# Patient Record
Sex: FEMALE | Race: WHITE | NOT HISPANIC OR LATINO
[De-identification: names, ages, dates, MRNs, and addresses within clinical notes are randomized per-mention and may not be internally consistent; named-entity substitution may affect disease eponyms.]

---

## 2017-01-03 ENCOUNTER — MEDICATION RENEWAL (OUTPATIENT)
Age: 82
End: 2017-01-03

## 2017-02-01 ENCOUNTER — MEDICATION RENEWAL (OUTPATIENT)
Age: 82
End: 2017-02-01

## 2017-02-14 ENCOUNTER — MEDICATION RENEWAL (OUTPATIENT)
Age: 82
End: 2017-02-14

## 2017-03-08 ENCOUNTER — MEDICATION RENEWAL (OUTPATIENT)
Age: 82
End: 2017-03-08

## 2017-04-19 ENCOUNTER — RX RENEWAL (OUTPATIENT)
Age: 82
End: 2017-04-19

## 2017-04-25 ENCOUNTER — APPOINTMENT (OUTPATIENT)
Dept: CARDIOLOGY | Facility: CLINIC | Age: 82
End: 2017-04-25

## 2017-05-02 ENCOUNTER — NON-APPOINTMENT (OUTPATIENT)
Age: 82
End: 2017-05-02

## 2017-05-02 ENCOUNTER — APPOINTMENT (OUTPATIENT)
Dept: CARDIOLOGY | Facility: CLINIC | Age: 82
End: 2017-05-02

## 2017-05-02 VITALS
BODY MASS INDEX: 34.73 KG/M2 | WEIGHT: 196 LBS | DIASTOLIC BLOOD PRESSURE: 65 MMHG | HEIGHT: 63 IN | OXYGEN SATURATION: 88 % | SYSTOLIC BLOOD PRESSURE: 129 MMHG | HEART RATE: 79 BPM

## 2017-05-03 LAB
ALBUMIN SERPL ELPH-MCNC: 3.6 G/DL
ALP BLD-CCNC: 41 U/L
ALT SERPL-CCNC: 14 U/L
ANION GAP SERPL CALC-SCNC: 14 MMOL/L
AST SERPL-CCNC: 22 U/L
BASOPHILS # BLD AUTO: 0.01 K/UL
BASOPHILS NFR BLD AUTO: 0.2 %
BILIRUB SERPL-MCNC: 0.5 MG/DL
BUN SERPL-MCNC: 15 MG/DL
CALCIUM SERPL-MCNC: 8.6 MG/DL
CHLORIDE SERPL-SCNC: 104 MMOL/L
CHOLEST SERPL-MCNC: 92 MG/DL
CHOLEST/HDLC SERPL: 3.2 RATIO
CO2 SERPL-SCNC: 26 MMOL/L
CREAT SERPL-MCNC: 0.99 MG/DL
EOSINOPHIL # BLD AUTO: 0.26 K/UL
EOSINOPHIL NFR BLD AUTO: 4 %
GLUCOSE SERPL-MCNC: 92 MG/DL
HCT VFR BLD CALC: 39.2 %
HDLC SERPL-MCNC: 29 MG/DL
HGB BLD-MCNC: 12.2 G/DL
IMM GRANULOCYTES NFR BLD AUTO: 0.5 %
LDLC SERPL CALC-MCNC: 44 MG/DL
LYMPHOCYTES # BLD AUTO: 1.31 K/UL
LYMPHOCYTES NFR BLD AUTO: 20.4 %
MAN DIFF?: NORMAL
MCHC RBC-ENTMCNC: 29.9 PG
MCHC RBC-ENTMCNC: 31.1 GM/DL
MCV RBC AUTO: 96.1 FL
MONOCYTES # BLD AUTO: 0.78 K/UL
MONOCYTES NFR BLD AUTO: 12.1 %
NEUTROPHILS # BLD AUTO: 4.03 K/UL
NEUTROPHILS NFR BLD AUTO: 62.8 %
PLATELET # BLD AUTO: 153 K/UL
POTASSIUM SERPL-SCNC: 3.9 MMOL/L
PROT SERPL-MCNC: 6.5 G/DL
RBC # BLD: 4.08 M/UL
RBC # FLD: 15.3 %
SODIUM SERPL-SCNC: 144 MMOL/L
TRIGL SERPL-MCNC: 97 MG/DL
TSH SERPL-ACNC: 2.53 UIU/ML
WBC # FLD AUTO: 6.42 K/UL

## 2017-05-12 ENCOUNTER — APPOINTMENT (OUTPATIENT)
Dept: DERMATOLOGY | Facility: CLINIC | Age: 82
End: 2017-05-12

## 2017-05-12 VITALS
SYSTOLIC BLOOD PRESSURE: 124 MMHG | BODY MASS INDEX: 33.66 KG/M2 | WEIGHT: 190 LBS | DIASTOLIC BLOOD PRESSURE: 68 MMHG | HEIGHT: 63 IN

## 2017-05-12 DIAGNOSIS — Z56.0 UNEMPLOYMENT, UNSPECIFIED: ICD-10-CM

## 2017-05-12 DIAGNOSIS — H26.9 UNSPECIFIED CATARACT: ICD-10-CM

## 2017-05-12 DIAGNOSIS — H54.7 UNSPECIFIED VISUAL LOSS: ICD-10-CM

## 2017-05-12 DIAGNOSIS — Z87.898 PERSONAL HISTORY OF OTHER SPECIFIED CONDITIONS: ICD-10-CM

## 2017-05-12 DIAGNOSIS — L82.1 OTHER SEBORRHEIC KERATOSIS: ICD-10-CM

## 2017-05-12 DIAGNOSIS — H91.90 UNSPECIFIED HEARING LOSS, UNSPECIFIED EAR: ICD-10-CM

## 2017-05-12 DIAGNOSIS — Z87.39 PERSONAL HISTORY OF OTHER DISEASES OF THE MUSCULOSKELETAL SYSTEM AND CONNECTIVE TISSUE: ICD-10-CM

## 2017-05-12 SDOH — ECONOMIC STABILITY - INCOME SECURITY: UNEMPLOYMENT, UNSPECIFIED: Z56.0

## 2017-05-15 ENCOUNTER — MEDICATION RENEWAL (OUTPATIENT)
Age: 82
End: 2017-05-15

## 2017-05-19 ENCOUNTER — APPOINTMENT (OUTPATIENT)
Dept: CARDIOLOGY | Facility: CLINIC | Age: 82
End: 2017-05-19

## 2017-05-22 ENCOUNTER — MEDICATION RENEWAL (OUTPATIENT)
Age: 82
End: 2017-05-22

## 2017-06-23 ENCOUNTER — APPOINTMENT (OUTPATIENT)
Dept: CARDIOLOGY | Facility: CLINIC | Age: 82
End: 2017-06-23

## 2017-06-23 VITALS
OXYGEN SATURATION: 87 % | BODY MASS INDEX: 34.2 KG/M2 | DIASTOLIC BLOOD PRESSURE: 74 MMHG | SYSTOLIC BLOOD PRESSURE: 132 MMHG | HEIGHT: 63 IN | RESPIRATION RATE: 16 BRPM | WEIGHT: 193 LBS | HEART RATE: 90 BPM

## 2017-06-23 DIAGNOSIS — I87.2 VENOUS INSUFFICIENCY (CHRONIC) (PERIPHERAL): ICD-10-CM

## 2017-06-26 ENCOUNTER — NON-APPOINTMENT (OUTPATIENT)
Age: 82
End: 2017-06-26

## 2017-06-26 ENCOUNTER — APPOINTMENT (OUTPATIENT)
Dept: CARDIOLOGY | Facility: CLINIC | Age: 82
End: 2017-06-26

## 2017-06-26 VITALS
DIASTOLIC BLOOD PRESSURE: 56 MMHG | SYSTOLIC BLOOD PRESSURE: 125 MMHG | WEIGHT: 192 LBS | HEIGHT: 63 IN | HEART RATE: 76 BPM | BODY MASS INDEX: 34.02 KG/M2 | OXYGEN SATURATION: 85 %

## 2017-06-28 LAB
ANION GAP SERPL CALC-SCNC: 15 MMOL/L
BUN SERPL-MCNC: 18 MG/DL
CALCIUM SERPL-MCNC: 9 MG/DL
CHLORIDE SERPL-SCNC: 103 MMOL/L
CO2 SERPL-SCNC: 28 MMOL/L
CREAT SERPL-MCNC: 1.05 MG/DL
GLUCOSE SERPL-MCNC: 83 MG/DL
POTASSIUM SERPL-SCNC: 4 MMOL/L
SODIUM SERPL-SCNC: 146 MMOL/L

## 2017-06-29 LAB — NT-PROBNP SERPL-MCNC: 1735 PG/ML

## 2017-07-11 ENCOUNTER — APPOINTMENT (OUTPATIENT)
Dept: CARDIOLOGY | Facility: CLINIC | Age: 82
End: 2017-07-11

## 2017-07-17 ENCOUNTER — APPOINTMENT (OUTPATIENT)
Dept: ELECTROPHYSIOLOGY | Facility: CLINIC | Age: 82
End: 2017-07-17
Payer: MEDICARE

## 2017-07-17 PROCEDURE — 93298 REM INTERROG DEV EVAL SCRMS: CPT

## 2017-07-20 ENCOUNTER — RX RENEWAL (OUTPATIENT)
Age: 82
End: 2017-07-20

## 2017-07-26 ENCOUNTER — RX RENEWAL (OUTPATIENT)
Age: 82
End: 2017-07-26

## 2017-08-01 ENCOUNTER — OUTPATIENT (OUTPATIENT)
Dept: INPATIENT UNIT | Facility: HOSPITAL | Age: 82
LOS: 1 days | End: 2017-08-01
Payer: MEDICARE

## 2017-08-01 VITALS
HEIGHT: 63 IN | RESPIRATION RATE: 18 BRPM | HEART RATE: 72 BPM | OXYGEN SATURATION: 93 % | TEMPERATURE: 99 F | WEIGHT: 190.04 LBS | SYSTOLIC BLOOD PRESSURE: 112 MMHG | DIASTOLIC BLOOD PRESSURE: 58 MMHG

## 2017-08-01 DIAGNOSIS — H26.9 UNSPECIFIED CATARACT: Chronic | ICD-10-CM

## 2017-08-01 DIAGNOSIS — R55 SYNCOPE AND COLLAPSE: ICD-10-CM

## 2017-08-01 DIAGNOSIS — Z98.89 OTHER SPECIFIED POSTPROCEDURAL STATES: Chronic | ICD-10-CM

## 2017-08-01 PROCEDURE — 93005 ELECTROCARDIOGRAM TRACING: CPT

## 2017-08-01 PROCEDURE — 33284: CPT

## 2017-08-01 PROCEDURE — 93010 ELECTROCARDIOGRAM REPORT: CPT

## 2017-08-01 NOTE — H&P CARDIOLOGY - HISTORY OF PRESENT ILLNESS
84 yr old female with PMH of syncope (LOOP recorder), MR, HTN, HLD, permanent A Fib -on Pradaxa, mitral valve clipping presents for Loop recorder Explant. 84 yr old female with PMH of syncope (LOOP recorder-2015), MR, HTN, HLD, permanent A Fib -on Pradaxa, mitral valve clipping presents for Loop recorder Explant. 84 yr old female with PMH of syncope (LOOP recorder-2015), MR, HTN, HLD, permanent A Fib -on Pradaxa, Mitral valve clipping-2016 presents for Loop recorder Explant.

## 2017-08-01 NOTE — H&P CARDIOLOGY - PMH
Atrial fibrillation  On Pradaxa  Depression    Hearing loss of left ear    HLD (hyperlipidemia)    HTN (hypertension)    Leg edema    Mitral valve stenosis, unspecified etiology    Syncope

## 2017-08-16 ENCOUNTER — APPOINTMENT (OUTPATIENT)
Dept: ELECTROPHYSIOLOGY | Facility: CLINIC | Age: 82
End: 2017-08-16

## 2017-08-17 ENCOUNTER — APPOINTMENT (OUTPATIENT)
Dept: CARDIOLOGY | Facility: CLINIC | Age: 82
End: 2017-08-17
Payer: MEDICARE

## 2017-08-17 ENCOUNTER — NON-APPOINTMENT (OUTPATIENT)
Age: 82
End: 2017-08-17

## 2017-08-17 VITALS
OXYGEN SATURATION: 87 % | BODY MASS INDEX: 35.08 KG/M2 | SYSTOLIC BLOOD PRESSURE: 133 MMHG | HEART RATE: 80 BPM | HEIGHT: 63 IN | DIASTOLIC BLOOD PRESSURE: 72 MMHG | WEIGHT: 198 LBS

## 2017-08-17 PROCEDURE — 93000 ELECTROCARDIOGRAM COMPLETE: CPT

## 2017-08-17 PROCEDURE — 99214 OFFICE O/P EST MOD 30 MIN: CPT

## 2017-08-22 ENCOUNTER — APPOINTMENT (OUTPATIENT)
Dept: ELECTROPHYSIOLOGY | Facility: CLINIC | Age: 82
End: 2017-08-22

## 2017-08-25 ENCOUNTER — MEDICATION RENEWAL (OUTPATIENT)
Age: 82
End: 2017-08-25

## 2017-11-02 ENCOUNTER — APPOINTMENT (OUTPATIENT)
Dept: DERMATOLOGY | Facility: CLINIC | Age: 82
End: 2017-11-02
Payer: MEDICARE

## 2017-11-02 ENCOUNTER — LABORATORY RESULT (OUTPATIENT)
Age: 82
End: 2017-11-02

## 2017-11-02 DIAGNOSIS — L81.4 OTHER MELANIN HYPERPIGMENTATION: ICD-10-CM

## 2017-11-02 DIAGNOSIS — Z85.828 PERSONAL HISTORY OF OTHER MALIGNANT NEOPLASM OF SKIN: ICD-10-CM

## 2017-11-02 DIAGNOSIS — D48.5 NEOPLASM OF UNCERTAIN BEHAVIOR OF SKIN: ICD-10-CM

## 2017-11-02 DIAGNOSIS — L72.0 EPIDERMAL CYST: ICD-10-CM

## 2017-11-02 DIAGNOSIS — Z12.83 ENCOUNTER FOR SCREENING FOR MALIGNANT NEOPLASM OF SKIN: ICD-10-CM

## 2017-11-02 PROCEDURE — 11100 BX SKIN SUBCUTANEOUS&/MUCOUS MEMBRANE 1 LESION: CPT

## 2017-11-02 PROCEDURE — 99214 OFFICE O/P EST MOD 30 MIN: CPT | Mod: 25

## 2017-11-08 ENCOUNTER — MOBILE ON CALL (OUTPATIENT)
Age: 82
End: 2017-11-08

## 2017-11-15 ENCOUNTER — TRANSCRIPTION ENCOUNTER (OUTPATIENT)
Age: 82
End: 2017-11-15

## 2017-11-15 ENCOUNTER — OUTPATIENT (OUTPATIENT)
Dept: OUTPATIENT SERVICES | Facility: HOSPITAL | Age: 82
LOS: 1 days | End: 2017-11-15
Payer: MEDICARE

## 2017-11-15 ENCOUNTER — APPOINTMENT (OUTPATIENT)
Dept: CT IMAGING | Facility: CLINIC | Age: 82
End: 2017-11-15

## 2017-11-15 DIAGNOSIS — Z00.8 ENCOUNTER FOR OTHER GENERAL EXAMINATION: ICD-10-CM

## 2017-11-15 DIAGNOSIS — H26.9 UNSPECIFIED CATARACT: Chronic | ICD-10-CM

## 2017-11-15 DIAGNOSIS — Z98.89 OTHER SPECIFIED POSTPROCEDURAL STATES: Chronic | ICD-10-CM

## 2017-11-15 PROCEDURE — 71250 CT THORAX DX C-: CPT

## 2017-11-15 PROCEDURE — 74176 CT ABD & PELVIS W/O CONTRAST: CPT | Mod: 26

## 2017-11-15 PROCEDURE — 71250 CT THORAX DX C-: CPT | Mod: 26

## 2017-11-15 PROCEDURE — 74176 CT ABD & PELVIS W/O CONTRAST: CPT

## 2017-12-11 ENCOUNTER — NON-APPOINTMENT (OUTPATIENT)
Age: 82
End: 2017-12-11

## 2017-12-11 ENCOUNTER — APPOINTMENT (OUTPATIENT)
Dept: CARDIOLOGY | Facility: CLINIC | Age: 82
End: 2017-12-11
Payer: MEDICARE

## 2017-12-11 VITALS
HEART RATE: 80 BPM | WEIGHT: 195 LBS | HEIGHT: 63 IN | BODY MASS INDEX: 34.55 KG/M2 | SYSTOLIC BLOOD PRESSURE: 97 MMHG | DIASTOLIC BLOOD PRESSURE: 63 MMHG

## 2017-12-11 PROCEDURE — 99214 OFFICE O/P EST MOD 30 MIN: CPT

## 2017-12-11 PROCEDURE — 93000 ELECTROCARDIOGRAM COMPLETE: CPT

## 2017-12-20 ENCOUNTER — RX RENEWAL (OUTPATIENT)
Age: 82
End: 2017-12-20

## 2018-01-26 ENCOUNTER — TRANSCRIPTION ENCOUNTER (OUTPATIENT)
Age: 83
End: 2018-01-26

## 2018-01-26 ENCOUNTER — EMERGENCY (EMERGENCY)
Facility: HOSPITAL | Age: 83
LOS: 1 days | Discharge: HOME | End: 2018-01-26
Attending: EMERGENCY MEDICINE | Admitting: EMERGENCY MEDICINE
Payer: MEDICARE

## 2018-01-26 VITALS
HEART RATE: 99 BPM | WEIGHT: 184.97 LBS | OXYGEN SATURATION: 94 % | SYSTOLIC BLOOD PRESSURE: 147 MMHG | DIASTOLIC BLOOD PRESSURE: 82 MMHG | RESPIRATION RATE: 20 BRPM | TEMPERATURE: 98 F

## 2018-01-26 DIAGNOSIS — Z98.89 OTHER SPECIFIED POSTPROCEDURAL STATES: Chronic | ICD-10-CM

## 2018-01-26 DIAGNOSIS — H26.9 UNSPECIFIED CATARACT: Chronic | ICD-10-CM

## 2018-01-26 PROCEDURE — 73110 X-RAY EXAM OF WRIST: CPT

## 2018-01-26 PROCEDURE — 73030 X-RAY EXAM OF SHOULDER: CPT

## 2018-01-26 PROCEDURE — 73110 X-RAY EXAM OF WRIST: CPT | Mod: 26,LT

## 2018-01-26 PROCEDURE — 99284 EMERGENCY DEPT VISIT MOD MDM: CPT | Mod: 25

## 2018-01-26 PROCEDURE — 73070 X-RAY EXAM OF ELBOW: CPT

## 2018-01-26 PROCEDURE — 73060 X-RAY EXAM OF HUMERUS: CPT

## 2018-01-26 PROCEDURE — 73030 X-RAY EXAM OF SHOULDER: CPT | Mod: 26,LT

## 2018-01-26 PROCEDURE — 99284 EMERGENCY DEPT VISIT MOD MDM: CPT

## 2018-01-26 PROCEDURE — 73060 X-RAY EXAM OF HUMERUS: CPT | Mod: 26,LT

## 2018-01-26 PROCEDURE — 73070 X-RAY EXAM OF ELBOW: CPT | Mod: 26,LT

## 2018-01-26 RX ORDER — ACETAMINOPHEN 500 MG
975 TABLET ORAL ONCE
Qty: 0 | Refills: 0 | Status: COMPLETED | OUTPATIENT
Start: 2018-01-26 | End: 2018-01-26

## 2018-01-26 RX ORDER — ALPRAZOLAM 0.25 MG
1 TABLET ORAL
Qty: 0 | Refills: 0 | COMMUNITY

## 2018-01-26 RX ORDER — OXYCODONE HYDROCHLORIDE 5 MG/1
1 TABLET ORAL
Qty: 8 | Refills: 0 | OUTPATIENT
Start: 2018-01-26 | End: 2018-01-27

## 2018-01-26 RX ADMIN — Medication 975 MILLIGRAM(S): at 12:58

## 2018-01-26 NOTE — ED PROVIDER NOTE - PROGRESS NOTE DETAILS
Rpt exam after pain Rx -- no discrete L elbow or wrist TTP.  Limited XR of L elbow likely does not require true lateral given lack of obvious displaced fx and low clinical concern for fx at elbow joint.  --BMM Oxycodone Rx provided; pt has adverse rxn to codeine in past -- felt "hyper", no tongue swelling, airway closing, dizziness, etc.  Declined oxy in ED, pain improved after tylenol given.  --ARASH

## 2018-01-26 NOTE — ED CLERICAL - NS ED CLERK NOTE PRE-ARRIVAL INFOMATION; PCP NAME
cesario(x-ray tech) from [facility] [address] [facility]

## 2018-01-26 NOTE — ED PROVIDER NOTE - PHYSICAL EXAMINATION
GENERAL: AAOx4, GCS 15, NAD, WDWN, mild distress 2/2 pain; HEENT: MMM, no jugular venous distension, supple neck, PERRLA, EOMI, nonicteric sclera; PULM: CTA B, no crackles/rubs/rales; CV: RRR, S1S2, no MRG; ABD: Flat abdomen, NTND, no R/G/R, no CVAT.  MSK: L shoulder swelling, TTP at prox hum/mid hum, questionable TTP at L elbow and wrist without deformities.  Significant restriction in range of motion.  Soft compartments, normal skin.  ROMERO, +2 pulses x4;  NEURO: No obvious focal deficits; PSYCH: AAOx3, clear thought and normal sensorium. GENERAL: AAOx4, GCS 15, NAD, WDWN, mild distress 2/2 pain; HEENT: MMM, no jugular venous distension, supple neck, PERRLA, EOMI, nonicteric sclera; PULM: CTA B, no crackles/rubs/rales; CV: RRR, S1S2, no MRG; ABD: Flat abdomen, NTND, no R/G/R, no CVAT.  MSK: L shoulder swelling, TTP at prox hum/mid hum, questionable nonspecific TTP at L elbow and wrist without deformities.  Significant restriction in range of motion.  Soft compartments, normal skin.  ROMERO, +2 pulses x4;  NEURO: No obvious focal deficits; PSYCH: AAOx3, clear thought and normal sensorium.

## 2018-01-26 NOTE — ED PROVIDER NOTE - MEDICAL DECISION MAKING DETAILS
L shoulder pain/swelling after trauma.  Exam c/w hum fx, may be dislocated given restriction in range of motion.  NV intact, good sensation, strength, and rad pulse/CR in LUE.  XR, pain control, reassess.  --BMM

## 2018-01-26 NOTE — ED ADULT NURSE NOTE - OBJECTIVE STATEMENT
84 yo female A&OX3 presents to the ED with the c/o l elbow pain. Pt states that she fell over a chair this morning. C/o l arm pain, no numbness or tingling. Pt has sensation to touch in the l arm, + pulses. Limited ROM in the l arm. Pt c/o  8/10 pain. Pt was seen at urgent care and sent to the ED for x rays. N swelling in the hand r fingers.

## 2018-01-26 NOTE — ED PROVIDER NOTE - OBJECTIVE STATEMENT
85 y F mechanical fall onto L side approx 1 hr ago, L shoulder/elbow/wrist pain, predominantly at shoulder, no paresthesias, +LUE swelling, has not tried moving LUE.  Seen at urgent care center, no XRs performed 2/2 pt's discomfort, advised to come to ED for imaging.  No pain Rx thus far.  No CP/SOB/back pain.  no neck pain, no HA.  No head trauma or LOC.  Stumbled while walking; no dizziness, palps.  Pt unsure if pain radiates, worse c range of motion, sharp.  --BMM      Rx -- Pradaxa, cardizem, lopressor, lipitor, torsemide, ramapril; PRN -- trazadone/sertraline/xanax/ambien/docusate

## 2018-02-03 ENCOUNTER — EMERGENCY (EMERGENCY)
Facility: HOSPITAL | Age: 83
LOS: 1 days | Discharge: HOME | End: 2018-02-03
Attending: STUDENT IN AN ORGANIZED HEALTH CARE EDUCATION/TRAINING PROGRAM | Admitting: STUDENT IN AN ORGANIZED HEALTH CARE EDUCATION/TRAINING PROGRAM
Payer: MEDICARE

## 2018-02-03 VITALS
TEMPERATURE: 99 F | OXYGEN SATURATION: 87 % | RESPIRATION RATE: 24 BRPM | WEIGHT: 184.97 LBS | HEART RATE: 74 BPM | HEIGHT: 63 IN | DIASTOLIC BLOOD PRESSURE: 73 MMHG | SYSTOLIC BLOOD PRESSURE: 132 MMHG

## 2018-02-03 VITALS
RESPIRATION RATE: 25 BRPM | SYSTOLIC BLOOD PRESSURE: 140 MMHG | OXYGEN SATURATION: 94 % | DIASTOLIC BLOOD PRESSURE: 98 MMHG | TEMPERATURE: 99 F | HEART RATE: 86 BPM

## 2018-02-03 DIAGNOSIS — H26.9 UNSPECIFIED CATARACT: Chronic | ICD-10-CM

## 2018-02-03 DIAGNOSIS — Z98.89 OTHER SPECIFIED POSTPROCEDURAL STATES: Chronic | ICD-10-CM

## 2018-02-03 PROCEDURE — 71101 X-RAY EXAM UNILAT RIBS/CHEST: CPT | Mod: 26,RT

## 2018-02-03 PROCEDURE — 72070 X-RAY EXAM THORAC SPINE 2VWS: CPT

## 2018-02-03 PROCEDURE — 12001 RPR S/N/AX/GEN/TRNK 2.5CM/<: CPT

## 2018-02-03 PROCEDURE — 71100 X-RAY EXAM RIBS UNI 2 VIEWS: CPT

## 2018-02-03 PROCEDURE — 72070 X-RAY EXAM THORAC SPINE 2VWS: CPT | Mod: 26

## 2018-02-03 PROCEDURE — 71045 X-RAY EXAM CHEST 1 VIEW: CPT

## 2018-02-03 PROCEDURE — 70450 CT HEAD/BRAIN W/O DYE: CPT | Mod: 26

## 2018-02-03 PROCEDURE — 99284 EMERGENCY DEPT VISIT MOD MDM: CPT | Mod: 25

## 2018-02-03 PROCEDURE — 70450 CT HEAD/BRAIN W/O DYE: CPT

## 2018-02-03 RX ORDER — OXYCODONE AND ACETAMINOPHEN 5; 325 MG/1; MG/1
1 TABLET ORAL EVERY 4 HOURS
Qty: 0 | Refills: 0 | Status: DISCONTINUED | OUTPATIENT
Start: 2018-02-03 | End: 2018-02-03

## 2018-02-03 RX ADMIN — OXYCODONE AND ACETAMINOPHEN 1 TABLET(S): 5; 325 TABLET ORAL at 05:00

## 2018-02-03 NOTE — ED ADULT NURSE NOTE - OBJECTIVE STATEMENT
Pt was in the bathroom when she turned to flush the toilet and fell into the tub. Pt hit the back of her head and her upper back. Denies any LOC, no dizziness or CP prior to fall. Pt is now awake, alert and conversant with clear speech. LUE is in a sling from a fall 1 week ago. Pt has small laceration to back of her head. Pt was in the bathroom when she turned to flush the toilet and fell into the tub. Pt hit the back of her head and her upper back. Denies any LOC, no dizziness or CP prior to fall. Pt is now awake, alert and conversant with clear speech. LUE is in a sling from a fall 1 week ago. Pt has small laceration to back of her head. Pt has pain right upper back, rib area tender to palpation. Pt denies any difficulty breathing, is tachypneic and has decreased SpO2 in RA, pt and  describe this is normal respiratory status for pt.

## 2018-02-03 NOTE — ED PROVIDER NOTE - CARE PLAN
Principal Discharge DX:	Laceration of scalp, initial encounter  Secondary Diagnosis:	Acute right-sided thoracic back pain  Secondary Diagnosis:	Fall, initial encounter

## 2018-02-03 NOTE — ED PROVIDER NOTE - MUSCULOSKELETAL, MLM
Spine appears normal, range of motion is not limited, no muscle or joint tenderness. No T/L/S spine tenderness or deformity, hips and pelvis stable, LUE in sling

## 2018-02-03 NOTE — ED PROVIDER NOTE - OBJECTIVE STATEMENT
85 year old female with a history of HTN, high cholesterol, a-fib on Pradaxa presents s/p a mechanical fall at home.  Patient was in the bathroom, slipped and fell backwards into the bathtub. She hit the back of her head and her right upper back. No LOC. She was seen at [facility] on 1/26 also for mechanical fall and she suffered a left h 85 year old female with a history of HTN, high cholesterol, a-fib on Pradaxa presents s/p a mechanical fall at home.  Patient was in the bathroom, slipped and fell backwards into the bathtub. She hit the back of her head and her right upper back. No LOC. She was seen at [facility] on 1/26 also for mechanical fall and she suffered a left proximal humerus fracture.  Tetanus UTD, patient on 3 liters home O2 at night.  PMD Devyn Silverman, Cardiology [facility]

## 2018-02-03 NOTE — ED PROVIDER NOTE - PHYSICAL EXAMINATION
1cm vertical laceration to occipital scalp with no active bleeding.  Full ROM of c-spine, no step-off or deformity

## 2018-02-03 NOTE — ED PROVIDER NOTE - CONSTITUTIONAL, MLM
normal... Well appearing, well nourished, awake, alert, oriented to person, place, time/situation. Patient uncomfortable, obese

## 2018-02-03 NOTE — ED PROVIDER NOTE - CARDIAC, MLM
Normal rate, regular rhythm.  Heart sounds S1, S2.  No murmurs, rubs or gallops. No ecchymosis to chest or back, tenderness with right lower ribs, no deformity or splinting

## 2018-02-03 NOTE — ED ADULT NURSE REASSESSMENT NOTE - NS ED NURSE REASSESS COMMENT FT1
Pt is sleeping between care, partial pain relief following pain med. Pt and her  informed radiology results are pending. Head laceration irrigated with saline and staple inserted by the MD.

## 2018-02-03 NOTE — ED PROVIDER NOTE - PROGRESS NOTE DETAILS
Patient feels well. Informed of results of CT and x-rays,  (Discussed x-rays with radiologist Dr. Briceño, no evidence of rib fracture or lung injury.)  Advised patient to f/u with PMD and continue pradaxa.  She has an orthopedist, Dr. Riggs Patient feels well. Informed of results of CT and x-rays,  (Discussed x-rays with radiologist Dr. Briceño, no evidence of rib fracture or lung injury.)  Advised patient to f/u with PMD and continue pradaxa.  She has an orthopedist, Dr. Riggs. Has oxycodone at home, advised caution since she is a fall-risk

## 2018-02-21 ENCOUNTER — INPATIENT (INPATIENT)
Facility: HOSPITAL | Age: 83
LOS: 4 days | Discharge: HOME HEALTH | DRG: 871 | End: 2018-02-26
Attending: INTERNAL MEDICINE | Admitting: INTERNAL MEDICINE
Payer: MEDICARE

## 2018-02-21 VITALS
HEART RATE: 136 BPM | RESPIRATION RATE: 20 BRPM | OXYGEN SATURATION: 95 % | DIASTOLIC BLOOD PRESSURE: 90 MMHG | TEMPERATURE: 103 F | SYSTOLIC BLOOD PRESSURE: 137 MMHG

## 2018-02-21 DIAGNOSIS — I48.91 UNSPECIFIED ATRIAL FIBRILLATION: ICD-10-CM

## 2018-02-21 DIAGNOSIS — Z98.89 OTHER SPECIFIED POSTPROCEDURAL STATES: Chronic | ICD-10-CM

## 2018-02-21 DIAGNOSIS — R65.10 SYSTEMIC INFLAMMATORY RESPONSE SYNDROME (SIRS) OF NON-INFECTIOUS ORIGIN WITHOUT ACUTE ORGAN DYSFUNCTION: ICD-10-CM

## 2018-02-21 DIAGNOSIS — R50.9 FEVER, UNSPECIFIED: ICD-10-CM

## 2018-02-21 DIAGNOSIS — E78.5 HYPERLIPIDEMIA, UNSPECIFIED: ICD-10-CM

## 2018-02-21 DIAGNOSIS — G93.40 ENCEPHALOPATHY, UNSPECIFIED: ICD-10-CM

## 2018-02-21 DIAGNOSIS — Z29.9 ENCOUNTER FOR PROPHYLACTIC MEASURES, UNSPECIFIED: ICD-10-CM

## 2018-02-21 DIAGNOSIS — H26.9 UNSPECIFIED CATARACT: Chronic | ICD-10-CM

## 2018-02-21 DIAGNOSIS — I10 ESSENTIAL (PRIMARY) HYPERTENSION: ICD-10-CM
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
## 2018-02-21 LAB
ALBUMIN SERPL ELPH-MCNC: 3.5 G/DL — SIGNIFICANT CHANGE UP (ref 3.3–5)
ALP SERPL-CCNC: 65 U/L — SIGNIFICANT CHANGE UP (ref 40–120)
ALT FLD-CCNC: 13 U/L RC — SIGNIFICANT CHANGE UP (ref 10–45)
ANION GAP SERPL CALC-SCNC: 14 MMOL/L — SIGNIFICANT CHANGE UP (ref 5–17)
APPEARANCE UR: CLEAR — SIGNIFICANT CHANGE UP
AST SERPL-CCNC: 29 U/L — SIGNIFICANT CHANGE UP (ref 10–40)
BASOPHILS # BLD AUTO: 0 K/UL — SIGNIFICANT CHANGE UP (ref 0–0.2)
BILIRUB SERPL-MCNC: 1.4 MG/DL — HIGH (ref 0.2–1.2)
BILIRUB UR-MCNC: NEGATIVE — SIGNIFICANT CHANGE UP
BUN SERPL-MCNC: 19 MG/DL — SIGNIFICANT CHANGE UP (ref 7–23)
CALCIUM SERPL-MCNC: 8.6 MG/DL — SIGNIFICANT CHANGE UP (ref 8.4–10.5)
CHLORIDE SERPL-SCNC: 97 MMOL/L — SIGNIFICANT CHANGE UP (ref 96–108)
CO2 SERPL-SCNC: 25 MMOL/L — SIGNIFICANT CHANGE UP (ref 22–31)
COLOR SPEC: YELLOW — SIGNIFICANT CHANGE UP
CREAT SERPL-MCNC: 0.74 MG/DL — SIGNIFICANT CHANGE UP (ref 0.5–1.3)
DIFF PNL FLD: ABNORMAL
EOSINOPHIL # BLD AUTO: 0.1 K/UL — SIGNIFICANT CHANGE UP (ref 0–0.5)
EOSINOPHIL NFR BLD AUTO: 1 % — SIGNIFICANT CHANGE UP (ref 0–6)
GAS PNL BLDV: SIGNIFICANT CHANGE UP
GLUCOSE SERPL-MCNC: 158 MG/DL — HIGH (ref 70–99)
GLUCOSE UR QL: NEGATIVE — SIGNIFICANT CHANGE UP
HCT VFR BLD CALC: 35.8 % — SIGNIFICANT CHANGE UP (ref 34.5–45)
HGB BLD-MCNC: 12.3 G/DL — SIGNIFICANT CHANGE UP (ref 11.5–15.5)
KETONES UR-MCNC: NEGATIVE — SIGNIFICANT CHANGE UP
LEUKOCYTE ESTERASE UR-ACNC: NEGATIVE — SIGNIFICANT CHANGE UP
LYMPHOCYTES # BLD AUTO: 2.8 K/UL — SIGNIFICANT CHANGE UP (ref 1–3.3)
LYMPHOCYTES # BLD AUTO: 32 % — SIGNIFICANT CHANGE UP (ref 13–44)
MCHC RBC-ENTMCNC: 32.7 PG — SIGNIFICANT CHANGE UP (ref 27–34)
MCHC RBC-ENTMCNC: 34.4 GM/DL — SIGNIFICANT CHANGE UP (ref 32–36)
MCV RBC AUTO: 95 FL — SIGNIFICANT CHANGE UP (ref 80–100)
MONOCYTES # BLD AUTO: 0.7 K/UL — SIGNIFICANT CHANGE UP (ref 0–0.9)
MONOCYTES NFR BLD AUTO: 8 % — SIGNIFICANT CHANGE UP (ref 2–14)
NEUTROPHILS # BLD AUTO: 5.2 K/UL — SIGNIFICANT CHANGE UP (ref 1.8–7.4)
NEUTROPHILS NFR BLD AUTO: 53 % — SIGNIFICANT CHANGE UP (ref 43–77)
NITRITE UR-MCNC: NEGATIVE — SIGNIFICANT CHANGE UP
PH UR: 6 — SIGNIFICANT CHANGE UP (ref 5–8)
PLATELET # BLD AUTO: 105 K/UL — LOW (ref 150–400)
POTASSIUM SERPL-MCNC: 3.5 MMOL/L — SIGNIFICANT CHANGE UP (ref 3.5–5.3)
POTASSIUM SERPL-SCNC: 3.5 MMOL/L — SIGNIFICANT CHANGE UP (ref 3.5–5.3)
PROT SERPL-MCNC: 6.6 G/DL — SIGNIFICANT CHANGE UP (ref 6–8.3)
PROT UR-MCNC: 100 MG/DL
RAPID RVP RESULT: SIGNIFICANT CHANGE UP
RBC # BLD: 3.77 M/UL — LOW (ref 3.8–5.2)
RBC # FLD: 14.4 % — SIGNIFICANT CHANGE UP (ref 10.3–14.5)
SODIUM SERPL-SCNC: 136 MMOL/L — SIGNIFICANT CHANGE UP (ref 135–145)
SP GR SPEC: 1.02 — SIGNIFICANT CHANGE UP (ref 1.01–1.02)
UROBILINOGEN FLD QL: NEGATIVE — SIGNIFICANT CHANGE UP
WBC # BLD: 8.9 K/UL — SIGNIFICANT CHANGE UP (ref 3.8–10.5)
WBC # FLD AUTO: 8.9 K/UL — SIGNIFICANT CHANGE UP (ref 3.8–10.5)

## 2018-02-21 PROCEDURE — 71045 X-RAY EXAM CHEST 1 VIEW: CPT | Mod: 26

## 2018-02-21 PROCEDURE — 99285 EMERGENCY DEPT VISIT HI MDM: CPT

## 2018-02-21 PROCEDURE — 99223 1ST HOSP IP/OBS HIGH 75: CPT

## 2018-02-21 RX ORDER — SODIUM CHLORIDE 9 MG/ML
1000 INJECTION INTRAMUSCULAR; INTRAVENOUS; SUBCUTANEOUS
Qty: 0 | Refills: 0 | Status: COMPLETED | OUTPATIENT
Start: 2018-02-21 | End: 2018-02-21

## 2018-02-21 RX ORDER — ACETAMINOPHEN 500 MG
1000 TABLET ORAL ONCE
Qty: 0 | Refills: 0 | Status: COMPLETED | OUTPATIENT
Start: 2018-02-21 | End: 2018-02-21

## 2018-02-21 RX ORDER — DABIGATRAN ETEXILATE MESYLATE 150 MG/1
150 CAPSULE ORAL EVERY 12 HOURS
Qty: 0 | Refills: 0 | Status: DISCONTINUED | OUTPATIENT
Start: 2018-02-21 | End: 2018-02-26

## 2018-02-21 RX ORDER — PIPERACILLIN AND TAZOBACTAM 4; .5 G/20ML; G/20ML
3.38 INJECTION, POWDER, LYOPHILIZED, FOR SOLUTION INTRAVENOUS EVERY 8 HOURS
Qty: 0 | Refills: 0 | Status: DISCONTINUED | OUTPATIENT
Start: 2018-02-21 | End: 2018-02-23

## 2018-02-21 RX ORDER — TRAZODONE HCL 50 MG
50 TABLET ORAL AT BEDTIME
Qty: 0 | Refills: 0 | Status: DISCONTINUED | OUTPATIENT
Start: 2018-02-21 | End: 2018-02-26

## 2018-02-21 RX ORDER — PIPERACILLIN AND TAZOBACTAM 4; .5 G/20ML; G/20ML
3.38 INJECTION, POWDER, LYOPHILIZED, FOR SOLUTION INTRAVENOUS EVERY 8 HOURS
Qty: 0 | Refills: 0 | Status: DISCONTINUED | OUTPATIENT
Start: 2018-02-21 | End: 2018-02-21

## 2018-02-21 RX ORDER — VANCOMYCIN HCL 1 G
1000 VIAL (EA) INTRAVENOUS ONCE
Qty: 0 | Refills: 0 | Status: COMPLETED | OUTPATIENT
Start: 2018-02-21 | End: 2018-02-21

## 2018-02-21 RX ORDER — SODIUM CHLORIDE 9 MG/ML
1000 INJECTION INTRAMUSCULAR; INTRAVENOUS; SUBCUTANEOUS ONCE
Qty: 0 | Refills: 0 | Status: COMPLETED | OUTPATIENT
Start: 2018-02-21 | End: 2018-02-21

## 2018-02-21 RX ORDER — FUROSEMIDE 40 MG
40 TABLET ORAL ONCE
Qty: 0 | Refills: 0 | Status: COMPLETED | OUTPATIENT
Start: 2018-02-21 | End: 2018-02-21

## 2018-02-21 RX ORDER — ATORVASTATIN CALCIUM 80 MG/1
10 TABLET, FILM COATED ORAL AT BEDTIME
Qty: 0 | Refills: 0 | Status: DISCONTINUED | OUTPATIENT
Start: 2018-02-21 | End: 2018-02-26

## 2018-02-21 RX ORDER — DOCUSATE SODIUM 100 MG
100 CAPSULE ORAL DAILY
Qty: 0 | Refills: 0 | Status: DISCONTINUED | OUTPATIENT
Start: 2018-02-21 | End: 2018-02-26

## 2018-02-21 RX ORDER — SODIUM CHLORIDE 9 MG/ML
1000 INJECTION INTRAMUSCULAR; INTRAVENOUS; SUBCUTANEOUS ONCE
Qty: 0 | Refills: 0 | Status: DISCONTINUED | OUTPATIENT
Start: 2018-02-21 | End: 2018-02-21

## 2018-02-21 RX ORDER — FAMOTIDINE 10 MG/ML
20 INJECTION INTRAVENOUS DAILY
Qty: 0 | Refills: 0 | Status: DISCONTINUED | OUTPATIENT
Start: 2018-02-21 | End: 2018-02-26

## 2018-02-21 RX ORDER — RAMIPRIL 5 MG
1 CAPSULE ORAL
Qty: 0 | Refills: 0 | COMMUNITY

## 2018-02-21 RX ORDER — SERTRALINE 25 MG/1
100 TABLET, FILM COATED ORAL DAILY
Qty: 0 | Refills: 0 | Status: DISCONTINUED | OUTPATIENT
Start: 2018-02-21 | End: 2018-02-26

## 2018-02-21 RX ORDER — PIPERACILLIN AND TAZOBACTAM 4; .5 G/20ML; G/20ML
3.38 INJECTION, POWDER, LYOPHILIZED, FOR SOLUTION INTRAVENOUS ONCE
Qty: 0 | Refills: 0 | Status: DISCONTINUED | OUTPATIENT
Start: 2018-02-21 | End: 2018-02-21

## 2018-02-21 RX ORDER — METOPROLOL TARTRATE 50 MG
200 TABLET ORAL
Qty: 0 | Refills: 0 | Status: DISCONTINUED | OUTPATIENT
Start: 2018-02-21 | End: 2018-02-26

## 2018-02-21 RX ORDER — ASCORBIC ACID 60 MG
1000 TABLET,CHEWABLE ORAL DAILY
Qty: 0 | Refills: 0 | Status: DISCONTINUED | OUTPATIENT
Start: 2018-02-21 | End: 2018-02-26

## 2018-02-21 RX ORDER — CHOLECALCIFEROL (VITAMIN D3) 125 MCG
2000 CAPSULE ORAL DAILY
Qty: 0 | Refills: 0 | Status: DISCONTINUED | OUTPATIENT
Start: 2018-02-21 | End: 2018-02-26

## 2018-02-21 RX ORDER — ALPRAZOLAM 0.25 MG
0 TABLET ORAL
Qty: 0 | Refills: 0 | COMMUNITY

## 2018-02-21 RX ORDER — SODIUM CHLORIDE 9 MG/ML
3 INJECTION INTRAMUSCULAR; INTRAVENOUS; SUBCUTANEOUS ONCE
Qty: 0 | Refills: 0 | Status: COMPLETED | OUTPATIENT
Start: 2018-02-21 | End: 2018-02-21

## 2018-02-21 RX ORDER — PIPERACILLIN AND TAZOBACTAM 4; .5 G/20ML; G/20ML
3.38 INJECTION, POWDER, LYOPHILIZED, FOR SOLUTION INTRAVENOUS ONCE
Qty: 0 | Refills: 0 | Status: COMPLETED | OUTPATIENT
Start: 2018-02-21 | End: 2018-02-21

## 2018-02-21 RX ORDER — TRAZODONE HCL 50 MG
0 TABLET ORAL
Qty: 0 | Refills: 0 | COMMUNITY

## 2018-02-21 RX ORDER — DILTIAZEM HCL 120 MG
360 CAPSULE, EXT RELEASE 24 HR ORAL DAILY
Qty: 0 | Refills: 0 | Status: DISCONTINUED | OUTPATIENT
Start: 2018-02-21 | End: 2018-02-26

## 2018-02-21 RX ORDER — VANCOMYCIN HCL 1 G
1000 VIAL (EA) INTRAVENOUS EVERY 12 HOURS
Qty: 0 | Refills: 0 | Status: DISCONTINUED | OUTPATIENT
Start: 2018-02-21 | End: 2018-02-23

## 2018-02-21 RX ADMIN — Medication 250 MILLIGRAM(S): at 15:44

## 2018-02-21 RX ADMIN — DABIGATRAN ETEXILATE MESYLATE 150 MILLIGRAM(S): 150 CAPSULE ORAL at 23:42

## 2018-02-21 RX ADMIN — SODIUM CHLORIDE 3 MILLILITER(S): 9 INJECTION INTRAMUSCULAR; INTRAVENOUS; SUBCUTANEOUS at 15:20

## 2018-02-21 RX ADMIN — Medication 400 MILLIGRAM(S): at 15:40

## 2018-02-21 RX ADMIN — PIPERACILLIN AND TAZOBACTAM 200 GRAM(S): 4; .5 INJECTION, POWDER, LYOPHILIZED, FOR SOLUTION INTRAVENOUS at 15:43

## 2018-02-21 RX ADMIN — SODIUM CHLORIDE 75 MILLILITER(S): 9 INJECTION INTRAMUSCULAR; INTRAVENOUS; SUBCUTANEOUS at 22:36

## 2018-02-21 RX ADMIN — ATORVASTATIN CALCIUM 10 MILLIGRAM(S): 80 TABLET, FILM COATED ORAL at 23:42

## 2018-02-21 RX ADMIN — SODIUM CHLORIDE 1000 MILLILITER(S): 9 INJECTION INTRAMUSCULAR; INTRAVENOUS; SUBCUTANEOUS at 15:45

## 2018-02-21 RX ADMIN — Medication 40 MILLIGRAM(S): at 17:14

## 2018-02-21 RX ADMIN — PIPERACILLIN AND TAZOBACTAM 25 GRAM(S): 4; .5 INJECTION, POWDER, LYOPHILIZED, FOR SOLUTION INTRAVENOUS at 23:42

## 2018-02-21 NOTE — H&P ADULT - PROBLEM SELECTOR PLAN 2
s/p 1L NS  will continue gentle hydration as mentioned above  f/u blood culture and urine culture  c/w empiric abx with IV vancomycin and IV zosyn  ID consult appreciated

## 2018-02-21 NOTE — H&P ADULT - PROBLEM SELECTOR PLAN 6
c/w atorvastatin Hold torsemide for now given patient appears to be dehydrated  c/w gentle hydration, monitor closely for fluid overload

## 2018-02-21 NOTE — H&P ADULT - HISTORY OF PRESENT ILLNESS
84 yo female with PMH of HTN, HLD, Afib on pradaxa, MVR, syncope, multiple falls, presents here with altered mental status. As per daughter, patient was at her baseline mental status on Sunday.  At baseline, she is alert and oriented and she is able to communicate well.  On Monday, she complained of abdominal cramping pain.  On Tuesday, she appeared to be more weak and lethargic.  Daughter spoke to her on the phone today and patient was not able to speak in complete sentences and was not coherent.  Daughter states that patient had a fall few weeks ago and had a left humerus fracture.  Few days later, she again fell and had head trauma, was taken to hospital where she had CT brain that was negative, required suturing of the back of the head. She again slid off the bed last night, which was not witnessed.  Patient states she did not hit her hear and had no loss of consciousness.  Her  helped her up from the floor.  For last few days patient has not been eating or drinking well.  She is supposed to use her home oxygen, but she is not complaint.  She also is not compliant with her diuretics torsemide.  Patient otherwise had no nausea, vomiting, chest pain, SOB, cough, runny nose, sick contact or recent travel.  At baseline she does have some SOB with exertion, that has not changed.  She did report on episode of loose stool few days ago.  She reports no fever, dysuria, hematuria.  On arrival to ED, patient was found to have a fever of 102.7, /90, HR of 136.  She was given 1L NS and then also received lasix 40mg IV x 1. She also received IV vancomycin and zosyn.  On my evaluation, patient was sleeping initially, appeared lethargic, easily arousable, alert and oriented x 3.

## 2018-02-21 NOTE — H&P ADULT - PROBLEM SELECTOR PLAN 1
Patient with AMS, currently AAO x 3.  Unclear etiology  unlikely intracranial process even though patient had few recent falls.  If mental status worsens and patient has decreased mentation or develops any focal neurological deficits, will consider urgent CT brain.  For now, will hold off  given fever of 102, suspect underlying infectious process  urinalysis negative, CXR does not show opacities; patient has no leukocytosis and no symptoms  suspect viral process; will check RVP  s/p 1L fluid, patient still appears to be somewhat dry; will c/w gentle hydration with NS at 75cc/hr x 1L  f/u blood culture and urine culture  c/w gentle hydration with IV vancomycin and IV zosyn  consider neuro eval and LP if worsening mental status; at this time there is no nuchal rigidity and negative kernig's and brudzinski sign

## 2018-02-21 NOTE — H&P ADULT - NSHPPHYSICALEXAM_GEN_ALL_CORE
PHYSICAL EXAM:  Vital Signs Last 24 Hrs  T(C): 36.9 (02-21-18 @ 21:15)  T(F): 98.4 (02-21-18 @ 21:15), Max: 102.7 (02-21-18 @ 14:20)  HR: 101 (02-21-18 @ 21:15) (101 - 136)  BP: 109/70 (02-21-18 @ 21:15)  BP(mean): --  RR: 20 (02-21-18 @ 21:15) (20 - 22)  SpO2: 100% (02-21-18 @ 21:15) (95% - 100%)  Wt(kg): --    Constitutional: lethargic, arousable to voice and tactile stimuli  EYES: EOMI  ENMT:  Normal Hearing, no tonsillar exudates ; dry mucous membrane  Neck: Soft and supple, No JVD  Lungs: Breath sounds are clear bilaterally, No wheezing, rales or rhonchi; ?rales right base  Heart: S1 and S2, irregularly irregular, no Murmurs, gallops or rubs  Abdomen: Bowel Sounds present, soft, nontender, nondistended, no guarding, no rebound  Extremities: No cyanosis or clubbing; warm to touch  Vascular: 2+ peripheral pulses lower ex  Neurological: A/O x 3, no focal deficits  Musculoskeletal: 5/5 strength right upper extremity, b/l lower extremities; left arm tenderness limited movement due to pain  Skin: No rashes  Psych: no depression or anhedonia  HEME: no bruises, no nose bleeds PHYSICAL EXAM:  Vital Signs Last 24 Hrs  T(C): 36.9 (02-21-18 @ 21:15)  T(F): 98.4 (02-21-18 @ 21:15), Max: 102.7 (02-21-18 @ 14:20)  HR: 101 (02-21-18 @ 21:15) (101 - 136)  BP: 109/70 (02-21-18 @ 21:15)  BP(mean): --  RR: 20 (02-21-18 @ 21:15) (20 - 22)  SpO2: 100% (02-21-18 @ 21:15) (95% - 100%)  Wt(kg): --    Constitutional: lethargic, arousable to voice and tactile stimuli  EYES: EOMI  ENMT:  Normal Hearing, no tonsillar exudates ; dry mucous membrane  Neck: Soft and supple, No JVD  Lungs: Breath sounds are clear bilaterally, No wheezing, rales or rhonchi; ?rales right base  Heart: S1 and S2, irregularly irregular, no Murmurs, gallops or rubs  Abdomen: Bowel Sounds present, soft, nontender, nondistended, no guarding, no rebound  Extremities: No cyanosis or clubbing; warm to touch  Vascular: 2+ peripheral pulses lower ex  Neurological: A/O x 3, no focal deficits; negative nuchal rigidity and negative kernig's and brudzinski sign  Musculoskeletal: 5/5 strength right upper extremity, b/l lower extremities; left arm tenderness limited movement due to pain  Skin: No rashes  Psych: no depression or anhedonia  HEME: no bruises, no nose bleeds

## 2018-02-21 NOTE — ED PROVIDER NOTE - ATTENDING CONTRIBUTION TO CARE
Attending Note (Ade): patient coming in with confusion and sob.  patient non compliant with home O2 and lasix.  tachypnea. no confusion on my exam.  hypoxia on room air.  concern for chf exacerbation vs pna vs flu.  labs, cxr, nebs, admit.

## 2018-02-21 NOTE — ED PROVIDER NOTE - OBJECTIVE STATEMENT
Patient is an 86yo F with PMH A.fib on pradaxa, mitral valve repair?, syncope, and current left humerus fx and head injury s/p two recent falls in the past month. Patient is an 86yo F with PMH HTN, HLD, A.fib on pradaxa, mitral valve repair?, syncope, and current left humerus fx and head injury s/p two recent falls in the past month presenting with poor PO intake, abd pain, and headache x 2 days. Per family at bedside, patient not eating and drinking well for two days and pt c/o general abd pain and HA yesterday. Pt currently denies any headache. Per family, patient also SOB. Patient denies any difficulty breathing. Per daughter, patient is supposed to wear O2 at home but is noncompliant. Patient denies fever/chills but is febrile in ED. Denies CP, palpitations, cough, n/v/d, dysuria, hematuria Patient is an 84yo F with PMH HTN, HLD, A.fib on pradaxa, mitral valve repair?, syncope, and current left humerus fx and head injury s/p two recent falls in the past month presenting with poor PO intake, abd pain, and headache x 2 days. Per family at bedside, patient not eating and drinking well for two days and pt c/o general abd pain and HA yesterday. Pt currently denies any headache. Per family, patient also SOB. Patient denies any difficulty breathing. Per daughter, patient is supposed to wear O2 at home but is noncompliant. Patient denies fever/chills but is febrile in ED. Denies CP, palpitations, cough, n/v/d, dysuria, hematuria    PMD Devyn Silverman  Cardiologist [facility] Patient is an 84yo F with PMH HTN, HLD, A.fib on pradaxa, mitral valve repair?, syncope, and current left humerus fx and head injury s/p two recent falls in the past month presenting with poor PO intake, abd pain, and headache x 2 days. Per family at bedside, patient not eating and drinking well for two days and pt c/o general abd pain and HA yesterday. Pt currently denies any headache. Per family, patient also SOB. Patient denies any difficulty breathing. Per daughter, patient is supposed to wear O2 at home but is noncompliant. Patient denies fever/chills but is febrile in ED. Denies CP, palpitations, cough, n/v/d, dysuria, hematuria    PMD Devyn Silverman ([facility])  Cardiologist [facility] Patient is an 86yo F with PMH HTN, HLD, A.fib on pradaxa, mitral valve repair?, syncope, and current left humerus fx and head injury s/p two recent falls in the past month presenting with poor PO intake, cramping abd pain, and headache x 2 days. Per family at bedside, patient not eating and drinking well for two days and pt c/o generalized abd cramping and HA yesterday. Pt currently denies any headache. Per family, patient also SOB. Patient denies any difficulty breathing. Per daughter, patient is supposed to wear O2 at home but is noncompliant. Patient denies fever/chills but is febrile in ED. Denies CP, palpitations, cough, n/v/d, dysuria, hematuria    PMD Devyn Silverman ([facility])  Cardiologist [facility]

## 2018-02-21 NOTE — H&P ADULT - NSHPLABSRESULTS_GEN_ALL_CORE
Labs personally reviewed:                          12.3   8.9   )-----------( 105      ( 2018 15:42 )             35.8     -    136  |  97  |  19  ----------------------------<  158<H>  3.5   |  25  |  0.74    Ca    8.6      2018 15:42    TPro  6.6  /  Alb  3.5  /  TBili  1.4<H>  /  DBili  x   /  AST  29  /  ALT  13  /  AlkPhos  65          LIVER FUNCTIONS - ( 2018 15:42 )  Alb: 3.5 g/dL / Pro: 6.6 g/dL / ALK PHOS: 65 U/L / ALT: 13 U/L RC / AST: 29 U/L / GGT: x             Urinalysis Basic - ( 2018 15:42 )    Color: Yellow / Appearance: Clear / S.020 / pH: x  Gluc: x / Ketone: Negative  / Bili: Negative / Urobili: Negative   Blood: x / Protein: 100 mg/dL / Nitrite: Negative   Leuk Esterase: Negative / RBC: 3-5 /HPF / WBC 0-2 /HPF   Sq Epi: x / Non Sq Epi: OCC /HPF / Bacteria: Few /HPF      CAPILLARY BLOOD GLUCOSE          Imaging:  CXR personally reviewed: no focal opacity    Reviewed previous results:   : CT brain: negative; XR ribs: no displaced right sided rib fracture; old right 4th rib fracture  XR thoracic spine: multilevel degenerative changes; XR arms: redemonstration of left proximal humerus comminuted fracture    EKG personally reviewed: afib, RVR rate 121

## 2018-02-21 NOTE — H&P ADULT - NSHPREVIEWOFSYSTEMS_GEN_ALL_CORE
CONSTITUTIONAL: generalized weakness  EYES/ENT: No visual changes;  No dysphagia  NECK: No pain or stiffness  RESPIRATORY: No cough, wheezing, hemoptysis; + shortness of breath at baseline  CARDIOVASCULAR: No chest pain or palpitations; + lower extremity edema  EXTREMITIES: + le edema, cyanosis, clubbing  MUSCULOSKELETAL: + joint pain, no swelling  GASTROINTESTINAL: No abdominal or epigastric pain. No nausea, vomiting, or hematemesis; one episode of diarrhea. No melena or hematochezia.  BACK: No back pain  GENITOURINARY: No dysuria, frequency or hematuria  NEUROLOGICAL: No numbness or weakness  SKIN: No itching, burning, rashes, or lesions   PSYCH: no agitation  All other review of systems is negative unless indicated above.

## 2018-02-21 NOTE — H&P ADULT - ASSESSMENT
84 yo female with PMH of HTN, HLD, Afib on pradaxa, MVR, syncope, multiple falls, presents here with altered mental status.

## 2018-02-22 LAB
ALBUMIN SERPL ELPH-MCNC: 3 G/DL — LOW (ref 3.3–5)
ALP SERPL-CCNC: 59 U/L — SIGNIFICANT CHANGE UP (ref 40–120)
ALT FLD-CCNC: 19 U/L — SIGNIFICANT CHANGE UP (ref 10–45)
ANION GAP SERPL CALC-SCNC: 16 MMOL/L — SIGNIFICANT CHANGE UP (ref 5–17)
AST SERPL-CCNC: 36 U/L — SIGNIFICANT CHANGE UP (ref 10–40)
BASOPHILS # BLD AUTO: 0.01 K/UL — SIGNIFICANT CHANGE UP (ref 0–0.2)
BASOPHILS NFR BLD AUTO: 0.2 % — SIGNIFICANT CHANGE UP (ref 0–2)
BILIRUB SERPL-MCNC: 1.2 MG/DL — SIGNIFICANT CHANGE UP (ref 0.2–1.2)
BUN SERPL-MCNC: 18 MG/DL — SIGNIFICANT CHANGE UP (ref 7–23)
CALCIUM SERPL-MCNC: 8.1 MG/DL — LOW (ref 8.4–10.5)
CHLORIDE SERPL-SCNC: 97 MMOL/L — SIGNIFICANT CHANGE UP (ref 96–108)
CO2 SERPL-SCNC: 25 MMOL/L — SIGNIFICANT CHANGE UP (ref 22–31)
CREAT SERPL-MCNC: 0.51 MG/DL — SIGNIFICANT CHANGE UP (ref 0.5–1.3)
CULTURE RESULTS: NO GROWTH — SIGNIFICANT CHANGE UP
EOSINOPHIL # BLD AUTO: 0.24 K/UL — SIGNIFICANT CHANGE UP (ref 0–0.5)
EOSINOPHIL NFR BLD AUTO: 3.7 % — SIGNIFICANT CHANGE UP (ref 0–6)
FOLATE SERPL-MCNC: >20 NG/ML — SIGNIFICANT CHANGE UP (ref 4.8–24.2)
GLUCOSE SERPL-MCNC: 161 MG/DL — HIGH (ref 70–99)
HCT VFR BLD CALC: 36.1 % — SIGNIFICANT CHANGE UP (ref 34.5–45)
HGB BLD-MCNC: 11.9 G/DL — SIGNIFICANT CHANGE UP (ref 11.5–15.5)
IMM GRANULOCYTES NFR BLD AUTO: 0.5 % — SIGNIFICANT CHANGE UP (ref 0–1.5)
LYMPHOCYTES # BLD AUTO: 0.57 K/UL — LOW (ref 1–3.3)
LYMPHOCYTES # BLD AUTO: 8.7 % — LOW (ref 13–44)
MAGNESIUM SERPL-MCNC: 2 MG/DL — SIGNIFICANT CHANGE UP (ref 1.6–2.6)
MCHC RBC-ENTMCNC: 30.5 PG — SIGNIFICANT CHANGE UP (ref 27–34)
MCHC RBC-ENTMCNC: 33 GM/DL — SIGNIFICANT CHANGE UP (ref 32–36)
MCV RBC AUTO: 92.6 FL — SIGNIFICANT CHANGE UP (ref 80–100)
MONOCYTES # BLD AUTO: 0.69 K/UL — SIGNIFICANT CHANGE UP (ref 0–0.9)
MONOCYTES NFR BLD AUTO: 10.5 % — SIGNIFICANT CHANGE UP (ref 2–14)
NEUTROPHILS # BLD AUTO: 5.03 K/UL — SIGNIFICANT CHANGE UP (ref 1.8–7.4)
NEUTROPHILS NFR BLD AUTO: 76.4 % — SIGNIFICANT CHANGE UP (ref 43–77)
PHOSPHATE SERPL-MCNC: 2.5 MG/DL — SIGNIFICANT CHANGE UP (ref 2.5–4.5)
PLATELET # BLD AUTO: 89 K/UL — LOW (ref 150–400)
POTASSIUM SERPL-MCNC: 3.3 MMOL/L — LOW (ref 3.5–5.3)
POTASSIUM SERPL-SCNC: 3.3 MMOL/L — LOW (ref 3.5–5.3)
PROT SERPL-MCNC: 6.1 G/DL — SIGNIFICANT CHANGE UP (ref 6–8.3)
RBC # BLD: 3.9 M/UL — SIGNIFICANT CHANGE UP (ref 3.8–5.2)
RBC # FLD: 16.2 % — HIGH (ref 10.3–14.5)
SODIUM SERPL-SCNC: 138 MMOL/L — SIGNIFICANT CHANGE UP (ref 135–145)
SPECIMEN SOURCE: SIGNIFICANT CHANGE UP
T PALLIDUM AB TITR SER: NEGATIVE — SIGNIFICANT CHANGE UP
TSH SERPL-MCNC: 2.04 UIU/ML — SIGNIFICANT CHANGE UP (ref 0.27–4.2)
VIT B12 SERPL-MCNC: 350 PG/ML — SIGNIFICANT CHANGE UP (ref 232–1245)
WBC # BLD: 6.57 K/UL — SIGNIFICANT CHANGE UP (ref 3.8–10.5)
WBC # FLD AUTO: 6.57 K/UL — SIGNIFICANT CHANGE UP (ref 3.8–10.5)

## 2018-02-22 PROCEDURE — 99223 1ST HOSP IP/OBS HIGH 75: CPT

## 2018-02-22 PROCEDURE — 71045 X-RAY EXAM CHEST 1 VIEW: CPT | Mod: 26

## 2018-02-22 RX ORDER — POTASSIUM CHLORIDE 20 MEQ
40 PACKET (EA) ORAL ONCE
Qty: 0 | Refills: 0 | Status: COMPLETED | OUTPATIENT
Start: 2018-02-22 | End: 2018-02-22

## 2018-02-22 RX ORDER — ZALEPLON 10 MG
5 CAPSULE ORAL ONCE
Qty: 0 | Refills: 0 | Status: DISCONTINUED | OUTPATIENT
Start: 2018-02-22 | End: 2018-02-22

## 2018-02-22 RX ORDER — FUROSEMIDE 40 MG
40 TABLET ORAL ONCE
Qty: 0 | Refills: 0 | Status: COMPLETED | OUTPATIENT
Start: 2018-02-22 | End: 2018-02-22

## 2018-02-22 RX ADMIN — Medication 5 MILLIGRAM(S): at 00:37

## 2018-02-22 RX ADMIN — Medication 40 MILLIEQUIVALENT(S): at 13:26

## 2018-02-22 RX ADMIN — Medication 2000 UNIT(S): at 13:26

## 2018-02-22 RX ADMIN — DABIGATRAN ETEXILATE MESYLATE 150 MILLIGRAM(S): 150 CAPSULE ORAL at 05:16

## 2018-02-22 RX ADMIN — Medication 200 MILLIGRAM(S): at 18:51

## 2018-02-22 RX ADMIN — Medication 200 MILLIGRAM(S): at 05:16

## 2018-02-22 RX ADMIN — PIPERACILLIN AND TAZOBACTAM 25 GRAM(S): 4; .5 INJECTION, POWDER, LYOPHILIZED, FOR SOLUTION INTRAVENOUS at 13:35

## 2018-02-22 RX ADMIN — Medication 250 MILLIGRAM(S): at 05:16

## 2018-02-22 RX ADMIN — SERTRALINE 100 MILLIGRAM(S): 25 TABLET, FILM COATED ORAL at 13:26

## 2018-02-22 RX ADMIN — Medication 250 MILLIGRAM(S): at 18:53

## 2018-02-22 RX ADMIN — Medication 50 MILLIGRAM(S): at 21:33

## 2018-02-22 RX ADMIN — PIPERACILLIN AND TAZOBACTAM 25 GRAM(S): 4; .5 INJECTION, POWDER, LYOPHILIZED, FOR SOLUTION INTRAVENOUS at 05:15

## 2018-02-22 RX ADMIN — Medication 1000 MILLIGRAM(S): at 13:26

## 2018-02-22 RX ADMIN — Medication 360 MILLIGRAM(S): at 05:16

## 2018-02-22 RX ADMIN — ATORVASTATIN CALCIUM 10 MILLIGRAM(S): 80 TABLET, FILM COATED ORAL at 21:33

## 2018-02-22 RX ADMIN — PIPERACILLIN AND TAZOBACTAM 25 GRAM(S): 4; .5 INJECTION, POWDER, LYOPHILIZED, FOR SOLUTION INTRAVENOUS at 21:33

## 2018-02-22 RX ADMIN — FAMOTIDINE 20 MILLIGRAM(S): 10 INJECTION INTRAVENOUS at 13:26

## 2018-02-22 RX ADMIN — Medication 40 MILLIGRAM(S): at 14:09

## 2018-02-22 RX ADMIN — DABIGATRAN ETEXILATE MESYLATE 150 MILLIGRAM(S): 150 CAPSULE ORAL at 18:51

## 2018-02-22 NOTE — CONSULT NOTE ADULT - ASSESSMENT
86 yo female with HTN, HLD, Afib on pradaxa, MR s/p Bushra-clip, syncope, multiple falls, presents here with altered mental status and fevers. Her symptoms sound viral in origin, despite a negative RVP.  This was some element of volume overload, and she is s/p IV lasix 40 in the ER with some improvement. Her CXR is not with significant congestion. I would restart her diuretics. Can do Lasix 40 IV x 1 today. She was on torsemide 20 at home.  AF initially fast, now in  range.  Continue with Cardizem  and metoprolol 200 bid and Pradaxa for a/c  Empiric Abx per ID, cultures are pending  Can check echocardiogram, last in our system was earlier in 2017.  Replete potassium  watch on tele  Will follow with you
85F with diastolic heart failure, HTN,   admitted with encephalopathy and fever  thrombocytopenia

## 2018-02-22 NOTE — PROGRESS NOTE ADULT - SUBJECTIVE AND OBJECTIVE BOX
Patient is a 85y old  Female who presents with a chief complaint of altered mental status. (21 Feb 2018 21:39)  pt seen and examined at bedside   SUBJECTIVE/OVERNIGHT events noted, episode of loose bm this morning  feels better this am  dgtr and  at bedside- pt improved in terms of MS    Vital Signs Last 24 Hrs  T(C): 36.8 (22 Feb 2018 20:39), Max: 37.8 (22 Feb 2018 05:11)  T(F): 98.2 (22 Feb 2018 20:39), Max: 100 (22 Feb 2018 05:11)  HR: 71 (22 Feb 2018 20:39) (63 - 130)  BP: 106/62 (22 Feb 2018 20:39) (102/53 - 130/77)  BP(mean): --  RR: 20 (22 Feb 2018 20:39) (20 - 34)  SpO2: 94% (22 Feb 2018 20:39) (94% - 100%)  CAPILLARY BLOOD GLUCOSE        MEDICATIONS  (STANDING):  ascorbic acid 1000 milliGRAM(s) Oral daily  atorvastatin 10 milliGRAM(s) Oral at bedtime  cholecalciferol 2000 Unit(s) Oral daily  dabigatran 150 milliGRAM(s) Oral every 12 hours  diltiazem    milliGRAM(s) Oral daily  famotidine    Tablet 20 milliGRAM(s) Oral daily  metoprolol     tartrate 200 milliGRAM(s) Oral two times a day  piperacillin/tazobactam IVPB. 3.375 Gram(s) IV Intermittent every 8 hours  sertraline 100 milliGRAM(s) Oral daily  vancomycin  IVPB 1000 milliGRAM(s) IV Intermittent every 12 hours    MEDICATIONS  (PRN):  docusate sodium 100 milliGRAM(s) Oral daily PRN Constipation  traZODone 50 milliGRAM(s) Oral at bedtime PRN anxiety    PHYSICAL EXAM  General : comfortable, not in any acute distress  Neck : supple, no LAD, no JVD  Eyes : EOMI, PERRLA, conjunctiva : no icterus, no pallor  MM moist, no pharyngeal erythema/exudates  Pulmonary: clear to auscultation bilateral lung fields, no crackles/rhonchi/wheezing,   CVS : S1 S2,rate normal-,rhythm-regular, no murmurs, no abnormal sounds  Gastrointestinal: soft, non tender, non distended, no organomegaly , bowel sounds audible  Extremities: no edema  Neuro: AAOx3, no focal deficits  Musculoskeletal:  FROM of all ext  Skin: no rash  LABS                        11.9   6.57  )-----------( 89       ( 22 Feb 2018 07:29 )             36.1     02-22    138  |  97  |  18  ----------------------------<  161<H>  3.3<L>   |  25  |  0.51    Ca    8.1<L>      22 Feb 2018 07:57  Phos  2.5     02-22  Mg     2.0     02-22    TPro  6.1  /  Alb  3.0<L>  /  TBili  1.2  /  DBili  x   /  AST  36  /  ALT  19  /  AlkPhos  59  02-22      Culture - Blood (collected 21 Feb 2018 16:27)  Source: .Blood Blood-Peripheral  Preliminary Report (22 Feb 2018 17:05):    No growth to date.    Culture - Blood (collected 21 Feb 2018 16:27)  Source: .Blood Blood-Peripheral  Preliminary Report (22 Feb 2018 17:05):    No growth to date.      RADIOLOGY and IMAGING reviewed: yes  Consultant notes reviewed : yes  Care discussed with Consultants/other providers :

## 2018-02-22 NOTE — PROGRESS NOTE ADULT - SUBJECTIVE AND OBJECTIVE BOX
[facility], Division of Infectious Diseases  DAVIS White A. Lee  [phone]  Name: ARNULFO ALEX  Age: 85y  Gender: Female  MRN: [id number]    Interval History--  Notes reviewed  sitting in chair  states she feels better   at bedside she is breathing at bedside    Past Medical History--  Hearing loss of left ear  Mitral valve stenosis, unspecified etiology  Leg edema  Depression  Syncope  Atrial fibrillation  HLD (hyperlipidemia)  HTN (hypertension)  Cataracta  H/O external ear surgery  H/O knee surgery      For details regarding the patient's social history, family history, and other miscellaneous elements, please refer the initial infectious diseases consultation and/or the admitting history and physical examination for this admission.    Allergies    codeine (Other)    Intolerances        Medications--  Antibiotics:  piperacillin/tazobactam IVPB. 3.375 Gram(s) IV Intermittent every 8 hours  vancomycin  IVPB 1000 milliGRAM(s) IV Intermittent every 12 hours    Immunologic:    Other:  ascorbic acid  atorvastatin  cholecalciferol  dabigatran  diltiazem   CD  docusate sodium PRN  famotidine    Tablet  metoprolol     tartrate  potassium chloride    Tablet ER  sertraline  traZODone PRN      Review of Systems--  A 10-point review of systems was obtained.     Pertinent positives and negatives--  Constitutional: No fevers. No Chills. No Rigors.   Cardiovascular: No chest pain. No palpitations.  Respiratory: No shortness of breath. No cough.  Gastrointestinal: No nausea or vomiting. No diarrhea or constipation.   Psychiatric: no anxiety    Review of systems otherwise negative except as previously noted.    Physical Examination--  Vital Signs: T(F): 99 (02-22-18 @ 06:00), Max: 102.7 (02-21-18 @ 14:20)  HR: 130 (02-22-18 @ 06:00)  BP: 126/74 (02-22-18 @ 06:00)  RR: 20 (02-22-18 @ 06:00)  SpO2: 97% (02-22-18 @ 06:00)  Wt(kg): --  General: Nontoxic-appearing Female in no acute distress.  HEENT: AT/NC.  Anicteric. Conjunctiva pink and moist  Neck: Not rigid. No sense of mass.  Nodes: None palpable.  Lungs: Clear bilaterally without rales, wheezing or rhonchi  Heart: Regular rate and rhythm. No Murmur. No rub. No gallop. No palpable thrill.  Abdomen: Bowel sounds present and normoactive. Soft. Nondistended. Nontender.   Extremities: No cyanosis or clubbing. + edema.   Skin: Warm. Dry. Good turgor. No rash. No vasculitic stigmata.  Psychiatric: Appropriate affect and mood for situation.         Laboratory Studies--  CBC                        12.3   8.9   )-----------( 105      ( 21 Feb 2018 15:42 )             35.8     Chemistries  02-22    138  |  97  |  18  ----------------------------<  161<H>  3.3<L>   |  25  |  0.51    Ca    8.1<L>      22 Feb 2018 07:57  Phos  2.5     02-22  Mg     2.0     02-22    TPro  6.1  /  Alb  3.0<L>  /  TBili  1.2  /  DBili  x   /  AST  36  /  ALT  19  /  AlkPhos  59  02-22      Culture Data    < from: Xray Chest 1 View AP/PA (02.21.18 @ 15:34) >  XAM:  XR CHEST AP OR PA 1V                            PROCEDURE DATE:  02/21/2018            INTERPRETATION:  A single chest x-ray was obtained on February 21, 2018.    Indication: Fever.    Impression:    The heart is enlarged. The lungs are clear. Calcified aortic knob.   Degenerative changes of the thoracic spine.    < end of copied text >        Rapid Respiratory Viral Panel (02.21.18 @ 19:51)    Rapid RVP Result: NotDete: The [employer] RVP Rapid uses polymerase chain reaction (PCR) and melt  curve analysis to screen for adenovirus; coronavirus HKU1, NL63, 229E,  OC43; human metapneumovirus (hMPV); human enterovirus/rhinovirus  (Entero/RV); influenza A; influenza A/H1;influenza A/H3; influenza  A/H1-2009; influenza B; parainfluenza viruses 1, 2, 3, 4; respiratory  syncytial virus; Bordetella pertussis; Mycoplasma pneumoniae; and  Chlamydophila pneumoniae.          Urinalysis (02.21.18 @ 15:42)    Glucose Qualitative, Urine: Negative    Blood, Urine: Small    pH Urine: 6.0    Color: Yellow    Urine Appearance: Clear    Bilirubin: Negative    Ketone - Urine: Negative    Specific Gravity: 1.020    Protein, Urine: 100 mg/dL    Urobilinogen: Negative    Nitrite: Negative    Leukocyte Esterase Concentration: Negative

## 2018-02-22 NOTE — PROGRESS NOTE ADULT - PROBLEM SELECTOR PLAN 6
Hold torsemide for now given patient appears to be dehydrated  c/w gentle hydration, monitor closely for fluid overload

## 2018-02-22 NOTE — CONSULT NOTE ADULT - SUBJECTIVE AND OBJECTIVE BOX
[facility] - Mayank Matthews, Gilbert, Xiomara, Chuy Jerez  Office Number: [phone]    Initial Consult Note    CHIEF COMPLAINT: Patient is a 85y old  Female who presents with a chief complaint of altered mental status. (21 Feb 2018 21:39)      HPI:  84 yo female with PMH of HTN, HLD, Afib on pradaxa, MVR, syncope, multiple falls, presents here with altered mental status. As per daughter, patient was at her baseline mental status on Sunday.  At baseline, she is alert and oriented and she is able to communicate well.  On Monday, she complained of abdominal cramping pain.  On Tuesday, she appeared to be more weak and lethargic.  Daughter spoke to her on the phone today and patient was not able to speak in complete sentences and was not coherent.  Daughter states that patient had a fall few weeks ago and had a left humerus fracture.  Few days later, she again fell and had head trauma, was taken to hospital where she had CT brain that was negative, required suturing of the back of the head. She again slid off the bed last night, which was not witnessed.  Patient states she did not hit her hear and had no loss of consciousness.  Her  helped her up from the floor.  For last few days patient has not been eating or drinking well.  She is supposed to use her home oxygen, but she is not complaint.  She also is not compliant with her diuretics torsemide.  Patient otherwise had no nausea, vomiting, chest pain, SOB, cough, runny nose, sick contact or recent travel.  At baseline she does have some SOB with exertion, that has not changed.  She did report on episode of loose stool few days ago.  She reports no fever, dysuria, hematuria.  On arrival to ED, patient was found to have a fever of 102.7, /90, HR of 136.  She was given 1L NS and then also received lasix 40mg IV x 1. She also received IV vancomycin and zosyn.  On my evaluation, patient was sleeping initially, appeared lethargic, easily arousable, alert and oriented x 3. (21 Feb 2018 21:39)    She was last seen in our office in 12/2017 for edema, dyspnea and permanent atrial fibrillation. She underwent transesophageal echocardiography 6/16 which revealed mitral annular calcification with severely calcified and restricted posterior mitral valve leaflet with normal diastolic opening, severe eccentric, posteriorly directed mitral regurgitation, systolic flow reversal seen in the right upper pulmonary vein, aortic sclerosis, severe concentric left ventricular hypertrophy, normal left ventricular systolic function, estimated pulmonary artery systolic pressure of 70 mm, PFO. On August 30 2016, she underwent a Mitraclip procedure with reduction in mitral regurgitation to a mild amount.         PAST MEDICAL & SURGICAL HISTORY:  Hearing loss of left ear  Mitral valve stenosis, unspecified etiology  Leg edema  Depression  Syncope  Atrial fibrillation: On Pradaxa  HLD (hyperlipidemia)  HTN (hypertension)  Cataracta  H/O external ear surgery  H/O knee surgery      SOCIAL HISTORY:  No tobacco, ethanol, or drug abuse.    FAMILY HISTORY:  Family history of blood disorder (Mother)    No family history of acute MI or sudden cardiac death.    MEDICATIONS  (STANDING):  ascorbic acid 1000 milliGRAM(s) Oral daily  atorvastatin 10 milliGRAM(s) Oral at bedtime  cholecalciferol 2000 Unit(s) Oral daily  dabigatran 150 milliGRAM(s) Oral every 12 hours  diltiazem    milliGRAM(s) Oral daily  famotidine    Tablet 20 milliGRAM(s) Oral daily  metoprolol     tartrate 200 milliGRAM(s) Oral two times a day  piperacillin/tazobactam IVPB. 3.375 Gram(s) IV Intermittent every 8 hours  potassium chloride    Tablet ER 40 milliEquivalent(s) Oral once  sertraline 100 milliGRAM(s) Oral daily  vancomycin  IVPB 1000 milliGRAM(s) IV Intermittent every 12 hours    MEDICATIONS  (PRN):  docusate sodium 100 milliGRAM(s) Oral daily PRN Constipation  traZODone 50 milliGRAM(s) Oral at bedtime PRN anxiety      Allergies    codeine (Other)    Intolerances        REVIEW OF SYSTEMS:    CONSTITUTIONAL: +weakness, + fevers, no chills  EYES/ENT: No visual changes;  No vertigo or throat pain   NECK: No pain or stiffness  RESPIRATORY: +cough, + wheezing, no hemoptysis; +shortness of breath  CARDIOVASCULAR: No chest pain or palpitations  GASTROINTESTINAL: No abdominal pain. No nausea, vomiting, or hematemesis; No diarrhea or constipation. No melena or hematochezia.  GENITOURINARY: No dysuria, frequency or hematuria  NEUROLOGICAL: No numbness or weakness  SKIN: No itching or rash  All other review of systems is negative unless indicated above    VITAL SIGNS:   Vital Signs Last 24 Hrs  T(C): 37.2 (22 Feb 2018 06:00), Max: 39.3 (21 Feb 2018 14:20)  T(F): 99 (22 Feb 2018 06:00), Max: 102.7 (21 Feb 2018 14:20)  HR: 130 (22 Feb 2018 06:00) (90 - 136)  BP: 126/74 (22 Feb 2018 06:00) (109/70 - 137/90)  BP(mean): --  RR: 20 (22 Feb 2018 06:00) (20 - 22)  SpO2: 97% (22 Feb 2018 06:00) (95% - 100%)    I&O's Summary    21 Feb 2018 07:01  -  22 Feb 2018 07:00  --------------------------------------------------------  IN: 1275 mL / OUT: 0 mL / NET: 1275 mL        On Exam:    Constitutional: awake and alert.  EYES: EOMI  ENMT:  Normal Hearing, no tonsillar exudates ; dry mucous membrane  Neck: Soft and supple, No JVD  Lungs: Coarse BS bilaterally, scattered wheezing  Heart: S1 and S2, irregularly irregular, no Murmurs, gallops or rubs  Abdomen: Bowel Sounds present, soft, nontender, nondistended, no guarding, no rebound  Extremities: No cyanosis or clubbing; warm to touch  Vascular: 2+ peripheral pulses lower ex  Neurological: A/O x 3, no focal deficits; negative nuchal rigidity and negative kernig's and brudzinski sign  Musculoskeletal: 5/5 strength right upper extremity, b/l lower extremities; left arm tenderness limited movement due to pain  Skin: No rashes  Psych: no depression or anhedonia    LABS: All Labs Reviewed:                        12.3   8.9   )-----------( 105      ( 21 Feb 2018 15:42 )             35.8     22 Feb 2018 07:57    138    |  97     |  18     ----------------------------<  161    3.3     |  25     |  0.51   21 Feb 2018 15:42    136    |  97     |  19     ----------------------------<  158    3.5     |  25     |  0.74     Ca    8.1        22 Feb 2018 07:57  Ca    8.6        21 Feb 2018 15:42  Phos  2.5       22 Feb 2018 07:57  Mg     2.0       22 Feb 2018 07:57    TPro  6.1    /  Alb  3.0    /  TBili  1.2    /  DBili  x      /  AST  36     /  ALT  19     /  AlkPhos  59     22 Feb 2018 07:57  TPro  6.6    /  Alb  3.5    /  TBili  1.4    /  DBili  x      /  AST  29     /  ALT  13     /  AlkPhos  65     21 Feb 2018 15:42          Blood Culture:     02-22 @ 08:00  TSH: 2.04      RADIOLOGY:    EKG: AF with RVR, LAD, poor R wave progression

## 2018-02-22 NOTE — PROVIDER CONTACT NOTE (OTHER) - ASSESSMENT
Patient states she is breathing comfortably and denies shortness of breath, but her RR is 34 and O2 on 3LNC is 95%. On 2LNC she was 91%. Some wheezing and crackles at the bases auscultated.

## 2018-02-23 ENCOUNTER — TRANSCRIPTION ENCOUNTER (OUTPATIENT)
Age: 83
End: 2018-02-23

## 2018-02-23 LAB
ANION GAP SERPL CALC-SCNC: 12 MMOL/L — SIGNIFICANT CHANGE UP (ref 5–17)
BUN SERPL-MCNC: 20 MG/DL — SIGNIFICANT CHANGE UP (ref 7–23)
CALCIUM SERPL-MCNC: 8.3 MG/DL — LOW (ref 8.4–10.5)
CHLORIDE SERPL-SCNC: 99 MMOL/L — SIGNIFICANT CHANGE UP (ref 96–108)
CO2 SERPL-SCNC: 24 MMOL/L — SIGNIFICANT CHANGE UP (ref 22–31)
CREAT SERPL-MCNC: 0.85 MG/DL — SIGNIFICANT CHANGE UP (ref 0.5–1.3)
GLUCOSE SERPL-MCNC: 109 MG/DL — HIGH (ref 70–99)
HCT VFR BLD CALC: 34.4 % — LOW (ref 34.5–45)
HGB BLD-MCNC: 11.7 G/DL — SIGNIFICANT CHANGE UP (ref 11.5–15.5)
MCHC RBC-ENTMCNC: 32.5 PG — SIGNIFICANT CHANGE UP (ref 27–34)
MCHC RBC-ENTMCNC: 34 GM/DL — SIGNIFICANT CHANGE UP (ref 32–36)
MCV RBC AUTO: 95.8 FL — SIGNIFICANT CHANGE UP (ref 80–100)
PLATELET # BLD AUTO: 94 K/UL — LOW (ref 150–400)
POTASSIUM SERPL-MCNC: 3.4 MMOL/L — LOW (ref 3.5–5.3)
POTASSIUM SERPL-SCNC: 3.4 MMOL/L — LOW (ref 3.5–5.3)
RBC # BLD: 3.59 M/UL — LOW (ref 3.8–5.2)
RBC # FLD: 14.3 % — SIGNIFICANT CHANGE UP (ref 10.3–14.5)
SODIUM SERPL-SCNC: 135 MMOL/L — SIGNIFICANT CHANGE UP (ref 135–145)
VANCOMYCIN TROUGH SERPL-MCNC: 12.5 UG/ML — SIGNIFICANT CHANGE UP (ref 10–20)
WBC # BLD: 6.1 K/UL — SIGNIFICANT CHANGE UP (ref 3.8–10.5)
WBC # FLD AUTO: 6.1 K/UL — SIGNIFICANT CHANGE UP (ref 3.8–10.5)

## 2018-02-23 PROCEDURE — 99232 SBSQ HOSP IP/OBS MODERATE 35: CPT

## 2018-02-23 RX ORDER — POTASSIUM CHLORIDE 20 MEQ
20 PACKET (EA) ORAL
Qty: 0 | Refills: 0 | Status: COMPLETED | OUTPATIENT
Start: 2018-02-23 | End: 2018-02-23

## 2018-02-23 RX ORDER — ACETAMINOPHEN 500 MG
650 TABLET ORAL EVERY 6 HOURS
Qty: 0 | Refills: 0 | Status: DISCONTINUED | OUTPATIENT
Start: 2018-02-23 | End: 2018-02-26

## 2018-02-23 RX ADMIN — Medication 20 MILLIEQUIVALENT(S): at 12:31

## 2018-02-23 RX ADMIN — DABIGATRAN ETEXILATE MESYLATE 150 MILLIGRAM(S): 150 CAPSULE ORAL at 06:16

## 2018-02-23 RX ADMIN — Medication 1000 MILLIGRAM(S): at 12:31

## 2018-02-23 RX ADMIN — Medication 200 MILLIGRAM(S): at 18:59

## 2018-02-23 RX ADMIN — Medication 650 MILLIGRAM(S): at 15:45

## 2018-02-23 RX ADMIN — Medication 20 MILLIEQUIVALENT(S): at 14:45

## 2018-02-23 RX ADMIN — Medication 200 MILLIGRAM(S): at 06:16

## 2018-02-23 RX ADMIN — SERTRALINE 100 MILLIGRAM(S): 25 TABLET, FILM COATED ORAL at 12:31

## 2018-02-23 RX ADMIN — ATORVASTATIN CALCIUM 10 MILLIGRAM(S): 80 TABLET, FILM COATED ORAL at 21:08

## 2018-02-23 RX ADMIN — Medication 650 MILLIGRAM(S): at 14:46

## 2018-02-23 RX ADMIN — FAMOTIDINE 20 MILLIGRAM(S): 10 INJECTION INTRAVENOUS at 12:31

## 2018-02-23 RX ADMIN — PIPERACILLIN AND TAZOBACTAM 25 GRAM(S): 4; .5 INJECTION, POWDER, LYOPHILIZED, FOR SOLUTION INTRAVENOUS at 06:16

## 2018-02-23 RX ADMIN — DABIGATRAN ETEXILATE MESYLATE 150 MILLIGRAM(S): 150 CAPSULE ORAL at 18:59

## 2018-02-23 RX ADMIN — Medication 360 MILLIGRAM(S): at 06:17

## 2018-02-23 RX ADMIN — Medication 250 MILLIGRAM(S): at 07:54

## 2018-02-23 RX ADMIN — Medication 2000 UNIT(S): at 12:31

## 2018-02-23 RX ADMIN — Medication 20 MILLIEQUIVALENT(S): at 18:59

## 2018-02-23 NOTE — PROGRESS NOTE ADULT - SUBJECTIVE AND OBJECTIVE BOX
Patient is a 85y old  Female who presents with a chief complaint of altered mental status. (21 Feb 2018 21:39)  pt seen and examined at bedside   SUBJECTIVE/OVERNIGHT events noted, feels well,   afebrile, no diarrhea  some hoarse voice    Vital Signs Last 24 Hrs  T(C): 37.7 (23 Feb 2018 11:30), Max: 37.7 (23 Feb 2018 03:45)  T(F): 99.8 (23 Feb 2018 11:30), Max: 99.8 (23 Feb 2018 03:45)  HR: 71 (23 Feb 2018 11:30) (63 - 96)  BP: 112/62 (23 Feb 2018 11:30) (102/53 - 112/72)  BP(mean): --  RR: 18 (23 Feb 2018 11:30) (18 - 20)  SpO2: 92% (23 Feb 2018 11:30) (92% - 100%)  CAPILLARY BLOOD GLUCOSE        MEDICATIONS  (STANDING):  ascorbic acid 1000 milliGRAM(s) Oral daily  atorvastatin 10 milliGRAM(s) Oral at bedtime  cholecalciferol 2000 Unit(s) Oral daily  dabigatran 150 milliGRAM(s) Oral every 12 hours  diltiazem    milliGRAM(s) Oral daily  famotidine    Tablet 20 milliGRAM(s) Oral daily  metoprolol     tartrate 200 milliGRAM(s) Oral two times a day  potassium chloride    Tablet ER 20 milliEquivalent(s) Oral every 2 hours  sertraline 100 milliGRAM(s) Oral daily    MEDICATIONS  (PRN):  acetaminophen   Tablet. 650 milliGRAM(s) Oral every 6 hours PRN Moderate Pain (4 - 6)  docusate sodium 100 milliGRAM(s) Oral daily PRN Constipation  traZODone 50 milliGRAM(s) Oral at bedtime PRN anxiety    PHYSICAL EXAM  General : comfortable, not in any acute distress  Neck : supple, no LAD, no JVD  Eyes : EOMI, PERRLA, conjunctiva : no icterus, no pallor  MM moist, no pharyngeal erythema/exudates  Pulmonary: basal rales+  CVS : S1 S2,rate normal-,rhythm-regular, no murmurs, no abnormal sounds  Gastrointestinal: soft, non tender, non distended, no organomegaly , bowel sounds audible  Extremities: no edema  Neuro: AAOx3, no focal deficits  Musculoskeletal:  FROM of all ext  Skin: no rash  LABS                        11.9   6.57  )-----------( 89       ( 22 Feb 2018 07:29 )             36.1     02-23    135  |  99  |  20  ----------------------------<  109<H>  3.4<L>   |  24  |  0.85    Ca    8.3<L>      23 Feb 2018 07:42  Phos  2.5     02-22  Mg     2.0     02-22    TPro  6.1  /  Alb  3.0<L>  /  TBili  1.2  /  DBili  x   /  AST  36  /  ALT  19  /  AlkPhos  59  02-22      Culture - Urine (collected 21 Feb 2018 16:30)  Source: .Urine Clean Catch (Midstream)  Final Report (22 Feb 2018 22:15):    No growth    Culture - Blood (collected 21 Feb 2018 16:27)  Source: .Blood Blood-Peripheral  Preliminary Report (22 Feb 2018 17:05):    No growth to date.    Culture - Blood (collected 21 Feb 2018 16:27)  Source: .Blood Blood-Peripheral  Preliminary Report (22 Feb 2018 17:05):    No growth to date.      RADIOLOGY and IMAGING reviewed: yes  Consultant notes reviewed : yes  Care discussed with Consultants/other providers :

## 2018-02-23 NOTE — DISCHARGE NOTE ADULT - MEDICATION SUMMARY - MEDICATIONS TO STOP TAKING
I will STOP taking the medications listed below when I get home from the hospital:    ramipril 10 mg oral tablet  -- 1 tab(s) by mouth once a day

## 2018-02-23 NOTE — PHYSICAL THERAPY INITIAL EVALUATION ADULT - PERTINENT HX OF CURRENT PROBLEM, REHAB EVAL
84 yo female admitted to [facility]  with PMH of HTN, HLD, Afib on pradaxa, MVR, syncope, multiple falls, presents here with altered mental status. 86 yo female admitted to [facility] on 2/21/18  with PMH of HTN, HLD, Afib on pradaxa, MVR, syncope, multiple falls, presents here with altered mental status.

## 2018-02-23 NOTE — DISCHARGE NOTE ADULT - MEDICATION SUMMARY - MEDICATIONS TO TAKE
I will START or STAY ON the medications listed below when I get home from the hospital:    Tylenol 8 HR Arthritis Pain 650 mg oral tablet, extended release  -- 2 tab(s) by mouth every 8 hours, As Needed  -- Indication: For Pain    DilTIAZem Hydrochloride  mg/24 hours oral capsule, extended release  -- 1 cap(s) by mouth once a day  -- Indication: For HTN (hypertension)    Pradaxa 150 mg oral capsule  -- 1 cap(s) by mouth 2 times a day  -- Indication: For Atrial fibrillation    sertraline 100 mg oral tablet  -- 1 tab(s) by mouth once a day  -- Indication: For Depression     traZODone 50 mg oral tablet  -- 1-2 tab(s) by mouth , As Needed  -- Indication: For Pain     atorvastatin 10 mg oral tablet  -- 1 tab(s) by mouth once a day (at bedtime)  -- Indication: For HLD (hyperlipidemia)    Xanax 0.25 mg oral tablet  -- 1 tab(s) by mouth once a day, As Needed  -- Indication: For Anxiety    metoprolol tartrate 100 mg oral tablet  -- 2 tab(s) by mouth 2 times a day  -- Indication: For Atrial fibrillation    torsemide 20 mg oral tablet  -- 1 tab(s) by mouth once a day hold for sbp <100  -- Indication: For Atrial fibrillation    famotidine 20 mg oral tablet  -- 1 tab(s) by mouth once a day, As Needed  -- Indication: For Gerd    docusate sodium 100 mg oral capsule  -- 1 cap(s) by mouth , As Needed  -- Indication: For Bowel regimen     potassium chloride 10 mEq oral capsule, extended release  -- 1 cap(s) by mouth once a day  -- Indication: For Supplements     Vitamin D3 2000 intl units oral capsule  -- 1 cap(s) by mouth once a day  -- Indication: For Supplements     Vitamin C 1000 mg oral tablet  -- 1 tab(s) by mouth once a day  -- Indication: For Supplements

## 2018-02-23 NOTE — DISCHARGE NOTE ADULT - HOSPITAL COURSE
. 86 yo female with HTN, HLD, Afib on pradaxa, MR s/p Bushra-clip, syncope, multiple falls, presents here with altered mental status and fevers  RVP negative   off empiric Abx. No obvious source of infection.  Cards consulted   AF initially fast, now in 50-80 range.  Continue with Cardizem  and metoprolol 200 bid and Pradaxa for a/c  Pt to have ECHO outpatient with Dr Schwarz   follow up with Dr. Hunt in our office upon dc  DCP with med rec discussed with Dr Hu  Pt deemed clinically stable for discharge

## 2018-02-23 NOTE — DISCHARGE NOTE ADULT - HOME CARE AGENCY
[facility] at [place] . Rn will contact you in 1-2days to set up visit for teaching, assessment, evaluation for home P.t and hha

## 2018-02-23 NOTE — DISCHARGE NOTE ADULT - PLAN OF CARE
Resolved likely viral Take all antibiotics as ordered.  Call you Health care provider upon arrival home to make a one week follow up appointment.  If you develop fever, chills, malaise, or change in mental status call your Health Care Provider or go to the Emergency Department.  Nutrition is important, eat small frequent meals to help ensure you get adequate calories.  Do not stay in bed all day!  Increase your activity daily as tolerated. Atrial fibrillation is the most common heart rhythm problem & has the risk of stroke & heart attack  It helps if you control your blood pressure, not drink more than 1-2 alcohol drinks per day, cut down on caffeine, getting treatment for over active thyroid gland, & getting exercise  Call your doctor if you feel your heart racing or beating unusually, chest tightness or pain, lightheaded, faint, shortness of breath especially with exercise  It is important to take your heart medication as prescribed Follow up with your medical doctor to establish long term blood pressure treatment goals.

## 2018-02-23 NOTE — PROGRESS NOTE ADULT - SUBJECTIVE AND OBJECTIVE BOX
[facility], Division of Infectious Diseases  DAVIS [place] A. Lee  [phone]    Name: ARNULFO ALEX  Age: 85y  Gender: Female  MRN: [id number]    Interval History--  Notes reviewed  Pt wants to go home  feels breathing at baseline  left shoulder hurts after she moves too much.  She recently fell on it     Past Medical History--  Hearing loss of left ear  Mitral valve stenosis, unspecified etiology  Leg edema  Depression  Syncope  Atrial fibrillation  HLD (hyperlipidemia)  HTN (hypertension)  Cataracta  H/O external ear surgery  H/O knee surgery      For details regarding the patient's social history, family history, and other miscellaneous elements, please refer the initial infectious diseases consultation and/or the admitting history and physical examination for this admission.    Allergies    codeine (Other)    Intolerances        Medications--  Antibiotics:  piperacillin/tazobactam IVPB. 3.375 Gram(s) IV Intermittent every 8 hours  vancomycin  IVPB 1000 milliGRAM(s) IV Intermittent every 12 hours    Immunologic:    Other:  ascorbic acid  atorvastatin  cholecalciferol  dabigatran  diltiazem   CD  docusate sodium PRN  famotidine    Tablet  metoprolol     tartrate  potassium chloride    Tablet ER  sertraline  traZODone PRN      Review of Systems--  A 10-point review of systems was obtained.     Pertinent positives and negatives--  Constitutional: No fevers. No Chills. No Rigors.   Cardiovascular: No chest pain. No palpitations.  Respiratory: No shortness of breath. No cough.  Gastrointestinal: No nausea or vomiting. No diarrhea or constipation.   Psychiatric: no depression  Review of systems otherwise negative except as previously noted.    Physical Examination--  Vital Signs: T(F): 99.8 (02-23-18 @ 11:30), Max: 99.8 (02-23-18 @ 03:45)  HR: 71 (02-23-18 @ 11:30)  BP: 112/62 (02-23-18 @ 11:30)  RR: 18 (02-23-18 @ 11:30)  SpO2: 92% (02-23-18 @ 11:30)  Wt(kg): --  General: Nontoxic-appearing Female in no mild resp distress.  HEENT: AT/NC. PERRL. EOMI. Anicteric. Conjunctiva pink and moist. Oropharynx clear. Dentition fair.  Neck: Not rigid. No sense of mass.  Nodes: None palpable.  Lungs: Clear bilaterally without rales, wheezing or rhonchi  Heart: Regular rate and rhythm. No Murmur. No rub. No gallop. No palpable thrill.  Abdomen: Bowel sounds present and normoactive. Soft. Nondistended. Nontender.   Back: No spinal tenderness. No costovertebral angle tenderness.   Extremities: No cyanosis or clubbing. trace edema.   Skin: Warm. Dry. Good turgor. No rash. No vasculitic stigmata.  Psychiatric: Appropriate affect and mood for situation.         Laboratory Studies--  CBC                        11.9   6.57  )-----------( 89       ( 22 Feb 2018 07:29 )             36.1       Chemistries  02-23    135  |  99  |  20  ----------------------------<  109<H>  3.4<L>   |  24  |  0.85    Ca    8.3<L>      23 Feb 2018 07:42  Phos  2.5     02-22  Mg     2.0     02-22    TPro  6.1  /  Alb  3.0<L>  /  TBili  1.2  /  DBili  x   /  AST  36  /  ALT  19  /  AlkPhos  59  02-22      Culture Data    Culture - Urine (collected 21 Feb 2018 16:30)  Source: .Urine Clean Catch (Midstream)  Final Report (22 Feb 2018 22:15):    No growth    Culture - Blood (collected 21 Feb 2018 16:27)  Source: .Blood Blood-Peripheral  Preliminary Report (22 Feb 2018 17:05):    No growth to date.    Culture - Blood (collected 21 Feb 2018 16:27)  Source: .Blood Blood-Peripheral  Preliminary Report (22 Feb 2018 17:05):    No growth to date.      < from: Xray Chest 1 View- PORTABLE-Urgent (02.22.18 @ 14:17) >  EXAM:  XR CHEST PORTABLE URGENT 1V                            PROCEDURE DATE:  02/22/2018            INTERPRETATION:  CLINICAL INFORMATION: Tachypnea. Admitted for fever.    COMPARISON:  Chest x-ray dated 2/21/2018. Chest x-ray dated 2/3/2018.    TECHNIQUE:   AP portable chest xray.    FINDINGS:   Low lung volumes.    There is no focal consolidation.    There are no pleural effusions.    No evidence of pneumothorax.    Mitral valve clips are in place.    Comminuted left proximal humeral fracture is again noted.    The cardiac silhouette is enlarged.    IMPRESSION:  No focal consolidations.    < end of copied text > [facility], Division of Infectious Diseases  DAVIS [place] A. Lee  [phone]    Name: ARNULFO ALEX  Age: 85y  Gender: Female  MRN: [id number]    Interval History--  Notes reviewed  Pt wants to go home  feels breathing at baseline  left shoulder hurts after she moves too much.  She recently fell on it     Past Medical History--  Hearing loss of left ear  Mitral valve stenosis, unspecified etiology  Leg edema  Depression  Syncope  Atrial fibrillation  HLD (hyperlipidemia)  HTN (hypertension)  Cataracta  H/O external ear surgery  H/O knee surgery      For details regarding the patient's social history, family history, and other miscellaneous elements, please refer the initial infectious diseases consultation and/or the admitting history and physical examination for this admission.    Allergies    codeine (Other)    Intolerances        Medications--  Antibiotics:  piperacillin/tazobactam IVPB. 3.375 Gram(s) IV Intermittent every 8 hours  vancomycin  IVPB 1000 milliGRAM(s) IV Intermittent every 12 hours    Immunologic:    Other:  ascorbic acid  atorvastatin  cholecalciferol  dabigatran  diltiazem   CD  docusate sodium PRN  famotidine    Tablet  metoprolol     tartrate  potassium chloride    Tablet ER  sertraline  traZODone PRN      Review of Systems--  A 10-point review of systems was obtained.     Pertinent positives and negatives--  Constitutional: No fevers. No Chills. No Rigors.   Cardiovascular: No chest pain. No palpitations.  Respiratory: No shortness of breath. No cough.  Gastrointestinal: No nausea or vomiting. No diarrhea or constipation.   Psychiatric: no depression  Review of systems otherwise negative except as previously noted.    Physical Examination--  Vital Signs: T(F): 99.8 (02-23-18 @ 11:30), Max: 99.8 (02-23-18 @ 03:45)  HR: 71 (02-23-18 @ 11:30)  BP: 112/62 (02-23-18 @ 11:30)  RR: 18 (02-23-18 @ 11:30)  SpO2: 92% (02-23-18 @ 11:30)  Wt(kg): --  General: Nontoxic-appearing Female in no mild resp distress.  HEENT: AT/NC. PERRL. EOMI. Anicteric. Conjunctiva pink and moist. Oropharynx clear. Dentition fair.  Neck: Not rigid. No sense of mass.  Nodes: None palpable.  Lungs: exp wheeze  Heart: Regular rate and rhythm. No Murmur. No rub. No gallop. No palpable thrill.  Abdomen: Bowel sounds present and normoactive. Soft. Nondistended. Nontender.   Back: No spinal tenderness. No costovertebral angle tenderness.   Extremities: No cyanosis or clubbing. trace edema.   Skin: Warm. Dry. Good turgor. No rash. No vasculitic stigmata.  Psychiatric: Appropriate affect and mood for situation.         Laboratory Studies--  CBC                        11.9   6.57  )-----------( 89       ( 22 Feb 2018 07:29 )             36.1       Chemistries  02-23    135  |  99  |  20  ----------------------------<  109<H>  3.4<L>   |  24  |  0.85    Ca    8.3<L>      23 Feb 2018 07:42  Phos  2.5     02-22  Mg     2.0     02-22    TPro  6.1  /  Alb  3.0<L>  /  TBili  1.2  /  DBili  x   /  AST  36  /  ALT  19  /  AlkPhos  59  02-22      Culture Data    Culture - Urine (collected 21 Feb 2018 16:30)  Source: .Urine Clean Catch (Midstream)  Final Report (22 Feb 2018 22:15):    No growth    Culture - Blood (collected 21 Feb 2018 16:27)  Source: .Blood Blood-Peripheral  Preliminary Report (22 Feb 2018 17:05):    No growth to date.    Culture - Blood (collected 21 Feb 2018 16:27)  Source: .Blood Blood-Peripheral  Preliminary Report (22 Feb 2018 17:05):    No growth to date.      < from: Xray Chest 1 View- PORTABLE-Urgent (02.22.18 @ 14:17) >  EXAM:  XR CHEST PORTABLE URGENT 1V                            PROCEDURE DATE:  02/22/2018            INTERPRETATION:  CLINICAL INFORMATION: Tachypnea. Admitted for fever.    COMPARISON:  Chest x-ray dated 2/21/2018. Chest x-ray dated 2/3/2018.    TECHNIQUE:   AP portable chest xray.    FINDINGS:   Low lung volumes.    There is no focal consolidation.    There are no pleural effusions.    No evidence of pneumothorax.    Mitral valve clips are in place.    Comminuted left proximal humeral fracture is again noted.    The cardiac silhouette is enlarged.    IMPRESSION:  No focal consolidations.    < end of copied text >

## 2018-02-23 NOTE — PHYSICAL THERAPY INITIAL EVALUATION ADULT - ADDITIONAL COMMENTS
lives in private home w/ 17 steps to enter w/ handrails, uses no device for amb/ but has walker, cane, shower seat, o2 concentrator /awaiting stair glide for home stairs

## 2018-02-23 NOTE — PHYSICAL THERAPY INITIAL EVALUATION ADULT - ACTIVE RANGE OF MOTION EXAMINATION, REHAB EVAL
bilateral upper extremity Active ROM was WFL (within functional limits)/bilateral  lower extremity Active ROM was WFL (within functional limits)/L sh not assessed

## 2018-02-23 NOTE — DISCHARGE NOTE ADULT - CARE PROVIDER_API CALL
Conner Schwarz), Internal Medicine  [address], [place] [id number]  Phone: [phone]  Fax: [phone] Conner Schwarz), Internal Medicine  [address] [place] [id number]  Phone: [phone]  Fax: [phone]    Avila Hunt), Cardiovascular Disease  [address] [place] [id number]  Phone: [phone]  Fax: [phone]

## 2018-02-23 NOTE — PHYSICAL THERAPY INITIAL EVALUATION ADULT - PLANNED THERAPY INTERVENTIONS, PT EVAL
strengthening/transfer training/stair negotiation/gait training/balance training/bed mobility training

## 2018-02-23 NOTE — DISCHARGE NOTE ADULT - CARE PLAN
Principal Discharge DX:	Fever  Goal:	Resolved likely viral  Assessment and plan of treatment:	Take all antibiotics as ordered.  Call you Health care provider upon arrival home to make a one week follow up appointment.  If you develop fever, chills, malaise, or change in mental status call your Health Care Provider or go to the Emergency Department.  Nutrition is important, eat small frequent meals to help ensure you get adequate calories.  Do not stay in bed all day!  Increase your activity daily as tolerated.  Secondary Diagnosis:	Chronic atrial fibrillation  Assessment and plan of treatment:	Atrial fibrillation is the most common heart rhythm problem & has the risk of stroke & heart attack  It helps if you control your blood pressure, not drink more than 1-2 alcohol drinks per day, cut down on caffeine, getting treatment for over active thyroid gland, & getting exercise  Call your doctor if you feel your heart racing or beating unusually, chest tightness or pain, lightheaded, faint, shortness of breath especially with exercise  It is important to take your heart medication as prescribed  Secondary Diagnosis:	HTN (hypertension)  Assessment and plan of treatment:	Follow up with your medical doctor to establish long term blood pressure treatment goals.

## 2018-02-23 NOTE — DISCHARGE NOTE ADULT - CARE PROVIDERS DIRECT ADDRESSES
,DirectAddress_Unknown ,DirectAddress_Unknown,[email or web address]

## 2018-02-23 NOTE — PROGRESS NOTE ADULT - SUBJECTIVE AND OBJECTIVE BOX
[facility] -- Mayank Matthews, Gilbert, Xiomara, Chuy Jerez Savella  Office # [phone]      Follow Up:  CHF, AF    Subjective/Observations: Patient seen and examined. Events noted. Resting comfortably in bed. No complaints of chest pain,   or palpitations reported. No signs of orthopnea or PND. Dyspnea improved       REVIEW OF SYSTEMS: All other review of systems is negative unless indicated above    PAST MEDICAL & SURGICAL HISTORY:  Hearing loss of left ear  Mitral valve stenosis, unspecified etiology  Leg edema  Depression  Syncope  Atrial fibrillation: On Pradaxa  HLD (hyperlipidemia)  HTN (hypertension)  Cataracta  H/O external ear surgery  H/O knee surgery      MEDICATIONS  (STANDING):  ascorbic acid 1000 milliGRAM(s) Oral daily  atorvastatin 10 milliGRAM(s) Oral at bedtime  cholecalciferol 2000 Unit(s) Oral daily  dabigatran 150 milliGRAM(s) Oral every 12 hours  diltiazem    milliGRAM(s) Oral daily  famotidine    Tablet 20 milliGRAM(s) Oral daily  metoprolol     tartrate 200 milliGRAM(s) Oral two times a day  piperacillin/tazobactam IVPB. 3.375 Gram(s) IV Intermittent every 8 hours  sertraline 100 milliGRAM(s) Oral daily  vancomycin  IVPB 1000 milliGRAM(s) IV Intermittent every 12 hours    MEDICATIONS  (PRN):  docusate sodium 100 milliGRAM(s) Oral daily PRN Constipation  traZODone 50 milliGRAM(s) Oral at bedtime PRN anxiety      Allergies    codeine (Other)    Intolerances            Vital Signs Last 24 Hrs  T(C): 37.7 (23 Feb 2018 03:45), Max: 37.7 (23 Feb 2018 03:45)  T(F): 99.8 (23 Feb 2018 03:45), Max: 99.8 (23 Feb 2018 03:45)  HR: 84 (23 Feb 2018 08:12) (63 - 96)  BP: 112/72 (23 Feb 2018 03:45) (102/53 - 120/74)  BP(mean): --  RR: 18 (23 Feb 2018 03:45) (18 - 34)  SpO2: 93% (23 Feb 2018 03:45) (93% - 100%)    I&O's Summary    22 Feb 2018 07:01  -  23 Feb 2018 07:00  --------------------------------------------------------  IN: 800 mL / OUT: 0 mL / NET: 800 mL          PHYSICAL EXAM:  TELE: AF   Constitutional: NAD, awake and alert, well-developed  HEENT: Moist Mucous Membranes, Anicteric  Pulmonary: Decreased breath sounds b/l. mild basilar crackles.    Cardiovascular: IRRR, S1 and S2, No murmurs, rubs, gallops or clicks  Gastrointestinal: Bowel Sounds present, soft, nontender.   Lymph: trace peripheral edema. No lymphadenopathy.  Skin: No visible rashes or ulcers.  Psych:  Mood & affect appropriate    LABS: All Labs Reviewed:                        11.9   6.57  )-----------( 89       ( 22 Feb 2018 07:29 )             36.1                         12.3   8.9   )-----------( 105      ( 21 Feb 2018 15:42 )             35.8     23 Feb 2018 07:42    135    |  99     |  20     ----------------------------<  109    3.4     |  24     |  0.85   22 Feb 2018 07:57    138    |  97     |  18     ----------------------------<  161    3.3     |  25     |  0.51   21 Feb 2018 15:42    136    |  97     |  19     ----------------------------<  158    3.5     |  25     |  0.74     Ca    8.3        23 Feb 2018 07:42  Ca    8.1        22 Feb 2018 07:57  Ca    8.6        21 Feb 2018 15:42  Phos  2.5       22 Feb 2018 07:57  Mg     2.0       22 Feb 2018 07:57    TPro  6.1    /  Alb  3.0    /  TBili  1.2    /  DBili  x      /  AST  36     /  ALT  19     /  AlkPhos  59     22 Feb 2018 07:57  TPro  6.6    /  Alb  3.5    /  TBili  1.4    /  DBili  x      /  AST  29     /  ALT  13     /  AlkPhos  65     21 Feb 2018 15:42

## 2018-02-23 NOTE — DISCHARGE NOTE ADULT - PATIENT PORTAL LINK FT
You can access the [facility] Patient Portal, offered by [facility], by registering with the following website: [email or web address]

## 2018-02-24 LAB
ANION GAP SERPL CALC-SCNC: 14 MMOL/L — SIGNIFICANT CHANGE UP (ref 5–17)
BUN SERPL-MCNC: 20 MG/DL — SIGNIFICANT CHANGE UP (ref 7–23)
CALCIUM SERPL-MCNC: 8.3 MG/DL — LOW (ref 8.4–10.5)
CHLORIDE SERPL-SCNC: 101 MMOL/L — SIGNIFICANT CHANGE UP (ref 96–108)
CO2 SERPL-SCNC: 23 MMOL/L — SIGNIFICANT CHANGE UP (ref 22–31)
CREAT SERPL-MCNC: 0.69 MG/DL — SIGNIFICANT CHANGE UP (ref 0.5–1.3)
GLUCOSE SERPL-MCNC: 96 MG/DL — SIGNIFICANT CHANGE UP (ref 70–99)
HCT VFR BLD CALC: 32.8 % — LOW (ref 34.5–45)
HGB BLD-MCNC: 10.7 G/DL — LOW (ref 11.5–15.5)
MAGNESIUM SERPL-MCNC: 2.1 MG/DL — SIGNIFICANT CHANGE UP (ref 1.6–2.6)
MCHC RBC-ENTMCNC: 30.5 PG — SIGNIFICANT CHANGE UP (ref 27–34)
MCHC RBC-ENTMCNC: 32.6 GM/DL — SIGNIFICANT CHANGE UP (ref 32–36)
MCV RBC AUTO: 93.4 FL — SIGNIFICANT CHANGE UP (ref 80–100)
PLATELET # BLD AUTO: 92 K/UL — LOW (ref 150–400)
POTASSIUM SERPL-MCNC: 3.8 MMOL/L — SIGNIFICANT CHANGE UP (ref 3.5–5.3)
POTASSIUM SERPL-SCNC: 3.8 MMOL/L — SIGNIFICANT CHANGE UP (ref 3.5–5.3)
RBC # BLD: 3.51 M/UL — LOW (ref 3.8–5.2)
RBC # FLD: 16.5 % — HIGH (ref 10.3–14.5)
SODIUM SERPL-SCNC: 138 MMOL/L — SIGNIFICANT CHANGE UP (ref 135–145)
WBC # BLD: 5.57 K/UL — SIGNIFICANT CHANGE UP (ref 3.8–10.5)
WBC # FLD AUTO: 5.57 K/UL — SIGNIFICANT CHANGE UP (ref 3.8–10.5)

## 2018-02-24 PROCEDURE — 71045 X-RAY EXAM CHEST 1 VIEW: CPT | Mod: 26

## 2018-02-24 PROCEDURE — 99232 SBSQ HOSP IP/OBS MODERATE 35: CPT

## 2018-02-24 RX ORDER — POTASSIUM CHLORIDE 20 MEQ
10 PACKET (EA) ORAL DAILY
Qty: 0 | Refills: 0 | Status: DISCONTINUED | OUTPATIENT
Start: 2018-02-24 | End: 2018-02-26

## 2018-02-24 RX ORDER — LISINOPRIL 2.5 MG/1
40 TABLET ORAL DAILY
Qty: 0 | Refills: 0 | Status: DISCONTINUED | OUTPATIENT
Start: 2018-02-24 | End: 2018-02-26

## 2018-02-24 RX ADMIN — SERTRALINE 100 MILLIGRAM(S): 25 TABLET, FILM COATED ORAL at 11:39

## 2018-02-24 RX ADMIN — Medication 650 MILLIGRAM(S): at 14:10

## 2018-02-24 RX ADMIN — DABIGATRAN ETEXILATE MESYLATE 150 MILLIGRAM(S): 150 CAPSULE ORAL at 17:43

## 2018-02-24 RX ADMIN — Medication 200 MILLIGRAM(S): at 17:42

## 2018-02-24 RX ADMIN — DABIGATRAN ETEXILATE MESYLATE 150 MILLIGRAM(S): 150 CAPSULE ORAL at 06:09

## 2018-02-24 RX ADMIN — Medication 20 MILLIGRAM(S): at 11:39

## 2018-02-24 RX ADMIN — ATORVASTATIN CALCIUM 10 MILLIGRAM(S): 80 TABLET, FILM COATED ORAL at 21:39

## 2018-02-24 RX ADMIN — Medication 650 MILLIGRAM(S): at 13:40

## 2018-02-24 RX ADMIN — Medication 1000 MILLIGRAM(S): at 11:38

## 2018-02-24 RX ADMIN — Medication 200 MILLIGRAM(S): at 06:09

## 2018-02-24 RX ADMIN — Medication 10 MILLIEQUIVALENT(S): at 11:42

## 2018-02-24 RX ADMIN — Medication 360 MILLIGRAM(S): at 06:09

## 2018-02-24 RX ADMIN — Medication 50 MILLIGRAM(S): at 21:39

## 2018-02-24 RX ADMIN — FAMOTIDINE 20 MILLIGRAM(S): 10 INJECTION INTRAVENOUS at 11:39

## 2018-02-24 RX ADMIN — Medication 2000 UNIT(S): at 11:39

## 2018-02-24 NOTE — PROGRESS NOTE ADULT - SUBJECTIVE AND OBJECTIVE BOX
· Subjective and Objective:   [facility]:    Mayank Matthews Grossman, Wachsman, Pannella, Patel, Savella, Goodger      [phone]    CHIEF COMPLAINT: Patient is a 85y old  Female who presents with a chief complaint of altered mental status. (2018 16:34)    Follow Up:  CHF, AF    Subjective/Observations: Patient seen and examined. Events noted. Resting comfortably in chair. No complaints of chest pain,   or palpitations reported. No signs of orthopnea or PND. Dyspnea improved       REVIEW OF SYSTEMS: All other review of systems is negative unless indicated above    TELEMETRY: afib 60-80    REVIEW OF SYSTEMS:  CONSTITUTIONAL: No fever, weight loss, or fatigue  EYES: No eye pain, visual disturbances, or discharge  ENMT:  No difficulty hearing, tinnitus, vertigo; No sinus or throat pain  NECK: No pain or stiffness  RESPIRATORY: denies cough,No wheezing, chills or hemoptysis; No shortness of breath  CARDIOVASCULAR: No chest pain,No palpitations, dizziness, or leg swelling  GASTROINTESTINAL: No abdominal or epigastric pain. No nausea, vomiting, or hematemesis; No diarrhea , no constipation. No melena or hematochezia.  GENITOURINARY: No dysuria, frequency, hematuria, or incontinence  NEUROLOGICAL: No headaches, memory loss, loss of strength, numbness, or tremors  SKIN: No itching, burning, rashes, or lesions   LYMPH NODES: No enlarged glands  ENDOCRINE: No heat or cold intolerance; No hair loss  MUSCULOSKELETAL: No joint pain or swelling; No muscle, back, or extremity pain  PSYCHIATRIC: No depression, anxiety, mood swings, or difficulty sleeping  HEME/LYMPH: No easy bruising, or bleeding gums  ALLERGY AND IMMUNOLOGIC: No hives or eczema          PAST MEDICAL & SURGICAL HISTORY:  Hearing loss of left ear  Mitral valve stenosis, unspecified etiology  Leg edema  Depression  Syncope  Atrial fibrillation: On Pradaxa  HLD (hyperlipidemia)  HTN (hypertension)  Cataracta  H/O external ear surgery  H/O knee surgery      MEDICATIONS  (STANDING):  ascorbic acid 1000 milliGRAM(s) Oral daily  atorvastatin 10 milliGRAM(s) Oral at bedtime  cholecalciferol 2000 Unit(s) Oral daily  dabigatran 150 milliGRAM(s) Oral every 12 hours  diltiazem    milliGRAM(s) Oral daily  famotidine    Tablet 20 milliGRAM(s) Oral daily  lisinopril 40 milliGRAM(s) Oral daily  metoprolol     tartrate 200 milliGRAM(s) Oral two times a day  potassium chloride    Tablet ER 10 milliEquivalent(s) Oral daily  sertraline 100 milliGRAM(s) Oral daily  torsemide 20 milliGRAM(s) Oral daily      Allergies    codeine (Other)    Intolerances                              10.7   5.57  )-----------( 92       ( 2018 08:44 )             32.8           138  |  101  |  20  ----------------------------<  96  3.8   |  23  |  0.69    Ca    8.3<L>      2018 08:48  Mg     2.1                                       Daily     Daily Weight in k.7 (2018 16:34)    I&O's Summary    2018 07:  -  2018 07:00  --------------------------------------------------------  IN: 240 mL / OUT: 0 mL / NET: 240 mL    2018 07:01  -  2018 13:07  --------------------------------------------------------  IN: 240 mL / OUT: 0 mL / NET: 240 mL        Vital Signs Last 24 Hrs  T(C): 37 (2018 11:13), Max: 37.4 (2018 03:59)  T(F): 98.6 (2018 11:13), Max: 99.4 (2018 03:59)  HR: 87 (2018 11:13) (59 - 87)  BP: 113/68 (2018 11:13) (94/55 - 113/68)  BP(mean): --  RR: 18 (2018 11:13) (18 - 18)  SpO2: 93% (2018 11:13) (93% - 95%)    PHYSICAL EXAM:   · Constitutional	Well-developed, well nourished  · Eyes	EOMI; PERRL; no drainage or redness  · ENMT	No oral lesions; no gross abnormalities  · Neck	No bruits; no thyromegaly or nodules  · Respiratory	Normal breath sounds b/l, No RRW  · Cardiovascular	Regular rate & rhythm, normal S1, S2; no murmurs, gallops or rubs; no S3, S4  · Gastrointestinal	Soft, non-tender, no hepatosplenomegaly, normal bowel sounds  · Extremities	No cyanosis, clubbing or edema  · Vascular	Equal and normal pulses (carotid, femoral, dorsalis pedis)  · Neurological	Alert & oriented; no sensory, motor or coordination deficits, normal reflexes

## 2018-02-24 NOTE — PROGRESS NOTE ADULT - SUBJECTIVE AND OBJECTIVE BOX
Patient is a 85y old  Female who presents with a chief complaint of altered mental status. (23 Feb 2018 16:34)  pt seen and examined at bedside   SUBJECTIVE/OVERNIGHT events noted, sob on min exertion  afebrile    Vital Signs Last 24 Hrs  T(C): 37 (24 Feb 2018 11:13), Max: 37.4 (24 Feb 2018 03:59)  T(F): 98.6 (24 Feb 2018 11:13), Max: 99.4 (24 Feb 2018 03:59)  HR: 87 (24 Feb 2018 11:13) (59 - 87)  BP: 113/68 (24 Feb 2018 11:13) (94/55 - 113/68)  BP(mean): --  RR: 18 (24 Feb 2018 11:13) (18 - 18)  SpO2: 93% (24 Feb 2018 11:13) (93% - 95%)  CAPILLARY BLOOD GLUCOSE        MEDICATIONS  (STANDING):  ascorbic acid 1000 milliGRAM(s) Oral daily  atorvastatin 10 milliGRAM(s) Oral at bedtime  cholecalciferol 2000 Unit(s) Oral daily  dabigatran 150 milliGRAM(s) Oral every 12 hours  diltiazem    milliGRAM(s) Oral daily  famotidine    Tablet 20 milliGRAM(s) Oral daily  lisinopril 40 milliGRAM(s) Oral daily  metoprolol     tartrate 200 milliGRAM(s) Oral two times a day  potassium chloride    Tablet ER 10 milliEquivalent(s) Oral daily  sertraline 100 milliGRAM(s) Oral daily  torsemide 20 milliGRAM(s) Oral daily    MEDICATIONS  (PRN):  acetaminophen   Tablet. 650 milliGRAM(s) Oral every 6 hours PRN Moderate Pain (4 - 6)  docusate sodium 100 milliGRAM(s) Oral daily PRN Constipation  traZODone 50 milliGRAM(s) Oral at bedtime PRN anxiety    PHYSICAL EXAM  General : comfortable, not in any acute distress  Neck : supple, no LAD, no JVD  Eyes : EOMI, PERRLA, conjunctiva : no icterus, no pallor  MM moist, no pharyngeal erythema/exudates  Pulmonary: basal crackles  CVS : S1 S2,rate normal-,rhythm-regular, no murmurs, no abnormal sounds  Gastrointestinal: soft, non tender, non distended, no organomegaly , bowel sounds audible  Extremities: no edema  Neuro: AAOx3, no focal deficits  Musculoskeletal:  FROM of all ext  Skin: no rash  LABS                        10.7   5.57  )-----------( 92       ( 24 Feb 2018 08:44 )             32.8     02-24    138  |  101  |  20  ----------------------------<  96  3.8   |  23  |  0.69    Ca    8.3<L>      24 Feb 2018 08:48  Mg     2.1     02-24        Culture - Urine (collected 21 Feb 2018 16:30)  Source: .Urine Clean Catch (Midstream)  Final Report (22 Feb 2018 22:15):    No growth    Culture - Blood (collected 21 Feb 2018 16:27)  Source: .Blood Blood-Peripheral  Preliminary Report (22 Feb 2018 17:05):    No growth to date.    Culture - Blood (collected 21 Feb 2018 16:27)  Source: .Blood Blood-Peripheral  Preliminary Report (22 Feb 2018 17:05):    No growth to date.      RADIOLOGY and IMAGING reviewed: yes  Consultant notes reviewed : yes  Care discussed with Consultants/other providers :

## 2018-02-25 LAB
ANION GAP SERPL CALC-SCNC: 12 MMOL/L — SIGNIFICANT CHANGE UP (ref 5–17)
BUN SERPL-MCNC: 18 MG/DL — SIGNIFICANT CHANGE UP (ref 7–23)
CALCIUM SERPL-MCNC: 8.5 MG/DL — SIGNIFICANT CHANGE UP (ref 8.4–10.5)
CHLORIDE SERPL-SCNC: 102 MMOL/L — SIGNIFICANT CHANGE UP (ref 96–108)
CO2 SERPL-SCNC: 26 MMOL/L — SIGNIFICANT CHANGE UP (ref 22–31)
CREAT SERPL-MCNC: 0.74 MG/DL — SIGNIFICANT CHANGE UP (ref 0.5–1.3)
GLUCOSE SERPL-MCNC: 111 MG/DL — HIGH (ref 70–99)
HCT VFR BLD CALC: 34.8 % — SIGNIFICANT CHANGE UP (ref 34.5–45)
HGB BLD-MCNC: 11.1 G/DL — LOW (ref 11.5–15.5)
MAGNESIUM SERPL-MCNC: 1.9 MG/DL — SIGNIFICANT CHANGE UP (ref 1.6–2.6)
MCHC RBC-ENTMCNC: 30.6 PG — SIGNIFICANT CHANGE UP (ref 27–34)
MCHC RBC-ENTMCNC: 31.9 GM/DL — LOW (ref 32–36)
MCV RBC AUTO: 95.9 FL — SIGNIFICANT CHANGE UP (ref 80–100)
PLATELET # BLD AUTO: 101 K/UL — LOW (ref 150–400)
POTASSIUM SERPL-MCNC: 3.5 MMOL/L — SIGNIFICANT CHANGE UP (ref 3.5–5.3)
POTASSIUM SERPL-SCNC: 3.5 MMOL/L — SIGNIFICANT CHANGE UP (ref 3.5–5.3)
RBC # BLD: 3.63 M/UL — LOW (ref 3.8–5.2)
RBC # FLD: 16.2 % — HIGH (ref 10.3–14.5)
SODIUM SERPL-SCNC: 140 MMOL/L — SIGNIFICANT CHANGE UP (ref 135–145)
WBC # BLD: 6.02 K/UL — SIGNIFICANT CHANGE UP (ref 3.8–10.5)
WBC # FLD AUTO: 6.02 K/UL — SIGNIFICANT CHANGE UP (ref 3.8–10.5)

## 2018-02-25 PROCEDURE — 99232 SBSQ HOSP IP/OBS MODERATE 35: CPT

## 2018-02-25 RX ADMIN — DABIGATRAN ETEXILATE MESYLATE 150 MILLIGRAM(S): 150 CAPSULE ORAL at 06:16

## 2018-02-25 RX ADMIN — DABIGATRAN ETEXILATE MESYLATE 150 MILLIGRAM(S): 150 CAPSULE ORAL at 17:29

## 2018-02-25 RX ADMIN — Medication 650 MILLIGRAM(S): at 20:00

## 2018-02-25 RX ADMIN — ATORVASTATIN CALCIUM 10 MILLIGRAM(S): 80 TABLET, FILM COATED ORAL at 22:04

## 2018-02-25 RX ADMIN — Medication 2000 UNIT(S): at 11:59

## 2018-02-25 RX ADMIN — Medication 360 MILLIGRAM(S): at 06:16

## 2018-02-25 RX ADMIN — Medication 20 MILLIGRAM(S): at 06:16

## 2018-02-25 RX ADMIN — LISINOPRIL 40 MILLIGRAM(S): 2.5 TABLET ORAL at 06:16

## 2018-02-25 RX ADMIN — Medication 200 MILLIGRAM(S): at 06:16

## 2018-02-25 RX ADMIN — Medication 200 MILLIGRAM(S): at 17:29

## 2018-02-25 RX ADMIN — Medication 1000 MILLIGRAM(S): at 11:59

## 2018-02-25 RX ADMIN — FAMOTIDINE 20 MILLIGRAM(S): 10 INJECTION INTRAVENOUS at 11:59

## 2018-02-25 RX ADMIN — SERTRALINE 100 MILLIGRAM(S): 25 TABLET, FILM COATED ORAL at 11:59

## 2018-02-25 RX ADMIN — Medication 10 MILLIEQUIVALENT(S): at 11:59

## 2018-02-25 RX ADMIN — Medication 650 MILLIGRAM(S): at 19:31

## 2018-02-25 NOTE — PROGRESS NOTE ADULT - PROBLEM SELECTOR PLAN 1
follow up blood cx  cont empiric antibx for now  so far no focal infectious etiology isolated to account for fever and encephalopathy
improved   , suspect d/t underlying infectious process  urinalysis negative, CXR does not show opacities; patient has no leukocytosis and no symptoms  RVP-ve  s/p 1L fluid, patient still appears to be somewhat dry; will c/w gentle hydration with NS at 75cc/hr x 1L  f/u blood culture and urine culture ngtd  c/w gentle hydration with IV vancomycin and IV zosyn
improved   suspect d/t underlying infectious process  urinalysis negative, CXR does not show opacities; patient has no leukocytosis and no symptoms  RVP-ve      f/u blood culture and urine culture ngtd  c/w gentle hydration with IV vancomycin and IV zosyn
improved   suspect d/t underlying infectious process/viral syndrome  urinalysis negative, CXR does not show opacities; patient has no leukocytosis and no symptoms  RVP-ve  f/u blood culture and urine culture ngtd
improved   suspect d/t underlying infectious process/viral syndrome  urinalysis negative, CXR does not show opacities; patient has no leukocytosis and no symptoms  RVP-ve  f/u blood culture and urine culture ngtd
resolved  all cultures negative  rvp neg   no focal infectious etiology isolated to account for fever and encephalopathy  d/c antibiotics

## 2018-02-25 NOTE — PROGRESS NOTE ADULT - PROBLEM SELECTOR PLAN 2
baseline mental status now
baseline mental status now
f/u blood culture and urine culture ngtd  ID consult noted, monitor off abx, fever curve
f/u blood culture and urine culture ngtd  ID consult noted, monitor off abx, fever curve  afebrile>24-48hrs off abx
f/u blood culture and urine culture ngtd  ID consult noted, monitor off abx, fever curve  afebrile>24-48hrs off abx
s/p 1L NS  will continue gentle hydration as mentioned above  f/u blood culture and urine culture ngtd  c/w empiric abx with IV vancomycin and IV zosyn  ID consult appreciated

## 2018-02-25 NOTE — PROGRESS NOTE ADULT - PROBLEM SELECTOR PROBLEM 2
Encephalopathy
Encephalopathy
SIRS (systemic inflammatory response syndrome)

## 2018-02-25 NOTE — PROGRESS NOTE ADULT - NSHPATTENDINGPLANDISCUSS_GEN_ALL_CORE
dgtr and  at length
patient and   at length
patient, dgtr and   at length
patient, dgtr and   at length

## 2018-02-25 NOTE — PROGRESS NOTE ADULT - ATTENDING COMMENTS
no ID objection to discharge  thank you  Will sign off
Tami Hu MD ( [facility])  [phone]
Tami Hu MD ( [facility])  [phone]
pt eval-dc planning  home with home o2    Tami Hu MD ( [facility])  [phone]
tte  pt eval-dc planning  home with home o2    Tami Hu MD ( [facility])

## 2018-02-25 NOTE — PROGRESS NOTE ADULT - SUBJECTIVE AND OBJECTIVE BOX
· Subjective and Objective:   [facility] CARDIOLOGY CONSULTANTS:    Mayank Matthews Grossman, Wachsman, Pannella, Patel, Savella, Goodger      [phone]    CHIEF COMPLAINT: Patient is a 85y old  Female who presents with a chief complaint of altered mental status. (23 Feb 2018 16:34)    Follow Up:  CHF, AF    Subjective/Observations: Patient seen and examined. Events noted. Resting comfortably in chair. No complaints of chest pain,   or palpitations reported. No signs of orthopnea or PND. Dyspnea improved       REVIEW OF SYSTEMS: All other review of systems is negative unless indicated above    TELEMETRY: afib 60-80's    REVIEW OF SYSTEMS:  CONSTITUTIONAL: No fever, weight loss, or fatigue  EYES: No eye pain, visual disturbances, or discharge  ENMT:  No difficulty hearing, tinnitus, vertigo; No sinus or throat pain  NECK: No pain or stiffness  RESPIRATORY: denies cough,No wheezing, chills or hemoptysis; No shortness of breath  CARDIOVASCULAR: No chest pain,No palpitations, dizziness, or leg swelling  GASTROINTESTINAL: No abdominal or epigastric pain. No nausea, vomiting, or hematemesis; No diarrhea , no constipation. No melena or hematochezia.  GENITOURINARY: No dysuria, frequency, hematuria, or incontinence  NEUROLOGICAL: No headaches, memory loss, loss of strength, numbness, or tremors  SKIN: No itching, burning, rashes, or lesions   LYMPH NODES: No enlarged glands  ENDOCRINE: No heat or cold intolerance; No hair loss  MUSCULOSKELETAL: No joint pain or swelling; No muscle, back, or extremity pain  PSYCHIATRIC: No depression, anxiety, mood swings, or difficulty sleeping  HEME/LYMPH: No easy bruising, or bleeding gums  ALLERGY AND IMMUNOLOGIC: No hives or eczema          PAST MEDICAL & SURGICAL HISTORY:  Hearing loss of left ear  Mitral valve stenosis, unspecified etiology  Leg edema  Depression  Syncope  Atrial fibrillation: On Pradaxa  HLD (hyperlipidemia)  HTN (hypertension)  Cataracta  H/O external ear surgery  H/O knee surgery      MEDICATIONS  (STANDING):  ascorbic acid 1000 milliGRAM(s) Oral daily  atorvastatin 10 milliGRAM(s) Oral at bedtime  cholecalciferol 2000 Unit(s) Oral daily  dabigatran 150 milliGRAM(s) Oral every 12 hours  diltiazem    milliGRAM(s) Oral daily  famotidine    Tablet 20 milliGRAM(s) Oral daily  lisinopril 40 milliGRAM(s) Oral daily  metoprolol     tartrate 200 milliGRAM(s) Oral two times a day  potassium chloride    Tablet ER 10 milliEquivalent(s) Oral daily  sertraline 100 milliGRAM(s) Oral daily  torsemide 20 milliGRAM(s) Oral daily      Allergies    codeine (Other)    Intolerances                              10.7   5.57  )-----------( 92       ( 24 Feb 2018 08:44 )             32.8       02-24    138  |  101  |  20  ----------------------------<  96  3.8   |  23  |  0.69    Ca    8.3<L>      24 Feb 2018 08:48  Mg     2.1     02-24                                  Daily     Daily     I&O's Summary    24 Feb 2018 07:01  -  25 Feb 2018 07:00  --------------------------------------------------------  IN: 490 mL / OUT: 0 mL / NET: 490 mL        Vital Signs Last 24 Hrs  T(C): 36.7 (25 Feb 2018 04:30), Max: 37 (24 Feb 2018 11:13)  T(F): 98.1 (25 Feb 2018 04:30), Max: 98.6 (24 Feb 2018 11:13)  HR: 74 (25 Feb 2018 04:30) (74 - 87)  BP: 115/69 (25 Feb 2018 04:30) (103/63 - 115/69)  BP(mean): --  RR: 19 (25 Feb 2018 04:30) (18 - 19)  SpO2: 96% (25 Feb 2018 04:30) (93% - 96%)    PHYSICAL EXAM:   · Constitutional	Well-developed, well nourished  · Eyes	EOMI; PERRL; no drainage or redness  · ENMT	No oral lesions; no gross abnormalities  · Neck	No bruits; no thyromegaly or nodules  · Respiratory	Normal breath sounds b/l, No RRW  · Cardiovascular	Regular rate & rhythm, normal S1, S2; no murmurs, gallops or rubs; no S3, S4  · Gastrointestinal	Soft, non-tender, no hepatosplenomegaly, normal bowel sounds  · Extremities	No cyanosis, clubbing or edema  · Vascular	Equal and normal pulses (carotid, femoral, dorsalis pedis)  · Neurological	Alert & oriented; no sensory, motor or coordination deficits, normal reflexes

## 2018-02-25 NOTE — PROGRESS NOTE ADULT - SUBJECTIVE AND OBJECTIVE BOX
Patient is a 85y old  Female who presents with a chief complaint of altered mental status. (23 Feb 2018 16:34)  pt seen and examined at bedside   SUBJECTIVE/OVERNIGHT events noted, feels well    Vital Signs Last 24 Hrs  T(C): 36.8 (25 Feb 2018 21:29), Max: 36.8 (25 Feb 2018 14:29)  T(F): 98.2 (25 Feb 2018 21:29), Max: 98.3 (25 Feb 2018 14:29)  HR: 61 (25 Feb 2018 21:29) (61 - 74)  BP: 104/60 (25 Feb 2018 21:29) (97/58 - 115/69)  BP(mean): --  RR: 18 (25 Feb 2018 21:29) (18 - 19)  SpO2: 95% (25 Feb 2018 21:29) (92% - 96%)  CAPILLARY BLOOD GLUCOSE        MEDICATIONS  (STANDING):  ascorbic acid 1000 milliGRAM(s) Oral daily  atorvastatin 10 milliGRAM(s) Oral at bedtime  cholecalciferol 2000 Unit(s) Oral daily  dabigatran 150 milliGRAM(s) Oral every 12 hours  diltiazem    milliGRAM(s) Oral daily  famotidine    Tablet 20 milliGRAM(s) Oral daily  lisinopril 40 milliGRAM(s) Oral daily  metoprolol     tartrate 200 milliGRAM(s) Oral two times a day  potassium chloride    Tablet ER 10 milliEquivalent(s) Oral daily  sertraline 100 milliGRAM(s) Oral daily  torsemide 20 milliGRAM(s) Oral daily    MEDICATIONS  (PRN):  acetaminophen   Tablet. 650 milliGRAM(s) Oral every 6 hours PRN Moderate Pain (4 - 6)  docusate sodium 100 milliGRAM(s) Oral daily PRN Constipation  traZODone 50 milliGRAM(s) Oral at bedtime PRN anxiety    PHYSICAL EXAM  General : comfortable, not in any acute distress  Neck : supple, no LAD, no JVD  Eyes : EOMI, PERRLA, conjunctiva : no icterus, no pallor  MM moist, no pharyngeal erythema/exudates  Pulmonary: clear to auscultation bilateral lung fields, no crackles/rhonchi/wheezing,   CVS : S1 S2,rate normal-,rhythm-regular, no murmurs, no abnormal sounds  Gastrointestinal: soft, non tender, non distended, no organomegaly , bowel sounds audible  Extremities: no edema  Neuro: AAOx3, no focal deficits  Musculoskeletal:  FROM of all ext  Skin: no rash  LABS                        11.1   6.02  )-----------( 101      ( 25 Feb 2018 08:37 )             34.8     02-25    140  |  102  |  18  ----------------------------<  111<H>  3.5   |  26  |  0.74    Ca    8.5      25 Feb 2018 08:23  Mg     1.9     02-25        RADIOLOGY and IMAGING reviewed: yes  Consultant notes reviewed : yes  Care discussed with Consultants/other providers :

## 2018-02-26 VITALS
RESPIRATION RATE: 17 BRPM | HEART RATE: 88 BPM | TEMPERATURE: 98 F | SYSTOLIC BLOOD PRESSURE: 108 MMHG | OXYGEN SATURATION: 94 % | DIASTOLIC BLOOD PRESSURE: 65 MMHG

## 2018-02-26 LAB
ANION GAP SERPL CALC-SCNC: 15 MMOL/L — SIGNIFICANT CHANGE UP (ref 5–17)
BUN SERPL-MCNC: 17 MG/DL — SIGNIFICANT CHANGE UP (ref 7–23)
CALCIUM SERPL-MCNC: 8.5 MG/DL — SIGNIFICANT CHANGE UP (ref 8.4–10.5)
CHLORIDE SERPL-SCNC: 101 MMOL/L — SIGNIFICANT CHANGE UP (ref 96–108)
CO2 SERPL-SCNC: 28 MMOL/L — SIGNIFICANT CHANGE UP (ref 22–31)
CREAT SERPL-MCNC: 0.78 MG/DL — SIGNIFICANT CHANGE UP (ref 0.5–1.3)
CULTURE RESULTS: SIGNIFICANT CHANGE UP
CULTURE RESULTS: SIGNIFICANT CHANGE UP
GLUCOSE SERPL-MCNC: 108 MG/DL — HIGH (ref 70–99)
HCT VFR BLD CALC: 32.9 % — LOW (ref 34.5–45)
HGB BLD-MCNC: 10.6 G/DL — LOW (ref 11.5–15.5)
MAGNESIUM SERPL-MCNC: 1.9 MG/DL — SIGNIFICANT CHANGE UP (ref 1.6–2.6)
MCHC RBC-ENTMCNC: 30.5 PG — SIGNIFICANT CHANGE UP (ref 27–34)
MCHC RBC-ENTMCNC: 32.2 GM/DL — SIGNIFICANT CHANGE UP (ref 32–36)
MCV RBC AUTO: 94.5 FL — SIGNIFICANT CHANGE UP (ref 80–100)
PLATELET # BLD AUTO: 127 K/UL — LOW (ref 150–400)
POTASSIUM SERPL-MCNC: 3.5 MMOL/L — SIGNIFICANT CHANGE UP (ref 3.5–5.3)
POTASSIUM SERPL-SCNC: 3.5 MMOL/L — SIGNIFICANT CHANGE UP (ref 3.5–5.3)
RBC # BLD: 3.48 M/UL — LOW (ref 3.8–5.2)
RBC # FLD: 16.2 % — HIGH (ref 10.3–14.5)
SODIUM SERPL-SCNC: 144 MMOL/L — SIGNIFICANT CHANGE UP (ref 135–145)
SPECIMEN SOURCE: SIGNIFICANT CHANGE UP
SPECIMEN SOURCE: SIGNIFICANT CHANGE UP
WBC # BLD: 6.61 K/UL — SIGNIFICANT CHANGE UP (ref 3.8–10.5)
WBC # FLD AUTO: 6.61 K/UL — SIGNIFICANT CHANGE UP (ref 3.8–10.5)

## 2018-02-26 PROCEDURE — 87633 RESP VIRUS 12-25 TARGETS: CPT

## 2018-02-26 PROCEDURE — 85014 HEMATOCRIT: CPT

## 2018-02-26 PROCEDURE — 87086 URINE CULTURE/COLONY COUNT: CPT

## 2018-02-26 PROCEDURE — 82746 ASSAY OF FOLIC ACID SERUM: CPT

## 2018-02-26 PROCEDURE — 87486 CHLMYD PNEUM DNA AMP PROBE: CPT

## 2018-02-26 PROCEDURE — 71045 X-RAY EXAM CHEST 1 VIEW: CPT

## 2018-02-26 PROCEDURE — 97162 PT EVAL MOD COMPLEX 30 MIN: CPT

## 2018-02-26 PROCEDURE — 96374 THER/PROPH/DIAG INJ IV PUSH: CPT

## 2018-02-26 PROCEDURE — 84295 ASSAY OF SERUM SODIUM: CPT

## 2018-02-26 PROCEDURE — 87581 M.PNEUMON DNA AMP PROBE: CPT

## 2018-02-26 PROCEDURE — 86780 TREPONEMA PALLIDUM: CPT

## 2018-02-26 PROCEDURE — 83735 ASSAY OF MAGNESIUM: CPT

## 2018-02-26 PROCEDURE — 82803 BLOOD GASES ANY COMBINATION: CPT

## 2018-02-26 PROCEDURE — 87798 DETECT AGENT NOS DNA AMP: CPT

## 2018-02-26 PROCEDURE — 99285 EMERGENCY DEPT VISIT HI MDM: CPT | Mod: 25

## 2018-02-26 PROCEDURE — 82330 ASSAY OF CALCIUM: CPT

## 2018-02-26 PROCEDURE — 83605 ASSAY OF LACTIC ACID: CPT

## 2018-02-26 PROCEDURE — 82607 VITAMIN B-12: CPT

## 2018-02-26 PROCEDURE — 87040 BLOOD CULTURE FOR BACTERIA: CPT

## 2018-02-26 PROCEDURE — 84443 ASSAY THYROID STIM HORMONE: CPT

## 2018-02-26 PROCEDURE — 96375 TX/PRO/DX INJ NEW DRUG ADDON: CPT

## 2018-02-26 PROCEDURE — 93005 ELECTROCARDIOGRAM TRACING: CPT

## 2018-02-26 PROCEDURE — 82435 ASSAY OF BLOOD CHLORIDE: CPT

## 2018-02-26 PROCEDURE — 81001 URINALYSIS AUTO W/SCOPE: CPT

## 2018-02-26 PROCEDURE — 80202 ASSAY OF VANCOMYCIN: CPT

## 2018-02-26 PROCEDURE — 99232 SBSQ HOSP IP/OBS MODERATE 35: CPT

## 2018-02-26 PROCEDURE — 82947 ASSAY GLUCOSE BLOOD QUANT: CPT

## 2018-02-26 PROCEDURE — 84100 ASSAY OF PHOSPHORUS: CPT

## 2018-02-26 PROCEDURE — 80053 COMPREHEN METABOLIC PANEL: CPT

## 2018-02-26 PROCEDURE — 84132 ASSAY OF SERUM POTASSIUM: CPT

## 2018-02-26 PROCEDURE — 80048 BASIC METABOLIC PNL TOTAL CA: CPT

## 2018-02-26 PROCEDURE — 85027 COMPLETE CBC AUTOMATED: CPT

## 2018-02-26 RX ORDER — METOPROLOL TARTRATE 50 MG
2 TABLET ORAL
Qty: 0 | Refills: 0 | COMMUNITY
Start: 2018-02-26

## 2018-02-26 RX ORDER — METOPROLOL TARTRATE 50 MG
2 TABLET ORAL
Qty: 60 | Refills: 0 | COMMUNITY

## 2018-02-26 RX ORDER — ZOLPIDEM TARTRATE 10 MG/1
1 TABLET ORAL
Qty: 0 | Refills: 0 | COMMUNITY

## 2018-02-26 RX ORDER — METOPROLOL TARTRATE 50 MG
2 TABLET ORAL
Qty: 0 | Refills: 0 | DISCHARGE
Start: 2018-02-26

## 2018-02-26 RX ORDER — RAMIPRIL 5 MG
1 CAPSULE ORAL
Qty: 0 | Refills: 0 | COMMUNITY

## 2018-02-26 RX ADMIN — DABIGATRAN ETEXILATE MESYLATE 150 MILLIGRAM(S): 150 CAPSULE ORAL at 05:31

## 2018-02-26 RX ADMIN — Medication 20 MILLIGRAM(S): at 05:32

## 2018-02-26 RX ADMIN — Medication 10 MILLIEQUIVALENT(S): at 11:45

## 2018-02-26 RX ADMIN — Medication 2000 UNIT(S): at 11:45

## 2018-02-26 RX ADMIN — Medication 360 MILLIGRAM(S): at 05:32

## 2018-02-26 RX ADMIN — Medication 1000 MILLIGRAM(S): at 11:45

## 2018-02-26 RX ADMIN — FAMOTIDINE 20 MILLIGRAM(S): 10 INJECTION INTRAVENOUS at 11:45

## 2018-02-26 RX ADMIN — Medication 200 MILLIGRAM(S): at 06:28

## 2018-02-26 RX ADMIN — SERTRALINE 100 MILLIGRAM(S): 25 TABLET, FILM COATED ORAL at 11:44

## 2018-02-26 RX ADMIN — LISINOPRIL 40 MILLIGRAM(S): 2.5 TABLET ORAL at 05:32

## 2018-02-26 NOTE — PROGRESS NOTE ADULT - SUBJECTIVE AND OBJECTIVE BOX
[facility] Cardiology Consultants - Mayank Matthews, Gilbert, Xiomara, Solitario, Chong Vera  Office Number:  [phone]    Patient resting comfortably in bed in NAD.  Laying flat with no respiratory distress.  No complaints of chest pain, dyspnea, palpitations, PND, or orthopnea.  Says her breathing is much improved and wants to go home.    ROS: negative unless otherwise mentioned.    Telemetry:  AF 50-80    MEDICATIONS  (STANDING):  ascorbic acid 1000 milliGRAM(s) Oral daily  atorvastatin 10 milliGRAM(s) Oral at bedtime  cholecalciferol 2000 Unit(s) Oral daily  dabigatran 150 milliGRAM(s) Oral every 12 hours  diltiazem    milliGRAM(s) Oral daily  famotidine    Tablet 20 milliGRAM(s) Oral daily  lisinopril 40 milliGRAM(s) Oral daily  metoprolol     tartrate 200 milliGRAM(s) Oral two times a day  potassium chloride    Tablet ER 10 milliEquivalent(s) Oral daily  sertraline 100 milliGRAM(s) Oral daily  torsemide 20 milliGRAM(s) Oral daily    MEDICATIONS  (PRN):  acetaminophen   Tablet. 650 milliGRAM(s) Oral every 6 hours PRN Moderate Pain (4 - 6)  docusate sodium 100 milliGRAM(s) Oral daily PRN Constipation  traZODone 50 milliGRAM(s) Oral at bedtime PRN anxiety      Allergies    codeine (Other)    Intolerances        Vital Signs Last 24 Hrs  T(C): 36.6 (26 Feb 2018 03:59), Max: 36.8 (25 Feb 2018 14:29)  T(F): 97.8 (26 Feb 2018 03:59), Max: 98.3 (25 Feb 2018 14:29)  HR: 63 (26 Feb 2018 03:59) (61 - 72)  BP: 115/61 (26 Feb 2018 03:59) (97/58 - 115/61)  BP(mean): --  RR: 17 (26 Feb 2018 03:59) (17 - 18)  SpO2: 97% (26 Feb 2018 03:59) (92% - 97%)    I&O's Summary    25 Feb 2018 07:01  -  26 Feb 2018 07:00  --------------------------------------------------------  IN: 530 mL / OUT: 0 mL / NET: 530 mL        ON EXAM:    · Constitutional	Well-developed, well nourished, on oxygen  · Eyes	EOMI; PERRL; no drainage or redness  · ENMT	No oral lesions; no gross abnormalities  · Neck	No bruits; no thyromegaly or nodules  · Respiratory	Crackles at lung bases bilaterally.  · Cardiovascular: Irregular rate & rhythm, normal S1, S2; no murmurs, gallops or rubs; no S3, S4  · Gastrointestinal	Soft, non-tender, no hepatosplenomegaly, normal bowel sounds  · Extremities	No cyanosis, clubbing or edema  · Vascular	Equal and normal pulses (carotid, femoral, dorsalis pedis)  · Neurological	Alert & oriented; no sensory, motor or coordination deficits, normal reflexes    LABS: All Labs Reviewed:                        10.6   6.61  )-----------( 127      ( 26 Feb 2018 07:20 )             32.9                         11.1   6.02  )-----------( 101      ( 25 Feb 2018 08:37 )             34.8                         10.7   5.57  )-----------( 92       ( 24 Feb 2018 08:44 )             32.8     26 Feb 2018 07:27    144    |  101    |  17     ----------------------------<  108    3.5     |  28     |  0.78   25 Feb 2018 08:23    140    |  102    |  18     ----------------------------<  111    3.5     |  26     |  0.74   24 Feb 2018 08:48    138    |  101    |  20     ----------------------------<  96     3.8     |  23     |  0.69     Ca    8.5        26 Feb 2018 07:27  Ca    8.5        25 Feb 2018 08:23  Ca    8.3        24 Feb 2018 08:48  Mg     1.9       26 Feb 2018 07:27  Mg     1.9       25 Feb 2018 08:23  Mg     2.1       24 Feb 2018 08:48            Blood Culture: Organism --  Gram Stain Blood -- Gram Stain --  Specimen Source .Urine Clean Catch (Midstream)  Culture-Blood --    Organism --  Gram Stain Blood -- Gram Stain --  Specimen Source .Blood Blood-Peripheral  Culture-Blood --

## 2018-02-26 NOTE — PROGRESS NOTE ADULT - PROVIDER SPECIALTY LIST ADULT
Cardiology
Infectious Disease
Internal Medicine
Infectious Disease

## 2018-02-26 NOTE — PROGRESS NOTE ADULT - ASSESSMENT
85F with diastolic heart failure, HTN,   admitted with encephalopathy and fever  thrombocytopenia
84 yo female with HTN, HLD, Afib on pradaxa, MR s/p Bushra-clip, syncope, multiple falls, presents here with altered mental status and fevers. Her symptoms sound viral in origin, despite a negative RVP.  This was some element of volume overload, and she is s/p IV lasix 40 in the ER with some improvement. Her CXR is not with significant congestion. I would restart her diuretics. Can do Lasix 40 IV x 1 today. She was on torsemide 20 at home.  AF initially fast, now in  range.  Continue with Cardizem  and metoprolol 200 bid and Pradaxa for a/c  Empiric Abx per ID, cultures are pending  Can check echocardiogram, last in our system was earlier in 2017.  Monitor and replete electrolytes. Keep K>4.0 and Mg>2.0.   Appears compensated from HF POV.   Further cardiac workup will depend on clinical course.   All other workup per primary team. Will followup.
84 yo female with HTN, HLD, Afib on pradaxa, MR s/p Bushra-clip, syncope, multiple falls, presents here with altered mental status and fevers. Her symptoms sound viral in origin, despite a negative RVP.  This was some element of volume overload, she is currently on po torsemide - can consider extra IV diuretic dose today  AF initially fast, now in  range.  Continue with Cardizem  and metoprolol 200 bid and Pradaxa for a/c  Empiric Abx per ID, cultures are pending  Can check echocardiogram, last in our system was earlier in 2017.  Monitor and replete electrolytes. Keep K>4.0 and Mg>2.0.   Appears compensated from HF POV.   Further cardiac workup will depend on clinical course.   All other workup per primary team. Will followup.
84 yo female with HTN, HLD, Afib on pradaxa, MR s/p Bushra-clip, syncope, multiple falls, presents here with altered mental status and fevers. Her symptoms sound viral in origin, despite a negative RVP.  This was some element of volume overload, she is currently on po torsemide- looks much better today  AF initially fast, now in  range.  Continue with Cardizem  and metoprolol 200 bid and Pradaxa for a/c  Empiric Abx per ID, cultures are pending  Can check echocardiogram, last in our system was earlier in 2017.  Monitor and replete electrolytes. Keep K>4.0 and Mg>2.0.   Appears compensated from HF POV.   Further cardiac workup will depend on clinical course.   All other workup per primary team. Will followup.
84 yo female with PMH of HTN, HLD, Afib on pradaxa, MVR, syncope, multiple falls, presents here with altered mental status.
84 yo female with PMH of HTN, HLD, Afib on pradaxa, MVR, syncope, multiple falls, presents here with altered mental status.
85F with diastolic heart failure, HTN,   admitted with encephalopathy and fever=--- resolved  thrombocytopenia
86 yo female with HTN, HLD, Afib on pradaxa, MR s/p Bushra-clip, syncope, multiple falls, presents here with altered mental status and fevers. Her symptoms sound viral in origin, despite a negative RVP.  This was some element of volume overload, she is currently on po torsemide and volume status has improved.  AF initially fast, now in 50-80 range.  Continue with Cardizem  and metoprolol 200 bid and Pradaxa for a/c  Now off empiric Abx. No obvious source of infection.  Can check echocardiogram, last in our system was earlier in 2017. This can happen in our office as outpatient, if there is a delay.  Monitor and replete electrolytes. Keep K>4.0 and Mg>2.0.   Appears compensated from HF POV.   Further cardiac workup will depend on clinical course.   All other workup per primary team. Will followup.   She should follow up with Dr. Hunt in our office upon d/c
86 yo female with PMH of HTN, HLD, Afib on pradaxa, MVR, syncope, multiple falls, presents here with altered mental status.
86 yo female with PMH of HTN, HLD, Afib on pradaxa, MVR, syncope, multiple falls, presents here with altered mental status.

## 2018-03-01 ENCOUNTER — APPOINTMENT (OUTPATIENT)
Dept: CARDIOLOGY | Facility: CLINIC | Age: 83
End: 2018-03-01
Payer: MEDICARE

## 2018-03-01 ENCOUNTER — NON-APPOINTMENT (OUTPATIENT)
Age: 83
End: 2018-03-01

## 2018-03-01 VITALS
SYSTOLIC BLOOD PRESSURE: 118 MMHG | OXYGEN SATURATION: 98 % | HEART RATE: 58 BPM | HEIGHT: 63 IN | DIASTOLIC BLOOD PRESSURE: 78 MMHG

## 2018-03-01 PROCEDURE — 93000 ELECTROCARDIOGRAM COMPLETE: CPT

## 2018-03-01 PROCEDURE — 93306 TTE W/DOPPLER COMPLETE: CPT

## 2018-03-01 PROCEDURE — 99214 OFFICE O/P EST MOD 30 MIN: CPT

## 2018-03-04 ENCOUNTER — RESULT CHARGE (OUTPATIENT)
Age: 83
End: 2018-03-04

## 2018-03-05 ENCOUNTER — NON-APPOINTMENT (OUTPATIENT)
Age: 83
End: 2018-03-05

## 2018-03-13 ENCOUNTER — EMERGENCY (EMERGENCY)
Facility: HOSPITAL | Age: 83
LOS: 1 days | Discharge: HOME | End: 2018-03-13
Attending: EMERGENCY MEDICINE
Payer: MEDICARE

## 2018-03-13 VITALS
RESPIRATION RATE: 17 BRPM | DIASTOLIC BLOOD PRESSURE: 69 MMHG | SYSTOLIC BLOOD PRESSURE: 130 MMHG | OXYGEN SATURATION: 100 % | HEART RATE: 98 BPM

## 2018-03-13 VITALS
SYSTOLIC BLOOD PRESSURE: 122 MMHG | RESPIRATION RATE: 17 BRPM | DIASTOLIC BLOOD PRESSURE: 79 MMHG | HEART RATE: 109 BPM | OXYGEN SATURATION: 96 % | TEMPERATURE: 98 F

## 2018-03-13 DIAGNOSIS — Z98.89 OTHER SPECIFIED POSTPROCEDURAL STATES: Chronic | ICD-10-CM

## 2018-03-13 DIAGNOSIS — H26.9 UNSPECIFIED CATARACT: Chronic | ICD-10-CM

## 2018-03-13 LAB
ALBUMIN SERPL ELPH-MCNC: 3.5 G/DL — SIGNIFICANT CHANGE UP (ref 3.3–5)
ALP SERPL-CCNC: 77 U/L — SIGNIFICANT CHANGE UP (ref 40–120)
ALT FLD-CCNC: 13 U/L RC — SIGNIFICANT CHANGE UP (ref 10–45)
ANION GAP SERPL CALC-SCNC: 11 MMOL/L — SIGNIFICANT CHANGE UP (ref 5–17)
APTT BLD: 41.2 SEC — HIGH (ref 27.5–37.4)
AST SERPL-CCNC: 22 U/L — SIGNIFICANT CHANGE UP (ref 10–40)
BASE EXCESS BLDV CALC-SCNC: 8.3 MMOL/L — HIGH (ref -2–2)
BILIRUB SERPL-MCNC: 0.8 MG/DL — SIGNIFICANT CHANGE UP (ref 0.2–1.2)
BUN SERPL-MCNC: 11 MG/DL — SIGNIFICANT CHANGE UP (ref 7–23)
CA-I SERPL-SCNC: 1.09 MMOL/L — LOW (ref 1.12–1.3)
CALCIUM SERPL-MCNC: 9.1 MG/DL — SIGNIFICANT CHANGE UP (ref 8.4–10.5)
CHLORIDE BLDV-SCNC: 103 MMOL/L — SIGNIFICANT CHANGE UP (ref 96–108)
CHLORIDE SERPL-SCNC: 98 MMOL/L — SIGNIFICANT CHANGE UP (ref 96–108)
CO2 BLDV-SCNC: 35 MMOL/L — HIGH (ref 22–30)
CO2 SERPL-SCNC: 32 MMOL/L — HIGH (ref 22–31)
CREAT SERPL-MCNC: 0.68 MG/DL — SIGNIFICANT CHANGE UP (ref 0.5–1.3)
GAS PNL BLDV: 135 MMOL/L — LOW (ref 136–145)
GAS PNL BLDV: SIGNIFICANT CHANGE UP
GAS PNL BLDV: SIGNIFICANT CHANGE UP
GLUCOSE BLDV-MCNC: 127 MG/DL — HIGH (ref 70–99)
GLUCOSE SERPL-MCNC: 133 MG/DL — HIGH (ref 70–99)
HCO3 BLDV-SCNC: 34 MMOL/L — HIGH (ref 21–29)
HCT VFR BLDA CALC: 33 % — LOW (ref 39–50)
HGB BLD CALC-MCNC: 10.5 G/DL — LOW (ref 11.5–15.5)
INR BLD: 1.39 RATIO — HIGH (ref 0.88–1.16)
LACTATE BLDV-MCNC: 1.3 MMOL/L — SIGNIFICANT CHANGE UP (ref 0.7–2)
NT-PROBNP SERPL-SCNC: 3439 PG/ML — HIGH (ref 0–300)
PCO2 BLDV: 53 MMHG — HIGH (ref 35–50)
PH BLDV: 7.42 — SIGNIFICANT CHANGE UP (ref 7.35–7.45)
PO2 BLDV: 38 MMHG — SIGNIFICANT CHANGE UP (ref 25–45)
POTASSIUM BLDV-SCNC: 3.5 MMOL/L — SIGNIFICANT CHANGE UP (ref 3.5–5)
POTASSIUM SERPL-MCNC: 3.8 MMOL/L — SIGNIFICANT CHANGE UP (ref 3.5–5.3)
POTASSIUM SERPL-SCNC: 3.8 MMOL/L — SIGNIFICANT CHANGE UP (ref 3.5–5.3)
PROT SERPL-MCNC: 7.2 G/DL — SIGNIFICANT CHANGE UP (ref 6–8.3)
PROTHROM AB SERPL-ACNC: 15.2 SEC — HIGH (ref 9.8–12.7)
SAO2 % BLDV: 65 % — LOW (ref 67–88)
SODIUM SERPL-SCNC: 141 MMOL/L — SIGNIFICANT CHANGE UP (ref 135–145)

## 2018-03-13 PROCEDURE — 80053 COMPREHEN METABOLIC PANEL: CPT

## 2018-03-13 PROCEDURE — 85730 THROMBOPLASTIN TIME PARTIAL: CPT

## 2018-03-13 PROCEDURE — 71045 X-RAY EXAM CHEST 1 VIEW: CPT | Mod: 26

## 2018-03-13 PROCEDURE — 82330 ASSAY OF CALCIUM: CPT

## 2018-03-13 PROCEDURE — 70450 CT HEAD/BRAIN W/O DYE: CPT

## 2018-03-13 PROCEDURE — 99284 EMERGENCY DEPT VISIT MOD MDM: CPT | Mod: 25,GC

## 2018-03-13 PROCEDURE — 82435 ASSAY OF BLOOD CHLORIDE: CPT

## 2018-03-13 PROCEDURE — 12001 RPR S/N/AX/GEN/TRNK 2.5CM/<: CPT

## 2018-03-13 PROCEDURE — 83605 ASSAY OF LACTIC ACID: CPT

## 2018-03-13 PROCEDURE — 82947 ASSAY GLUCOSE BLOOD QUANT: CPT

## 2018-03-13 PROCEDURE — 93005 ELECTROCARDIOGRAM TRACING: CPT | Mod: XU

## 2018-03-13 PROCEDURE — 82803 BLOOD GASES ANY COMBINATION: CPT

## 2018-03-13 PROCEDURE — 84132 ASSAY OF SERUM POTASSIUM: CPT

## 2018-03-13 PROCEDURE — 82962 GLUCOSE BLOOD TEST: CPT

## 2018-03-13 PROCEDURE — 71045 X-RAY EXAM CHEST 1 VIEW: CPT

## 2018-03-13 PROCEDURE — 85610 PROTHROMBIN TIME: CPT

## 2018-03-13 PROCEDURE — 99284 EMERGENCY DEPT VISIT MOD MDM: CPT | Mod: 25

## 2018-03-13 PROCEDURE — 85014 HEMATOCRIT: CPT

## 2018-03-13 PROCEDURE — 72125 CT NECK SPINE W/O DYE: CPT | Mod: 26

## 2018-03-13 PROCEDURE — 73030 X-RAY EXAM OF SHOULDER: CPT

## 2018-03-13 PROCEDURE — 96374 THER/PROPH/DIAG INJ IV PUSH: CPT | Mod: XU

## 2018-03-13 PROCEDURE — 12001 RPR S/N/AX/GEN/TRNK 2.5CM/<: CPT | Mod: GC

## 2018-03-13 PROCEDURE — 70450 CT HEAD/BRAIN W/O DYE: CPT | Mod: 26

## 2018-03-13 PROCEDURE — 84295 ASSAY OF SERUM SODIUM: CPT

## 2018-03-13 PROCEDURE — 73030 X-RAY EXAM OF SHOULDER: CPT | Mod: 26,LT

## 2018-03-13 PROCEDURE — 83880 ASSAY OF NATRIURETIC PEPTIDE: CPT

## 2018-03-13 PROCEDURE — 72125 CT NECK SPINE W/O DYE: CPT

## 2018-03-13 RX ORDER — ACETAMINOPHEN 500 MG
1000 TABLET ORAL ONCE
Qty: 0 | Refills: 0 | Status: COMPLETED | OUTPATIENT
Start: 2018-03-13 | End: 2018-03-13

## 2018-03-13 RX ORDER — KETOROLAC TROMETHAMINE 30 MG/ML
15 SYRINGE (ML) INJECTION ONCE
Qty: 0 | Refills: 0 | Status: DISCONTINUED | OUTPATIENT
Start: 2018-03-13 | End: 2018-03-13

## 2018-03-13 RX ADMIN — Medication 400 MILLIGRAM(S): at 11:16

## 2018-03-13 NOTE — ED PROVIDER NOTE - PLAN OF CARE
1) Please follow-up with your primary care doctor within the next 10 days to have the staple removed from your scalp.  Please also speak to him/her about possibly changing your anticoagulation regimen given your recent history of several falls. If you cannot follow-up with your doctor(s), please return to the ED for any urgent issues.  2) If you have any worsening of symptoms or any other concerns please return to the ED immediately.  3) Please continue taking your home medications as directed.  4) Your non-contrast head CT and cervical spine CT were both negative for acute injury today.

## 2018-03-13 NOTE — ED PROVIDER NOTE - CARE PLAN
Principal Discharge DX:	Laceration of head, initial encounter  Assessment and plan of treatment:	1) Please follow-up with your primary care doctor within the next 10 days to have the staple removed from your scalp.  Please also speak to him/her about possibly changing your anticoagulation regimen given your recent history of several falls. If you cannot follow-up with your doctor(s), please return to the ED for any urgent issues.  2) If you have any worsening of symptoms or any other concerns please return to the ED immediately.  3) Please continue taking your home medications as directed.  4) Your non-contrast head CT and cervical spine CT were both negative for acute injury today.

## 2018-03-13 NOTE — ED ADULT NURSE NOTE - OBJECTIVE STATEMENT
84 y/o female pmhx Afib on pradaxa, HTN, HLd, multiple syncopal episodes presenting to ED s/p fall via EMS. Per EMS pt has hx of frequent falls. Pt. does not remember events, but states she did not lose consciousness.   was in another room, and heard the pt fall. At this time denies chest pain, sob, ha, n/v/d, abdominal pain, f/c, urinary symptoms, hematuria. A&Ox4 gross neuro intact, lungs cta bilaterally, pt on 3L o2 at home, no difficulty speaking in complete sentences, s1s2 heart sounds heard, pulses x 4, wynn x4, abdomen soft nontender nondistended, back of head has a small laceration. Safety and comfort measures maintained. Continuous cardiac monitoring maintained. Patient undressed and placed into gown, call bell in hand and side rails up for safety. warm blanket provided, vital signs stable, pt in no acute distress.

## 2018-03-13 NOTE — ED PROVIDER NOTE - OBJECTIVE STATEMENT
85F pmhx aFib on pradaxa, mitral valve stenosis/regurg, htn, hld, on home O2 for "heart problem" (likely chf), BIBEMS s/p fall while bending over to pick something up off the floor in her bedroom this morning. Per pt, there were no precipitating symptoms other than loss of balance while bending down. Denies palpitations, light-headedness, acute headaches prior to fall. Pt was recently discharged from [facility] (2/26/18) after being admitted approx 1 week for fever of unknown origin. Was antibiosed here and sent home. Since then, per daughter pt has been doing well and recouperating. Prior to this admission pt notably had two falls in early Feb leaving her with a left humerus fx and minor head trauma (head CT neg for bleed).     PMD: Devyn Silverman  Cardiologist: Avila Hunt

## 2018-03-13 NOTE — ED ADULT NURSE REASSESSMENT NOTE - NS ED NURSE REASSESS COMMENT FT1
Pt. currently awaiting nonemergent ambulette. VSS NAD. Ambulette to arrive approximately 14:00. Will monitor.

## 2018-04-11 ENCOUNTER — MEDICATION RENEWAL (OUTPATIENT)
Age: 83
End: 2018-04-11

## 2018-04-12 ENCOUNTER — RX RENEWAL (OUTPATIENT)
Age: 83
End: 2018-04-12

## 2018-04-12 ENCOUNTER — MEDICATION RENEWAL (OUTPATIENT)
Age: 83
End: 2018-04-12

## 2018-04-16 ENCOUNTER — RX RENEWAL (OUTPATIENT)
Age: 83
End: 2018-04-16

## 2018-04-20 ENCOUNTER — APPOINTMENT (OUTPATIENT)
Dept: ULTRASOUND IMAGING | Facility: IMAGING CENTER | Age: 83
End: 2018-04-20
Payer: MEDICARE

## 2018-04-20 ENCOUNTER — OUTPATIENT (OUTPATIENT)
Dept: OUTPATIENT SERVICES | Facility: HOSPITAL | Age: 83
LOS: 1 days | End: 2018-04-20
Payer: MEDICARE

## 2018-04-20 DIAGNOSIS — H26.9 UNSPECIFIED CATARACT: Chronic | ICD-10-CM

## 2018-04-20 DIAGNOSIS — Z00.8 ENCOUNTER FOR OTHER GENERAL EXAMINATION: ICD-10-CM

## 2018-04-20 DIAGNOSIS — Z98.89 OTHER SPECIFIED POSTPROCEDURAL STATES: Chronic | ICD-10-CM

## 2018-04-20 PROCEDURE — 76536 US EXAM OF HEAD AND NECK: CPT

## 2018-04-20 PROCEDURE — 76536 US EXAM OF HEAD AND NECK: CPT | Mod: 26

## 2018-06-18 ENCOUNTER — MEDICATION RENEWAL (OUTPATIENT)
Age: 83
End: 2018-06-18

## 2018-06-21 ENCOUNTER — RX RENEWAL (OUTPATIENT)
Age: 83
End: 2018-06-21

## 2018-06-26 ENCOUNTER — NON-APPOINTMENT (OUTPATIENT)
Age: 83
End: 2018-06-26

## 2018-06-26 ENCOUNTER — APPOINTMENT (OUTPATIENT)
Dept: CARDIOLOGY | Facility: CLINIC | Age: 83
End: 2018-06-26
Payer: MEDICARE

## 2018-06-26 ENCOUNTER — CLINICAL ADVICE (OUTPATIENT)
Age: 83
End: 2018-06-26

## 2018-06-26 VITALS
WEIGHT: 193 LBS | HEIGHT: 63 IN | DIASTOLIC BLOOD PRESSURE: 55 MMHG | SYSTOLIC BLOOD PRESSURE: 93 MMHG | HEART RATE: 58 BPM | OXYGEN SATURATION: 81 % | BODY MASS INDEX: 34.2 KG/M2

## 2018-06-26 PROCEDURE — 99214 OFFICE O/P EST MOD 30 MIN: CPT

## 2018-06-26 PROCEDURE — 93000 ELECTROCARDIOGRAM COMPLETE: CPT

## 2018-07-12 ENCOUNTER — OTHER (OUTPATIENT)
Age: 83
End: 2018-07-12

## 2018-07-16 ENCOUNTER — OUTPATIENT (OUTPATIENT)
Dept: OUTPATIENT SERVICES | Facility: HOSPITAL | Age: 83
LOS: 1 days | Discharge: HOME | End: 2018-07-16
Payer: MEDICARE

## 2018-07-16 DIAGNOSIS — Z98.89 OTHER SPECIFIED POSTPROCEDURAL STATES: Chronic | ICD-10-CM

## 2018-07-16 DIAGNOSIS — L97.801 NON-PRESSURE CHRONIC ULCER OF OTHER PART OF UNSPECIFIED LOWER LEG LIMITED TO BREAKDOWN OF SKIN: ICD-10-CM

## 2018-07-16 DIAGNOSIS — H26.9 UNSPECIFIED CATARACT: Chronic | ICD-10-CM

## 2018-07-16 PROCEDURE — G0463: CPT

## 2018-07-17 DIAGNOSIS — S81.802A UNSPECIFIED OPEN WOUND, LEFT LOWER LEG, INITIAL ENCOUNTER: ICD-10-CM

## 2018-07-17 DIAGNOSIS — Z88.5 ALLERGY STATUS TO NARCOTIC AGENT: ICD-10-CM

## 2018-07-17 DIAGNOSIS — I48.91 UNSPECIFIED ATRIAL FIBRILLATION: ICD-10-CM

## 2018-07-17 DIAGNOSIS — E66.9 OBESITY, UNSPECIFIED: ICD-10-CM

## 2018-07-17 DIAGNOSIS — E78.5 HYPERLIPIDEMIA, UNSPECIFIED: ICD-10-CM

## 2018-07-17 DIAGNOSIS — R60.9 EDEMA, UNSPECIFIED: ICD-10-CM
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
## 2018-07-17 DIAGNOSIS — Z82.49 FAMILY HISTORY OF ISCHEMIC HEART DISEASE AND OTHER DISEASES OF THE CIRCULATORY SYSTEM: ICD-10-CM

## 2018-07-17 DIAGNOSIS — I10 ESSENTIAL (PRIMARY) HYPERTENSION: ICD-10-CM

## 2018-07-17 DIAGNOSIS — Z79.899 OTHER LONG TERM (CURRENT) DRUG THERAPY: ICD-10-CM
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
## 2018-07-17 DIAGNOSIS — M12.9 ARTHROPATHY, UNSPECIFIED: ICD-10-CM

## 2018-07-24 PROBLEM — Z85.828 HISTORY OF BASAL CELL CARCINOMA: Status: ACTIVE | Noted: 2017-05-12

## 2018-07-24 PROBLEM — I87.2 VENOUS INSUFFICIENCY: Status: RESOLVED | Noted: 2017-05-12 | Resolved: 2018-07-24

## 2018-08-30 ENCOUNTER — RECORD ABSTRACTING (OUTPATIENT)
Age: 83
End: 2018-08-30

## 2018-09-17 ENCOUNTER — RX RENEWAL (OUTPATIENT)
Age: 83
End: 2018-09-17

## 2018-10-01 ENCOUNTER — MEDICATION RENEWAL (OUTPATIENT)
Age: 83
End: 2018-10-01

## 2018-10-12 ENCOUNTER — APPOINTMENT (OUTPATIENT)
Dept: CARDIOLOGY | Facility: CLINIC | Age: 83
End: 2018-10-12
Payer: MEDICARE

## 2018-10-12 ENCOUNTER — NON-APPOINTMENT (OUTPATIENT)
Age: 83
End: 2018-10-12

## 2018-10-12 VITALS
SYSTOLIC BLOOD PRESSURE: 100 MMHG | BODY MASS INDEX: 33.66 KG/M2 | DIASTOLIC BLOOD PRESSURE: 60 MMHG | OXYGEN SATURATION: 84 % | HEART RATE: 69 BPM | WEIGHT: 190 LBS | HEIGHT: 63 IN

## 2018-10-12 PROCEDURE — 99214 OFFICE O/P EST MOD 30 MIN: CPT

## 2018-10-12 PROCEDURE — 93000 ELECTROCARDIOGRAM COMPLETE: CPT

## 2018-10-14 ENCOUNTER — RESULT CHARGE (OUTPATIENT)
Age: 83
End: 2018-10-14

## 2018-10-17 ENCOUNTER — APPOINTMENT (OUTPATIENT)
Dept: PULMONOLOGY | Facility: CLINIC | Age: 83
End: 2018-10-17
Payer: MEDICARE

## 2018-10-17 VITALS — OXYGEN SATURATION: 83 %

## 2018-10-17 DIAGNOSIS — Z86.69 PERSONAL HISTORY OF OTHER DISEASES OF THE NERVOUS SYSTEM AND SENSE ORGANS: ICD-10-CM

## 2018-10-17 PROCEDURE — 99214 OFFICE O/P EST MOD 30 MIN: CPT | Mod: 25

## 2018-10-17 PROCEDURE — 71046 X-RAY EXAM CHEST 2 VIEWS: CPT

## 2018-12-14 ENCOUNTER — OUTPATIENT (OUTPATIENT)
Dept: OUTPATIENT SERVICES | Facility: HOSPITAL | Age: 83
LOS: 1 days | End: 2018-12-14
Payer: MEDICARE

## 2018-12-14 ENCOUNTER — APPOINTMENT (OUTPATIENT)
Dept: ULTRASOUND IMAGING | Facility: IMAGING CENTER | Age: 83
End: 2018-12-14
Payer: MEDICARE

## 2018-12-14 DIAGNOSIS — Z98.89 OTHER SPECIFIED POSTPROCEDURAL STATES: Chronic | ICD-10-CM

## 2018-12-14 DIAGNOSIS — H26.9 UNSPECIFIED CATARACT: Chronic | ICD-10-CM

## 2018-12-14 DIAGNOSIS — Z00.8 ENCOUNTER FOR OTHER GENERAL EXAMINATION: ICD-10-CM

## 2018-12-14 PROCEDURE — 76536 US EXAM OF HEAD AND NECK: CPT | Mod: 26

## 2018-12-14 PROCEDURE — 76536 US EXAM OF HEAD AND NECK: CPT

## 2019-01-03 ENCOUNTER — APPOINTMENT (OUTPATIENT)
Dept: DERMATOLOGY | Facility: CLINIC | Age: 84
End: 2019-01-03
Payer: MEDICARE

## 2019-01-03 DIAGNOSIS — L30.9 DERMATITIS, UNSPECIFIED: ICD-10-CM

## 2019-01-03 PROCEDURE — 99214 OFFICE O/P EST MOD 30 MIN: CPT

## 2019-01-03 NOTE — HISTORY OF PRESENT ILLNESS
[FreeTextEntry1] : Rash; Fluid leaking from RLE [de-identified] : 85 yo F with hx of BCC on the L nasal crease, s/p Mohs surgery by outside dermatologist (2016), AF and CHF with diastolic dysfunction here for above.\par \par Notes a rash that "moves" x 1.5 months. Very itchy. Lesions last about a week at a time. Noted on the arms and thighs. Used a prescription cream that she is unsure helps. Tried Benadryl without improvement. No antecedent illnesses or new medications\par \par Also reports x 1 week that she has had a sore on her RLE that has been draining fluid. No tenderness or malodorous drainage. Has not seen her cardiologist recently.

## 2019-01-03 NOTE — PHYSICAL EXAM
[Alert] : alert [Oriented x 3] : ~L oriented x 3 [Well Nourished] : well nourished [Conjunctiva Non-injected] : conjunctiva non-injected [No Visual Lymphadenopathy] : no visual  lymphadenopathy [No Clubbing] : no clubbing [No Edema] : no edema [No Bromhidrosis] : no bromhidrosis [No Chromhidrosis] : no chromhidrosis [FreeTextEntry3] : Ecchymotic patch on the R upper and L upper arms. R upper back with pink papule\par \par RLE with superficial clean-based erosion with 2+ pitting edema

## 2019-01-16 ENCOUNTER — APPOINTMENT (OUTPATIENT)
Dept: PULMONOLOGY | Facility: CLINIC | Age: 84
End: 2019-01-16
Payer: MEDICARE

## 2019-01-16 VITALS — SYSTOLIC BLOOD PRESSURE: 104 MMHG | DIASTOLIC BLOOD PRESSURE: 63 MMHG | HEART RATE: 74 BPM | OXYGEN SATURATION: 84 %

## 2019-01-16 PROCEDURE — 99213 OFFICE O/P EST LOW 20 MIN: CPT

## 2019-01-16 NOTE — PHYSICAL EXAM
[General Appearance - Well Developed] : well developed [Normal Appearance] : normal appearance [Well Groomed] : well groomed [General Appearance - Well Nourished] : well nourished [No Deformities] : no deformities [General Appearance - In No Acute Distress] : no acute distress [Normal Conjunctiva] : the conjunctiva exhibited no abnormalities [Eyelids - No Xanthelasma] : the eyelids demonstrated no xanthelasmas [Normal Oropharynx] : normal oropharynx [Neck Appearance] : the appearance of the neck was normal [Neck Cervical Mass (___cm)] : no neck mass was observed [Jugular Venous Distention Increased] : there was no jugular-venous distention [Thyroid Diffuse Enlargement] : the thyroid was not enlarged [Thyroid Nodule] : there were no palpable thyroid nodules [Heart Rate And Rhythm] : heart rate and rhythm were normal [Heart Sounds] : normal S1 and S2 [Murmurs] : no murmurs present [Respiration, Rhythm And Depth] : normal respiratory rhythm and effort [Exaggerated Use Of Accessory Muscles For Inspiration] : no accessory muscle use [Auscultation Breath Sounds / Voice Sounds] : lungs were clear to auscultation bilaterally [Abdomen Soft] : soft [Abdomen Tenderness] : non-tender [Abdomen Mass (___ Cm)] : no abdominal mass palpated [Abnormal Walk] : normal gait [Gait - Sufficient For Exercise Testing] : the gait was sufficient for exercise testing [Nail Clubbing] : no clubbing of the fingernails [Cyanosis, Localized] : no localized cyanosis [Petechial Hemorrhages (___cm)] : no petechial hemorrhages [] : no ischemic changes

## 2019-01-16 NOTE — ASSESSMENT
[FreeTextEntry1] : Overall little change. Recommend increase to 4 pulse while walking. Followup in 6 months unless symptomatic

## 2019-01-16 NOTE — HISTORY OF PRESENT ILLNESS
[FreeTextEntry1] : PRIOR: Janna Mix returns for followup.  She has a history of congestive heart failure.  She is currently oxygen dependent.  She is using a concentrator at night and a portable oxygen concentrator during the daytime.  She uses oxygen 24/7.  Her resting oxygen saturation measured in the office today was 87%.  With supplemental oxygen, oxygen saturations were maintained with exertion.\par \par Impression:  Ms. Mix has pulmonary hypertension, congestive heart failure, and sleep apnea.  She uses supplemental oxygen.  I am satisfied with her progress to date.  She should see me again in 6 months.\par \par CURRENT: No interval change. Continues to use oxygen both concentrator at home and portable concentrator while walking. Does not complain of shortness of breath or any change since last visit.\par

## 2019-03-05 ENCOUNTER — INPATIENT (INPATIENT)
Facility: HOSPITAL | Age: 84
LOS: 7 days | Discharge: HOME | DRG: 286 | End: 2019-03-13
Attending: INTERNAL MEDICINE | Admitting: INTERNAL MEDICINE
Payer: MEDICARE

## 2019-03-05 ENCOUNTER — NON-APPOINTMENT (OUTPATIENT)
Age: 84
End: 2019-03-05

## 2019-03-05 ENCOUNTER — APPOINTMENT (OUTPATIENT)
Dept: CARDIOLOGY | Facility: CLINIC | Age: 84
End: 2019-03-05
Payer: MEDICARE

## 2019-03-05 VITALS
OXYGEN SATURATION: 80 % | HEIGHT: 63 IN | DIASTOLIC BLOOD PRESSURE: 57 MMHG | SYSTOLIC BLOOD PRESSURE: 129 MMHG | WEIGHT: 195 LBS | BODY MASS INDEX: 34.55 KG/M2 | HEART RATE: 69 BPM

## 2019-03-05 VITALS
HEART RATE: 63 BPM | SYSTOLIC BLOOD PRESSURE: 132 MMHG | DIASTOLIC BLOOD PRESSURE: 69 MMHG | RESPIRATION RATE: 18 BRPM | TEMPERATURE: 98 F | HEIGHT: 63 IN | OXYGEN SATURATION: 93 %

## 2019-03-05 DIAGNOSIS — E78.5 HYPERLIPIDEMIA, UNSPECIFIED: ICD-10-CM

## 2019-03-05 DIAGNOSIS — G47.33 OBSTRUCTIVE SLEEP APNEA (ADULT) (PEDIATRIC): ICD-10-CM

## 2019-03-05 DIAGNOSIS — Z98.89 OTHER SPECIFIED POSTPROCEDURAL STATES: Chronic | ICD-10-CM

## 2019-03-05 DIAGNOSIS — I48.91 UNSPECIFIED ATRIAL FIBRILLATION: ICD-10-CM

## 2019-03-05 DIAGNOSIS — I50.33 ACUTE ON CHRONIC DIASTOLIC (CONGESTIVE) HEART FAILURE: ICD-10-CM

## 2019-03-05 DIAGNOSIS — F32.9 MAJOR DEPRESSIVE DISORDER, SINGLE EPISODE, UNSPECIFIED: ICD-10-CM

## 2019-03-05 DIAGNOSIS — I50.43 ACUTE ON CHRONIC COMBINED SYSTOLIC (CONGESTIVE) AND DIASTOLIC (CONGESTIVE) HEART FAILURE: ICD-10-CM

## 2019-03-05 DIAGNOSIS — H26.9 UNSPECIFIED CATARACT: Chronic | ICD-10-CM

## 2019-03-05 DIAGNOSIS — I05.0 RHEUMATIC MITRAL STENOSIS: ICD-10-CM

## 2019-03-05 DIAGNOSIS — I10 ESSENTIAL (PRIMARY) HYPERTENSION: ICD-10-CM

## 2019-03-05 PROCEDURE — 93010 ELECTROCARDIOGRAM REPORT: CPT

## 2019-03-05 PROCEDURE — 71045 X-RAY EXAM CHEST 1 VIEW: CPT | Mod: 26

## 2019-03-05 PROCEDURE — 99215 OFFICE O/P EST HI 40 MIN: CPT | Mod: PD

## 2019-03-05 PROCEDURE — 99223 1ST HOSP IP/OBS HIGH 75: CPT | Mod: AI

## 2019-03-05 PROCEDURE — 93000 ELECTROCARDIOGRAM COMPLETE: CPT | Mod: PD

## 2019-03-05 RX ORDER — METOPROLOL TARTRATE 50 MG
200 TABLET ORAL
Qty: 0 | Refills: 0 | Status: DISCONTINUED | OUTPATIENT
Start: 2019-03-05 | End: 2019-03-13

## 2019-03-05 RX ORDER — ZOLPIDEM TARTRATE 5 MG/1
TABLET ORAL
Refills: 0 | Status: DISCONTINUED | COMMUNITY
End: 2019-03-05

## 2019-03-05 RX ORDER — DILTIAZEM HCL 120 MG
360 CAPSULE, EXT RELEASE 24 HR ORAL DAILY
Qty: 0 | Refills: 0 | Status: DISCONTINUED | OUTPATIENT
Start: 2019-03-05 | End: 2019-03-07

## 2019-03-05 RX ORDER — FAMOTIDINE 10 MG/ML
20 INJECTION INTRAVENOUS DAILY
Qty: 0 | Refills: 0 | Status: DISCONTINUED | OUTPATIENT
Start: 2019-03-05 | End: 2019-03-13

## 2019-03-05 RX ORDER — ACETAMINOPHEN 500 MG
2 TABLET ORAL
Qty: 0 | Refills: 0 | COMMUNITY

## 2019-03-05 RX ORDER — SERTRALINE 25 MG/1
25 TABLET, FILM COATED ORAL AT BEDTIME
Qty: 0 | Refills: 0 | Status: DISCONTINUED | OUTPATIENT
Start: 2019-03-05 | End: 2019-03-13

## 2019-03-05 RX ORDER — BUMETANIDE 0.25 MG/ML
2 INJECTION INTRAMUSCULAR; INTRAVENOUS ONCE
Qty: 0 | Refills: 0 | Status: COMPLETED | OUTPATIENT
Start: 2019-03-05 | End: 2019-03-05

## 2019-03-05 RX ORDER — SERTRALINE 25 MG/1
100 TABLET, FILM COATED ORAL DAILY
Qty: 0 | Refills: 0 | Status: DISCONTINUED | OUTPATIENT
Start: 2019-03-05 | End: 2019-03-05

## 2019-03-05 RX ORDER — POTASSIUM CHLORIDE 20 MEQ
1 PACKET (EA) ORAL
Qty: 30 | Refills: 0 | COMMUNITY

## 2019-03-05 RX ORDER — DABIGATRAN ETEXILATE MESYLATE 150 MG/1
150 CAPSULE ORAL EVERY 12 HOURS
Qty: 0 | Refills: 0 | Status: DISCONTINUED | OUTPATIENT
Start: 2019-03-05 | End: 2019-03-10

## 2019-03-05 RX ORDER — RANITIDINE HYDROCHLORIDE 300 MG/1
TABLET, FILM COATED ORAL
Refills: 0 | Status: DISCONTINUED | COMMUNITY
End: 2019-03-05

## 2019-03-05 RX ORDER — ALPRAZOLAM 0.25 MG
0.25 TABLET ORAL DAILY
Qty: 0 | Refills: 0 | Status: DISCONTINUED | OUTPATIENT
Start: 2019-03-05 | End: 2019-03-11

## 2019-03-05 RX ORDER — ATORVASTATIN CALCIUM 80 MG/1
10 TABLET, FILM COATED ORAL AT BEDTIME
Qty: 0 | Refills: 0 | Status: DISCONTINUED | OUTPATIENT
Start: 2019-03-05 | End: 2019-03-13

## 2019-03-05 RX ORDER — SERTRALINE 25 MG/1
50 TABLET, FILM COATED ORAL DAILY
Qty: 0 | Refills: 0 | Status: DISCONTINUED | OUTPATIENT
Start: 2019-03-05 | End: 2019-03-13

## 2019-03-05 RX ORDER — MIRTAZAPINE 7.5 MG/1
TABLET, FILM COATED ORAL
Refills: 0 | Status: DISCONTINUED | COMMUNITY
End: 2019-03-05

## 2019-03-05 RX ORDER — BUMETANIDE 0.25 MG/ML
1 INJECTION INTRAMUSCULAR; INTRAVENOUS
Qty: 20 | Refills: 0 | Status: DISCONTINUED | OUTPATIENT
Start: 2019-03-05 | End: 2019-03-06

## 2019-03-05 RX ADMIN — SERTRALINE 25 MILLIGRAM(S): 25 TABLET, FILM COATED ORAL at 21:43

## 2019-03-05 RX ADMIN — BUMETANIDE 5 MG/HR: 0.25 INJECTION INTRAMUSCULAR; INTRAVENOUS at 21:44

## 2019-03-05 RX ADMIN — Medication 0.25 MILLIGRAM(S): at 21:43

## 2019-03-05 RX ADMIN — DABIGATRAN ETEXILATE MESYLATE 150 MILLIGRAM(S): 150 CAPSULE ORAL at 20:44

## 2019-03-05 RX ADMIN — Medication 200 MILLIGRAM(S): at 21:44

## 2019-03-05 RX ADMIN — BUMETANIDE 2 MILLIGRAM(S): 0.25 INJECTION INTRAMUSCULAR; INTRAVENOUS at 19:04

## 2019-03-05 RX ADMIN — BUMETANIDE 5 MG/HR: 0.25 INJECTION INTRAMUSCULAR; INTRAVENOUS at 19:04

## 2019-03-05 RX ADMIN — ATORVASTATIN CALCIUM 10 MILLIGRAM(S): 80 TABLET, FILM COATED ORAL at 21:43

## 2019-03-05 NOTE — H&P ADULT - PROBLEM SELECTOR PLAN 1
as per heart failure recs: bumex 2mg ivp once with bumex gtt 1mg/hr  ins and outs  daily weights  possibility of repeating 2d echo on this admission to evaluate the mv clip, follow up full heart failure recs in am  dash diet  c/w metoprolol 200mg bid  not on aceI

## 2019-03-05 NOTE — PHYSICAL EXAM
[General Appearance - Well Developed] : well developed [Bowel Sounds] : normal bowel sounds [Abdomen Soft] : soft [Abdomen Tenderness] : non-tender [Nail Clubbing] : no clubbing of the fingernails [Cyanosis, Localized] : no localized cyanosis [Oriented To Time, Place, And Person] : oriented to person, place, and time [Impaired Insight] : insight and judgment were intact [Affect] : the affect was normal [Mood] : the mood was normal [Well Groomed] : well groomed [General Appearance - Well Nourished] : well nourished [General Appearance - In No Acute Distress] : no acute distress [Eyelids - No Xanthelasma] : the eyelids demonstrated no xanthelasmas [2+] : left 2+ [No Oral Pallor] : no oral pallor [] : no rash [No Venous Stasis] : no venous stasis [Right Carotid Bruit] : no bruit heard over the right carotid [Left Carotid Bruit] : no bruit heard over the left carotid [FreeTextEntry1] : some areas of weeping, small open lesion on R lateral LE

## 2019-03-05 NOTE — H&P ADULT - NSHPLABSRESULTS_GEN_ALL_CORE
wbc 6.5 h/h 12.5/40.1 plts 134      146/4.0/101/32/15/.88<120    mg 2.2    probnp 2838    cxr pending  ekg pending

## 2019-03-05 NOTE — H&P ADULT - ASSESSMENT
87 yo HFpEF (3/2018 ef 71%) s/p mitral clip in 8/2016, hx of permanent a fib on pradaxa, htn, hld , SHERYL not on cpap but on continuous home 02 3L p/w acute on chronic diastolic heart failure

## 2019-03-05 NOTE — HISTORY OF PRESENT ILLNESS
[FreeTextEntry1] : Ms. Mix is an 86 year old woman with history of mitral regurgitation, s/p Mitraclip procedure on August 30th of 2016 and HFpEF (LVEF 71% 3/18) who presents today for initial evaluation. Her other comorbidities include a history of syncope in 2014, permanent atrial fibrillation, hypertension, hyperlipidemia and SHERYL. She is on continuous home oxygen at 3L via NC.\par \par Ms. Mix has noted shortness of breath starting around [id number]. She had a loop recorder placed in 2015 following a syncopal event to evaluate for bradyarrythmias, which showed afib with poor rate control, which was managed medically with adjustment in her diltiazem and metoprolol. She underwent Mitraclip in 8/2016, however, she noted no improvement in her symptoms following the clipping. Of note, she had a LHC/RHC prior to Mitraclip which showed normal coronaries with biventricular elevated filling pressures, pulmonary hypertension and normal output. She was hypertensive with /89 at the time.\par \par Over the past 6 months to a year she reports continued decline in her activity tolerance. At this time she is able to walk about 20 feet and is unable to climb stairs due to dyspnea. At times notes difficulty with ADLs due to dyspnea and has a home health aid to assist her. She reports orthopnea, sleeping in a recliner, occasional abdominal distention, decreased appetite, and LE edema with episodes of weeping. She does not weigh herself regularly, but states her weight has been uptrending over the past several months. Endorses dietary indiscretions, inconsistently adhering to a low sodium diet. She reports taking her medications as prescribed, including her torsemide 20mg daily, with an occasional additional dose of 10-20mg in PM, which she states she takes about once a month. She has never been hospitalized or presented to the ER for HF/SOB. She has had hospitalizations related to multiple mechanical falls, most recently in early 2018. Otherwise, she denies any chest pain, pressure, recurrent syncope since 2014, lightheadedness, dizziness, and palpitations. Her bowel and bladder habits are normal. She denies hematuria, melena, and hematochezia. She has been following with her cardiologist, Dr. Hunt and pulmonologist, Dr. Lopez.

## 2019-03-05 NOTE — H&P ADULT - HISTORY OF PRESENT ILLNESS
87 yo hfpef  ef 71% march 2018 mitral clip in 8/2016, hx of a fib on pradaxa, htn, hld , SHERYL not on cpap on continuous home 02 3L, came into heart failure clinic for initial eval, reported 6 month decrease ET could barely walk 20 steps associated with andrade and diffcuilty in performing adls. She has been sleeping in a chair. No cp. Her dry weight is usually 186lb but in clinic s 85 yo HFpEF (3/2018 ef 71%) s/p mitral clip in 8/2016, hx of permanent a fib on pradaxa, htn, hld , SHERYL not on cpap but on continuous home 02 3L, came into heart failure clinic this morning for initial eval and reported 6 month decrease ET and now could barely walk 20 steps associated with andrade and difficulty in performing adls. She is unable to go from chair to chair without feeling tired or sob. She has not required any additional oxygen but does report she has rima about low 80s on the 3L when she moves around. She is usually about 92%.  She has been recently sleeping in a chair. She has leg swelling but has noticed it getting worse in the past months. No chest pain, cough, or sputum production. No recent sick contacts. She does not recall drinking a lot of water but she did have an occasional BLT sandwich. Her dry weight is usually 186lb but in clinic she was measured to be 195lb and found to have a JVD to about 20cm.

## 2019-03-05 NOTE — REASON FOR VISIT
[Initial Evaluation] : an initial evaluation of [Heart Failure] : congestive heart failure [Spouse] : spouse [Family Member] : family member [FreeTextEntry1] : \par

## 2019-03-05 NOTE — DISCUSSION/SUMMARY
[FreeTextEntry1] : 85 yo F with acute on chronic diastolic HF, rate-controlled AFib, and chronic hypoxia on home O2, but without evident lung disease who is markedly decompensated on this initial assessment. She is ACC/AHA Stage C-D, NYHA Class IV HF. It is unclear how much of her hypoxia is driven by her HF.  I have recommended the following:\par \par 1. Acute on chronic diastolic heart failure - admission to [facility] HF unit for aggressive intravenous diuresis. Please give bumex 2mg IV x 1 and start bumex infusion at 1mg/hr. Labs sent from office and available for review. Discussed with hospitalist and inpatient HF team.\par \par 2. AFib - Rate controlled on beta blocker and CCB. On Pradaxa for AC.\par \par 3. Hypertension -  Well controlled on current therapies.\par \par 4. Hypoxia - Titrate oxygen to maintain SpO2 > 92%. Prior PFTs reportedly unrevealing. Would reassess when euvolemic.\par \par 5. Follow up with HF NP 1 week following hospital discharge and additional follow-up with me will be arranged.\par

## 2019-03-06 DIAGNOSIS — R09.02 HYPOXEMIA: ICD-10-CM

## 2019-03-06 DIAGNOSIS — I48.2 CHRONIC ATRIAL FIBRILLATION: ICD-10-CM

## 2019-03-06 LAB
ALBUMIN SERPL ELPH-MCNC: 4 G/DL
ALBUMIN SERPL ELPH-MCNC: 4 G/DL — SIGNIFICANT CHANGE UP (ref 3.3–5)
ALP BLD-CCNC: 48 U/L
ALP SERPL-CCNC: 47 U/L — SIGNIFICANT CHANGE UP (ref 40–120)
ALT FLD-CCNC: 10 U/L — SIGNIFICANT CHANGE UP (ref 10–45)
ALT SERPL-CCNC: 10 U/L
ANION GAP SERPL CALC-SCNC: 13 MMOL/L
ANION GAP SERPL CALC-SCNC: 13 MMOL/L — SIGNIFICANT CHANGE UP (ref 5–17)
ANION GAP SERPL CALC-SCNC: 13 MMOL/L — SIGNIFICANT CHANGE UP (ref 5–17)
APTT BLD: 48.1 SEC — HIGH (ref 27.5–36.3)
AST SERPL-CCNC: 17 U/L
AST SERPL-CCNC: 17 U/L — SIGNIFICANT CHANGE UP (ref 10–40)
BASOPHILS # BLD AUTO: 0.04 K/UL
BASOPHILS NFR BLD AUTO: 0.6 %
BILIRUB SERPL-MCNC: 0.6 MG/DL
BILIRUB SERPL-MCNC: 0.9 MG/DL — SIGNIFICANT CHANGE UP (ref 0.2–1.2)
BUN SERPL-MCNC: 12 MG/DL — SIGNIFICANT CHANGE UP (ref 7–23)
BUN SERPL-MCNC: 12 MG/DL — SIGNIFICANT CHANGE UP (ref 7–23)
BUN SERPL-MCNC: 15 MG/DL
CALCIUM SERPL-MCNC: 8.7 MG/DL — SIGNIFICANT CHANGE UP (ref 8.4–10.5)
CALCIUM SERPL-MCNC: 8.8 MG/DL — SIGNIFICANT CHANGE UP (ref 8.4–10.5)
CALCIUM SERPL-MCNC: 9.3 MG/DL
CHLORIDE SERPL-SCNC: 101 MMOL/L
CHLORIDE SERPL-SCNC: 98 MMOL/L — SIGNIFICANT CHANGE UP (ref 96–108)
CHLORIDE SERPL-SCNC: 98 MMOL/L — SIGNIFICANT CHANGE UP (ref 96–108)
CO2 SERPL-SCNC: 31 MMOL/L — SIGNIFICANT CHANGE UP (ref 22–31)
CO2 SERPL-SCNC: 32 MMOL/L
CO2 SERPL-SCNC: 34 MMOL/L — HIGH (ref 22–31)
CREAT SERPL-MCNC: 0.86 MG/DL — SIGNIFICANT CHANGE UP (ref 0.5–1.3)
CREAT SERPL-MCNC: 0.88 MG/DL
CREAT SERPL-MCNC: 0.98 MG/DL — SIGNIFICANT CHANGE UP (ref 0.5–1.3)
EOSINOPHIL # BLD AUTO: 0.66 K/UL
EOSINOPHIL NFR BLD AUTO: 10.1 %
GAS PNL BLDA: SIGNIFICANT CHANGE UP
GLUCOSE SERPL-MCNC: 108 MG/DL — HIGH (ref 70–99)
GLUCOSE SERPL-MCNC: 119 MG/DL — HIGH (ref 70–99)
GLUCOSE SERPL-MCNC: 120 MG/DL
HCT VFR BLD CALC: 39.9 % — SIGNIFICANT CHANGE UP (ref 34.5–45)
HCT VFR BLD CALC: 40.1 %
HGB BLD-MCNC: 12.3 G/DL — SIGNIFICANT CHANGE UP (ref 11.5–15.5)
HGB BLD-MCNC: 12.5 G/DL
IMM GRANULOCYTES NFR BLD AUTO: 0.3 %
INR BLD: 1.53 RATIO — HIGH (ref 0.88–1.16)
LYMPHOCYTES # BLD AUTO: 1.07 K/UL
LYMPHOCYTES NFR BLD AUTO: 16.4 %
MAGNESIUM SERPL-MCNC: 1.9 MG/DL — SIGNIFICANT CHANGE UP (ref 1.6–2.6)
MAGNESIUM SERPL-MCNC: 2.2 MG/DL
MAGNESIUM SERPL-MCNC: 2.2 MG/DL — SIGNIFICANT CHANGE UP (ref 1.6–2.6)
MAN DIFF?: NORMAL
MCHC RBC-ENTMCNC: 30.4 PG — SIGNIFICANT CHANGE UP (ref 27–34)
MCHC RBC-ENTMCNC: 30.8 GM/DL — LOW (ref 32–36)
MCHC RBC-ENTMCNC: 31 PG
MCHC RBC-ENTMCNC: 31.2 GM/DL
MCV RBC AUTO: 98.5 FL — SIGNIFICANT CHANGE UP (ref 80–100)
MCV RBC AUTO: 99.5 FL
MONOCYTES # BLD AUTO: 0.78 K/UL
MONOCYTES NFR BLD AUTO: 12 %
NEUTROPHILS # BLD AUTO: 3.95 K/UL
NEUTROPHILS NFR BLD AUTO: 60.6 %
NT-PROBNP SERPL-MCNC: 2848 PG/ML
PHOSPHATE SERPL-MCNC: 3.4 MG/DL — SIGNIFICANT CHANGE UP (ref 2.5–4.5)
PLATELET # BLD AUTO: 133 K/UL — LOW (ref 150–400)
PLATELET # BLD AUTO: 134 K/UL
POTASSIUM SERPL-MCNC: 3 MMOL/L — LOW (ref 3.5–5.3)
POTASSIUM SERPL-MCNC: 4.1 MMOL/L — SIGNIFICANT CHANGE UP (ref 3.5–5.3)
POTASSIUM SERPL-SCNC: 3 MMOL/L — LOW (ref 3.5–5.3)
POTASSIUM SERPL-SCNC: 4 MMOL/L
POTASSIUM SERPL-SCNC: 4.1 MMOL/L — SIGNIFICANT CHANGE UP (ref 3.5–5.3)
PROT SERPL-MCNC: 6.8 G/DL — SIGNIFICANT CHANGE UP (ref 6–8.3)
PROT SERPL-MCNC: 7 G/DL
PROTHROM AB SERPL-ACNC: 17.7 SEC — HIGH (ref 10–13.1)
RBC # BLD: 4.03 M/UL
RBC # BLD: 4.05 M/UL — SIGNIFICANT CHANGE UP (ref 3.8–5.2)
RBC # FLD: 14.9 % — HIGH (ref 10.3–14.5)
RBC # FLD: 15 %
SODIUM SERPL-SCNC: 142 MMOL/L — SIGNIFICANT CHANGE UP (ref 135–145)
SODIUM SERPL-SCNC: 145 MMOL/L — SIGNIFICANT CHANGE UP (ref 135–145)
SODIUM SERPL-SCNC: 146 MMOL/L
WBC # BLD: 7.15 K/UL — SIGNIFICANT CHANGE UP (ref 3.8–10.5)
WBC # FLD AUTO: 6.52 K/UL
WBC # FLD AUTO: 7.15 K/UL — SIGNIFICANT CHANGE UP (ref 3.8–10.5)

## 2019-03-06 PROCEDURE — 99223 1ST HOSP IP/OBS HIGH 75: CPT

## 2019-03-06 RX ORDER — NYSTATIN CREAM 100000 [USP'U]/G
1 CREAM TOPICAL THREE TIMES A DAY
Qty: 0 | Refills: 0 | Status: DISCONTINUED | OUTPATIENT
Start: 2019-03-06 | End: 2019-03-13

## 2019-03-06 RX ORDER — MAGNESIUM SULFATE 500 MG/ML
2 VIAL (ML) INJECTION ONCE
Qty: 0 | Refills: 0 | Status: COMPLETED | OUTPATIENT
Start: 2019-03-06 | End: 2019-03-06

## 2019-03-06 RX ORDER — BUMETANIDE 0.25 MG/ML
1 INJECTION INTRAMUSCULAR; INTRAVENOUS
Qty: 20 | Refills: 0 | Status: DISCONTINUED | OUTPATIENT
Start: 2019-03-06 | End: 2019-03-10

## 2019-03-06 RX ORDER — SODIUM CHLORIDE 0.65 %
1 AEROSOL, SPRAY (ML) NASAL
Qty: 0 | Refills: 0 | Status: DISCONTINUED | OUTPATIENT
Start: 2019-03-06 | End: 2019-03-13

## 2019-03-06 RX ORDER — DOCUSATE SODIUM 100 MG
100 CAPSULE ORAL THREE TIMES A DAY
Qty: 0 | Refills: 0 | Status: DISCONTINUED | OUTPATIENT
Start: 2019-03-06 | End: 2019-03-13

## 2019-03-06 RX ORDER — PETROLATUM,WHITE
1 JELLY (GRAM) TOPICAL
Qty: 0 | Refills: 0 | Status: DISCONTINUED | OUTPATIENT
Start: 2019-03-06 | End: 2019-03-13

## 2019-03-06 RX ORDER — POLYETHYLENE GLYCOL 3350 17 G/17G
17 POWDER, FOR SOLUTION ORAL DAILY
Qty: 0 | Refills: 0 | Status: DISCONTINUED | OUTPATIENT
Start: 2019-03-06 | End: 2019-03-13

## 2019-03-06 RX ORDER — POTASSIUM CHLORIDE 20 MEQ
40 PACKET (EA) ORAL
Qty: 0 | Refills: 0 | Status: COMPLETED | OUTPATIENT
Start: 2019-03-06 | End: 2019-03-06

## 2019-03-06 RX ADMIN — Medication 200 MILLIGRAM(S): at 17:24

## 2019-03-06 RX ADMIN — NYSTATIN CREAM 1 APPLICATION(S): 100000 CREAM TOPICAL at 09:22

## 2019-03-06 RX ADMIN — DABIGATRAN ETEXILATE MESYLATE 150 MILLIGRAM(S): 150 CAPSULE ORAL at 17:24

## 2019-03-06 RX ADMIN — ATORVASTATIN CALCIUM 10 MILLIGRAM(S): 80 TABLET, FILM COATED ORAL at 21:30

## 2019-03-06 RX ADMIN — BUMETANIDE 5 MG/HR: 0.25 INJECTION INTRAMUSCULAR; INTRAVENOUS at 17:27

## 2019-03-06 RX ADMIN — Medication 40 MILLIEQUIVALENT(S): at 09:23

## 2019-03-06 RX ADMIN — Medication 1 APPLICATION(S): at 17:23

## 2019-03-06 RX ADMIN — SERTRALINE 25 MILLIGRAM(S): 25 TABLET, FILM COATED ORAL at 21:31

## 2019-03-06 RX ADMIN — SERTRALINE 50 MILLIGRAM(S): 25 TABLET, FILM COATED ORAL at 10:29

## 2019-03-06 RX ADMIN — Medication 40 MILLIEQUIVALENT(S): at 15:34

## 2019-03-06 RX ADMIN — DABIGATRAN ETEXILATE MESYLATE 150 MILLIGRAM(S): 150 CAPSULE ORAL at 05:39

## 2019-03-06 RX ADMIN — Medication 0.25 MILLIGRAM(S): at 22:15

## 2019-03-06 RX ADMIN — Medication 50 GRAM(S): at 10:28

## 2019-03-06 RX ADMIN — Medication 200 MILLIGRAM(S): at 05:39

## 2019-03-06 RX ADMIN — Medication 40 MILLIEQUIVALENT(S): at 12:20

## 2019-03-06 RX ADMIN — FAMOTIDINE 20 MILLIGRAM(S): 10 INJECTION INTRAVENOUS at 10:29

## 2019-03-06 RX ADMIN — NYSTATIN CREAM 1 APPLICATION(S): 100000 CREAM TOPICAL at 21:30

## 2019-03-06 RX ADMIN — POLYETHYLENE GLYCOL 3350 17 GRAM(S): 17 POWDER, FOR SOLUTION ORAL at 15:35

## 2019-03-06 RX ADMIN — Medication 360 MILLIGRAM(S): at 05:39

## 2019-03-06 NOTE — CONSULT NOTE ADULT - PROBLEM SELECTOR RECOMMENDATION 4
- Titrate oxygen to maintain SpO2 > 92%. Prior PFTs reportedly unrevealing. Would reassess PFTs when euvolemic.

## 2019-03-06 NOTE — CONSULT NOTE ADULT - SUBJECTIVE AND OBJECTIVE BOX
CHIEF COMPLAINT: Patient is a 86y old  Female who presents with a chief complaint of     HPI:  87 yo HFpEF (3/2018 ef 71%) s/p mitral clip in 8/2016, normal cors in 2016,  permanent a fib on pradaxa, htn, hld , SHERYL not on cpap but on continuous home 02 3L, came into heart failure clinic yesterday for initial eval and reported 6 month decrease ET and now could barely walk 20 steps associated with andrade and difficulty in performing adls. She is unable to go from chair to chair without feeling tired or sob. She has not required any additional oxygen but does report she has rima about low 80s on the 3L when she moves around. She is usually about 92%.  She has been recently sleeping in a chair. She has leg swelling but has noticed it getting worse in the past months. No chest pain, cough, or sputum production. No recent sick contacts. She does not recall drinking a lot of water but she did have an occasional BLT sandwich. Her dry weight is usually 186lb but in clinic she was measured to be 195lb and found to have a JVD to about 20cm.      She was started on a Bumex gtt. She has been urinating all night. She is feeling improved but still very dyspneic on exertion. Her lower extremity edema is improving. Denies any chest pain,   palpitations, PND,  near syncope, syncope,   stroke like symptoms. + orthopnea    EKG: AF irbbb nonspecific T wave changes    REVIEW OF SYSTEMS:   All other review of systems are negative unless indicated above    PAST MEDICAL & SURGICAL HISTORY:  Hearing loss of left ear  Mitral valve stenosis, unspecified etiology  Leg edema  Depression  Syncope  Atrial fibrillation: On Pradaxa  HLD (hyperlipidemia)  HTN (hypertension)  Cataracta  H/O external ear surgery  H/O knee surgery      SOCIAL HISTORY:  No tobacco, ethanol, or drug abuse.    FAMILY HISTORY:  Family history of blood disorder (Mother)    No family history of acute MI or sudden cardiac death.    MEDICATIONS  (STANDING):  atorvastatin 10 milliGRAM(s) Oral at bedtime  buMETAnide Infusion 1 mG/Hr (5 mL/Hr) IV Continuous <Continuous>  dabigatran 150 milliGRAM(s) Oral every 12 hours  diltiazem    milliGRAM(s) Oral daily  famotidine    Tablet 20 milliGRAM(s) Oral daily  metoprolol tartrate 200 milliGRAM(s) Oral two times a day  sertraline 50 milliGRAM(s) Oral daily  sertraline 25 milliGRAM(s) Oral at bedtime    MEDICATIONS  (PRN):  ALPRAZolam 0.25 milliGRAM(s) Oral daily PRN anxiety      Allergies    codeine (Other)    Intolerances        Home meds:  Home Medications:  DilTIAZem Hydrochloride  mg/24 hours oral capsule, extended release: 1 cap(s) orally once a day (05 Mar 2019 17:43)  docusate sodium 100 mg oral capsule: 1 cap(s) orally , As Needed (05 Mar 2019 17:43)  metoprolol tartrate 100 mg oral tablet: 2 tab(s) orally 2 times a day (05 Mar 2019 17:43)  torsemide 20 mg oral tablet: 1 tab(s) orally once a day hold for sbp &lt;100 (05 Mar 2019 17:43)  traZODone 50 mg oral tablet: 1-2 tab(s) orally , As Needed (05 Mar 2019 17:43)  Vitamin C 1000 mg oral tablet: 1 tab(s) orally once a day (05 Mar 2019 17:43)  Vitamin D3 2000 intl units oral capsule: 1 cap(s) orally once a day (05 Mar 2019 17:43)  Xanax 0.25 mg oral tablet: 1 tab(s) orally once a day, As Needed (05 Mar 2019 17:43)        VITAL SIGNS:   Vital Signs Last 24 Hrs  T(C): 36.6 (06 Mar 2019 05:37), Max: 36.6 (05 Mar 2019 17:38)  T(F): 97.9 (06 Mar 2019 05:37), Max: 97.9 (05 Mar 2019 17:38)  HR: 74 (06 Mar 2019 05:37) (63 - 74)  BP: 125/67 (06 Mar 2019 05:37) (114/64 - 132/69)  BP(mean): --  RR: 18 (06 Mar 2019 05:37) (18 - 18)  SpO2: 92% (06 Mar 2019 05:37) (90% - 93%)    I&O's Summary    05 Mar 2019 07:01  -  06 Mar 2019 07:00  --------------------------------------------------------  IN: 60 mL / OUT: 900 mL / NET: -840 mL        On Exam:  Tele AF 70-80  Constitutional: NAD, awake    HEENT: Moist Mucous Membranes, Anicteric  Pulmonary: Decreased breath sounds b/l. + crackles at bases  Cardiovascular: IRRR, S1 and S2, 1-2/6 SM, distant   Gastrointestinal: Bowel Sounds present, soft, nontender.   Lymph:  2+ peripheral edema. No lymphadenopathy.  Skin: No visible rashes or ulcers.  Psych:  Mood & affect appropriate    LABS: All Labs Reviewed:    06 Mar 2019 06:00    145    |  98     |  12     ----------------------------<  108    3.0     |  34     |  0.86     Ca    8.8        06 Mar 2019 06:00  Phos  3.4       06 Mar 2019 06:00  Mg     1.9       06 Mar 2019 06:00    TPro  6.8    /  Alb  4.0    /  TBili  0.9    /  DBili  x      /  AST  17     /  ALT  10     /  AlkPhos  47     06 Mar 2019 06:00    PT/INR - ( 06 Mar 2019 07:48 )   PT: 17.7 sec;   INR: 1.53 ratio         PTT - ( 06 Mar 2019 07:48 )  PTT:48.1 sec

## 2019-03-06 NOTE — CONSULT NOTE ADULT - ASSESSMENT
87 yo HFpEF (3/2018 ef 71%) s/p mitral clip in 8/2016, normal cors in 2016,  permanent a fib on pradaxa, htn, hld , SHERYL not on cpap a/w ADHF.     - She is still decompensated.   - Sig vol ol  - Cont Bumex gtt at current dose.   - Please continue to maintain strict I/Os, monitor daily weights, Cr, and K.   - Monitor and replete electrolytes. Keep K>4.0 and Mg>2.0.   - Await HF recs  - ?if would benefit from nitrates    - AF is rate controlled.   - Cont Cardizem 360mg Qday and Lopressor 200mg q12  - Cont Pradaxa.     - 03/01/18 TTE: LVEF 71%, LVIDd 4.7cm (PW 1.0, SW 1.1), RVE with RVSD, moderate SONNY, severe LAE, mild-mod MR, mild MS, mod TR, mild-mod TN, RVSP 52  - Once better optimized I would recheck an echo to assess if the MR has significantly worsened.     - cont statin  - Further cardiac workup will depend on clinical course.   - All other workup per primary team. Will followup.

## 2019-03-06 NOTE — PROGRESS NOTE ADULT - SUBJECTIVE AND OBJECTIVE BOX
Patient is a 86y old  Female who presents with a chief complaint of acute on chronic HFpEF (06 Mar 2019 12:08)                                                               INTERVAL HPI/OVERNIGHT EVENTS:    REVIEW OF SYSTEMS:     CONSTITUTIONAL: No weakness, fevers or chills  RESPIRATORY: No cough, wheezing, improving  shortness of breath  CARDIOVASCULAR: No chest pain or palpitations  GASTROINTESTINAL: No abdominal pain  . No nausea, vomiting, or hematemesis; No diarrhea or constipation. No melena or hematochezia.  GENITOURINARY: No dysuria, frequency or hematuria  NEUROLOGICAL: No numbness or weakness                                                                                                                                                                                                                                                                                 Medications:  MEDICATIONS  (STANDING):  AQUAPHOR (petrolatum Ointment) 1 Application(s) Topical two times a day  atorvastatin 10 milliGRAM(s) Oral at bedtime  buMETAnide Infusion 1 mG/Hr (5 mL/Hr) IV Continuous <Continuous>  dabigatran 150 milliGRAM(s) Oral every 12 hours  diltiazem    milliGRAM(s) Oral daily  docusate sodium 100 milliGRAM(s) Oral three times a day  famotidine    Tablet 20 milliGRAM(s) Oral daily  metoprolol tartrate 200 milliGRAM(s) Oral two times a day  nystatin Powder 1 Application(s) Topical three times a day  polyethylene glycol 3350 17 Gram(s) Oral daily  sertraline 50 milliGRAM(s) Oral daily  sertraline 25 milliGRAM(s) Oral at bedtime    MEDICATIONS  (PRN):  ALPRAZolam 0.25 milliGRAM(s) Oral daily PRN anxiety       Allergies    codeine (Other)    Intolerances      Vital Signs Last 24 Hrs  T(C): 36.9 (06 Mar 2019 14:35), Max: 36.9 (06 Mar 2019 14:35)  T(F): 98.4 (06 Mar 2019 14:35), Max: 98.4 (06 Mar 2019 14:35)  HR: 53 (06 Mar 2019 14:35) (53 - 74)  BP: 101/63 (06 Mar 2019 14:35) (101/63 - 128/70)  BP(mean): --  RR: 18 (06 Mar 2019 14:35) (16 - 18)  SpO2: 97% (06 Mar 2019 14:35) (90% - 97%)  CAPILLARY BLOOD GLUCOSE          03-05 @ 07:01  -  03-06 @ 07:00  --------------------------------------------------------  IN: 60 mL / OUT: 900 mL / NET: -840 mL     @ 07:01  -   @ 20:36  --------------------------------------------------------  IN: 600 mL / OUT: 2051 mL / NET: -1451 mL      Physical Exam:   Daily Weight in k.4 (06 Mar 2019 09:44)  General: NAD  HEENT:  Nonicteric, PERRLA  CV:  RRR, S1S2   Lungs:  Crackles at bases   Abdomen:  Soft, non-tender, no distended, positive BS  Extremities:  edema  Skin:  Warm and dry, no rashes  :  No corbett  Neuro:  AAOx3, non-focal, grossly intact                                                                                                                                                                                                                                                                                                LABS:                               12.3   7.15  )-----------( 133      ( 06 Mar 2019 10:12 )             39.9                      03-06    145  |  98  |  12  ----------------------------<  108<H>  3.0<L>   |  34<H>  |  0.86    Ca    8.8      06 Mar 2019 06:00  Phos  3.4     03-06  Mg     1.9         TPro  6.8  /  Alb  4.0  /  TBili  0.9  /  DBili  x   /  AST  17  /  ALT  10  /  AlkPhos  47  -06

## 2019-03-06 NOTE — CONSULT NOTE ADULT - ASSESSMENT
Ms. Mix is an 85 y/o F who was directly admitted from HF clinic with acute on chronic HFpEF with RV failure, rate- controlled afib, and chronic hypoxia. She is ACC/AHAH Stage C-D, Class IV HF. It is unclear how much of her hypoxia is driven by HF. Currently diuresing on bumex gtt. Weight downtrending, although only recorded to have 900ml out in UOP. Renal function stable. Normotensive.

## 2019-03-06 NOTE — CONSULT NOTE ADULT - ATTENDING COMMENTS
86yrs, history of MR s/p mitral clip aug 2016. HFpEF, LVEF 70. RV dysfunciton. Permanent AF. Sleep apnea not on CPAP.   Home O2 for hypoxia ((? cause). No known history of chronic lung disease.   home meds: dilt 360, lopressor 200 bid, predaxa, torsemide 20, K.   can walk 20ft limited by SOB. cant climb stairs.   never hosp for HF. no improvement symptoms after mitral clip.   seen in clinic yesterday for 2nd opinion. found to have sig volume overload and referred for admission.  TTE: 3.18 LA 5.0n .  LVEDD 4.7 normal wall thickness. mild mod MR, mild MS. RV enlarg with RV dysfunction and mod TR. RVSP 52.   LHC: July 2016 normal cors. RA 19, PA 55/31/ 40, PCWP 37, PA 61. Almaz 5.4/2.9 86yrs, history of MR s/p mitral clip aug 2016. HFpEF, LVEF 70. RV dysfunciton. Permanent AF. Sleep apnea not on CPAP.   Home O2 for hypoxia ((? cause). No known history of chronic lung disease.   home meds: dilt 360, lopressor 200 bid, predaxa, torsemide 20, K.   can walk 20ft limited by SOB. cant climb stairs.   never hosp for HF. no improvement symptoms after mitral clip.   seen in clinic yesterday for 2nd opinion. found to have sig volume overload and referred for admission.  TTE: 3.18 LA 5.0n .  LVEDD 4.7 normal wall thickness. mild mod MR, mild MS. RV enlarg with RV dysfunction and mod TR. RVSP 52.   LHC: July 2016 normal cors. RA 19, PA 55/31/ 40, PCWP 37, PA 61. Almaz 5.4/2.9  initiated bumex IV drip. ? 4kg weight loss.   63-74 Afib, 114/-132/, Sat 90-93 on 3L.   NTproBNP 2848 (prior 1735).   03-06    145  |  98  |  12  ----------------------------<  108<H>  3.0<L>   |  34<H>  |  0.86  Ca    8.8      06 Mar 2019 06:00  Phos  3.4     03-06  Mg     1.9     03-06  TPro  6.8  /  Alb  4.0  /  TBili  0.9  /  DBili  x   /  AST  17  /  ALT  10  /  AlkPhos  47  03-06                        12.3   7.15  )-----------( 133      ( 06 Mar 2019 10:12 )             39.9 86yrs, history of MR s/p mitral clip aug 2016. HFpEF, LVEF 70. RV dysfunciton. Permanent AF. Sleep apnea not on CPAP.   Home O2 for hypoxia ((? cause). No known history of chronic lung disease.   home meds: dilt 360, lopressor 200 bid, predaxa, torsemide 20, K.   can walk 20ft limited by SOB. cant climb stairs.   never hosp for HF. no improvement symptoms after mitral clip.   seen in clinic yesterday for 2nd opinion. found to have sig volume overload and referred for admission.  TTE: 3.18 LA 5.0n .  LVEDD 4.7 normal wall thickness. mild mod MR, mild MS. RV enlarg with RV dysfunction and mod TR. RVSP 52.   LHC: July 2016 normal cors. RA 19, PA 55/31/ 40, PCWP 37, PA 61. Almaz 5.4/2.9  initiated bumex IV drip. ? 4kg weight loss.   63-74 Afib, 114/-132/, Sat 90-93 on 3L.   decreased AE , quiet HS.   abdo distension. ascites. +++LE edema    NTproBNP 2848 (prior 1735).   03-06    145  |  98  |  12  ----------------------------<  108<H>  3.0<L>   |  34<H>  |  0.86  Ca    8.8      06 Mar 2019 06:00  Phos  3.4     03-06  Mg     1.9     03-06  TPro  6.8  /  Alb  4.0  /  TBili  0.9  /  DBili  x   /  AST  17  /  ALT  10  /  AlkPhos  47  03-06                        12.3   7.15  )-----------( 133      ( 06 Mar 2019 10:12 )             39.9  86yrs admitted with ADHF with sig volume overload.   chronic hypoxia not well explained  Plan:  Continue IV bumex  venous dopplers  will ask her pulmonolgist to see.   when euvolemic hold AC: RHC with sat run, ABG  consider change diltiazem in view of RV dysfunction.   Demetrio Hawkins

## 2019-03-06 NOTE — PROGRESS NOTE ADULT - ASSESSMENT
87 yo HFpEF (3/2018 ef 71%) s/p mitral clip in 8/2016, hx of permanent a fib on pradaxa, htn, hld , SHERYL not on cpap but on continuous home 02 3L p/w acute on chronic diastolic heart failure      Problem/Plan - 1:  ·  Problem: Acute on chronic diastolic heart failure.  Plan: as per heart failure recs: bumex 2mg gtt   strict ins and outs  monitor HCO3   daily weights  repeat echo afteer optimization of fluid status   dash diet      Problem/Plan - 2:  ·  Problem: SHERYL (obstructive sleep apnea).  Plan: on 3L 02      Problem/Plan - 3:  ·  Problem: Atrial fibrillation.  Plan: c/w pradaxa.  Rate control   episode of tachyarrythmia / bradyarrythmia today     Problem/Plan - 4:  ·  Problem: HLD (hyperlipidemia).  Plan: c/w lipitor 1omg qhs.     Problem/Plan - 5:  ·  Problem: HTN (hypertension).  Plan: cont to monitor BP on meds   Problem/Plan - 6:  Problem: MR : f/u repeat Echo     Problem/Plan - 7:  ·  Problem: Depression.  Plan: c/w sertaline 50mg qam  serataline 25mg qpm.

## 2019-03-06 NOTE — CONSULT NOTE ADULT - PROBLEM SELECTOR RECOMMENDATION 9
- Continue with bumex 1mg/hr at this time.  - BID BMP, Mg. Supplement to maintain K 4-4.5 and Mg 2-2.5  - Daily standing weights, strict I/Os. - Continue with bumex 1mg/hr at this time.   - BID BMP, Mg. Supplement to maintain K 4-4.5 and Mg 2-2.5  - Consider starting spironolactone 25mg daily  - Daily standing weights, strict I/Os.

## 2019-03-06 NOTE — CONSULT NOTE ADULT - SUBJECTIVE AND OBJECTIVE BOX
HPI:  Ms. Mix is an 85 y/o woman with hx of MR s/p Bushra clip 2016 and HFpEF (LVEF 71% with RVSD 3/18). Her other comorbidities include a history of syncope in , permanent afib, hypertension, and SHERYL (not on CPAP). She is on continuous home oxygen at 3L NC. She was directly admitted from HF clinic 3/5 for acute on chronic HFpEF, volume overloaded with NYHA class IIIb-IV symptoms.    She initially noted SOB starting around [id number]. She had a loop recorded placed in  following a syncopal event to evaluate for bradyarrythmias, which showed afib with poor rate control, which was managed medically with adjustment in her diltiazem and metoprolol. She underwent Mitraclip in 2016, however noted no improvement in her symptoms following the clipping. Of note, she had a LHC/RHC prior to MItraclip which showed normal coronaries and biventricular elevated filling pressures, pulmonary hypertension and normal output. She was hypertensive with /89 at the time.    Over the past 6 months to a year she reports continued decline in her activity tolerance. At this time she is able to walk about 20 feet and is unable to climb stairs due to dyspnea. At times, she notes difficulty with her ADLs due to dyspnea and has a home health aid to assist her. She reports orthopnea, sleeping in a recliner, occasional abdominal distention, decreased appetite and LE edema with episodes of weeping. She does not weight herself regularly, but states her weight has been uptrending over the past several months. She endorsees dietary indiscretions inconsistently adhering to a low sodium diet. She reports taking her medications as prescribed, including her torsemide 20mg daily with an occasional additional dose of 10-20mg in PM, which she states she takes about once a month. She has never been hospitalized or presented to the ER for HF/SOB. She has had hospitalizations related to multiple mechanical falls, most recently in early . otherwise, she denies any CP, pressure, recurrent syncope since , lightheadedness, dizziness, and palpitations. Her bowel and bladder habits are normal. She denies hematuria, melena, and hematochezia. She has been following with her cardiologist, Dr. Hunt and pulmonologists, Dr. Lopez.    She received 2mg IV bumex and was started on a bumex gtt at 1mg/hr yesterday evening. Ambulating to bathroom without dyspnea, however unable to walk further due to SOB. + orthopnea, denies PND, CP, palpitations, lightheadedness, dizziness. Last BM 2 days ago. Fair appetite.     PAST MEDICAL & SURGICAL HISTORY:  Hearing loss of left ear  Mitral valve stenosis, unspecified etiology  Leg edema  Depression  Syncope  Atrial fibrillation: On Pradaxa  HLD (hyperlipidemia)  HTN (hypertension)  Cataracta  H/O external ear surgery  H/O knee surgery      REVIEW OF SYSTEMS  14 point ROS done and found to be negative or noncontributory other than noted in HPI.	    MEDICATIONS  (STANDING):  atorvastatin 10 milliGRAM(s) Oral at bedtime  buMETAnide Infusion 1 mG/Hr (5 mL/Hr) IV Continuous <Continuous>  dabigatran 150 milliGRAM(s) Oral every 12 hours  diltiazem    milliGRAM(s) Oral daily  famotidine    Tablet 20 milliGRAM(s) Oral daily  metoprolol tartrate 200 milliGRAM(s) Oral two times a day  nystatin Powder 1 Application(s) Topical three times a day  potassium chloride    Tablet ER 40 milliEquivalent(s) Oral every 2 hours  sertraline 50 milliGRAM(s) Oral daily  sertraline 25 milliGRAM(s) Oral at bedtime    MEDICATIONS  (PRN):  ALPRAZolam 0.25 milliGRAM(s) Oral daily PRN anxiety    HOME MEDICATIONS:  ALPRAZolam 0.25 MG Oral Tablet; TAKE 1 TABLET 3 TIMES DAILY AS NEEDED  Atorvastatin Calcium 10 MG Oral Tablet; TAKE 1 TABLET EVERY DAY  dilTIAZem HCl ER Beads 360 MG Oral Capsule Extended Release 24 Hour; TAKE 1  CAPSULE DAILY  Famotidine 20 MG Oral Tablet; TAKE 1 TABLET DAILY AS DIRECTED  Metoprolol Tartrate 100 MG Oral Tablet; TAKE 2 TABLET Every twelve hours  Multiple Vitamin TABS  Mupirocin 2 % External Ointment; APPLY A SMALL AMOUNT 2-3 TIMES DAILY TO  AFFECTED AREA on right lower extremity  Potassium Chloride ER 10 MEQ Oral Capsule Extended Release; TAKE 1 CAPSULE  EVERY DAY  Pradaxa 150 MG Oral Capsule; TAKE ONE CAPSULE BY MOUTH TWICE A DAY  Sertraline HCl - 100 MG Oral Tablet; TAKE 1 TABLET DAILY AS DIRECTED  Torsemide 10 MG Oral Tablet; TAKE 2 TABLET Daily take twice daily prn for weight gain  traZODone HCl TABS  Triamcinolone Acetonide 0.1 % External Ointment; APPLY SPARINGLY AND RUB IN  WELL TO AFFECTED AREA(S) ON ARMS, BODY, AND LEG TWICE DAILY. NOT FOR  FACE, BREAST OR GROIN  Tylenol 325 MG Oral Tablet; TAKE 2 TABLET Every 6 hours PRN  Zolpidem Tartrate 5 MG Oral Tablet; TAKE 1 TABLET AT BEDTIME AS NEEDED FOR  SLEEP    Allergies    codeine (Other)    SOCIAL HISTORY:  , lives with   Never smoker  Denies alcohol and illicit drug use    FAMILY HISTORY:  Father and mother history of hypertension  Mother history of leukemia    Vital Signs Last 24 Hrs  T(C): 36.6 (06 Mar 2019 05:37), Max: 36.6 (05 Mar 2019 17:38)  T(F): 97.9 (06 Mar 2019 05:37), Max: 97.9 (05 Mar 2019 17:38)  HR: 59 (06 Mar 2019 11:42) (59 - 74)  BP: 128/70 (06 Mar 2019 11:42) (111/61 - 132/69)  RR: 17 (06 Mar 2019 11:42) (16 - 18)  SpO2: 95% (06 Mar 2019 11:42) (90% - 95%)    PHYSICAL EXAM:  General: NAD, comfortable  Neuro: AOx3, ROMERO  HEET: EOM intact  Neck: JVP > 20, + HJR  Resp: Coarse crackles to mid lung fields bilaterally, unlabored, on O2 via NC  CV: Irregularly irregular. S1S2, II/VI SM at apex. +2-3 BLE edema to knees. +2 radial pulses bilaterally.  Abd: Soft, rounded, nontender, mildly distended, normoactive BS  Skin: Intact, warm peripherally  Psych: Mood and affect normal      LABS:                        12.3   7.15  )-----------( 133      ( 06 Mar 2019 10:12 )             39.9     03-06    145  |  98  |  12  ----------------------------<  108<H>  3.0<L>   |  34<H>  |  0.86    Ca    8.8      06 Mar 2019 06:00  Phos  3.4     03-06  Mg     1.9     03-06    TPro  6.8  /  Alb  4.0  /  TBili  0.9  /  DBili  x   /  AST  17  /  ALT  10  /  AlkPhos  47  03-    PT/INR - ( 06 Mar 2019 07:48 )   PT: 17.7 sec;   INR: 1.53 ratio         PTT - ( 06 Mar 2019 07:48 )  PTT:48.1 sec    pro BNP 3/6 2848    RADIOLOGY & ADDITIONAL STUDIES:    < from: Xray Chest 1 View- PORTABLE-Routine (19 @ 18:50) >    Impression:    Low lung volumes with clear lungs.    EK/05/19 EKG: AFib at 69 bpm.    Stress Test:    10/2014 Regadenason nuclear stress test: no clear evidence of stress induced ischemia with insignificant TID.    Echo:    18 TTE: LVEF 71%, LVIDd 4.7cm (PW 1.0, SW 1.1), RVE with RVSD, moderate SONNY, severe LAE, mild-mod MR, mild MS, mod TR, mild-mod RI, RVSP 52.    2016 TTE: EF 60%, LVH, enlarged LA, severe MR   Cardiac Cath:    2016 LHC: normal coronaries, RHC: Ao 154/89/114, HR 97, RA 19, RV 58/9, PA 55/31/40, PCWP 37, Ao 86.3%, PA 60.9%, CO/CI 5.45/2.89, PVR 0.55 Mendez, SVR 1393

## 2019-03-07 DIAGNOSIS — J96.01 ACUTE RESPIRATORY FAILURE WITH HYPOXIA: ICD-10-CM

## 2019-03-07 LAB
ANION GAP SERPL CALC-SCNC: 11 MMOL/L — SIGNIFICANT CHANGE UP (ref 5–17)
ANION GAP SERPL CALC-SCNC: 14 MMOL/L — SIGNIFICANT CHANGE UP (ref 5–17)
BUN SERPL-MCNC: 10 MG/DL — SIGNIFICANT CHANGE UP (ref 7–23)
BUN SERPL-MCNC: 14 MG/DL — SIGNIFICANT CHANGE UP (ref 7–23)
CALCIUM SERPL-MCNC: 8.8 MG/DL — SIGNIFICANT CHANGE UP (ref 8.4–10.5)
CALCIUM SERPL-MCNC: 9.3 MG/DL — SIGNIFICANT CHANGE UP (ref 8.4–10.5)
CHLORIDE SERPL-SCNC: 97 MMOL/L — SIGNIFICANT CHANGE UP (ref 96–108)
CHLORIDE SERPL-SCNC: 99 MMOL/L — SIGNIFICANT CHANGE UP (ref 96–108)
CO2 SERPL-SCNC: 33 MMOL/L — HIGH (ref 22–31)
CO2 SERPL-SCNC: 35 MMOL/L — HIGH (ref 22–31)
CREAT SERPL-MCNC: 0.91 MG/DL — SIGNIFICANT CHANGE UP (ref 0.5–1.3)
CREAT SERPL-MCNC: 1.01 MG/DL — SIGNIFICANT CHANGE UP (ref 0.5–1.3)
GLUCOSE SERPL-MCNC: 112 MG/DL — HIGH (ref 70–99)
GLUCOSE SERPL-MCNC: 181 MG/DL — HIGH (ref 70–99)
MAGNESIUM SERPL-MCNC: 2.2 MG/DL — SIGNIFICANT CHANGE UP (ref 1.6–2.6)
PHOSPHATE SERPL-MCNC: 3.5 MG/DL — SIGNIFICANT CHANGE UP (ref 2.5–4.5)
POTASSIUM SERPL-MCNC: 3.3 MMOL/L — LOW (ref 3.5–5.3)
POTASSIUM SERPL-MCNC: 4.1 MMOL/L — SIGNIFICANT CHANGE UP (ref 3.5–5.3)
POTASSIUM SERPL-SCNC: 3.3 MMOL/L — LOW (ref 3.5–5.3)
POTASSIUM SERPL-SCNC: 4.1 MMOL/L — SIGNIFICANT CHANGE UP (ref 3.5–5.3)
SODIUM SERPL-SCNC: 144 MMOL/L — SIGNIFICANT CHANGE UP (ref 135–145)
SODIUM SERPL-SCNC: 145 MMOL/L — SIGNIFICANT CHANGE UP (ref 135–145)

## 2019-03-07 PROCEDURE — 99233 SBSQ HOSP IP/OBS HIGH 50: CPT

## 2019-03-07 PROCEDURE — 93970 EXTREMITY STUDY: CPT | Mod: 26

## 2019-03-07 PROCEDURE — 99223 1ST HOSP IP/OBS HIGH 75: CPT

## 2019-03-07 RX ORDER — POTASSIUM CHLORIDE 20 MEQ
40 PACKET (EA) ORAL EVERY 4 HOURS
Qty: 0 | Refills: 0 | Status: COMPLETED | OUTPATIENT
Start: 2019-03-07 | End: 2019-03-07

## 2019-03-07 RX ORDER — DILTIAZEM HCL 120 MG
240 CAPSULE, EXT RELEASE 24 HR ORAL DAILY
Qty: 0 | Refills: 0 | Status: DISCONTINUED | OUTPATIENT
Start: 2019-03-08 | End: 2019-03-13

## 2019-03-07 RX ADMIN — SERTRALINE 50 MILLIGRAM(S): 25 TABLET, FILM COATED ORAL at 12:13

## 2019-03-07 RX ADMIN — POLYETHYLENE GLYCOL 3350 17 GRAM(S): 17 POWDER, FOR SOLUTION ORAL at 12:11

## 2019-03-07 RX ADMIN — NYSTATIN CREAM 1 APPLICATION(S): 100000 CREAM TOPICAL at 17:23

## 2019-03-07 RX ADMIN — Medication 200 MILLIGRAM(S): at 05:49

## 2019-03-07 RX ADMIN — Medication 200 MILLIGRAM(S): at 17:22

## 2019-03-07 RX ADMIN — NYSTATIN CREAM 1 APPLICATION(S): 100000 CREAM TOPICAL at 05:49

## 2019-03-07 RX ADMIN — BUMETANIDE 5 MG/HR: 0.25 INJECTION INTRAMUSCULAR; INTRAVENOUS at 21:19

## 2019-03-07 RX ADMIN — Medication 40 MILLIEQUIVALENT(S): at 17:22

## 2019-03-07 RX ADMIN — Medication 40 MILLIEQUIVALENT(S): at 12:10

## 2019-03-07 RX ADMIN — Medication 100 MILLIGRAM(S): at 21:20

## 2019-03-07 RX ADMIN — Medication 1 APPLICATION(S): at 17:22

## 2019-03-07 RX ADMIN — FAMOTIDINE 20 MILLIGRAM(S): 10 INJECTION INTRAVENOUS at 12:10

## 2019-03-07 RX ADMIN — Medication 1 SPRAY(S): at 05:50

## 2019-03-07 RX ADMIN — ATORVASTATIN CALCIUM 10 MILLIGRAM(S): 80 TABLET, FILM COATED ORAL at 21:20

## 2019-03-07 RX ADMIN — BUMETANIDE 5 MG/HR: 0.25 INJECTION INTRAMUSCULAR; INTRAVENOUS at 12:10

## 2019-03-07 RX ADMIN — Medication 0.25 MILLIGRAM(S): at 21:22

## 2019-03-07 RX ADMIN — DABIGATRAN ETEXILATE MESYLATE 150 MILLIGRAM(S): 150 CAPSULE ORAL at 21:20

## 2019-03-07 RX ADMIN — SERTRALINE 25 MILLIGRAM(S): 25 TABLET, FILM COATED ORAL at 21:20

## 2019-03-07 RX ADMIN — Medication 360 MILLIGRAM(S): at 05:50

## 2019-03-07 RX ADMIN — NYSTATIN CREAM 1 APPLICATION(S): 100000 CREAM TOPICAL at 21:19

## 2019-03-07 RX ADMIN — DABIGATRAN ETEXILATE MESYLATE 150 MILLIGRAM(S): 150 CAPSULE ORAL at 05:49

## 2019-03-07 RX ADMIN — Medication 100 MILLIGRAM(S): at 17:22

## 2019-03-07 RX ADMIN — Medication 1 APPLICATION(S): at 05:49

## 2019-03-07 NOTE — CHART NOTE - NSCHARTNOTEFT_GEN_A_CORE
Called by RN that pt. with HR 33. Vitals wnl   Cardizem decreased per heart failure reccs  Potassium replete  Will continue to monitor pt. on telemetry  Dr. Ross aware  [facility]

## 2019-03-07 NOTE — PROGRESS NOTE ADULT - SUBJECTIVE AND OBJECTIVE BOX
- states that her breathing has improved   - no other new/acute complaints         Medications:  ALPRAZolam 0.25 milliGRAM(s) Oral daily PRN  AQUAPHOR (petrolatum Ointment) 1 Application(s) Topical two times a day  atorvastatin 10 milliGRAM(s) Oral at bedtime  buMETAnide Infusion 1 mG/Hr IV Continuous <Continuous>  dabigatran 150 milliGRAM(s) Oral every 12 hours  docusate sodium 100 milliGRAM(s) Oral three times a day  famotidine    Tablet 20 milliGRAM(s) Oral daily  metoprolol tartrate 200 milliGRAM(s) Oral two times a day  nystatin Powder 1 Application(s) Topical three times a day  polyethylene glycol 3350 17 Gram(s) Oral daily  potassium chloride    Tablet ER 40 milliEquivalent(s) Oral every 4 hours  sertraline 50 milliGRAM(s) Oral daily  sertraline 25 milliGRAM(s) Oral at bedtime  sodium chloride 0.65% Nasal 1 Spray(s) Both Nostrils two times a day      PAST MEDICAL & SURGICAL HISTORY:  Hearing loss of left ear  Mitral valve stenosis, unspecified etiology  Leg edema  Depression  Syncope  Atrial fibrillation: On Pradaxa  HLD (hyperlipidemia)  HTN (hypertension)  Cataracta  H/O external ear surgery  H/O knee surgery        Vitals:  T(F): 98.2 (03-07), Max: 98.2 (03-07)  HR: 59 (03-07) (59 - 92)  BP: 110/69 (03-07) (110/69 - 118/67)  RR: 17 (03-07)  SpO2: 95% (03-07)  I&O's Summary    06 Mar 2019 07:01  -  07 Mar 2019 07:00  --------------------------------------------------------  IN: 1020 mL / OUT: 4201 mL / NET: -3181 mL    07 Mar 2019 07:01  -  07 Mar 2019 14:52  --------------------------------------------------------  IN: 600 mL / OUT: 0 mL / NET: 600 mL        Physical Exam:  General: NAD, comfortable  Neuro: AOx3, ROMERO  HEET: EOM intact  Neck: supple no thyromegaly, +JVP > 18cm, + HJR  Resp: Coarse crackles to mid lung fields bilaterally; improved   CV: Irregularly irregular. S1 variable S2 audible, II/VI SM along left sternum and apex. +2-3 BLE edema to knees; improved. +2 radial pulses bilaterally.  Abd: Soft, rounded, distended; improved, nontender, normoactive BS  Skin: Intact, warm peripherally  Psych: Mood and affect normal                        12.3   7.15  )-----------( 133      ( 06 Mar 2019 10:12 )             39.9     03-07    145  |  99  |  10  ----------------------------<  112<H>  3.3<L>   |  35<H>  |  0.91    Ca    8.8      07 Mar 2019 06:03  Phos  3.5     03-07  Mg     2.2     03-07    TPro  6.8  /  Alb  4.0  /  TBili  0.9  /  DBili  x   /  AST  17  /  ALT  10  /  AlkPhos  47  03-06    PT/INR - ( 06 Mar 2019 07:48 )   PT: 17.7 sec;   INR: 1.53 ratio         PTT - ( 06 Mar 2019 07:48 )  PTT:48.1 sec        Interpretation of Telemetry:  afib, HR 50s-70s; occasional drops to the 30s

## 2019-03-07 NOTE — DIETITIAN INITIAL EVALUATION ADULT. - ADHERENCE
Pt reports not following any type of diet or restriction at home; admits to consume foods high in salt. Reports taking Multivitamin, Vitamin C, and Potassium Chloride PTA. Denies obtaining daily weights at home.

## 2019-03-07 NOTE — DIETITIAN INITIAL EVALUATION ADULT. - ENERGY NEEDS
Ht: 63 inches Wt: 178.3 pounds BMI: 31.5 kg/m2 IBW: 115 (+/-10%) 154.8 %IBW  Pertinent information: Pt 85 y/o F with PMH: HFpEF S/P mitral clip (08/2016), A-fib, HTN, HLD, SHERYL, depression, mitral valve stenosis, admitted with difficulty walking and SOB, found acute on chronic diastolic HF.   Noted +3 dorie. leg edema as per flow sheets. Skin: no noted pressure injuries as per documentation.

## 2019-03-07 NOTE — DIETITIAN INITIAL EVALUATION ADULT. - OTHER INFO
Nutrition consult received for education. Pt reports good appetite and PO intake. Noted 75% PO intake as per flow sheets. Denies difficulty chewing/swallowing. Pt denies nausea, vomiting, diarrhea, or constipation, reports last BM today (03/07) - pt on bowel regimen as per chart. Denies weight changes PTA, reports UBW between 175-192 pounds due to fluid shifts, states clothes fit her the same way, reports fluid retention appears usually in her legs. Weight as per flow sheets (03/06) 186 pounds -> (03/07) 178.3 pounds -?accuracy of weight fluctuations likely due to fluid shifts, will continue to monitor.

## 2019-03-07 NOTE — PROGRESS NOTE ADULT - ASSESSMENT
85 yo HFpEF (3/2018 ef 71%) s/p mitral clip in 8/2016, hx of permanent a fib on pradaxa, htn, hld , SHERYL not on cpap but on continuous home 02 3L p/w acute on chronic diastolic heart failure      Problem/Plan - 1:  ·  Problem: Acute on chronic diastolic heart failure.  Plan: as per heart failure recs: bumex 2mg gtt   strict ins and outs  monitor HCO3   daily weights  repeat echo after optimization of fluid status   dash diet      Problem/Plan - 2:  ·  Problem: SHERYL (obstructive sleep apnea).  Plan: on 3L 02      Problem/Plan - 3:  ·  Problem: Atrial fibrillation.  Plan: c/w pradaxa.  Rate control   bradycardia today ..cardizem decreased  cont to monitor     Problem/Plan - 4:  ·  Problem: HLD (hyperlipidemia).  Plan: c/w lipitor 1omg qhs.     Problem/Plan - 5:  ·  Problem: HTN (hypertension).  Plan: cont to monitor BP on meds   Problem/Plan - 6:  Problem: MR : f/u repeat Echo     Problem/Plan - 7:  ·  Problem: Depression.  Plan: c/w sertaline 50mg qam  serataline 25mg qpm.

## 2019-03-07 NOTE — DIETITIAN INITIAL EVALUATION ADULT. - NS AS NUTRI INTERV ED CONTENT
Provided education on heart failure nutrition therapy. Recommended limited salt intake. Reviewed foods high in salt and amount of salt recommended per day. Discussed nutrition label reading. Stressed the importance of limited fried foods and saturated fat consumption. Reviewed recommended foods within therapeutic diet. Discussed the importance of daily weights at home. Discussed weight gain parameters for contacting MD. Pt and  amenable for education - noted good understanding. Dietitian remains available.

## 2019-03-07 NOTE — PROGRESS NOTE ADULT - SUBJECTIVE AND OBJECTIVE BOX
Patient is a 86y old  Female who presents with a chief complaint of acute on chronic diastolic heart failure (07 Mar 2019 14:51)                                                               INTERVAL HPI/OVERNIGHT EVENTS:    REVIEW OF SYSTEMS:     CONSTITUTIONAL: No weakness, fevers or chills  RESPIRATORY: No cough, wheezing,  No shortness of breath  CARDIOVASCULAR: No chest pain or palpitations  GASTROINTESTINAL: No abdominal pain  . No nausea, vomiting, or hematemesis; No diarrhea or constipation. No melena or hematochezia.  GENITOURINARY: No dysuria, frequency or hematuria  NEUROLOGICAL: No numbness or weakness                                                                                                                                                                                                                                                                              Medications:  MEDICATIONS  (STANDING):  AQUAPHOR (petrolatum Ointment) 1 Application(s) Topical two times a day  atorvastatin 10 milliGRAM(s) Oral at bedtime  buMETAnide Infusion 1 mG/Hr (5 mL/Hr) IV Continuous <Continuous>  dabigatran 150 milliGRAM(s) Oral every 12 hours  docusate sodium 100 milliGRAM(s) Oral three times a day  famotidine    Tablet 20 milliGRAM(s) Oral daily  metoprolol tartrate 200 milliGRAM(s) Oral two times a day  nystatin Powder 1 Application(s) Topical three times a day  polyethylene glycol 3350 17 Gram(s) Oral daily  sertraline 50 milliGRAM(s) Oral daily  sertraline 25 milliGRAM(s) Oral at bedtime  sodium chloride 0.65% Nasal 1 Spray(s) Both Nostrils two times a day    MEDICATIONS  (PRN):  ALPRAZolam 0.25 milliGRAM(s) Oral daily PRN anxiety       Allergies    codeine (Other)    Intolerances      Vital Signs Last 24 Hrs  T(C): 36.6 (07 Mar 2019 20:50), Max: 36.8 (07 Mar 2019 10:37)  T(F): 97.8 (07 Mar 2019 20:50), Max: 98.2 (07 Mar 2019 10:37)  HR: 65 (07 Mar 2019 20:50) (59 - 70)  BP: 119/70 (07 Mar 2019 20:50) (110/69 - 119/70)  BP(mean): --  RR: 18 (07 Mar 2019 20:50) (17 - 18)  SpO2: 94% (07 Mar 2019 20:50) (94% - 95%)  CAPILLARY BLOOD GLUCOSE          03-06 @ 07:01  -  03-07 @ 07:00  --------------------------------------------------------  IN: 1020 mL / OUT: 4201 mL / NET: -3181 mL     @ 07:01  -   @ 23:00  --------------------------------------------------------  IN: 960 mL / OUT: 825 mL / NET: 135 mL      Physical Exam:    Daily     Daily Weight in k.1 (07 Mar 2019 12:59)  General:  NAD  HEENT:  Nonicteric, PERRLA  CV:  RRR, S1S2   Lungs:  crackles at bases   Abdomen:  Soft, non-tender, no distended, positive BS  Extremities:  edema   Neuro:  AAOx3, non-focal, grossly intact                                                                                                                                                                                                                                                                                                LABS:                               12.3   7.15  )-----------( 133      ( 06 Mar 2019 10:12 )             39.9                      03-07    144  |  97  |  14  ----------------------------<  181<H>  4.1   |  33<H>  |  1.01    Ca    9.3      07 Mar 2019 20:16  Phos  3.5     03-07  Mg     2.2     03-07    TPro  6.8  /  Alb  4.0  /  TBili  0.9  /  DBili  x   /  AST  17  /  ALT  10  /  AlkPhos  47  03-06

## 2019-03-07 NOTE — PROGRESS NOTE ADULT - PROBLEM SELECTOR PLAN 1
- Continue bumex GTT at 1mg/hr    -  BID BMP, keep K>4.5, Mg>2   - daily standing weights, strict I/Os   - obtain LE dopplers to assess for DVTs   - RHC once she's euvolemic

## 2019-03-07 NOTE — DIETITIAN INITIAL EVALUATION ADULT. - ORAL INTAKE PTA
Pt reports good appetite and PO intake at home, states always being a "small eater". Confirms NKFA./good

## 2019-03-07 NOTE — PROGRESS NOTE ADULT - ASSESSMENT
Ms. Mix is an 87 y/o w/ hx of HFpEF (EF 60-65%, mod MR, RV enlargement w/ RV systolic dysfunction, B/L Atrial enlargement), severe MR s/p mitral clip on 8/2016, chronic Afib (on pradaxa), possible SHERYL who presented w/ acute on chronic diastolic heart failure.  Admitted directly from HF clinic.  Placed on Bumex GTT which she has tolerated well w/o issues.    Still hypervolemic on exam w/ clinical evidence of elevated filling pressures; however has responded quite well to IV diuretic therapy   84.4 Kg --> 80.9 Kg today

## 2019-03-07 NOTE — PROVIDER CONTACT NOTE (CHANGE IN STATUS NOTIFICATION) - ASSESSMENT
Patient resting in chair.  /69, Pulse 59 Afib, o2 sat 95% 3L supplementation, resp 17.  Denies distress. Serum potassium 3.3 today. On bumex drip.

## 2019-03-07 NOTE — PROGRESS NOTE ADULT - SUBJECTIVE AND OBJECTIVE BOX
PULMONARY PROGRESS NOTE    ARNULFO ALEX  MRN-[id number]    Patient is a 86y old  Female who presents with a chief complaint of hf (07 Mar 2019 08:08)      HPI:  -breathing comfortably, legs less swollen, no complaints.    ROS:   -neg    ACTIVE MEDICATION LIST:  MEDICATIONS  (STANDING):  AQUAPHOR (petrolatum Ointment) 1 Application(s) Topical two times a day  atorvastatin 10 milliGRAM(s) Oral at bedtime  buMETAnide Infusion 1 mG/Hr (5 mL/Hr) IV Continuous <Continuous>  dabigatran 150 milliGRAM(s) Oral every 12 hours  docusate sodium 100 milliGRAM(s) Oral three times a day  famotidine    Tablet 20 milliGRAM(s) Oral daily  metoprolol tartrate 200 milliGRAM(s) Oral two times a day  nystatin Powder 1 Application(s) Topical three times a day  polyethylene glycol 3350 17 Gram(s) Oral daily  potassium chloride    Tablet ER 40 milliEquivalent(s) Oral every 4 hours  sertraline 50 milliGRAM(s) Oral daily  sertraline 25 milliGRAM(s) Oral at bedtime  sodium chloride 0.65% Nasal 1 Spray(s) Both Nostrils two times a day    MEDICATIONS  (PRN):  ALPRAZolam 0.25 milliGRAM(s) Oral daily PRN anxiety      EXAM:  Vital Signs Last 24 Hrs  T(C): 36.8 (07 Mar 2019 10:37), Max: 36.9 (06 Mar 2019 14:35)  T(F): 98.2 (07 Mar 2019 10:37), Max: 98.4 (06 Mar 2019 14:35)  HR: 59 (07 Mar 2019 10:37) (53 - 92)  BP: 110/69 (07 Mar 2019 10:37) (101/63 - 118/67)  BP(mean): --  RR: 17 (07 Mar 2019 10:37) (17 - 18)  SpO2: 95% (07 Mar 2019 10:37) (95% - 97%)    GENERAL: The patient is awake and alert in no apparent distress.       LUNGS: Clear to auscultation without wheezing, rales or rhonchi; respirations unlabored        LABS/IMGAING: reviewed                        12.3   7.15  )-----------( 133      ( 06 Mar 2019 10:12 )             39.9     03-07    145  |  99  |  10  ----------------------------<  112<H>  3.3<L>   |  35<H>  |  0.91    Ca    8.8      07 Mar 2019 06:03  Phos  3.5     03-07  Mg     2.2     03-07    TPro  6.8  /  Alb  4.0  /  TBili  0.9  /  DBili  x   /  AST  17  /  ALT  10  /  AlkPhos  47  03-06    ABG - ( 06 Mar 2019 16:19 )  pH, Arterial: 7.47  pH, Blood: x     /  pCO2: 49    /  pO2: 57    / HCO3: 35    / Base Excess: 10.3  /  SaO2: 89          < from: Xray Chest 1 View- PORTABLE-Routine (03.05.19 @ 18:50) >  Impression:    Low lung volumes with clear lungs.    < end of copied text >          PROBLEM LIST:  86y Female with HEALTH ISSUES - PROBLEM Dx:  Hypoxia: Hypoxia  Permanent atrial fibrillation: Permanent atrial fibrillation  Depression: Depression  Mitral valve stenosis, unspecified etiology: Mitral valve stenosis, unspecified etiology  HTN (hypertension): HTN (hypertension)  HLD (hyperlipidemia): HLD (hyperlipidemia)  Atrial fibrillation: Atrial fibrillation  SHERYL (obstructive sleep apnea): SHERYL (obstructive sleep apnea)  Acute on chronic diastolic heart failure: Acute on chronic diastolic heart failure      RECS:  -some evidence hypoventilation on abg, pt refuses trial of cpap/bipap, states she is 86 years old and oxygen is enough.  -appreciate cards/hf evals  -cont supplemental O2, incentive tiffanie, oob, pt      Starr Hall MD  [phone]

## 2019-03-07 NOTE — PROGRESS NOTE ADULT - SUBJECTIVE AND OBJECTIVE BOX
[facility]    Mayank Matthews, Gilbert, Xiomara, Solitario, Chuy, Chong      [phone]    CHIEF COMPLAINT: Patient is a 86y old  Female who presents with a chief complaint of acute on chronic HFpEF (06 Mar 2019 12:08)      Follow Up: hf, af    Interim history: reports no chest discomfort. subj improvement in edema. no meaningful sob at rest    MEDICATIONS  (STANDING):  AQUAPHOR (petrolatum Ointment) 1 Application(s) Topical two times a day  atorvastatin 10 milliGRAM(s) Oral at bedtime  buMETAnide Infusion 1 mG/Hr (5 mL/Hr) IV Continuous <Continuous>  dabigatran 150 milliGRAM(s) Oral every 12 hours  diltiazem    milliGRAM(s) Oral daily  docusate sodium 100 milliGRAM(s) Oral three times a day  famotidine    Tablet 20 milliGRAM(s) Oral daily  metoprolol tartrate 200 milliGRAM(s) Oral two times a day  nystatin Powder 1 Application(s) Topical three times a day  polyethylene glycol 3350 17 Gram(s) Oral daily  sertraline 50 milliGRAM(s) Oral daily  sertraline 25 milliGRAM(s) Oral at bedtime  sodium chloride 0.65% Nasal 1 Spray(s) Both Nostrils two times a day    MEDICATIONS  (PRN):  ALPRAZolam 0.25 milliGRAM(s) Oral daily PRN anxiety      REVIEW OF SYSTEMS:  eye, ent, GI, , allergic, dermatologic, musculoskeletal and neurologic are negative except as described above    Vital Signs Last 24 Hrs  T(C): 36.6 (07 Mar 2019 04:46), Max: 36.9 (06 Mar 2019 14:35)  T(F): 97.8 (07 Mar 2019 04:46), Max: 98.4 (06 Mar 2019 14:35)  HR: 70 (07 Mar 2019 04:46) (53 - 92)  BP: 118/67 (07 Mar 2019 04:46) (101/63 - 128/70)  BP(mean): --  RR: 18 (07 Mar 2019 04:46) (16 - 18)  SpO2: 95% (07 Mar 2019 04:46) (95% - 97%)    I&O's Summary    06 Mar 2019 07:01  -  07 Mar 2019 07:00  --------------------------------------------------------  IN: 1020 mL / OUT: 4201 mL / NET: -3181 mL        Telemetry past 24h: af slow vr, down to 30s in am yesterday, not sustained    PHYSICAL EXAM:    Constitutional: well-nourished, well-developed, NAD   HEENT:  MMM, sclerae anicteric, conjunctivae clear, no oral cyanosis.  Pulmonary: Non-labored, breath sounds are clear bilaterally, No wheezing, rales or rhonchi  Cardiovascular: irregular, S1 and S2.  No murmur.  No rubs, gallops or clicks  Gastrointestinal: Bowel Sounds present, soft, nontender.   Lymph: mild peripheral edema.   Neurological: Alert, no focal deficits  Skin: No rashes.  Psych:  Mood & affect appropriate    LABS: All Labs Reviewed:                        12.3   7.15  )-----------( 133      ( 06 Mar 2019 10:12 )             39.9     07 Mar 2019 06:03    145    |  99     |  10     ----------------------------<  112    3.3     |  35     |  0.91   06 Mar 2019 21:19    142    |  98     |  12     ----------------------------<  119    4.1     |  31     |  0.98   06 Mar 2019 06:00    145    |  98     |  12     ----------------------------<  108    3.0     |  34     |  0.86     Ca    8.8        07 Mar 2019 06:03  Ca    8.7        06 Mar 2019 21:19  Ca    8.8        06 Mar 2019 06:00  Phos  3.5       07 Mar 2019 06:03  Phos  3.4       06 Mar 2019 06:00  Mg     2.2       07 Mar 2019 06:03  Mg     2.2       06 Mar 2019 21:19  Mg     1.9       06 Mar 2019 06:00    TPro  6.8    /  Alb  4.0    /  TBili  0.9    /  DBili  x      /  AST  17     /  ALT  10     /  AlkPhos  47     06 Mar 2019 06:00    PT/INR - ( 06 Mar 2019 07:48 )   PT: 17.7 sec;   INR: 1.53 ratio         PTT - ( 06 Mar 2019 07:48 )  PTT:48.1 sec      Blood Culture:         RADIOLOGY:    EKG:    Echo:

## 2019-03-07 NOTE — DIETITIAN INITIAL EVALUATION ADULT. - NS AS NUTRI INTERV COLLABORAT
Continue to obtain daily weights as pt with CHF and to identify changes if any. Continue to obtain daily weights as pt with HF and to identify changes if any.

## 2019-03-08 ENCOUNTER — TRANSCRIPTION ENCOUNTER (OUTPATIENT)
Age: 84
End: 2019-03-08

## 2019-03-08 LAB
ANION GAP SERPL CALC-SCNC: 14 MMOL/L — SIGNIFICANT CHANGE UP (ref 5–17)
ANION GAP SERPL CALC-SCNC: 15 MMOL/L — SIGNIFICANT CHANGE UP (ref 5–17)
BUN SERPL-MCNC: 12 MG/DL — SIGNIFICANT CHANGE UP (ref 7–23)
BUN SERPL-MCNC: 17 MG/DL — SIGNIFICANT CHANGE UP (ref 7–23)
CALCIUM SERPL-MCNC: 9.2 MG/DL — SIGNIFICANT CHANGE UP (ref 8.4–10.5)
CALCIUM SERPL-MCNC: 9.4 MG/DL — SIGNIFICANT CHANGE UP (ref 8.4–10.5)
CHLORIDE SERPL-SCNC: 93 MMOL/L — LOW (ref 96–108)
CHLORIDE SERPL-SCNC: 96 MMOL/L — SIGNIFICANT CHANGE UP (ref 96–108)
CO2 SERPL-SCNC: 32 MMOL/L — HIGH (ref 22–31)
CO2 SERPL-SCNC: 33 MMOL/L — HIGH (ref 22–31)
CREAT SERPL-MCNC: 0.85 MG/DL — SIGNIFICANT CHANGE UP (ref 0.5–1.3)
CREAT SERPL-MCNC: 1.06 MG/DL — SIGNIFICANT CHANGE UP (ref 0.5–1.3)
GLUCOSE SERPL-MCNC: 137 MG/DL — HIGH (ref 70–99)
GLUCOSE SERPL-MCNC: 199 MG/DL — HIGH (ref 70–99)
HCT VFR BLD CALC: 44.4 % — SIGNIFICANT CHANGE UP (ref 34.5–45)
HGB BLD-MCNC: 13.4 G/DL — SIGNIFICANT CHANGE UP (ref 11.5–15.5)
MCHC RBC-ENTMCNC: 30 PG — SIGNIFICANT CHANGE UP (ref 27–34)
MCHC RBC-ENTMCNC: 30.2 GM/DL — LOW (ref 32–36)
MCV RBC AUTO: 99.3 FL — SIGNIFICANT CHANGE UP (ref 80–100)
PLATELET # BLD AUTO: 142 K/UL — LOW (ref 150–400)
POTASSIUM SERPL-MCNC: 3.5 MMOL/L — SIGNIFICANT CHANGE UP (ref 3.5–5.3)
POTASSIUM SERPL-MCNC: 3.7 MMOL/L — SIGNIFICANT CHANGE UP (ref 3.5–5.3)
POTASSIUM SERPL-SCNC: 3.5 MMOL/L — SIGNIFICANT CHANGE UP (ref 3.5–5.3)
POTASSIUM SERPL-SCNC: 3.7 MMOL/L — SIGNIFICANT CHANGE UP (ref 3.5–5.3)
RBC # BLD: 4.47 M/UL — SIGNIFICANT CHANGE UP (ref 3.8–5.2)
RBC # FLD: 14.8 % — HIGH (ref 10.3–14.5)
SODIUM SERPL-SCNC: 140 MMOL/L — SIGNIFICANT CHANGE UP (ref 135–145)
SODIUM SERPL-SCNC: 143 MMOL/L — SIGNIFICANT CHANGE UP (ref 135–145)
WBC # BLD: 7.2 K/UL — SIGNIFICANT CHANGE UP (ref 3.8–10.5)
WBC # FLD AUTO: 7.2 K/UL — SIGNIFICANT CHANGE UP (ref 3.8–10.5)

## 2019-03-08 PROCEDURE — 99233 SBSQ HOSP IP/OBS HIGH 50: CPT

## 2019-03-08 PROCEDURE — 93306 TTE W/DOPPLER COMPLETE: CPT | Mod: 26

## 2019-03-08 RX ORDER — POTASSIUM CHLORIDE 20 MEQ
40 PACKET (EA) ORAL ONCE
Qty: 0 | Refills: 0 | Status: COMPLETED | OUTPATIENT
Start: 2019-03-08 | End: 2019-03-08

## 2019-03-08 RX ADMIN — DABIGATRAN ETEXILATE MESYLATE 150 MILLIGRAM(S): 150 CAPSULE ORAL at 10:51

## 2019-03-08 RX ADMIN — Medication 0.25 MILLIGRAM(S): at 21:48

## 2019-03-08 RX ADMIN — Medication 200 MILLIGRAM(S): at 05:36

## 2019-03-08 RX ADMIN — Medication 1 APPLICATION(S): at 05:37

## 2019-03-08 RX ADMIN — NYSTATIN CREAM 1 APPLICATION(S): 100000 CREAM TOPICAL at 13:13

## 2019-03-08 RX ADMIN — Medication 100 MILLIGRAM(S): at 05:36

## 2019-03-08 RX ADMIN — BUMETANIDE 5 MG/HR: 0.25 INJECTION INTRAMUSCULAR; INTRAVENOUS at 05:37

## 2019-03-08 RX ADMIN — Medication 200 MILLIGRAM(S): at 18:03

## 2019-03-08 RX ADMIN — SERTRALINE 50 MILLIGRAM(S): 25 TABLET, FILM COATED ORAL at 13:13

## 2019-03-08 RX ADMIN — BUMETANIDE 5 MG/HR: 0.25 INJECTION INTRAMUSCULAR; INTRAVENOUS at 10:51

## 2019-03-08 RX ADMIN — Medication 40 MILLIEQUIVALENT(S): at 21:50

## 2019-03-08 RX ADMIN — SERTRALINE 25 MILLIGRAM(S): 25 TABLET, FILM COATED ORAL at 21:49

## 2019-03-08 RX ADMIN — Medication 100 MILLIGRAM(S): at 13:13

## 2019-03-08 RX ADMIN — Medication 240 MILLIGRAM(S): at 05:37

## 2019-03-08 RX ADMIN — NYSTATIN CREAM 1 APPLICATION(S): 100000 CREAM TOPICAL at 21:49

## 2019-03-08 RX ADMIN — Medication 100 MILLIGRAM(S): at 21:49

## 2019-03-08 RX ADMIN — FAMOTIDINE 20 MILLIGRAM(S): 10 INJECTION INTRAVENOUS at 13:13

## 2019-03-08 RX ADMIN — DABIGATRAN ETEXILATE MESYLATE 150 MILLIGRAM(S): 150 CAPSULE ORAL at 18:03

## 2019-03-08 RX ADMIN — BUMETANIDE 5 MG/HR: 0.25 INJECTION INTRAMUSCULAR; INTRAVENOUS at 21:48

## 2019-03-08 RX ADMIN — ATORVASTATIN CALCIUM 10 MILLIGRAM(S): 80 TABLET, FILM COATED ORAL at 21:49

## 2019-03-08 RX ADMIN — Medication 1 APPLICATION(S): at 18:03

## 2019-03-08 RX ADMIN — NYSTATIN CREAM 1 APPLICATION(S): 100000 CREAM TOPICAL at 05:37

## 2019-03-08 RX ADMIN — Medication 1 SPRAY(S): at 05:37

## 2019-03-08 NOTE — PROGRESS NOTE ADULT - PROBLEM SELECTOR PLAN 1
- on bumex GTT at 1mg/hr; consider transitioning to IV bumex pushes   -  BID BMP, keep K>4.5, Mg>2   - daily standing weights, strict I/Os   - RHC and TTE once she's euvolemic -  cont bumex GTT at 1mg/hr  -  BID BMP, keep K>4.5, Mg>2   -  daily standing weights, strict I/Os   -  RHC and TTE once she's euvolemic

## 2019-03-08 NOTE — PROGRESS NOTE ADULT - SUBJECTIVE AND OBJECTIVE BOX
PULMONARY PROGRESS NOTE    ARNULFO ALEX  MRN-[id number]    Patient is a 86y old  Female who presents with a chief complaint of hf (07 Mar 2019 08:08)      HPI:  -breathing comfortably, legs less swollen, no complaints.    ROS:   -neg    MEDICATIONS  (STANDING):  AQUAPHOR (petrolatum Ointment) 1 Application(s) Topical two times a day  atorvastatin 10 milliGRAM(s) Oral at bedtime  buMETAnide Infusion 1 mG/Hr (5 mL/Hr) IV Continuous <Continuous>  dabigatran 150 milliGRAM(s) Oral every 12 hours  diltiazem    milliGRAM(s) Oral daily  docusate sodium 100 milliGRAM(s) Oral three times a day  famotidine    Tablet 20 milliGRAM(s) Oral daily  metoprolol tartrate 200 milliGRAM(s) Oral two times a day  nystatin Powder 1 Application(s) Topical three times a day  polyethylene glycol 3350 17 Gram(s) Oral daily  sertraline 50 milliGRAM(s) Oral daily  sertraline 25 milliGRAM(s) Oral at bedtime  sodium chloride 0.65% Nasal 1 Spray(s) Both Nostrils two times a day    MEDICATIONS  (PRN):  ALPRAZolam 0.25 milliGRAM(s) Oral daily PRN anxiety          EXAM:  Vital Signs Last 24 Hrs  T(C): 36.5 (08 Mar 2019 05:16), Max: 36.8 (07 Mar 2019 10:37)  T(F): 97.7 (08 Mar 2019 05:16), Max: 98.2 (07 Mar 2019 10:37)  HR: 70 (08 Mar 2019 05:38) (57 - 70)  BP: 105/57 (08 Mar 2019 05:16) (105/57 - 119/70)  BP(mean): --  RR: 18 (08 Mar 2019 05:16) (17 - 18)  SpO2: 95% (08 Mar 2019 05:16) (94% - 95%)    GENERAL: The patient is awake and alert in no apparent distress.       LUNGS: Clear to auscultation without wheezing, rales or rhonchi; respirations unlabored        LABS/IMGAING: reviewed                                   13.4   7.20  )-----------( 142      ( 08 Mar 2019 09:35 )             44.4   03-08    143  |  96  |  12  ----------------------------<  137<H>  3.5   |  33<H>  |  0.85    Ca    9.2      08 Mar 2019 06:36  Phos  3.5     03-07  Mg     2.2     03-07        ABG - ( 06 Mar 2019 16:19 )  pH, Arterial: 7.47  pH, Blood: x     /  pCO2: 49    /  pO2: 57    / HCO3: 35    / Base Excess: 10.3  /  SaO2: 89          < from: Xray Chest 1 View- PORTABLE-Routine (03.05.19 @ 18:50) >  Impression:    Low lung volumes with clear lungs.    < end of copied text >          PROBLEM LIST:  86y Female with HEALTH ISSUES - PROBLEM Dx:  Hypoxia: Hypoxia  Permanent atrial fibrillation: Permanent atrial fibrillation  Depression: Depression  Mitral valve stenosis, unspecified etiology: Mitral valve stenosis, unspecified etiology  HTN (hypertension): HTN (hypertension)  HLD (hyperlipidemia): HLD (hyperlipidemia)  Atrial fibrillation: Atrial fibrillation  SHERYL (obstructive sleep apnea): SHERYL (obstructive sleep apnea)  Acute on chronic diastolic heart failure: Acute on chronic diastolic heart failure      RECS:  -some evidence hypoventilation on abg likely related to diuresis and obesity/hypovent/sheryl?, pt refuses trial of cpap/bipap, states she is 86 years old and oxygen is enough.    -appreciate cards/hf evals  -cont supplemental O2, incentive tiffanie, oob, pt      Starr Hall MD  [phone]

## 2019-03-08 NOTE — PROGRESS NOTE ADULT - SUBJECTIVE AND OBJECTIVE BOX
Doing quite well this morning  dyspnea and rest of symptoms much improved     Medications:  ALPRAZolam 0.25 milliGRAM(s) Oral daily PRN  AQUAPHOR (petrolatum Ointment) 1 Application(s) Topical two times a day  atorvastatin 10 milliGRAM(s) Oral at bedtime  buMETAnide Infusion 1 mG/Hr IV Continuous <Continuous>  dabigatran 150 milliGRAM(s) Oral every 12 hours  diltiazem    milliGRAM(s) Oral daily  docusate sodium 100 milliGRAM(s) Oral three times a day  famotidine    Tablet 20 milliGRAM(s) Oral daily  metoprolol tartrate 200 milliGRAM(s) Oral two times a day  nystatin Powder 1 Application(s) Topical three times a day  polyethylene glycol 3350 17 Gram(s) Oral daily  sertraline 50 milliGRAM(s) Oral daily  sertraline 25 milliGRAM(s) Oral at bedtime  sodium chloride 0.65% Nasal 1 Spray(s) Both Nostrils two times a day      PAST MEDICAL & SURGICAL HISTORY:  Hearing loss of left ear  Mitral valve stenosis, unspecified etiology  Leg edema  Depression  Syncope  Atrial fibrillation: On Pradaxa  HLD (hyperlipidemia)  HTN (hypertension)  Cataracta  H/O external ear surgery  H/O knee surgery        Vitals:  T(F): 97.9 (03-08), Max: 97.9 (03-08)  HR: 61 (03-08) (57 - 70)  BP: 112/70 (03-08) (105/57 - 119/70)  RR: 17 (03-08)  SpO2: 96% (03-08)  I&O's Summary    07 Mar 2019 07:01  -  08 Mar 2019 07:00  --------------------------------------------------------  IN: 960 mL / OUT: 2425 mL / NET: -1465 mL    08 Mar 2019 07:01  -  08 Mar 2019 14:38  --------------------------------------------------------  IN: 640 mL / OUT: 450 mL / NET: 190 mL        Physical Exam:  General: NAD, comfortable  Neuro: AOx3, ROMERO  HEET: EOM intact  Neck: supple no thyromegaly, +JVP 10-12 cm w/ + HJR  Resp: Coarse crackles to mid lung fields bilaterally; improved   CV: Irregularly irregular. S1 variable S2 audible, II/VI SM along left sternum and apex. +2-3 BLE edema to knees; much improved. +2 radial pulses bilaterally.  Abd: Soft, rounded, distended; improved, nontender, normoactive BS  Skin: Intact, warm peripherally  Psych: Mood and affect normal                                     13.4   7.20  )-----------( 142      ( 08 Mar 2019 09:35 )             44.4     03-08    143  |  96  |  12  ----------------------------<  137<H>  3.5   |  33<H>  |  0.85    Ca    9.2      08 Mar 2019 06:36  Phos  3.5     03-07  Mg     2.2     03-07        Interpretation of Telemetry: afib, HR 50s-70s

## 2019-03-08 NOTE — DISCHARGE NOTE NURSING/CASE MANAGEMENT/SOCIAL WORK - NSDCDPATPORTLINK_GEN_ALL_CORE
You can access the [facility] Portal, offered by [facility], by registering with the following website: [email or web address]

## 2019-03-08 NOTE — PHYSICAL THERAPY INITIAL EVALUATION ADULT - ADDITIONAL COMMENTS
As per pt, pt lives in an apartment w/ spouse and #13 steps to enter w/ UHR, pt has chair lift. PTA pt was independent w/ all mobility w/ straight cane and ADLs.

## 2019-03-08 NOTE — PROGRESS NOTE ADULT - SUBJECTIVE AND OBJECTIVE BOX
[facility] - Mayank Matthews, Gilbert, Xiomara, Solitario, Chong Vera  Office Number:  [phone]    Patient resting comfortably in bed in NAD.  She is going to walk today to see if her respiratory status has improved.  Her LE edema has gone down.  SHe denies chest pain.  She remains volume overloaded.    F/U for:  CHF    Telemetry:  AF 50-70    MEDICATIONS  (STANDING):  AQUAPHOR (petrolatum Ointment) 1 Application(s) Topical two times a day  atorvastatin 10 milliGRAM(s) Oral at bedtime  buMETAnide Infusion 1 mG/Hr (5 mL/Hr) IV Continuous <Continuous>  dabigatran 150 milliGRAM(s) Oral every 12 hours  diltiazem    milliGRAM(s) Oral daily  docusate sodium 100 milliGRAM(s) Oral three times a day  famotidine    Tablet 20 milliGRAM(s) Oral daily  metoprolol tartrate 200 milliGRAM(s) Oral two times a day  nystatin Powder 1 Application(s) Topical three times a day  polyethylene glycol 3350 17 Gram(s) Oral daily  sertraline 50 milliGRAM(s) Oral daily  sertraline 25 milliGRAM(s) Oral at bedtime  sodium chloride 0.65% Nasal 1 Spray(s) Both Nostrils two times a day    MEDICATIONS  (PRN):  ALPRAZolam 0.25 milliGRAM(s) Oral daily PRN anxiety      Allergies    codeine (Other)    Intolerances        Vital Signs Last 24 Hrs  T(C): 36.5 (08 Mar 2019 05:16), Max: 36.8 (07 Mar 2019 10:37)  T(F): 97.7 (08 Mar 2019 05:16), Max: 98.2 (07 Mar 2019 10:37)  HR: 70 (08 Mar 2019 05:38) (57 - 70)  BP: 105/57 (08 Mar 2019 05:16) (105/57 - 119/70)  BP(mean): --  RR: 18 (08 Mar 2019 05:16) (17 - 18)  SpO2: 95% (08 Mar 2019 05:16) (94% - 95%)    I&O's Summary    07 Mar 2019 07:01  -  08 Mar 2019 07:00  --------------------------------------------------------  IN: 960 mL / OUT: 2425 mL / NET: -1465 mL        ON EXAM:    Constitutional: well-nourished, well-developed, NAD   HEENT:  MMM, sclerae anicteric, conjunctivae clear, no oral cyanosis.  Pulmonary: Non-labored, breath sounds are clear bilaterally, No wheezing, rales or rhonchi  Cardiovascular: irregular, S1 and S2.  No murmur.  No rubs, gallops or clicks  Gastrointestinal: Bowel Sounds present, soft, nontender.   Lymph: mild peripheral edema.   Neurological: Alert, no focal deficits  Skin: No rashes.  Psych:  Mood & affect appropriate      LABS: All Labs Reviewed:                        12.3   7.15  )-----------( 133      ( 06 Mar 2019 10:12 )             39.9     08 Mar 2019 06:36    143    |  96     |  12     ----------------------------<  137    3.5     |  33     |  0.85   07 Mar 2019 20:16    144    |  97     |  14     ----------------------------<  181    4.1     |  33     |  1.01   07 Mar 2019 06:03    145    |  99     |  10     ----------------------------<  112    3.3     |  35     |  0.91     Ca    9.2        08 Mar 2019 06:36  Ca    9.3        07 Mar 2019 20:16  Ca    8.8        07 Mar 2019 06:03  Phos  3.5       07 Mar 2019 06:03  Phos  3.4       06 Mar 2019 06:00  Mg     2.2       07 Mar 2019 06:03  Mg     2.2       06 Mar 2019 21:19  Mg     1.9       06 Mar 2019 06:00    TPro  6.8    /  Alb  4.0    /  TBili  0.9    /  DBili  x      /  AST  17     /  ALT  10     /  AlkPhos  47     06 Mar 2019 06:00    PT/INR - ( 06 Mar 2019 07:48 )   PT: 17.7 sec;   INR: 1.53 ratio         PTT - ( 06 Mar 2019 07:48 )  PTT:48.1 sec

## 2019-03-08 NOTE — PROGRESS NOTE ADULT - ATTENDING COMMENTS
no events.  meds: bumex 1/hr, predaxa, diltiazem 240SR, lopressor 200 Q12.   50-70Afib, 110/70,   I/O: -1.4,   84-79kg.   03-08  143  |  96  |  12  ----------------------------<  137<H>  3.5   |  33<H>  |  0.85  Ca    9.2      08 Mar 2019 06:36  Phos  3.5     03-07  Mg     2.2     03-07                        13.4   7.20  )-----------( 142      ( 08 Mar 2019 09:35 )             44.4   no DVT by dopplers. no events.  meds: bumex 1/hr, predaxa, diltiazem 240SR, lopressor 200 Q12.   50-70Afib, 110/70,   still evidence for LE edema.   I/O: -1.4,   84-79kg.   03-08  143  |  96  |  12  ----------------------------<  137<H>  3.5   |  33<H>  |  0.85  Ca    9.2      08 Mar 2019 06:36  Phos  3.5     03-07  Mg     2.2     03-07                        13.4   7.20  )-----------( 142      ( 08 Mar 2019 09:35 )             44.4   no DVT by dopplers.  Making good progress.   continue continuous bumex for further 24 hour.   likely to be ready for RHC with sat run mon/tues.   check repeat TTE  Demetrio Hawkins

## 2019-03-08 NOTE — DISCHARGE NOTE NURSING/CASE MANAGEMENT/SOCIAL WORK - NSDCPEPT PROEDHF_GEN_ALL_CORE
Low salt diet/Monitor weight daily/Activities as tolerated/Report signs and symptoms to primary care provider/Call primary care provider for follow up after discharge

## 2019-03-08 NOTE — PROGRESS NOTE ADULT - ASSESSMENT
87 yo HFpEF (3/2018 ef 71%) s/p mitral clip in 8/2016, hx of permanent a fib on pradaxa, htn, hld , SHERYL not on cpap but on continuous home 02 3L p/w acute on chronic diastolic heart failure      Problem/Plan - 1:  ·  Problem: Acute on chronic diastolic heart failure.  Plan: as per heart failure recs: bumex 2mg gtt   strict ins and outs  monitor HCO3   daily weights  repeat echo after optimization of fluid status   plan for RHC : will need to hold AC prior to cath       Problem/Plan - 2:  ·  Problem: SHERYL (obstructive sleep apnea).  Plan: on 3L 02      Problem/Plan - 3:  ·  Problem: Atrial fibrillation.  Plan: c/w pradaxa: hold when decision for RHC is made     Rate controlled : appreciate cardio input .. consider dcing CCB given CHF   cont to monitor     Problem/Plan - 4:  ·  Problem: HLD (hyperlipidemia).  Plan: c/w lipitor 1omg qhs.     Problem/Plan - 5:  ·  Problem: HTN (hypertension).  Plan: cont to monitor BP on meds   Problem/Plan - 6:  Problem: MR : f/u repeat Echo to evaluate     Problem/Plan - 7:  ·  Problem: Depression.  Plan: c/w sertaline 50mg qam  serataline 25mg qpm.

## 2019-03-08 NOTE — PROGRESS NOTE ADULT - ASSESSMENT
85 yo HFpEF (3/2018 ef 71%) s/p mitral clip in 8/2016, normal cors in 2016,  permanent a fib on pradaxa, htn, hld , SHERYL not on cpap a/w ADHF.     - She remains decompensated, with hf on exam.   - Cont Bumex gtt at current dose.   - Please continue to maintain strict I/Os, monitor daily weights, Cr, and K.  creatinine not yet bumping, arguing that there is still adequate room for diuresis  - hf eval noted, with plans to perform rhc with sat run to evaluated poorly explained chronic hypoxemia     - AF is slow at times. Rates are better with reduced dose of diltiazem.  Would consider dc'ing it entirely in light of rv dysfunction, though she is already on high dose bb, and digoxin is not desirable either, so that rate control may be difficult  - Cont Pradaxa until we get close enough to euvolemia to plan for rhc    - 03/01/18 TTE: LVEF 71%, LVIDd 4.7cm (PW 1.0, SW 1.1), RVE with RVSD, moderate SONNY, severe LAE, mild-mod MR, mild MS, mod TR, mild-mod MA, RVSP 52  - Once better optimized can re-echo to assess if the MR has significantly worsened, and reassess rv/pasp.     - cont statin  - Further cardiac workup will depend on clinical course.   - All other workup per primary team. Will followup.

## 2019-03-08 NOTE — PHYSICAL THERAPY INITIAL EVALUATION ADULT - SITTING BALANCE: DYNAMIC
Went straight to voice-mail  Left message to contact office regarding US that has not been completed 
good balance

## 2019-03-08 NOTE — PROGRESS NOTE ADULT - PROBLEM SELECTOR PLAN 4
due to above w/ possible SHERYL component   refusing CPAP/BIPAP   pulm recs appreciated due to above w/ possible SHERYL component   refusing CPAP/BIPAP   last PFTs on 8/2016: FEV1>75%, FEV1/FVC = 78%  Last CT cardiac on 6/2016 did not show evidence of lung disease     Recommend repeat ABG once euvolemic upon obtaining RHC

## 2019-03-08 NOTE — PROGRESS NOTE ADULT - ASSESSMENT
Ms. Mix is an 87 y/o w/ hx of HFpEF (EF 60-65%, mod MR, RV enlargement w/ RV systolic dysfunction, B/L Atrial enlargement), severe MR s/p mitral clip on 8/2016, chronic Afib (on pradaxa), possible SHERYL who presented w/ acute on chronic diastolic heart failure.  Admitted directly from HF clinic.  Placed on Bumex GTT which she has tolerated well w/o issues.    Still hypervolemic on exam w/ clinical evidence of elevated filling pressures; however has responded quite well to IV diuretic therapy   84.4 Kg --> 80.9 Kg --> 79.3kg today   LE dopplers neg for DVT

## 2019-03-08 NOTE — PHYSICAL THERAPY INITIAL EVALUATION ADULT - PERTINENT HX OF CURRENT PROBLEM, REHAB EVAL
Pt is a 85 yo HFpEF (3/2018 ef 71%) s/p mitral clip in 8/2016, hx of permanent a fib on pradaxa, htn, hld , SHERYL not on cpap but on continuous home 02 3L, came into heart failure clinic this morning for initial eval and reported 6 month decrease ET and now could barely walk 20 steps associated with andrade and difficulty in performing adls. She is unable to go from chair to chair without feeling tired or sob. LE dopplers: (-)

## 2019-03-08 NOTE — PROGRESS NOTE ADULT - SUBJECTIVE AND OBJECTIVE BOX
Patient is a 86y old  Female who presents with a chief complaint of acute on chronic diastolic heart failure (07 Mar 2019 14:51)                                                               INTERVAL HPI/OVERNIGHT EVENTS:    REVIEW OF SYSTEMS:     CONSTITUTIONAL: No weakness, fevers or chills  RESPIRATORY: No cough, wheezing,  No shortness of breath  CARDIOVASCULAR: No chest pain or palpitations  GASTROINTESTINAL: No abdominal pain  . No nausea, vomiting, or hematemesis; No diarrhea or constipation. No melena or hematochezia.  GENITOURINARY: No dysuria, frequency or hematuria  NEUROLOGICAL: No numbness or weakness                                                                                                                                                                                                                                                                                Medications:  MEDICATIONS  (STANDING):  AQUAPHOR (petrolatum Ointment) 1 Application(s) Topical two times a day  atorvastatin 10 milliGRAM(s) Oral at bedtime  buMETAnide Infusion 1 mG/Hr (5 mL/Hr) IV Continuous <Continuous>  dabigatran 150 milliGRAM(s) Oral every 12 hours  diltiazem    milliGRAM(s) Oral daily  docusate sodium 100 milliGRAM(s) Oral three times a day  famotidine    Tablet 20 milliGRAM(s) Oral daily  metoprolol tartrate 200 milliGRAM(s) Oral two times a day  nystatin Powder 1 Application(s) Topical three times a day  polyethylene glycol 3350 17 Gram(s) Oral daily  sertraline 50 milliGRAM(s) Oral daily  sertraline 25 milliGRAM(s) Oral at bedtime  sodium chloride 0.65% Nasal 1 Spray(s) Both Nostrils two times a day    MEDICATIONS  (PRN):  ALPRAZolam 0.25 milliGRAM(s) Oral daily PRN anxiety       Allergies    codeine (Other)    Intolerances      Vital Signs Last 24 Hrs  T(C): 36.5 (08 Mar 2019 05:16), Max: 36.8 (07 Mar 2019 10:37)  T(F): 97.7 (08 Mar 2019 05:16), Max: 98.2 (07 Mar 2019 10:37)  HR: 70 (08 Mar 2019 05:38) (57 - 70)  BP: 105/57 (08 Mar 2019 05:16) (105/57 - 119/70)  BP(mean): --  RR: 18 (08 Mar 2019 05:16) (17 - 18)  SpO2: 95% (08 Mar 2019 05:16) (94% - 95%)  CAPILLARY BLOOD GLUCOSE          03-07 @ 07:01  -  - @ 07:00  --------------------------------------------------------  IN: 960 mL / OUT: 2425 mL / NET: -1465 mL      Physical Exam:    Daily     Daily Weight in k.3 (08 Mar 2019 09:42)  General:  NAD   HEENT:  Nonicteric, PERRLA  CV:  RRR, S1S2   Lungs: mild carckles at bases   Abdomen:  Soft, non-tender, no distended, positive BS  Extremities:  improving edema    :  No corbett  Neuro:  AAOx3, non-focal, grossly intact                                                                                                                                                                                                                                                                                                LABS:                               13.4   7.20  )-----------( 142      ( 08 Mar 2019 09:35 )             44.4                      03-08    143  |  96  |  12  ----------------------------<  137<H>  3.5   |  33<H>  |  0.85    Ca    9.2      08 Mar 2019 06:36  Phos  3.5     03-07  Mg     2.2     0307

## 2019-03-09 LAB
ANION GAP SERPL CALC-SCNC: 14 MMOL/L — SIGNIFICANT CHANGE UP (ref 5–17)
ANION GAP SERPL CALC-SCNC: 15 MMOL/L — SIGNIFICANT CHANGE UP (ref 5–17)
BUN SERPL-MCNC: 16 MG/DL — SIGNIFICANT CHANGE UP (ref 7–23)
BUN SERPL-MCNC: 20 MG/DL — SIGNIFICANT CHANGE UP (ref 7–23)
CALCIUM SERPL-MCNC: 9.4 MG/DL — SIGNIFICANT CHANGE UP (ref 8.4–10.5)
CALCIUM SERPL-MCNC: 9.5 MG/DL — SIGNIFICANT CHANGE UP (ref 8.4–10.5)
CHLORIDE SERPL-SCNC: 94 MMOL/L — LOW (ref 96–108)
CHLORIDE SERPL-SCNC: 95 MMOL/L — LOW (ref 96–108)
CO2 SERPL-SCNC: 34 MMOL/L — HIGH (ref 22–31)
CO2 SERPL-SCNC: 35 MMOL/L — HIGH (ref 22–31)
CREAT SERPL-MCNC: 0.95 MG/DL — SIGNIFICANT CHANGE UP (ref 0.5–1.3)
CREAT SERPL-MCNC: 1.08 MG/DL — SIGNIFICANT CHANGE UP (ref 0.5–1.3)
GLUCOSE SERPL-MCNC: 122 MG/DL — HIGH (ref 70–99)
GLUCOSE SERPL-MCNC: 131 MG/DL — HIGH (ref 70–99)
POTASSIUM SERPL-MCNC: 3.5 MMOL/L — SIGNIFICANT CHANGE UP (ref 3.5–5.3)
POTASSIUM SERPL-MCNC: 3.6 MMOL/L — SIGNIFICANT CHANGE UP (ref 3.5–5.3)
POTASSIUM SERPL-SCNC: 3.5 MMOL/L — SIGNIFICANT CHANGE UP (ref 3.5–5.3)
POTASSIUM SERPL-SCNC: 3.6 MMOL/L — SIGNIFICANT CHANGE UP (ref 3.5–5.3)
SODIUM SERPL-SCNC: 143 MMOL/L — SIGNIFICANT CHANGE UP (ref 135–145)
SODIUM SERPL-SCNC: 144 MMOL/L — SIGNIFICANT CHANGE UP (ref 135–145)

## 2019-03-09 PROCEDURE — 99232 SBSQ HOSP IP/OBS MODERATE 35: CPT

## 2019-03-09 RX ORDER — POTASSIUM CHLORIDE 20 MEQ
40 PACKET (EA) ORAL ONCE
Qty: 0 | Refills: 0 | Status: COMPLETED | OUTPATIENT
Start: 2019-03-09 | End: 2019-03-09

## 2019-03-09 RX ADMIN — BUMETANIDE 5 MG/HR: 0.25 INJECTION INTRAMUSCULAR; INTRAVENOUS at 22:29

## 2019-03-09 RX ADMIN — Medication 100 MILLIGRAM(S): at 05:47

## 2019-03-09 RX ADMIN — DABIGATRAN ETEXILATE MESYLATE 150 MILLIGRAM(S): 150 CAPSULE ORAL at 18:26

## 2019-03-09 RX ADMIN — POLYETHYLENE GLYCOL 3350 17 GRAM(S): 17 POWDER, FOR SOLUTION ORAL at 13:57

## 2019-03-09 RX ADMIN — ATORVASTATIN CALCIUM 10 MILLIGRAM(S): 80 TABLET, FILM COATED ORAL at 22:28

## 2019-03-09 RX ADMIN — Medication 240 MILLIGRAM(S): at 05:47

## 2019-03-09 RX ADMIN — SERTRALINE 25 MILLIGRAM(S): 25 TABLET, FILM COATED ORAL at 22:29

## 2019-03-09 RX ADMIN — DABIGATRAN ETEXILATE MESYLATE 150 MILLIGRAM(S): 150 CAPSULE ORAL at 05:47

## 2019-03-09 RX ADMIN — NYSTATIN CREAM 1 APPLICATION(S): 100000 CREAM TOPICAL at 22:29

## 2019-03-09 RX ADMIN — FAMOTIDINE 20 MILLIGRAM(S): 10 INJECTION INTRAVENOUS at 13:57

## 2019-03-09 RX ADMIN — Medication 1 APPLICATION(S): at 18:27

## 2019-03-09 RX ADMIN — Medication 100 MILLIGRAM(S): at 13:57

## 2019-03-09 RX ADMIN — Medication 1 APPLICATION(S): at 05:47

## 2019-03-09 RX ADMIN — Medication 1 SPRAY(S): at 18:27

## 2019-03-09 RX ADMIN — Medication 200 MILLIGRAM(S): at 18:26

## 2019-03-09 RX ADMIN — SERTRALINE 50 MILLIGRAM(S): 25 TABLET, FILM COATED ORAL at 13:57

## 2019-03-09 RX ADMIN — Medication 100 MILLIGRAM(S): at 22:28

## 2019-03-09 RX ADMIN — NYSTATIN CREAM 1 APPLICATION(S): 100000 CREAM TOPICAL at 13:58

## 2019-03-09 RX ADMIN — Medication 0.25 MILLIGRAM(S): at 22:27

## 2019-03-09 RX ADMIN — Medication 40 MILLIEQUIVALENT(S): at 22:28

## 2019-03-09 RX ADMIN — Medication 200 MILLIGRAM(S): at 05:47

## 2019-03-09 RX ADMIN — BUMETANIDE 5 MG/HR: 0.25 INJECTION INTRAMUSCULAR; INTRAVENOUS at 05:47

## 2019-03-09 RX ADMIN — Medication 1 SPRAY(S): at 05:48

## 2019-03-09 RX ADMIN — NYSTATIN CREAM 1 APPLICATION(S): 100000 CREAM TOPICAL at 05:47

## 2019-03-09 NOTE — PROGRESS NOTE ADULT - SUBJECTIVE AND OBJECTIVE BOX
[facility]    Mayank Matthews, Gilbert, Xiomara, Solitario, Chuy, Chong      [phone]    CHIEF COMPLAINT: Patient is a 86y old  Female who presents with a chief complaint of acute on chronic diastolic heart failure (08 Mar 2019 14:37)      Follow Up: hf, s/p mitral clip    Interim history: The patient reports no new symptoms.  Denies chest discomfort and shortness of breath.  No abdominal pain.  No new neurologic symptoms.      MEDICATIONS  (STANDING):  AQUAPHOR (petrolatum Ointment) 1 Application(s) Topical two times a day  atorvastatin 10 milliGRAM(s) Oral at bedtime  buMETAnide Infusion 1 mG/Hr (5 mL/Hr) IV Continuous <Continuous>  dabigatran 150 milliGRAM(s) Oral every 12 hours  diltiazem    milliGRAM(s) Oral daily  docusate sodium 100 milliGRAM(s) Oral three times a day  famotidine    Tablet 20 milliGRAM(s) Oral daily  metoprolol tartrate 200 milliGRAM(s) Oral two times a day  nystatin Powder 1 Application(s) Topical three times a day  polyethylene glycol 3350 17 Gram(s) Oral daily  sertraline 50 milliGRAM(s) Oral daily  sertraline 25 milliGRAM(s) Oral at bedtime  sodium chloride 0.65% Nasal 1 Spray(s) Both Nostrils two times a day    MEDICATIONS  (PRN):  ALPRAZolam 0.25 milliGRAM(s) Oral daily PRN anxiety      REVIEW OF SYSTEMS:  eye, ent, GI, , allergic, dermatologic, musculoskeletal and neurologic are negative except as described above    Vital Signs Last 24 Hrs  T(C): 36.6 (09 Mar 2019 05:23), Max: 36.7 (08 Mar 2019 20:30)  T(F): 97.8 (09 Mar 2019 05:23), Max: 98.1 (08 Mar 2019 20:30)  HR: 78 (09 Mar 2019 05:23) (61 - 78)  BP: 109/65 (09 Mar 2019 05:23) (109/65 - 112/70)  BP(mean): --  RR: 18 (09 Mar 2019 05:23) (17 - 18)  SpO2: 95% (09 Mar 2019 05:23) (95% - 98%)    I&O's Summary    08 Mar 2019 07:01  -  09 Mar 2019 07:00  --------------------------------------------------------  IN: 1000 mL / OUT: 1250 mL / NET: -250 mL        Telemetry past 24h:    PHYSICAL EXAM:    Constitutional: well-nourished, well-developed, NAD   HEENT:  MMM, sclerae anicteric, conjunctivae clear, no oral cyanosis.  Pulmonary: Non-labored, crackles dorie bases  Cardiovascular: irregular, S1 and S2.  No murmur.  No rubs, gallops or clicks  Gastrointestinal: Bowel Sounds present, soft, nontender.   Lymph: mild peripheral edema.   Neurological: Alert, no focal deficits  Skin: No rashes.  Psych:  Mood & affect appropriate    LABS: All Labs Reviewed:                        13.4   7.20  )-----------( 142      ( 08 Mar 2019 09:35 )             44.4                         12.3   7.15  )-----------( 133      ( 06 Mar 2019 10:12 )             39.9     09 Mar 2019 06:30    144    |  95     |  16     ----------------------------<  122    3.5     |  34     |  0.95   08 Mar 2019 20:43    140    |  93     |  17     ----------------------------<  199    3.7     |  32     |  1.06   08 Mar 2019 06:36    143    |  96     |  12     ----------------------------<  137    3.5     |  33     |  0.85     Ca    9.5        09 Mar 2019 06:30  Ca    9.4        08 Mar 2019 20:43  Ca    9.2        08 Mar 2019 06:36  Phos  3.5       07 Mar 2019 06:03  Mg     2.2       07 Mar 2019 06:03  Mg     2.2       06 Mar 2019 21:19            Blood Culture:         RADIOLOGY:    EKG:    Echo:

## 2019-03-09 NOTE — PROGRESS NOTE ADULT - ASSESSMENT
85 yo HFpEF (3/2018 ef 71%) s/p mitral clip in 8/2016, hx of permanent a fib on pradaxa, htn, hld , SHERYL not on cpap but on continuous home 02 3L p/w acute on chronic diastolic heart failure      Problem/Plan - 1:  ·  Problem: Acute on chronic diastolic heart failure.  Plan: as per heart failure recs: bumex 2mg gtt   strict ins and outs  monitor HCO3   daily weights  repeat echo after optimization of fluid status   plan for RHC : will need to hold AC prior to cath       Problem/Plan - 2:  ·  Problem: SHERYL (obstructive sleep apnea).  Plan: on 3L 02      Problem/Plan - 3:  ·  Problem: Atrial fibrillation.  Plan: c/w pradaxa: hold when decision for RHC is made     Rate controlled : appreciate cardio input .. consider dcing CCB given CHF   cont to monitor     Problem/Plan - 4:  ·  Problem: HLD (hyperlipidemia).  Plan: c/w lipitor 1omg qhs.     Problem/Plan - 5:  ·  Problem: HTN (hypertension).  Plan: cont to monitor BP on meds   Problem/Plan - 6:  Problem: MR : f/u repeat Echo to evaluate     Problem/Plan - 7:  ·  Problem: Depression.  Plan: c/w sertaline 50mg qam  serataline 25mg qpm. 87 yo HFpEF (3/2018 ef 71%) s/p mitral clip in 8/2016, hx of permanent a fib on pradaxa, htn, hld , SHERYL not on cpap but on continuous home 02 3L p/w acute on chronic diastolic heart failure      Problem/Plan - 1:  ·  Problem: Acute on chronic diastolic heart failure.  Plan: as per heart failure recs: bumex 2mg gtt   strict ins and outs  monitor HCO3   daily weights  repeat echo after optimization of fluid status   plan for RHC :  hold AC prior to cath       Problem/Plan - 2:  ·  Problem: SHERYL (obstructive sleep apnea).  Plan: on 3L 02      Problem/Plan - 3:  ·  Problem: Atrial fibrillation.  Plan: c/w pradaxa: hold when decision for RHC is made     Rate controlled : appreciate cardio input .. consider dcing CCB given CHF   cont to monitor     Problem/Plan - 4:  ·  Problem: HLD (hyperlipidemia).  Plan: c/w lipitor 1omg qhs.     Problem/Plan - 5:  ·  Problem: HTN (hypertension).  Plan: cont to monitor BP on meds   Problem/Plan - 6:  Problem: MR : f/u repeat Echo to evaluate     Problem/Plan - 7:  ·  Problem: Depression.  Plan: c/w sertaline 50mg qam  serataline 25mg qpm.

## 2019-03-09 NOTE — PROGRESS NOTE ADULT - ASSESSMENT
87 yo HFpEF (3/2018 ef 71%) s/p mitral clip in 8/2016, normal cors in 2016,  permanent a fib on pradaxa, htn, hld , SHERYL not on cpap a/w ADHF.     - She is much better compensated, with improved hf on exam.   - Cont transition gtt to po this afternoon in my opinion.  would defer to heart failure service followup   - Please continue to maintain strict I/Os, monitor daily weights, Cr, and K.     - hf eval noted, with plans to perform rhc with sat run to evaluated poorly explained chronic hypoxemia.  As monday looks like a reasonable plan, would hold pradaxa starting today, can cover with lovenox    - AF were slow at times. Rates are better with reduced dose of diltiazem.        - 03/01/18 TTE: LVEF 71%, LVIDd 4.7cm (PW 1.0, SW 1.1), RVE with RVSD, moderate SONNY, severe LAE, mild-mod MR, mild MS, mod TR, mild-mod [place], RVSP 52     - cont statin  - Further cardiac workup will depend on clinical course.   - All other workup per primary team. Will followup.

## 2019-03-09 NOTE — PROGRESS NOTE ADULT - SUBJECTIVE AND OBJECTIVE BOX
Patient is a 86y old  Female who presents with a chief complaint of hf (09 Mar 2019 09:11)                                                               INTERVAL HPI/OVERNIGHT EVENTS:    REVIEW OF SYSTEMS:     CONSTITUTIONAL: No weakness, fevers or chills  EYES/ENT: No visual changes , no ear ache   NECK: No pain or stiffness  RESPIRATORY: No cough, wheezing,  No shortness of breath  CARDIOVASCULAR: No chest pain or palpitations  GASTROINTESTINAL: No abdominal pain  . No nausea, vomiting, or hematemesis; No diarrhea or constipation. No melena or hematochezia.  GENITOURINARY: No dysuria, frequency or hematuria  NEUROLOGICAL: No numbness or weakness  SKIN: No itching, burning, rashes, or lesions                                                                                                                                                                                                                                                                                 Medications:  MEDICATIONS  (STANDING):  AQUAPHOR (petrolatum Ointment) 1 Application(s) Topical two times a day  atorvastatin 10 milliGRAM(s) Oral at bedtime  buMETAnide Infusion 1 mG/Hr (5 mL/Hr) IV Continuous <Continuous>  dabigatran 150 milliGRAM(s) Oral every 12 hours  diltiazem    milliGRAM(s) Oral daily  docusate sodium 100 milliGRAM(s) Oral three times a day  famotidine    Tablet 20 milliGRAM(s) Oral daily  metoprolol tartrate 200 milliGRAM(s) Oral two times a day  nystatin Powder 1 Application(s) Topical three times a day  polyethylene glycol 3350 17 Gram(s) Oral daily  sertraline 50 milliGRAM(s) Oral daily  sertraline 25 milliGRAM(s) Oral at bedtime  sodium chloride 0.65% Nasal 1 Spray(s) Both Nostrils two times a day    MEDICATIONS  (PRN):  ALPRAZolam 0.25 milliGRAM(s) Oral daily PRN anxiety       Allergies    codeine (Other)    Intolerances      Vital Signs Last 24 Hrs  T(C): 36.9 (09 Mar 2019 20:28), Max: 36.9 (09 Mar 2019 20:28)  T(F): 98.4 (09 Mar 2019 20:28), Max: 98.4 (09 Mar 2019 20:28)  HR: 67 (09 Mar 2019 20:28) (61 - 78)  BP: 121/65 (09 Mar 2019 20:28) (96/60 - 123/53)  BP(mean): --  RR: 17 (09 Mar 2019 20:28) (17 - 18)  SpO2: 96% (09 Mar 2019 20:28) (95% - 96%)  CAPILLARY BLOOD GLUCOSE           @ 07:01  -   @ 07:00  --------------------------------------------------------  IN: 1000 mL / OUT: 1850 mL / NET: -850 mL     @ 06:01  -   @ 22:49  --------------------------------------------------------  IN: 264 mL / OUT: 800 mL / NET: -536 mL      Physical Exam:    Daily     Daily Weight in k.1 (09 Mar 2019 07:30)  General:  Well appearing, NAD, not cachetic  HEENT:  Nonicteric, PERRLA  CV:  RRR, S1S2   Lungs:  CTA B/L, no wheezes, rales, rhonchi  Abdomen:  Soft, non-tender, no distended, positive BS  Extremities:  2+ pulses, no c/c, no edema  Skin:  Warm and dry, no rashes  :  No corbett  Neuro:  AAOx3, non-focal, grossly intact                                                                                                                                                                                                                                                                                                LABS:                               13.4   7.20  )-----------( 142      ( 08 Mar 2019 09:35 )             44.4                          143  |  94<L>  |  20  ----------------------------<  131<H>  3.6   |  35<H>  |  1.08    Ca    9.4      09 Mar 2019 19:46                         RADIOLOGY & ADDITIONAL TESTS         I personally reviewed: [  ]EKG   [  ]CXR    [  ] CT      A/P:         Discussed with :     Emmanuelle consultants' Notes   Time spent : Patient is a 86y old  Female who presents with a chief complaint of hf (09 Mar 2019 09:11)                                                               INTERVAL HPI/OVERNIGHT EVENTS:    REVIEW OF SYSTEMS:     CONSTITUTIONAL: No weakness, fevers or chills  RESPIRATORY: No cough, wheezing,  No shortness of breath  CARDIOVASCULAR: No chest pain or palpitations  GASTROINTESTINAL: No abdominal pain  . No nausea, vomiting, or hematemesis; No diarrhea or constipation. No melena or hematochezia.  GENITOURINARY: No dysuria, frequency or hematuria  NEUROLOGICAL: No numbness or weakness                                                                                                                                                                                                                                                                                   Medications:  MEDICATIONS  (STANDING):  AQUAPHOR (petrolatum Ointment) 1 Application(s) Topical two times a day  atorvastatin 10 milliGRAM(s) Oral at bedtime  buMETAnide Infusion 1 mG/Hr (5 mL/Hr) IV Continuous <Continuous>  dabigatran 150 milliGRAM(s) Oral every 12 hours  diltiazem    milliGRAM(s) Oral daily  docusate sodium 100 milliGRAM(s) Oral three times a day  famotidine    Tablet 20 milliGRAM(s) Oral daily  metoprolol tartrate 200 milliGRAM(s) Oral two times a day  nystatin Powder 1 Application(s) Topical three times a day  polyethylene glycol 3350 17 Gram(s) Oral daily  sertraline 50 milliGRAM(s) Oral daily  sertraline 25 milliGRAM(s) Oral at bedtime  sodium chloride 0.65% Nasal 1 Spray(s) Both Nostrils two times a day    MEDICATIONS  (PRN):  ALPRAZolam 0.25 milliGRAM(s) Oral daily PRN anxiety       Allergies    codeine (Other)    Intolerances      Vital Signs Last 24 Hrs  T(C): 36.9 (09 Mar 2019 20:28), Max: 36.9 (09 Mar 2019 20:28)  T(F): 98.4 (09 Mar 2019 20:28), Max: 98.4 (09 Mar 2019 20:28)  HR: 67 (09 Mar 2019 20:28) (61 - 78)  BP: 121/65 (09 Mar 2019 20:28) (96/60 - 123/53)  BP(mean): --  RR: 17 (09 Mar 2019 20:28) (17 - 18)  SpO2: 96% (09 Mar 2019 20:28) (95% - 96%)  CAPILLARY BLOOD GLUCOSE          03-08 @ 07:01  -  03-09 @ 07:00  --------------------------------------------------------  IN: 1000 mL / OUT: 1850 mL / NET: -850 mL     @ 06:01   @ 22:49  --------------------------------------------------------  IN: 264 mL / OUT: 800 mL / NET: -536 mL      Physical Exam:    Daily Weight in k.1 (09 Mar 2019 07:30)  General:  NAD   HEENT:  Nonicteric, PERRLA  CV:  RRR, S1S2   Lungs:  mild crackles at bases   Abdomen:  Soft, non-tender, no distended, positive BS  Extremities:edema  Neuro:  AAOx3, non-focal, grossly intact                                                                                                                                                                                                                                                                                                LABS:                               13.4   7.20  )-----------( 142      ( 08 Mar 2019 09:35 )             44.4                          143  |  94<L>  |  20  ----------------------------<  131<H>  3.6   |  35<H>  |  1.08    Ca    9.4      09 Mar 2019 19:46

## 2019-03-10 LAB
ANION GAP SERPL CALC-SCNC: 14 MMOL/L — SIGNIFICANT CHANGE UP (ref 5–17)
BUN SERPL-MCNC: 18 MG/DL — SIGNIFICANT CHANGE UP (ref 7–23)
CALCIUM SERPL-MCNC: 9.3 MG/DL — SIGNIFICANT CHANGE UP (ref 8.4–10.5)
CHLORIDE SERPL-SCNC: 96 MMOL/L — SIGNIFICANT CHANGE UP (ref 96–108)
CO2 SERPL-SCNC: 33 MMOL/L — HIGH (ref 22–31)
CREAT SERPL-MCNC: 0.91 MG/DL — SIGNIFICANT CHANGE UP (ref 0.5–1.3)
GLUCOSE SERPL-MCNC: 124 MG/DL — HIGH (ref 70–99)
HCT VFR BLD CALC: 44.3 % — SIGNIFICANT CHANGE UP (ref 34.5–45)
HGB BLD-MCNC: 14 G/DL — SIGNIFICANT CHANGE UP (ref 11.5–15.5)
MCHC RBC-ENTMCNC: 30.4 PG — SIGNIFICANT CHANGE UP (ref 27–34)
MCHC RBC-ENTMCNC: 31.6 GM/DL — LOW (ref 32–36)
MCV RBC AUTO: 96.3 FL — SIGNIFICANT CHANGE UP (ref 80–100)
PLATELET # BLD AUTO: 143 K/UL — LOW (ref 150–400)
POTASSIUM SERPL-MCNC: 3.6 MMOL/L — SIGNIFICANT CHANGE UP (ref 3.5–5.3)
POTASSIUM SERPL-SCNC: 3.6 MMOL/L — SIGNIFICANT CHANGE UP (ref 3.5–5.3)
RBC # BLD: 4.6 M/UL — SIGNIFICANT CHANGE UP (ref 3.8–5.2)
RBC # FLD: 14.5 % — SIGNIFICANT CHANGE UP (ref 10.3–14.5)
SODIUM SERPL-SCNC: 143 MMOL/L — SIGNIFICANT CHANGE UP (ref 135–145)
WBC # BLD: 7.58 K/UL — SIGNIFICANT CHANGE UP (ref 3.8–10.5)
WBC # FLD AUTO: 7.58 K/UL — SIGNIFICANT CHANGE UP (ref 3.8–10.5)

## 2019-03-10 PROCEDURE — 99232 SBSQ HOSP IP/OBS MODERATE 35: CPT

## 2019-03-10 RX ORDER — POTASSIUM CHLORIDE 20 MEQ
40 PACKET (EA) ORAL ONCE
Qty: 0 | Refills: 0 | Status: COMPLETED | OUTPATIENT
Start: 2019-03-10 | End: 2019-03-10

## 2019-03-10 RX ADMIN — Medication 100 MILLIGRAM(S): at 13:49

## 2019-03-10 RX ADMIN — ATORVASTATIN CALCIUM 10 MILLIGRAM(S): 80 TABLET, FILM COATED ORAL at 21:32

## 2019-03-10 RX ADMIN — DABIGATRAN ETEXILATE MESYLATE 150 MILLIGRAM(S): 150 CAPSULE ORAL at 06:15

## 2019-03-10 RX ADMIN — Medication 100 MILLIGRAM(S): at 06:15

## 2019-03-10 RX ADMIN — Medication 1 SPRAY(S): at 06:15

## 2019-03-10 RX ADMIN — BUMETANIDE 5 MG/HR: 0.25 INJECTION INTRAMUSCULAR; INTRAVENOUS at 06:15

## 2019-03-10 RX ADMIN — Medication 40 MILLIEQUIVALENT(S): at 18:13

## 2019-03-10 RX ADMIN — POLYETHYLENE GLYCOL 3350 17 GRAM(S): 17 POWDER, FOR SOLUTION ORAL at 11:19

## 2019-03-10 RX ADMIN — Medication 240 MILLIGRAM(S): at 06:15

## 2019-03-10 RX ADMIN — NYSTATIN CREAM 1 APPLICATION(S): 100000 CREAM TOPICAL at 21:32

## 2019-03-10 RX ADMIN — Medication 200 MILLIGRAM(S): at 17:04

## 2019-03-10 RX ADMIN — SERTRALINE 25 MILLIGRAM(S): 25 TABLET, FILM COATED ORAL at 21:32

## 2019-03-10 RX ADMIN — Medication 200 MILLIGRAM(S): at 06:15

## 2019-03-10 RX ADMIN — SERTRALINE 50 MILLIGRAM(S): 25 TABLET, FILM COATED ORAL at 11:19

## 2019-03-10 RX ADMIN — Medication 1 APPLICATION(S): at 06:15

## 2019-03-10 RX ADMIN — FAMOTIDINE 20 MILLIGRAM(S): 10 INJECTION INTRAVENOUS at 11:19

## 2019-03-10 RX ADMIN — NYSTATIN CREAM 1 APPLICATION(S): 100000 CREAM TOPICAL at 06:15

## 2019-03-10 RX ADMIN — Medication 1 APPLICATION(S): at 17:03

## 2019-03-10 RX ADMIN — Medication 0.25 MILLIGRAM(S): at 22:20

## 2019-03-10 RX ADMIN — Medication 100 MILLIGRAM(S): at 21:32

## 2019-03-10 NOTE — PROGRESS NOTE ADULT - ASSESSMENT
Ms. Mix is an 87 y/o w/ hx of HFpEF (EF 60-65%, mod MR, RV enlargement w/ RV systolic dysfunction, B/L Atrial enlargement), severe MR s/p mitral clip on 8/2016, chronic Afib (on pradaxa), possible SHERYL who presented w/ acute on chronic diastolic heart failure.  Admitted directly from HF clinic.  Placed on Bumex GTT which she has tolerated well w/o issues. Currently appears close to euvolemic.

## 2019-03-10 NOTE — PROGRESS NOTE ADULT - ATTENDING COMMENTS
meds: bumex 1/hr, dilt 240 QD, lopressor 200 bid, predaxa.   Afib 60-80, 96/-121/, 6kg weight loss since admission .   03-10  143  |  96  |  18  ----------------------------<  124<H>  3.6   |  33<H>  |  0.91  Ca    9.3      10 Mar 2019 06:43                        14.0   7.58  )-----------( 143      ( 10 Mar 2019 08:27 )             44.3 meds: bumex 1/hr, dilt 240 QD, lopressor 200 bid, predaxa.   Afib 60-80, 96/-121/, 6kg weight loss since admission .   rpt TTE: 3.8 LA 5.7, LVEDD 5.1, LVEF 70 MVA 1.6, mean grad 5. severe RV dilation and dysfunction and severe TR. RVSP 82.   03-10  143  |  96  |  18  ----------------------------<  124<H>  3.6   |  33<H>  |  0.91  Ca    9.3      10 Mar 2019 06:43                        14.0   7.58  )-----------( 143      ( 10 Mar 2019 08:27 )             44.3  apppears euvolemic.   hold predaxa.  d/c bumex IV.   for RHC and NO study with sat run tomorrow.   for ABG on room air while off predaxa   Pulmonary consult  Demetrio Hawkins meds: bumex 1/hr, dilt 240 QD, lopressor 200 bid, predaxa.   Afib 60-80, 96/-121/, 6kg weight loss since admission .   rpt TTE: 3.8 LA 5.7, LVEDD 5.1, LVEF 70 MVA 1.6, mean grad 5. severe RV dilation and dysfunction and severe TR. RVSP 82.   03-10  143  |  96  |  18  ----------------------------<  124<H>  3.6   |  33<H>  |  0.91  Ca    9.3      10 Mar 2019 06:43                        14.0   7.58  )-----------( 143      ( 10 Mar 2019 08:27 )             44.3  apppears euvolemic.   hold predaxa.  d/c bumex IV.   for RHC and NO study with sat run tomorrow.   for ABG on room air while off predaxa   Please ask pulmonary to review any recent chest CT and PFTs. Does patient need V/Q scan and high res CT.   Demetrio Hawkins

## 2019-03-10 NOTE — PROGRESS NOTE ADULT - SUBJECTIVE AND OBJECTIVE BOX
[facility]    Mayank Matthews, Gilbert, Xiomara, Solitario, Chuy, Chong      [phone]    CHIEF COMPLAINT: Patient is a 86y old  Female who presents with a chief complaint of hf (09 Mar 2019 09:11)      Follow Up: hf, hypoxia, af    Interim history: The patient reports no new symptoms.  Denies chest discomfort and shortness of breath.  No abdominal pain.  No new neurologic symptoms.      MEDICATIONS  (STANDING):  AQUAPHOR (petrolatum Ointment) 1 Application(s) Topical two times a day  atorvastatin 10 milliGRAM(s) Oral at bedtime  buMETAnide Infusion 1 mG/Hr (5 mL/Hr) IV Continuous <Continuous>  dabigatran 150 milliGRAM(s) Oral every 12 hours  diltiazem    milliGRAM(s) Oral daily  docusate sodium 100 milliGRAM(s) Oral three times a day  famotidine    Tablet 20 milliGRAM(s) Oral daily  metoprolol tartrate 200 milliGRAM(s) Oral two times a day  nystatin Powder 1 Application(s) Topical three times a day  polyethylene glycol 3350 17 Gram(s) Oral daily  sertraline 50 milliGRAM(s) Oral daily  sertraline 25 milliGRAM(s) Oral at bedtime  sodium chloride 0.65% Nasal 1 Spray(s) Both Nostrils two times a day    MEDICATIONS  (PRN):  ALPRAZolam 0.25 milliGRAM(s) Oral daily PRN anxiety      REVIEW OF SYSTEMS:  eye, ent, GI, , allergic, dermatologic, musculoskeletal and neurologic are negative except as described above    Vital Signs Last 24 Hrs  T(C): 36.3 (10 Mar 2019 05:22), Max: 36.9 (09 Mar 2019 20:28)  T(F): 97.3 (10 Mar 2019 05:22), Max: 98.4 (09 Mar 2019 20:28)  HR: 78 (10 Mar 2019 05:22) (61 - 78)  BP: 111/64 (10 Mar 2019 05:22) (96/60 - 123/53)  BP(mean): --  RR: 17 (10 Mar 2019 05:22) (17 - 17)  SpO2: 94% (10 Mar 2019 05:22) (94% - 96%)    I&O's Summary    09 Mar 2019 06:01  -  10 Mar 2019 07:00  --------------------------------------------------------  IN: 264 mL / OUT: 800 mL / NET: -536 mL        Telemetry past 24h: af controlled    PHYSICAL EXAM:    Constitutional: well-nourished, well-developed, NAD   HEENT:  MMM, sclerae anicteric, conjunctivae clear, no oral cyanosis.  Pulmonary: Non-labored, breath sounds are decr at bases bilaterally, No wheezing, rales or rhonchi  Cardiovascular: irregular, S1 and S2.  No murmur.  No rubs, gallops or clicks  Gastrointestinal: Bowel Sounds present, soft, nontender.   Lymph: min peripheral edema.   Neurological: Alert, no focal deficits  Skin: No rashes.  Psych:  Mood & affect appropriate    LABS: All Labs Reviewed:                        14.0   7.58  )-----------( 143      ( 10 Mar 2019 08:27 )             44.3                         13.4   7.20  )-----------( 142      ( 08 Mar 2019 09:35 )             44.4     10 Mar 2019 06:43    143    |  96     |  18     ----------------------------<  124    3.6     |  33     |  0.91   09 Mar 2019 19:46    143    |  94     |  20     ----------------------------<  131    3.6     |  35     |  1.08   09 Mar 2019 06:30    144    |  95     |  16     ----------------------------<  122    3.5     |  34     |  0.95     Ca    9.3        10 Mar 2019 06:43  Ca    9.4        09 Mar 2019 19:46  Ca    9.5        09 Mar 2019 06:30            Blood Culture:         RADIOLOGY:    EKG:    Echo:

## 2019-03-10 NOTE — PROGRESS NOTE ADULT - ASSESSMENT
85 yo HFpEF (3/2018 ef 71%) s/p mitral clip in 8/2016, hx of permanent a fib on pradaxa, htn, hld , SHERYL not on cpap but on continuous home 02 3L p/w acute on chronic diastolic heart failure      Problem/Plan - 1:  ·  Problem: Acute on chronic diastolic heart failure.  Plan: as per heart failure recs: changed to torsemide   strict ins and outs  monitor HCO3   daily weights  repeat echo after optimization of fluid status   plan for RHC :  holding  AC prior to cath       Problem/Plan - 2:  ·  Problem: SHERYL (obstructive sleep apnea).  Plan: on 3L 02      Problem/Plan - 3:  ·  Problem: Atrial fibrillation.  Plan:  pradaxa on hold for RHC     Rate controlled : appreciate cardio input .. consider dcing CCB given CHF   cont to monitor     Problem/Plan - 4:  ·  Problem: HLD (hyperlipidemia).  Plan: c/w lipitor 1omg qhs.     Problem/Plan - 5:  ·  Problem: HTN (hypertension).  Plan: cont to monitor BP on meds   Problem/Plan - 6:  Problem: MR : f/u repeat Echo to evaluate     Problem/Plan - 7:  ·  Problem: Depression.  Plan: c/w sertaline 50mg qam  serataline 25mg qpm.

## 2019-03-10 NOTE — PROGRESS NOTE ADULT - SUBJECTIVE AND OBJECTIVE BOX
Subjective:  - Reports feeling well this morning  - Has been ambulating around the nursing unit and denies any CP, palpitations, LH/dizziness, dyspnea, orthopnea, or PND    Medications:  ALPRAZolam 0.25 milliGRAM(s) Oral daily PRN  AQUAPHOR (petrolatum Ointment) 1 Application(s) Topical two times a day  atorvastatin 10 milliGRAM(s) Oral at bedtime  buMETAnide Infusion 1 mG/Hr IV Continuous <Continuous>  dabigatran 150 milliGRAM(s) Oral every 12 hours  diltiazem    milliGRAM(s) Oral daily  docusate sodium 100 milliGRAM(s) Oral three times a day  famotidine    Tablet 20 milliGRAM(s) Oral daily  metoprolol tartrate 200 milliGRAM(s) Oral two times a day  nystatin Powder 1 Application(s) Topical three times a day  polyethylene glycol 3350 17 Gram(s) Oral daily  sertraline 50 milliGRAM(s) Oral daily  sertraline 25 milliGRAM(s) Oral at bedtime  sodium chloride 0.65% Nasal 1 Spray(s) Both Nostrils two times a day      Physical Exam:    Vitals:  Vital Signs Last 24 Hours  T(C): 36.3 (03-10-19 @ 05:22), Max: 36.9 (03-09-19 @ 20:28)  HR: 78 (03-10-19 @ 05:22) (61 - 78)  BP: 111/64 (03-10-19 @ 05:22) (96/60 - 123/53)  RR: 17 (03-10-19 @ 05:22) (17 - 17)  SpO2: 94% (03-10-19 @ 05:22) (94% - 96%)        I&O's Summary    09 Mar 2019 06:01  -  10 Mar 2019 07:00  --------------------------------------------------------  IN: 264 mL / OUT: 800 mL / NET: -536 mL    Tele: Afib 60-80    General: No distress. Comfortable.  HEENT: EOM intact.  Neck: Neck supple. JVP mildly elevated. No masses  Chest: Clear to auscultation bilaterally  CV: Irregularly irregular. Normal S1 and S2. No murmurs, rub, or gallops. Radial pulses normal. No edema.  Abdomen: Soft, non-distended, non-tender  Skin: No rashes or skin breakdown  Neurology: Alert and oriented times three. Sensation intact  Psych: Affect normal    Labs:                        14.0   7.58  )-----------( 143      ( 10 Mar 2019 08:27 )             44.3     03-10    143  |  96  |  18  ----------------------------<  124<H>  3.6   |  33<H>  |  0.91    Ca    9.3      10 Mar 2019 06:43

## 2019-03-10 NOTE — PROGRESS NOTE ADULT - PROBLEM SELECTOR PLAN 1
-  cont bumex GTT at 1mg/hr, will consider transitioning to PO diuretics  -  BID BMP, keep K>4.5, Mg>2   -  daily standing weights, strict I/Os   -  RHC and TTE once she's euvolemic

## 2019-03-10 NOTE — PROGRESS NOTE ADULT - ASSESSMENT
87 yo HFpEF (3/2018 ef 71%) s/p mitral clip in 8/2016, normal cors in 2016,  permanent a fib on pradaxa, htn, hld , SHERYL not on cpap a/w ADHF.     - She is much better compensated, with improved hf on exam.   - Cont transition gtt to po this afternoon in my opinion. d/w hf np  - Please continue to maintain strict I/Os, monitor daily weights, Cr, and K.     - hf eval noted, with plans to perform rhc with sat run to evaluated poorly explained chronic hypoxemia.  Would hold pradaxa starting today, can cover with lovenox    - AF rates were slow at times. Rates are better with reduced dose of diltiazem.        - 03/01/18 TTE: LVEF 71%, LVIDd 4.7cm (PW 1.0, SW 1.1), RVE with RVSD, moderate SONNY, severe LAE, mild-mod MR, mild MS, mod TR, mild-mod WI, RVSP 52     - cont statin  - Further cardiac workup will depend on clinical course.   - All other workup per primary team. Will followup.

## 2019-03-10 NOTE — PROGRESS NOTE ADULT - PROBLEM SELECTOR PLAN 4
due to above w/ possible SHERYL component   refusing CPAP/BIPAP   last PFTs on 8/2016: FEV1>75%, FEV1/FVC = 78%  Last CT cardiac on 6/2016 did not show evidence of lung disease     Recommend repeat ABG once euvolemic upon obtaining RHC

## 2019-03-10 NOTE — PROGRESS NOTE ADULT - SUBJECTIVE AND OBJECTIVE BOX
Patient is a 86y old  Female who presents with a chief complaint of hf (09 Mar 2019 09:11)                                                               INTERVAL HPI/OVERNIGHT EVENTS:    REVIEW OF SYSTEMS:     CONSTITUTIONAL: No weakness, fevers or chills  RESPIRATORY: No cough, wheezing,  No shortness of breath  CARDIOVASCULAR: No chest pain or palpitations  GASTROINTESTINAL: No abdominal pain  . No nausea, vomiting, or hematemesis; No diarrhea or constipation. No melena or hematochezia.  GENITOURINARY: No dysuria, frequency or hematuria  NEUROLOGICAL: No numbness or weakness                                                                                                                                                                                                                                                                                Medications:  MEDICATIONS  (STANDING):  AQUAPHOR (petrolatum Ointment) 1 Application(s) Topical two times a day  atorvastatin 10 milliGRAM(s) Oral at bedtime  diltiazem    milliGRAM(s) Oral daily  docusate sodium 100 milliGRAM(s) Oral three times a day  famotidine    Tablet 20 milliGRAM(s) Oral daily  metoprolol tartrate 200 milliGRAM(s) Oral two times a day  nystatin Powder 1 Application(s) Topical three times a day  polyethylene glycol 3350 17 Gram(s) Oral daily  sertraline 50 milliGRAM(s) Oral daily  sertraline 25 milliGRAM(s) Oral at bedtime  sodium chloride 0.65% Nasal 1 Spray(s) Both Nostrils two times a day    MEDICATIONS  (PRN):  ALPRAZolam 0.25 milliGRAM(s) Oral daily PRN anxiety       Allergies    codeine (Other)    Intolerances      Vital Signs Last 24 Hrs  T(C): 36.6 (10 Mar 2019 14:14), Max: 36.9 (09 Mar 2019 20:28)  T(F): 97.9 (10 Mar 2019 14:14), Max: 98.4 (09 Mar 2019 20:28)  HR: 65 (10 Mar 2019 14:14) (65 - 78)  BP: 112/67 (10 Mar 2019 14:14) (111/64 - 121/65)  BP(mean): --  RR: 18 (10 Mar 2019 14:14) (17 - 18)  SpO2: 94% (10 Mar 2019 14:14) (94% - 96%)  CAPILLARY BLOOD GLUCOSE          03-09 @ 06:01  -  03-10 @ 07:00  --------------------------------------------------------  IN: 264 mL / OUT: 800 mL / NET: -536 mL    03-10 @ 07:01  -  03-10 @ 19:16  --------------------------------------------------------  IN: 1240 mL / OUT: 750 mL / NET: 490 mL      Physical Exam:      Daily Weight in k.7 (10 Mar 2019 09:27)  General: NAD   HEENT:  Nonicteric, PERRLA  CV:  RRR, S1S2   Lungs:  mild crackles at bases   Abdomen:  Soft, non-tender, no distended, positive BS  Extremities: improved edema    :  No corbett  Neuro:  AAOx3, non-focal, grossly intact                                                                                                                                                                                                                                                                                                LABS:                               14.0   7.58  )-----------( 143      ( 10 Mar 2019 08:27 )             44.3                      0310    143  |  96  |  18  ----------------------------<  124<H>  3.6   |  33<H>  |  0.91    Ca    9.3      10 Mar 2019 06:43                        \

## 2019-03-11 LAB
ANION GAP SERPL CALC-SCNC: 13 MMOL/L — SIGNIFICANT CHANGE UP (ref 5–17)
BUN SERPL-MCNC: 25 MG/DL — HIGH (ref 7–23)
CALCIUM SERPL-MCNC: 9.5 MG/DL — SIGNIFICANT CHANGE UP (ref 8.4–10.5)
CHLORIDE SERPL-SCNC: 97 MMOL/L — SIGNIFICANT CHANGE UP (ref 96–108)
CO2 SERPL-SCNC: 32 MMOL/L — HIGH (ref 22–31)
CREAT SERPL-MCNC: 0.87 MG/DL — SIGNIFICANT CHANGE UP (ref 0.5–1.3)
GLUCOSE SERPL-MCNC: 128 MG/DL — HIGH (ref 70–99)
HCT VFR BLD CALC: 43.8 % — SIGNIFICANT CHANGE UP (ref 34.5–45)
HGB BLD-MCNC: 13.7 G/DL — SIGNIFICANT CHANGE UP (ref 11.5–15.5)
MCHC RBC-ENTMCNC: 30.4 PG — SIGNIFICANT CHANGE UP (ref 27–34)
MCHC RBC-ENTMCNC: 31.3 GM/DL — LOW (ref 32–36)
MCV RBC AUTO: 97.1 FL — SIGNIFICANT CHANGE UP (ref 80–100)
PLATELET # BLD AUTO: 142 K/UL — LOW (ref 150–400)
POTASSIUM SERPL-MCNC: 3.9 MMOL/L — SIGNIFICANT CHANGE UP (ref 3.5–5.3)
POTASSIUM SERPL-SCNC: 3.9 MMOL/L — SIGNIFICANT CHANGE UP (ref 3.5–5.3)
RBC # BLD: 4.51 M/UL — SIGNIFICANT CHANGE UP (ref 3.8–5.2)
RBC # FLD: 14.4 % — SIGNIFICANT CHANGE UP (ref 10.3–14.5)
SODIUM SERPL-SCNC: 142 MMOL/L — SIGNIFICANT CHANGE UP (ref 135–145)
WBC # BLD: 7.57 K/UL — SIGNIFICANT CHANGE UP (ref 3.8–10.5)
WBC # FLD AUTO: 7.57 K/UL — SIGNIFICANT CHANGE UP (ref 3.8–10.5)

## 2019-03-11 PROCEDURE — 93451 RIGHT HEART CATH: CPT | Mod: 26,GC

## 2019-03-11 PROCEDURE — 93463 DRUG ADMIN & HEMODYNMIC MEAS: CPT | Mod: 59,GC

## 2019-03-11 PROCEDURE — 99232 SBSQ HOSP IP/OBS MODERATE 35: CPT

## 2019-03-11 PROCEDURE — 99233 SBSQ HOSP IP/OBS HIGH 50: CPT | Mod: GC

## 2019-03-11 RX ORDER — DABIGATRAN ETEXILATE MESYLATE 150 MG/1
150 CAPSULE ORAL EVERY 12 HOURS
Qty: 0 | Refills: 0 | Status: DISCONTINUED | OUTPATIENT
Start: 2019-03-12 | End: 2019-03-13

## 2019-03-11 RX ORDER — POTASSIUM CHLORIDE 20 MEQ
40 PACKET (EA) ORAL ONCE
Qty: 0 | Refills: 0 | Status: COMPLETED | OUTPATIENT
Start: 2019-03-11 | End: 2019-03-11

## 2019-03-11 RX ORDER — ALPRAZOLAM 0.25 MG
0.25 TABLET ORAL DAILY
Qty: 0 | Refills: 0 | Status: DISCONTINUED | OUTPATIENT
Start: 2019-03-11 | End: 2019-03-13

## 2019-03-11 RX ADMIN — NYSTATIN CREAM 1 APPLICATION(S): 100000 CREAM TOPICAL at 18:00

## 2019-03-11 RX ADMIN — FAMOTIDINE 20 MILLIGRAM(S): 10 INJECTION INTRAVENOUS at 17:58

## 2019-03-11 RX ADMIN — Medication 40 MILLIGRAM(S): at 05:57

## 2019-03-11 RX ADMIN — SERTRALINE 50 MILLIGRAM(S): 25 TABLET, FILM COATED ORAL at 17:57

## 2019-03-11 RX ADMIN — Medication 100 MILLIGRAM(S): at 05:57

## 2019-03-11 RX ADMIN — Medication 200 MILLIGRAM(S): at 17:59

## 2019-03-11 RX ADMIN — Medication 40 MILLIGRAM(S): at 17:59

## 2019-03-11 RX ADMIN — POLYETHYLENE GLYCOL 3350 17 GRAM(S): 17 POWDER, FOR SOLUTION ORAL at 17:56

## 2019-03-11 RX ADMIN — NYSTATIN CREAM 1 APPLICATION(S): 100000 CREAM TOPICAL at 20:10

## 2019-03-11 RX ADMIN — Medication 100 MILLIGRAM(S): at 20:09

## 2019-03-11 RX ADMIN — Medication 1 APPLICATION(S): at 17:55

## 2019-03-11 RX ADMIN — ATORVASTATIN CALCIUM 10 MILLIGRAM(S): 80 TABLET, FILM COATED ORAL at 20:09

## 2019-03-11 RX ADMIN — Medication 200 MILLIGRAM(S): at 05:58

## 2019-03-11 RX ADMIN — Medication 1 SPRAY(S): at 05:58

## 2019-03-11 RX ADMIN — Medication 240 MILLIGRAM(S): at 05:58

## 2019-03-11 RX ADMIN — SERTRALINE 25 MILLIGRAM(S): 25 TABLET, FILM COATED ORAL at 20:10

## 2019-03-11 RX ADMIN — Medication 100 MILLIGRAM(S): at 17:57

## 2019-03-11 RX ADMIN — NYSTATIN CREAM 1 APPLICATION(S): 100000 CREAM TOPICAL at 05:58

## 2019-03-11 RX ADMIN — Medication 1 APPLICATION(S): at 05:58

## 2019-03-11 RX ADMIN — Medication 40 MILLIEQUIVALENT(S): at 17:55

## 2019-03-11 RX ADMIN — Medication 0.25 MILLIGRAM(S): at 20:09

## 2019-03-11 NOTE — PROGRESS NOTE ADULT - PROBLEM SELECTOR PLAN 1
-  cont Torsemide 40 mg BID   -  BID BMP, keep K>4.5, Mg>2   -  daily standing weights, strict I/Os   -  RHC results pending

## 2019-03-11 NOTE — PROGRESS NOTE ADULT - ATTENDING COMMENTS
Ms. Mix is currently well compensated. 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 Her cardiac output was on the higher side of normal, assuming that the PA saturation truly reflected the PA saturation. No shunt run was performed. 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 However, the hypoxia needs further explanation. She will need full PFTs with DLCO, sleep study, and a V/Q scan. She may need a bubble study to query an intracardiac shunt if she cannot be weaned off of oxygen. In addition, strong consideration should be given to placement of a Cardiomems device.     I discussed this plan with her and her family.     Please call me with questions at [phone].

## 2019-03-11 NOTE — PROGRESS NOTE ADULT - SUBJECTIVE AND OBJECTIVE BOX
[facility] - Mayank Matthews, Gilbert, Xiomara, Solitario, Chong Vera  Office Number:  [phone]    Patient resting comfortably in bed in NAD.  Laying flat with no respiratory distress.  No complaints of chest pain, dyspnea, palpitations, PND, or orthopnea.  Lower extremity edema has basically resolved.  She is feeling better this morning.    ROS: negative unless otherwise mentioned.    Telemetry:  AF    MEDICATIONS  (STANDING):  AQUAPHOR (petrolatum Ointment) 1 Application(s) Topical two times a day  atorvastatin 10 milliGRAM(s) Oral at bedtime  diltiazem    milliGRAM(s) Oral daily  docusate sodium 100 milliGRAM(s) Oral three times a day  famotidine    Tablet 20 milliGRAM(s) Oral daily  metoprolol tartrate 200 milliGRAM(s) Oral two times a day  nystatin Powder 1 Application(s) Topical three times a day  polyethylene glycol 3350 17 Gram(s) Oral daily  potassium chloride    Tablet ER 40 milliEquivalent(s) Oral once  sertraline 50 milliGRAM(s) Oral daily  sertraline 25 milliGRAM(s) Oral at bedtime  sodium chloride 0.65% Nasal 1 Spray(s) Both Nostrils two times a day  torsemide 40 milliGRAM(s) Oral two times a day    MEDICATIONS  (PRN):  ALPRAZolam 0.25 milliGRAM(s) Oral daily PRN anxiety      Allergies    codeine (Other)    Intolerances        Vital Signs Last 24 Hrs  T(C): 36.7 (11 Mar 2019 06:07), Max: 36.9 (10 Mar 2019 20:52)  T(F): 98 (11 Mar 2019 06:07), Max: 98.4 (10 Mar 2019 20:52)  HR: 66 (11 Mar 2019 06:07) (63 - 66)  BP: 103/61 (11 Mar 2019 06:07) (103/61 - 112/67)  BP(mean): --  RR: 18 (11 Mar 2019 06:07) (18 - 18)  SpO2: 95% (11 Mar 2019 06:07) (92% - 95%)    I&O's Summary    10 Mar 2019 07:01  -  11 Mar 2019 07:00  --------------------------------------------------------  IN: 1240 mL / OUT: 750 mL / NET: 490 mL        ON EXAM:      Constitutional: well-nourished, well-developed, NAD   HEENT:  MMM, sclerae anicteric, conjunctivae clear, no oral cyanosis.  Pulmonary: Non-labored, breath sounds are decreasing at bases bilaterally, No wheezing, rales or rhonchi  Cardiovascular: irregular, S1 and S2.  No murmur.  No rubs, gallops or clicks  Gastrointestinal: Bowel Sounds present, soft, nontender.   Lymph: trace peripheral edema.   Neurological: Alert, no focal deficits  Skin: No rashes.  Psych:  Mood & affect appropriate      LABS: All Labs Reviewed:                        13.7   7.57  )-----------( 142      ( 11 Mar 2019 07:55 )             43.8                         14.0   7.58  )-----------( 143      ( 10 Mar 2019 08:27 )             44.3     11 Mar 2019 05:56    142    |  97     |  25     ----------------------------<  128    3.9     |  32     |  0.87   10 Mar 2019 06:43    143    |  96     |  18     ----------------------------<  124    3.6     |  33     |  0.91   09 Mar 2019 19:46    143    |  94     |  20     ----------------------------<  131    3.6     |  35     |  1.08     Ca    9.5        11 Mar 2019 05:56  Ca    9.3        10 Mar 2019 06:43  Ca    9.4        09 Mar 2019 19:46            Blood Culture:

## 2019-03-11 NOTE — PROGRESS NOTE ADULT - ASSESSMENT
Ms. Mix is an 85 y/o w/ hx of HFpEF (EF 60-65%, mod MR, RV enlargement w/ RV systolic dysfunction, B/L Atrial enlargement), severe MR s/p mitral clip on 8/2016, chronic Afib (on pradaxa), possible SHERYL who presented w/ acute on chronic diastolic heart failure.  Initially placed on Bumex GTT, off Bumex since 3/10 . Currently appears close to euvolemic and is undergoing right heart catheterization.

## 2019-03-11 NOTE — PROGRESS NOTE ADULT - ASSESSMENT
85 yo HFpEF (3/2018 ef 71%) s/p mitral clip in 8/2016, normal cors in 2016,  permanent a fib on pradaxa, htn, hld , SHERYL not on cpap a/w ADHF.     - She is much better compensated, with improved hf on exam.   - agree with po torsemide  - Please continue to maintain strict I/Os, monitor daily weights, Cr, and K.     - hf eval noted and appreciated, with plans to perform rhc with sat run to evaluated poorly explained chronic hypoxemia.   - hold pradaxa for right heart cath    - AF rates were slow at times. Rates are better with reduced dose of diltiazem.        - Echo on 3/9: LVEF 70%, RVE with RVSD, severe LAE and SONNY, mild MS, severe TR and severe pulmonary HTN with an RVSP 82, and some degree of MR that is shadowed by the mitraclip.    - cont statin  - Further cardiac workup will depend on clinical course.   - All other workup per primary team. Will followup.

## 2019-03-11 NOTE — PROGRESS NOTE ADULT - PROBLEM SELECTOR PLAN 4
due to above w/ possible SHERYL component   refusing CPAP/BIPAP   last PFTs on 8/2016: FEV1>75%, FEV1/FVC = 78%  Last CT cardiac on 6/2016 did not show evidence of lung disease   -- Recommend repeat ABG once euvolemic upon obtaining RHC

## 2019-03-11 NOTE — PROGRESS NOTE ADULT - SUBJECTIVE AND OBJECTIVE BOX
Interval events:   - currently in cath lab for [facility]  - no acute events from overnight reported per RN       MEDICATIONS  (STANDING):  AQUAPHOR (petrolatum Ointment) 1 Application(s) Topical two times a day  atorvastatin 10 milliGRAM(s) Oral at bedtime  diltiazem    milliGRAM(s) Oral daily  docusate sodium 100 milliGRAM(s) Oral three times a day  famotidine    Tablet 20 milliGRAM(s) Oral daily  metoprolol tartrate 200 milliGRAM(s) Oral two times a day  nystatin Powder 1 Application(s) Topical three times a day  polyethylene glycol 3350 17 Gram(s) Oral daily  potassium chloride    Tablet ER 40 milliEquivalent(s) Oral once  sertraline 50 milliGRAM(s) Oral daily  sertraline 25 milliGRAM(s) Oral at bedtime  sodium chloride 0.65% Nasal 1 Spray(s) Both Nostrils two times a day  torsemide 40 milliGRAM(s) Oral two times a day      Physical Exam:    Vital Signs Last 24 Hrs  T(C): 36.7 (11 Mar 2019 06:07), Max: 36.9 (10 Mar 2019 20:52)  T(F): 98 (11 Mar 2019 06:07), Max: 98.4 (10 Mar 2019 20:52)  HR: 66 (11 Mar 2019 06:07) (63 - 66)  BP: 103/61 (11 Mar 2019 06:07) (103/61 - 112/67)  BP(mean): --  RR: 18 (11 Mar 2019 06:07) (18 - 18)  SpO2: 95% (11 Mar 2019 06:07) (92% - 95%)      I&O's Summary    10 Mar 2019 07:01  -  11 Mar 2019 07:00  --------------------------------------------------------  IN:    Oral Fluid: 1240 mL  Total IN: 1240 mL    OUT:    Voided: 750 mL  Total OUT: 750 mL    Total NET: 490 mL      11 Mar 2019 07:01  -  11 Mar 2019 12:55  --------------------------------------------------------  IN:    Oral Fluid: 240 mL  Total IN: 240 mL    OUT:    Voided: 400 mL  Total OUT: 400 mL    Total NET: -160 mL        Tele: Afib 50-70     General: No distress. Comfortable.  HEENT: EOM intact.  Neck: Neck supple. JVP mildly elevated. No masses  Chest: Clear to auscultation bilaterally  CV: Irregularly irregular. Normal S1 and S2. No murmurs, rub, or gallops. Radial pulses normal. No edema.  Abdomen: Soft, non-distended, non-tender  Skin: No rashes or skin breakdown  Neurology: Alert and oriented times three. Sensation intact  Psych: Affect normal    Labs:                                   13.7   7.57  )-----------( 142      ( 11 Mar 2019 07:55 )             43.8     03-11    142  |  97  |  25<H>  ----------------------------<  128<H>  3.9   |  32<H>  |  0.87    Ca    9.5      11 Mar 2019 05:56 Interval events:   - no acute events and no current complaints .      MEDICATIONS  (STANDING):  AQUAPHOR (petrolatum Ointment) 1 Application(s) Topical two times a day  atorvastatin 10 milliGRAM(s) Oral at bedtime  diltiazem    milliGRAM(s) Oral daily  docusate sodium 100 milliGRAM(s) Oral three times a day  famotidine    Tablet 20 milliGRAM(s) Oral daily  metoprolol tartrate 200 milliGRAM(s) Oral two times a day  nystatin Powder 1 Application(s) Topical three times a day  polyethylene glycol 3350 17 Gram(s) Oral daily  potassium chloride    Tablet ER 40 milliEquivalent(s) Oral once  sertraline 50 milliGRAM(s) Oral daily  sertraline 25 milliGRAM(s) Oral at bedtime  sodium chloride 0.65% Nasal 1 Spray(s) Both Nostrils two times a day  torsemide 40 milliGRAM(s) Oral two times a day      Physical Exam:    Vital Signs Last 24 Hrs  T(C): 36.7 (11 Mar 2019 06:07), Max: 36.9 (10 Mar 2019 20:52)  T(F): 98 (11 Mar 2019 06:07), Max: 98.4 (10 Mar 2019 20:52)  HR: 66 (11 Mar 2019 06:07) (63 - 66)  BP: 103/61 (11 Mar 2019 06:07) (103/61 - 112/67)  RR: 18 (11 Mar 2019 06:07) (18 - 18)  SpO2: 95% (11 Mar 2019 06:07) (92% - 95%)      I&O's Summary    10 Mar 2019 07:01  -  11 Mar 2019 07:00  --------------------------------------------------------  IN:    Oral Fluid: 1240 mL  Total IN: 1240 mL    OUT:    Voided: 750 mL  Total OUT: 750 mL    Total NET: 490 mL      11 Mar 2019 07:01  -  11 Mar 2019 12:55  --------------------------------------------------------  IN:    Oral Fluid: 240 mL  Total IN: 240 mL    OUT:    Voided: 400 mL  Total OUT: 400 mL    Total NET: -160 mL    Tele: Afib 50-70     General: No distress. Comfortable.  HEENT: EOM intact.  Neck: Neck supple. JVP not elevated. No masses  Chest: Clear to auscultation bilaterally  CV: Irregularly irregular. Normal S1 and S2. No murmurs, rub, or gallops. Radial pulses normal. No edema.  Abdomen: Soft, non-distended, non-tender  Skin: No rashes or skin breakdown  Neurology: Alert and oriented times three. Sensation intact  Psych: Affect normal    Labs:                                   13.7   7.57  )-----------( 142      ( 11 Mar 2019 07:55 )             43.8     03-11    142  |  97  |  25<H>  ----------------------------<  128<H>  3.9   |  32<H>  |  0.87    Ca    9.5      11 Mar 2019 05:56

## 2019-03-11 NOTE — PROGRESS NOTE ADULT - SUBJECTIVE AND OBJECTIVE BOX
Patient is a 86y old  Female who presents with a chief complaint of acute decompensated heart failure (11 Mar 2019 12:52)                                                               INTERVAL HPI/OVERNIGHT EVENTS:    REVIEW OF SYSTEMS:     CONSTITUTIONAL: No weakness, fevers or chills  EYES/ENT: No visual changes , no ear ache   NECK: No pain or stiffness  RESPIRATORY: No cough, wheezing,  No shortness of breath  CARDIOVASCULAR: No chest pain or palpitations  GASTROINTESTINAL: No abdominal pain  . No nausea, vomiting, or hematemesis; No diarrhea or constipation. No melena or hematochezia.  GENITOURINARY: No dysuria, frequency or hematuria  NEUROLOGICAL: No numbness or weakness  SKIN: No itching, burning, rashes, or lesions                                                                                                                                                                                                                                                                                 Medications:  MEDICATIONS  (STANDING):  AQUAPHOR (petrolatum Ointment) 1 Application(s) Topical two times a day  atorvastatin 10 milliGRAM(s) Oral at bedtime  diltiazem    milliGRAM(s) Oral daily  docusate sodium 100 milliGRAM(s) Oral three times a day  famotidine    Tablet 20 milliGRAM(s) Oral daily  metoprolol tartrate 200 milliGRAM(s) Oral two times a day  nystatin Powder 1 Application(s) Topical three times a day  polyethylene glycol 3350 17 Gram(s) Oral daily  sertraline 50 milliGRAM(s) Oral daily  sertraline 25 milliGRAM(s) Oral at bedtime  sodium chloride 0.65% Nasal 1 Spray(s) Both Nostrils two times a day  torsemide 40 milliGRAM(s) Oral two times a day    MEDICATIONS  (PRN):  ALPRAZolam 0.25 milliGRAM(s) Oral daily PRN anxiety       Allergies    codeine (Other)    Intolerances      Vital Signs Last 24 Hrs  T(C): 36.3 (11 Mar 2019 20:33), Max: 36.7 (11 Mar 2019 06:07)  T(F): 97.4 (11 Mar 2019 20:33), Max: 98 (11 Mar 2019 06:07)  HR: 68 (11 Mar 2019 20:33) (66 - 68)  BP: 121/71 (11 Mar 2019 20:33) (103/61 - 121/71)  BP(mean): --  RR: 18 (11 Mar 2019 20:33) (18 - 18)  SpO2: 96% (11 Mar 2019 20:33) (95% - 96%)  CAPILLARY BLOOD GLUCOSE          03-10 @ 07: @ 07:00  --------------------------------------------------------  IN: 1240 mL / OUT: 750 mL / NET: 490 mL     @ 07: @ 23:22  --------------------------------------------------------  IN: 480 mL / OUT: 400 mL / NET: 80 mL      Physical Exam:    Daily     Daily Weight in k.5 (11 Mar 2019 06:32)  General:  Well appearing, NAD, not cachetic  HEENT:  Nonicteric, PERRLA  CV:  RRR, S1S2   Lungs:  CTA B/L, no wheezes, rales, rhonchi  Abdomen:  Soft, non-tender, no distended, positive BS  Extremities:  2+ pulses, no c/c, no edema  Skin:  Warm and dry, no rashes  :  No corbett  Neuro:  AAOx3, non-focal, grossly intact                                                                                                                                                                                                                                                                                                LABS:                               13.7   7.57  )-----------( 142      ( 11 Mar 2019 07:55 )             43.8                          142  |  97  |  25<H>  ----------------------------<  128<H>  3.9   |  32<H>  |  0.87    Ca    9.5      11 Mar 2019 05:56                         RADIOLOGY & ADDITIONAL TESTS         I personally reviewed: [  ]EKG   [  ]CXR    [  ] CT      A/P:         Discussed with :     Emmanuelle consultants' Notes   Time spent : Patient is a 86y old  Female who presents with a chief complaint of acute decompensated heart failure (11 Mar 2019 12:52)                                                               INTERVAL HPI/OVERNIGHT EVENTS:    REVIEW OF SYSTEMS:     CONSTITUTIONAL: No weakness, fevers or chills  RESPIRATORY: No cough, wheezing,  No shortness of breath  CARDIOVASCULAR: No chest pain or palpitations  GASTROINTESTINAL: No abdominal pain  . No nausea, vomiting, or hematemesis; No diarrhea or constipation. No melena or hematochezia.  GENITOURINARY: No dysuria, frequency or hematuria  NEUROLOGICAL: No numbness or weakness                                                                                                                                                                                                                                                                               Medications:  MEDICATIONS  (STANDING):  AQUAPHOR (petrolatum Ointment) 1 Application(s) Topical two times a day  atorvastatin 10 milliGRAM(s) Oral at bedtime  diltiazem    milliGRAM(s) Oral daily  docusate sodium 100 milliGRAM(s) Oral three times a day  famotidine    Tablet 20 milliGRAM(s) Oral daily  metoprolol tartrate 200 milliGRAM(s) Oral two times a day  nystatin Powder 1 Application(s) Topical three times a day  polyethylene glycol 3350 17 Gram(s) Oral daily  sertraline 50 milliGRAM(s) Oral daily  sertraline 25 milliGRAM(s) Oral at bedtime  sodium chloride 0.65% Nasal 1 Spray(s) Both Nostrils two times a day  torsemide 40 milliGRAM(s) Oral two times a day    MEDICATIONS  (PRN):  ALPRAZolam 0.25 milliGRAM(s) Oral daily PRN anxiety       Allergies    codeine (Other)    Intolerances      Vital Signs Last 24 Hrs  T(C): 36.3 (11 Mar 2019 20:33), Max: 36.7 (11 Mar 2019 06:07)  T(F): 97.4 (11 Mar 2019 20:33), Max: 98 (11 Mar 2019 06:07)  HR: 68 (11 Mar 2019 20:33) (66 - 68)  BP: 121/71 (11 Mar 2019 20:33) (103/61 - 121/71)  BP(mean): --  RR: 18 (11 Mar 2019 20:33) (18 - 18)  SpO2: 96% (11 Mar 2019 20:33) (95% - 96%)  CAPILLARY BLOOD GLUCOSE          03-10 @ 07:01  -  03-11 @ 07:00  --------------------------------------------------------  IN: 1240 mL / OUT: 750 mL / NET: 490 mL     @ 07:01  -   @ 23:22  --------------------------------------------------------  IN: 480 mL / OUT: 400 mL / NET: 80 mL      Physical Exam:    Daily Weight in k.5 (11 Mar 2019 06:32)  General:  NAD  HEENT:  Nonicteric, PERRLA  CV:  RRR, S1S2   Lungs:  CTA B  Abdomen:  Soft, non-tender, no distended, positive BS  Extremities:  trace  edema  Skin:  Warm and dry, no rashes  :  No chelle  Neuro:  AAOx3, non-focal, grossly intact                                                                                                                                                                                                                                                                                                LABS:                               13.7   7.57  )-----------( 142      ( 11 Mar 2019 07:55 )             43.8                          142  |  97  |  25<H>  ----------------------------<  128<H>  3.9   |  32<H>  |  0.87    Ca    9.5      11 Mar 2019 05:56

## 2019-03-11 NOTE — PROGRESS NOTE ADULT - ASSESSMENT
87 yo HFpEF (3/2018 ef 71%) s/p mitral clip in 8/2016, hx of permanent a fib on pradaxa, htn, hld , SHERYL not on cpap but on continuous home 02 3L p/w acute on chronic diastolic heart failure      Problem/Plan - 1:  ·  Problem: Acute on chronic diastolic heart failure.  Plan: as per heart failure recs: changed to torsemide   strict ins and outs  monitor HCO3   daily weights  now s/p RHC   ..f/u recommendations from HF team and cardio         Problem/Plan - 2:  ·  Problem: SHERYL (obstructive sleep apnea).  Plan: on 3L 02      Problem/Plan - 3:  ·  Problem: Atrial fibrillation.  Plan:  pradaxa on hold .. restart now taht RHC  performed     Rate controlled : appreciate cardio input .. consider dcing CCB given CHF   cont to monitor     Problem/Plan - 4:  ·  Problem: HLD (hyperlipidemia).  Plan: c/w lipitor 1omg qhs.     Problem/Plan - 5:  ·  Problem: HTN (hypertension).  Plan: cont to monitor BP on meds   Problem/Plan - 6:  Problem: MR : f/u repeat Echo to evaluate     Problem/Plan - 7:  ·  Problem: Depression.  Plan: c/w sertaline 50mg qam  serataline 25mg qpm.

## 2019-03-12 ENCOUNTER — TRANSCRIPTION ENCOUNTER (OUTPATIENT)
Age: 84
End: 2019-03-12

## 2019-03-12 LAB
ANION GAP SERPL CALC-SCNC: 12 MMOL/L — SIGNIFICANT CHANGE UP (ref 5–17)
BUN SERPL-MCNC: 25 MG/DL — HIGH (ref 7–23)
CALCIUM SERPL-MCNC: 9.2 MG/DL — SIGNIFICANT CHANGE UP (ref 8.4–10.5)
CHLORIDE SERPL-SCNC: 100 MMOL/L — SIGNIFICANT CHANGE UP (ref 96–108)
CO2 SERPL-SCNC: 33 MMOL/L — HIGH (ref 22–31)
CREAT SERPL-MCNC: 0.91 MG/DL — SIGNIFICANT CHANGE UP (ref 0.5–1.3)
GLUCOSE SERPL-MCNC: 140 MG/DL — HIGH (ref 70–99)
MAGNESIUM SERPL-MCNC: 2.2 MG/DL — SIGNIFICANT CHANGE UP (ref 1.6–2.6)
PHOSPHATE SERPL-MCNC: 4.3 MG/DL — SIGNIFICANT CHANGE UP (ref 2.5–4.5)
POTASSIUM SERPL-MCNC: 3.9 MMOL/L — SIGNIFICANT CHANGE UP (ref 3.5–5.3)
POTASSIUM SERPL-SCNC: 3.9 MMOL/L — SIGNIFICANT CHANGE UP (ref 3.5–5.3)
SODIUM SERPL-SCNC: 145 MMOL/L — SIGNIFICANT CHANGE UP (ref 135–145)

## 2019-03-12 PROCEDURE — 71250 CT THORAX DX C-: CPT | Mod: 26

## 2019-03-12 PROCEDURE — 71045 X-RAY EXAM CHEST 1 VIEW: CPT | Mod: 26

## 2019-03-12 PROCEDURE — 99233 SBSQ HOSP IP/OBS HIGH 50: CPT | Mod: GC

## 2019-03-12 PROCEDURE — 99232 SBSQ HOSP IP/OBS MODERATE 35: CPT

## 2019-03-12 PROCEDURE — 78582 LUNG VENTILAT&PERFUS IMAGING: CPT | Mod: 26

## 2019-03-12 RX ORDER — ASCORBIC ACID 60 MG
1 TABLET,CHEWABLE ORAL
Qty: 0 | Refills: 0 | COMMUNITY

## 2019-03-12 RX ORDER — NYSTATIN CREAM 100000 [USP'U]/G
1 CREAM TOPICAL
Qty: 0 | Refills: 0 | DISCHARGE
Start: 2019-03-12

## 2019-03-12 RX ORDER — DILTIAZEM HCL 120 MG
1 CAPSULE, EXT RELEASE 24 HR ORAL
Qty: 0 | Refills: 0 | COMMUNITY
Start: 2019-03-12

## 2019-03-12 RX ORDER — SERTRALINE 25 MG/1
1 TABLET, FILM COATED ORAL
Qty: 0 | Refills: 0 | DISCHARGE
Start: 2019-03-12

## 2019-03-12 RX ORDER — TRAZODONE HCL 50 MG
1 TABLET ORAL
Qty: 0 | Refills: 0 | COMMUNITY

## 2019-03-12 RX ORDER — POLYETHYLENE GLYCOL 3350 17 G/17G
17 POWDER, FOR SOLUTION ORAL
Qty: 0 | Refills: 0 | DISCHARGE
Start: 2019-03-12

## 2019-03-12 RX ORDER — CHOLECALCIFEROL (VITAMIN D3) 125 MCG
1 CAPSULE ORAL
Qty: 0 | Refills: 0 | COMMUNITY

## 2019-03-12 RX ORDER — NYSTATIN CREAM 100000 [USP'U]/G
1 CREAM TOPICAL
Qty: 0 | Refills: 0 | COMMUNITY
Start: 2019-03-12

## 2019-03-12 RX ORDER — DILTIAZEM HCL 120 MG
1 CAPSULE, EXT RELEASE 24 HR ORAL
Qty: 30 | Refills: 0 | COMMUNITY

## 2019-03-12 RX ADMIN — POLYETHYLENE GLYCOL 3350 17 GRAM(S): 17 POWDER, FOR SOLUTION ORAL at 11:34

## 2019-03-12 RX ADMIN — Medication 100 MILLIGRAM(S): at 18:12

## 2019-03-12 RX ADMIN — Medication 200 MILLIGRAM(S): at 06:03

## 2019-03-12 RX ADMIN — Medication 100 MILLIGRAM(S): at 21:19

## 2019-03-12 RX ADMIN — DABIGATRAN ETEXILATE MESYLATE 150 MILLIGRAM(S): 150 CAPSULE ORAL at 06:03

## 2019-03-12 RX ADMIN — Medication 100 MILLIGRAM(S): at 06:03

## 2019-03-12 RX ADMIN — FAMOTIDINE 20 MILLIGRAM(S): 10 INJECTION INTRAVENOUS at 11:34

## 2019-03-12 RX ADMIN — NYSTATIN CREAM 1 APPLICATION(S): 100000 CREAM TOPICAL at 18:15

## 2019-03-12 RX ADMIN — Medication 40 MILLIGRAM(S): at 18:11

## 2019-03-12 RX ADMIN — NYSTATIN CREAM 1 APPLICATION(S): 100000 CREAM TOPICAL at 21:19

## 2019-03-12 RX ADMIN — Medication 240 MILLIGRAM(S): at 06:03

## 2019-03-12 RX ADMIN — SERTRALINE 50 MILLIGRAM(S): 25 TABLET, FILM COATED ORAL at 11:34

## 2019-03-12 RX ADMIN — Medication 1 APPLICATION(S): at 06:03

## 2019-03-12 RX ADMIN — Medication 200 MILLIGRAM(S): at 21:19

## 2019-03-12 RX ADMIN — ATORVASTATIN CALCIUM 10 MILLIGRAM(S): 80 TABLET, FILM COATED ORAL at 21:18

## 2019-03-12 RX ADMIN — Medication 1 APPLICATION(S): at 18:16

## 2019-03-12 RX ADMIN — Medication 40 MILLIGRAM(S): at 06:03

## 2019-03-12 RX ADMIN — Medication 0.25 MILLIGRAM(S): at 21:19

## 2019-03-12 RX ADMIN — DABIGATRAN ETEXILATE MESYLATE 150 MILLIGRAM(S): 150 CAPSULE ORAL at 18:11

## 2019-03-12 RX ADMIN — NYSTATIN CREAM 1 APPLICATION(S): 100000 CREAM TOPICAL at 06:03

## 2019-03-12 RX ADMIN — SERTRALINE 25 MILLIGRAM(S): 25 TABLET, FILM COATED ORAL at 21:19

## 2019-03-12 NOTE — DISCHARGE NOTE PROVIDER - HOSPITAL COURSE
Ms. Mix is an 85 y/o w/ hx of HFpEF (EF 60-65%, mod MR, RV enlargement w/ RV systolic dysfunction, B/L Atrial enlargement), severe MR s/p mitral clip on 8/2016, chronic Afib (on pradaxa), possible SHERYL who presented w/ acute on chronic diastolic heart failure.  Initially placed on Bumex GTT, off Bumex since 3/10 . Pt underwent a RHC which suggested component of pre- and post capillary pulm HTN likely 2/2 to left sided valvular heart disease. However other etiologies of her chornic hypoxemia need to be further investigated. She is planned for V/Q study today.  Currently appears euvolemic Ms. Mix is an 85 y/o w/ hx of HFpEF (EF 60-65%, mod MR, RV enlargement w/ RV systolic dysfunction, B/L Atrial enlargement), severe MR s/p mitral clip on 8/2016, chronic Afib (on pradaxa), possible SHERYL who presented w/ acute on chronic diastolic heart failure.  Initially placed on Bumex GTT, off Bumex since 3/10 . Pt underwent a RHC which suggested component of pre- and post capillary pulm HTN likely 2/2 to left sided valvular heart disease. However other etiologies of her chornic hypoxemia need to be further investigated. VQ scan w/ no pulmonary embolism.  Chest C w// no CHF, no lesions.  Currently appears euvolemic .  Echocardiogram w/ bubble study has very small right to left shunting - consider SHEREEN

## 2019-03-12 NOTE — DISCHARGE NOTE PROVIDER - NSDCCAREPROVSEEN_GEN_ALL_CORE_FT
[facility] Medicine, Advance PracticeTeam [facility] Medicine, Advance PracticeTeam  Pat Mayfield Sailaja

## 2019-03-12 NOTE — PROGRESS NOTE ADULT - ASSESSMENT
85 yo HFpEF (3/2018 ef 71%) s/p mitral clip in 8/2016, hx of permanent a fib on pradaxa, htn, hld , SHERYL not on cpap but on continuous home 02 3L p/w acute on chronic diastolic heart failure      Problem/Plan - 1:  ·  Problem: Acute on chronic diastolic heart failure.  Plan: as per heart failure recs: changed to torsemide   strict ins and outs  monitor HCO3   daily weights  now s/p RHC   ..pt still with hypoxia despite diuresis and RHC is not totally explaining her hypoxia ...  discussed with HF team : will check CT chest   will need further w/u as o/p.   VQ scan negative and pt on AC         Problem/Plan - 2:  ·  Problem: SHERYL (obstructive sleep apnea).  Plan: on 3L 02  as above       Problem/Plan - 3:  ·  Problem: Atrial fibrillation.  Plan:  pradaxa on hold .. restart now taht RHC  performed     Rate controlled : appreciate cardio input .. consider dcing CCB given CHF   cont to monitor     Problem/Plan - 4:  ·  Problem: HLD (hyperlipidemia).  Plan: c/w lipitor 1omg qhs.     Problem/Plan - 5:  ·  Problem: HTN (hypertension).  Plan: cont to monitor BP on meds   Problem/Plan - 6:  Problem: MR : f/u repeat Echo to evaluate     Problem/Plan - 7:  ·  Problem: Depression.  Plan: c/w sertaline 50mg qam  serataline 25mg qpm.

## 2019-03-12 NOTE — DISCHARGE NOTE PROVIDER - NSDCHHNEEDSERVICE_GEN_ALL_CORE
Teaching and training Observation and assessment/Rehabilitation services/Teaching and training/Medication teaching and assessment

## 2019-03-12 NOTE — PROGRESS NOTE ADULT - NSICDXPROBLEM_GEN_ALL_CORE_FT
PROBLEM DIAGNOSES  Problem: Acute on chronic diastolic heart failure  Assessment and Plan: - resolved  - continue Torsemide 40 mg PO BID  - cardiomems device possibly as outpt.     Problem: Acute respiratory failure with hypoxia  Assessment and Plan: - at baseline on 3L NC at home  - follow up V/Q scan today   - outpt PFT, and sleep study     Problem: Atrial fibrillation  Assessment and Plan: -rates better controlled  - cotninue current doses of Diltiazem and Lopressor   - continue pradaxa     Problem: SHERYL (obstructive sleep apnea)  Assessment and Plan: needs outpatient PFT and sleep study done PROBLEM DIAGNOSES  Problem: Acute on chronic diastolic heart failure  Assessment and Plan: - resolved  - continue Torsemide 40 mg PO BID  - cardiomems device possibly as outpt.     Problem: Hypoxia  Assessment and Plan: V/Q scan negative for PE today  - obtain CT chest   - obtain TTE with bubble study   - PFTS as outpatient     Problem: Atrial fibrillation  Assessment and Plan: -rates better controlled  - cotninue current doses of Diltiazem and Lopressor   - continue pradaxa     Problem: SHERYL (obstructive sleep apnea)  Assessment and Plan: needs outpatient PFT and sleep study done

## 2019-03-12 NOTE — PROGRESS NOTE ADULT - SUBJECTIVE AND OBJECTIVE BOX
Interval events:   - no acute overnight events , yesterday evening came back from [facility] suggestive of pre and post cap pulm HTN, no shunt run done  - pt denies any SOB, chest pain , palps otherwise.       MEDS  MEDICATIONS  (STANDING):  AQUAPHOR (petrolatum Ointment) 1 Application(s) Topical two times a day  atorvastatin 10 milliGRAM(s) Oral at bedtime  dabigatran 150 milliGRAM(s) Oral every 12 hours  diltiazem    milliGRAM(s) Oral daily  docusate sodium 100 milliGRAM(s) Oral three times a day  famotidine    Tablet 20 milliGRAM(s) Oral daily  metoprolol tartrate 200 milliGRAM(s) Oral two times a day  nystatin Powder 1 Application(s) Topical three times a day  polyethylene glycol 3350 17 Gram(s) Oral daily  sertraline 50 milliGRAM(s) Oral daily  sertraline 25 milliGRAM(s) Oral at bedtime  sodium chloride 0.65% Nasal 1 Spray(s) Both Nostrils two times a day  torsemide 40 milliGRAM(s) Oral two times a day        Physical Exam:    Vital Signs Last 24 Hrs  Vital Signs Last 24 Hrs  T(C): 36.7 (12 Mar 2019 04:41), Max: 36.7 (12 Mar 2019 04:41)  T(F): 98 (12 Mar 2019 04:41), Max: 98 (12 Mar 2019 04:41)  HR: 69 (12 Mar 2019 04:41) (68 - 69)  BP: 100/61 (12 Mar 2019 04:41) (100/61 - 121/71)  BP(mean): --  RR: 18 (12 Mar 2019 04:41) (18 - 18)  SpO2: 95% (12 Mar 2019 04:41) (95% - 96%)      I&O's Summary      11 Mar 2019 07:01  -  12 Mar 2019 07:00  --------------------------------------------------------  IN:    Oral Fluid: 480 mL  Total IN: 480 mL    OUT:    Voided: 1800 mL  Total OUT: 1800 mL    Total NET: -1320 mL      12 Mar 2019 07:01  -  12 Mar 2019 11:46  --------------------------------------------------------  IN:    Oral Fluid: 240 mL  Total IN: 240 mL    OUT:  Total OUT: 0 mL    Total NET: 240 mL      General: No distress. Comfortable.  HEENT: EOM intact.  Neck: Neck supple. JVP not elevated. No masses  Chest: Clear to auscultation bilaterally  CV: Irregularly irregular. Normal S1 and S2. No murmurs, rub, or gallops. Radial pulses normal. No edema.  Abdomen: Soft, non-distended, non-tender  Skin: No rashes or skin breakdown  Neurology: Alert and oriented times three. Sensation intact  Psych: Affect normal    Labs:                          13.7   7.57  )-----------( 142      ( 11 Mar 2019 07:55 )             43.8   03-12    145  |  100  |  25<H>  ----------------------------<  140<H>  3.9   |  33<H>  |  0.91    Ca    9.2      12 Mar 2019 05:59  Phos  4.3     03-12  Mg     2.2     03-12

## 2019-03-12 NOTE — DISCHARGE NOTE PROVIDER - CARE PROVIDERS DIRECT ADDRESSES
,[email or web address],DirectAddress_Unknown,DirectAddress_Unknown

## 2019-03-12 NOTE — DISCHARGE NOTE PROVIDER - NSDCCPCAREPLAN_GEN_ALL_CORE_FT
PRINCIPAL DISCHARGE DIAGNOSIS  Problem: Acute CHF  Assessment and Plan of Treatment: Weigh yourself daily.  If you gain 3lbs in 3 days, or 5lbs in a week call your Health Care Provider.  Do not eat or drink foods containing more than 2000mg of salt (sodium) in your diet every day.  Call your Health Care Provider if you have any swelling or increased swelling in your feet, ankles, and/or stomach.  Take all of your medication as directed.  If you become dizzy call your Health Care Provider.        SECONDARY DISCHARGE DIAGNOSES  Problem: Permanent atrial fibrillation  Assessment and Plan of Treatment: Atrial fibrillation is the most common heart rhythm problem.  The condition puts you at risk for has stroke and heart attack  It helps if you control your blood pressure, not drink more than 1-2 alcohol drinks per day, cut down on caffeine, getting treatment for over active thyroid gland, and get regular exercise  Call your doctor if you feel your heart racing or beating unusually, chest tightness or pain, lightheaded, faint, shortness of breath especially with exercise  It is important to take your heart medication as prescribed  You may be on anticoagulation which is very important to take as directed - you may need blood work to monitor drug levels      Problem: Acute respiratory failure with hypoxia  Assessment and Plan of Treatment: c/w further workup as an outpatient    Problem: HTN (hypertension)  Assessment and Plan of Treatment: Follow up with your medical doctor to establish long term blood pressure treatment goals. PRINCIPAL DISCHARGE DIAGNOSIS  Problem: Acute CHF  Assessment and Plan of Treatment: acute on chronic diastolic CHF w/ ejection fraction 71%  Weigh yourself daily.  If you gain 3lbs in 3 days, or 5lbs in a week call your Health Care Provider.  Do not eat or drink foods containing more than 2000mg of salt (sodium) in your diet every day.  Call your Health Care Provider if you have any swelling or increased swelling in your feet, ankles, and/or stomach.  Take all of your medication as directed.  If you become dizzy call your Health Care Provider.  follow up on CHF nurse practitioner appointment in CHF clinic on [facility] [address] [place] on Tuesday 3/19 # 2 PM  follow up on role for cardio MEMS for CHF management        SECONDARY DISCHARGE DIAGNOSES  Problem: Permanent atrial fibrillation  Assessment and Plan of Treatment: Atrial fibrillation is the most common heart rhythm problem.  The condition puts you at risk for has stroke and heart attack  It helps if you control your blood pressure, not drink more than 1-2 alcohol drinks per day, cut down on caffeine, getting treatment for over active thyroid gland, and get regular exercise  Call your doctor if you feel your heart racing or beating unusually, chest tightness or pain, lightheaded, faint, shortness of breath especially with exercise  It is important to take your heart medication as prescribed  You are on Xarelto for anticoagulation & stroke prevention  Xarelto/Rivaroxaban is used to thin the blood so clots will not form and to keep existing ones from getting bigger.  Take this medication daily as prescribed by your health care provider.  Take this medication with food to prevent upset stomach.  If you miss a dose call your health care provider or pharmacist right away.  Tell your doctor you use this drug before you have a spinal or epidural procedure  Tell dentists, surgeon, and other doctors that you use this drug.  You may bleed more easily.  Be careful and avoid injury.  Use a soft toothbrush and an electric razor.       Problem: Acute respiratory failure with hypoxia  Assessment and Plan of Treatment: c/w further workup as an outpatient for severe PHTN - needs sleep study and full pulmonary function studies  follow up with pulmonary Dr. Henley within 1 week after discharge  nasal cannula oxygen @ 3L/min continuously    Problem: HTN (hypertension)  Assessment and Plan of Treatment: Follow up with your medical doctor to establish long term blood pressure treatment goals.

## 2019-03-12 NOTE — PROGRESS NOTE ADULT - SUBJECTIVE AND OBJECTIVE BOX
[facility]    Mayank Matthews, Gilbert, Xiomara, Solitario, Chuy, Chong      [phone]    CHIEF COMPLAINT: Patient is a 86y old  Female who presents with a chief complaint of acute decompensated heart failure (11 Mar 2019 12:52)      Follow Up: hfpef, af, hypoxia    Interim history: The patient reports no new symptoms.  Denies chest discomfort and shortness of breath.  s/p RHC yesterday without complication      MEDICATIONS  (STANDING):  AQUAPHOR (petrolatum Ointment) 1 Application(s) Topical two times a day  atorvastatin 10 milliGRAM(s) Oral at bedtime  dabigatran 150 milliGRAM(s) Oral every 12 hours  diltiazem    milliGRAM(s) Oral daily  docusate sodium 100 milliGRAM(s) Oral three times a day  famotidine    Tablet 20 milliGRAM(s) Oral daily  metoprolol tartrate 200 milliGRAM(s) Oral two times a day  nystatin Powder 1 Application(s) Topical three times a day  polyethylene glycol 3350 17 Gram(s) Oral daily  sertraline 50 milliGRAM(s) Oral daily  sertraline 25 milliGRAM(s) Oral at bedtime  sodium chloride 0.65% Nasal 1 Spray(s) Both Nostrils two times a day  torsemide 40 milliGRAM(s) Oral two times a day    MEDICATIONS  (PRN):  ALPRAZolam 0.25 milliGRAM(s) Oral daily PRN anxiety      REVIEW OF SYSTEMS:  eye, ent, GI, , allergic, dermatologic, musculoskeletal and neurologic are negative except as described above    Vital Signs Last 24 Hrs  T(C): 36.7 (12 Mar 2019 04:41), Max: 36.7 (12 Mar 2019 04:41)  T(F): 98 (12 Mar 2019 04:41), Max: 98 (12 Mar 2019 04:41)  HR: 69 (12 Mar 2019 04:41) (68 - 69)  BP: 100/61 (12 Mar 2019 04:41) (100/61 - 121/71)  BP(mean): --  RR: 18 (12 Mar 2019 04:41) (18 - 18)  SpO2: 95% (12 Mar 2019 04:41) (95% - 96%)    I&O's Summary    11 Mar 2019 07:01  -  12 Mar 2019 07:00  --------------------------------------------------------  IN: 480 mL / OUT: 1800 mL / NET: -1320 mL    12 Mar 2019 07:01  -  12 Mar 2019 10:41  --------------------------------------------------------  IN: 240 mL / OUT: 0 mL / NET: 240 mL        Telemetry past 24h: af controlled, 2.4s pause yesterday am    PHYSICAL EXAM:    Constitutional: well-nourished, well-developed, NAD   HEENT:  MMM, sclerae anicteric, conjunctivae clear, no oral cyanosis.  Pulmonary: Non-labored, breath sounds are clear bilaterally, No wheezing, rales or rhonchi  Cardiovascular: irregular, S1 and S2.  No murmur.  No rubs, gallops or clicks  Gastrointestinal: Bowel Sounds present, soft, nontender.   Lymph: No peripheral edema.   Neurological: Alert, no focal deficits  Skin: No rashes.  Psych:  Mood & affect appropriate    LABS: All Labs Reviewed:                        13.7   7.57  )-----------( 142      ( 11 Mar 2019 07:55 )             43.8                         14.0   7.58  )-----------( 143      ( 10 Mar 2019 08:27 )             44.3     12 Mar 2019 05:59    145    |  100    |  25     ----------------------------<  140    3.9     |  33     |  0.91   11 Mar 2019 05:56    142    |  97     |  25     ----------------------------<  128    3.9     |  32     |  0.87   10 Mar 2019 06:43    143    |  96     |  18     ----------------------------<  124    3.6     |  33     |  0.91     Ca    9.2        12 Mar 2019 05:59  Ca    9.5        11 Mar 2019 05:56  Ca    9.3        10 Mar 2019 06:43  Phos  4.3       12 Mar 2019 05:59  Mg     2.2       12 Mar 2019 05:59            Blood Culture:         RADIOLOGY:    EKG:    Echo:

## 2019-03-12 NOTE — PROGRESS NOTE ADULT - SUBJECTIVE AND OBJECTIVE BOX
Patient is a 86y old  Female who presents with a chief complaint of acute decompensated heart failure (12 Mar 2019 11:43)                                                               INTERVAL HPI/OVERNIGHT EVENTS:    REVIEW OF SYSTEMS:     CONSTITUTIONAL: No weakness, fevers or chills  RESPIRATORY: No cough, wheezing,  No shortness of breath  CARDIOVASCULAR: No chest pain or palpitations  GASTROINTESTINAL: No abdominal pain  . No nausea, vomiting, or hematemesis; No diarrhea or constipation. No melena or hematochezia.  GENITOURINARY: No dysuria, frequency or hematuria  NEUROLOGICAL: No numbness or weakness                                                                                                                                                                                                                                                                            Medications:  MEDICATIONS  (STANDING):  AQUAPHOR (petrolatum Ointment) 1 Application(s) Topical two times a day  atorvastatin 10 milliGRAM(s) Oral at bedtime  dabigatran 150 milliGRAM(s) Oral every 12 hours  diltiazem    milliGRAM(s) Oral daily  docusate sodium 100 milliGRAM(s) Oral three times a day  famotidine    Tablet 20 milliGRAM(s) Oral daily  metoprolol tartrate 200 milliGRAM(s) Oral two times a day  nystatin Powder 1 Application(s) Topical three times a day  polyethylene glycol 3350 17 Gram(s) Oral daily  sertraline 50 milliGRAM(s) Oral daily  sertraline 25 milliGRAM(s) Oral at bedtime  sodium chloride 0.65% Nasal 1 Spray(s) Both Nostrils two times a day  torsemide 40 milliGRAM(s) Oral two times a day    MEDICATIONS  (PRN):  ALPRAZolam 0.25 milliGRAM(s) Oral daily PRN anxiety       Allergies    codeine (Other)    Intolerances      Vital Signs Last 24 Hrs  T(C): 36.5 (12 Mar 2019 15:05), Max: 36.7 (12 Mar 2019 04:41)  T(F): 97.7 (12 Mar 2019 15:05), Max: 98 (12 Mar 2019 04:41)  HR: 61 (12 Mar 2019 15:05) (61 - 69)  BP: 102/50 (12 Mar 2019 15:05) (100/61 - 121/71)  BP(mean): --  RR: 18 (12 Mar 2019 15:05) (18 - 18)  SpO2: 94% (12 Mar 2019 15:05) (94% - 96%)  CAPILLARY BLOOD GLUCOSE          03-11 @ 07:01  -  03-12 @ 07:00  --------------------------------------------------------  IN: 480 mL / OUT: 1800 mL / NET: -1320 mL     @ 07: @ 20:01  --------------------------------------------------------  IN: 600 mL / OUT: 0 mL / NET: 600 mL      Physical Exam:    Daily Weight in k.7 (12 Mar 2019 10:45)  General:  NAD   HEENT:  Nonicteric, PERRLA  CV:  RRR, S1S2   Lungs:  mild crackles at bases otherwise CTAB   Abdomen:  Soft, non-tender, no distended, positive BS  Extremities:  trace edema  Skin:  Warm and dry, no rashes  :  No corbett  Neuro:  AAOx3, non-focal, grossly intact                                                                                                                                                                                                                                                                                                LABS:                               13.7   7.57  )-----------( 142      ( 11 Mar 2019 07:55 )             43.8                          145  |  100  |  25<H>  ----------------------------<  140<H>  3.9   |  33<H>  |  0.91    Ca    9.2      12 Mar 2019 05:59  Phos  4.3       Mg     2.2

## 2019-03-12 NOTE — DISCHARGE NOTE PROVIDER - REASON FOR ADMISSION
acute on chronic diastolic congestive heart failure w/ ejection fraction 71%, chronic atrial fibrillation on Pradaxa, severe pulmonary hypertension requiring home oxygen which patient has prior to admission

## 2019-03-12 NOTE — PROGRESS NOTE ADULT - ATTENDING COMMENTS
Ms. Mix appears euvolemic. The cause of her hypoxia is unclear. Her echocardiogram shows severely reduced RV systolic function. The cardiac output on the right heart catheterization was likely overestimated given the presence of the left to right shunt. For now we will continue the current diuretics. The plan will be for a chest CT to evaluate for evidence of parenchymal lung disease. She will likely be discharged tomorrow.     Please call me with questions at [phone].

## 2019-03-12 NOTE — DISCHARGE NOTE PROVIDER - PROVIDER TOKENS
PROVIDER:[TOKEN:[[id number]]] PROVIDER:[TOKEN:[[id number]]],PROVIDER:[TOKEN:[[id number]]],PROVIDER:[TOKEN:[[id number]]]

## 2019-03-12 NOTE — PROGRESS NOTE ADULT - ASSESSMENT
Ms. Mix is an 85 y/o w/ hx of HFpEF (EF 60-65%, mod MR, RV enlargement w/ RV systolic dysfunction, B/L Atrial enlargement), severe MR s/p mitral clip on 8/2016, chronic Afib (on pradaxa), possible SHERYL who presented w/ acute on chronic diastolic heart failure.  Initially placed on Bumex GTT, off Bumex since 3/10 . Pt underwent a RHC which suggested component of pre- and post capillary pulm HTN likely 2/2 to left sided valvular heart disease. However other etiologies of her chornic hypoxemia need to be further investigated. She is planned for V/Q study today.  Currently appears euvolemic

## 2019-03-12 NOTE — PROGRESS NOTE ADULT - ASSESSMENT
85 yo HFpEF (3/2018 ef 71%) s/p mitral clip in 8/2016, normal cors in 2016,  permanent a fib on pradaxa, htn, hld , SHERYL not on cpap a/w ADHF.     - She is much better compensated, with improved hf on exam.   - cont po torsemide  - continue to maintain strict I/Os, monitor daily weights, Cr, and K.     -rhc yesterday revealed mildly elev right and left heart pressures, with a suggestion of pre and post cap pulm htn, none of which is surprising.  hypoxic tendencies not well explained at present.  no sat run performed.  Meaningful right to left shunt unlikely  -recommendation noted for v/q, sleep study and pfts  -patient is considering the cardiomems    - AF rates controlled overall, though still with some bradycardic tendencies  -monitor hr carefully    - Echo on 3/9: LVEF 70%, RVE with RVSD, severe LAE and SONNY, mild MS, severe TR and severe pulmonary HTN with an RVSP 82, and some degree of MR that is shadowed by the mitraclip.    - cont statin  - Further cardiac workup will depend on clinical course.   - All other workup per primary team. Will followup.

## 2019-03-12 NOTE — PROGRESS NOTE ADULT - NSHPATTENDINGPLANDISCUSS_GEN_ALL_CORE
pt  ,  and np
pt ,  , HF team and np
pt , family at bedside at length
pt and  at bedside   with np
pt and  at length   , with NP
pt ,  , son and NP
Dr. Hu

## 2019-03-12 NOTE — DISCHARGE NOTE PROVIDER - CARE PROVIDER_API CALL
Alexandra Lopes (MD; PhD)  Adv Heart Fail Trnsplnt Cardio; Cardiovascular Disease; Internal Medicine  [address] [place] [address]  Phone: [phone]  Fax: [phone]  Follow Up Time: Alexandra Lopes; PhD)  Adv Heart Fail Trnsplnt Cardio; Cardiovascular Disease; Internal Medicine  [address] [place] [address]  Phone: [phone]  Fax: [phone]  Follow Up Time:     Khari Henley)  Critical Care Medicine; Internal Medicine; Pulmonary Disease; Sleep Medicine  [address] [facility] [address]  [place] [address]  Phone: [phone]  Fax: [phone]  Follow Up Time:     Conner Schwarz)  Internal Medicine  [address], [place] [id number]  Phone: [phone]  Fax: [phone]  Follow Up Time:

## 2019-03-13 ENCOUNTER — INBOUND DOCUMENT (OUTPATIENT)
Age: 84
End: 2019-03-13

## 2019-03-13 VITALS — WEIGHT: 173.72 LBS

## 2019-03-13 LAB
ANION GAP SERPL CALC-SCNC: 13 MMOL/L — SIGNIFICANT CHANGE UP (ref 5–17)
BUN SERPL-MCNC: 23 MG/DL — SIGNIFICANT CHANGE UP (ref 7–23)
CALCIUM SERPL-MCNC: 9.5 MG/DL — SIGNIFICANT CHANGE UP (ref 8.4–10.5)
CHLORIDE SERPL-SCNC: 98 MMOL/L — SIGNIFICANT CHANGE UP (ref 96–108)
CO2 SERPL-SCNC: 34 MMOL/L — HIGH (ref 22–31)
CREAT SERPL-MCNC: 0.89 MG/DL — SIGNIFICANT CHANGE UP (ref 0.5–1.3)
GLUCOSE SERPL-MCNC: 132 MG/DL — HIGH (ref 70–99)
POTASSIUM SERPL-MCNC: 3.4 MMOL/L — LOW (ref 3.5–5.3)
POTASSIUM SERPL-SCNC: 3.4 MMOL/L — LOW (ref 3.5–5.3)
SODIUM SERPL-SCNC: 145 MMOL/L — SIGNIFICANT CHANGE UP (ref 135–145)

## 2019-03-13 PROCEDURE — C1817: CPT

## 2019-03-13 PROCEDURE — 93321 DOPPLER ECHO F-UP/LMTD STD: CPT | Mod: 26

## 2019-03-13 PROCEDURE — 78582 LUNG VENTILAT&PERFUS IMAGING: CPT

## 2019-03-13 PROCEDURE — 85610 PROTHROMBIN TIME: CPT

## 2019-03-13 PROCEDURE — 83605 ASSAY OF LACTIC ACID: CPT

## 2019-03-13 PROCEDURE — 82947 ASSAY GLUCOSE BLOOD QUANT: CPT

## 2019-03-13 PROCEDURE — 93308 TTE F-UP OR LMTD: CPT

## 2019-03-13 PROCEDURE — C1769: CPT

## 2019-03-13 PROCEDURE — A9540: CPT

## 2019-03-13 PROCEDURE — 80053 COMPREHEN METABOLIC PANEL: CPT

## 2019-03-13 PROCEDURE — 93321 DOPPLER ECHO F-UP/LMTD STD: CPT

## 2019-03-13 PROCEDURE — 84132 ASSAY OF SERUM POTASSIUM: CPT

## 2019-03-13 PROCEDURE — 76937 US GUIDE VASCULAR ACCESS: CPT

## 2019-03-13 PROCEDURE — 93970 EXTREMITY STUDY: CPT

## 2019-03-13 PROCEDURE — 85027 COMPLETE CBC AUTOMATED: CPT

## 2019-03-13 PROCEDURE — 71045 X-RAY EXAM CHEST 1 VIEW: CPT

## 2019-03-13 PROCEDURE — 84100 ASSAY OF PHOSPHORUS: CPT

## 2019-03-13 PROCEDURE — A9567: CPT

## 2019-03-13 PROCEDURE — 99233 SBSQ HOSP IP/OBS HIGH 50: CPT | Mod: GC

## 2019-03-13 PROCEDURE — 82435 ASSAY OF BLOOD CHLORIDE: CPT

## 2019-03-13 PROCEDURE — C1894: CPT

## 2019-03-13 PROCEDURE — 82330 ASSAY OF CALCIUM: CPT

## 2019-03-13 PROCEDURE — 97161 PT EVAL LOW COMPLEX 20 MIN: CPT

## 2019-03-13 PROCEDURE — 83735 ASSAY OF MAGNESIUM: CPT

## 2019-03-13 PROCEDURE — 93005 ELECTROCARDIOGRAM TRACING: CPT

## 2019-03-13 PROCEDURE — 93308 TTE F-UP OR LMTD: CPT | Mod: 26

## 2019-03-13 PROCEDURE — 82803 BLOOD GASES ANY COMBINATION: CPT

## 2019-03-13 PROCEDURE — 85014 HEMATOCRIT: CPT

## 2019-03-13 PROCEDURE — 71250 CT THORAX DX C-: CPT

## 2019-03-13 PROCEDURE — 80048 BASIC METABOLIC PNL TOTAL CA: CPT

## 2019-03-13 PROCEDURE — 99232 SBSQ HOSP IP/OBS MODERATE 35: CPT

## 2019-03-13 PROCEDURE — 84295 ASSAY OF SERUM SODIUM: CPT

## 2019-03-13 PROCEDURE — 93306 TTE W/DOPPLER COMPLETE: CPT

## 2019-03-13 PROCEDURE — 93451 RIGHT HEART CATH: CPT

## 2019-03-13 PROCEDURE — 93463 DRUG ADMIN & HEMODYNMIC MEAS: CPT | Mod: 59

## 2019-03-13 PROCEDURE — 85730 THROMBOPLASTIN TIME PARTIAL: CPT

## 2019-03-13 RX ORDER — ALPRAZOLAM 0.25 MG
1 TABLET ORAL
Qty: 0 | Refills: 0 | COMMUNITY

## 2019-03-13 RX ORDER — POTASSIUM CHLORIDE 20 MEQ
1 PACKET (EA) ORAL
Qty: 60 | Refills: 0 | OUTPATIENT
Start: 2019-03-13 | End: 2019-04-11

## 2019-03-13 RX ORDER — SODIUM CHLORIDE 0.65 %
1 AEROSOL, SPRAY (ML) NASAL
Qty: 1 | Refills: 0
Start: 2019-03-13 | End: 2019-04-11

## 2019-03-13 RX ORDER — POTASSIUM CHLORIDE 20 MEQ
40 PACKET (EA) ORAL ONCE
Qty: 0 | Refills: 0 | Status: COMPLETED | OUTPATIENT
Start: 2019-03-13 | End: 2019-03-13

## 2019-03-13 RX ORDER — POTASSIUM CHLORIDE 20 MEQ
1 PACKET (EA) ORAL
Qty: 30 | Refills: 0
Start: 2019-03-13 | End: 2019-04-11

## 2019-03-13 RX ORDER — FAMOTIDINE 10 MG/ML
1 INJECTION INTRAVENOUS
Qty: 30 | Refills: 0
Start: 2019-03-13 | End: 2019-04-11

## 2019-03-13 RX ORDER — DILTIAZEM HCL 120 MG
1 CAPSULE, EXT RELEASE 24 HR ORAL
Qty: 30 | Refills: 0
Start: 2019-03-13 | End: 2019-04-11

## 2019-03-13 RX ORDER — PETROLATUM,WHITE
1 JELLY (GRAM) TOPICAL
Qty: 1 | Refills: 0
Start: 2019-03-13 | End: 2019-04-11

## 2019-03-13 RX ORDER — DOCUSATE SODIUM 100 MG
1 CAPSULE ORAL
Qty: 0 | Refills: 0 | COMMUNITY

## 2019-03-13 RX ADMIN — Medication 40 MILLIGRAM(S): at 05:39

## 2019-03-13 RX ADMIN — Medication 1 APPLICATION(S): at 05:40

## 2019-03-13 RX ADMIN — NYSTATIN CREAM 1 APPLICATION(S): 100000 CREAM TOPICAL at 05:40

## 2019-03-13 RX ADMIN — SERTRALINE 50 MILLIGRAM(S): 25 TABLET, FILM COATED ORAL at 13:11

## 2019-03-13 RX ADMIN — DABIGATRAN ETEXILATE MESYLATE 150 MILLIGRAM(S): 150 CAPSULE ORAL at 05:39

## 2019-03-13 RX ADMIN — NYSTATIN CREAM 1 APPLICATION(S): 100000 CREAM TOPICAL at 13:13

## 2019-03-13 RX ADMIN — Medication 240 MILLIGRAM(S): at 05:39

## 2019-03-13 RX ADMIN — FAMOTIDINE 20 MILLIGRAM(S): 10 INJECTION INTRAVENOUS at 13:10

## 2019-03-13 RX ADMIN — Medication 200 MILLIGRAM(S): at 05:39

## 2019-03-13 RX ADMIN — Medication 100 MILLIGRAM(S): at 05:40

## 2019-03-13 RX ADMIN — Medication 40 MILLIEQUIVALENT(S): at 13:10

## 2019-03-13 NOTE — PROGRESS NOTE ADULT - ATTENDING COMMENTS
Stable for discharge. Chest CT was not remarkable and echo with agitated saline failed to show a significant right to left shunt. She is currently euvolemic. She will need PFTs as an outpatient. She will follow-up with Dr. Lopes as an outpatient in the heart failure clinic.     Please call me with questions at [phone].

## 2019-03-13 NOTE — PROGRESS NOTE ADULT - ASSESSMENT
Ms. Mix is an 87 y/o w/ hx of HFpEF (EF 60-65%, mod MR, RV enlargement w/ RV systolic dysfunction, B/L Atrial enlargement), severe MR s/p mitral clip on 8/2016, chronic Afib (on pradaxa), possible SHERYL who presented w/ acute on chronic diastolic heart failure.  Initially placed on Bumex GTT, off Bumex since 3/10 . Pt underwent a RHC which suggested component of pre- and post capillary pulm HTN likely 2/2 to left sided valvular heart disease. However other etiologies of her chronic hypoxemia need to be further investigated. Her V/Q scan is negative for PE. She is planned for CT chest and TTE with buble study today.   Currently appears euvolemic

## 2019-03-13 NOTE — PHARMACOTHERAPY INTERVENTION NOTE - COMMENTS
Counseled patient and spouse on discharge medication doses, indications, and possible side effects. Provided heart failure booklet to patient.    Margie Grubbs, PharmD   [phone]

## 2019-03-13 NOTE — PROGRESS NOTE ADULT - SUBJECTIVE AND OBJECTIVE BOX
[facility] - Mayank Matthews, Gilbert, Xiomara, Solitario, Chong Vera  Office Number:  [phone]    Patient resting comfortably in bed. She is still on oxygen.    No complaints of chest pain, increased dyspnea, palpitations, PND, or orthopnea.      F/U for:  CHF    Telemetry:  AF,     MEDICATIONS  (STANDING):  AQUAPHOR (petrolatum Ointment) 1 Application(s) Topical two times a day  atorvastatin 10 milliGRAM(s) Oral at bedtime  dabigatran 150 milliGRAM(s) Oral every 12 hours  diltiazem    milliGRAM(s) Oral daily  docusate sodium 100 milliGRAM(s) Oral three times a day  famotidine    Tablet 20 milliGRAM(s) Oral daily  metoprolol tartrate 200 milliGRAM(s) Oral two times a day  nystatin Powder 1 Application(s) Topical three times a day  polyethylene glycol 3350 17 Gram(s) Oral daily  sertraline 50 milliGRAM(s) Oral daily  sertraline 25 milliGRAM(s) Oral at bedtime  sodium chloride 0.65% Nasal 1 Spray(s) Both Nostrils two times a day  torsemide 40 milliGRAM(s) Oral two times a day    MEDICATIONS  (PRN):  ALPRAZolam 0.25 milliGRAM(s) Oral daily PRN anxiety      Allergies    codeine (Other)          Vital Signs Last 24 Hrs  T(C): 36.3 (13 Mar 2019 05:23), Max: 37.5 (12 Mar 2019 20:00)  T(F): 97.3 (13 Mar 2019 05:23), Max: 99.5 (12 Mar 2019 20:00)  HR: 70 (13 Mar 2019 05:23) (61 - 70)  BP: 127/71 (13 Mar 2019 05:23) (102/50 - 127/71)  BP(mean): --  RR: 19 (13 Mar 2019 05:23) (18 - 19)  SpO2: 96% (13 Mar 2019 05:23) (94% - 97%)    I&O's Summary    12 Mar 2019 07:01  -  13 Mar 2019 07:00  --------------------------------------------------------  IN: 720 mL / OUT: 800 mL / NET: -80 mL        ON EXAM:    Constitutional: well-nourished, well-developed, NAD   HEENT:  MMM, sclerae anicteric, conjunctivae clear, no oral cyanosis.  Pulmonary: Non-labored, breath sounds are clear bilaterally.  Minimal rales  Cardiovascular: irregular, S1 and S2.  No murmur.  No rubs, gallops or clicks  Gastrointestinal: Bowel Sounds present, soft, nontender.   Lymph: No peripheral edema.   Neurological: Alert, no focal deficits  Skin: No rashes.  Psych:  Mood & affect appropriate      LABS: All Labs Reviewed:                        13.7   7.57  )-----------( 142      ( 11 Mar 2019 07:55 )             43.8                         14.0   7.58  )-----------( 143      ( 10 Mar 2019 08:27 )             44.3     13 Mar 2019 06:06    145    |  98     |  23     ----------------------------<  132    3.4     |  34     |  0.89   12 Mar 2019 05:59    145    |  100    |  25     ----------------------------<  140    3.9     |  33     |  0.91   11 Mar 2019 05:56    142    |  97     |  25     ----------------------------<  128    3.9     |  32     |  0.87     Ca    9.5        13 Mar 2019 06:06  Ca    9.2        12 Mar 2019 05:59  Ca    9.5        11 Mar 2019 05:56  Phos  4.3       12 Mar 2019 05:59  Mg     2.2       12 Mar 2019 05:59

## 2019-03-13 NOTE — PROGRESS NOTE ADULT - PROVIDER SPECIALTY LIST ADULT
Cardiology
Heart Failure
Internal Medicine
Pulmonology
Pulmonology
Cardiology

## 2019-03-13 NOTE — PROGRESS NOTE ADULT - NSICDXPROBLEM_GEN_ALL_CORE_FT
PROBLEM DIAGNOSES  Problem: Acute on chronic diastolic heart failure  Assessment and Plan: - resolved  - continue Torsemide 40 mg PO BID  - cardiomems device possibly as outpt.     Problem: Hypoxia  Assessment and Plan: V/Q scan negative for PE today  - obtain CT chest   - obtain TTE with bubble study   - PFTS as outpatient     Problem: Atrial fibrillation  Assessment and Plan: -rates better controlled  - cotninue current doses of Diltiazem and Lopressor   - continue pradaxa     Problem: SHERYL (obstructive sleep apnea)  Assessment and Plan: needs outpatient PFT and sleep study done

## 2019-03-13 NOTE — PROGRESS NOTE ADULT - REASON FOR ADMISSION
sob
sob
Shortness of breath. edema
acute decompensated heart failure
hf
hf
shortness of breath
acute decompensated heart failure
acute on chronic diastolic heart failure
acute on chronic diastolic heart failure

## 2019-03-13 NOTE — PROGRESS NOTE ADULT - SUBJECTIVE AND OBJECTIVE BOX
Interval events:   - no acute overnight events , NM scan neg for PE, pt pending CT chest and TTE with bubble study.   - pt denies any SOB, chest pain , palps otherwise.       MEDS  MEDICATIONS  (STANDING):  AQUAPHOR (petrolatum Ointment) 1 Application(s) Topical two times a day  atorvastatin 10 milliGRAM(s) Oral at bedtime  dabigatran 150 milliGRAM(s) Oral every 12 hours  diltiazem    milliGRAM(s) Oral daily  docusate sodium 100 milliGRAM(s) Oral three times a day  famotidine    Tablet 20 milliGRAM(s) Oral daily  metoprolol tartrate 200 milliGRAM(s) Oral two times a day  nystatin Powder 1 Application(s) Topical three times a day  polyethylene glycol 3350 17 Gram(s) Oral daily  sertraline 50 milliGRAM(s) Oral daily  sertraline 25 milliGRAM(s) Oral at bedtime  sodium chloride 0.65% Nasal 1 Spray(s) Both Nostrils two times a day  torsemide 40 milliGRAM(s) Oral two times a day        Physical Exam:  Vital Signs Last 24 Hrs  T(C): 36.3 (13 Mar 2019 05:23), Max: 37.5 (12 Mar 2019 20:00)  T(F): 97.3 (13 Mar 2019 05:23), Max: 99.5 (12 Mar 2019 20:00)  HR: 70 (13 Mar 2019 05:23) (61 - 70)  BP: 127/71 (13 Mar 2019 05:23) (102/50 - 127/71)  BP(mean): --  RR: 19 (13 Mar 2019 05:23) (18 - 19)  SpO2: 96% (13 Mar 2019 05:23) (94% - 97%)  I&O's Summary    12 Mar 2019 07:01  -  13 Mar 2019 07:00  --------------------------------------------------------  IN:    Oral Fluid: 720 mL  Total IN: 720 mL    OUT:    Voided: 800 mL  Total OUT: 800 mL    Total NET: -80 mL    General: No distress. Comfortable.  HEENT: EOM intact.  Neck: Neck supple. JVP not elevated. No masses  Chest: Clear to auscultation bilaterally  CV: Irregularly irregular. Normal S1 and S2. No murmurs, rub, or gallops. Radial pulses normal. No edema.  Abdomen: Soft, non-distended, non-tender  Skin: No rashes or skin breakdown  Neurology: Alert and oriented times three. Sensation intact  Psych: Affect normal    Labs:                          13.7   7.57  )-----------( 142      ( 11 Mar 2019 07:55 )             43.8   03-13    145  |  98  |  23  ----------------------------<  132<H>  3.4<L>   |  34<H>  |  0.89    Ca    9.5      13 Mar 2019 06:06  Phos  4.3     03-12  Mg     2.2     03-12

## 2019-03-20 ENCOUNTER — APPOINTMENT (OUTPATIENT)
Dept: CARDIOLOGY | Facility: CLINIC | Age: 84
End: 2019-03-20
Payer: MEDICARE

## 2019-03-20 VITALS
SYSTOLIC BLOOD PRESSURE: 105 MMHG | HEART RATE: 57 BPM | WEIGHT: 173 LBS | HEIGHT: 63 IN | DIASTOLIC BLOOD PRESSURE: 56 MMHG | BODY MASS INDEX: 30.65 KG/M2 | OXYGEN SATURATION: 88 %

## 2019-03-20 PROCEDURE — 99214 OFFICE O/P EST MOD 30 MIN: CPT

## 2019-03-20 RX ORDER — TRAZODONE HYDROCHLORIDE 50 MG/1
50 TABLET ORAL
Refills: 0 | Status: DISCONTINUED | COMMUNITY
End: 2019-03-20

## 2019-03-20 RX ORDER — TRIAMCINOLONE ACETONIDE 1 MG/G
0.1 OINTMENT TOPICAL
Qty: 1 | Refills: 0 | Status: DISCONTINUED | COMMUNITY
Start: 2019-01-03 | End: 2019-03-20

## 2019-03-20 RX ORDER — NYSTATIN AND TRIAMCINOLONE ACETONIDE 100000; 1 [USP'U]/G; MG/G
100000-0.1 OINTMENT TOPICAL
Refills: 0 | Status: DISCONTINUED | COMMUNITY
Start: 2019-03-20 | End: 2019-03-20

## 2019-03-20 RX ORDER — POTASSIUM CHLORIDE 750 MG/1
10 CAPSULE, EXTENDED RELEASE ORAL DAILY
Refills: 0 | Status: DISCONTINUED | COMMUNITY
Start: 2017-06-23 | End: 2019-03-20

## 2019-03-20 RX ORDER — DILTIAZEM HYDROCHLORIDE 240 MG/1
240 CAPSULE, EXTENDED RELEASE ORAL DAILY
Refills: 0 | Status: DISCONTINUED | COMMUNITY
Start: 2018-06-21 | End: 2019-03-20

## 2019-03-20 RX ORDER — MUPIROCIN 20 MG/G
2 OINTMENT TOPICAL
Qty: 2 | Refills: 1 | Status: DISCONTINUED | COMMUNITY
Start: 2019-01-03 | End: 2019-03-20

## 2019-03-20 NOTE — HISTORY OF PRESENT ILLNESS
[FreeTextEntry1] : Ms. 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 Her other comorbidities include a history of syncope in 2014, permanent atrial fibrillation, hypertension, hyperlipidemia and SHERYL. She is on continuous home oxygen at 3L via [place].\par \par She was last seen by Dr. Lopes for initial evaluation on 3/5 at which time she was noted to be grossly volume overloaded in the setting of acute on chronic RV failure. She was hospitalized from 3/5-3/13 during which time she was diuresed 12 lbs with IV diuretics. Work up for her hypoxia and pulmonary hypertension, included a NM scan, which was negative for PE, TTE with bubble study that failed to show significant right to left shunting, and chest CT, which was unremarkable. RHC with NO challenge showed suggested component of pre/post capillary PH, which is likely secondary to left sided valvular heart disease. Her diltiazem was decreased due to bradycardia. She was transitioned to oral diuretics and discharged home at a weight of 173lbs. \par \par Since discharge she reports feeling very well. Her weight at home has ranged from 172-174lbs, although noted to be uptrending. Weight today in clinic 173lbs. She is able to walk about 1 block and has not yet tried climbing stairs. She notes improvement in her energy level and appetite. She continues to sleep in the recliner at home, which she states is for physical comfort. She states she was able to lay flat in bed prior to hospital discharge. She denies orthopnea, PND, CP, palpitations, lightheadedness, dizziness, abdominal distention and LE edema. She reports taking her medications as prescribed, following a low sodium diet and has been limiting her fluids to less than 1.5L/day. Her bowel and bladder habits are normal for her. She denies hematochezia and melena.

## 2019-03-20 NOTE — REASON FOR VISIT
[Heart Failure] : congestive heart failure [Spouse] : spouse [Family Member] : family member [Follow-Up - From Hospitalization] : follow-up of a recent hospitalization for [Discharge Date: ___] : Discharge Date: [unfilled] [Admitted for Heart Failure] : patient was admitted for heart failure [FreeTextEntry1] : INSTRUCTIONS:\par \par 1. Continue your current medications.\par \par 2. Labs to be drawn today. We will call you with the results and will also sen a copy to Dr. Silverman's office.\par \par 3. Please call us at [phone] if your weight goes up by 2-3lbs in 1-2 days or 5 lbs in a week. Limit your fluid intake to no more than 2 L per day (6-8 8 oz glasses).\par \par 4. Follow up with NP in 3 weeks.

## 2019-03-20 NOTE — PHYSICAL EXAM
[General Appearance - Well Developed] : well developed [Well Groomed] : well groomed [General Appearance - Well Nourished] : well nourished [General Appearance - In No Acute Distress] : no acute distress [Eyelids - No Xanthelasma] : the eyelids demonstrated no xanthelasmas [No Oral Pallor] : no oral pallor [2+] : left 2+ [Bowel Sounds] : normal bowel sounds [Abdomen Soft] : soft [Abdomen Tenderness] : non-tender [Nail Clubbing] : no clubbing of the fingernails [Cyanosis, Localized] : no localized cyanosis [No Venous Stasis] : no venous stasis [Oriented To Time, Place, And Person] : oriented to person, place, and time [Impaired Insight] : insight and judgment were intact [Affect] : the affect was normal [Mood] : the mood was normal [] : no respiratory distress [Respiration, Rhythm And Depth] : normal respiratory rhythm and effort [Auscultation Breath Sounds / Voice Sounds] : lungs were clear to auscultation bilaterally [Right Carotid Bruit] : no bruit heard over the right carotid [Left Carotid Bruit] : no bruit heard over the left carotid [FreeTextEntry1] : slow cautious gait using cane, using wheelchair for distance

## 2019-03-21 ENCOUNTER — OTHER (OUTPATIENT)
Age: 84
End: 2019-03-21

## 2019-03-21 RX ORDER — POTASSIUM CHLORIDE 1500 MG/1
20 TABLET, FILM COATED, EXTENDED RELEASE ORAL
Refills: 1 | Status: COMPLETED | COMMUNITY
Start: 2019-03-20 | End: 2019-03-21

## 2019-03-26 LAB
ANION GAP SERPL CALC-SCNC: 13 MMOL/L
BASOPHILS # BLD AUTO: 0.03 K/UL
BASOPHILS NFR BLD AUTO: 0.4 %
BUN SERPL-MCNC: 22 MG/DL
CALCIUM SERPL-MCNC: 9 MG/DL
CHLORIDE SERPL-SCNC: 100 MMOL/L
CO2 SERPL-SCNC: 32 MMOL/L
CREAT SERPL-MCNC: 1.05 MG/DL
EOSINOPHIL # BLD AUTO: 0.86 K/UL
EOSINOPHIL NFR BLD AUTO: 11.4 %
GLUCOSE SERPL-MCNC: 110 MG/DL
HCT VFR BLD CALC: 43.6 %
HGB BLD-MCNC: 13.7 G/DL
IMM GRANULOCYTES NFR BLD AUTO: 0.3 %
LYMPHOCYTES # BLD AUTO: 1.55 K/UL
LYMPHOCYTES NFR BLD AUTO: 20.6 %
MAGNESIUM SERPL-MCNC: 2.1 MG/DL
MAN DIFF?: NORMAL
MCHC RBC-ENTMCNC: 30.3 PG
MCHC RBC-ENTMCNC: 31.4 GM/DL
MCV RBC AUTO: 96.5 FL
MONOCYTES # BLD AUTO: 0.86 K/UL
MONOCYTES NFR BLD AUTO: 11.4 %
NEUTROPHILS # BLD AUTO: 4.22 K/UL
NEUTROPHILS NFR BLD AUTO: 55.9 %
NT-PROBNP SERPL-MCNC: 1838 PG/ML
PLATELET # BLD AUTO: 118 K/UL
POTASSIUM SERPL-SCNC: 4 MMOL/L
RBC # BLD: 4.52 M/UL
RBC # FLD: 13.8 %
SODIUM SERPL-SCNC: 145 MMOL/L
WBC # FLD AUTO: 7.54 K/UL

## 2019-03-29 ENCOUNTER — RX RENEWAL (OUTPATIENT)
Age: 84
End: 2019-03-29

## 2019-04-09 ENCOUNTER — MEDICATION RENEWAL (OUTPATIENT)
Age: 84
End: 2019-04-09

## 2019-04-10 ENCOUNTER — APPOINTMENT (OUTPATIENT)
Dept: CARDIOLOGY | Facility: CLINIC | Age: 84
End: 2019-04-10
Payer: MEDICARE

## 2019-04-10 VITALS
BODY MASS INDEX: 30.65 KG/M2 | DIASTOLIC BLOOD PRESSURE: 68 MMHG | HEIGHT: 63 IN | WEIGHT: 173 LBS | HEART RATE: 74 BPM | OXYGEN SATURATION: 94 % | SYSTOLIC BLOOD PRESSURE: 114 MMHG

## 2019-04-10 PROCEDURE — 99214 OFFICE O/P EST MOD 30 MIN: CPT

## 2019-04-10 NOTE — DISCUSSION/SUMMARY
[FreeTextEntry1] : 85 yo F with chronic diastolic HF, rate-controlled AFib, and chronic hypoxia on home O2, but without evident lung disease who presents today for follow up doing well with increasing activity tolerance. She is ACC/AHA Stage C-D, NYHA Class III HF, euvolemic and normotensive. Her afib is rate controlled. I have recommended the following:\par \par 1. Chronic diastolic heart failure - Will continue torsemide 40mg BID. Will draw labs today to reassess renal function and electrolytes on spironolactone. Asked that she continue to closely monitor her weights and limit the salt and fluid in her diet. Again, discussed CardioMEMS with her and her , however she would like to defer at this time.\par \par 2. AFib - Rate controlled on beta blocker and CCB. On Pradaxa for AC.\par \par 3. Hypertension -  Well controlled on current therapies.\par \par 4. Hypoxia - Continue with home oxygen. Follow up with Dr. Henley scheduled in July. Repeat PFTs recommended now that she is euvolemic.\par \par 5. Fall with head trauma - Sounds to be a mechanical fall. No overt s/s of bleeding. Strongly urged her to go to the ER for evaluation and head/chest CT to evaluate for injuries and bleeding particularly as she is on Pradaxa. Additionally asked that she notify her cardiologist, who manages her AC, and her PCP.  \par \par 5. Follow up with Dr. Lopes in 4 weeks.

## 2019-04-10 NOTE — PHYSICAL EXAM
[General Appearance - Well Developed] : well developed [Well Groomed] : well groomed [General Appearance - Well Nourished] : well nourished [General Appearance - In No Acute Distress] : no acute distress [Eyelids - No Xanthelasma] : the eyelids demonstrated no xanthelasmas [No Oral Pallor] : no oral pallor [Respiration, Rhythm And Depth] : normal respiratory rhythm and effort [Auscultation Breath Sounds / Voice Sounds] : lungs were clear to auscultation bilaterally [2+] : left 2+ [Bowel Sounds] : normal bowel sounds [Abdomen Soft] : soft [Abdomen Tenderness] : non-tender [] : no rash [No Venous Stasis] : no venous stasis [Oriented To Time, Place, And Person] : oriented to person, place, and time [Affect] : the affect was normal [Mood] : the mood was normal [Nail Clubbing] : no clubbing of the fingernails [Cyanosis, Localized] : no localized cyanosis [Right Carotid Bruit] : no bruit heard over the right carotid [Left Carotid Bruit] : no bruit heard over the left carotid [FreeTextEntry1] : no abdominal distention

## 2019-04-10 NOTE — HISTORY OF PRESENT ILLNESS
[FreeTextEntry1] : Ms. Mix is an 86 year old woman with history of mitral regurgitation, s/p Mitraclip procedure on August 30th of 2016, HFpEF (LVEF 71% 3/18), and mixed pre and post capillary pulmonary hypertension who presents today for follow up. Her other comorbidities include a history of syncope in 2014, permanent atrial fibrillation, hypertension, hyperlipidemia and SHERYL. She is on continuous home oxygen at 3L via [place].\par \par She was seen by Dr. Lopes for initial evaluation on 3/5 at which time she was noted to be grossly volume overloaded in the setting of acute on chronic RV failure. She was hospitalized from 3/5-3/13 during which time she was diuresed 12 lbs with IV diuretics. Work up for her hypoxia and pulmonary hypertension, included a NM scan, which was negative for PE, TTE with bubble study that failed to show significant right to left shunting, and chest CT, which was unremarkable. RHC with Kahlil challenge showed suggested component of pre/post capillary PH, which is likely secondary to left sided valvular heart disease. Her diltiazem was decreased due to bradycardia. She was transitioned to oral diuretics and discharged home at a weight of 173lbs. \par \par Since she was 3/20, she has been feeling well. She did, unfortunately, fall over the weekend as she tripped coming in her front door. She denies lightheadedness, dizziness or LOC prior to the fall. She did however, hit her head and chest when she landed on the tile floor. She sustained a small laceration over her left eye. She additionally has L periorbital edema and ecchymosis over her sternum, as she had on a large pendant necklace she landed on when she fell. She did not report the fall to her physicians, nor has she been evaluated since. She denies headaches and changes in vision.\par \par Her weight at home has ranged from 174-176lbs, and was noted to be stable from last visit at 173lbs here in clinic. Her activity tolerance has been improving and she is now able to climb a flight of stairs without stopping and without dyspnea. She is walking the distance of about 1.5-2 blocks doing laps in her house daily. She continues to sleep in the recliner at home, which she states is for physical comfort. She denies orthopnea, PND, CP, palpitations, lightheadedness, dizziness, abdominal distention and LE edema. She reports taking her medications as prescribed, following a low sodium diet and has been limiting her fluids to less than 1.5L/day. Her bowel and bladder habits are normal for her. She denies hematochezia and melena.

## 2019-04-10 NOTE — REASON FOR VISIT
[Heart Failure] : congestive heart failure [Spouse] : spouse [Family Member] : family member [Follow-Up - Clinic] : a clinic follow-up of [Formal Caregiver] : formal caregiver [FreeTextEntry1] : INSTRUCTIONS:\par \par 1. Continue your current medications.\par \par 2. Labs to be drawn today. We will call you with the results and will also send a copy to Dr. Silverman's office.\par \par 3. Please call us at [phone] if your weight goes up by 2-3lbs in 1-2 days or 5 lbs in a week. Limit your fluid intake to no more than 2 L per day (6-8 8 oz glasses).\par \par 4. We will call you to schedule an appointment with Dr. Lopes.\par \par 5. Please inform your other doctors regarding your fall. I strongly recommend you having a CT scan of your head and chest to evaluate for any injury or bleeding after your fall, particularly because you are on blood thinners.

## 2019-04-11 ENCOUNTER — OUTPATIENT (OUTPATIENT)
Dept: OUTPATIENT SERVICES | Facility: HOSPITAL | Age: 84
LOS: 1 days | End: 2019-04-11
Payer: MEDICARE

## 2019-04-11 ENCOUNTER — APPOINTMENT (OUTPATIENT)
Dept: CT IMAGING | Facility: CLINIC | Age: 84
End: 2019-04-11
Payer: MEDICARE
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
## 2019-04-11 DIAGNOSIS — Z98.89 OTHER SPECIFIED POSTPROCEDURAL STATES: Chronic | ICD-10-CM

## 2019-04-11 DIAGNOSIS — H26.9 UNSPECIFIED CATARACT: Chronic | ICD-10-CM

## 2019-04-11 PROCEDURE — 71250 CT THORAX DX C-: CPT

## 2019-04-11 PROCEDURE — 70450 CT HEAD/BRAIN W/O DYE: CPT

## 2019-04-11 PROCEDURE — 70450 CT HEAD/BRAIN W/O DYE: CPT | Mod: 26

## 2019-04-11 PROCEDURE — 71250 CT THORAX DX C-: CPT | Mod: 26

## 2019-04-15 LAB
ANION GAP SERPL CALC-SCNC: 15 MMOL/L
BUN SERPL-MCNC: 26 MG/DL
CALCIUM SERPL-MCNC: 9.6 MG/DL
CHLORIDE SERPL-SCNC: 99 MMOL/L
CO2 SERPL-SCNC: 30 MMOL/L
CREAT SERPL-MCNC: 1.1 MG/DL
GLUCOSE SERPL-MCNC: 117 MG/DL
MAGNESIUM SERPL-MCNC: 2.2 MG/DL
NT-PROBNP SERPL-MCNC: 1193 PG/ML
POTASSIUM SERPL-SCNC: 4.3 MMOL/L
SODIUM SERPL-SCNC: 144 MMOL/L

## 2019-04-22 ENCOUNTER — MEDICATION RENEWAL (OUTPATIENT)
Age: 84
End: 2019-04-22

## 2019-05-08 ENCOUNTER — APPOINTMENT (OUTPATIENT)
Dept: CARDIOLOGY | Facility: CLINIC | Age: 84
End: 2019-05-08
Payer: MEDICARE

## 2019-05-08 VITALS
HEART RATE: 76 BPM | BODY MASS INDEX: 30.48 KG/M2 | HEIGHT: 63 IN | OXYGEN SATURATION: 91 % | SYSTOLIC BLOOD PRESSURE: 110 MMHG | WEIGHT: 172 LBS | DIASTOLIC BLOOD PRESSURE: 67 MMHG

## 2019-05-08 PROCEDURE — 99215 OFFICE O/P EST HI 40 MIN: CPT

## 2019-05-08 RX ORDER — SERTRALINE HYDROCHLORIDE 50 MG/1
50 TABLET, FILM COATED ORAL DAILY
Refills: 0 | Status: DISCONTINUED | COMMUNITY
Start: 2019-03-20 | End: 2019-05-08

## 2019-05-09 LAB
ANION GAP SERPL CALC-SCNC: 11 MMOL/L
BUN SERPL-MCNC: 30 MG/DL
CALCIUM SERPL-MCNC: 9.2 MG/DL
CHLORIDE SERPL-SCNC: 98 MMOL/L
CO2 SERPL-SCNC: 31 MMOL/L
CREAT SERPL-MCNC: 0.96 MG/DL
GLUCOSE SERPL-MCNC: 108 MG/DL
MAGNESIUM SERPL-MCNC: 2.3 MG/DL
NT-PROBNP SERPL-MCNC: 1837 PG/ML
POTASSIUM SERPL-SCNC: 4.3 MMOL/L
SODIUM SERPL-SCNC: 140 MMOL/L

## 2019-05-09 NOTE — HISTORY OF PRESENT ILLNESS
[FreeTextEntry1] : Mrs. Mix is an 86 year old woman with history of HFpEF (LVEF 71% 3/18), MR s/p Mitraclip (8/30/16), and mixed pre- and post-capillary pulmonary hypertension who presents today for routine HF follow up. Her other comorbidities include permanent AFib, hypertension, hyperlipidemia and SHERYL. She is on continuous home oxygen at 3L via [place].\par \par Since last seen on 4/10/19 by the HF NP, she has done very well. Her activity tolerance includes 1 flight of stairs and she can do her own ADLs and get around the house. She is not doing basic tasks, such as laundry and housekeeping now, but she notes a dramatic difference in her functional status since discharge from the hospital. She often sleeps in a recliner for body comfort, but she denies orthopnea and PND. She also denies CP, SOB at rest, LH/dizziness, abdominal discomfort, palpitations, and syncope. Her appetite is normal and her bowel and bladder habits are unchanged. She has not had an ICD discharge. She has been limiting fluid and sodium in her diet and taking her medications as directed. She does not use NSAIDs. She has not been admitted to the hospital or seen in the ER for HF in the interim. Her weight has been stable.

## 2019-05-09 NOTE — REASON FOR VISIT
[Follow-Up - Clinic] : a clinic follow-up of [Heart Failure] : congestive heart failure [Spouse] : spouse [Formal Caregiver] : formal caregiver [Family Member] : family member [FreeTextEntry1] : INSTRUCTIONS:\par \par 1. Continue your current medications.\par \par 2. Labs to be drawn today. \par \par 3. Please keep a log of your blood pressure, heart rate, and weight taken daily. You may check your blood pressure and heart rate at a random time, preferably when you are at rest. Your weight should be checked first thing in the morning upon wakening and after using the bathroom. \par \par 4. Follow-up with HF NP in 6 weeks and with Dr. Lopes in 3 months.

## 2019-05-09 NOTE — DISCUSSION/SUMMARY
[Patient] : the patient [FreeTextEntry1] : 87 yo F with chronic diastolic HF, rate-controlled AFib, and chronic hypoxia on home O2 who is overall doing well. She is ACC/AHA Stage C, NYHA Class II-III HF, euvolemic on exam. I have recommended the following:\par \par 1. Chronic diastolic heart failure - Continue current medications. Would not pursue CardioMems unless she is difficult to manage as an outpatient or has worsening symptoms of HF. She seems to be doing very well on the current dose of torsemide and spironolactone.\par \par 2. AFib - Rate controlled on beta blocker and CCB. On Pradaxa for AC.\par \par 3. CKD stage 3 - Stable on current therapies.\par \par 4. Hypertension -  Well controlled on current therapies.\par \par 5. Follow up with HF NP in 6 weeks and with me in 3 months.

## 2019-05-09 NOTE — PHYSICAL EXAM
[General Appearance - Well Developed] : well developed [Well Groomed] : well groomed [General Appearance - In No Acute Distress] : no acute distress [General Appearance - Well Nourished] : well nourished [Eyelids - No Xanthelasma] : the eyelids demonstrated no xanthelasmas [Respiration, Rhythm And Depth] : normal respiratory rhythm and effort [Auscultation Breath Sounds / Voice Sounds] : lungs were clear to auscultation bilaterally [2+] : left 2+ [Bowel Sounds] : normal bowel sounds [Abdomen Soft] : soft [Abdomen Tenderness] : non-tender [Cyanosis, Localized] : no localized cyanosis [Nail Clubbing] : no clubbing of the fingernails [No Venous Stasis] : no venous stasis [Oriented To Time, Place, And Person] : oriented to person, place, and time [Affect] : the affect was normal [Mood] : the mood was normal [No Oral Cyanosis] : no oral cyanosis [Edema] : no peripheral edema present [Skin Turgor] : normal skin turgor [Impaired Insight] : insight and judgment were intact [FreeTextEntry1] : slow gait using cane, using wheelchair for distance

## 2019-05-15 ENCOUNTER — MEDICATION RENEWAL (OUTPATIENT)
Age: 84
End: 2019-05-15

## 2019-06-18 ENCOUNTER — OTHER (OUTPATIENT)
Age: 84
End: 2019-06-18

## 2019-06-18 ENCOUNTER — APPOINTMENT (OUTPATIENT)
Dept: CARDIOLOGY | Facility: CLINIC | Age: 84
End: 2019-06-18
Payer: MEDICARE

## 2019-06-18 VITALS
BODY MASS INDEX: 31.01 KG/M2 | DIASTOLIC BLOOD PRESSURE: 60 MMHG | HEART RATE: 63 BPM | SYSTOLIC BLOOD PRESSURE: 109 MMHG | HEIGHT: 63 IN | OXYGEN SATURATION: 91 % | WEIGHT: 175 LBS

## 2019-06-18 PROCEDURE — 99214 OFFICE O/P EST MOD 30 MIN: CPT

## 2019-06-21 NOTE — ED ADULT NURSE NOTE - RELIEVING FACTORS
Closure 2 Information: This tab is for additional flaps and grafts, including complex repair and grafts and complex repair and flaps. You can also specify a different location for the additional defect, if the location is the same you do not need to select a new one. We will insert the automated text for the repair you select below just as we do for solitary flaps and grafts. Please note that at this time if you select a location with a different insurance zone you will need to override the ICD10 and CPT if appropriate. rest

## 2019-06-21 NOTE — DISCUSSION/SUMMARY
[Patient] : the patient [FreeTextEntry1] : 87 yo F with chronic diastolic HF, rate-controlled AFib, and chronic hypoxia on home O2 who is overall doing well. She is ACC/AHA Stage C, NYHA Class II-III HF, euvolemic on exam. I have recommended the following:\par \par 1. Chronic diastolic heart failure - Continue current medications. Would not pursue CardioMems unless she is difficult to manage as an outpatient or has worsening symptoms of HF. She seems to be doing very well on the current dose of torsemide and spironolactone.\par \par 2. AFib - Rate controlled on beta blocker and CCB. On Pradaxa for AC.\par \par 3. CKD stage 3 - Stable on current therapies.\par \par 4. Hypertension -  Well controlled on current therapies.\par \par 5. Follow up with Dr. Lopes in 2 months.

## 2019-06-21 NOTE — PHYSICAL EXAM
[General Appearance - Well Developed] : well developed [General Appearance - Well Nourished] : well nourished [Well Groomed] : well groomed [No Oral Cyanosis] : no oral cyanosis [General Appearance - In No Acute Distress] : no acute distress [Respiration, Rhythm And Depth] : normal respiratory rhythm and effort [Auscultation Breath Sounds / Voice Sounds] : lungs were clear to auscultation bilaterally [Edema] : no peripheral edema present [2+] : left 2+ [Bowel Sounds] : normal bowel sounds [Abdomen Soft] : soft [Abdomen Tenderness] : non-tender [Nail Clubbing] : no clubbing of the fingernails [Cyanosis, Localized] : no localized cyanosis [Oriented To Time, Place, And Person] : oriented to person, place, and time [Affect] : the affect was normal [Impaired Insight] : insight and judgment were intact [Mood] : the mood was normal [] : no respiratory distress [FreeTextEntry1] : slow gait using cane, using wheelchair for distance

## 2019-06-21 NOTE — REASON FOR VISIT
[Heart Failure] : congestive heart failure [Follow-Up - Clinic] : a clinic follow-up of [Spouse] : spouse [Family Member] : family member [Formal Caregiver] : formal caregiver [FreeTextEntry1] : PATIENT INSTRUCTIONS:\par \par 1. Continue your current medications.\par \par 2. Please keep a log of your blood pressure, heart rate, and weight taken daily. You may check your blood pressure and heart rate at a random time, preferably when you are at rest. Your weight should be checked first thing in the morning upon wakening and after using the bathroom. \par \par 3. Follow-up with Dr. Lopes in 2 months.

## 2019-06-21 NOTE — HISTORY OF PRESENT ILLNESS
[FreeTextEntry1] : Mrs. Mix is an 86 year old woman with history of HFpEF (LVEF 71% 3/18), MR s/p Mitraclip (8/30/16), and mixed pre- and post-capillary pulmonary hypertension who presents today for routine HF follow up. Her other comorbidities include permanent AFib, hypertension, hyperlipidemia and SHERYL. She is on continuous home oxygen at 3L via NC.\par \par Since last seen in clinic on 5/8, she has been doing very well. Her activity tolerance includes 1 flight of stairs and she can do her own ADLs and get around the house. She often sleeps in a recliner for body comfort, but she denies orthopnea and PND. Her weight at home has been stable between 173-175 lbs. Her SBP has been ranging 110-130s. She denies CP, SOB at rest, LH/dizziness, abdominal discomfort, palpitations, and syncope. Her appetite has been "great" and her bowel and bladder habits are unchanged. She has not had an ICD discharge. She has been limiting fluid and sodium in her diet and taking her medications as directed. She does not use NSAIDs. She has not been admitted to the hospital or seen in the ER for HF in the interim. Her weight has been stable.

## 2019-07-03 ENCOUNTER — RX RENEWAL (OUTPATIENT)
Age: 84
End: 2019-07-03

## 2019-07-09 ENCOUNTER — RX RENEWAL (OUTPATIENT)
Age: 84
End: 2019-07-09

## 2019-07-10 ENCOUNTER — RX RENEWAL (OUTPATIENT)
Age: 84
End: 2019-07-10

## 2019-07-17 ENCOUNTER — APPOINTMENT (OUTPATIENT)
Dept: PULMONOLOGY | Facility: CLINIC | Age: 84
End: 2019-07-17
Payer: MEDICARE

## 2019-07-17 VITALS
SYSTOLIC BLOOD PRESSURE: 119 MMHG | OXYGEN SATURATION: 93 % | HEIGHT: 63 IN | DIASTOLIC BLOOD PRESSURE: 78 MMHG | WEIGHT: 176 LBS | BODY MASS INDEX: 31.18 KG/M2 | HEART RATE: 92 BPM

## 2019-07-17 PROCEDURE — 99213 OFFICE O/P EST LOW 20 MIN: CPT

## 2019-07-18 NOTE — HISTORY OF PRESENT ILLNESS
[FreeTextEntry1] : PRIOR: Janna Mix returns for followup.  She has a history of congestive heart failure.  She is currently oxygen dependent.  She is using a concentrator at night and a portable oxygen concentrator during the daytime.  She uses oxygen 24/7.  Her resting oxygen saturation measured in the office today was 87%.  With supplemental oxygen, oxygen saturations were maintained with exertion.\par \par Impression:  Ms. Mix has pulmonary hypertension, congestive heart failure, and sleep apnea.  She uses supplemental oxygen.  I am satisfied with her progress to date.  She should see me again in 6 months.\par \par Current: Patient last seen in hospital when she was admitted for congestive heart failure. She was placed on higher dose of diuretics and her weight is down about 30 pounds since that time her breathing is much better. She mostly does not use oxygen even though it has been recommended.\par \par

## 2019-07-18 NOTE — ASSESSMENT
[FreeTextEntry1] : Patient overall improved with high-dose diuretic therapy. Is not interested in further pulmonary testing.

## 2019-07-18 NOTE — PROCEDURE
[FreeTextEntry1] : Walking oximetry demonstrate oxygen desaturation with minimal exertion however resting saturation is 93% which is much higher than prior testing results

## 2019-07-18 NOTE — PHYSICAL EXAM
[FreeTextEntry1] : Elderly female in no distress [Normal Conjunctiva] : the conjunctiva exhibited no abnormalities [Eyelids - No Xanthelasma] : the eyelids demonstrated no xanthelasmas [Normal Oropharynx] : normal oropharynx [Neck Appearance] : the appearance of the neck was normal [Neck Cervical Mass (___cm)] : no neck mass was observed [Jugular Venous Distention Increased] : there was no jugular-venous distention [Thyroid Diffuse Enlargement] : the thyroid was not enlarged [Thyroid Nodule] : there were no palpable thyroid nodules [Heart Rate And Rhythm] : heart rate and rhythm were normal [Heart Sounds] : normal S1 and S2 [Murmurs] : no murmurs present [Respiration, Rhythm And Depth] : normal respiratory rhythm and effort [Exaggerated Use Of Accessory Muscles For Inspiration] : no accessory muscle use [Auscultation Breath Sounds / Voice Sounds] : lungs were clear to auscultation bilaterally [Abdomen Soft] : soft [Abdomen Tenderness] : non-tender [Abdomen Mass (___ Cm)] : no abdominal mass palpated [Abnormal Walk] : normal gait [Gait - Sufficient For Exercise Testing] : the gait was sufficient for exercise testing [Nail Clubbing] : no clubbing of the fingernails [Cyanosis, Localized] : no localized cyanosis [Petechial Hemorrhages (___cm)] : no petechial hemorrhages [Skin Color & Pigmentation] : normal skin color and pigmentation [] : no rash [No Venous Stasis] : no venous stasis [Skin Lesions] : no skin lesions [No Skin Ulcers] : no skin ulcer [No Xanthoma] : no  xanthoma was observed [Deep Tendon Reflexes (DTR)] : deep tendon reflexes were 2+ and symmetric [Sensation] : the sensory exam was normal to light touch and pinprick [No Focal Deficits] : no focal deficits [Oriented To Time, Place, And Person] : oriented to person, place, and time [Impaired Insight] : insight and judgment were intact [Affect] : the affect was normal

## 2019-08-28 ENCOUNTER — APPOINTMENT (OUTPATIENT)
Dept: CARDIOLOGY | Facility: CLINIC | Age: 84
End: 2019-08-28
Payer: MEDICARE

## 2019-08-28 ENCOUNTER — OTHER (OUTPATIENT)
Age: 84
End: 2019-08-28

## 2019-08-28 VITALS
DIASTOLIC BLOOD PRESSURE: 69 MMHG | SYSTOLIC BLOOD PRESSURE: 123 MMHG | WEIGHT: 180 LBS | OXYGEN SATURATION: 90 % | HEIGHT: 63 IN | BODY MASS INDEX: 31.89 KG/M2

## 2019-08-28 LAB
ANION GAP SERPL CALC-SCNC: 15 MMOL/L
BUN SERPL-MCNC: 32 MG/DL
CALCIUM SERPL-MCNC: 9.1 MG/DL
CHLORIDE SERPL-SCNC: 97 MMOL/L
CO2 SERPL-SCNC: 29 MMOL/L
CREAT SERPL-MCNC: 1.28 MG/DL
GLUCOSE SERPL-MCNC: 124 MG/DL
MAGNESIUM SERPL-MCNC: 2.2 MG/DL
POTASSIUM SERPL-SCNC: 4.3 MMOL/L
SODIUM SERPL-SCNC: 141 MMOL/L

## 2019-08-28 PROCEDURE — 99215 OFFICE O/P EST HI 40 MIN: CPT

## 2019-08-29 LAB — NT-PROBNP SERPL-MCNC: 1487 PG/ML

## 2019-10-11 NOTE — DISCUSSION/SUMMARY
[Patient] : the patient [FreeTextEntry1] : 88 yo F with chronic diastolic HF, rate-controlled AFib, and chronic hypoxia on home O2 who is overall doing well. She is ACC/AHA Stage C, NYHA Class II-III HF, euvolemic on exam. I have recommended the following:\par \par 1. Chronic diastolic heart failure - Continue current medications. Would not pursue CardioMems unless she is difficult to manage as an outpatient or has worsening symptoms of HF. Continued fluid and salt restriction.\par \par 2. AFib - Rate controlled on beta blocker and CCB. On Pradaxa for AC.\par \par 3. CKD stage 3 - Stable on current therapies.\par \par 4. Hypertension -  Well controlled on current therapies.\par \par 5. Follow up with me in 6 months.

## 2019-10-11 NOTE — PHYSICAL EXAM
[General Appearance - Well Developed] : well developed [General Appearance - Well Nourished] : well nourished [Well Groomed] : well groomed [General Appearance - In No Acute Distress] : no acute distress [No Oral Cyanosis] : no oral cyanosis [Respiration, Rhythm And Depth] : normal respiratory rhythm and effort [Auscultation Breath Sounds / Voice Sounds] : lungs were clear to auscultation bilaterally [Edema] : no peripheral edema present [2+] : left 2+ [Bowel Sounds] : normal bowel sounds [Abdomen Soft] : soft [Abdomen Tenderness] : non-tender [Nail Clubbing] : no clubbing of the fingernails [Cyanosis, Localized] : no localized cyanosis [Impaired Insight] : insight and judgment were intact [Oriented To Time, Place, And Person] : oriented to person, place, and time [Affect] : the affect was normal [Mood] : the mood was normal [Eyelids - No Xanthelasma] : the eyelids demonstrated no xanthelasmas [Skin Turgor] : normal skin turgor [No Venous Stasis] : no venous stasis [FreeTextEntry1] : slow gait using cane, using wheelchair for distance

## 2019-10-11 NOTE — REASON FOR VISIT
[Follow-Up - Clinic] : a clinic follow-up of [Heart Failure] : congestive heart failure [Spouse] : spouse [FreeTextEntry1] : PATIENT INSTRUCTIONS:\par \par 1. Continue your current medications.\par \par 2. Please keep a log of your blood pressure, heart rate, and weight taken daily. You may check your blood pressure and heart rate at a random time, preferably when you are at rest. Your weight should be checked first thing in the morning upon wakening and after using the bathroom. \par \par 3. Follow-up with Dr. Lopes in 6 months.

## 2019-10-11 NOTE — HISTORY OF PRESENT ILLNESS
[FreeTextEntry1] : Mrs. Mix is an 86 year old woman with history of HFpEF (LVEF 71% 3/18), MR s/p Mitraclip (8/30/16), and mixed pre- and post-capillary pulmonary hypertension who presents today for routine HF follow up. Her other comorbidities include permanent AFib, hypertension, hyperlipidemia and SHERYL. She is on continuous home oxygen at 3L via NC.\par \par Since last seen in clinic on 6/18, she has not had any clinical change in her condition. She is still able to climb 1 flight of stairs and she can manage her own ADLs. She sleeps in a recliner for "body comfort" and has not tried her bed in a long while. She is considering that now. She denies maikol orthopnea and PND, although has not slept flat. Her weight and BP are stable. She denies CP, SOB at rest, LH/dizziness, abdominal discomfort, palpitations, and syncope. Her appetite has been normal and her bowel and bladder habits are unchanged. She does not have a device. She has been limiting fluid and sodium in her diet and taking her medications as directed. She does not use NSAIDs. She has not been admitted to the hospital or seen in the ER for HF in the interim.

## 2019-10-21 ENCOUNTER — TRANSCRIPTION ENCOUNTER (OUTPATIENT)
Age: 84
End: 2019-10-21

## 2019-10-22 ENCOUNTER — TRANSCRIPTION ENCOUNTER (OUTPATIENT)
Age: 84
End: 2019-10-22

## 2019-10-26 ENCOUNTER — TRANSCRIPTION ENCOUNTER (OUTPATIENT)
Age: 84
End: 2019-10-26

## 2019-11-08 ENCOUNTER — RX RENEWAL (OUTPATIENT)
Age: 84
End: 2019-11-08

## 2019-11-13 ENCOUNTER — APPOINTMENT (OUTPATIENT)
Dept: CARDIOLOGY | Facility: CLINIC | Age: 84
End: 2019-11-13
Payer: MEDICARE

## 2019-11-13 ENCOUNTER — NON-APPOINTMENT (OUTPATIENT)
Age: 84
End: 2019-11-13

## 2019-11-13 VITALS
SYSTOLIC BLOOD PRESSURE: 123 MMHG | HEART RATE: 74 BPM | HEIGHT: 63 IN | DIASTOLIC BLOOD PRESSURE: 60 MMHG | OXYGEN SATURATION: 89 %

## 2019-11-13 PROCEDURE — 93000 ELECTROCARDIOGRAM COMPLETE: CPT

## 2019-11-13 PROCEDURE — 99214 OFFICE O/P EST MOD 30 MIN: CPT

## 2019-11-18 ENCOUNTER — RX RENEWAL (OUTPATIENT)
Age: 84
End: 2019-11-18

## 2020-01-10 NOTE — ED PROVIDER NOTE - NSTIMEPROVIDERCAREINITIATE_GEN_ER
Recd report from Raghavendra shea, [address]  Pt resting comfortably in bed  No signs of distress  Will continue to monitor  26-Jan-2018 12:04

## 2020-01-22 ENCOUNTER — RX RENEWAL (OUTPATIENT)
Age: 85
End: 2020-01-22

## 2020-02-27 ENCOUNTER — APPOINTMENT (OUTPATIENT)
Dept: DERMATOLOGY | Facility: CLINIC | Age: 85
End: 2020-02-27

## 2020-03-11 ENCOUNTER — APPOINTMENT (OUTPATIENT)
Dept: HEART FAILURE | Facility: CLINIC | Age: 85
End: 2020-03-11

## 2020-04-04 ENCOUNTER — EMERGENCY (EMERGENCY)
Facility: HOSPITAL | Age: 85
LOS: 1 days | Discharge: HOME | End: 2020-04-04
Attending: EMERGENCY MEDICINE
Payer: MEDICARE

## 2020-04-04 VITALS
HEIGHT: 63 IN | TEMPERATURE: 98 F | HEART RATE: 77 BPM | OXYGEN SATURATION: 88 % | SYSTOLIC BLOOD PRESSURE: 106 MMHG | WEIGHT: 179.9 LBS | RESPIRATION RATE: 22 BRPM | DIASTOLIC BLOOD PRESSURE: 68 MMHG

## 2020-04-04 VITALS
RESPIRATION RATE: 20 BRPM | HEART RATE: 56 BPM | OXYGEN SATURATION: 95 % | TEMPERATURE: 99 F | DIASTOLIC BLOOD PRESSURE: 93 MMHG | SYSTOLIC BLOOD PRESSURE: 130 MMHG

## 2020-04-04 DIAGNOSIS — H26.9 UNSPECIFIED CATARACT: Chronic | ICD-10-CM

## 2020-04-04 DIAGNOSIS — Z98.89 OTHER SPECIFIED POSTPROCEDURAL STATES: Chronic | ICD-10-CM

## 2020-04-04 LAB
ALBUMIN SERPL ELPH-MCNC: 3.8 G/DL — SIGNIFICANT CHANGE UP (ref 3.3–5)
ALP SERPL-CCNC: 36 U/L — LOW (ref 40–120)
ALT FLD-CCNC: 11 U/L — SIGNIFICANT CHANGE UP (ref 10–45)
ANION GAP SERPL CALC-SCNC: 14 MMOL/L — SIGNIFICANT CHANGE UP (ref 5–17)
APTT BLD: 48.1 SEC — HIGH (ref 27.5–36.3)
AST SERPL-CCNC: 21 U/L — SIGNIFICANT CHANGE UP (ref 10–40)
BASOPHILS # BLD AUTO: 0.02 K/UL — SIGNIFICANT CHANGE UP (ref 0–0.2)
BASOPHILS NFR BLD AUTO: 0.3 % — SIGNIFICANT CHANGE UP (ref 0–2)
BILIRUB SERPL-MCNC: 0.5 MG/DL — SIGNIFICANT CHANGE UP (ref 0.2–1.2)
BUN SERPL-MCNC: 31 MG/DL — HIGH (ref 7–23)
CALCIUM SERPL-MCNC: 8.7 MG/DL — SIGNIFICANT CHANGE UP (ref 8.4–10.5)
CHLORIDE SERPL-SCNC: 98 MMOL/L — SIGNIFICANT CHANGE UP (ref 96–108)
CO2 SERPL-SCNC: 26 MMOL/L — SIGNIFICANT CHANGE UP (ref 22–31)
CREAT SERPL-MCNC: 1.24 MG/DL — SIGNIFICANT CHANGE UP (ref 0.5–1.3)
EOSINOPHIL # BLD AUTO: 0.05 K/UL — SIGNIFICANT CHANGE UP (ref 0–0.5)
EOSINOPHIL NFR BLD AUTO: 0.8 % — SIGNIFICANT CHANGE UP (ref 0–6)
GLUCOSE SERPL-MCNC: 117 MG/DL — HIGH (ref 70–99)
HCT VFR BLD CALC: 44.6 % — SIGNIFICANT CHANGE UP (ref 34.5–45)
HGB BLD-MCNC: 13.8 G/DL — SIGNIFICANT CHANGE UP (ref 11.5–15.5)
IMM GRANULOCYTES NFR BLD AUTO: 1 % — SIGNIFICANT CHANGE UP (ref 0–1.5)
INR BLD: 1.47 RATIO — HIGH (ref 0.88–1.16)
LYMPHOCYTES # BLD AUTO: 1.17 K/UL — SIGNIFICANT CHANGE UP (ref 1–3.3)
LYMPHOCYTES # BLD AUTO: 19.1 % — SIGNIFICANT CHANGE UP (ref 13–44)
MCHC RBC-ENTMCNC: 27 PG — SIGNIFICANT CHANGE UP (ref 27–34)
MCHC RBC-ENTMCNC: 30.9 GM/DL — LOW (ref 32–36)
MCV RBC AUTO: 87.3 FL — SIGNIFICANT CHANGE UP (ref 80–100)
MONOCYTES # BLD AUTO: 0.76 K/UL — SIGNIFICANT CHANGE UP (ref 0–0.9)
MONOCYTES NFR BLD AUTO: 12.4 % — SIGNIFICANT CHANGE UP (ref 2–14)
NEUTROPHILS # BLD AUTO: 4.08 K/UL — SIGNIFICANT CHANGE UP (ref 1.8–7.4)
NEUTROPHILS NFR BLD AUTO: 66.4 % — SIGNIFICANT CHANGE UP (ref 43–77)
NRBC # BLD: 0 /100 WBCS — SIGNIFICANT CHANGE UP (ref 0–0)
NT-PROBNP SERPL-SCNC: 2077 PG/ML — HIGH (ref 0–300)
PLATELET # BLD AUTO: 81 K/UL — LOW (ref 150–400)
POTASSIUM SERPL-MCNC: 4.1 MMOL/L — SIGNIFICANT CHANGE UP (ref 3.5–5.3)
POTASSIUM SERPL-SCNC: 4.1 MMOL/L — SIGNIFICANT CHANGE UP (ref 3.5–5.3)
PROT SERPL-MCNC: 7.3 G/DL — SIGNIFICANT CHANGE UP (ref 6–8.3)
PROTHROM AB SERPL-ACNC: 17.1 SEC — HIGH (ref 10–12.9)
RBC # BLD: 5.11 M/UL — SIGNIFICANT CHANGE UP (ref 3.8–5.2)
RBC # FLD: 20.8 % — HIGH (ref 10.3–14.5)
SODIUM SERPL-SCNC: 138 MMOL/L — SIGNIFICANT CHANGE UP (ref 135–145)
TROPONIN T, HIGH SENSITIVITY RESULT: 31 NG/L — SIGNIFICANT CHANGE UP (ref 0–51)
TROPONIN T, HIGH SENSITIVITY RESULT: 32 NG/L — SIGNIFICANT CHANGE UP (ref 0–51)
WBC # BLD: 6.14 K/UL — SIGNIFICANT CHANGE UP (ref 3.8–10.5)
WBC # FLD AUTO: 6.14 K/UL — SIGNIFICANT CHANGE UP (ref 3.8–10.5)

## 2020-04-04 PROCEDURE — 93010 ELECTROCARDIOGRAM REPORT: CPT | Mod: GC

## 2020-04-04 PROCEDURE — 83735 ASSAY OF MAGNESIUM: CPT

## 2020-04-04 PROCEDURE — 99285 EMERGENCY DEPT VISIT HI MDM: CPT | Mod: GC

## 2020-04-04 PROCEDURE — 93005 ELECTROCARDIOGRAM TRACING: CPT

## 2020-04-04 PROCEDURE — 71045 X-RAY EXAM CHEST 1 VIEW: CPT | Mod: 26

## 2020-04-04 PROCEDURE — 85730 THROMBOPLASTIN TIME PARTIAL: CPT

## 2020-04-04 PROCEDURE — 84100 ASSAY OF PHOSPHORUS: CPT

## 2020-04-04 PROCEDURE — 83880 ASSAY OF NATRIURETIC PEPTIDE: CPT

## 2020-04-04 PROCEDURE — 71045 X-RAY EXAM CHEST 1 VIEW: CPT

## 2020-04-04 PROCEDURE — 80053 COMPREHEN METABOLIC PANEL: CPT

## 2020-04-04 PROCEDURE — 84484 ASSAY OF TROPONIN QUANT: CPT

## 2020-04-04 PROCEDURE — 99283 EMERGENCY DEPT VISIT LOW MDM: CPT | Mod: 25

## 2020-04-04 PROCEDURE — 85027 COMPLETE CBC AUTOMATED: CPT

## 2020-04-04 PROCEDURE — 85610 PROTHROMBIN TIME: CPT

## 2020-04-04 RX ORDER — AZITHROMYCIN 500 MG/1
500 TABLET, FILM COATED ORAL ONCE
Refills: 0 | Status: COMPLETED | OUTPATIENT
Start: 2020-04-04 | End: 2020-04-04

## 2020-04-04 RX ORDER — AZITHROMYCIN 500 MG/1
1 TABLET, FILM COATED ORAL
Qty: 4 | Refills: 0
Start: 2020-04-04 | End: 2020-04-07

## 2020-04-04 RX ORDER — ACETAMINOPHEN 500 MG
650 TABLET ORAL ONCE
Refills: 0 | Status: COMPLETED | OUTPATIENT
Start: 2020-04-04 | End: 2020-04-04

## 2020-04-04 RX ADMIN — AZITHROMYCIN 500 MILLIGRAM(S): 500 TABLET, FILM COATED ORAL at 23:43

## 2020-04-04 RX ADMIN — Medication 650 MILLIGRAM(S): at 21:44

## 2020-04-04 NOTE — ED PROVIDER NOTE - PROGRESS NOTE DETAILS
PGY2/MD Lopez. spoke with Kallie in law, Rina Baron, [phone]. understanding pt is not hypoxic, requires medication adjustment, metformin 200mg bid at least cut off to half, Dr. Hunt is the physician taking care of the medication. Dr. Lopes also was involved in the conversation but not has access or capability to take care of the pt care out-pt. IF pt requires admission, charis or electrolyte abnormality in this case, heart failure team can follow up the pt during hospital stay. lab result pending. PGY2/MD Lopez. Spoke with the daughter in law again over the phone. Pt received instruction of the dose of metoproplonol from Dr. Vera covering Dr. Hunt today. Pt will be seen by their service after d/c. HR low stable, 56, mentally stable. No sings of hypoxia, cxr with pna. likely covid but will cover atypical/cap with azm. all the plan was discussed with pt's daughter in law. given COVID pandemic and increasing number of admitted cases (including healthcare providers in the hospital), outpatient management is likely favored in risk/benefit analysis.  d/w patient to this effect.

## 2020-04-04 NOTE — ED PROVIDER NOTE - OBJECTIVE STATEMENT
STEWARTY2/MD Lopez. 86 yo F with hx of HFpEF (EF 60-65%, mod MR, RV enlargement w/ RV systolic dysfunction, B/L Atrial enlargement), severe MR s/p mitral clip on 8/2016, chronic Afib (on pradaxa), s/p PPI (2019) on Pradaxa, possible SHERYL, p/w bradycardia sent from Dr. Lopes (cardiologist) and his aid. Triage note read, but patient explicitly states is not "SOB". Pt is home oxygen dependent, 2L nasal, and normal SpO2 is 94%. Pt's  was admitted for COVID. Denies syncope, light headed or sob. NO chest pain.    PCP/Cardiologist: Dr. Lopes TIGIST/MD Lopez. 88 yo F with hx of HFpEF (EF 60-65%, mod MR, RV enlargement w/ RV systolic dysfunction, B/L Atrial enlargement), severe MR s/p mitral clip on 8/2016, chronic Afib (on pradaxa), s/p PPI (2019) on Pradaxa, possible SHERYL, p/w bradycardia sent from Dr. Lopes (cardiologist) and his aid. Triage note read, but patient explicitly states is not "SOB". Pt is home oxygen dependent, 2L nasal, and normal SpO2 is 94%. Pt's  was admitted for COVID. Denies syncope, light headed or sob. NO chest pain.    PCP/Cardiologist: Alexandra Enciso TIGIST/MD Lopez. 88 yo F with hx of HFpEF (EF 60-65%, mod MR, RV enlargement w/ RV systolic dysfunction, B/L Atrial enlargement), severe MR s/p mitral clip on 8/2016, chronic Afib (on pradaxa), s/p PPI (2019) on Pradaxa, possible SHERYL, p/w bradycardia sent from Dr. Lopes (cardiologist) and his aid. Triage note read, but patient explicitly states is not "SOB". Pt is home oxygen dependent, 2L nasal, and normal SpO2 is 94%. Pt's  was admitted for COVID. Denies syncope, light headed or sob. NO chest pain.    PCP/Cardiologist: Dr. Lopes, Alexandra  Cardiologist: Dr. Hunt

## 2020-04-04 NOTE — ED PROVIDER NOTE - PHYSICAL EXAMINATION
PGY2/MD Lopez.   VITALS: reviewed  GEN: No apparent distress, A & O x 4  HEAD/EYES: NC/AT, PERRL, EOMI, anicteric sclerae, no conjunctival pallor  ENT: mucus membranes moist, oropharynx WNL, trachea midline, no JVD, neck is supple  RESP: lungs CTA with equal breath sounds bilaterally, chest wall nontender and atraumatic  CV: heart with reg rhythm S1, S2, no murmur; cold extremities but not mottled skin  ABDOMEN: normoactive bowel sounds, soft, nondistended, nontender  MSK: extremities atraumatic and nontender, no edema, no asymmetry.   SKIN: warm, dry, no rash, no bruising, no cyanosis. color appropriate for ethnicity  NEURO: alert, mentating appropriately, no facial asymmetry.  PSYCH: Affect appropriate

## 2020-04-04 NOTE — ED PROVIDER NOTE - PATIENT PORTAL LINK FT
You can access the FollowMyHealth Patient Portal offered by [facility] by registering at the following website: [email or web address]. By joining [facility]’s FollowMyHealth portal, you will also be able to view your health information using other applications (apps) compatible with our system.

## 2020-04-04 NOTE — ED PROVIDER NOTE - ATTENDING CONTRIBUTION TO CARE
88 yo F with hx of HFpEF (EF 60-65%, mod MR, RV enlargement w/ RV systolic dysfunction, B/L Atrial enlargement), severe MR s/p mitral clip on 8/2016, chronic Afib (on pradaxa) with mild flu like sts, her  admitted yesterday, sats on 2l which she uses at home for chf, 96% with no sob, lungs cta b/l sent in for bradycardia, ekg afib hr 58, nl bp, mentating well.  cardiac work up, cxr, discuss with her cards might readjust her bblocker and ca channel blocker.

## 2020-04-04 NOTE — ED PROVIDER NOTE - NSFOLLOWUPINSTRUCTIONS_ED_ALL_ED_FT
Catherine, cut off the dose of metoprolol to 100 mg twice day (from 200 mg bid) until seen by Dr. Hunt. Please discuss the medication plan with Dr. Hunt.    (1) Follow up the education guide as discussed. In addition, we did not find evidence of a life threatening illness on your testing here today, but listed below are the specialists that will be necessary to see as an outpatient to continue the workup.  Please call the numbers listed below or [phone] to set up the necessary appointments  or call [phone] to make an appointment with the clinic.  (2) Patients who have questions related to Coronavirus, COVID-19: call [phone], press optiion #3  (3) Return immediately to the emergency department for new, persistent, or worsening symptoms or signs. Return immediately to the emergency department if you have sever headache, chest pain, shortness of breath, loss of consciousness, or any other new concerns.    Your symptoms may be due to the novel coronavirus (COVID19).     For any questions regarding COVID19 or quarantine, changes in your clinical status or any concerns, please call: [facility] Emergency [facility] at: [phone]    Please return to the ER if you develop worsening or concerning symptoms, shortness of breath, high fevers not improved with antifever medications, difficulty eating or drinking, chest pain, or anything else of concern to you.    You may take 500-1000 mg acetaminophen every 6 hours, as needed for fever / pain    What is a coronavirus?  Coronaviruses are a large family of viruses that cause illnesses ranging from the common cold to more severe diseases such as Middle East Respiratory Syndrome (MERS) and Severe Acute Respiratory Syndrome (SARS).    What is Novel Coronavirus (COVID-19)?  The Centers for Disease Control and Prevention (CDC) is closely monitoring the outbreak caused by COVID-19. For the latest information about COVID-19, visit the CDC website at CDC.gov/Coronavirus    How are coronaviruses spread?  Coronaviruses can be transmitted from person-toperson, usually after close contact with an infected  person (for example, in a household, workplace, or healthcare setting), via droplets that become airborne after a cough or sneeze. These droplets can then infect a nearby person. Transmission can also occur by touching recently contaminated surfaces.    Is there a treatment for a COVID-19?  There is no specific treatment for disease caused by COVID-19. However, many of the symptoms can be treated based on the patient’s clinical condition. Supportive care for infected persons can be highly effective.    What are the symptoms of coronavirus infection?  It depends on the virus, but common signs include fever and/or respiratory symptoms such as cough and shortness of breath. In more severe cases, infection can cause pneumonia, severe acute respiratory syndrome, kidney failure and even death. Fortunately, most cases of COVID-19 have an illness no different than the influenza (flu), with a majority of these patients having mild symptoms and overall mortality which appears to be not much different than the flu.    What can I do to protect myself?  The best precautionary measures:  – washing your hands  – covering your cough  – disinfecting surfaces  – it is also advisable to avoid close contact with anyone showing symptoms of respiratory illness such as coughing and sneezing  – those with symptoms should wear a surgical mask when around others    What can I do to protect those around me?  If you have been identified as someone who may be infected with COVID-19, we recommend you follow the self-isolation procedures outlined on the following page to protect those around you and to limit the spread of this virus.    We recommend the below precautionary steps from now until 14 days from when you returned from your travel or date of your last known possible contact:    — Do not go to work, school or public areas. Avoid using public transportation, ridesharing or taxis.  — As much as possible, separate yourself from other people in your home. If you can, you should stay in a room and away from other people. Also, you should use a separate bathroom if available.  — Wear the supplied mask whenever you are around other people.  — If you have a non-urgent medical appointment, please reschedule for a later date. If the appointment is urgent, please call the health care provider and tell them that you are on self-isolation for possible COVID-19. This will help the health care provider’s office take steps to keep other people from getting infected or exposed. If you can reschedule routine appointments, do so.  — Wash your hands often with soap and water for at least 15 to 20 seconds or clean your hands with an alcohol-based hand  that contains 60 to 95% alcohol, covering all surfaces of your hands and rubbing them together until they feel dry. Soap and water should be used preferentially if hands are visibly dirty.  — Cover your mouth and nose with a tissue when you cough or sneeze. Throw used tissues in a lined trash can. Immediately wash your hands.  — Avoid touching your eyes, nose, and mouth with your hands.  — Avoid sharing personal household items. You should not share dishes, drinking glasses, cups, eating utensils, towels, or bedding with other people or pets in your home. After using these items, they should be washed thoroughly with soap and water.  — Clean and disinfect all “high-touch”surfaces every day. High touch surfaces include counters, tabletops, doorknobs, light switches, remote controls, bathroom fixtures, toilets, phones, keyboards, tablets, and bedside tables. Also, clean any surfaces that may have blood, stool, or body fluids on them.

## 2020-04-04 NOTE — ED ADULT NURSE NOTE - OBJECTIVE STATEMENT
PMH of afib, heart failure. Pt uses 2L oxygen at home all the time for heart failure. Pt states daughter called EMS bc her HR was low per portable finger pulse ox at home. Pt states HR was 52. Pt states she had a fever of 100.1F which she took tylenol for (last dose at 3pm). Pt poor historian. Pt denies SOB, . Pt has trace edema BL which she states is normal for her. Pt is 94% spO2 on 2L NC. PMH of afib (on pradaxa), heart failure, mitral valve regurgitation. Pt uses 2L oxygen at home all the time for heart failure. Pt states daughter called EMS bc her HR was low per portable finger pulse ox at home. Pt states HR was 52. Pt states she had a fever of 100.1F which she took tylenol for (last dose at 3pm). Pt is 94% spO2 on 2L NC. Pt states "I have no shortness of breath". Pt does have slight increased WOB and tachypnea with no retractions. Pt has hand on chest but denies difficulty breathing and denies CP. Pt also denies palpitations, dizziness, lightheadedness, N/V, numbness/tingling, weakness, increased urination, burning on urination, abdominal pain, back pain.  Pt denies any pain at this time. Pt has trace edema BL which she states is normal for her. Pt  is +COVID19 and was admitted to the hospital. Pt poor historian. Pt AAOx4. Pt placed on cardiac and spo2 monitor. Pt noted to have heart rhythm of atrial fibrillation with bradycardia. Pt reposition for comfort. IV placed and labs drawn and sent. Bed locked in lowest position with appropriate side rails raised. Pt given call bell and explained call bell system. Pt aware of plan to await lab results and chest x-ray. Pt has no other questions or concerns at this time. PMH of afib (on pradaxa), heart failure, mitral valve regurgitation. Pt states she came to hospital bc her HR was low per portable finger pulse ox. Pt states HR was 52. Pt states she had a fever of 100.1F which she took tylenol for (last dose at 3pm). Pt uses 2L oxygen at home all the time for heart failure and normal spO2 is 94%. Pt placed on 2L O2 NC upon arrival. Pt states "I have no shortness of breath". Pt does have slight increased WOB and tachypnea with no retractions. Pt has hand on chest but denies difficulty breathing, CP or palpitations. Pt also denies dizziness, lightheadedness, headaches, N/V, C/D, numbness/tingling, weakness, increased urination, burning on urination, abdominal pain, back pain.  Pt denies any pain at this time. Pt has trace edema BL which she states is normal for her. Pt  is +COVID19 and was admitted to the hospital. Pt poor historian. Pt AAOx4. Pt placed on cardiac and spo2 monitor. Pt noted to have heart rhythm of atrial fibrillation with bradycardia. Pt reposition for comfort. IV placed and labs drawn and sent. Bed locked in lowest position with appropriate side rails raised. Pt given call bell and explained call bell system. Pt aware of plan to await lab results and chest x-ray. Pt has no other questions or concerns at this time.

## 2020-04-04 NOTE — ED PROVIDER NOTE - CLINICAL SUMMARY MEDICAL DECISION MAKING FREE TEXT BOX
PGY2/MD Lopez. 88 yo F with multiple cardiac complications including CHF, with PPI, afib on Pradaxa. due to cold finger, probably low perfusion, sat difficult to detect but with a good wave form, confirmed SpO2 95-97% on nasal 2L oxygen. does not meet with hypoxic criteria, EKG with HR 58, asymptomatic. will call cardioglosit, medication adjustment, but likely d/c.

## 2020-08-19 ENCOUNTER — APPOINTMENT (OUTPATIENT)
Dept: HEART FAILURE | Facility: CLINIC | Age: 85
End: 2020-08-19

## 2020-08-28 ENCOUNTER — NON-APPOINTMENT (OUTPATIENT)
Age: 85
End: 2020-08-28

## 2020-08-28 ENCOUNTER — APPOINTMENT (OUTPATIENT)
Dept: CARDIOLOGY | Facility: CLINIC | Age: 85
End: 2020-08-28
Payer: MEDICARE

## 2020-08-28 VITALS
HEIGHT: 63 IN | BODY MASS INDEX: 31.71 KG/M2 | HEART RATE: 76 BPM | SYSTOLIC BLOOD PRESSURE: 114 MMHG | OXYGEN SATURATION: 92 % | WEIGHT: 179 LBS | DIASTOLIC BLOOD PRESSURE: 74 MMHG

## 2020-08-28 PROCEDURE — 93000 ELECTROCARDIOGRAM COMPLETE: CPT

## 2020-08-28 PROCEDURE — 99214 OFFICE O/P EST MOD 30 MIN: CPT

## 2020-09-08 LAB
SARS-COV-2 IGG SERPL IA-ACNC: 271 AU/ML
SARS-COV-2 IGG SERPL QL IA: POSITIVE

## 2020-09-29 NOTE — H&P ADULT - NEGATIVE CARDIOVASCULAR SYMPTOMS
[FreeTextEntry1] : Onychomycosis\par Edema, B/L\par \par Patient seen and evaluated \par All findings discussed with patient \par Rx for Ciclopirox\par Recommended compression socks to be worn during the day  \par F/u in 2 months \par  no chest pain/no paroxysmal nocturnal dyspnea/no palpitations

## 2020-11-18 ENCOUNTER — APPOINTMENT (OUTPATIENT)
Dept: CV DIAGNOSITCS | Facility: HOSPITAL | Age: 85
End: 2020-11-18

## 2020-11-18 ENCOUNTER — APPOINTMENT (OUTPATIENT)
Dept: HEART FAILURE | Facility: CLINIC | Age: 85
End: 2020-11-18
Payer: MEDICARE

## 2020-11-18 ENCOUNTER — OUTPATIENT (OUTPATIENT)
Dept: OUTPATIENT SERVICES | Facility: HOSPITAL | Age: 85
LOS: 1 days | End: 2020-11-18
Payer: MEDICARE

## 2020-11-18 ENCOUNTER — NON-APPOINTMENT (OUTPATIENT)
Age: 85
End: 2020-11-18

## 2020-11-18 VITALS
HEART RATE: 61 BPM | TEMPERATURE: 97.8 F | HEIGHT: 63 IN | RESPIRATION RATE: 16 BRPM | WEIGHT: 180 LBS | BODY MASS INDEX: 31.89 KG/M2 | SYSTOLIC BLOOD PRESSURE: 102 MMHG | OXYGEN SATURATION: 93 % | DIASTOLIC BLOOD PRESSURE: 58 MMHG

## 2020-11-18 DIAGNOSIS — Z98.89 OTHER SPECIFIED POSTPROCEDURAL STATES: Chronic | ICD-10-CM

## 2020-11-18 DIAGNOSIS — H26.9 UNSPECIFIED CATARACT: Chronic | ICD-10-CM

## 2020-11-18 DIAGNOSIS — I50.32 CHRONIC DIASTOLIC (CONGESTIVE) HEART FAILURE: ICD-10-CM

## 2020-11-18 DIAGNOSIS — I34.0 NONRHEUMATIC MITRAL (VALVE) INSUFFICIENCY: ICD-10-CM

## 2020-11-18 PROCEDURE — 93000 ELECTROCARDIOGRAM COMPLETE: CPT

## 2020-11-18 PROCEDURE — 93306 TTE W/DOPPLER COMPLETE: CPT

## 2020-11-18 PROCEDURE — 93306 TTE W/DOPPLER COMPLETE: CPT | Mod: 26

## 2020-11-18 PROCEDURE — 99215 OFFICE O/P EST HI 40 MIN: CPT

## 2020-11-18 RX ORDER — METOPROLOL TARTRATE 100 MG/1
100 TABLET, FILM COATED ORAL
Qty: 120 | Refills: 4 | Status: DISCONTINUED | COMMUNITY
Start: 2019-03-20 | End: 2020-11-18

## 2020-11-18 RX ORDER — FAMOTIDINE 20 MG/1
20 TABLET, FILM COATED ORAL DAILY
Qty: 90 | Refills: 3 | Status: DISCONTINUED | COMMUNITY
Start: 2019-03-20 | End: 2020-11-18

## 2020-11-28 LAB
ALBUMIN SERPL ELPH-MCNC: 4.4 G/DL
ALP BLD-CCNC: 50 U/L
ALT SERPL-CCNC: 12 U/L
ANION GAP SERPL CALC-SCNC: 12 MMOL/L
AST SERPL-CCNC: 20 U/L
BASOPHILS # BLD AUTO: 0.04 K/UL
BASOPHILS NFR BLD AUTO: 0.4 %
BILIRUB SERPL-MCNC: 0.6 MG/DL
BUN SERPL-MCNC: 32 MG/DL
CALCIUM SERPL-MCNC: 9.1 MG/DL
CHLORIDE SERPL-SCNC: 99 MMOL/L
CO2 SERPL-SCNC: 29 MMOL/L
CREAT SERPL-MCNC: 1.35 MG/DL
EOSINOPHIL # BLD AUTO: 0.36 K/UL
EOSINOPHIL NFR BLD AUTO: 4 %
GLUCOSE SERPL-MCNC: 107 MG/DL
HCT VFR BLD CALC: 48 %
HGB BLD-MCNC: 15.7 G/DL
IMM GRANULOCYTES NFR BLD AUTO: 0.6 %
LYMPHOCYTES # BLD AUTO: 1.93 K/UL
LYMPHOCYTES NFR BLD AUTO: 21.3 %
MAGNESIUM SERPL-MCNC: 2.6 MG/DL
MAN DIFF?: NORMAL
MCHC RBC-ENTMCNC: 31.8 PG
MCHC RBC-ENTMCNC: 32.7 GM/DL
MCV RBC AUTO: 97.4 FL
MONOCYTES # BLD AUTO: 1.14 K/UL
MONOCYTES NFR BLD AUTO: 12.6 %
NEUTROPHILS # BLD AUTO: 5.52 K/UL
NEUTROPHILS NFR BLD AUTO: 61.1 %
NT-PROBNP SERPL-MCNC: 1660 PG/ML
PLATELET # BLD AUTO: 145 K/UL
POTASSIUM SERPL-SCNC: 4.5 MMOL/L
PROT SERPL-MCNC: 7 G/DL
RBC # BLD: 4.93 M/UL
RBC # FLD: 13.6 %
SODIUM SERPL-SCNC: 140 MMOL/L
WBC # FLD AUTO: 9.04 K/UL

## 2020-12-29 ENCOUNTER — NON-APPOINTMENT (OUTPATIENT)
Age: 85
End: 2020-12-29

## 2020-12-29 NOTE — REASON FOR VISIT
[Follow-Up - Clinic] : a clinic follow-up of [Heart Failure] : congestive heart failure [FreeTextEntry1] : PATIENT INSTRUCTIONS:\par \par 1. Continue your current medications.\par \par 2. TTE will be added on for today.\par \par 3. Follow-up with Dr. Lopes in 6 months.

## 2020-12-29 NOTE — DISCUSSION/SUMMARY
[Patient] : the patient [FreeTextEntry1] : 87 yo F with chronic diastolic HF, rate-controlled AFib, CKD stage 3 and chronic hypoxia on home O2 who is overall doing well. She is ACC/AHA Stage C, NYHA Class II-III HF, euvolemic on exam. I have recommended the following:\par \par 1. Chronic diastolic heart failure - Continue current medications. Would not pursue CardioMems unless she is difficult to manage as an outpatient or has worsening symptoms of HF. Continued fluid and salt restriction. TTE today essentially unchanged from 2018 with persistent severe PH.\par \par 2. Pulmonary hypertension - She has been stable for a few years despite severely elevated pulmonary pressures by TTE. She clinically feels well on current regimen and is not fluid overloaded. At the time of her last RHC on 3/11/19, PA 61/21/35, PCW 12 without a response to inhaled NO and with PA 71.3%, PVR 3.77 Mendez. Her weight then was 84 kg and she is now 81.6 kg. Would not pursue additional therapies unless she develops worsening symptoms.\par \par 3. AFib - Rate controlled on beta blocker and CCB. On Pradaxa for AC. \par \par 4. CKD stage 3 - Stable on current therapies.\par \par 5. Hypertension -  Well controlled on current therapies.\par \par 6. Follow up with me in 6 months.

## 2020-12-29 NOTE — HISTORY OF PRESENT ILLNESS
[FreeTextEntry1] : Mrs. Mix is an 88 year old woman with history of HFpEF (LVEF 71% 3/18), MR s/p Mitraclip (8/30/16), and mixed pre- and post-capillary pulmonary hypertension who presents today for routine HF follow up. Her other comorbidities include permanent AFib, hypertension, hyperlipidemia and SHERYL. She was previously on continuous home oxygen at 3L via [place], but now uses it mostly at night or as needed.\par \par Since last seen in clinic on 8/28/19, she was  due to COVID and she is now in assisted living. She also had COVID at that time, but was no hospitalized with her . She had COVID antibodies on labs done in Aug 2020. She denies any change in her condition and reports that she is overall doing well. She can get around on flat ground and manage her own ADLs. She doesn't do stairs, but thinks she could given where she was prior to moving to the [facility]. \par \par Currently, she denies CP, SOB at rest, orthopnea, PND, LH/dizziness, abdominal discomfort, palpitations, and syncope. Her appetite is normal and her bowel and bladder habits are unchanged. She has been limiting fluid and sodium in her diet and taking her medications as directed. She does not use NSAIDs. She has not been admitted to the hospital or seen in the ER for HF in the interim.

## 2020-12-29 NOTE — PHYSICAL EXAM
[General Appearance - Well Developed] : well developed [Well Groomed] : well groomed [General Appearance - Well Nourished] : well nourished [General Appearance - In No Acute Distress] : no acute distress [Eyelids - No Xanthelasma] : the eyelids demonstrated no xanthelasmas [Respiration, Rhythm And Depth] : normal respiratory rhythm and effort [Auscultation Breath Sounds / Voice Sounds] : lungs were clear to auscultation bilaterally [Edema] : no peripheral edema present [2+] : left 2+ [Bowel Sounds] : normal bowel sounds [Abdomen Soft] : soft [Abdomen Tenderness] : non-tender [Nail Clubbing] : no clubbing of the fingernails [Cyanosis, Localized] : no localized cyanosis [Skin Turgor] : normal skin turgor [No Venous Stasis] : no venous stasis [Oriented To Time, Place, And Person] : oriented to person, place, and time [Impaired Insight] : insight and judgment were intact [Affect] : the affect was normal [Mood] : the mood was normal [FreeTextEntry1] : slow gait using cane, using wheelchair for distance

## 2021-05-19 ENCOUNTER — NON-APPOINTMENT (OUTPATIENT)
Age: 86
End: 2021-05-19

## 2021-05-19 ENCOUNTER — APPOINTMENT (OUTPATIENT)
Dept: HEART FAILURE | Facility: CLINIC | Age: 86
End: 2021-05-19
Payer: MEDICARE

## 2021-05-19 VITALS
HEART RATE: 62 BPM | WEIGHT: 180 LBS | OXYGEN SATURATION: 89 % | HEIGHT: 63 IN | DIASTOLIC BLOOD PRESSURE: 66 MMHG | BODY MASS INDEX: 31.89 KG/M2 | RESPIRATION RATE: 16 BRPM | SYSTOLIC BLOOD PRESSURE: 104 MMHG | TEMPERATURE: 98.2 F

## 2021-05-19 VITALS — OXYGEN SATURATION: 96 %

## 2021-05-19 LAB
ALBUMIN SERPL ELPH-MCNC: 4.7 G/DL
ALP BLD-CCNC: 46 U/L
ALT SERPL-CCNC: 9 U/L
ANION GAP SERPL CALC-SCNC: 14 MMOL/L
AST SERPL-CCNC: 20 U/L
BILIRUB SERPL-MCNC: 0.6 MG/DL
BUN SERPL-MCNC: 36 MG/DL
CALCIUM SERPL-MCNC: 9.9 MG/DL
CHLORIDE SERPL-SCNC: 99 MMOL/L
CO2 SERPL-SCNC: 26 MMOL/L
CREAT SERPL-MCNC: 1.32 MG/DL
GLUCOSE SERPL-MCNC: 115 MG/DL
MAGNESIUM SERPL-MCNC: 2.6 MG/DL
NT-PROBNP SERPL-MCNC: 1582 PG/ML
POTASSIUM SERPL-SCNC: 4.7 MMOL/L
PROT SERPL-MCNC: 7.4 G/DL
SODIUM SERPL-SCNC: 140 MMOL/L

## 2021-05-19 PROCEDURE — 99215 OFFICE O/P EST HI 40 MIN: CPT

## 2021-05-19 PROCEDURE — 93000 ELECTROCARDIOGRAM COMPLETE: CPT

## 2021-05-28 ENCOUNTER — NON-APPOINTMENT (OUTPATIENT)
Age: 86
End: 2021-05-28

## 2021-05-28 NOTE — HISTORY OF PRESENT ILLNESS
[FreeTextEntry1] : Mrs. Mix is an 88 year old woman with history of HFpEF (LVEF 71% 3/18), MR s/p Mitraclip (8/30/16), and mixed pre- and post-capillary pulmonary hypertension who presents today for routine HF follow up. Her other comorbidities include permanent AFib (on Pradaxa), hypertension, hyperlipidemia and SHERYL. She was previously on continuous home oxygen at 3L via NC, but now uses it mostly at night or as needed.\par \par Since last visit on 11/18, she has been doing well. She continues to reside at the [facility] after the loss of her   to COVID. She stays active by participating in various activities in the facility. Reports her exertional tolerance to be unchanged and can get around on flat ground and manage own ADLs comfortably. She does not do stairs/inclines. Her daughter voices concern over reduced use of oxygen during the day as the 02 tank is difficulty to carry around. Otherwise, she denies CP, SOB at rest, orthopnea, PND, LH/dizziness, abdominal discomfort, palpitations, and syncope. Her appetite is normal and her bowel and bladder habits are unchanged. She has been limiting fluid and sodium in her diet and taking her medications as directed. She does not use NSAIDs. She has not been admitted to the hospital or seen in the ER for HF in the interim.\par

## 2021-05-28 NOTE — DISCUSSION/SUMMARY
[Patient] : the patient [FreeTextEntry1] : 89 yo F with chronic diastolic HF, rate-controlled AFib, CKD stage 3 and chronic hypoxia on home O2 who overall continues to do well. She is ACC/AHA Stage C, NYHA Class II-III HF, euvolemic on exam and normotensive. I have recommended the following:\par \par 1. Chronic diastolic heart failure - Continue current medications. Would not pursue CardioMems unless she is difficult to manage as an outpatient or has worsening symptoms of HF. Continue fluid and salt restriction. TTE from 11/2020 essentially unchanged from 2018 with persistent severe PH. Can get annual TTE.\par \par 2. Pulmonary hypertension - She has been stable for a few years despite severely elevated pulmonary pressures by TTE. She clinically feels well on current regimen and is not fluid overloaded. At the time of her last RHC on 3/11/19, PA 61/21/35, PCW 12 without a response to inhaled NO and with PA 71.3%, PVR 3.77 Mendez. Her weight then was 84 kg and she is now 81.8 kg. Would not pursue additional therapies unless she develops worsening symptoms.\par \par 3. AFib - Rate controlled on beta blocker and CCB. On Pradaxa for AC. \par \par 4. CKD stage 3 - Stable on current therapies. Repeat labs today with BUN/Cr 36/1.32 from 32/1.35 in November 2020. \par \par 5. Hypertension -  Well controlled on current therapies.\par \par 6. Follow up with Dr. Lopes in 6 months.

## 2021-05-28 NOTE — PHYSICAL EXAM
[General Appearance - Well Developed] : well developed [Well Groomed] : well groomed [General Appearance - Well Nourished] : well nourished [General Appearance - In No Acute Distress] : no acute distress [Eyelids - No Xanthelasma] : the eyelids demonstrated no xanthelasmas [Respiration, Rhythm And Depth] : normal respiratory rhythm and effort [Auscultation Breath Sounds / Voice Sounds] : lungs were clear to auscultation bilaterally [2+] : left 2+ [Bowel Sounds] : normal bowel sounds [Abdomen Soft] : soft [Abdomen Tenderness] : non-tender [Nail Clubbing] : no clubbing of the fingernails [Cyanosis, Localized] : no localized cyanosis [Skin Turgor] : normal skin turgor [No Venous Stasis] : no venous stasis [Oriented To Time, Place, And Person] : oriented to person, place, and time [Affect] : the affect was normal [Impaired Insight] : insight and judgment were intact [Mood] : the mood was normal [Exaggerated Use Of Accessory Muscles For Inspiration] : no accessory muscle use [Heart Sounds] : normal S1 and S2 [FreeTextEntry1] : slow gait using cane, using wheelchair for distance [No Xanthelasma] : no xanthelasma [Clear Lung Fields] : clear lung fields [Good Air Entry] : good air entry [No Respiratory Distress] : no respiratory distress  [Soft] : abdomen soft [Non Tender] : non-tender [Normal Bowel Sounds] : normal bowel sounds [No Cyanosis] : no cyanosis [No Clubbing] : no clubbing [No Rash] : no rash [Normal] : alert and oriented, normal memory [de-identified] : unable to assess - wearing a mask [de-identified] : JVP 6-8 cm H2O, no HJR [de-identified] : Irregularly irregular, II/VI systolic murmur at apex [de-identified] : unable to appreciate HSM or masses due to body habitus [de-identified] : n [de-identified] : slow gait using cane, using wheelchair for distance [de-identified] :  trace BLE edema [de-identified] : no venous stasis

## 2021-05-28 NOTE — REASON FOR VISIT
[Follow-Up - Clinic] : a clinic follow-up of [Heart Failure] : congestive heart failure [Other: _____] : [unfilled] [FreeTextEntry1] : PATIENT INSTRUCTIONS:\par \par 1. Routine repeat labs today, we will call you with the results  \par \par 2. Continue your current medication as prescribed, we have made no adjustments today\par \par 3. If you would like to obtain a compressed oxygen tank, inquire with Dr. Henley's office for a prescription \par \par 4. Followup with Dr. Lpoes in 6 months

## 2021-05-28 NOTE — CARDIOLOGY SUMMARY
[___] : [unfilled] [de-identified] : 05/19/21 ECG: AFib at 61 bpm, iRBBB, LAFB. No sig change from prior.\par 11/18/20 ECG: AFib at 57 bpm, iRBBB, LAFB. No sig change from prior 8/28/20. [de-identified] : 03/11/19 RHC baseline: no RA or RV reported, PA 61/21/35, PCW 12, Ao 93%, PA 71%, CO/CI (F) 6.09/3.26, PVR 3.77 Mendez.\par Nitric Oxide: RA 10, RV 57/12, PA 54/17/30, no PCW reported, Ao 93%, PA 77%, CO/CI (F) 8.19/4.38,  PVR 3.66 Mendez. [de-identified] : 11/18/20 TTE: LVIDd 4.6 cm, normal LVEF, concentric LVH (SW 1.2, PW 1.3 cm), flattening of septum c/w RV pressure overload, normal LA size, mod SONNY, RVE with decreased RV function, mild-mod MR s/p MitraClip (peak 10 mmHg, mean 4 mmHg), mod-severe TR, no effusion, severe PH (RVSP 95 mmHg). Iatrogenic transeptal flow noted (transeptal puncture for Clip).\par \par 03/01/18 TTE: LVEF 70%, LVIDd 5.1, septum/PWT 1.1, LA 5.7, severely dilated RV with severe RVSD, severe biatrial enlargement, severe TR, MR present but shadowed by Mitraclip,est RVSP 82, left to right atrial flow noted from transseptal puncture.

## 2021-05-28 NOTE — END OF VISIT
[Time Spent: ___ minutes] : I have spent [unfilled] minutes of time on the encounter. [FreeTextEntry3] : I was physically present with the HF NP for the key portions of the history and physical examination.  I agree with the above history, physical, and plan which I have reviewed and edited where appropriate.

## 2021-06-30 ENCOUNTER — APPOINTMENT (OUTPATIENT)
Dept: PULMONOLOGY | Facility: CLINIC | Age: 86
End: 2021-06-30
Payer: MEDICARE

## 2021-06-30 VITALS
OXYGEN SATURATION: 84 % | SYSTOLIC BLOOD PRESSURE: 100 MMHG | WEIGHT: 180 LBS | HEART RATE: 88 BPM | BODY MASS INDEX: 31.89 KG/M2 | HEIGHT: 63 IN | DIASTOLIC BLOOD PRESSURE: 52 MMHG

## 2021-06-30 PROCEDURE — 71046 X-RAY EXAM CHEST 2 VIEWS: CPT

## 2021-06-30 PROCEDURE — 99213 OFFICE O/P EST LOW 20 MIN: CPT | Mod: 25

## 2021-06-30 NOTE — HISTORY OF PRESENT ILLNESS
[Never] : never [TextBox_4] : Had COVID 2020\par  , patient did surprisingly well.\par She uses oxygen for heart failure.  Reports no other new complaints\par Here for interval reassessment regarding the need for portable oxygen uses a concentrator at home.  She has a portable oxygen concentrator but it is large bulky and too heavy for the patient to use\par

## 2021-06-30 NOTE — ASSESSMENT
[FreeTextEntry1] : Continues to use oxygen at home.\par Advised to purchase portable oxygen concentrator to suit patient's needs

## 2021-07-14 ENCOUNTER — APPOINTMENT (OUTPATIENT)
Dept: OTOLARYNGOLOGY | Facility: CLINIC | Age: 86
End: 2021-07-14
Payer: MEDICARE

## 2021-07-14 VITALS
HEIGHT: 63 IN | WEIGHT: 180 LBS | HEART RATE: 74 BPM | BODY MASS INDEX: 31.89 KG/M2 | SYSTOLIC BLOOD PRESSURE: 92 MMHG | DIASTOLIC BLOOD PRESSURE: 54 MMHG

## 2021-07-14 DIAGNOSIS — Z83.3 FAMILY HISTORY OF DIABETES MELLITUS: ICD-10-CM

## 2021-07-14 DIAGNOSIS — Z86.39 PERSONAL HISTORY OF OTHER ENDOCRINE, NUTRITIONAL AND METABOLIC DISEASE: ICD-10-CM

## 2021-07-14 DIAGNOSIS — L98.9 DISORDER OF THE SKIN AND SUBCUTANEOUS TISSUE, UNSPECIFIED: ICD-10-CM

## 2021-07-14 PROCEDURE — 69145 REMOVE EAR CANAL LESION(S): CPT

## 2021-07-14 PROCEDURE — 99214 OFFICE O/P EST MOD 30 MIN: CPT | Mod: 25

## 2021-07-14 NOTE — ASSESSMENT
[FreeTextEntry1] : Reviewed and reconciled medications, allergies, PMHx, PSHx, SocHx, FMHx. 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 Cerumen removed.  Ofloxacin ear drops. FU after Sonogram

## 2021-07-14 NOTE — CONSULT LETTER
[Dear  ___] : Dear  [unfilled], [Courtesy Letter:] : I had the pleasure of seeing your patient, [unfilled], in my office today. [FreeTextEntry3] : Alexei Ventura MD FACS

## 2021-07-14 NOTE — HISTORY OF PRESENT ILLNESS
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 The pt comes in today refusing to get a hearing test. The pt notes the feeling of clogging in her ears. She further states she is having intermittent right cheek swelling. The pt states she is using supplemental oxygen. Of note, she is currently on blood thinners.

## 2021-07-14 NOTE — ADDENDUM
[FreeTextEntry1] : Documented by Bhaskar Dorantes acting as scribe for Dr. Ventura on 07/14/2021.\par \par All Medical record entries made by the Scribe were at my, Dr. Ventura, direction and personally dictated by me on 07/14/2021 . I have reviewed the chart and agree that the record accurately reflects my personal performance of the history, physical exam, assessment and plan. I have also personally directed, reviewed, and agreed with the discharge instructions.

## 2021-07-23 ENCOUNTER — RESULT REVIEW (OUTPATIENT)
Age: 86
End: 2021-07-23

## 2021-07-23 ENCOUNTER — APPOINTMENT (OUTPATIENT)
Dept: ULTRASOUND IMAGING | Facility: IMAGING CENTER | Age: 86
End: 2021-07-23
Payer: MEDICARE

## 2021-07-23 ENCOUNTER — OUTPATIENT (OUTPATIENT)
Dept: OUTPATIENT SERVICES | Facility: HOSPITAL | Age: 86
LOS: 1 days | End: 2021-07-23
Payer: MEDICARE

## 2021-07-23 DIAGNOSIS — Z98.89 OTHER SPECIFIED POSTPROCEDURAL STATES: Chronic | ICD-10-CM

## 2021-07-23 DIAGNOSIS — M79.89 OTHER SPECIFIED SOFT TISSUE DISORDERS: ICD-10-CM

## 2021-07-23 DIAGNOSIS — H26.9 UNSPECIFIED CATARACT: Chronic | ICD-10-CM

## 2021-07-23 PROCEDURE — 76536 US EXAM OF HEAD AND NECK: CPT | Mod: 26

## 2021-07-23 PROCEDURE — 76536 US EXAM OF HEAD AND NECK: CPT

## 2021-08-04 ENCOUNTER — APPOINTMENT (OUTPATIENT)
Dept: OTOLARYNGOLOGY | Facility: CLINIC | Age: 86
End: 2021-08-04
Payer: MEDICARE

## 2021-08-04 VITALS
SYSTOLIC BLOOD PRESSURE: 125 MMHG | HEART RATE: 73 BPM | WEIGHT: 181 LBS | HEIGHT: 63 IN | BODY MASS INDEX: 32.07 KG/M2 | DIASTOLIC BLOOD PRESSURE: 71 MMHG

## 2021-08-04 PROCEDURE — 99024 POSTOP FOLLOW-UP VISIT: CPT

## 2021-08-04 RX ORDER — OFLOXACIN OTIC 3 MG/ML
0.3 SOLUTION AURICULAR (OTIC) TWICE DAILY
Qty: 1 | Refills: 3 | Status: COMPLETED | COMMUNITY
Start: 2021-07-14 | End: 2021-08-04

## 2021-08-04 NOTE — PHYSICAL EXAM
[Normal] : the left middle ear was normal [FreeTextEntry7] : Dried blood removed with hydrogen peroxide drops and suction- unsuccessful

## 2021-08-04 NOTE — ASSESSMENT
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 Peroxide drops twice per day, let it bubble and when it stops bubbling turn the head over and let the drops run out. FU 2 weeks to remove dried blood.

## 2021-08-04 NOTE — REASON FOR VISIT
[Subsequent Evaluation] : a subsequent evaluation for [FreeTextEntry2] : left ear recheck/ bump on cheek

## 2021-08-04 NOTE — CONSULT LETTER
[Dear  ___] : Dear  [unfilled], [Courtesy Letter:] : I had the pleasure of seeing your patient, [unfilled], in my office today. [Please see my note below.] : Please see my note below. [Consult Closing:] : Thank you very much for allowing me to participate in the care of this patient.  If you have any questions, please do not hesitate to contact me. [Sincerely,] : Sincerely, [FreeTextEntry3] : Documented by Bhaskar Dorantes acting as scribe for Dr. Ventura on 08/04/2021.\par \par All Medical record entries made by the Scribe were at my, Dr. Ventura, direction and personally dictated by me on 08/04/2021 . I have reviewed the chart and agree that the record accurately reflects my personal performance of the history, physical exam, assessment and plan. I have also personally directed, reviewed, and agreed with the discharge instructions.

## 2021-08-04 NOTE — HISTORY OF PRESENT ILLNESS
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 Pt was last seen 3 weeks ago for a a mass removal in her left ear canal. There was not much bleeding after the removal, however there is dry blood in her left ear. The pt has used Ofloxacin drops which improved her ears.

## 2021-08-04 NOTE — ADDENDUM
[FreeTextEntry1] : Documented by Bhaskar Dorantes acting as scribe for Dr. Ventura on 08/04/2021.\par \par All Medical record entries made by the Scribe were at my, Dr. Ventura, direction and personally dictated by me on 08/04/2021 . I have reviewed the chart and agree that the record accurately reflects my personal performance of the history, physical exam, assessment and plan. I have also personally directed, reviewed, and agreed with the discharge instructions.

## 2021-08-08 NOTE — ED PROVIDER NOTE - CONDUCTED A DETAILED DISCUSSION WITH PATIENT AND/OR GUARDIAN REGARDING, MDM
Patient states he has been having pain in his abdomin and rectum since Wednesday. Patient states it has been getting worse since he has been trying to have a BM, he feels constipated.   
lab results

## 2021-08-18 ENCOUNTER — APPOINTMENT (OUTPATIENT)
Dept: OTOLARYNGOLOGY | Facility: CLINIC | Age: 86
End: 2021-08-18
Payer: MEDICARE

## 2021-08-18 VITALS
HEART RATE: 51 BPM | DIASTOLIC BLOOD PRESSURE: 62 MMHG | HEIGHT: 63 IN | BODY MASS INDEX: 30.65 KG/M2 | SYSTOLIC BLOOD PRESSURE: 106 MMHG | WEIGHT: 173 LBS

## 2021-08-18 DIAGNOSIS — H71.92 UNSPECIFIED CHOLESTEATOMA, LEFT EAR: ICD-10-CM

## 2021-08-18 DIAGNOSIS — D18.09 HEMANGIOMA OF OTHER SITES: ICD-10-CM

## 2021-08-18 PROCEDURE — 99214 OFFICE O/P EST MOD 30 MIN: CPT | Mod: 25

## 2021-08-18 PROCEDURE — 99024 POSTOP FOLLOW-UP VISIT: CPT | Mod: 25

## 2021-08-18 PROCEDURE — 69145 REMOVE EAR CANAL LESION(S): CPT | Mod: LT,78

## 2021-08-18 NOTE — ASSESSMENT
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 Ciprofloxacin FU 10 -14 days- do not use drops the day of next appointment.

## 2021-08-18 NOTE — CONSULT LETTER
[Dear  ___] : Dear  [unfilled], [Courtesy Letter:] : I had the pleasure of seeing your patient, [unfilled], in my office today. [Please see my note below.] : Please see my note below. [Consult Closing:] : Thank you very much for allowing me to participate in the care of this patient.  If you have any questions, please do not hesitate to contact me. [Sincerely,] : Sincerely, [FreeTextEntry3] : Dr. Alexei Ventura MD FACS

## 2021-08-18 NOTE — PHYSICAL EXAM
[Normal] : the left middle ear was normal [FreeTextEntry7] : epithelial debris, wax, dry blood, granulation tissue removed via suction and alligator forceps

## 2021-08-18 NOTE — ADDENDUM
[FreeTextEntry1] : Documented by Bhaskar Dorantes acting as a scribe for Dr. Alexei Ventura on (08/18/2021).\par \par All medical record entries made by the Scribe were at my, Dr. Alexei Ventura's, direction and personally dictated by me on (08/18/2021). I have reviewed the chart and agree that the record accurately reflects my personal performance of the history, physical exam, assessment and plan. I have also personally directed, reviewed, and agree with the discharge instructions.\par \par

## 2021-08-18 NOTE — HISTORY OF PRESENT ILLNESS
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 Pt admits to using peroxide in her ear which resolved her ear pain. She further reports her right cheek mass is still present.

## 2021-08-27 ENCOUNTER — APPOINTMENT (OUTPATIENT)
Dept: OTOLARYNGOLOGY | Facility: CLINIC | Age: 86
End: 2021-08-27
Payer: MEDICARE

## 2021-08-27 VITALS
BODY MASS INDEX: 31.54 KG/M2 | DIASTOLIC BLOOD PRESSURE: 57 MMHG | HEIGHT: 63 IN | WEIGHT: 178 LBS | HEART RATE: 62 BPM | SYSTOLIC BLOOD PRESSURE: 93 MMHG

## 2021-08-27 DIAGNOSIS — M79.89 OTHER SPECIFIED SOFT TISSUE DISORDERS: ICD-10-CM

## 2021-08-27 PROCEDURE — 99024 POSTOP FOLLOW-UP VISIT: CPT

## 2021-08-27 NOTE — ADDENDUM
[FreeTextEntry1] : Documented by Bhaskar Dorantes acting as a scribe for Dr. Alexei Ventura on (08/27/2021).\par \par All medical record entries made by the Scribe were at my, Dr. Alexei Ventura's, direction and personally dictated by me on (08/27/2021). I have reviewed the chart and agree that the record accurately reflects my personal performance of the history, physical exam, assessment and plan. I have also personally directed, reviewed, and agree with the discharge instructions.\par \par

## 2021-08-27 NOTE — PHYSICAL EXAM
[Normal] : the left middle ear was normal [FreeTextEntry7] : Dry blood removed via suction [FreeTextEntry2] : Mass along Mandible on right side- unchaged in the last few years.

## 2021-08-27 NOTE — ASSESSMENT
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 \par \par Plan:\par Dry blood removed- s/p Cholesteatoma removal 10 days ago. FU 3 months.

## 2021-08-27 NOTE — HISTORY OF PRESENT ILLNESS
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 Pt reports not putting in drops in her ear this morning. She adds she used drops yesterday morning and place a tissue in her ear, and found blood coming out. \par \par

## 2021-11-19 ENCOUNTER — APPOINTMENT (OUTPATIENT)
Dept: OTOLARYNGOLOGY | Facility: CLINIC | Age: 86
End: 2021-11-19
Payer: MEDICARE

## 2021-11-19 VITALS
SYSTOLIC BLOOD PRESSURE: 109 MMHG | DIASTOLIC BLOOD PRESSURE: 64 MMHG | WEIGHT: 180 LBS | HEART RATE: 64 BPM | HEIGHT: 63 IN | BODY MASS INDEX: 31.89 KG/M2

## 2021-11-19 PROCEDURE — 99213 OFFICE O/P EST LOW 20 MIN: CPT

## 2021-11-19 RX ORDER — CIPROFLOXACIN 0.5 MG/.25ML
0.2 SOLUTION/ DROPS AURICULAR (OTIC) TWICE DAILY
Qty: 1 | Refills: 2 | Status: COMPLETED | COMMUNITY
Start: 2021-08-18 | End: 2021-11-19

## 2021-11-19 NOTE — PHYSICAL EXAM
[Hearing Loss Right Only] : diminished [Hearing Loss Left Only] : diminished [Orta Test Lateralizes To Right] : tone lateralization to the right [Normal] : mucosa is normal [Midline] : trachea located in midline position [FreeTextEntry8] : dry flaky wax removed by curettage [FreeTextEntry9] : dry blood [FreeTextEntry1] : dry mucous,deviated septum [de-identified] : vascular lesion right floor of mouth?joie

## 2021-11-19 NOTE — ASSESSMENT
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

## 2021-11-19 NOTE — HISTORY OF PRESENT ILLNESS
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 s/p Cholesteatoma removal o.f left ear on 8/18/21. Ears are feeling great. No discharge and no pain. Not using drops. Aids working well for her\par \par

## 2021-11-19 NOTE — REASON FOR VISIT
[Subsequent Evaluation] : a subsequent evaluation for [FreeTextEntry2] : Patient here for a subsequent evaluation on LT ear

## 2021-11-24 ENCOUNTER — APPOINTMENT (OUTPATIENT)
Dept: HEART FAILURE | Facility: CLINIC | Age: 86
End: 2021-11-24
Payer: MEDICARE

## 2021-11-24 ENCOUNTER — NON-APPOINTMENT (OUTPATIENT)
Age: 86
End: 2021-11-24

## 2021-11-24 VITALS
SYSTOLIC BLOOD PRESSURE: 99 MMHG | BODY MASS INDEX: 31.89 KG/M2 | HEART RATE: 71 BPM | TEMPERATURE: 97.7 F | WEIGHT: 180 LBS | OXYGEN SATURATION: 93 % | RESPIRATION RATE: 16 BRPM | HEIGHT: 63 IN | DIASTOLIC BLOOD PRESSURE: 59 MMHG

## 2021-11-24 DIAGNOSIS — M79.89 OTHER SPECIFIED SOFT TISSUE DISORDERS: ICD-10-CM

## 2021-11-24 PROCEDURE — 99215 OFFICE O/P EST HI 40 MIN: CPT

## 2021-11-24 PROCEDURE — 93000 ELECTROCARDIOGRAM COMPLETE: CPT

## 2021-11-30 LAB
ANION GAP SERPL CALC-SCNC: 17 MMOL/L
BUN SERPL-MCNC: 31 MG/DL
CALCIUM SERPL-MCNC: 9.4 MG/DL
CHLORIDE SERPL-SCNC: 101 MMOL/L
CO2 SERPL-SCNC: 25 MMOL/L
CREAT SERPL-MCNC: 1.24 MG/DL
GLUCOSE SERPL-MCNC: 102 MG/DL
MAGNESIUM SERPL-MCNC: 2.6 MG/DL
NT-PROBNP SERPL-MCNC: 2198 PG/ML
POTASSIUM SERPL-SCNC: 4.3 MMOL/L
SODIUM SERPL-SCNC: 143 MMOL/L

## 2021-11-30 NOTE — PHYSICAL EXAM
[No Xanthelasma] : no xanthelasma [Soft] : abdomen soft [Non Tender] : non-tender [Normal Bowel Sounds] : normal bowel sounds [No Cyanosis] : no cyanosis [No Clubbing] : no clubbing [No Rash] : no rash [No Rub] : no rub [No Gallop] : no gallop [Murmur] : murmur [Normal] : clear lung fields, good air entry, no respiratory distress [Normal Radial B/L] : normal radial B/L [Edema ___] : edema [unfilled] [de-identified] : unable to assess - wearing a mask [de-identified] : JVP 12 cm H2O with large V waves [de-identified] : II/VI systolic murmur at apex [de-identified] : Irregularly irregular S1S2 [de-identified] : unable to appreciate HSM or masses due to body habitus [de-identified] : slow gait using cane, using wheelchair for distance

## 2021-11-30 NOTE — DISCUSSION/SUMMARY
[Patient] : the patient [FreeTextEntry2] : and her daughter, Shirlene [FreeTextEntry1] : 88 yo F with chronic diastolic HF, rate-controlled AFib, severe MR s/p MitraClip, CKD stage 3 and chronic hypoxia on home O2 who overall is doing well though mildly hypervolemic on exam today. She is ACC/AHA Stage C, NYHA Class II-III HF and normotensive. I have recommended the following:\par \par 1. Chronic diastolic heart failure - Stable and generally well compensated, but with R>>L LE edema. Labs today with stable renal function, K 4.3, Mg 2.6 and pro-BNP increased to 2200 from 1600 in May. Start Farxiga 10 mg QD with 30 day trial card (daughter to contact [place] patient assistance program to submit patient application given high co-pay, provider portion faxed). Resume Spironolactone 25 mg QD. Continue Torsemide 40 mg QD and instructed to notify our office should weight increase. Would not pursue CardioMems unless she is difficult to manage as an outpatient or has worsening symptoms of HF. Continue fluid and salt restriction. TTE from 11/2020 essentially unchanged from 2018 with persistent severe PH. Annual TTE is due now. \par \par 2. Pulmonary hypertension - She has been stable for years despite severely elevated pulmonary pressures by TTE. She clinically feels well on current regimen. At the time of her last RHC on 3/11/19, PA 61/21/35, PCW 12 without a response to inhaled NO and with PA 71.3%, PVR 3.77 Mendez. Her weight then was 84 kg. Would not pursue additional therapies unless she develops worsening symptoms.\par \par 3. AFib - Rate controlled on beta blocker and CCB. On Pradaxa for AC. ECG HR ~50 bpm, but current pulse rate is 70.\par \par 4. CKD stage 3 - Stable on current therapies. Baseline Cr 1.3. Labs today with BUN/Cr 31/1.24\par \par 5. Hypertension -  Well controlled on current therapies.\par \par 6. RLE swelling - ongoing RLE edema > LLE following mechanical fall with R ankle sprain. Will obtain RLE Doppler to rule out DVT. Can be performed same day as TTE if unable to go today.\par \par 7. Follow up in 2 weeks with HF NP for assessment of fluid balance and repeat labs. RTC with Dr. Lopes in 6 months.

## 2021-11-30 NOTE — HISTORY OF PRESENT ILLNESS
[FreeTextEntry1] : Mrs. Mix is an 89 year old woman with history of HFpEF (LVEF 71% 3/18), MR s/p Mitraclip (8/30/16), and mixed pre- and post-capillary pulmonary hypertension who presents today for routine HF follow up. Her other comorbidities include permanent AFib (on Pradaxa), hypertension, hyperlipidemia, CKD (b/l Cr 1.3) and SHERYL. She was previously on continuous home oxygen at 3L via [place], but now uses it mostly at night or as needed.\par \par Since last visit in May, no significant clinical change though suffered a mechanical fall onto her bed 3 weeks ago with subsequent R ankle sprain. She underwent X-ray which reportedly did not reveal fracture though notes lingering RLE swelling. Otherwise, AT is unchanged and able to slowly walk the length of 2-3 city blocks. She uses oxygen continuously at night and intermittently throughout the day. Weight ranges 178-182 lbs on Torsemide 40 mg daily. Infrequently takes additional 20 mg when she notes increased LE swelling which occurs once every 2 week. She stopped spironolactone as she thought it was only to be used for acute weight gain. She denies CP, SOB at rest, orthopnea, PND, LH/dizziness, abdominal discomfort, palpitations, and syncope. Her appetite is normal and her bowel and bladder habits are unchanged. She has been limiting fluid and sodium in her diet and taking her medications as directed aside from Spironolactone. She does not use NSAIDs. She has not been admitted to the hospital or seen in the ER for HF in the interim.\par \par \par \par \par

## 2021-11-30 NOTE — CARDIOLOGY SUMMARY
[de-identified] : \par 11/24/21 ECG: AFib at 48 bpm, iRBBB, LAFB. No sig change from prior 5/19/21\par 05/19/21 ECG: AFib at 61 bpm, iRBBB, LAFB. No sig change from prior.\par 11/18/20 ECG: AFib at 57 bpm, iRBBB, LAFB. No sig change from prior 8/28/20\par  [de-identified] : \par 11/18/20 TTE: LVIDd 4.6 cm, normal LVEF, concentric LVH (SW 1.2, PW 1.3 cm), flattening of septum c/w RV pressure overload, normal LA size, mod SONNY, RVE with decreased RV function, mild-mod MR s/p MitraClip (peak 10 mmHg, mean 4 mmHg), mod-severe TR, no effusion, severe PH (RVSP 95 mmHg). Iatrogenic transeptal flow noted (transeptal puncture for Clip).\par \par 03/01/18 TTE: LVEF 70%, LVIDd 5.1, septum/PWT 1.1, LA 5.7, severely dilated RV with severe RVSD, severe biatrial enlargement, severe TR, MR present but shadowed by Mitraclip,est RVSP 82, left to right atrial flow noted from transseptal puncture.\par  [de-identified] : \par 03/11/19 RHC baseline: no RA or RV reported, PA 61/21/35, PCW 12, Ao 93%, PA 71%, CO/CI (F) 6.09/3.26, PVR 3.77 Mendez.\par Nitric Oxide: RA 10, RV 57/12, PA 54/17/30, no PCW reported, Ao 93%, PA 77%, CO/CI (F) 8.19/4.38,  PVR 3.66 Mendez.\par

## 2021-11-30 NOTE — REASON FOR VISIT
[Other: _____] : [unfilled] [Cardiac Failure] : cardiac failure [FreeTextEntry1] : PATIENT INSTRUCTIONS:\par \par 1. Please resume Spironolactone at 25 mg daily\par \par 2. Start Farxiga 10 mg daily. Please notify our office should you develop symptoms of urinary tract infection or swelling/discomfort/redness of your genitals or groin. \par \par 3. Continue all other medications as prescribed\par \par 4. Repeat annual echocardiogram\par \par 5. Will also have you undergo an ultrasound of your right leg given ongoing swelling\par \par 6. Labs today, will call you with the results\par \par 7. Follow your weights daily and if you see a consistent increase OR if swelling in your legs increase, please email or call Dr. Lopes so she may instruct you to on how to increase the Torsemide. \par \par 8. Follow-up with the Nurse Practitioners in 2 weeks with repeat labs and Dr. Lopes in 6 months\par

## 2021-12-01 ENCOUNTER — OUTPATIENT (OUTPATIENT)
Dept: OUTPATIENT SERVICES | Facility: HOSPITAL | Age: 86
LOS: 1 days | End: 2021-12-01
Payer: MEDICARE

## 2021-12-01 ENCOUNTER — APPOINTMENT (OUTPATIENT)
Dept: ULTRASOUND IMAGING | Facility: CLINIC | Age: 86
End: 2021-12-01
Payer: MEDICARE

## 2021-12-01 DIAGNOSIS — H26.9 UNSPECIFIED CATARACT: Chronic | ICD-10-CM

## 2021-12-01 DIAGNOSIS — M79.89 OTHER SPECIFIED SOFT TISSUE DISORDERS: ICD-10-CM

## 2021-12-01 DIAGNOSIS — Z98.89 OTHER SPECIFIED POSTPROCEDURAL STATES: Chronic | ICD-10-CM

## 2021-12-01 PROCEDURE — 93971 EXTREMITY STUDY: CPT

## 2021-12-01 PROCEDURE — 93971 EXTREMITY STUDY: CPT | Mod: 26,RT

## 2021-12-01 RX ORDER — DAPAGLIFLOZIN 10 MG/1
10 TABLET, FILM COATED ORAL DAILY
Qty: 30 | Refills: 5 | Status: DISCONTINUED | COMMUNITY
Start: 2021-11-24 | End: 2021-12-01

## 2021-12-09 ENCOUNTER — APPOINTMENT (OUTPATIENT)
Dept: VASCULAR SURGERY | Facility: CLINIC | Age: 86
End: 2021-12-09

## 2021-12-29 ENCOUNTER — APPOINTMENT (OUTPATIENT)
Dept: HEART FAILURE | Facility: CLINIC | Age: 86
End: 2021-12-29

## 2021-12-29 ENCOUNTER — APPOINTMENT (OUTPATIENT)
Dept: CV DIAGNOSITCS | Facility: HOSPITAL | Age: 86
End: 2021-12-29

## 2022-03-05 ENCOUNTER — EMERGENCY (EMERGENCY)
Facility: HOSPITAL | Age: 87
LOS: 1 days | Discharge: NURSING, REHAB OR LONG-TERM CARE | End: 2022-03-05
Attending: EMERGENCY MEDICINE | Admitting: EMERGENCY MEDICINE
Payer: MEDICARE

## 2022-03-05 VITALS
TEMPERATURE: 98 F | HEART RATE: 73 BPM | RESPIRATION RATE: 20 BRPM | DIASTOLIC BLOOD PRESSURE: 56 MMHG | OXYGEN SATURATION: 98 % | WEIGHT: 175.93 LBS | HEIGHT: 63 IN | SYSTOLIC BLOOD PRESSURE: 118 MMHG

## 2022-03-05 VITALS
SYSTOLIC BLOOD PRESSURE: 110 MMHG | OXYGEN SATURATION: 96 % | TEMPERATURE: 98 F | RESPIRATION RATE: 22 BRPM | DIASTOLIC BLOOD PRESSURE: 53 MMHG | HEART RATE: 67 BPM

## 2022-03-05 DIAGNOSIS — H26.9 UNSPECIFIED CATARACT: Chronic | ICD-10-CM

## 2022-03-05 DIAGNOSIS — Z98.89 OTHER SPECIFIED POSTPROCEDURAL STATES: Chronic | ICD-10-CM

## 2022-03-05 LAB
ALBUMIN SERPL ELPH-MCNC: 3.4 G/DL — SIGNIFICANT CHANGE UP (ref 3.3–5)
ALP SERPL-CCNC: 47 U/L — SIGNIFICANT CHANGE UP (ref 30–120)
ALT FLD-CCNC: 16 U/L DA — SIGNIFICANT CHANGE UP (ref 10–60)
ANION GAP SERPL CALC-SCNC: 5 MMOL/L — SIGNIFICANT CHANGE UP (ref 5–17)
APPEARANCE UR: CLEAR — SIGNIFICANT CHANGE UP
APTT BLD: 78.3 SEC — HIGH (ref 27.5–35.5)
AST SERPL-CCNC: 15 U/L — SIGNIFICANT CHANGE UP (ref 10–40)
BASOPHILS # BLD AUTO: 0.03 K/UL — SIGNIFICANT CHANGE UP (ref 0–0.2)
BASOPHILS NFR BLD AUTO: 0.4 % — SIGNIFICANT CHANGE UP (ref 0–2)
BILIRUB SERPL-MCNC: 0.5 MG/DL — SIGNIFICANT CHANGE UP (ref 0.2–1.2)
BILIRUB UR-MCNC: NEGATIVE — SIGNIFICANT CHANGE UP
BUN SERPL-MCNC: 33 MG/DL — HIGH (ref 7–23)
CALCIUM SERPL-MCNC: 8.2 MG/DL — LOW (ref 8.4–10.5)
CHLORIDE SERPL-SCNC: 105 MMOL/L — SIGNIFICANT CHANGE UP (ref 96–108)
CO2 SERPL-SCNC: 31 MMOL/L — SIGNIFICANT CHANGE UP (ref 22–31)
COLOR SPEC: YELLOW — SIGNIFICANT CHANGE UP
CREAT SERPL-MCNC: 1.22 MG/DL — SIGNIFICANT CHANGE UP (ref 0.5–1.3)
DIFF PNL FLD: NEGATIVE — SIGNIFICANT CHANGE UP
EGFR: 42 ML/MIN/1.73M2 — LOW
EOSINOPHIL # BLD AUTO: 0.42 K/UL — SIGNIFICANT CHANGE UP (ref 0–0.5)
EOSINOPHIL NFR BLD AUTO: 4.9 % — SIGNIFICANT CHANGE UP (ref 0–6)
GLUCOSE BLDC GLUCOMTR-MCNC: 135 MG/DL — HIGH (ref 70–99)
GLUCOSE SERPL-MCNC: 122 MG/DL — HIGH (ref 70–99)
GLUCOSE UR QL: NEGATIVE MG/DL — SIGNIFICANT CHANGE UP
HCT VFR BLD CALC: 41.3 % — SIGNIFICANT CHANGE UP (ref 34.5–45)
HGB BLD-MCNC: 13 G/DL — SIGNIFICANT CHANGE UP (ref 11.5–15.5)
IMM GRANULOCYTES NFR BLD AUTO: 0.5 % — SIGNIFICANT CHANGE UP (ref 0–1.5)
INR BLD: 1.75 RATIO — HIGH (ref 0.88–1.16)
KETONES UR-MCNC: NEGATIVE — SIGNIFICANT CHANGE UP
LEUKOCYTE ESTERASE UR-ACNC: NEGATIVE — SIGNIFICANT CHANGE UP
LYMPHOCYTES # BLD AUTO: 1.33 K/UL — SIGNIFICANT CHANGE UP (ref 1–3.3)
LYMPHOCYTES # BLD AUTO: 15.7 % — SIGNIFICANT CHANGE UP (ref 13–44)
MCHC RBC-ENTMCNC: 30.5 PG — SIGNIFICANT CHANGE UP (ref 27–34)
MCHC RBC-ENTMCNC: 31.5 GM/DL — LOW (ref 32–36)
MCV RBC AUTO: 96.9 FL — SIGNIFICANT CHANGE UP (ref 80–100)
MONOCYTES # BLD AUTO: 1.05 K/UL — HIGH (ref 0–0.9)
MONOCYTES NFR BLD AUTO: 12.4 % — SIGNIFICANT CHANGE UP (ref 2–14)
NEUTROPHILS # BLD AUTO: 5.62 K/UL — SIGNIFICANT CHANGE UP (ref 1.8–7.4)
NEUTROPHILS NFR BLD AUTO: 66.1 % — SIGNIFICANT CHANGE UP (ref 43–77)
NITRITE UR-MCNC: NEGATIVE — SIGNIFICANT CHANGE UP
NRBC # BLD: 0 /100 WBCS — SIGNIFICANT CHANGE UP (ref 0–0)
NT-PROBNP SERPL-SCNC: 1388 PG/ML — HIGH (ref 0–450)
PH UR: 6 — SIGNIFICANT CHANGE UP (ref 5–8)
PLATELET # BLD AUTO: 147 K/UL — LOW (ref 150–400)
POTASSIUM SERPL-MCNC: 4 MMOL/L — SIGNIFICANT CHANGE UP (ref 3.5–5.3)
POTASSIUM SERPL-SCNC: 4 MMOL/L — SIGNIFICANT CHANGE UP (ref 3.5–5.3)
PROT SERPL-MCNC: 7.1 G/DL — SIGNIFICANT CHANGE UP (ref 6–8.3)
PROT UR-MCNC: NEGATIVE MG/DL — SIGNIFICANT CHANGE UP
PROTHROM AB SERPL-ACNC: 20.2 SEC — HIGH (ref 10.5–13.4)
RBC # BLD: 4.26 M/UL — SIGNIFICANT CHANGE UP (ref 3.8–5.2)
RBC # FLD: 14.1 % — SIGNIFICANT CHANGE UP (ref 10.3–14.5)
SARS-COV-2 RNA SPEC QL NAA+PROBE: SIGNIFICANT CHANGE UP
SODIUM SERPL-SCNC: 141 MMOL/L — SIGNIFICANT CHANGE UP (ref 135–145)
SP GR SPEC: 1.01 — SIGNIFICANT CHANGE UP (ref 1.01–1.02)
TROPONIN I, HIGH SENSITIVITY RESULT: 40.5 NG/L — SIGNIFICANT CHANGE UP
UROBILINOGEN FLD QL: NEGATIVE MG/DL — SIGNIFICANT CHANGE UP
WBC # BLD: 8.49 K/UL — SIGNIFICANT CHANGE UP (ref 3.8–10.5)
WBC # FLD AUTO: 8.49 K/UL — SIGNIFICANT CHANGE UP (ref 3.8–10.5)

## 2022-03-05 PROCEDURE — 85025 COMPLETE CBC W/AUTO DIFF WBC: CPT

## 2022-03-05 PROCEDURE — 80053 COMPREHEN METABOLIC PANEL: CPT

## 2022-03-05 PROCEDURE — 85730 THROMBOPLASTIN TIME PARTIAL: CPT

## 2022-03-05 PROCEDURE — 93005 ELECTROCARDIOGRAM TRACING: CPT

## 2022-03-05 PROCEDURE — 71101 X-RAY EXAM UNILAT RIBS/CHEST: CPT

## 2022-03-05 PROCEDURE — 87635 SARS-COV-2 COVID-19 AMP PRB: CPT

## 2022-03-05 PROCEDURE — 70450 CT HEAD/BRAIN W/O DYE: CPT | Mod: 26,MA

## 2022-03-05 PROCEDURE — 93010 ELECTROCARDIOGRAM REPORT: CPT

## 2022-03-05 PROCEDURE — 36415 COLL VENOUS BLD VENIPUNCTURE: CPT

## 2022-03-05 PROCEDURE — 99285 EMERGENCY DEPT VISIT HI MDM: CPT | Mod: 25

## 2022-03-05 PROCEDURE — 99285 EMERGENCY DEPT VISIT HI MDM: CPT

## 2022-03-05 PROCEDURE — 73110 X-RAY EXAM OF WRIST: CPT | Mod: 26,RT

## 2022-03-05 PROCEDURE — 85610 PROTHROMBIN TIME: CPT

## 2022-03-05 PROCEDURE — 83880 ASSAY OF NATRIURETIC PEPTIDE: CPT

## 2022-03-05 PROCEDURE — 71101 X-RAY EXAM UNILAT RIBS/CHEST: CPT | Mod: 26

## 2022-03-05 PROCEDURE — 84484 ASSAY OF TROPONIN QUANT: CPT

## 2022-03-05 PROCEDURE — 70450 CT HEAD/BRAIN W/O DYE: CPT | Mod: MA

## 2022-03-05 PROCEDURE — 73110 X-RAY EXAM OF WRIST: CPT

## 2022-03-05 PROCEDURE — 81003 URINALYSIS AUTO W/O SCOPE: CPT

## 2022-03-05 PROCEDURE — 82962 GLUCOSE BLOOD TEST: CPT

## 2022-03-05 NOTE — ED PROVIDER NOTE - PATIENT PORTAL LINK FT
You can access the FollowMyHealth Patient Portal offered by [facility] by registering at the following website: [email or web address]. By joining [employer]’s FollowMyHealth portal, you will also be able to view your health information using other applications (apps) compatible with our system.

## 2022-03-05 NOTE — ED PROVIDER NOTE - PROGRESS NOTE DETAILS
Feels well. Labs and CT stable. EKG with slow afib. Family will discuss heart rate with pts cardiologist and decide if she needs medication adjustment. Do not want cardiology eval today.

## 2022-03-05 NOTE — ED PROVIDER NOTE - CARE PLAN
1 Principal Discharge DX:	Fall  Secondary Diagnosis:	Skin tear of hand without complication  Secondary Diagnosis:	Near syncope

## 2022-03-05 NOTE — ED ADULT NURSE NOTE - OBJECTIVE STATEMENT
89 YOF A&OX3 with pmh of a-fib, CHF broguht in by EMS from [facility] s/p fall. as per EMS, pt tripped and fell this morning while walking. pt stating "I tripped and fell" upon assessment skin tear noted to right forearm and right arm, bruising to left rib cage. family at bedside states pt is oxygen dependent but can be on RA for about 2 hours. pt denies sob, chest pain, n/v/d, headaches, dizziness, blurry vision. safety maintained.

## 2022-03-05 NOTE — ED PROVIDER NOTE - ENMT, MLM
Airway patent, Nasal mucosa clear. Mouth with normal mucosa. Throat has no vesicles, no oropharyngeal exudates and uvula is midline.  Scalp NT/AT

## 2022-03-05 NOTE — ED PROVIDER NOTE - OBJECTIVE STATEMENT
88 yo F with hx of CHF on home O2, afib on Pradaxa, BIBA from [facility] after a fall. Pt states she doesn't know why she fell. Was walking and found herself on floor. CO injury to skin on rt wrist. Denies headache, LOC, Visual disturbance, neck or back pain, CP, SOB, NV, or other symptom or injury.  PCP Shari

## 2022-03-05 NOTE — ED ADULT NURSE NOTE - NSIMPLEMENTINTERV_GEN_ALL_ED
87
Implemented All Fall with Harm Risk Interventions:  [place] to call system. Call bell, personal items and telephone within reach. Instruct patient to call for assistance. Room bathroom lighting operational. Non-slip footwear when patient is off stretcher. Physically safe environment: no spills, clutter or unnecessary equipment. Stretcher in lowest position, wheels locked, appropriate side rails in place. Provide visual cue, wrist band, yellow gown, etc. Monitor gait and stability. Monitor for mental status changes and reorient to person, place, and time. Review medications for side effects contributing to fall risk. Reinforce activity limits and safety measures with patient and family. Provide visual clues: red socks.

## 2022-03-05 NOTE — ED PROVIDER NOTE - NSICDXFAMILYHX_GEN_ALL_CORE_FT
FAMILY HISTORY:  Mother  Still living? Unknown  Family history of blood disorder, Age at diagnosis: Age Unknown

## 2022-03-05 NOTE — ED PROVIDER NOTE - CARE PROVIDER_API CALL
Alexandra Lopse; PhD)  Adv Heart Fail Trnsplnt Cardio; Cardiovascular Disease; Internal Medicine  [address] [place] [address]  Phone: [phone]  Fax: [phone]  Established Patient  Follow Up Time: 1-3 Days

## 2022-03-05 NOTE — ED PROVIDER NOTE - CLINICAL SUMMARY MEDICAL DECISION MAKING FREE TEXT BOX
89 F fell at [place]. ??Syncope. Skin tear to rt wrist and old bruises noted left ribs. Unclear if syncope or mechanical fall. Plan - Syncope workup. Xrays. CT.

## 2022-03-05 NOTE — ED PROVIDER NOTE - NSICDXPASTMEDICALHX_GEN_ALL_CORE_FT
PAST MEDICAL HISTORY:  Atrial fibrillation On Pradaxa    Depression     Hearing loss of left ear     HLD (hyperlipidemia)     HTN (hypertension)     Leg edema     Mitral valve stenosis, unspecified etiology     Syncope

## 2022-03-09 ENCOUNTER — FORM ENCOUNTER (OUTPATIENT)
Age: 87
End: 2022-03-09

## 2022-03-10 ENCOUNTER — APPOINTMENT (OUTPATIENT)
Dept: ELECTROPHYSIOLOGY | Facility: CLINIC | Age: 87
End: 2022-03-10
Payer: MEDICARE

## 2022-03-11 ENCOUNTER — OUTPATIENT (OUTPATIENT)
Dept: OUTPATIENT SERVICES | Facility: HOSPITAL | Age: 87
LOS: 1 days | End: 2022-03-11
Payer: MEDICARE

## 2022-03-11 ENCOUNTER — APPOINTMENT (OUTPATIENT)
Dept: ULTRASOUND IMAGING | Facility: CLINIC | Age: 87
End: 2022-03-11
Payer: MEDICARE

## 2022-03-11 DIAGNOSIS — Z98.89 OTHER SPECIFIED POSTPROCEDURAL STATES: Chronic | ICD-10-CM

## 2022-03-11 DIAGNOSIS — H26.9 UNSPECIFIED CATARACT: Chronic | ICD-10-CM

## 2022-03-11 DIAGNOSIS — I48.91 UNSPECIFIED ATRIAL FIBRILLATION: ICD-10-CM

## 2022-03-11 PROCEDURE — 93970 EXTREMITY STUDY: CPT | Mod: 26

## 2022-03-11 PROCEDURE — 93970 EXTREMITY STUDY: CPT

## 2022-03-15 ENCOUNTER — APPOINTMENT (OUTPATIENT)
Dept: CV DIAGNOSITCS | Facility: HOSPITAL | Age: 87
End: 2022-03-15

## 2022-03-15 ENCOUNTER — OUTPATIENT (OUTPATIENT)
Dept: OUTPATIENT SERVICES | Facility: HOSPITAL | Age: 87
LOS: 1 days | End: 2022-03-15
Payer: MEDICARE

## 2022-03-15 ENCOUNTER — APPOINTMENT (OUTPATIENT)
Dept: HEART FAILURE | Facility: CLINIC | Age: 87
End: 2022-03-15
Payer: MEDICARE

## 2022-03-15 VITALS
WEIGHT: 178 LBS | TEMPERATURE: 98.24 F | OXYGEN SATURATION: 95 % | HEIGHT: 63 IN | DIASTOLIC BLOOD PRESSURE: 63 MMHG | BODY MASS INDEX: 31.54 KG/M2 | HEART RATE: 70 BPM | SYSTOLIC BLOOD PRESSURE: 104 MMHG | RESPIRATION RATE: 16 BRPM

## 2022-03-15 DIAGNOSIS — Z98.89 OTHER SPECIFIED POSTPROCEDURAL STATES: Chronic | ICD-10-CM

## 2022-03-15 DIAGNOSIS — H26.9 UNSPECIFIED CATARACT: Chronic | ICD-10-CM

## 2022-03-15 DIAGNOSIS — I25.10 ATHEROSCLEROTIC HEART DISEASE OF NATIVE CORONARY ARTERY WITHOUT ANGINA PECTORIS: ICD-10-CM

## 2022-03-15 DIAGNOSIS — I34.2 NONRHEUMATIC MITRAL (VALVE) STENOSIS: ICD-10-CM

## 2022-03-15 PROCEDURE — 99215 OFFICE O/P EST HI 40 MIN: CPT

## 2022-03-15 PROCEDURE — C8929: CPT

## 2022-03-15 PROCEDURE — 93000 ELECTROCARDIOGRAM COMPLETE: CPT

## 2022-03-15 PROCEDURE — 93306 TTE W/DOPPLER COMPLETE: CPT | Mod: 26

## 2022-03-15 RX ORDER — SERTRALINE HYDROCHLORIDE 50 MG/1
50 TABLET, FILM COATED ORAL DAILY
Refills: 0 | Status: DISCONTINUED | COMMUNITY
Start: 2019-03-20 | End: 2022-03-15

## 2022-03-15 RX ORDER — ATORVASTATIN CALCIUM 10 MG/1
10 TABLET, FILM COATED ORAL
Refills: 0 | Status: DISCONTINUED | COMMUNITY
Start: 2019-03-20 | End: 2022-03-15

## 2022-03-16 ENCOUNTER — NON-APPOINTMENT (OUTPATIENT)
Age: 87
End: 2022-03-16

## 2022-03-23 ENCOUNTER — NON-APPOINTMENT (OUTPATIENT)
Age: 87
End: 2022-03-23

## 2022-03-24 PROBLEM — I34.2 NONRHEUMATIC MITRAL VALVE STENOSIS: Status: ACTIVE | Noted: 2022-03-24

## 2022-03-24 NOTE — PHYSICAL EXAM
[No Xanthelasma] : no xanthelasma [No Rub] : no rub [No Gallop] : no gallop [Murmur] : murmur [Soft] : abdomen soft [Non Tender] : non-tender [Normal Bowel Sounds] : normal bowel sounds [No Cyanosis] : no cyanosis [No Clubbing] : no clubbing [Normal Radial B/L] : normal radial B/L [No Rash] : no rash [Normal] : alert and oriented, normal memory [Edema ___] : edema [unfilled] [Normal Venous Pressure] : normal venous pressure [de-identified] : unable to assess - wearing a mask [de-identified] : JVP 6-8 cm H2O, no HJR [de-identified] : II/VI systolic murmur [de-identified] : Irregularly irregular S1S2 [de-identified] : unable to appreciate HSM or masses due to body habitus [de-identified] : slow gait using cane, using wheelchair for distance

## 2022-03-24 NOTE — DISCUSSION/SUMMARY
[Patient] : the patient [FreeTextEntry2] : and son [FreeTextEntry1] : 89 yo F with chronic diastolic HF, rate-controlled AFib, severe MR s/p MitraClip, CKD stage 3 and chronic hypoxia on home O2 who presents today after recent syncopal episode. TTE suggestive of MS s/p MitraClip several years ago. She is ACC/AHA Stage C, NYHA Class II-III, euvolemic and normotensive. I have recommended the following:\par \par 1. Syncope - Story sounds postural, but had an evaluation in the ER, was bradycardic on arrival. Negative head CT, but no D-dimer or eval for PE. We sent her for BLE Dopplers which were negative for clot. She has chronic stable O2 needs so PE is less likely. Awaiting results of Zio patch. TTE with elevated MV gradient s/p prior MitraClip so unclear if MS played a role. Will review TTE with the Structural Heart Team. Encouraged her to change positions slowly and she may drink up to 2L of fluid per day.\par \par 2. Chronic diastolic heart failure - Stable and well compensated. Continue torsemide 20 mg daily with an additional 20 mg as needed for weight gain. Continue spironolactone 25 mg daily. She did not tolerate Farxiga due to abdominal discomfort. Would not pursue CardioMEMS unless she is difficult to manage as an outpatient or has worsening symptoms of HF. Continue fluid and salt restriction. \par \par 3. Pulmonary hypertension - She has been stable for years despite severely elevated pulmonary pressures by TTE. She clinically feels well on current regimen. At the time of her last RHC on 3/11/19, PA 61/21/35, PCW 12 without a response to inhaled NO and with PA 71.3%, PVR 3.77 Mendez. Her weight then was 84 kg. Will need to determine if PH is due to worsening MS. May consider RHC study pending review of TTE with Structural team.\par \par 4. AFib - Rate controlled on beta blocker and CCB. On Pradaxa for AC.\par \par 5. CKD stage 3 - Stable on current therapies. Baseline Cr 1.2-1.3. Recent labs from 3/5/22 showed BUN/Cr of 33/1.22.\par \par 6. Hypertension -  Well controlled on current therapies.\par \par 7. RLE swelling - Ongoing RLE edema > LLE. LE dopplers on 3/11/22 negative for DVT. She states it is difficult for her to put on and take off compression socks. Recommended she be evaluated by vascular (Dr. John) but she does not wish to see another doctor at this time.\par \par 8. Follow up with Dr. Lopes as already scheduled in May.

## 2022-03-24 NOTE — REASON FOR VISIT
[Cardiac Failure] : cardiac failure [Other: _____] : [unfilled] [FreeTextEntry1] : PATIENT INSTRUCTIONS:\par \par 1. Continue your current medications.\par \par 2. We will follow-up the results of your echocardiogram and the Zio patch.\par \par 3. Keep your follow-up appointment with Dr. Lopes on 5/25 at 10am.

## 2022-03-24 NOTE — CARDIOLOGY SUMMARY
[de-identified] : \par 03/15/22 ECG: AFib at 63 bpm, iRBBB, LAFB. No sig change from 11/24/21.\par 11/24/21 ECG: AFib at 48 bpm, iRBBB, LAFB. No sig change from prior 5/19/21\par 05/19/21 ECG: AFib at 61 bpm, iRBBB, LAFB. No sig change from prior.\par 11/18/20 ECG: AFib at 57 bpm, iRBBB, LAFB. No sig change from prior 8/28/20\par  [de-identified] : \par 03/15/22 TTE: LVIDd 5.0 cm, LVEF 70%, no segmental WMA, flattening of the septum c/w RV pressure overload, mild concentric LVH (septum 1.4 cm, PWT 1.1 cm), RVE with decreased RVSF, severe JANEL, s/p Mitraclip with mild-mod MR, mod-severe MS (peak gradient 15 mmHg, mean gradient 6 mmHg), severe TR, mild MS, severe PH (RVSP 79 mmHg).\par \par 11/18/20 TTE: LVIDd 4.6 cm, normal LVEF, concentric LVH (SW 1.2, PW 1.3 cm), flattening of septum c/w RV pressure overload, normal LA size, mod SONNY, RVE with decreased RV function, mild-mod MR s/p MitraClip (peak 10 mmHg, mean 4 mmHg), mod-severe TR, no effusion, severe PH (RVSP 95 mmHg). Iatrogenic transeptal flow noted (transeptal puncture for Clip).\par \par 03/01/18 TTE: LVEF 70%, LVIDd 5.1, septum/PWT 1.1, LA 5.7, severely dilated RV with severe RVSD, severe biatrial enlargement, severe TR, MR present but shadowed by Mitraclip,est RVSP 82, left to right atrial flow noted from transseptal puncture.\par  [de-identified] : \par 03/11/19 RHC baseline: no RA or RV reported, PA 61/21/35, PCW 12, Ao 93%, PA 71%, CO/CI (F) 6.09/3.26, PVR 3.77 Mendez.\par Nitric Oxide: RA 10, RV 57/12, PA 54/17/30, no PCW reported, Ao 93%, PA 77%, CO/CI (F) 8.19/4.38,  PVR 3.66 Mendez.\par

## 2022-03-24 NOTE — HISTORY OF PRESENT ILLNESS
[FreeTextEntry1] : Mrs. Mix is an 89 year old woman with history of HFpEF (LVEF 71% 3/18), MR s/p Mitraclip (8/30/16), and mixed pre- and post-capillary pulmonary hypertension who presents today for follow-up after recent syncope episode. Her other comorbidities include permanent AFib (on Pradaxa), hypertension, hyperlipidemia, CKD (b/l Cr 1.2-1.3) and SHERYL. She was previously on continuous home oxygen at 3L via [place], but now uses it mostly at night or as needed.\par \par Since she was last seen in clinic in November 2021, she had a fall/syncopal episode at home on 3/5/22. She states that she was sitting down doing a puzzle and then stood up to go to the bathroom. After taking a few steps she found herself on the floor. She denies any feelings of lightheadedness or dizziness prior to falling. She was BIBA to [facility] and initially her HR was in the 50s, which later improved to the 70s. Her CTH was negative and xrays of her right wrist and left ribs did not show any fractures. She also had LE dopplers performed on 3/11 which was negative for DVT.\par \par Otherwise, her AT is unchanged and is able to walk the length of a long hallway at the [facility] (~2.5 yards) 3 times per day without any SOB or fatigue. She uses oxygen continuously at night and intermittently throughout the day. Her weight has been stable between 176-178# on torsemide 20 mg daily. Infrequently takes additional 20 mg when she notes increased LE swelling. She denies CP, SOB at rest, orthopnea, PND, LH/dizziness, abdominal discomfort, or palpitations. Her appetite is normal and her bowel and bladder habits are unchanged. She has been limiting fluid and sodium in her diet and taking her medications as directed. She does not use NSAIDs. She has not been admitted to the hospital or seen in the ER for HF in the interim.

## 2022-03-28 ENCOUNTER — NON-APPOINTMENT (OUTPATIENT)
Age: 87
End: 2022-03-28

## 2022-04-11 PROCEDURE — 93248 EXT ECG>7D<15D REV&INTERPJ: CPT

## 2022-04-27 ENCOUNTER — APPOINTMENT (OUTPATIENT)
Dept: HEART FAILURE | Facility: CLINIC | Age: 87
End: 2022-04-27
Payer: MEDICARE

## 2022-04-27 VITALS
OXYGEN SATURATION: 96 % | DIASTOLIC BLOOD PRESSURE: 55 MMHG | SYSTOLIC BLOOD PRESSURE: 104 MMHG | BODY MASS INDEX: 30.48 KG/M2 | HEIGHT: 63 IN | TEMPERATURE: 207.68 F | HEART RATE: 60 BPM | WEIGHT: 172 LBS

## 2022-04-27 PROCEDURE — 99214 OFFICE O/P EST MOD 30 MIN: CPT

## 2022-04-29 LAB
ANION GAP SERPL CALC-SCNC: 15 MMOL/L
BUN SERPL-MCNC: 28 MG/DL
CALCIUM SERPL-MCNC: 9.4 MG/DL
CHLORIDE SERPL-SCNC: 100 MMOL/L
CO2 SERPL-SCNC: 26 MMOL/L
CREAT SERPL-MCNC: 1.23 MG/DL
EGFR: 42 ML/MIN/1.73M2
GLUCOSE SERPL-MCNC: 126 MG/DL
MAGNESIUM SERPL-MCNC: 2.5 MG/DL
NT-PROBNP SERPL-MCNC: 1608 PG/ML
POTASSIUM SERPL-SCNC: 4.4 MMOL/L
SODIUM SERPL-SCNC: 141 MMOL/L

## 2022-04-29 NOTE — PHYSICAL EXAM
[No Xanthelasma] : no xanthelasma [No Rub] : no rub [No Gallop] : no gallop [Soft] : abdomen soft [Non Tender] : non-tender [Normal Bowel Sounds] : normal bowel sounds [No Cyanosis] : no cyanosis [No Clubbing] : no clubbing [Normal Radial B/L] : normal radial B/L [Edema ___] : edema [unfilled] [No Rash] : no rash [Normal] : alert and oriented, normal memory [de-identified] : unable to assess - wearing a mask [de-identified] : JVP 10-12 cm H2O with HJR [de-identified] : II/VI systolic murmur [de-identified] : Irregularly irregular S1 S2 [de-identified] : unable to appreciate HSM or masses due to body habitus [de-identified] : slow gait using cane, using wheelchair for distance

## 2022-04-29 NOTE — CARDIOLOGY SUMMARY
[de-identified] : \par 03/15/22 ECG: AFib at 63 bpm, iRBBB, LAFB. No sig change from 11/24/21.\par 11/24/21 ECG: AFib at 48 bpm, iRBBB, LAFB. No sig change from prior 5/19/21\par 05/19/21 ECG: AFib at 61 bpm, iRBBB, LAFB. No sig change from prior.\par 11/18/20 ECG: AFib at 57 bpm, iRBBB, LAFB. No sig change from prior 8/28/20\par  [de-identified] : \par 03/10/22-03/30/22 Zio monitor: Persistent AFib (100% burden). HR ranging  bpm (average HR 64 bpm). Mild IVCD, rare VPD's and couplets. No symptoms.\par  [de-identified] : \par 03/15/22 TTE: LVIDd 5.0 cm, LVEF 70%, no segmental WMA, flattening of the septum c/w RV pressure overload, mild concentric LVH (septum 1.4 cm, PWT 1.1 cm), RVE with decreased RVSF, severe JANEL, s/p Mitraclip with mild-mod MR, mod-severe MS (peak gradient 15 mmHg, mean gradient 6 mmHg), severe TR, mild SD, severe PH (RVSP 79 mmHg).\par \par 11/18/20 TTE: LVIDd 4.6 cm, normal LVEF, concentric LVH (SW 1.2, PW 1.3 cm), flattening of septum c/w RV pressure overload, normal LA size, mod SONNY, RVE with decreased RV function, mild-mod MR s/p MitraClip (peak 10 mmHg, mean 4 mmHg), mod-severe TR, no effusion, severe PH (RVSP 95 mmHg). Iatrogenic transeptal flow noted (transeptal puncture for Clip).\par \par 03/01/18 TTE: LVEF 70%, LVIDd 5.1, septum/PWT 1.1, LA 5.7, severely dilated RV with severe RVSD, severe biatrial enlargement, severe TR, MR present but shadowed by Mitraclip,est RVSP 82, left to right atrial flow noted from transseptal puncture.\par  [de-identified] : \par 03/11/19 RHC baseline: no RA or RV reported, PA 61/21/35, PCW 12, Ao 93%, PA 71%, CO/CI (F) 6.09/3.26, PVR 3.77 Mendez.\par Nitric Oxide: RA 10, RV 57/12, PA 54/17/30, no PCW reported, Ao 93%, PA 77%, CO/CI (F) 8.19/4.38,  PVR 3.66 Mendez.\par

## 2022-04-29 NOTE — DISCUSSION/SUMMARY
[Patient] : the patient [FreeTextEntry2] : and daughter [FreeTextEntry1] : 88 yo F with chronic diastolic HF, rate-controlled AFib, severe MR s/p Viola, CKD stage 3 and chronic hypoxia on home O2 who presents today after an episode of hypoxia and acute SOB. She is ACC/AHA Stage C, NYHA Class III, volume overloaded, and normotensive. I have recommended the following:\par \par 1. Chronic diastolic heart failure - Stable, but volume overloaded. Will increase torsemide to 40 mg BID for 3 days and then she will go back to 40 mg daily. HF education booklet given to patient so she can keep a daily log of her weight. Continue spironolactone 25 mg daily. She did not tolerate Farxiga due to abdominal discomfort. Would not pursue CardioMEMS unless she is difficult to manage as an outpatient or has worsening symptoms of HF. Continue fluid and salt restriction. Will check labs today.\par \par 3. Pulmonary hypertension - She has been stable for years despite severely elevated pulmonary pressures by TTE. At the time of her last RHC on 3/11/19, PA 61/21/35, PCW 12 without a response to inhaled NO and with PA 71.3%, PVR 3.77 Mendez. Her weight then was 84 kg. TTE from 3/15/22 reviewed with Dr. Moore (structural cardiology) who did not think there was significant new MS.\par \par 4. AFib - Rate controlled on beta blocker and CCB. On Pradaxa for AC.\par \par 5. CKD stage 3 - Stable on current therapies. Baseline Cr 1.2-1.3. Recent labs from 3/5/22 showed BUN/Cr of 33/1.22. Will repeat today.\par \par 6. Hypertension -  Well controlled on current therapies.\par \par 7. RLE swelling - She has a history of RLE edema (> LLE), but today appears more than usual. Will increase diuretics as above for a few days. LE dopplers on 3/11/22 negative for DVT. She states it is difficult for her to put on and take off compression socks. Recommended she be evaluated by vascular (Dr. John) but she does not wish to see another doctor at this time.\par \par 8. Follow up with Dr. Lopes as scheduled in May.

## 2022-04-29 NOTE — HISTORY OF PRESENT ILLNESS
[FreeTextEntry1] : Mrs. Mix is an 89 year old woman with history of HFpEF (LVEF 71% 3/18), MR s/p Mitraclip (8/30/16), and mixed pre- and post-capillary pulmonary hypertension. Her other comorbidities include permanent AFib (on Pradaxa), hypertension, hyperlipidemia, CKD (b/l Cr 1.2-1.3) and SHERYL. She was previously on continuous home oxygen at 3L via [place], but now uses it mostly at night and as needed. She presents today for follow-up of her HF.\par \par Since she was last seen in clinic a month ago, she had an episode where she was walking back to her apartment after spending the day at her daughters (off oxygen), and she became acutely SOB. When she reached her apartment she was immediately put back on oxygen, but her O2 sat at the time was 79%. With oxygen and rest, her O2 sat improved to 88-90%.\par \par She is usually able to walk the length of a long hallway at the [facility] (~2.5 yards) 3 times per day without any SOB or fatigue, but has not tried it since this episode. She states that her weight has been "stable" but has not been keeping a log. Of note, she realized about a week ago that she she ran out of spironolactone so empirically increased the torsemide to 40 mg daily. She was able to  spironolactone at the pharmacy, but has continued to take torsemide 40 mg daily. She also had increased LE edema, which has improved since increasing the torsemide.\par \par She denies CP, SOB at rest, orthopnea, PND, LH/dizziness, abdominal discomfort, or palpitations. Her appetite is normal and her bowel and bladder habits are unchanged. She has not been limiting her fluid intake, but is following a low sodium diet. She does not use NSAIDs. She has not been admitted to the hospital or seen in the ER for HF in the interim.

## 2022-05-12 NOTE — PROGRESS NOTE ADULT - PROBLEM SELECTOR PLAN 8
This is a chronic problem. The problem is well controlled. Patient monitors readings regularly. Pertinent negatives include no chest pain, focal sensory loss, focal weakness, leg pain, myalgias or shortness of breath. No headaches or chest pain. Takes medications regularly. Blood pressure has been stable, blood work was reviewed, and advised patient to continue the current instructions or medications. c/w atorvastatin

## 2022-05-18 ENCOUNTER — APPOINTMENT (OUTPATIENT)
Dept: HEART FAILURE | Facility: CLINIC | Age: 87
End: 2022-05-18

## 2022-05-20 ENCOUNTER — APPOINTMENT (OUTPATIENT)
Dept: OTOLARYNGOLOGY | Facility: CLINIC | Age: 87
End: 2022-05-20
Payer: MEDICARE

## 2022-05-20 VITALS
WEIGHT: 175 LBS | BODY MASS INDEX: 31.01 KG/M2 | SYSTOLIC BLOOD PRESSURE: 121 MMHG | HEIGHT: 63 IN | DIASTOLIC BLOOD PRESSURE: 64 MMHG | HEART RATE: 97 BPM

## 2022-05-20 DIAGNOSIS — H92.22 OTORRHAGIA, LEFT EAR: ICD-10-CM

## 2022-05-20 PROCEDURE — 99213 OFFICE O/P EST LOW 20 MIN: CPT | Mod: 25

## 2022-05-20 PROCEDURE — 69220 CLEAN OUT MASTOID CAVITY: CPT | Mod: LT

## 2022-05-20 NOTE — ADDENDUM
[FreeTextEntry1] : Documented by Anita Rodriguez acting as scribe for Dr. Ventura on 05/20/2022. \par \par All Medical record entries made by the scribe were at my. Dr. Ventura direction and personally dictated by me on 05/20/2022. I have reviewed the chart and agree that the record accurately reflects my personal performance of the history, physical exam, assessment and plan. I have also personally directed, reviewed, and agreed with the discharge instructions.

## 2022-05-20 NOTE — CONSULT LETTER
[Dear  ___] : Dear  [unfilled], [Courtesy Letter:] : I had the pleasure of seeing your patient, [unfilled], in my office today. [Please see my note below.] : Please see my note below. [Consult Closing:] : Thank you very much for allowing me to participate in the care of this patient.  If you have any questions, please do not hesitate to contact me. [Sincerely,] : Sincerely, [FreeTextEntry3] : Alexei Ventura MD FACS

## 2022-05-20 NOTE — ASSESSMENT
[FreeTextEntry1] : Reviewed and reconciled medications, allergies, PMHx, PSHx, SocHx, FMHx. 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 ofloxacin presecribed use 3x a day - don’t use the drop 24 hours before coming back. FU 2-3 weeks

## 2022-05-20 NOTE — HISTORY OF PRESENT ILLNESS
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 The patient presents today for 6 months follow up. Pt denies any odor or pain in the left ear.

## 2022-05-20 NOTE — PHYSICAL EXAM
[Normal] : mucosa is normal [Midline] : trachea located in midline position [FreeTextEntry9] : cerumen removed via curettage - granulation tissue  [de-identified] : retracted and sclerotic  [de-identified] : mastoid cleaned  via suction  [de-identified] : deviated septum [de-identified] : inflamed turbs  [de-identified] : little dry

## 2022-05-25 ENCOUNTER — APPOINTMENT (OUTPATIENT)
Dept: CV DIAGNOSITCS | Facility: HOSPITAL | Age: 87
End: 2022-05-25

## 2022-05-25 ENCOUNTER — APPOINTMENT (OUTPATIENT)
Dept: HEART FAILURE | Facility: CLINIC | Age: 87
End: 2022-05-25
Payer: MEDICARE

## 2022-05-25 VITALS
WEIGHT: 174 LBS | OXYGEN SATURATION: 95 % | DIASTOLIC BLOOD PRESSURE: 60 MMHG | BODY MASS INDEX: 30.83 KG/M2 | HEART RATE: 62 BPM | HEIGHT: 63 IN | RESPIRATION RATE: 16 BRPM | TEMPERATURE: 209.48 F | SYSTOLIC BLOOD PRESSURE: 93 MMHG

## 2022-05-25 PROCEDURE — 99215 OFFICE O/P EST HI 40 MIN: CPT

## 2022-06-08 ENCOUNTER — APPOINTMENT (OUTPATIENT)
Dept: OTOLARYNGOLOGY | Facility: CLINIC | Age: 87
End: 2022-06-08
Payer: MEDICARE

## 2022-06-08 VITALS
BODY MASS INDEX: 30.65 KG/M2 | DIASTOLIC BLOOD PRESSURE: 62 MMHG | HEIGHT: 63 IN | WEIGHT: 173 LBS | HEART RATE: 94 BPM | SYSTOLIC BLOOD PRESSURE: 106 MMHG

## 2022-06-08 DIAGNOSIS — H91.8X9 OTHER SPECIFIED HEARING LOSS, UNSPECIFIED EAR: ICD-10-CM

## 2022-06-08 PROCEDURE — 69220 CLEAN OUT MASTOID CAVITY: CPT

## 2022-06-08 PROCEDURE — 99213 OFFICE O/P EST LOW 20 MIN: CPT | Mod: 25

## 2022-06-08 RX ORDER — OFLOXACIN OTIC 3 MG/ML
0.3 SOLUTION AURICULAR (OTIC) TWICE DAILY
Qty: 1 | Refills: 3 | Status: DISCONTINUED | COMMUNITY
Start: 2022-05-20 | End: 2022-06-08

## 2022-06-08 NOTE — ASSESSMENT
[FreeTextEntry1] : Reviewed and reconciled medications, allergies, PMHx, PSHx, SocHx, FMHx. 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 FU 6 months

## 2022-06-08 NOTE — HISTORY OF PRESENT ILLNESS
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 The patient presents today for 3 weeks follow up on the ears. Pt denies having any further bleeding from left ear.

## 2022-06-08 NOTE — PHYSICAL EXAM
[Normal] : mucosa is normal [Midline] : trachea located in midline position [FreeTextEntry8] : cerumen removed via curettage - dead skin  [de-identified] : cleaned via curettage  [FreeTextEntry1] : severely deviated septum anteriorly on the right \par inflamed turbs  [de-identified] : dry mouth

## 2022-06-08 NOTE — ADDENDUM
[FreeTextEntry1] : Documented by Anita Rodriguez acting as scribe for Dr. Ventura on 06/08/2022. \par \par All Medical record entries made by the scribe were at my. Dr. Ventura direction and personally dictated by me on 06/08/2022. I have reviewed the chart and agree that the record accurately reflects my personal performance of the history, physical exam, assessment and plan. I have also personally directed, reviewed, and agreed with the discharge instructions.

## 2022-06-17 NOTE — CARDIOLOGY SUMMARY
[de-identified] : \par 03/15/22 ECG: AFib at 63 bpm, iRBBB, LAFB. No sig change from 11/24/21.\par 11/24/21 ECG: AFib at 48 bpm, iRBBB, LAFB. No sig change from prior 5/19/21\par 05/19/21 ECG: AFib at 61 bpm, iRBBB, LAFB. No sig change from prior.\par 11/18/20 ECG: AFib at 57 bpm, iRBBB, LAFB. No sig change from prior 8/28/20\par  [de-identified] : \par 03/10/22-03/30/22 Zio monitor: Persistent AFib (100% burden). HR ranging  bpm (average HR 64 bpm). Mild IVCD, rare VPD's and couplets. No symptoms.\par  [de-identified] : \par 03/15/22 TTE: LVIDd 5.0 cm, LVEF 70%, no segmental WMA, flattening of the septum c/w RV pressure overload, mild concentric LVH (septum 1.4 cm, PWT 1.1 cm), RVE with decreased RVSF, severe JANEL, s/p Mitraclip with mild-mod MR, mod-severe MS (peak gradient 15 mmHg, mean gradient 6 mmHg), severe TR, mild HI, severe PH (RVSP 79 mmHg).\par \par 11/18/20 TTE: LVIDd 4.6 cm, normal LVEF, concentric LVH (SW 1.2, PW 1.3 cm), flattening of septum c/w RV pressure overload, normal LA size, mod SONNY, RVE with decreased RV function, mild-mod MR s/p MitraClip (peak 10 mmHg, mean 4 mmHg), mod-severe TR, no effusion, severe PH (RVSP 95 mmHg). Iatrogenic transeptal flow noted (transeptal puncture for Clip).\par \par 03/01/18 TTE: LVEF 70%, LVIDd 5.1, septum/PWT 1.1, LA 5.7, severely dilated RV with severe RVSD, severe biatrial enlargement, severe TR, MR present but shadowed by Mitraclip,est RVSP 82, left to right atrial flow noted from transseptal puncture.\par  [de-identified] : \par 03/11/19 RHC baseline: no RA or RV reported, PA 61/21/35, PCW 12, Ao 93%, PA 71%, CO/CI (F) 6.09/3.26, PVR 3.77 Mendez.\par Nitric Oxide: RA 10, RV 57/12, PA 54/17/30, no PCW reported, Ao 93%, PA 77%, CO/CI (F) 8.19/4.38,  PVR 3.66 Mendez.\par

## 2022-06-17 NOTE — HISTORY OF PRESENT ILLNESS
[FreeTextEntry1] : Mrs. Mix is a very pleasant 89 year old woman with history of HFpEF (LVEF 71% 3/2022), MR s/p Mitraclip (8/30/16), mixed pre- and post-capillary pulmonary hypertension, permanent AFib (on Pradaxa), hypertension, hyperlipidemia, CKD (b/l Cr 1.2-1.3), SHERYL and hypoxia on home O2 3L. \par \par She presents today for routine follow-up of her cardiomyopathy. When last seen one month ago, was found to be mildly hypervolemic for which her diuretics were adjusted. She returned to taking Torsemide 40 mg daily for the past few days, with home weight stable at 173 lbs. Her clinic weight is down 4 lbs from last visit at 174 lbs. She notes improved LE swelling though not resolved. She can still walk the length of a long hallway at the [facility] (~2.5 yards) with the assistance of a walker but is now wearing supplemental O2 more consistently during the day as she experienced an episode of exertional hypoxia with SpO2 in 70s while on room air earlier this month. SBP generally 110-120s though is in 90s in office today. No further reported syncopal episodes and [facility] report from March without any notable events. \par \par She denies CP, SOB at rest, orthopnea, PND, LH/dizziness, abdominal discomfort, or palpitations. Her appetite is fair and her bowel and bladder habits are unchanged. She does not particularly adhere to fluid restriction, but is following a low sodium diet. She does not use NSAIDs. She has not been admitted to the hospital or seen in the ER for HF in the interim.\par

## 2022-06-17 NOTE — REASON FOR VISIT
[Cardiac Failure] : cardiac failure [Other: _____] : [unfilled] [FreeTextEntry1] : PATIENT INSTRUCTIONS:\par \par 1. Increase Torsemide to 40 mg taken twice a day ( by 6 hours)\par \par 2. Increase Spironolactone to 25 mg taken twice a day \par \par 3. Repeat labs in one week at [facility] \par \par 4. Follow-up with Dr. Lopes in 2 months, sooner if needed

## 2022-06-17 NOTE — PHYSICAL EXAM
[No Xanthelasma] : no xanthelasma [No Rub] : no rub [No Gallop] : no gallop [Soft] : abdomen soft [Non Tender] : non-tender [Normal Bowel Sounds] : normal bowel sounds [No Cyanosis] : no cyanosis [No Clubbing] : no clubbing [Normal Radial B/L] : normal radial B/L [Edema ___] : edema [unfilled] [Normal] : alert and oriented, normal memory [de-identified] : unable to assess - wearing a mask [de-identified] : JVP 14-16 cm H2O with large V waves [de-identified] : II/VI systolic murmur [de-identified] : Irregularly irregular S1 S2 [de-identified] : unable to appreciate HSM or masses due to body habitus [de-identified] : slow gait using cane, using wheelchair for distance [de-identified] : L shin with small oval laceration covered; weeping. No surrounding errythema or purulent discharge

## 2022-06-17 NOTE — END OF VISIT
[FreeTextEntry3] : I, Dr. Lopes, personally performed the evaluation and management (E/M) services for this established patient who presents today with (a) new problem(s)/exacerbation of (an) existing condition(s).  That E/M includes conducting the examination, assessing all new/exacerbated conditions, and establishing a new plan of care.  Today, my JULIUS, [employer], was here to observe my evaluation and management services for this new problem/exacerbated condition to be followed going forward.  [Time Spent: ___ minutes] : I have spent [unfilled] minutes of time on the encounter.

## 2022-06-17 NOTE — DISCUSSION/SUMMARY
[Patient] : the patient [FreeTextEntry2] : and daughter [FreeTextEntry1] : 89 yo F with chronic diastolic HF, rate-controlled AFib, severe MR s/p MitraClip, CKD stage 3 and chronic hypoxia on home O2 who is stable. She is ACC/AHA Stage C, NYHA Class III, volume overloaded, and BPs below her baseline. I have recommended the following:\par \par 1. Chronic diastolic heart failure - Fluid overloaded today. Increase Torsemide to 40 mg BID as well as Spironolactone to 25 mg BID. She did not tolerate Farxiga due to abdominal discomfort. Would not pursue CardioMEMS unless she is difficult to manage as an outpatient or has worsening symptoms of HF. Continue fluid and salt restriction. To have repeat labs in one week at [facility]. \par \par 3. Pulmonary hypertension - She has been stable for years despite severely elevated pulmonary pressures by TTE. At the time of her last RHC on 3/11/19, PA 61/21/35, PCW 12 without a response to inhaled NO and with PA 71.3%, PVR 3.77 Mendez. TTE from 3/15/22 reviewed with Dr. Moore (structural cardiology) who did not think there was significant new MS.\par \par 4. AFib - Rate controlled on beta blocker and CCB. On Pradaxa for AC.\par \par 5. CKD stage 3 - Stable on current therapies. Baseline Cr 1.2-1.3. Recent labs from 4/27 stable. Ideally would introduce SGLT2-i but did not tolerate in past. \par \par 6. Hypertension -  While SBP in office today is 93, asymptomatic and reports readings of 110-120s at [facility]. At subsequent visits, should BP continue to be marginal, will consider reducing Cardizem. \par \par 7. Follow-up with Dr. Lopes in 2 months\par

## 2022-06-22 ENCOUNTER — APPOINTMENT (OUTPATIENT)
Dept: PULMONOLOGY | Facility: CLINIC | Age: 87
End: 2022-06-22
Payer: MEDICARE

## 2022-06-22 VITALS
DIASTOLIC BLOOD PRESSURE: 59 MMHG | OXYGEN SATURATION: 93 % | HEART RATE: 65 BPM | WEIGHT: 173 LBS | BODY MASS INDEX: 30.65 KG/M2 | SYSTOLIC BLOOD PRESSURE: 103 MMHG | HEIGHT: 63 IN

## 2022-06-22 PROCEDURE — 71046 X-RAY EXAM CHEST 2 VIEWS: CPT

## 2022-06-22 PROCEDURE — 94618 PULMONARY STRESS TESTING: CPT

## 2022-06-22 PROCEDURE — 99214 OFFICE O/P EST MOD 30 MIN: CPT | Mod: 25

## 2022-06-22 NOTE — HISTORY OF PRESENT ILLNESS
[TextBox_4] : Follow-up for shortness of breath and pulmonary hypertension.  Has become clinically more dependent on oxygen.  Remains on diuretic therapy.

## 2022-06-22 NOTE — ASSESSMENT
[FreeTextEntry1] : Cardiac size appears to increased in size and patient more dependent on oxygen.  She did continue with submental oxygen will reach out to cardiology to discuss if there is any role for vericiguat

## 2022-07-18 ENCOUNTER — INPATIENT (INPATIENT)
Facility: HOSPITAL | Age: 87
LOS: 0 days | Discharge: NURSING, REHAB OR LONG-TERM CARE | DRG: 312 | End: 2022-07-19
Attending: FAMILY MEDICINE | Admitting: FAMILY MEDICINE
Payer: COMMERCIAL

## 2022-07-18 VITALS
DIASTOLIC BLOOD PRESSURE: 61 MMHG | SYSTOLIC BLOOD PRESSURE: 108 MMHG | HEIGHT: 63 IN | HEART RATE: 69 BPM | OXYGEN SATURATION: 98 % | RESPIRATION RATE: 20 BRPM | TEMPERATURE: 98 F | WEIGHT: 169.98 LBS

## 2022-07-18 DIAGNOSIS — Z98.89 OTHER SPECIFIED POSTPROCEDURAL STATES: Chronic | ICD-10-CM

## 2022-07-18 DIAGNOSIS — Z02.9 ENCOUNTER FOR ADMINISTRATIVE EXAMINATIONS, UNSPECIFIED: ICD-10-CM

## 2022-07-18 DIAGNOSIS — F41.9 ANXIETY DISORDER, UNSPECIFIED: ICD-10-CM

## 2022-07-18 DIAGNOSIS — R55 SYNCOPE AND COLLAPSE: ICD-10-CM

## 2022-07-18 DIAGNOSIS — I48.20 CHRONIC ATRIAL FIBRILLATION, UNSPECIFIED: ICD-10-CM

## 2022-07-18 DIAGNOSIS — E04.2 NONTOXIC MULTINODULAR GOITER: ICD-10-CM

## 2022-07-18 DIAGNOSIS — Z79.899 OTHER LONG TERM (CURRENT) DRUG THERAPY: ICD-10-CM

## 2022-07-18 DIAGNOSIS — I50.32 CHRONIC DIASTOLIC (CONGESTIVE) HEART FAILURE: ICD-10-CM

## 2022-07-18 DIAGNOSIS — M79.672 PAIN IN LEFT FOOT: ICD-10-CM

## 2022-07-18 DIAGNOSIS — H26.9 UNSPECIFIED CATARACT: Chronic | ICD-10-CM

## 2022-07-18 LAB
ALBUMIN SERPL ELPH-MCNC: 3.7 G/DL — SIGNIFICANT CHANGE UP (ref 3.3–5)
ALP SERPL-CCNC: 49 U/L — SIGNIFICANT CHANGE UP (ref 40–120)
ALT FLD-CCNC: 10 U/L — SIGNIFICANT CHANGE UP (ref 10–45)
ANION GAP SERPL CALC-SCNC: 11 MMOL/L — SIGNIFICANT CHANGE UP (ref 5–17)
APPEARANCE UR: CLEAR — SIGNIFICANT CHANGE UP
APTT BLD: 52.6 SEC — HIGH (ref 27.5–35.5)
AST SERPL-CCNC: 19 U/L — SIGNIFICANT CHANGE UP (ref 10–40)
BACTERIA # UR AUTO: NEGATIVE — SIGNIFICANT CHANGE UP
BASOPHILS # BLD AUTO: 0.02 K/UL — SIGNIFICANT CHANGE UP (ref 0–0.2)
BASOPHILS NFR BLD AUTO: 0.3 % — SIGNIFICANT CHANGE UP (ref 0–2)
BILIRUB SERPL-MCNC: 0.8 MG/DL — SIGNIFICANT CHANGE UP (ref 0.2–1.2)
BILIRUB UR-MCNC: NEGATIVE — SIGNIFICANT CHANGE UP
BLD GP AB SCN SERPL QL: NEGATIVE — SIGNIFICANT CHANGE UP
BUN SERPL-MCNC: 37 MG/DL — HIGH (ref 7–23)
CALCIUM SERPL-MCNC: 8.8 MG/DL — SIGNIFICANT CHANGE UP (ref 8.4–10.5)
CHLORIDE SERPL-SCNC: 100 MMOL/L — SIGNIFICANT CHANGE UP (ref 96–108)
CK SERPL-CCNC: 61 U/L — SIGNIFICANT CHANGE UP (ref 25–170)
CO2 SERPL-SCNC: 24 MMOL/L — SIGNIFICANT CHANGE UP (ref 22–31)
COLOR SPEC: SIGNIFICANT CHANGE UP
CREAT SERPL-MCNC: 1.39 MG/DL — HIGH (ref 0.5–1.3)
DIFF PNL FLD: NEGATIVE — SIGNIFICANT CHANGE UP
EGFR: 36 ML/MIN/1.73M2 — LOW
EOSINOPHIL # BLD AUTO: 0.1 K/UL — SIGNIFICANT CHANGE UP (ref 0–0.5)
EOSINOPHIL NFR BLD AUTO: 1.3 % — SIGNIFICANT CHANGE UP (ref 0–6)
EPI CELLS # UR: 0 /HPF — SIGNIFICANT CHANGE UP
FLUAV AG NPH QL: SIGNIFICANT CHANGE UP
FLUBV AG NPH QL: SIGNIFICANT CHANGE UP
GLUCOSE SERPL-MCNC: 133 MG/DL — HIGH (ref 70–99)
GLUCOSE UR QL: NEGATIVE — SIGNIFICANT CHANGE UP
HCT VFR BLD CALC: 32.1 % — LOW (ref 34.5–45)
HGB BLD-MCNC: 9.9 G/DL — LOW (ref 11.5–15.5)
HYALINE CASTS # UR AUTO: 0 /LPF — SIGNIFICANT CHANGE UP (ref 0–2)
IMM GRANULOCYTES NFR BLD AUTO: 1 % — SIGNIFICANT CHANGE UP (ref 0–1.5)
INR BLD: 1.76 RATIO — HIGH (ref 0.88–1.16)
KETONES UR-MCNC: NEGATIVE — SIGNIFICANT CHANGE UP
LEUKOCYTE ESTERASE UR-ACNC: NEGATIVE — SIGNIFICANT CHANGE UP
LYMPHOCYTES # BLD AUTO: 0.88 K/UL — LOW (ref 1–3.3)
LYMPHOCYTES # BLD AUTO: 11 % — LOW (ref 13–44)
MCHC RBC-ENTMCNC: 26.8 PG — LOW (ref 27–34)
MCHC RBC-ENTMCNC: 30.8 GM/DL — LOW (ref 32–36)
MCV RBC AUTO: 87 FL — SIGNIFICANT CHANGE UP (ref 80–100)
MONOCYTES # BLD AUTO: 1.16 K/UL — HIGH (ref 0–0.9)
MONOCYTES NFR BLD AUTO: 14.5 % — HIGH (ref 2–14)
NEUTROPHILS # BLD AUTO: 5.74 K/UL — SIGNIFICANT CHANGE UP (ref 1.8–7.4)
NEUTROPHILS NFR BLD AUTO: 71.9 % — SIGNIFICANT CHANGE UP (ref 43–77)
NITRITE UR-MCNC: NEGATIVE — SIGNIFICANT CHANGE UP
NRBC # BLD: 0 /100 WBCS — SIGNIFICANT CHANGE UP (ref 0–0)
NT-PROBNP SERPL-SCNC: 2421 PG/ML — HIGH (ref 0–300)
PH UR: 6.5 — SIGNIFICANT CHANGE UP (ref 5–8)
PLATELET # BLD AUTO: 182 K/UL — SIGNIFICANT CHANGE UP (ref 150–400)
POTASSIUM SERPL-MCNC: 4.8 MMOL/L — SIGNIFICANT CHANGE UP (ref 3.5–5.3)
POTASSIUM SERPL-SCNC: 4.8 MMOL/L — SIGNIFICANT CHANGE UP (ref 3.5–5.3)
PROT SERPL-MCNC: 6.6 G/DL — SIGNIFICANT CHANGE UP (ref 6–8.3)
PROT UR-MCNC: NEGATIVE — SIGNIFICANT CHANGE UP
PROTHROM AB SERPL-ACNC: 20.3 SEC — HIGH (ref 10.5–13.4)
RBC # BLD: 3.69 M/UL — LOW (ref 3.8–5.2)
RBC # FLD: 16.1 % — HIGH (ref 10.3–14.5)
RBC CASTS # UR COMP ASSIST: 0 /HPF — SIGNIFICANT CHANGE UP (ref 0–4)
RH IG SCN BLD-IMP: POSITIVE — SIGNIFICANT CHANGE UP
RSV RNA NPH QL NAA+NON-PROBE: SIGNIFICANT CHANGE UP
SARS-COV-2 RNA SPEC QL NAA+PROBE: SIGNIFICANT CHANGE UP
SODIUM SERPL-SCNC: 135 MMOL/L — SIGNIFICANT CHANGE UP (ref 135–145)
SP GR SPEC: 1.02 — SIGNIFICANT CHANGE UP (ref 1.01–1.02)
TROPONIN T, HIGH SENSITIVITY RESULT: 24 NG/L — SIGNIFICANT CHANGE UP (ref 0–51)
TROPONIN T, HIGH SENSITIVITY RESULT: 28 NG/L — SIGNIFICANT CHANGE UP (ref 0–51)
UROBILINOGEN FLD QL: NEGATIVE — SIGNIFICANT CHANGE UP
WBC # BLD: 7.98 K/UL — SIGNIFICANT CHANGE UP (ref 3.8–10.5)
WBC # FLD AUTO: 7.98 K/UL — SIGNIFICANT CHANGE UP (ref 3.8–10.5)
WBC UR QL: 0 /HPF — SIGNIFICANT CHANGE UP (ref 0–5)

## 2022-07-18 PROCEDURE — 99285 EMERGENCY DEPT VISIT HI MDM: CPT

## 2022-07-18 PROCEDURE — 99223 1ST HOSP IP/OBS HIGH 75: CPT

## 2022-07-18 PROCEDURE — 70450 CT HEAD/BRAIN W/O DYE: CPT | Mod: 26,MA

## 2022-07-18 PROCEDURE — 73630 X-RAY EXAM OF FOOT: CPT | Mod: 26,LT

## 2022-07-18 PROCEDURE — 70486 CT MAXILLOFACIAL W/O DYE: CPT | Mod: 26,MA

## 2022-07-18 PROCEDURE — 71260 CT THORAX DX C+: CPT | Mod: 26,MA

## 2022-07-18 PROCEDURE — 76377 3D RENDER W/INTRP POSTPROCES: CPT | Mod: 26

## 2022-07-18 PROCEDURE — 74177 CT ABD & PELVIS W/CONTRAST: CPT | Mod: 26,MA

## 2022-07-18 PROCEDURE — 72125 CT NECK SPINE W/O DYE: CPT | Mod: 26,MA

## 2022-07-18 PROCEDURE — 71045 X-RAY EXAM CHEST 1 VIEW: CPT | Mod: 26

## 2022-07-18 RX ORDER — ALPRAZOLAM 0.25 MG
0.25 TABLET ORAL DAILY
Refills: 0 | Status: DISCONTINUED | OUTPATIENT
Start: 2022-07-18 | End: 2022-07-19

## 2022-07-18 RX ORDER — ZOLPIDEM TARTRATE 10 MG/1
5 TABLET ORAL AT BEDTIME
Refills: 0 | Status: DISCONTINUED | OUTPATIENT
Start: 2022-07-18 | End: 2022-07-19

## 2022-07-18 RX ORDER — DILTIAZEM HCL 120 MG
240 CAPSULE, EXT RELEASE 24 HR ORAL DAILY
Refills: 0 | Status: DISCONTINUED | OUTPATIENT
Start: 2022-07-19 | End: 2022-07-19

## 2022-07-18 RX ORDER — SODIUM CHLORIDE 9 MG/ML
250 INJECTION INTRAMUSCULAR; INTRAVENOUS; SUBCUTANEOUS ONCE
Refills: 0 | Status: COMPLETED | OUTPATIENT
Start: 2022-07-18 | End: 2022-07-18

## 2022-07-18 RX ORDER — DOCUSATE SODIUM 100 MG
1 CAPSULE ORAL
Qty: 0 | Refills: 0 | DISCHARGE

## 2022-07-18 RX ORDER — DABIGATRAN ETEXILATE MESYLATE 150 MG/1
75 CAPSULE ORAL
Refills: 0 | Status: DISCONTINUED | OUTPATIENT
Start: 2022-07-18 | End: 2022-07-19

## 2022-07-18 RX ORDER — METOPROLOL TARTRATE 50 MG
100 TABLET ORAL
Refills: 0 | Status: DISCONTINUED | OUTPATIENT
Start: 2022-07-19 | End: 2022-07-19

## 2022-07-18 RX ORDER — ACETAMINOPHEN 500 MG
975 TABLET ORAL ONCE
Refills: 0 | Status: COMPLETED | OUTPATIENT
Start: 2022-07-18 | End: 2022-07-18

## 2022-07-18 RX ORDER — SERTRALINE 25 MG/1
75 TABLET, FILM COATED ORAL DAILY
Refills: 0 | Status: DISCONTINUED | OUTPATIENT
Start: 2022-07-18 | End: 2022-07-19

## 2022-07-18 RX ORDER — ATORVASTATIN CALCIUM 80 MG/1
10 TABLET, FILM COATED ORAL AT BEDTIME
Refills: 0 | Status: DISCONTINUED | OUTPATIENT
Start: 2022-07-18 | End: 2022-07-19

## 2022-07-18 RX ADMIN — SODIUM CHLORIDE 250 MILLILITER(S): 9 INJECTION INTRAMUSCULAR; INTRAVENOUS; SUBCUTANEOUS at 16:10

## 2022-07-18 RX ADMIN — Medication 975 MILLIGRAM(S): at 19:41

## 2022-07-18 NOTE — ED ADULT NURSE REASSESSMENT NOTE - NS ED NURSE REASSESS COMMENT FT1
Patient placed on primafit, purpose of primafit explained to patient and patient's daughter, patient tolerated well, comfort and safety maintained.

## 2022-07-18 NOTE — H&P ADULT - EXTREMITIES COMMENTS
LEFT hallux with ecchymoses at tip.  NO bony tenderness of dorsum or plantar aspect of LEFT foot or ankle.  Mild tenderness LEFT hallux on palpation.

## 2022-07-18 NOTE — ED PROVIDER NOTE - PROGRESS NOTE DETAILS
Meera Garcia, Attending Physician: Patient with drop in hgb from 3/2022. Less concerned for GIB (patient denies hematuria, hematochezia or melena) and higher concern for acute bleeding 2/2 fall on Pradaxa. CTs pending. Patient signed out to Dr. Paulson pending CTs. Attending MD Paulson.  Pt signed out to me in stable condition pending CTs, CT, CXR, EKG, labs, 89 yo fem with pmhx of CHF, on 3L baseline home O2, lives at [facility], afib on pradaxa, HTN, HLD, fell last night, lateral neck pain, preceding illness/feeling unwell. Jarrell Larson MD (PGY2): CTs neg for acute findings. Labs w/ elevated BNP. Spoke to Dr. Mccarthy, agree that patient should stay for syncope/chf w/u. Endorsed to Dr. Rodriguez.

## 2022-07-18 NOTE — ED PROVIDER NOTE - CLINICAL SUMMARY MEDICAL DECISION MAKING FREE TEXT BOX
89 y/o F p/w R facial ecchymosis s/p fall last night. Unwitnessed. Unknown time of LOC. Denies systemic sx, but did not take meds yesterday. Was hypoxic in room, increased NC 02 to 6L. Exam as above. Given AC use, c/f bleed. Will get CT head, CT abd/pelvis. Will also get CT maxillofacial and cervical spine to eval for fx. CXR to eval fluid status.  Labs including cbc, cmp, coags, t&s, CK, trop and bnp. Dispo- pending imaging and labs. 88 y/o F p/w R facial ecchymosis s/p fall last night. Unwitnessed. Unknown time of LOC. Denies systemic sx, but did not take meds yesterday. Was hypoxic in room, increased NC 02 to 6L. Exam as above. Given AC use, c/f bleed. Will get CT head, CT abd/pelvis, ct chest. Will also get CT maxillofacial and cervical spine to eval for fx. CXR to eval fluid status.  Labs including cbc, cmp, coags, t&s, CK, trop and bnp. Dispo- pending imaging and labs. 88 y/o F p/w R facial ecchymosis s/p fall last night. Unwitnessed. Unknown time of LOC. Denies systemic sx, but did not take meds yesterday. Was hypoxic in room, increased NC 02 to 6L. Exam as above. Given AC use, c/f bleed. Will get CT head, CT abd/pelvis, ct chest. Will also get CT maxillofacial and cervical spine to eval for fx. CXR to eval fluid status.  Labs including cbc, cmp, coags, t&s, CK, trop and bnp. Dispo- pending imaging and labs.    Meera Garcia, Attending Physician: 88 y/o F p/w R facial ecchymosis s/p fall last night with preceding weakness x 2 days with decreased compliance of lasix due to generalized weakness and inability to walk to the room. Patient initially hypoxic in room on home 3L which may be secondary to med non-compliance however I returned patient to 3L given sustained normal temps. No fevers however will check UA for generalized weakness. Given extent of ecchymosis - will perform multiple CT scans to eval for trauma given blood thinners. No preceding etiology of fall to suggest purely mechanical - concern for syncopal episode. Will explore etiology of generalized weakness as it was enough to deter patient from going to granddaughters baby shower.

## 2022-07-18 NOTE — ED PROVIDER NOTE - NS ED ROS FT
CONSTITUTIONAL: No fevers, no chills, no lightheadedness, no dizziness  EYES: no visual changes, no eye pain  NOSE: no nasal congestion  MOUTH/THROAT: no sore throat  CV: No chest pain, no palpitations  RESP: No SOB, no cough  GI: No n/v/d, no abd pain  : no dysuria, no hematuria, no flank pain  MSK: see hpi   SKIN: see hpi   NEURO: no headache, no focal weakness, no decreased sensation/parasthesias   PSYCHIATRIC: no known mental health issues

## 2022-07-18 NOTE — H&P ADULT - PROBLEM SELECTOR PLAN 8
Transitions of Care Status:  1.  Name of PCP:    Shari Cabezas MD (PCP) [phone]  2.  PCP Contacted on Admission: [ ] Y    [x ] N    3.  PCP contacted at Discharge: [ ] Y    [ ] N    [ ] N/A  4.  Post-Discharge Appointment Date and Location:  5.  Summary of Handoff given to PCP:

## 2022-07-18 NOTE — H&P ADULT - PROBLEM SELECTOR PLAN 4
See above.  Echo.  Orthostatics.   Would consider formal HF Team evaluation and pulmonary evaluation in the AM.

## 2022-07-18 NOTE — ED PROVIDER NOTE - OBJECTIVE STATEMENT
Patient is an 88 y/o F with PMHx sig for CHF on home O2 (3L NC), afib on Pradaxa, HTN, HLD who presents to the ED after a fall. Last night at 2:30AM patient got up from her puzzle table and fell to the ground. Denies lightheadedness, dizziness, chest pain, palpitations at that time. Unknown LOC. She remembers crawling to the bathroom/to make a call. No f/c, n/v, headache, vision changes, urinary or bowel changes. Ecchymosis on R forehead, eye. Last fall March 2022. Uses scooter for ambulation. Did not take her Torsemide or other meds x2 days. Denies ingestions.

## 2022-07-18 NOTE — H&P ADULT - PROBLEM SELECTOR PLAN 5
See above.  [place] in chart.   Patient on longstanding PRN Xanax and Ambien.  Patient/ daughter aware of risk/benefit of BEERS risk as such.

## 2022-07-18 NOTE — ED ADULT NURSE NOTE - OBJECTIVE STATEMENT
Patient is a 89 y/o female with PMH of A-fib (on Pradaxa), depression, HLD, HTN, mitral valve stenosis presenting to the ED via waiting room with c/o fall today. Patient lives at assisted living facility and had an unwitnessed fall around 2:30AM this morning. Patient states she remembers falling, denies LOC, states she did not trip on anything but just lost her balance and fell. Patient was able to crawl after falling. Bruising on right side of forehead, patient reports right sided neck pain only while moving neck. Patient took 3 Tylenol this morning. Small bruises noted on abdomen. Patient on 3L NC at baseline, on 6L in ED. Patient normally ambulates with walker. Patient states she has not taken her Lasix x2 days. Patient A&Ox4. Airway patent, breathing unlabored on 6L, no accessory muscle use noted, denies SOB. Denies chest pain, peripheral pulses strong, cap refill less than 2 secs. Denies dizziness, denies blurred vision. Denies n/v/d, denies constipation. Denies difficult/painful/frequent urination. Moving extremities w/o difficulty. Skin warm. Denies fever, cough, or chills. Patient is a 88 y/o female with PMH of A-fib (on Pradaxa), depression, HLD, HTN, mitral valve stenosis presenting to the ED via waiting room with c/o fall today. Patient lives at assisted living facility and had an unwitnessed fall around 2:30AM this morning. Patient states she remembers falling, denies LOC, states she did not trip on anything but just lost her balance and fell. Patient was able to crawl after falling. Bruising on right side of forehead, patient reports right sided neck pain only while moving neck. Small bruises noted on abdomen. Patient on 3L NC at baseline, on 4-6L in ED. Patient normally ambulates with walker. Patient states she has not taken her Lasix x2 days. Patient A&Ox3. Airway patent, breathing unlabored on 4L at rest, no accessory muscle use noted, denies SOB. Denies chest pain, peripheral pulses strong, cap refill less than 2 secs. Denies dizziness, denies blurred vision. Denies n/v/d, denies constipation. Denies difficult/painful/frequent urination. Moving extremities w/o difficulty. Skin warm. Denies fever, cough, or chills.

## 2022-07-18 NOTE — ED PROVIDER NOTE - PHYSICAL EXAMINATION
General: Alert and Orientated x 3.   Head: Ecchymosis on R Forehead, eyelid and around R upper mandible. Swelling of R buccal area.   Eyes: PERRLA with EOMI.  Neck: Supple. Trachea midline. No cervical spine ttp. Full rom of neckw. some discomfort.   Cardiac: Normal S1 and S2 w/ RRR. No murmurs appreciated.   Pulmonary: CTA bilaterally. No increased WOB. No wheezes or crackles.  Abdominal: Soft, non-tender. Ecchymosis above umbilicus.  (+) bowel sounds appreciated in all 4 quadrants. No hepatosplenomegaly.   Neurologic: No focal sensory or motor deficits.  Musculoskeletal: Strength appropriate in all 4 extremities for age with no limited ROM. Ecchymosis on b/l knees. trace pedal edema   Skin: Color appropriate for race. Intact, warm, and well-perfused.  Psychiatric: Appropriate mood and affect. No apparent risk to self or others. General: Alert and Orientated x 3.   Head: Ecchymosis on R Forehead, eyelid and around R upper mandible.  Swelling of R buccal area.   Eyes: PERRLA with EOMI.  Neck: Supple. Trachea midline. No cervical spine ttp. Full rom of neckw. some discomfort.   Cardiac: Normal S1 and S2 w/ RRR. No murmurs appreciated.   Pulmonary: CTA bilaterally. No increased WOB. No wheezes or crackles.  Abdominal: Soft, non-tender. Ecchymosis above umbilicus.  (+) bowel sounds appreciated in all 4 quadrants. No hepatosplenomegaly.   Neurologic: No focal sensory or motor deficits.  Musculoskeletal: Strength appropriate in all 4 extremities for age with no limited ROM. Ecchymosis on b/l knees. trace pedal edema   Skin: Color appropriate for race. Intact, warm, and well-perfused.  Psychiatric: Appropriate mood and affect. No apparent risk to self or others.    Meera Garcia, Attending Physician: In addition to above:  No midline spinal TTP.   Ecchymosis of L great toe - DP pulses 2/3 bilaterally. Full ROM of bilaterally hips and pelvis stable.   1 cm round ecchymosis to L lateral back in upper thoracic region around T4, non-tender  R frontal scalp TTP.   Neurologic Exam:   &O to person, place, time, and situation. GCS 15 (E4M5V6)  Cranial Nerves II-XII intact & symmetric.  Speech is normal and fluent.  Motor 5/5 and symmetric in both upper & lower extremities with normal tone and no tremor.  Sensation intact in both upper and lower extremities.

## 2022-07-18 NOTE — ED ADULT NURSE REASSESSMENT NOTE - NS ED NURSE REASSESS COMMENT FT1
Received report from ORIN Fletcher. Pt is A&Ox3, breathing spontaneously, unlabored on 4L NC. Placed back on CM. Medications administered as ordered.

## 2022-07-18 NOTE — ED ADULT NURSE REASSESSMENT NOTE - NS ED NURSE REASSESS COMMENT FT1
As per pt and family at bedside, pt needs to take home medications and would like to speak with provider. Hospitalist in charge paged.

## 2022-07-18 NOTE — H&P ADULT - PROBLEM SELECTOR PLAN 1
See above.   Echo to reassess the patient's severe mitral stenosis.   Orthostatics.  Will cautiously provide patient's Torsemide tonight and in the AM for patient's Diltiazem, Lopressor.  Would review patient's Aldactone Rx.    Would consider formal cardiology and pulmonary evaluation in the AM.

## 2022-07-18 NOTE — H&P ADULT - PROBLEM SELECTOR PLAN 7
See above.    Would revisit patient's arrangements at [facility] of medication self administration.

## 2022-07-18 NOTE — H&P ADULT - NSHPREVIEWOFSYSTEMS_GEN_ALL_CORE
COVID-19 vaccine x 4.    NO HA, no focal weakness.  NO chest pain/pressure.  No palpitations.  NO abdominal pain, no red blood per rectum or melena.  NO back pain, no tearing back pain.  NO breast symptoms.    NO thyroid symptoms.  NO SI/HI>  NO dysuria, no hematuria.  NO weight loss or anorexia.  NO vaginal bleeding.

## 2022-07-18 NOTE — H&P ADULT - HISTORY OF PRESENT ILLNESS
NIGHT HOSPITALIST:   Patient UNKNOWN to me previously, assigned to me at this point via the ER and by Dr. Rodriguez to admit this 88 y/o F--followed by her office physicians above--patient seen with patient's adult daughter in attendance--patient a resident of [facility] and apparently patient self administers her medications--patient last hospitalized at [facility] in Mar 2019--patient with a history of HF with preserved EF% with patient maintained on multiple medications of diltiazem  mg daily, Lopressor 100 mg BID, Aldactone 25 mg daily, Toresemide 20 mg BID, severe MR S/P mitral clip Aug 2016, chronic atrial fibrillation maintained on Pradaxa 150 mg BID, anxiety disorder with patient on longstanding Xanax 0.25 mg daily PRN (see [place] Stop) and Ambien 5 mg (patient apparently per daughter on both since patient was ), with patient brought in by family following patient noted by family with RIGHT periorbital hematoma and LEFT hallux pain following a presumed mechanical fall at 0230 today following getting up from a late engagement on a puzzle table.  No apparent prodrome.  NO HA, no focal weakness.  NO dizziness.  NO neck pain, back pain, tearing back pain.  Patient reports that she missed the doses of her Rx for the last day (2 days?).   Patient apparently administers her own Rx at [facility].  NO chest pain/pressure.  NO palpitations.  NO abdominal pain, no red blood per rectum or melena.  NO dysuria, no hematuria.    Patient notes LEFT foot/LEFT hallux pain. NIGHT HOSPITALIST:   Patient UNKNOWN to me previously, assigned to me at this point via the ER and by Dr. Rodriguez to admit this 88 y/o F--followed by her office physicians above--patient seen with patient's adult daughter in attendance--patient a resident of [facility] and apparently patient self administers her medications--patient last hospitalized at [facility] in Mar 2019--patient with a history of HF with preserved EF% with patient maintained on multiple medications of diltiazem  mg daily, 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  No apparent prodrome.  NO HA, no focal weakness.  NO dizziness.  NO neck pain, back pain, tearing back pain.  Patient reports that she missed the doses of her Rx for the last day (2 days?).   Patient apparently administers her own Rx at [facility].  NO chest pain/pressure.  NO palpitations.  NO abdominal pain, no red blood per rectum or melena.  NO dysuria, no hematuria.    Patient notes LEFT foot/LEFT hallux pain.

## 2022-07-18 NOTE — ED ADULT NURSE NOTE - NSIMPLEMENTINTERV_GEN_ALL_ED
Implemented All Fall with Harm Risk Interventions:  [place] to call system. Call bell, personal items and telephone within reach. Instruct patient to call for assistance. Room bathroom lighting operational. Non-slip footwear when patient is off stretcher. Physically safe environment: no spills, clutter or unnecessary equipment. Stretcher in lowest position, wheels locked, appropriate side rails in place. Provide visual cue, wrist band, yellow gown, etc. Monitor gait and stability. Monitor for mental status changes and reorient to person, place, and time. Review medications for side effects contributing to fall risk. Reinforce activity limits and safety measures with patient and family. Provide visual clues: red socks.

## 2022-07-18 NOTE — H&P ADULT - NSHPLABSRESULTS_GEN_ALL_CORE
EKG tracing reviewed with atrial fibrillation at 70 with RVH.    WBC 7.9  normal differential.    Hgb 9.9    Platelets of 182K>    INR 1.76 on Pradaxa    HS troponin 28>>24.    Random glucose of 133.    Cr 1.39 (CKD3)    K+ 4.8  Na+ 135.    UA negative.    BNP 2421.    RVP and COVID-19 PCR>>negative. EKG tracing reviewed with atrial fibrillation at 70 with RVH.    WBC 7.9  normal differential.    Hgb 9.9    Platelets of 182K>    INR 1.76 on Pradaxa    HS troponin 28>>24.    Random glucose of 133.    Cr 1.39 (CKD3)    K+ 4.8  Na+ 135.    UA negative.    BNP 2421.    RVP and COVID-19 PCR>>negative.    CTT head / neck >>no bleed.  RIGHT thyroid nodule 1.8 and 2 cm.  RIGHT periorbital soft tissue swelling.  CTT trunk with subcentimeter liver cyst.    Chest radiograph reviewed with small B/L pleural effusions.      LEFT foot radiograph with no fx.    Echo from Mar 2022 with severe mitral stenosis, severe RA enlargement, increased RV pressure, severe TR.

## 2022-07-18 NOTE — H&P ADULT - NSHPADDITIONALINFOADULT_GEN_ALL_CORE
NIGHT HOSPITALIST:    Patient/ daughter in attendance aware of course and agree with plan/care as above.   Given patient's comorbidities, patient's long term prognosis is guarded.   Emotional support provided to patient/ daughter.   The patient is not yet ready to discuss advance directives.   Care reviewed with covering NP/PA for endorsement to Dr. Rodriguez.    Jovi Reynolds MD  Available on [employer] Teams. NIGHT HOSPITALIST:    Patient/ daughter in attendance aware of course and agree with plan/care as above.   Given patient's comorbidities, patient's long term prognosis is guarded.   Emotional support provided to patient/ daughter.   The patient is not yet ready to discuss advance directives.   Care reviewed with covering NP/PA, Ritu,  for endorsement to Dr. Rodriguez.    Jovi Reynolds MD  Available on [employer] Teams.

## 2022-07-18 NOTE — H&P ADULT - NSHPSOURCEINFOTX_GEN_ALL_CORE
Adult daughter in attendance with patient's permission.   Reviewed with patient/ daughter Medex from [facility]. Adult daughter in attendance with patient's permission.   Reviewed with patient/ daughter Medex from [facility].  [facility] I STOP # [id number]

## 2022-07-18 NOTE — H&P ADULT - MENTAL STATUS
AxOx3.  Speech fluent.  Cognition intact.   Mild subtle short term memory impairment but patient interactive with examiner and daughter in attendance with no need for prompting.

## 2022-07-18 NOTE — H&P ADULT - PROBLEM SELECTOR PLAN 3
Patient/daughter aware of risk/benefits and agree to continue with patient's Pradaxa>>lowered to 75 mg BID due to CKD3.

## 2022-07-18 NOTE — ED ADULT NURSE REASSESSMENT NOTE - NS ED NURSE REASSESS COMMENT FT1
Patient urinated in diaper while not connected to primafit, fresh diaper and linens provided, will obtain urine sample when patient feels urge to urinate again, patient denies pain/discomfort, breathing unlabored, on 4L NC, side rails raised, comfort and safety maintained.

## 2022-07-18 NOTE — ED ADULT NURSE NOTE - NSFALLRSKASSESSDT_ED_ALL_ED
[facility] - 6 [facility]  Urology  Consult Note    Patient Name: Coleman Lares  MRN: [id number]  Admission Date: 2/10/2022  Hospital Length of Stay: 1   Code Status: No Order   Attending Provider: Chen Geiger MD   Consulting Provider: Kaz Nickerson Jr, MD  Primary Care Physician: Salinas Bird DO  Principal Problem:COVID-19    Consults    Subjective:     HPI:  Mr. Lares is an 89 year old WM who presents to ED with complaint of generalized weakness and shortness of breath. He states he took his daughter to therapy appointment and shortly afterwards his daughter got sick. He states she was sick approximately 4-5 days and within the week he was sick. He states he has had an intermittent cough, shortness of breath, and weakness. He also endorses variable appetite for the last 3 days. Workup revealed he is Covid positive, dehydrated, and experiencing acute kidney injury.      Pertinent medical history of chronic back pain, constipation, hypertension, osteopenia, CAD, aortic regurgitation, pulmonary stenosis, COPD, HLD, vitamin D deficiency.    COVID-19  Acute, variable  Symptomatic with shortness of breath and weakness  Covid positive on PCR  RA oxygen saturations 88%; 92-94% on 2L   ABGs unremarkable  Unable to receive Remdesivir due to kidney function  Baricitinib 1mg PO QD initiated   Decadron 4mg PO QD  Monitor in isolation    Shortness of breath  Subacute, variable  Oxygen sat room air 88%  Covid 19 positive  D-dimer 6.34; negative LE dopplers  History of cardiac stent 20+ years ago  ECHO, VQ lung scan pending   Check troponin, BNP    EMETERIO (acute kidney injury)  Patient with acute kidney injury likely d/t IVVD/Dehydration Which is currently being treated. Labs reviewed- Renal function/electrolytes with Estimated Creatinine Clearance: 13.7 mL/min (A) (based on SCr of 3.53 mg/dL (H)). according to latest data. Monitor urine output and serial BMP and adjust therapy as needed. Avoid  nephrotoxins and renally dose meds for GFR listed above.      UTI (urinary tract infection)  Acute, variable  WBCs 11-15, RBC TNTC, many bacteria; moderate leukocytes; positive nitrites  Initiated on Rocephin 1gm IV; Continue q24 hours  Monitor      I have seen the patient, reviewed the Nurse Practitioner's note.  I have personally interviewed and examined the patient at bedside and agree with the findings.        Patient at very high risk for poor outcome with COVID.     If renal function improves, can start remdesivir.     Given EF of 25%, will decrease IV fluids to 100cc/hr.       Attestation signed by Chen Geiger MD at 2/10/2022 10:24 PM  --------------------------------------------------------------------------------------------------------------------------------------------------------  The above note is from the H&P of LEXIE Jeffrey - Department of [facility] Medicine on February 10, 2022 and cosigned by Chen Geiger MD.    No improvement in renal function with IV fluids.  Urology consulted for obstructive uropathy.  Consult cardiology if new onset heart failure.    ---------------------------------------------------------------------------------------------------------------------------------------------------------  The above note is from Dr. Chen Geiger this morning, February 11, 2022.    Dr. Geiger consulted us on Mr. Interiano for hydronephrosis, obstructive uropathy with elevated Creatinine.  His Cr on admission yesterday was 3.53 (Feb 10) and this morning it was 3.59.      My RNFA Wale Felix and I looked at his CT Scan and it showed: Multiple nonobstructing calculi present in both kidneys.  There is moderate left hydronephrosis and hydroureter extending into the pelvis.  There is a calculus at the left ureterovesical junction estimated 3 mm.  Moderate amount of left perinephric stranding present.  We measured the left distal ureteral stone at 5.5 mm x 4.5 mm which is a  passable size stone. With the patient being Covid POSITIVE, we will treat conservatively with pain and nausea meds PRN.    A urine culture was taken while the patient was in the ER yesterday and the results are still pending.  Will follow urine culture and treat accordingly.    The left ureteral stone is unlikely to be contributing significantly to the patient's azotemia.  If patient improves as far as COVID is concerned and becomes symptomatic from the left ureteral stone would consider ureteroscopy but I do not think that would be helpful to the patient at this time.  Recommend only conservative treatment.  Patient does not have indwelling Torres.  We need to strain his urine to make sure he does not pass the stone.    No new subjective & objective note has been filed under this hospital service since the last note was generated.      Assessment and Plan:     No notes have been filed under this hospital service.  Service: Urology      VTE Risk Mitigation (From admission, onward)         Ordered     heparin (porcine) injection 5,000 Units  Every 8 hours         02/10/22 4164                Thank you for your consult. I will follow-up with patient. Please contact us if you have any additional questions.    Kaz Nickerson Jr, MD  Urology  [facility] - 6 [facility]   18-Jul-2022 12:41

## 2022-07-18 NOTE — H&P ADULT - NSHPOUTPATIENTPROVIDERS_GEN_ALL_CORE
Shari Cabezas MD (PCP) [phone]  Alexandra Lopes MD ([facility] Cardiology) [phone]  Khari Henley MD (pulmonary) [phone]

## 2022-07-18 NOTE — H&P ADULT - ASSESSMENT
NIGHT HOSPITALIST:  S/P presumed mechanical fall in the setting of patient with a complex medical history of HF with preserved EF%, past mitral clip for severe MR in 8/2016, chronic atrial fibrillation on Pradaxa (dose reduced to 75 mg BID with review with Pharmacy due to CKD3) with presumed mechanical fall, but with nondiagnostic CTT head/neck/ trunk with RIGHT periorbital haematoma with patient/daughter agree to resume patient's Pradaxa and aware of risks/benefits as such, reviewed with radiology recommend LEFT foot CTT no contrast to exclude occult LEFT hallux fracture.  Will obtain an echo.    Will check orthostatics.   Will provide patient's evening dose of Torsemide but will defer resumption of the Aldactone in the AM to the Daytime Provider.    Will cautiously continue patient's Diltiazem and Lopressor in the AM to avoid hypotension.    Patient/ daughter wish for patient to continue her PRN Xanax and Ambien--aware of risk/benefit as such.  See [place] I Stop.    Would consider in the AM reviewing patient's arrangements at [facility] of patient self administering her medications. NIGHT HOSPITALIST:  S/P presumed mechanical fall in the setting of patient with a complex medical history of HF with preserved EF%, past mitral clip for severe MR in 8/2016, chronic atrial fibrillation on Pradaxa (dose reduced to 75 mg BID with review with Pharmacy due to CKD3) with presumed mechanical fall, but with nondiagnostic CTT head/neck/ trunk with RIGHT periorbital haematoma with patient/daughter agree to resume patient's Pradaxa and aware of risks/benefits as such, reviewed with radiology recommend LEFT foot CTT no contrast to exclude occult LEFT hallux fracture.  Will obtain an echo.    Will check orthostatics.   Will provide patient's evening dose of Torsemide but will defer resumption of the Aldactone in the AM to the Daytime Provider.    Will cautiously continue patient's Diltiazem and Lopressor in the AM to avoid hypotension.    Patient/ daughter wish for patient to continue her PRN Xanax and Ambien--aware of risk/benefit as such.  See [place] I Stop.    Incidental thyroid nodules but will obtain a thyroid US and a thyroid panel.    Would consider in the AM reviewing patient's arrangements at [facility] of patient self administering her medications.

## 2022-07-19 ENCOUNTER — TRANSCRIPTION ENCOUNTER (OUTPATIENT)
Age: 87
End: 2022-07-19

## 2022-07-19 VITALS
HEART RATE: 65 BPM | DIASTOLIC BLOOD PRESSURE: 58 MMHG | OXYGEN SATURATION: 100 % | RESPIRATION RATE: 18 BRPM | SYSTOLIC BLOOD PRESSURE: 106 MMHG | TEMPERATURE: 98 F

## 2022-07-19 LAB
ANION GAP SERPL CALC-SCNC: 15 MMOL/L — SIGNIFICANT CHANGE UP (ref 5–17)
BASOPHILS # BLD AUTO: 0.02 K/UL — SIGNIFICANT CHANGE UP (ref 0–0.2)
BASOPHILS NFR BLD AUTO: 0.3 % — SIGNIFICANT CHANGE UP (ref 0–2)
BUN SERPL-MCNC: 26 MG/DL — HIGH (ref 7–23)
CALCIUM SERPL-MCNC: 9 MG/DL — SIGNIFICANT CHANGE UP (ref 8.4–10.5)
CHLORIDE SERPL-SCNC: 101 MMOL/L — SIGNIFICANT CHANGE UP (ref 96–108)
CO2 SERPL-SCNC: 24 MMOL/L — SIGNIFICANT CHANGE UP (ref 22–31)
CREAT SERPL-MCNC: 1 MG/DL — SIGNIFICANT CHANGE UP (ref 0.5–1.3)
CULTURE RESULTS: SIGNIFICANT CHANGE UP
EGFR: 54 ML/MIN/1.73M2 — LOW
EOSINOPHIL # BLD AUTO: 0.32 K/UL — SIGNIFICANT CHANGE UP (ref 0–0.5)
EOSINOPHIL NFR BLD AUTO: 4.4 % — SIGNIFICANT CHANGE UP (ref 0–6)
GLUCOSE SERPL-MCNC: 96 MG/DL — SIGNIFICANT CHANGE UP (ref 70–99)
HCT VFR BLD CALC: 35.9 % — SIGNIFICANT CHANGE UP (ref 34.5–45)
HGB BLD-MCNC: 10.7 G/DL — LOW (ref 11.5–15.5)
IMM GRANULOCYTES NFR BLD AUTO: 1.1 % — SIGNIFICANT CHANGE UP (ref 0–1.5)
LYMPHOCYTES # BLD AUTO: 1.06 K/UL — SIGNIFICANT CHANGE UP (ref 1–3.3)
LYMPHOCYTES # BLD AUTO: 14.6 % — SIGNIFICANT CHANGE UP (ref 13–44)
MCHC RBC-ENTMCNC: 26.7 PG — LOW (ref 27–34)
MCHC RBC-ENTMCNC: 29.8 GM/DL — LOW (ref 32–36)
MCV RBC AUTO: 89.5 FL — SIGNIFICANT CHANGE UP (ref 80–100)
MONOCYTES # BLD AUTO: 1.17 K/UL — HIGH (ref 0–0.9)
MONOCYTES NFR BLD AUTO: 16.1 % — HIGH (ref 2–14)
NEUTROPHILS # BLD AUTO: 4.62 K/UL — SIGNIFICANT CHANGE UP (ref 1.8–7.4)
NEUTROPHILS NFR BLD AUTO: 63.5 % — SIGNIFICANT CHANGE UP (ref 43–77)
NRBC # BLD: 0 /100 WBCS — SIGNIFICANT CHANGE UP (ref 0–0)
PLATELET # BLD AUTO: 192 K/UL — SIGNIFICANT CHANGE UP (ref 150–400)
POTASSIUM SERPL-MCNC: 4.1 MMOL/L — SIGNIFICANT CHANGE UP (ref 3.5–5.3)
POTASSIUM SERPL-SCNC: 4.1 MMOL/L — SIGNIFICANT CHANGE UP (ref 3.5–5.3)
RBC # BLD: 4.01 M/UL — SIGNIFICANT CHANGE UP (ref 3.8–5.2)
RBC # FLD: 16.1 % — HIGH (ref 10.3–14.5)
SODIUM SERPL-SCNC: 140 MMOL/L — SIGNIFICANT CHANGE UP (ref 135–145)
SPECIMEN SOURCE: SIGNIFICANT CHANGE UP
T3 SERPL-MCNC: 77 NG/DL — LOW (ref 80–200)
T4 AB SER-ACNC: 5.6 UG/DL — SIGNIFICANT CHANGE UP (ref 4.6–12)
TSH SERPL-MCNC: 2.49 UIU/ML — SIGNIFICANT CHANGE UP (ref 0.27–4.2)
WBC # BLD: 7.27 K/UL — SIGNIFICANT CHANGE UP (ref 3.8–10.5)
WBC # FLD AUTO: 7.27 K/UL — SIGNIFICANT CHANGE UP (ref 3.8–10.5)

## 2022-07-19 PROCEDURE — 97162 PT EVAL MOD COMPLEX 30 MIN: CPT

## 2022-07-19 PROCEDURE — 93306 TTE W/DOPPLER COMPLETE: CPT | Mod: 26

## 2022-07-19 PROCEDURE — 83880 ASSAY OF NATRIURETIC PEPTIDE: CPT

## 2022-07-19 PROCEDURE — 73630 X-RAY EXAM OF FOOT: CPT

## 2022-07-19 PROCEDURE — 70450 CT HEAD/BRAIN W/O DYE: CPT | Mod: MA

## 2022-07-19 PROCEDURE — 81001 URINALYSIS AUTO W/SCOPE: CPT

## 2022-07-19 PROCEDURE — 76377 3D RENDER W/INTRP POSTPROCES: CPT

## 2022-07-19 PROCEDURE — 86850 RBC ANTIBODY SCREEN: CPT

## 2022-07-19 PROCEDURE — 80048 BASIC METABOLIC PNL TOTAL CA: CPT

## 2022-07-19 PROCEDURE — 84484 ASSAY OF TROPONIN QUANT: CPT

## 2022-07-19 PROCEDURE — 76536 US EXAM OF HEAD AND NECK: CPT | Mod: 26

## 2022-07-19 PROCEDURE — 71260 CT THORAX DX C+: CPT | Mod: MA

## 2022-07-19 PROCEDURE — 85730 THROMBOPLASTIN TIME PARTIAL: CPT

## 2022-07-19 PROCEDURE — 85025 COMPLETE CBC W/AUTO DIFF WBC: CPT

## 2022-07-19 PROCEDURE — 84436 ASSAY OF TOTAL THYROXINE: CPT

## 2022-07-19 PROCEDURE — 82550 ASSAY OF CK (CPK): CPT

## 2022-07-19 PROCEDURE — 71045 X-RAY EXAM CHEST 1 VIEW: CPT

## 2022-07-19 PROCEDURE — 86900 BLOOD TYPING SEROLOGIC ABO: CPT

## 2022-07-19 PROCEDURE — 84480 ASSAY TRIIODOTHYRONINE (T3): CPT

## 2022-07-19 PROCEDURE — 86901 BLOOD TYPING SEROLOGIC RH(D): CPT

## 2022-07-19 PROCEDURE — 84443 ASSAY THYROID STIM HORMONE: CPT

## 2022-07-19 PROCEDURE — 87086 URINE CULTURE/COLONY COUNT: CPT

## 2022-07-19 PROCEDURE — 80053 COMPREHEN METABOLIC PANEL: CPT

## 2022-07-19 PROCEDURE — 93306 TTE W/DOPPLER COMPLETE: CPT

## 2022-07-19 PROCEDURE — 87637 SARSCOV2&INF A&B&RSV AMP PRB: CPT

## 2022-07-19 PROCEDURE — 99222 1ST HOSP IP/OBS MODERATE 55: CPT

## 2022-07-19 PROCEDURE — 76536 US EXAM OF HEAD AND NECK: CPT

## 2022-07-19 PROCEDURE — 70486 CT MAXILLOFACIAL W/O DYE: CPT | Mod: MA

## 2022-07-19 PROCEDURE — 85610 PROTHROMBIN TIME: CPT

## 2022-07-19 PROCEDURE — 74177 CT ABD & PELVIS W/CONTRAST: CPT | Mod: MA

## 2022-07-19 PROCEDURE — 72125 CT NECK SPINE W/O DYE: CPT | Mod: MA

## 2022-07-19 PROCEDURE — 99285 EMERGENCY DEPT VISIT HI MDM: CPT

## 2022-07-19 RX ORDER — SPIRONOLACTONE 25 MG/1
1 TABLET, FILM COATED ORAL
Qty: 0 | Refills: 0 | DISCHARGE

## 2022-07-19 RX ORDER — ACETAMINOPHEN 500 MG
650 TABLET ORAL EVERY 6 HOURS
Refills: 0 | Status: DISCONTINUED | OUTPATIENT
Start: 2022-07-19 | End: 2022-07-19

## 2022-07-19 RX ADMIN — DABIGATRAN ETEXILATE MESYLATE 75 MILLIGRAM(S): 150 CAPSULE ORAL at 00:12

## 2022-07-19 RX ADMIN — SERTRALINE 75 MILLIGRAM(S): 25 TABLET, FILM COATED ORAL at 12:34

## 2022-07-19 RX ADMIN — Medication 650 MILLIGRAM(S): at 05:04

## 2022-07-19 RX ADMIN — Medication 20 MILLIGRAM(S): at 00:13

## 2022-07-19 RX ADMIN — ZOLPIDEM TARTRATE 5 MILLIGRAM(S): 10 TABLET ORAL at 00:17

## 2022-07-19 RX ADMIN — Medication 20 MILLIGRAM(S): at 14:17

## 2022-07-19 RX ADMIN — Medication 20 MILLIGRAM(S): at 05:05

## 2022-07-19 RX ADMIN — Medication 240 MILLIGRAM(S): at 05:05

## 2022-07-19 RX ADMIN — ATORVASTATIN CALCIUM 10 MILLIGRAM(S): 80 TABLET, FILM COATED ORAL at 00:13

## 2022-07-19 RX ADMIN — Medication 100 MILLIGRAM(S): at 05:05

## 2022-07-19 RX ADMIN — DABIGATRAN ETEXILATE MESYLATE 75 MILLIGRAM(S): 150 CAPSULE ORAL at 12:34

## 2022-07-19 NOTE — PHYSICAL THERAPY INITIAL EVALUATION ADULT - ADDITIONAL COMMENTS
Pt lives at [facility]L facility, ambulates independently with rollator walker. States has fallen 3 times in the last 6 months. Uses O2 at baseline.

## 2022-07-19 NOTE — PHYSICAL THERAPY INITIAL EVALUATION ADULT - PRECAUTIONS/LIMITATIONS, REHAB EVAL
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

## 2022-07-19 NOTE — CONSULT NOTE ADULT - SUBJECTIVE AND OBJECTIVE BOX
HPI:       PAST MEDICAL & SURGICAL HISTORY:  HTN (hypertension)    HLD (hyperlipidemia)      Atrial fibrillation  On Pradaxa      Syncope      Depression      Leg edema      Mitral valve stenosis, unspecified etiology      Hearing loss of left ear      H/O knee surgery      H/O external ear surgery      Cataracta          Home Medications:  Ambien 5 mg oral tablet: 1 tab(s) orally once a day (at bedtime), As Needed (18 Jul 2022 22:32)  Metoprolol Tartrate 100 mg oral tablet: 1 tab(s) orally 2 times a day (18 Jul 2022 22:32)  sertraline 50 mg oral tablet: 1.5 tab(s) orally once a day (18 Jul 2022 22:35)  spironolactone 25 mg oral tablet: 1 tab(s) orally once a day (18 Jul 2022 22:32)  torsemide 20 mg oral tablet: 1 tab(s) orally 2 times a day (18 Jul 2022 22:32)  Xanax 0.25 mg oral tablet: 1 tab(s) orally once a day, As Needed - for anxiety (18 Jul 2022 22:32)      Medications:  acetaminophen     Tablet .. 650 milliGRAM(s) Oral every 6 hours PRN  ALPRAZolam 0.25 milliGRAM(s) Oral daily PRN  atorvastatin 10 milliGRAM(s) Oral at bedtime  dabigatran 75 milliGRAM(s) Oral two times a day  diltiazem    milliGRAM(s) Oral daily  metoprolol tartrate 100 milliGRAM(s) Oral two times a day  sertraline 75 milliGRAM(s) Oral daily  torsemide 20 milliGRAM(s) Oral two times a day  zolpidem 5 milliGRAM(s) Oral at bedtime PRN      Physical Exam:    Vitals:  Vital Signs Last 24 Hours  T(C): 36.4 (07-19-22 @ 13:17), Max: 36.8 (07-18-22 @ 16:28)  HR: 66 (07-19-22 @ 13:17) (66 - 102)  BP: 98/58 (07-19-22 @ 13:17) (97/57 - 124/73)  RR: 18 (07-19-22 @ 13:17) (18 - 18)  SpO2: 95% (07-19-22 @ 13:17) (95% - 100%)        I&O's Summary      Tele:    General: No distress. Comfortable.  HEENT: EOM intact.  Neck: Neck supple. JVP not elevated. No masses  Chest: Clear to auscultation bilaterally  CV: Normal S1 and S2. No murmurs, rub, or gallops. Radial pulses normal.  Abdomen: Soft, non-distended, non-tender  Skin: No rashes or skin breakdown  Neurology: Alert and oriented times three. Sensation intact  Psych: Affect normal      Labs:                        10.7   7.27  )-----------( 192      ( 19 Jul 2022 07:51 )             35.9     07-19    140  |  101  |  26<H>  ----------------------------<  96  4.1   |  24  |  1.00    Ca    9.0      19 Jul 2022 07:51    TPro  6.6  /  Alb  3.7  /  TBili  0.8  /  DBili  x   /  AST  19  /  ALT  10  /  AlkPhos  49  07-18    PT/INR - ( 18 Jul 2022 12:44 )   PT: 20.3 sec;   INR: 1.76 ratio         PTT - ( 18 Jul 2022 12:44 )  PTT:52.6 sec  CARDIAC MARKERS ( 18 Jul 2022 12:44 )  x     / x     / 61 U/L / x     / x          Serum Pro-Brain Natriuretic Peptide: 2421 pg/mL (07-18 @ 12:44)  Creatine Kinase, Serum: 61 U/L (07-18-22 @ 12:44)  Creatine Kinase, Serum: 61 U/L (07-18-22 @ 12:44)              Imaging Studies  HPI:   This is a very pleasant 89 year old woman with history of HFpEF (LVEF 71% 3/2022), MR s/p Mitraclip (8/30/16), mixed pre- and post-capillary pulmonary hypertension, permanent AFib (on Pradaxa), hypertension, hyperlipidemia, CKD (b/l Cr 1.2-1.3), SHERYL and hypoxia on home O2 3L.   She presents to [facility] ER 7/18 s/p fall. She is a resident of [facility] facility and endorses she fell at 2:30 am. No apparent prodromal symptoms. She does not remember the fall, but woke up on the floor and was able to get herself back to bed. She endorses a history of multiple falls, most recently a month ago. She reports her weight has been stable at home ~170-174 lb, in the ER she is 171 lb. She self administers her medications at assisted living and her son reports she occasionally self adjusts her medications.    She was last seen in HF clinic 5/25/22 and she was mildly hypervolemic for which she was instructed to increase her Torsemide to 40mg BID. She has been on Spironolactone 25mg BID. Of note, she has not tolerated SGLT2i in the past due to abdominal discomfort.     She specifically denies CP, palpitations, dizziness/LH, focal deficits, neck or back pain.    PAST MEDICAL & SURGICAL HISTORY:  HTN (hypertension)  HLD (hyperlipidemia)  Atrial fibrillation. On Pradaxa  Syncope  Depression  Leg edema  Mitral valve stenosis, unspecified etiology  Hearing loss of left ear    H/O knee surgery  H/O external ear surgery  Cataracta    Home Medications:  Ambien 5 mg oral tablet: 1 tab(s) orally once a day (at bedtime), As Needed (18 Jul 2022 22:32)  Metoprolol Tartrate 100 mg oral tablet: 1 tab(s) orally 2 times a day (18 Jul 2022 22:32)  sertraline 50 mg oral tablet: 1.5 tab(s) orally once a day (18 Jul 2022 22:35)  spironolactone 25 mg oral tablet: 1 tab(s) orally once a day (18 Jul 2022 22:32)  torsemide 20 mg oral tablet: 1 tab(s) orally 2 times a day (18 Jul 2022 22:32)  Xanax 0.25 mg oral tablet: 1 tab(s) orally once a day, As Needed - for anxiety (18 Jul 2022 22:32)    Medications:  acetaminophen     Tablet .. 650 milliGRAM(s) Oral every 6 hours PRN  ALPRAZolam 0.25 milliGRAM(s) Oral daily PRN  atorvastatin 10 milliGRAM(s) Oral at bedtime  dabigatran 75 milliGRAM(s) Oral two times a day  diltiazem    milliGRAM(s) Oral daily  metoprolol tartrate 100 milliGRAM(s) Oral two times a day  sertraline 75 milliGRAM(s) Oral daily  torsemide 20 milliGRAM(s) Oral two times a day  zolpidem 5 milliGRAM(s) Oral at bedtime PRN    ROS: All other review of systems is negative unless indicated in HPI.    Physical Exam:  General: No acute distress  HEENT: Right orbital ecchymosis  Pulm: Breathing unlabored, lungs clear to auscultation bilaterally  CV: irregular rhythm, +2/6 murmur. JVP ~12-14cm H20 with +V waves  GI: Abd soft, nontender  Extremities: no edema  Skin: warm peripherally  Neuro: Alert, no focal deficits    Vitals:  Vital Signs Last 24 Hours  T(C): 36.4 (07-19-22 @ 13:17), Max: 36.8 (07-18-22 @ 16:28)  HR: 66 (07-19-22 @ 13:17) (66 - 102)  BP: 98/58 (07-19-22 @ 13:17) (97/57 - 124/73)  RR: 18 (07-19-22 @ 13:17) (18 - 18)  SpO2: 95% (07-19-22 @ 13:17) (95% - 100%)    I&O's Summary    Tele: Afib, HR 60's    Labs:                        10.7   7.27  )-----------( 192      ( 19 Jul 2022 07:51 )             35.9     07-19    140  |  101  |  26<H>  ----------------------------<  96  4.1   |  24  |  1.00    Ca    9.0      19 Jul 2022 07:51    TPro  6.6  /  Alb  3.7  /  TBili  0.8  /  DBili  x   /  AST  19  /  ALT  10  /  AlkPhos  49  07-18    PT/INR - ( 18 Jul 2022 12:44 )   PT: 20.3 sec;   INR: 1.76 ratio         PTT - ( 18 Jul 2022 12:44 )  PTT:52.6 sec  CARDIAC MARKERS ( 18 Jul 2022 12:44 )  x     / x     / 61 U/L / x     / x          Serum Pro-Brain Natriuretic Peptide: 2421 pg/mL (07-18 @ 12:44)  Creatine Kinase, Serum: 61 U/L (07-18-22 @ 12:44)  Creatine Kinase, Serum: 61 U/L (07-18-22 @ 12:44)              Imaging Studies

## 2022-07-19 NOTE — DISCHARGE NOTE PROVIDER - NSDCFUADDAPPT_GEN_ALL_CORE_FT
Follow up with cardiology with 5 days of discharge.  Follow up with PCP within 48 hours of discharge for thyroid US results and continuation of care.

## 2022-07-19 NOTE — CONSULT NOTE ADULT - ASSESSMENT
89 year old woman with history of HFpEF, severe MR s/p mitral clip, chronic AF on Pradaxa, pulm HTN, home oxygen s/p fall at [facility], unclear if had syncope:    - Tele monitoring in ED neg.  Continue on telemetry while admitted  - Await echo  - Check orthostatics  - Continue Pradaxa for now  - Continue dilt and metoprolol.  HR and Bp controlled  - Getting torsemide bid and putting out good urine.  May need to reduce dose if orthostatics positive  - To follow while admitted

## 2022-07-19 NOTE — CONSULT NOTE ADULT - PROBLEM SELECTOR RECOMMENDATION 9
-Continue telemetry monitoring  -Check orthostatic BPs  -Infectious workup: urine cx pending, COVID negative, remains afebrile, WBC 7, CXR with some opacifications but no focal consolidation  -Consider holding Xanax/Ambien in this acute setting in this elderly female -Continue telemetry monitoring  -Negative orthostatic BPs  -HR ~60's, BP /60's. Can consider decreasing Lopressor or Cardizem dose, will defer to General Cardiology  -Infectious workup: urine cx pending, COVID negative, remains afebrile, WBC 7, CXR with some opacifications but no focal consolidation  -Would favor holding Xanax/Ambien in this acute setting in this elderly female - Unclear if there was LOC.   -Continue telemetry monitoring. No aucte events noted.  -Negative orthostatic BPs  -HR ~60's, BP /60's.   -Infectious workup: urine cx pending, COVID negative, remains afebrile, WBC 7, CXR with some opacifications but no focal consolidation  -Would favor holding Xanax/Ambien in this acute setting in this elderly female

## 2022-07-19 NOTE — CONSULT NOTE ADULT - NS ATTEND AMEND GEN_ALL_CORE FT
Pt known to our team with h/o chronic HFpEF ACC/AHA stage C, MR s/p mitral clip, CPC, permanent AF on pradaxa, HTN, DLP, CKD stage 2-3, SHERYL and continuous home O2 who came to the ED post fall. The pt states she wasn't able to sleep and decided to go to the bathroom at 2am. She is unclear about what happened but she ended up on the floor. She was unable to get up for a while. She eventually went back to bed. She did not notified anyone. Her son called her the next day and she stated that she had a fall. She was brought in to the hospital. SHe was hemodynamically stable and chest pain free upon arrival. Orthostatic VS were negative. She look clinically euvolemic. She was found to have a significant right periorbital hematoma. CT scans ruled out fractures or intracranial pathologies.   She stayed overnight in the ED. Repeated labs showed improvement. A TTE was unchanged from prior studies. She states she was also seen by PT.  The pt is eager to go home. It's unclear if this was a mechanical fall or syncopal event. Her sons refers she's had multiple falls in the past. Both the patient and her son are eager for her to go home.    I discussed her case with the medicine and gen cards teams. She will be discharged home on the same medical regimen as before. We will setup a 2 week follow up with out HF NPs. She will call us if there are any changes in her clinical condition.   Plan as above.

## 2022-07-19 NOTE — CONSULT NOTE ADULT - SUBJECTIVE AND OBJECTIVE BOX
[facility] - Mayank Matthews, Gilbert, Xiomara, Solitario, Chong Vera  Office Number: [phone]    Initial Consult Note    CHIEF COMPLAINT: Patient is a 89y old  Female who presents with a chief complaint of S/P presumed mechanical fall at 0230  AM of admision at [facility]       HPI:  88 y/o F--followed by her office physicians above--patient seen with patient's adult daughter in attendance--patient a resident of [facility] and apparently patient self administers her medications--patient last hospitalized at [facility] in Mar 2019--patient with a history of HF with preserved EF% with patient maintained on multiple medications of diltiazem  mg daily, 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  No apparent prodrome.  NO HA, no focal weakness.  NO dizziness.  NO neck pain, back pain, tearing back pain.  Patient reports that she missed the doses of her Rx for the last day (2 days?).   Patient apparently administers her own Rx at [facility].  NO chest pain/pressure.  NO palpitations.  NO abdominal pain, no red blood per rectum or melena.  NO dysuria, no hematuria.    Patient notes LEFT foot/LEFT hallux pain.     Spoke to daughter, Shirlene, and they feel patient had syncope, but they are not sure.  She has not been complaining of increased dyspnea.  No chest pain, PND, orthopnea, LE swelling, dizziness.  No abnormal bleeding.  Reports good medication compliance.        PAST MEDICAL & SURGICAL HISTORY:  HTN (hypertension)      HLD (hyperlipidemia)      Atrial fibrillation  On Pradaxa      Syncope      Depression      Leg edema      Mitral valve stenosis, unspecified etiology      Hearing loss of left ear      H/O knee surgery      H/O external ear surgery      Cataracta          SOCIAL HISTORY:  No tobacco, ethanol, or drug abuse.    FAMILY HISTORY:  Family history of blood disorder (Mother)      No family history of acute MI or sudden cardiac death.    MEDICATIONS  (STANDING):  atorvastatin 10 milliGRAM(s) Oral at bedtime  dabigatran 75 milliGRAM(s) Oral two times a day  diltiazem    milliGRAM(s) Oral daily  metoprolol tartrate 100 milliGRAM(s) Oral two times a day  sertraline 75 milliGRAM(s) Oral daily  torsemide 20 milliGRAM(s) Oral two times a day    MEDICATIONS  (PRN):  acetaminophen     Tablet .. 650 milliGRAM(s) Oral every 6 hours PRN Temp greater or equal to 38C (100.4F), Mild Pain (1 - 3)  ALPRAZolam 0.25 milliGRAM(s) Oral daily PRN for anxiety  zolpidem 5 milliGRAM(s) Oral at bedtime PRN Insomnia      Allergies    codeine (Other)    Intolerances        REVIEW OF SYSTEMS:     All other review of systems is negative unless indicated above    VITAL SIGNS:   Vital Signs Last 24 Hrs  T(C): 36.6 (19 Jul 2022 05:16), Max: 36.8 (18 Jul 2022 16:28)  T(F): 97.9 (19 Jul 2022 05:16), Max: 98.3 (18 Jul 2022 16:28)  HR: 102 (19 Jul 2022 05:16) (69 - 102)  BP: 124/73 (19 Jul 2022 05:16) (108/61 - 124/73)  BP(mean): --  RR: 18 (19 Jul 2022 05:16) (18 - 20)  SpO2: 98% (19 Jul 2022 05:16) (98% - 100%)    Parameters below as of 19 Jul 2022 05:16  Patient On (Oxygen Delivery Method): nasal cannula  O2 Flow (L/min): 4      I&O's Summary      On Exam:    Constitutional: NAD, alert and oriented x 3  Lungs:  Non-labored, breath sounds are clear bilaterally, No wheezing, rales or rhonchi  Cardiovascular: IRRR.  S1 and S2 positive.  3/6 systolic murmur  Gastrointestinal: Bowel Sounds present, soft, nontender.   Lymph: No peripheral edema. No cervical lymphadenopathy.  Neurological: Alert, no focal deficits  Skin: No rashes or ulcers   Psych:  Mood & affect appropriate.    LABS: All Labs Reviewed:                        9.9    7.98  )-----------( 182      ( 18 Jul 2022 12:44 )             32.1     18 Jul 2022 12:44    135    |  100    |  37     ----------------------------<  133    4.8     |  24     |  1.39     Ca    8.8        18 Jul 2022 12:44    TPro  6.6    /  Alb  3.7    /  TBili  0.8    /  DBili  x      /  AST  19     /  ALT  10     /  AlkPhos  49     18 Jul 2022 12:44    PT/INR - ( 18 Jul 2022 12:44 )   PT: 20.3 sec;   INR: 1.76 ratio         PTT - ( 18 Jul 2022 12:44 )  PTT:52.6 sec  CARDIAC MARKERS ( 18 Jul 2022 12:44 )  x     / x     / 61 U/L / x     / x          Blood Culture:   07-18 @ 12:44  Pro Bnp 2421        RADIOLOGY:    EKG:  No EKG available for review.  Old EKG with AF with IRBBB

## 2022-07-19 NOTE — CHART NOTE - NSCHARTNOTEFT_GEN_A_CORE
From a heart failure stand point patient is medically cleared to be discharged home today. Dr. Jerez was made aware

## 2022-07-19 NOTE — DISCHARGE NOTE PROVIDER - NSDCMRMEDTOKEN_GEN_ALL_CORE_FT
Ambien 5 mg oral tablet: 1 tab(s) orally once a day (at bedtime), As Needed  atorvastatin 10 mg oral tablet: 1 tab(s) orally once a day (at bedtime)  dilTIAZem 240 mg/24 hours oral capsule, extended release: 1 cap(s) orally once a day  Metoprolol Tartrate 100 mg oral tablet: 1 tab(s) orally 2 times a day  Pradaxa 150 mg oral capsule: 1 cap(s) orally 2 times a day  sertraline 50 mg oral tablet: 1.5 tab(s) orally once a day  spironolactone 25 mg oral tablet: 1 tab(s) orally once a day  Please discuss with PCP/cardiology before resuming.  torsemide 20 mg oral tablet: 1 tab(s) orally 2 times a day  Xanax 0.25 mg oral tablet: 1 tab(s) orally once a day, As Needed - for anxiety   Ambien 5 mg oral tablet: 1 tab(s) orally once a day (at bedtime), As Needed  atorvastatin 10 mg oral tablet: 1 tab(s) orally once a day (at bedtime)  dilTIAZem 240 mg/24 hours oral capsule, extended release: 1 cap(s) orally once a day  Metoprolol Tartrate 100 mg oral tablet: 1 tab(s) orally 2 times a day  Please evaluate and treat for Physical Therapy:   Pradaxa 150 mg oral capsule: 1 cap(s) orally 2 times a day  sertraline 50 mg oral tablet: 1.5 tab(s) orally once a day  spironolactone 25 mg oral tablet: 1 tab(s) orally once a day  Please discuss with PCP/cardiology before resuming.  torsemide 20 mg oral tablet: 1 tab(s) orally 2 times a day  Xanax 0.25 mg oral tablet: 1 tab(s) orally once a day, As Needed - for anxiety

## 2022-07-19 NOTE — CONSULT NOTE ADULT - PROBLEM SELECTOR RECOMMENDATION 3
-Torsemide 20mg BID  -Lopressor 100mg BID  -Aldactone 25mg daily  -TTE overall unchanged from prior (3/15/22)  -Pro BNP 2421 -Per outpatient records, patient was instructed to take Torsemide 40mg BID. Would continue this dose.  -Can continue Lopressor 100mg BID, consider decreasing if becomes bradycardic/hypotensive  -Continue spironolactone 25mg BID  -TTE today unchanged from prior (3/15/22)  -Pro BNP 2421 (outpatient trend ~0356-2722)  -Will sign off, but please call us back if further questions/concerns or clinical change. Thank you.

## 2022-07-19 NOTE — CONSULT NOTE ADULT - PROBLEM SELECTOR RECOMMENDATION 2
AC: Pradaxa 150 mg BID  -Diltiazem ER 240mg daily AC: Pradaxa 150 mg BID  -Diltiazem ER 240mg daily  -Follows with Dr. Jerez AC: Pradaxa 150 mg BID  -Diltiazem ER 240mg daily/lopressor  - can consider outpt event monitor

## 2022-07-19 NOTE — DISCHARGE NOTE NURSING/CASE MANAGEMENT/SOCIAL WORK - PATIENT PORTAL LINK FT
You can access the FollowMyHealth Patient Portal offered by [facility] by registering at the following website: [email or web address]. By joining [employer]’s FollowMyHealth portal, you will also be able to view your health information using other applications (apps) compatible with our system.

## 2022-07-19 NOTE — DISCHARGE NOTE PROVIDER - HOSPITAL COURSE
88 Y/O F complex medical history of HF with preserved EF%, past mitral clip for severe MR in 8/2016, chronic atrial fibrillation on Pradaxa (dose reduced to 75 mg BID with review with Pharmacy due to CKD3) with presumed mechanical fall, but with nondiagnostic CTT head/neck/ trunk with RIGHT periorbital hematoma with patient/daughter agree to resume patient's Pradaxa and aware of risks/benefits as such, reviewed with radiology recommend LEFT foot CTT no contrast to exclude occult LEFT hallux fracture.  Will obtain an echo.    ·  Problem: Syncope.   ·  Plan: See above.   Echo to reassess the patient's severe mitral stenosis.   Orthostatics.  Will cautiously provide patient's Torsemide tonight and in the AM for patient's Diltiazem, Lopressor.  Would review patient's Aldactone Rx.  s/p cardiology and HF evaluation     ·  Problem: Left foot pain.   ·  Plan: See above.   CTT LEFT foot no contrast.- declined ct scan    ·  Problem: Chronic atrial fibrillation.   ·  Plan: Patient/daughter aware of risk/benefits and agree to continue with patient's Pradaxa>>lowered to 75 mg BID due to CKD3.    ·  Problem: Chronic heart failure with preserved ejection fraction.   ·  Plan: See above.  Echo.  Orthostatics.   s/p cardiology and HF evaluation     ·  Problem: Anxiety disorder.   ·  Plan: See above.  [place] in [place].   Patient on longstanding PRN Xanax and Ambien.  Patient/ daughter aware of risk/benefit of BEERS risk as such.      ·  Problem: Multiple thyroid nodules.   ·  Plan: See above.   Thyroid panel, thyroid US>.  Cleared by cardiology , HF and Dr Rodriguez for discharge home.

## 2022-07-19 NOTE — CONSULT NOTE ADULT - ASSESSMENT
This is a very pleasant 89 year old woman with history of HFpEF (LVEF 71% 3/2022), MR s/p Mitraclip (8/30/16), mixed pre- and post-capillary pulmonary hypertension, permanent AFib (on Pradaxa), hypertension, hyperlipidemia, CKD (b/l Cr 1.2-1.3), SHERYL and hypoxia on home O2 3L.   She presents to [facility] ER 7/18 s/p fall. She is a resident of [facility] facility and endorses she fell at 2:30 am. No apparent prodromal symptoms. . Pt self administers her medications at assisted living and possibly missed medications.     She was last seen in HF clinic in March and she was mildly hypervolemic for which she was instructed to increase her Torsemide to 40mg BID. She has not tolerated SGLT2i in the past due to abdominal discomfort.  She specifically denies CP, palpitations, dizziness/LH, focal deficits, neck or back pain.      Pertinent Imaging:  TTE (7/19): LVEF 68%, LVIDd 5.4cm, mild concentric LVH, severe SONNY, RVE with decreased function, mod-severe TR, mild MR, minimal AR, mild OR, estimated RVSP 83 mmHg severe pHTN. No significant changes when compared to prior TTE 3/15/22    CTH: No acute intracranial abnormalities. +Right periorbital soft tissue swelling, R thyroid nodule. No fracture.  CT Chest: Trace b/l pleural effusions, bilateral atelectasis This is a very pleasant 89 year old woman with history of HFpEF (LVEF 71% 3/2022), MR s/p Mitraclip (8/30/16), mixed pre- and post-capillary pulmonary hypertension, permanent AFib (on Pradaxa), hypertension, hyperlipidemia, CKD (b/l Cr 1.2-1.3), SHERYL and hypoxia on home O2 3L.   She presents to [facility] ER 7/18 s/p fall. She is a resident of [facility] facility and endorses she fell at 2:30 am. No apparent prodromal symptoms. She does not remember the fall, but woke up on the floor and was able to get herself back to bed. She endorses multiple falls this year, most recently a month ago. She reports her weight has been stable at home ~170-174 lb, in the ER she is 171 lb. She self administers her medications at assisted living and her son reports she occasionally self adjusts her medications.    She was last seen in HF clinic 5/25/22 and she was mildly hypervolemic for which she was instructed to increase her Torsemide to 40mg BID. She has been on Spironolactone 25mg BID. Of note, she has not tolerated SGLT2i in the past due to abdominal discomfort.     She appears euvolemic on exam with warm extremities. Her BP has been /60's with negative orthostatic BPs today. HR on telemetry has been AF 60's with no events. Spoke to cardiology Dr. Jerez and patient is medically cleared to be discharged today as her scans and orthostatics are negative. Will arrange for HF hospital outpatient follow up.    Pertinent Imaging:  TTE (7/19): LVEF 68%, LVIDd 5.4cm, mild concentric LVH, severe SONNY, RVE with decreased function, mod-severe TR, mild MR, minimal AR, mild VA, estimated RVSP 83 mmHg severe pHTN. No significant changes when compared to prior TTE 3/15/22    CTH: No acute intracranial abnormalities. +Right periorbital soft tissue swelling, R thyroid nodule. No fracture.  CT Chest: Trace b/l pleural effusions, bilateral atelectasis

## 2022-07-19 NOTE — PHYSICAL THERAPY INITIAL EVALUATION ADULT - GENERAL OBSERVATIONS, REHAB EVAL
Received pt on stretcher in ED. See flowsheet for orthostatic BP taken, (-). +O2 NC 4L, tele, external urine catheter

## 2022-07-19 NOTE — DISCHARGE NOTE PROVIDER - CARE PROVIDER_API CALL
Osiel Jerez)  Cardio [facility]  [address] [place] [address]  Phone: [phone]  Fax: [phone]  Follow Up Time:     Price Rodriguez ()  Family Medicine  [address] [place] [address]  Phone: [phone]  Fax: [phone]  Follow Up Time:

## 2022-07-19 NOTE — CONSULT NOTE ADULT - REASON FOR ADMISSION
S/P presumed mechanical fall at 0230 this AM at [facility] S/P presumed mechanical fall yesterday at 0230 AM at [facility]

## 2022-07-19 NOTE — PHYSICAL THERAPY INITIAL EVALUATION ADULT - PERTINENT HX OF CURRENT PROBLEM, REHAB EVAL
89 y/oF admitted 7/18 brought in by family from A/L facility with R periorbital hematoma and L foot hallux pain following a presumed fall when getting up from a table. Unclear if pt had syncope. Pt self administers medications. L foot xray with no fx. CT head with R thyroid nodule 1.8 and 2cm. R periorbital soft tissue swelling. CTT trunk with subcentimeter liver cyst. PMH HF with preserved EF% with patient maintained on multiple medications of diltiazem  mg daily,

## 2022-07-19 NOTE — PROGRESS NOTE ADULT - SUBJECTIVE AND OBJECTIVE BOX
---___---___---___---___---___---___ ---___---___---___---___---___---___---___---___---                  M E D I C A L   A T T E N D I N G   P R O G R E S S   N O T E  ---___---___---___---___---___---___ ---___---___---___---___---___---___---___---___---        ================================================    ++CHIEF COMPLAINT:   Patient is a 89y old  Female who presents with a chief complaint of S/P presumed mechanical fall at 0230 this AM at [facility] (2022 16:46)      Syncope and collapse cardiology following  and patient has large hematoma to the right part of the face was awake at night and got up and fell . this has been the third time in 3 months that she has fallen      ---___---___---___---___---___---  PAST MEDICAL / Surgical  HISTORY:  PAST MEDICAL & SURGICAL HISTORY:  HTN (hypertension)      HLD (hyperlipidemia)      Atrial fibrillation  On Pradaxa      Syncope      Depression      Leg edema      Mitral valve stenosis, unspecified etiology      Hearing loss of left ear      H/O knee surgery      H/O external ear surgery      Cataracta          ---___---___---___---___---___---  FAMILY HISTORY:   FAMILY HISTORY:  Family history of blood disorder (Mother)          ---___---___---___---___---___---  ALLERGIES:   Allergies    codeine (Other)    Intolerances        ---___---___---___---___---___---  MEDICATIONS:  MEDICATIONS  (STANDING):  atorvastatin 10 milliGRAM(s) Oral at bedtime  dabigatran 75 milliGRAM(s) Oral two times a day  diltiazem    milliGRAM(s) Oral daily  metoprolol tartrate 100 milliGRAM(s) Oral two times a day  sertraline 75 milliGRAM(s) Oral daily  torsemide 20 milliGRAM(s) Oral two times a day    MEDICATIONS  (PRN):  acetaminophen     Tablet .. 650 milliGRAM(s) Oral every 6 hours PRN Temp greater or equal to 38C (100.4F), Mild Pain (1 - 3)  ALPRAZolam 0.25 milliGRAM(s) Oral daily PRN for anxiety  zolpidem 5 milliGRAM(s) Oral at bedtime PRN Insomnia      ---___---___---___---___---___---  REVIEW OF SYSTEM:    GEN: no fever, no chills, no pain  RESP: no SOB, no cough, no sputum  CVS: no chest pain, no palpitations, no edema  GI: no abdominal pain, no nausea, no vomiting, no constipation, no diarrhea  : no dysurea, no frequency, no hematurea  Neuro: no headache, no dizziness  PSYCH: no anxiety, no depression  Derm : no itching, no rash    ---___---___---___---___---___---  VITAL SIGNS:  89y , CAPILLARY BLOOD GLUCOSE        T(C): 36.4 (22 @ 17:14), Max: 36.8 (22 @ 22:50)  HR: 65 (22 @ 17:14) (65 - 102)  BP: 106/58 (22 @ 17:14) (97/57 - 124/73)  RR: 18 (22 @ 17:14) (18 - 18)  SpO2: 100% (22 @ 17:14) (92% - 100%)  ---___---___---___---___---___---  PHYSICAL EXAM:    GEN: A&O X 3 , NAD , comfortable  HEENT: NCAT, PERRL, MMM, hearing intact  Neck: supple , no JVD  CVS: S1S2 , regular , No M/R/G appreciated  PULM: CTA B/L,  no W/R/R appreciated  ABD.: soft. non tender, non distended,  bowel sounds present  Extrem: intact pulses , no edema   Derm: No rash ,  ecchymoses  PSYCH : normal mood,  no delusion noted to the right part of the face    not anxious     ---___---___---___---___---___---            LAB AND IMAGING:                          10.7   7.27  )-----------( 192      ( 2022 07:51 )             35.9               07-19    140  |  101  |  26<H>  ----------------------------<  96  4.1   |  24  |  1.00    Ca    9.0      2022 07:51    TPro  6.6  /  Alb  3.7  /  TBili  0.8  /  DBili  x   /  AST  19  /  ALT  10  /  AlkPhos  49  07-18    PT/INR - ( 2022 12:44 )   PT: 20.3 sec;   INR: 1.76 ratio         PTT - ( 2022 12:44 )  PTT:52.6 sec            CARDIAC MARKERS ( 2022 12:44 )  x     / x     / 61 U/L / x     / x                  Urinalysis Basic - ( 2022 16:29 )    Color: Light Yellow / Appearance: Clear / S.019 / pH: x  Gluc: x / Ketone: Negative  / Bili: Negative / Urobili: Negative   Blood: x / Protein: Negative / Nitrite: Negative   Leuk Esterase: Negative / RBC: 0 /hpf / WBC 0 /HPF   Sq Epi: x / Non Sq Epi: 0 /hpf / Bacteria: Negative        [All pertinent / recent Imaging reviewed]         ---___---___---___---___---___---___ ---___---___---___---___---                         A S S E S S M E N T   A N D   P L A N :      HEALTH ISSUES - PROBLEM Dx:  Syncope cleared by  cardiology to continue medication     Left foot pain supportive care     Chronic atrial fibrillation  continue current meds   Chronic heart failure with preserved ejection fraction  cardiology recommendation appreciated. cleared by cardiology to be discharged home.     Anxiety disorder  on xanax. consider to change timing of taking xanax   Multiple thyroid nodules  follow up out patient with endocrine   Medication management  consider discontinuing ambien and consider less sedative medication such as melatonin  Discharge planning issues                  -GI/DVT Prophylaxis.    --------------------------------------------  Case discussed with   Education given on   ___________________________  Thank you,  Price Rodriguez  [phone]

## 2022-07-19 NOTE — DISCHARGE NOTE PROVIDER - NSDCCPCAREPLAN_GEN_ALL_CORE_FT
PRINCIPAL DISCHARGE DIAGNOSIS  Diagnosis: Syncope  Assessment and Plan of Treatment: STABLE, ORTHOSTATIC NEGATIVE      SECONDARY DISCHARGE DIAGNOSES  Diagnosis: Chronic atrial fibrillation  Assessment and Plan of Treatment: CONTINUE HOME MEDS    Diagnosis: Multiple thyroid nodules  Assessment and Plan of Treatment: F/U THYROID US    Diagnosis: Anxiety disorder  Assessment and Plan of Treatment: CONT HOME MEDS    Diagnosis: Left foot pain  Assessment and Plan of Treatment: STABLE    Diagnosis: Chronic CHF  Assessment and Plan of Treatment: CONT HOME MEDS

## 2022-07-19 NOTE — DISCHARGE NOTE NURSING/CASE MANAGEMENT/SOCIAL WORK - NSDCPEFALRISK_GEN_ALL_CORE
For information on Fall & Injury Prevention, visit: [email or web address] OR  [email or web address] OR  https://www.cdc.gov/steadi/patient.html

## 2022-07-19 NOTE — DISCHARGE NOTE PROVIDER - CARE PROVIDERS DIRECT ADDRESSES
[email or web address]

## 2022-08-02 ENCOUNTER — APPOINTMENT (OUTPATIENT)
Dept: HEART FAILURE | Facility: CLINIC | Age: 87
End: 2022-08-02

## 2022-08-02 PROCEDURE — 99442: CPT | Mod: 95

## 2022-08-02 RX ORDER — SPIRONOLACTONE 25 MG/1
25 TABLET ORAL
Qty: 30 | Refills: 5 | Status: DISCONTINUED | COMMUNITY
Start: 2019-03-21 | End: 2022-08-02

## 2022-08-05 ENCOUNTER — APPOINTMENT (OUTPATIENT)
Dept: CARDIOLOGY | Facility: CLINIC | Age: 87
End: 2022-08-05

## 2022-08-05 ENCOUNTER — NON-APPOINTMENT (OUTPATIENT)
Age: 87
End: 2022-08-05

## 2022-08-05 VITALS
DIASTOLIC BLOOD PRESSURE: 70 MMHG | WEIGHT: 170 LBS | SYSTOLIC BLOOD PRESSURE: 120 MMHG | BODY MASS INDEX: 30.12 KG/M2 | HEIGHT: 63 IN | OXYGEN SATURATION: 97 % | HEART RATE: 65 BPM

## 2022-08-05 PROCEDURE — 93000 ELECTROCARDIOGRAM COMPLETE: CPT

## 2022-08-05 PROCEDURE — 99215 OFFICE O/P EST HI 40 MIN: CPT

## 2022-08-05 NOTE — DISCUSSION/SUMMARY
[FreeTextEntry1] : Janna is doing ok.  she does not present with marked volume overload on examination, though she does have mild LE edema and fine crackles on exam. ECG illustrates atrial fibrillation with controled ventricular response rate.  Her blood pressure is normal 120/70.\par Her most recent syncopal episode seems to have been related to volume depletion.  \par I advised that we keep Torsemide 20mg once a day instead of twice a day for now.  She will remain off of spironolactone until seen by HF later in the month.  I have asked the family to keep note of her daily weights and to let me know of any weight gain or worsening edema. \par In addition, to avoid any jolynn events, we will keep metoprolol at a decreased dose of 50 mg BID.\par Maintain diltiazem at 240mg daily for now.  \par Continue pradaxa BId for thromboembolic prevention. \par Atorvastatin 10mg for continue lipid management, goal LDL <100, ideally <70\par \par She is following a sodium restricted diet. She continues to be followed by the heart failure service for ACC/AHA stage C., [place] Heart Association class 2-3 chronic diastolic heart failure.  She uses oxygen at home and is followed by Dr. Henley. \par Further management will be dependent on her clinical course\par \par She will followup again with me in 3 months.  sooner if any questions or concern arise.

## 2022-08-05 NOTE — CARDIOLOGY SUMMARY
[No Ischemia] : no Ischemia [___] : [unfilled] [LVEF ___%] : LVEF [unfilled]% Detail Level: Zone [None] : normal LV function Include Location In Plan?: No [Moderate] : moderate pulmonary hypertension [Enlarged] : enlarged LA size [Mild] : mild mitral regurgitation [de-identified] : atrial fibrillation at 68 BPM\par LAD [de-identified] : 7/2022\par mitral clip\par mild concentric LVH\par bi atrial enlargement\par RV enlargement with decreased RV function\par mod -severe TR\par Rv pressure 83

## 2022-08-05 NOTE — PHYSICAL EXAM
[General Appearance - Well Developed] : well developed [Normal Appearance] : normal appearance [Well Groomed] : well groomed [General Appearance - Well Nourished] : well nourished [No Deformities] : no deformities [General Appearance - In No Acute Distress] : no acute distress [Eyelids - No Xanthelasma] : the eyelids demonstrated no xanthelasmas [Normal Oral Mucosa] : normal oral mucosa [No Oral Pallor] : no oral pallor [No Oral Cyanosis] : no oral cyanosis [Normal Jugular Venous A Waves Present] : normal jugular venous A waves present [Normal Jugular Venous V Waves Present] : normal jugular venous V waves present [No Jugular Venous Jordan A Waves] : no jugular venous jordan A waves [Respiration, Rhythm And Depth] : normal respiratory rhythm and effort [Exaggerated Use Of Accessory Muscles For Inspiration] : no accessory muscle use [Auscultation Breath Sounds / Voice Sounds] : lungs were clear to auscultation bilaterally [Abdomen Soft] : soft [Abdomen Tenderness] : non-tender [Abdomen Mass (___ Cm)] : no abdominal mass palpated [Abnormal Walk] : normal gait [Gait - Sufficient For Exercise Testing] : the gait was sufficient for exercise testing [Nail Clubbing] : no clubbing of the fingernails [Cyanosis, Localized] : no localized cyanosis [Petechial Hemorrhages (___cm)] : no petechial hemorrhages [Skin Color & Pigmentation] : normal skin color and pigmentation [] : no rash [No Venous Stasis] : no venous stasis [Skin Lesions] : no skin lesions [No Skin Ulcers] : no skin ulcer [No Xanthoma] : no  xanthoma was observed [Oriented To Time, Place, And Person] : oriented to person, place, and time [Affect] : the affect was normal [Mood] : the mood was normal [No Anxiety] : not feeling anxious [5th Left ICS - MCL] : palpated at the 5th LICS in the midclavicular line [Normal] : normal [No Precordial Heave] : no precordial heave was noted [Normal Rate] : normal [Heart Rate ___] : [unfilled] bpm [Normal S1] : normal S1 [Normal S2] : normal S2 [2+] : left 2+ [No Abnormalities] : the abdominal aorta was not enlarged and no bruit was heard [No Pitting Edema] : no pitting edema present [Rt] : varicose veins of the right leg noted [Lt] : varicose veins of the left leg noted [Well Developed] : well developed [Well Nourished] : well nourished [No Acute Distress] : no acute distress [Normal Conjunctiva] : normal conjunctiva [Normal Venous Pressure] : normal venous pressure [No Carotid Bruit] : no carotid bruit [No Murmur] : no murmur [No Rub] : no rub [No Gallop] : no gallop [Irregularly Irregular] : irregularly irregular [II] : a grade 2 [___ +] : bilateral [unfilled]U+ pretibial pitting edema [Clear Lung Fields] : clear lung fields [Good Air Entry] : good air entry [No Respiratory Distress] : no respiratory distress  [Soft] : abdomen soft [Non Tender] : non-tender [No Masses/organomegaly] : no masses/organomegaly [Normal Bowel Sounds] : normal bowel sounds [No Edema] : no edema [No Cyanosis] : no cyanosis [No Clubbing] : no clubbing [No Varicosities] : no varicosities [No Rash] : no rash [No Skin Lesions] : no skin lesions [Moves all extremities] : moves all extremities [No Focal Deficits] : no focal deficits [Normal Speech] : normal speech [Alert and Oriented] : alert and oriented [Normal memory] : normal memory [de-identified] : wheel chair, ambulates with walker , portable oxygen  [Apical Thrill] : no thrill palpable at the apex [S3] : no S3 [S4] : no S4 [Click] : no click [Pericardial Rub] : no pericardial rub [Right Carotid Bruit] : no bruit heard over the right carotid [Left Carotid Bruit] : no bruit heard over the left carotid [Right Femoral Bruit] : no bruit heard over the right femoral artery [Left Femoral Bruit] : no bruit heard over the left femoral artery [Bruit] : no bruit heard

## 2022-08-05 NOTE — REASON FOR VISIT
[Atrial Fibrillation] : atrial fibrillation [Dyspnea] : dyspnea [Syncope] : syncope [FreeTextEntry1] : edema

## 2022-08-05 NOTE — HISTORY OF PRESENT ILLNESS
[FreeTextEntry1] : Janna presents for evaluation of edema, dyspnea and atrial fibrillation.She is doing quite well with her chronic diastolic heart failure and has had no significant edema following a low sodium and volume diet. She is tolerating her medications without difficulty. \par She is saddened by her  recent death from COVID infection but is doing nicely at her assisted living facility. She reports no chest pain, dyspnea, or palpitations\par \par She underwent transesophageal echocardiography 6/16 which revealed mitral annular calcification with severely calcified and restricted posterior mitral valve leaflet with normal diastolic opening, severe eccentric, posteriorly directed mitral regurgitation, systolic flow reversal seen in the right upper pulmonary vein, aortic sclerosis, severe concentric left ventricular hypertrophy, normal left ventricular systolic function, estimated pulmonary artery systolic pressure of 70 mm, PFO\par On August 30 2016, she underwent a Mitraclip procedure with reduction in mitral regurgitation to a mild amount. \par \par \par Past medical history is otherwise remarkable for syncope,  permanent atrial fibrillation , hypertension, hyperlipidemia. \par \par She arrives today after hospital follow up.  She was Admitted to [facility] on 7/18-7/19 s/p unwitness fall.\par Her report was that she woke in the middle of the night, after standing she suddenly collapsed.  She does not recall any prodrome.  She does not remember the moment that she "went down".\par She had fernanda orbital bruising injury.\par Hospital work up included CT of the head which was negative. \par \par she has since had telehealth follow up with HF.  Torsemide was adjusted to once a day dosing.\par  Metoprolol was decreased to 50mg BID and spironolactone was removed at some point after release from the hospital.\par she is able to walk at least 2-3 wings at the [facility] with oxygen without distress.\par she states her weight has been stable ~170 lbs\par She states a good appetite.\par She still does not sleep well at night.  Denies trouble breathing at night, she states its due to anxiety.  she has since been taking xanax TID as needed.

## 2022-08-08 ENCOUNTER — EMERGENCY (EMERGENCY)
Facility: HOSPITAL | Age: 87
LOS: 1 days | Discharge: HOME | End: 2022-08-08
Attending: EMERGENCY MEDICINE | Admitting: EMERGENCY MEDICINE
Payer: MEDICARE

## 2022-08-08 VITALS
OXYGEN SATURATION: 99 % | HEART RATE: 70 BPM | DIASTOLIC BLOOD PRESSURE: 61 MMHG | SYSTOLIC BLOOD PRESSURE: 119 MMHG | RESPIRATION RATE: 19 BRPM

## 2022-08-08 VITALS
RESPIRATION RATE: 18 BRPM | WEIGHT: 186.07 LBS | HEIGHT: 63 IN | TEMPERATURE: 98 F | DIASTOLIC BLOOD PRESSURE: 71 MMHG | OXYGEN SATURATION: 100 % | HEART RATE: 74 BPM | SYSTOLIC BLOOD PRESSURE: 123 MMHG

## 2022-08-08 DIAGNOSIS — H26.9 UNSPECIFIED CATARACT: Chronic | ICD-10-CM

## 2022-08-08 DIAGNOSIS — Z98.89 OTHER SPECIFIED POSTPROCEDURAL STATES: Chronic | ICD-10-CM

## 2022-08-08 LAB
ALBUMIN SERPL ELPH-MCNC: 3.4 G/DL — SIGNIFICANT CHANGE UP (ref 3.3–5)
ALP SERPL-CCNC: 56 U/L — SIGNIFICANT CHANGE UP (ref 30–120)
ALT FLD-CCNC: 16 U/L DA — SIGNIFICANT CHANGE UP (ref 10–60)
ANION GAP SERPL CALC-SCNC: 9 MMOL/L — SIGNIFICANT CHANGE UP (ref 5–17)
APTT BLD: 71.9 SEC — HIGH (ref 27.5–35.5)
AST SERPL-CCNC: 17 U/L — SIGNIFICANT CHANGE UP (ref 10–40)
BASOPHILS # BLD AUTO: 0.02 K/UL — SIGNIFICANT CHANGE UP (ref 0–0.2)
BASOPHILS NFR BLD AUTO: 0.3 % — SIGNIFICANT CHANGE UP (ref 0–2)
BILIRUB SERPL-MCNC: 0.7 MG/DL — SIGNIFICANT CHANGE UP (ref 0.2–1.2)
BUN SERPL-MCNC: 34 MG/DL — HIGH (ref 7–23)
CALCIUM SERPL-MCNC: 9.1 MG/DL — SIGNIFICANT CHANGE UP (ref 8.4–10.5)
CHLORIDE SERPL-SCNC: 97 MMOL/L — SIGNIFICANT CHANGE UP (ref 96–108)
CO2 SERPL-SCNC: 30 MMOL/L — SIGNIFICANT CHANGE UP (ref 22–31)
CREAT SERPL-MCNC: 1.17 MG/DL — SIGNIFICANT CHANGE UP (ref 0.5–1.3)
EGFR: 45 ML/MIN/1.73M2 — LOW
EOSINOPHIL # BLD AUTO: 0.19 K/UL — SIGNIFICANT CHANGE UP (ref 0–0.5)
EOSINOPHIL NFR BLD AUTO: 3 % — SIGNIFICANT CHANGE UP (ref 0–6)
FLUAV AG NPH QL: SIGNIFICANT CHANGE UP
FLUBV AG NPH QL: SIGNIFICANT CHANGE UP
GLUCOSE SERPL-MCNC: 147 MG/DL — HIGH (ref 70–99)
HCT VFR BLD CALC: 29.9 % — LOW (ref 34.5–45)
HGB BLD-MCNC: 9.2 G/DL — LOW (ref 11.5–15.5)
IMM GRANULOCYTES NFR BLD AUTO: 0.9 % — SIGNIFICANT CHANGE UP (ref 0–1.5)
INR BLD: 1.81 RATIO — HIGH (ref 0.88–1.16)
LIDOCAIN IGE QN: 125 U/L — SIGNIFICANT CHANGE UP (ref 73–393)
LYMPHOCYTES # BLD AUTO: 0.86 K/UL — LOW (ref 1–3.3)
LYMPHOCYTES # BLD AUTO: 13.4 % — SIGNIFICANT CHANGE UP (ref 13–44)
MAGNESIUM SERPL-MCNC: 2.7 MG/DL — HIGH (ref 1.6–2.6)
MCHC RBC-ENTMCNC: 26 PG — LOW (ref 27–34)
MCHC RBC-ENTMCNC: 30.8 GM/DL — LOW (ref 32–36)
MCV RBC AUTO: 84.5 FL — SIGNIFICANT CHANGE UP (ref 80–100)
MONOCYTES # BLD AUTO: 0.87 K/UL — SIGNIFICANT CHANGE UP (ref 0–0.9)
MONOCYTES NFR BLD AUTO: 13.6 % — SIGNIFICANT CHANGE UP (ref 2–14)
NEUTROPHILS # BLD AUTO: 4.41 K/UL — SIGNIFICANT CHANGE UP (ref 1.8–7.4)
NEUTROPHILS NFR BLD AUTO: 68.8 % — SIGNIFICANT CHANGE UP (ref 43–77)
NRBC # BLD: 0 /100 WBCS — SIGNIFICANT CHANGE UP (ref 0–0)
NT-PROBNP SERPL-SCNC: 3012 PG/ML — HIGH (ref 0–450)
PHOSPHATE SERPL-MCNC: 4.3 MG/DL — SIGNIFICANT CHANGE UP (ref 2.5–4.5)
PLATELET # BLD AUTO: 169 K/UL — SIGNIFICANT CHANGE UP (ref 150–400)
POTASSIUM SERPL-MCNC: 3.4 MMOL/L — LOW (ref 3.5–5.3)
POTASSIUM SERPL-SCNC: 3.4 MMOL/L — LOW (ref 3.5–5.3)
PROT SERPL-MCNC: 7.1 G/DL — SIGNIFICANT CHANGE UP (ref 6–8.3)
PROTHROM AB SERPL-ACNC: 21.5 SEC — HIGH (ref 10.5–13.4)
RBC # BLD: 3.54 M/UL — LOW (ref 3.8–5.2)
RBC # FLD: 16 % — HIGH (ref 10.3–14.5)
RSV RNA NPH QL NAA+NON-PROBE: SIGNIFICANT CHANGE UP
SARS-COV-2 RNA SPEC QL NAA+PROBE: SIGNIFICANT CHANGE UP
SODIUM SERPL-SCNC: 136 MMOL/L — SIGNIFICANT CHANGE UP (ref 135–145)
TROPONIN I, HIGH SENSITIVITY RESULT: 21.1 NG/L — SIGNIFICANT CHANGE UP
WBC # BLD: 6.41 K/UL — SIGNIFICANT CHANGE UP (ref 3.8–10.5)
WBC # FLD AUTO: 6.41 K/UL — SIGNIFICANT CHANGE UP (ref 3.8–10.5)

## 2022-08-08 PROCEDURE — 93010 ELECTROCARDIOGRAM REPORT: CPT

## 2022-08-08 PROCEDURE — 99285 EMERGENCY DEPT VISIT HI MDM: CPT | Mod: 25

## 2022-08-08 PROCEDURE — 96374 THER/PROPH/DIAG INJ IV PUSH: CPT

## 2022-08-08 PROCEDURE — 36415 COLL VENOUS BLD VENIPUNCTURE: CPT

## 2022-08-08 PROCEDURE — 85025 COMPLETE CBC W/AUTO DIFF WBC: CPT

## 2022-08-08 PROCEDURE — 84100 ASSAY OF PHOSPHORUS: CPT

## 2022-08-08 PROCEDURE — 71045 X-RAY EXAM CHEST 1 VIEW: CPT

## 2022-08-08 PROCEDURE — 83880 ASSAY OF NATRIURETIC PEPTIDE: CPT

## 2022-08-08 PROCEDURE — 99285 EMERGENCY DEPT VISIT HI MDM: CPT

## 2022-08-08 PROCEDURE — 93005 ELECTROCARDIOGRAM TRACING: CPT

## 2022-08-08 PROCEDURE — 80053 COMPREHEN METABOLIC PANEL: CPT

## 2022-08-08 PROCEDURE — 83735 ASSAY OF MAGNESIUM: CPT

## 2022-08-08 PROCEDURE — 93970 EXTREMITY STUDY: CPT | Mod: 26

## 2022-08-08 PROCEDURE — 93970 EXTREMITY STUDY: CPT

## 2022-08-08 PROCEDURE — 84484 ASSAY OF TROPONIN QUANT: CPT

## 2022-08-08 PROCEDURE — 85730 THROMBOPLASTIN TIME PARTIAL: CPT

## 2022-08-08 PROCEDURE — 83690 ASSAY OF LIPASE: CPT

## 2022-08-08 PROCEDURE — 71045 X-RAY EXAM CHEST 1 VIEW: CPT | Mod: 26

## 2022-08-08 PROCEDURE — 87637 SARSCOV2&INF A&B&RSV AMP PRB: CPT

## 2022-08-08 PROCEDURE — 85610 PROTHROMBIN TIME: CPT

## 2022-08-08 PROCEDURE — 96375 TX/PRO/DX INJ NEW DRUG ADDON: CPT

## 2022-08-08 RX ORDER — FUROSEMIDE 40 MG
40 TABLET ORAL ONCE
Refills: 0 | Status: COMPLETED | OUTPATIENT
Start: 2022-08-08 | End: 2022-08-08

## 2022-08-08 RX ORDER — AMPICILLIN SODIUM AND SULBACTAM SODIUM 250; 125 MG/ML; MG/ML
3 INJECTION, POWDER, FOR SUSPENSION INTRAMUSCULAR; INTRAVENOUS ONCE
Refills: 0 | Status: COMPLETED | OUTPATIENT
Start: 2022-08-08 | End: 2022-08-08

## 2022-08-08 RX ORDER — POTASSIUM CHLORIDE 20 MEQ
40 PACKET (EA) ORAL ONCE
Refills: 0 | Status: COMPLETED | OUTPATIENT
Start: 2022-08-08 | End: 2022-08-08

## 2022-08-08 RX ADMIN — Medication 40 MILLIEQUIVALENT(S): at 13:57

## 2022-08-08 RX ADMIN — Medication 40 MILLIGRAM(S): at 13:58

## 2022-08-08 RX ADMIN — AMPICILLIN SODIUM AND SULBACTAM SODIUM 200 GRAM(S): 250; 125 INJECTION, POWDER, FOR SUSPENSION INTRAMUSCULAR; INTRAVENOUS at 14:07

## 2022-08-08 NOTE — ED ADULT NURSE NOTE - OBJECTIVE STATEMENT
11:05- Pt presented to ER with c/o progressive RLE x 2 days along with sob when walking briskly, denies chest pain, n/v/d, arrived with 2+ pitting edema, no other complaints. In Er eval by Provider, Ekg obtained, nsr on monitor, specimens sent to lab, awaiting US, X-ray. KIMBER [place]

## 2022-08-08 NOTE — ED PROVIDER NOTE - OBJECTIVE STATEMENT
Patient is an 89-year-old female who presents to the emergency room with a chief complaint of shortness of breath and right lower extremity swelling. Past medical history of heart failure with preserved EF past medical history of mitral clip for severe MR and 2016 history of chronic A. fib on Pradaxa, HTN, HLD, depression.  Patient was last admitted to [facility] from July 18 through July 19 with mechanical fall from the [place].  Patient underwent evaluation her Pradaxa was lowered to 75 mg twice daily due to chronic kidney disease stabilized and was discharged home presents today with shortness of breath and right lower extremity swelling. Patient is an 89-year-old female who presents to the emergency room with a chief complaint of shortness of breath and right lower extremity swelling. Past medical history of heart failure with preserved EF past medical history of mitral clip for severe MR and 2016 history of chronic A. fib on Pradaxa, HTN, HLD, depression.  Patient was last admitted to [facility] from July 18 through July 19 with mechanical fall from the [place].  Patient underwent evaluation her Pradaxa was lowered to 75 mg twice daily due to chronic kidney disease stabilized and was discharged home presents today with shortness of breath and right lower extremity swelling. Patient reports that for the last 1 to 2 months patient she has been experiencing exertional shortness of breath.  She then reports that for the last 2 days she has noticed right lower extremity swelling and today the leg began to weep.  Patient reports she is on chronic O2 at 2 to 3 L nasal cannula.  Denies fevers chills nausea vomiting chest pain abdominal pain extremity numbness.

## 2022-08-08 NOTE — ED ADULT NURSE NOTE - NSIMPLEMENTINTERV_GEN_ALL_ED
Implemented All Fall with Harm Risk Interventions:  [place] to call system. Call bell, personal items and telephone within reach. Instruct patient to call for assistance. Room bathroom lighting operational. Non-slip footwear when patient is off stretcher. Physically safe environment: no spills, clutter or unnecessary equipment. Stretcher in lowest position, wheels locked, appropriate side rails in place. Provide visual cue, wrist band, yellow gown, etc. Monitor gait and stability. Monitor for mental status changes and reorient to person, place, and time. Review medications for side effects contributing to fall risk. Reinforce activity limits and safety measures with patient and family. Provide visual clues: red socks.

## 2022-08-08 NOTE — ED PROVIDER NOTE - PATIENT PORTAL LINK FT
You can access the FollowMyHealth Patient Portal offered by [facility] by registering at the following website: [email or web address]. By joining [employer]’s FollowMyHealth portal, you will also be able to view your health information using other applications (apps) compatible with our system.

## 2022-08-08 NOTE — ED PROVIDER NOTE - PROGRESS NOTE DETAILS
Patient offered admission refusing at this time family at bedside in agreement with taking patient home.  Results of labs and images reviewed copy provided all questions answered patient seen by cardiology recommend increasing furosemide to 40 mg in the morning and 20 mg at night.  If this does not work patient may need additional increase in dose but will evaluate at a later time.  Remains stable on normal O2.  Again offered admission but refusing.  All questions answered

## 2022-08-08 NOTE — CONSULT NOTE ADULT - ASSESSMENT
The patient is an 89 year old female with a history of HTN, HL, atrial fibrillation, MitraClip, chronic diastolic heart failure who presents with right leg swelling.    Plan:  - Right leg swelling possibly due to right leg cellulitis  - There is evidence of mild acute on chronic diastolic heart failure  - CXR with mild congestion  - BNP 3012  - LE venous duplex negative for DVT  - Give furosemide 40 mg IV and increase torsemide to 40 mg qam and 20 mg qpm  - Antibiotics as indicated  - Outpatient cardiology follow-up

## 2022-08-08 NOTE — ED PROVIDER NOTE - NSFOLLOWUPINSTRUCTIONS_ED_ALL_ED_FT
Return to the ED for any new or worsening symptoms  Take your medication as prescribed  Augmentin 1 tab 2 times a day begin tonight   Increase your Torsemide to 40 mg in the morning and 20 mg at night  Elevate your leg when seated to help reduce swelling  Return to the ED immediately if the swelling or redness worsens  Advance activity as tolerated  Follow up with your PMD in 1-2 days for a recheck     Cellulitis, Adult       Cellulitis is a skin infection. The infected area is often warm, red, swollen, and sore. It occurs most often in the arms and lower legs. It is very important to get treated for this condition.      What are the causes?    This condition is caused by bacteria. The bacteria enter through a break in the skin, such as a cut, burn, insect bite, open sore, or crack.      What increases the risk?    This condition is more likely to occur in people who:  •Have a weak body defense system (immune system).       •Have open cuts, burns, bites, or scrapes on the skin.      •Are older than 60 years of age.      •Have a blood sugar problem (diabetes).       •Have a long-lasting (chronic) liver disease (cirrhosis) or kidney disease.      •Are very overweight (obese).    •Have a skin problem, such as:  •Itchy rash (eczema).      •Slow movement of blood in the veins (venous stasis).      •Fluid buildup below the skin (edema).        •Have been treated with high-energy rays (radiation).      •Use IV drugs.         What are the signs or symptoms?    Symptoms of this condition include:•Skin that is:  •Red.      •Streaking.      •Spotting.      •Swollen.      •Sore or painful when you touch it.      •Warm.        •A fever.      •Chills.       •Blisters.        How is this diagnosed?    This condition is diagnosed based on:  •Medical history.      •Physical exam.      •Blood tests.      •Imaging tests.        How is this treated?    Treatment for this condition may include:  •Medicines to treat infections or allergies.    •Home care, such as:  •Rest.      •Placing cold or warm cloths (compresses) on the skin.        •Hospital care, if the condition is very bad.        Follow these instructions at home:    Medicines     •Take over-the-counter and prescription medicines only as told by your doctor.      •If you were prescribed an antibiotic medicine, take it as told by your doctor. Do not stop taking it even if you start to feel better.        General instructions      •Drink enough fluid to keep your pee (urine) pale yellow.      • Do not touch or rub the infected area.      •Raise (elevate) the infected area above the level of your heart while you are sitting or lying down.      •Place cold or warm cloths on the area as told by your doctor.      •Keep all follow-up visits as told by your doctor. This is important.        Contact a doctor if:    •You have a fever.      •You do not start to get better after 1–2 days of treatment.      •Your bone or joint under the infected area starts to hurt after the skin has healed.      •Your infection comes back. This can happen in the same area or another area.      •You have a swollen bump in the area.      •You have new symptoms.      •You feel ill and have muscle aches and pains.        Get help right away if:    •Your symptoms get worse.      •You feel very sleepy.      •You throw up (vomit) or have watery poop (diarrhea) for a long time.      •You see red streaks coming from the area.      •Your red area gets larger.      •Your red area turns dark in color.      These symptoms may represent a serious problem that is an emergency. Do not wait to see if the symptoms will go away. Get medical help right away. Call your local emergency services (911 in the [place] Do not drive yourself to the hospital.       Summary    •Cellulitis is a skin infection. The area is often warm, red, swollen, and sore.      •This condition is treated with medicines, rest, and cold and warm cloths.      •Take all medicines only as told by your doctor.      •Tell your doctor if symptoms do not start to get better after 1–2 days of treatment.      This information is not intended to replace advice given to you by your health care provider. Make sure you discuss any questions you have with your health care provider.

## 2022-08-08 NOTE — ED PROVIDER NOTE - CLINICAL SUMMARY MEDICAL DECISION MAKING FREE TEXT BOX
Patient presenting with exertional shortness of breath and right lower extremity edema concern for possible DVT versus volume overload versus cellulitis blood clot is less likely as patient is on Pradaxa.  Will obtain screening labs EKG chest x-ray and will obtain venous duplex of the lower extremities

## 2022-08-08 NOTE — ED ADULT TRIAGE NOTE - CHIEF COMPLAINT QUOTE
" My legs are swollen and draining fluid " No shortness of breath on arrival  As per transfer sheet, leg edema and shortness of breath

## 2022-08-08 NOTE — ED PROVIDER NOTE - PHYSICAL EXAMINATION
RLE +4 pitting edema no significant edema noted to the LLE   sensation grossly intact to bilateral lower ext +pedal pulse   chronic venous stasis changes noted to RLE RLE +4 pitting edema with overlying redness and increased warmth no significant edema noted to the LLE   sensation grossly intact to bilateral lower ext +pedal pulse   chronic venous stasis changes noted to RLE

## 2022-08-08 NOTE — CONSULT NOTE ADULT - SUBJECTIVE AND OBJECTIVE BOX
History of Present Illness:    Past Medical/Surgical History:    Medications:    Family History: Non-contributory family history of premature cardiovascular atherosclerotic disease    Social History: No tobacco, alcohol or drug use    Review of Systems:  General: No fevers, chills, weight gain  Skin: No rashes, color changes  Cardiovascular: No chest pain, orthopnea  Respiratory: No shortness of breath, cough  Gastrointestinal: No nausea, abdominal pain  Genitourinary: No incontinence, pain with urination  Musculoskeletal: No pain, swelling, decreased range of motion  Neurological: No headache, weakness  Psychiatric: No depression, anxiety  Endocrine: No weight gain, increased thirst  All other systems are comprehensively negative.    Physical Exam:  Vitals:        Vital Signs Last 24 Hrs  T(C): 36.6 (08 Aug 2022 10:56), Max: 36.6 (08 Aug 2022 10:56)  T(F): 97.9 (08 Aug 2022 10:56), Max: 97.9 (08 Aug 2022 10:56)  HR: 68 (08 Aug 2022 12:19) (68 - 74)  BP: 121/59 (08 Aug 2022 12:19) (121/59 - 123/71)  BP(mean): --  RR: 17 (08 Aug 2022 12:19) (17 - 18)  SpO2: 98% (08 Aug 2022 12:19) (98% - 100%)    Parameters below as of 08 Aug 2022 10:56  Patient On (Oxygen Delivery Method): room air      General: NAD  HEENT: MMM  Neck: No JVD, no carotid bruit  Lungs: CTAB  CV: RRR, nl S1/S2, no M/R/G  Abdomen: S/NT/ND, +BS  Extremities: No LE edema, no cyanosis  Neuro: AAOx3, non-focal  Skin: No rash    Labs:                        9.2    6.41  )-----------( 169      ( 08 Aug 2022 11:25 )             29.9     08-08    136  |  97  |  34<H>  ----------------------------<  147<H>  3.4<L>   |  30  |  1.17    Ca    9.1      08 Aug 2022 11:25  Phos  4.3     08-08  Mg     2.7     08-08    TPro  7.1  /  Alb  3.4  /  TBili  0.7  /  DBili  x   /  AST  17  /  ALT  16  /  AlkPhos  56  08-08        PT/INR - ( 08 Aug 2022 11:25 )   PT: 21.5 sec;   INR: 1.81 ratio         PTT - ( 08 Aug 2022 11:25 )  PTT:71.9 sec    ECG: NSR, LAD, poor R wave progression     History of Present Illness: The patient is an 89 year old female with a history of HTN, HL, atrial fibrillation, MitraClip, chronic diastolic heart failure who presents with right leg swelling. She has noted over the last couple of days right leg swelling with associated redness and warmth. She notes chronic dyspnea on exertion that is stable with her home O2. No chest pain, dizziness, palpitations.    Past Medical/Surgical History:  HTN, HL, atrial fibrillation, MitraClip, chronic diastolic heart failure    Medications:  Home Medications:  Ambien 5 mg oral tablet: 1 tab(s) orally once a day (at bedtime), As Needed (18 Jul 2022 22:32)  Metoprolol Tartrate 100 mg oral tablet: 1 tab(s) orally 2 times a day (18 Jul 2022 22:32)  sertraline 50 mg oral tablet: 1.5 tab(s) orally once a day (18 Jul 2022 22:35)  spironolactone 25 mg oral tablet: 1 tab(s) orally once a day  Please discuss with PCP/cardiology before resuming. (19 Jul 2022 17:12)  torsemide 20 mg oral tablet: 1 tab(s) orally 2 times a day (18 Jul 2022 22:32)  Xanax 0.25 mg oral tablet: 1 tab(s) orally once a day, As Needed - for anxiety (18 Jul 2022 22:32)      Family History: Non-contributory family history of premature cardiovascular atherosclerotic disease    Social History: No tobacco, alcohol or drug use    Review of Systems:  General: No fevers, chills, weight gain  Skin: No rashes, color changes  Cardiovascular: No chest pain, orthopnea  Respiratory: No shortness of breath, cough  Gastrointestinal: No nausea, abdominal pain  Genitourinary: No incontinence, pain with urination  Musculoskeletal: No pain, swelling, decreased range of motion  Neurological: No headache, weakness  Psychiatric: No depression, anxiety  Endocrine: No weight gain, increased thirst  All other systems are comprehensively negative.    Physical Exam:  Vitals:        Vital Signs Last 24 Hrs  T(C): 36.6 (08 Aug 2022 10:56), Max: 36.6 (08 Aug 2022 10:56)  T(F): 97.9 (08 Aug 2022 10:56), Max: 97.9 (08 Aug 2022 10:56)  HR: 68 (08 Aug 2022 12:19) (68 - 74)  BP: 121/59 (08 Aug 2022 12:19) (121/59 - 123/71)  BP(mean): --  RR: 17 (08 Aug 2022 12:19) (17 - 18)  SpO2: 98% (08 Aug 2022 12:19) (98% - 100%)  Parameters below as of 08 Aug 2022 10:56  Patient On (Oxygen Delivery Method): room air  General: NAD  HEENT: MMM  Neck: No JVD, no carotid bruit  Lungs: CTAB  CV: RRR, nl S1/S2, no M/R/G  Abdomen: S/NT/ND, +BS  Extremities: RLE edema, no cyanosis  Neuro: AAOx3, non-focal  Skin: Right leg erythema    Labs:                        9.2    6.41  )-----------( 169      ( 08 Aug 2022 11:25 )             29.9     08-08    136  |  97  |  34<H>  ----------------------------<  147<H>  3.4<L>   |  30  |  1.17    Ca    9.1      08 Aug 2022 11:25  Phos  4.3     08-08  Mg     2.7     08-08    TPro  7.1  /  Alb  3.4  /  TBili  0.7  /  DBili  x   /  AST  17  /  ALT  16  /  AlkPhos  56  08-08        PT/INR - ( 08 Aug 2022 11:25 )   PT: 21.5 sec;   INR: 1.81 ratio         PTT - ( 08 Aug 2022 11:25 )  PTT:71.9 sec    ECG: NSR, LAD, poor R wave progression

## 2022-08-24 ENCOUNTER — APPOINTMENT (OUTPATIENT)
Dept: HEART FAILURE | Facility: CLINIC | Age: 87
End: 2022-08-24

## 2022-08-24 VITALS
TEMPERATURE: 97.5 F | HEART RATE: 73 BPM | BODY MASS INDEX: 30.12 KG/M2 | SYSTOLIC BLOOD PRESSURE: 113 MMHG | DIASTOLIC BLOOD PRESSURE: 64 MMHG | OXYGEN SATURATION: 82 % | WEIGHT: 170 LBS | HEIGHT: 63 IN

## 2022-08-24 VITALS — WEIGHT: 173 LBS | BODY MASS INDEX: 30.65 KG/M2

## 2022-08-24 PROCEDURE — 99215 OFFICE O/P EST HI 40 MIN: CPT

## 2022-08-24 RX ORDER — LOPERAMIDE HYDROCHLORIDE 2 MG/1
2 TABLET ORAL
Qty: 30 | Refills: 0 | Status: DISCONTINUED | COMMUNITY
Start: 2022-08-02 | End: 2022-08-24

## 2022-08-24 RX ORDER — ZOLPIDEM TARTRATE 5 MG/1
5 TABLET ORAL
Refills: 0 | Status: DISCONTINUED | COMMUNITY
Start: 2022-03-15 | End: 2022-08-24

## 2022-08-25 DIAGNOSIS — E87.6 HYPOKALEMIA: ICD-10-CM

## 2022-08-25 LAB
ANION GAP SERPL CALC-SCNC: 13 MMOL/L
BUN SERPL-MCNC: 26 MG/DL
CALCIUM SERPL-MCNC: 9 MG/DL
CHLORIDE SERPL-SCNC: 99 MMOL/L
CO2 SERPL-SCNC: 30 MMOL/L
CREAT SERPL-MCNC: 1.16 MG/DL
EGFR: 45 ML/MIN/1.73M2
GLUCOSE SERPL-MCNC: 119 MG/DL
MAGNESIUM SERPL-MCNC: 2.3 MG/DL
NT-PROBNP SERPL-MCNC: 2971 PG/ML
POTASSIUM SERPL-SCNC: 3.3 MMOL/L
SODIUM SERPL-SCNC: 141 MMOL/L

## 2022-08-26 RX ORDER — POTASSIUM CHLORIDE 1500 MG/1
20 TABLET, FILM COATED, EXTENDED RELEASE ORAL
Qty: 4 | Refills: 0 | Status: DISCONTINUED | COMMUNITY
Start: 2022-08-25 | End: 2022-08-26

## 2022-09-07 ENCOUNTER — APPOINTMENT (OUTPATIENT)
Dept: HEART FAILURE | Facility: CLINIC | Age: 87
End: 2022-09-07

## 2022-09-07 PROCEDURE — 99443: CPT | Mod: 95

## 2022-09-13 ENCOUNTER — NON-APPOINTMENT (OUTPATIENT)
Age: 87
End: 2022-09-13

## 2022-09-16 ENCOUNTER — APPOINTMENT (OUTPATIENT)
Dept: GERIATRICS | Facility: CLINIC | Age: 87
End: 2022-09-16

## 2022-09-16 VITALS
RESPIRATION RATE: 16 BRPM | HEART RATE: 76 BPM | BODY MASS INDEX: 27.46 KG/M2 | HEIGHT: 63 IN | TEMPERATURE: 97.5 F | SYSTOLIC BLOOD PRESSURE: 102 MMHG | WEIGHT: 155 LBS | DIASTOLIC BLOOD PRESSURE: 62 MMHG | OXYGEN SATURATION: 98 %

## 2022-09-16 PROCEDURE — 99205 OFFICE O/P NEW HI 60 MIN: CPT | Mod: 25

## 2022-09-16 PROCEDURE — G0444 DEPRESSION SCREEN ANNUAL: CPT | Mod: 59

## 2022-09-16 NOTE — REASON FOR VISIT
[Initial Evaluation] : an initial evaluation [FreeTextEntry1] : anxiety, insomnia, recurrent falls [FreeTextEntry3] : alisha Cardenas [phone] [FreeTextEntry2] : who assists with history due to patient with baseline cognitive impairment

## 2022-09-16 NOTE — REVIEW OF SYSTEMS
[Loss Of Hearing] : hearing loss [Constipation] : constipation [As Noted in HPI] : as noted in HPI [Easy Bleeding] : a tendency for easy bleeding [Easy Bruising] : a tendency for easy bruising [Negative] : Endocrine [FreeTextEntry3] : wears reading glasses  [FreeTextEntry4] : doesn't wear HA's

## 2022-09-16 NOTE — PHYSICAL EXAM
[Alert] : alert [Normal Outer Ear/Nose] : the ears and nose were normal in appearance [Normal Hearing] : hearing was not normal [No Respiratory Distress] : no respiratory distress [No Acc Muscle Use] : no accessory muscle use [Respiration, Rhythm And Depth] : normal respiratory rhythm and effort [Normal] : no spinal tenderness [Normal Gait] : abnormal gait [No Clubbing, Cyanosis] : no clubbing or cyanosis of the fingernails [Involuntary Movements] : no involuntary movements were seen [Motor Tone] : muscle strength and tone were normal [Normal Insight/Judgment] : insight and judgment were not intact [de-identified] : +O2 via NC [de-identified] : chronic b/l LE skin changes [de-identified] : +memory loss [de-identified] : crying at times, anxious, depressed mood

## 2022-09-16 NOTE — ASSESSMENT
[FreeTextEntry1] : - Education, counseling, support provided today\par - Referred to SW for additional counseling, education, support, resources\par \par Recent BW reviewed in EMR\par \par Would benefit from inc support/ for safety and help with ADLs\par Would also benefit from geripsych eval for mgmg of uncontrolled mood symptoms\par \par Cognitive/mood assessment at next visit\par \par Bowel regimen \par \par Fall precautions\par f/u HCP form - son to bring to next visit\par \par f/u within 1-2 wks\par \par Rest as per PMD\par \par If any new/worsening symptoms advised to call us asap or go to emergency room.\par \par \par

## 2022-09-16 NOTE — HISTORY OF PRESENT ILLNESS
[Any fall with injury in past year] : Patient reported fall with injury in the past year [Independent] : toileting [] : Assistance needed managing finances. [Cane] : cane [Walker] : walker [Wheelchair] : wheelchair [Smoke Detector] : smoke detector [Carbon Monoxide Detector] : carbon monoxide detector [2] : 1) Little interest or pleasure doing things for more than half of the days (2) [3] : 2) Feeling down, depressed, or hopeless for nearly every day (3) [PHQ-2 Positive] : PHQ-2 Positive [FreeTextEntry1] : \par \par Took a fall at the [facility] approx end of July or beginning of Aug '22 - bad fall but "didn't realize it".  Hospitalized at [facility] and "all kinds of tests were OK." Had bruises and pain but no fractures. \par \par Since then "always afraid of something."  Afraid of falling. Afraid that sometimes oxygen won't come and won't have anything to breathe with. One fear leads to another fear. Feels like she's "going crazy." Feels like something bad is going to happen to her. Frightened. Can't sleep.  Can't go to bathroom. Last BM was 3 days ago - "very hard". \par "Everything I do is hard and I don't know what to do." \par "Fear of losing my mind." \par \par INSOMNIA / ANXIETY\par - SLeep is on/off, some nights good some nights up all night long \par - Seen by psych for anxiety years ago - prescribed for Dr. Carrion\par - Previously on Ambien - stopped approx 6/22 by "heart doctor Moses - not sure why". Son states this was approx 8/22 and d/t recurrent falls was switched to Remeron instead. \par - Takes Xanax TID - dose unclear\par - Takes Zoloft\par - Takes Remeron \par \par HFpEF / CHronic SOBE / on oxygen\par - follows w/ cards Dr. Lopes\par \par h/o COVID approx 2020? - mild symptoms, no treatment \par \par Uses cane around the home. \par Outside uses WC. \par \par PMD Dr. Jayant Godinez at [facility] \par  [Driving Concerns] : not driving or driving without noted concerns [facility]  [de-identified] : Melecio Sp helps since moved to [facility]  [de-identified] : PHQ2 of 5 on 9/16/22  [[id number]] : 5 [AdvancecareDate] : 9/16/22  [FreeTextEntry4] : HCP form at home - dtr is primary HCP?

## 2022-09-19 ENCOUNTER — NON-APPOINTMENT (OUTPATIENT)
Age: 87
End: 2022-09-19

## 2022-09-21 ENCOUNTER — APPOINTMENT (OUTPATIENT)
Dept: HEART FAILURE | Facility: CLINIC | Age: 87
End: 2022-09-21

## 2022-09-21 VITALS
BODY MASS INDEX: 26.75 KG/M2 | HEART RATE: 61 BPM | HEIGHT: 63 IN | WEIGHT: 151 LBS | SYSTOLIC BLOOD PRESSURE: 91 MMHG | OXYGEN SATURATION: 96 % | DIASTOLIC BLOOD PRESSURE: 60 MMHG | TEMPERATURE: 97 F

## 2022-09-21 PROCEDURE — 99215 OFFICE O/P EST HI 40 MIN: CPT

## 2022-09-21 NOTE — CARDIOLOGY SUMMARY
[de-identified] : \par 08/05/22 ECG Afib at 68 bpm, iRBBB, low voltage, left axis, NSTW abnormalities \par 03/15/22 ECG: AFib at 63 bpm, iRBBB, LAFB. No sig change from 11/24/21.\par 11/24/21 ECG: AFib at 48 bpm, iRBBB, LAFB. No sig change from prior 5/19/21\par 05/19/21 ECG: AFib at 61 bpm, iRBBB, LAFB. No sig change from prior.\par 11/18/20 ECG: AFib at 57 bpm, iRBBB, LAFB. No sig change from prior 8/28/20\par  [de-identified] : \par 03/10/22-03/30/22 Zio monitor: Persistent AFib (100% burden). HR ranging  bpm (average HR 64 bpm). Mild IVCD, rare VPD's and couplets. No symptoms.\par  [de-identified] : 07/19/22 TTE: LVIDd 5.4 cm, LVEF 68%, mild LVH ( SW 1.0 cm, PW 1.0 cm) no segmental WMA, RV is enlarged w/ decreased RVSF, severe JANEL, s/p lala clip w/mild MR (peak/mean 10  mmHg/ 4 mmHg @ HR 66), mild AR, mod-severe TR, severe PH (RVSP 83 mmHg)\par \par 03/15/22 TTE: LVIDd 5.0 cm, LVEF 70%, no segmental WMA, flattening of the septum c/w RV pressure overload, mild concentric LVH (septum 1.4 cm, PWT 1.1 cm), RVE with decreased RVSF, severe JANEL, s/p Mitraclip with mild-mod MR, mod-severe MS (peak gradient 15 mmHg, mean gradient 6 mmHg), severe TR, mild CA, severe PH (RVSP 79 mmHg).\par \par 11/18/20 TTE: LVIDd 4.6 cm, normal LVEF, concentric LVH (SW 1.2, PW 1.3 cm), flattening of septum c/w RV pressure overload, normal LA size, mod SONNY, RVE with decreased RV function, mild-mod MR s/p MitraClip (peak 10 mmHg, mean 4 mmHg), mod-severe TR, no effusion, severe PH (RVSP 95 mmHg). Iatrogenic transeptal flow noted (transeptal puncture for Clip).\par \par 03/01/18 TTE: LVEF 70%, LVIDd 5.1, septum/PWT 1.1, LA 5.7, severely dilated RV with severe RVSD, severe biatrial enlargement, severe TR, MR present but shadowed by Mitraclip,est RVSP 82, left to right atrial flow noted from transseptal puncture.\par  [de-identified] : \par 03/11/19 RHC baseline: no RA or RV reported, PA 61/21/35, PCW 12, Ao 93%, PA 71%, CO/CI (F) 6.09/3.26, PVR 3.77 Mendez.\par Nitric Oxide: RA 10, RV 57/12, PA 54/17/30, no PCW reported, Ao 93%, PA 77%, CO/CI (F) 8.19/4.38,  PVR 3.66 Mendez.\par

## 2022-09-21 NOTE — HISTORY OF PRESENT ILLNESS
[FreeTextEntry1] : Mrs. Mix is a very pleasant 89 year old woman with history of HFpEF (LVEF 68% 7/2022), MR s/p Mitraclip (8/30/16), mixed pre- and post-capillary pulmonary hypertension, permanent AFib (on Pradaxa), hypertension, hyperlipidemia, CKD (b/l Cr 1.2-1.3), SHERYL and hypoxia on home O2 3-4L. Here today for follow up on her cardiomyopathy.\par \par Since her last OV on 5/25/2022, she had a brief hospital admission 7/18-7/19/2022 at [facility] due to an unwitnessed fall that occurred at approximately 2 AM resulting in head trauma, work up included CT of the head which was negative. She takes Ambien most nights for insomnia and cannot recall the mechanism of the fall but it is believed she had woken up to use the rest room and suffered a mechanical fall. Her medications have been further adjusted, given there was clinical concern for hypovolemia and bradycardia. Her lopressor was reduced to 50 mg BID. Torsemide further reduced to 40 mg QD and Spironolactone held for unclear reasons. \par During her most recent cardiology visit on 8/5/22 Torsemide was further reduced from 40 mg QD to 20 mg QD to avoid the potential for hypovolemia.\par \par Overall, she feels the hospital was a set back, but is working with PT/OT 2-3 times a week to regain her strength. She mostly utilizes a wheelchair or walker for mobility but is able to walk around the [facility] with her walker without stopping.  As she routinely walks to her meals twice daily. She is now maintained on continuous oxygen 3-4 L via NC. She reports she commonly sleeps on 2-3 pillows for comfort. Admits to poor sleep hygiene and frequently napping during the day and staying awake during the night. Not routinely taking BP/Weight. However, has had progressive lower extremity edema, worse in the right leg > left leg. The right leg is now weeping fluid in the last few days, and Dr. Briseno is providing wound care at [facility]. \par \par She denies CP, SOB at rest, orthopnea, PND, LH/dizziness, abdominal discomfort, or palpitations. Her appetite is fair and her bowel and bladder habits are unchanged. She is limiting her fluids and is following a low sodium diet. She does not use NSAIDs. She has not been admitted to the hospital or seen in the ER for Heart Failure in the interim.

## 2022-09-21 NOTE — END OF VISIT
[Time Spent: ___ minutes] : I have spent [unfilled] minutes of time on the encounter. [FreeTextEntry3] : I, Dr. Lopes, personally performed the evaluation and management (E/M) services for this established patient who presents today with (a) new problem(s)/exacerbation of (an) existing condition(s).  That E/M includes conducting the examination, assessing all new/exacerbated conditions, and establishing a new plan of care.  Today, my JULIUS, Arleen Zaina, was here to observe my evaluation and management services for this new problem/exacerbated condition to be followed going forward.

## 2022-09-21 NOTE — REASON FOR VISIT
[Cardiac Failure] : cardiac failure [Other: _____] : [unfilled] [FreeTextEntry1] : Patient Instructions:\par \par 1. INCREASE  Torsemide 40 mg twice daily (please take two of your 20 mg tablets at 7 am and 3 pm) \par \par 2. RESUME Spironolactone 25 mg twice daily \par \par 3. STOP Ambien, Attempt to Minimize Xanax\par \par 4. We will refer you to Geriatric Psychiatrist  \par \par 5. Consider a device called "CardioMEMS" a minor procedure that is implanted that allows the heart failure team to monitor your fluid balance from your home\par \par 6. Please keep a log of your blood pressure, heart rate, and weight taken daily. You may check your blood pressure and heart rate at a random time, preferably when you are at rest. Your weight should be checked first thing in the morning upon wakening and after using the bathroom.\par \par 7. Follow up with JULIUS Team by telehealth in 2 weeks, Follow up with Dr. Lopes in 1 month.

## 2022-09-21 NOTE — PHYSICAL EXAM
[No Xanthelasma] : no xanthelasma [No Rub] : no rub [No Gallop] : no gallop [Soft] : abdomen soft [Non Tender] : non-tender [Normal Bowel Sounds] : normal bowel sounds [No Cyanosis] : no cyanosis [No Clubbing] : no clubbing [Normal Radial B/L] : normal radial B/L [Normal] : alert and oriented, normal memory [Edema ___] : edema [unfilled] [de-identified] : unable to assess - wearing a mask [de-identified] : JVP 14 cm H2O with large V waves sitting up [de-identified] : II/VI systolic murmur [de-identified] : Irregularly irregular S1 S2 [de-identified] : on 4 L of O2 via NC [de-identified] : unable to appreciate HSM or masses due to seated in wheelchair [de-identified] : using wheelchair for distance [de-identified] : Right LE: unable to assess, in bandage, which is clean and dry

## 2022-09-21 NOTE — DISCUSSION/SUMMARY
[Patient] : the patient [FreeTextEntry2] : and son-in-law [FreeTextEntry1] : 88 yo F with chronic diastolic HF, rate-controlled AFib, severe MR s/p MitBrennaip, CKD stage 3 and chronic hypoxia on home O2. She is ACC/AHA Stage C, NYHA Class III. On exam is volume overloaded, I have recommended the following:\par \par 1. Acute on chronic diastolic heart failure - Fluid overloaded today. Increase Torsemide to 40 mg BID as well as Spironolactone to 25 mg BID. She did not tolerate Farxiga due to abdominal discomfort. Her volume status continues to be tenuous and I recommend her to consider CardioMEMS. Low thresh hold for inpatient admission if her volume status does not improve with escalation of her diuretics as she was not amenable today to pursue IV diuresis as an inpatient.  Continue fluid and salt restriction. Instructed to Evans to start routine BP check and standing weights. Check labs today. \par \par 2. Pulmonary hypertension - She has been stable for years despite severely elevated pulmonary pressures by TTE. At the time of her last RHC on 3/11/19, PA 61/21/35, PCW 12 without a response to inhaled NO and with PA 71.3%, PVR 3.77 Mendez. TTE from 3/15/22 reviewed with Dr. Moore (structural cardiology) who did not think there was significant new MS. TTE 7/19/22: (peak/mean 10 mmHg/4 mmHg).\par \par 3. AFib - Rate controlled on beta blocker and CCB. On Pradaxa for AC.\par \par 4. CKD stage 3 - Stable on current therapies. Baseline Cr 1.2-1.3. Recent labs from 8/08 SrCr (1.17). Ideally would introduce SGLT2-i but did not tolerate in 12/2021 due to abdominal cramping\par \par 5. Hypertension -  Well controlled on current regimen. Continue Diltiazem  mg QD. Instructed Evans to begin routine BP monitoring.\par \par 6. Anxiety and Depression- suboptimal control on Sertraline 75 mg QD. Amenable to referral to behavioral health, Geriatric Psychiatry. Will stop benzodiazepine.\par \par 7. Insomnia- with poor sleep maintenance currently on Ambien and Alprazolam, with recent fall on Pradaxa. Will stop hypnotic sleep aids along with benzodiazepines. Trial Mirtazapine 7.5 mg QHS. To establish care with Geriatric Psychiatric and follow up on formal recommendations.\par \par 8. Follow up with [facility] in 2 weeks and Dr. Lopes on 9/21/2022.

## 2022-09-22 ENCOUNTER — NON-APPOINTMENT (OUTPATIENT)
Age: 87
End: 2022-09-22

## 2022-09-23 ENCOUNTER — APPOINTMENT (OUTPATIENT)
Dept: GERIATRICS | Facility: CLINIC | Age: 87
End: 2022-09-23

## 2022-09-23 VITALS
BODY MASS INDEX: 26.75 KG/M2 | SYSTOLIC BLOOD PRESSURE: 110 MMHG | HEIGHT: 63 IN | WEIGHT: 151 LBS | HEART RATE: 83 BPM | DIASTOLIC BLOOD PRESSURE: 62 MMHG | TEMPERATURE: 97.5 F | OXYGEN SATURATION: 98 % | RESPIRATION RATE: 16 BRPM

## 2022-09-23 PROCEDURE — 99354: CPT

## 2022-09-23 PROCEDURE — 99483 ASSMT & CARE PLN PT COG IMP: CPT

## 2022-09-27 ENCOUNTER — NON-APPOINTMENT (OUTPATIENT)
Age: 87
End: 2022-09-27

## 2022-09-27 ENCOUNTER — APPOINTMENT (OUTPATIENT)
Dept: GERIATRICS | Facility: CLINIC | Age: 87
End: 2022-09-27

## 2022-09-27 VITALS — DIASTOLIC BLOOD PRESSURE: 40 MMHG | HEART RATE: 50 BPM | OXYGEN SATURATION: 97 % | SYSTOLIC BLOOD PRESSURE: 80 MMHG

## 2022-09-27 VITALS
BODY MASS INDEX: 26.93 KG/M2 | HEART RATE: 82 BPM | TEMPERATURE: 98.2 F | OXYGEN SATURATION: 97 % | RESPIRATION RATE: 24 BRPM | SYSTOLIC BLOOD PRESSURE: 76 MMHG | DIASTOLIC BLOOD PRESSURE: 50 MMHG | WEIGHT: 152 LBS

## 2022-09-27 DIAGNOSIS — Z87.898 PERSONAL HISTORY OF OTHER SPECIFIED CONDITIONS: ICD-10-CM

## 2022-09-27 DIAGNOSIS — R22.0 LOCALIZED SWELLING, MASS AND LUMP, HEAD: ICD-10-CM

## 2022-09-27 DIAGNOSIS — J34.89 OTHER SPECIFIED DISORDERS OF NOSE AND NASAL SINUSES: ICD-10-CM

## 2022-09-27 DIAGNOSIS — Z11.59 ENCOUNTER FOR SCREENING FOR OTHER VIRAL DISEASES: ICD-10-CM

## 2022-09-27 PROCEDURE — G2212 PROLONG OUTPT/OFFICE VIS: CPT

## 2022-09-27 PROCEDURE — 99215 OFFICE O/P EST HI 40 MIN: CPT | Mod: 25

## 2022-09-27 NOTE — HISTORY OF PRESENT ILLNESS
[Any fall with injury in past year] : Patient reported fall with injury in the past year [Independent] : transferring/mobility [Full assistance needed] : Assistance needed managing medications [FAST Score: ____] : Functional Assessment Scale (FAST) Score: [unfilled] [Cane] : cane [Walker] : walker [Wheelchair] : wheelchair [Smoke Detector] : smoke detector [Carbon Monoxide Detector] : carbon monoxide detector [Mild] : Stage: Mild [Stable] : Status: Stable [Memory Lapses Or Loss] : stable memory impairment [Patient Observed To Be Agitated] : denies agitation [Hostility Toward Caregivers] : denies aggression [Sleep Disturbances] : stable sleep disturbances [] : denies wandering [Fixed Beliefs Contradicted By Reality (Delusions)] : denies delusions [Difficulty Finding Desired Words] : denies difficulty finding desired words [Designated Healthcare Proxy] : Designated healthcare proxy [FreeTextEntry1] : No acute events since last visit including falls, hospitalizations, ED visits, urgent care visits.\par \par TODAY reports feels well except worried about having enough oxygen.  States, "As long as I have enough oxygen I'm ok." \par \par Still having trouble sleeping. \par Anxiety has overall been better. \par States hasn't needed Xanax since last visit.  Son/HHA unable to confirm. \par \par So reports pt now has  supervision with 2 HHAs - one during daytime and another at nighttime. This is going well so far. \par \par Have not been keeping sleep log, BM log, or Xanax use log. \par No med admin record available to review from [facility]. \par Med order list reviewed. \par \par Constipated since last visit - started on Senna which helped her have BM after 3 days of no BM. Last BM this morning was normal. \par \par INSOMNIA / ANXIETY / CONFUSION \par - 22: feels awful - "not myself". Ashamed - doesn't want anyone to see her this way.  \par Worried about many things including having enough oxygen, about falling, being a burden to her children. \par Used to be active independent and "happy go lizz."  States "everybody loved me!" \par More anxious after   of COVID.  Previously on xanax PRN for anxiety.  After   started taking Xanax TID pretty regularly. Significant decline after fall in .  Started on Zoloft after that.  Recently off Ambien and switched to Remeron instead. \par Wants to go back to being independent and active as she used to be prior to fall. \par Miserable living at the [facility] - "I don't want to see all the old and crippled people, I don't want to get that way." \par Doesn't want to be a burden on her children.  \par Xanax was held today - last dose was last night - "because otherwise she is completely out of it and too sleepy" per dtr.\par - 22: Took a fall at the [facility] approx end of July or beginning of Aug '22 - bad fall but "didn't realize it".  Hospitalized at [facility] and "all kinds of tests were OK." Had bruises and pain but no fractures. \par Since then "always afraid of something."  Afraid of falling. Afraid that sometimes oxygen won't come and won't have anything to breathe with. One fear leads to another fear. Feels like she's "going crazy." Feels like something bad is going to happen to her. Frightened. Can't sleep.  Can't go to bathroom. Last BM was 3 days ago - "very hard". \par "Everything I do is hard and I don't know what to do." \par "Fear of losing my mind." \par h/o COVID approx ? - mild symptoms, no treatment \par \par - Sleep is poor / variable - some nights sleeps well, other nights wakes up frequently, and approx once every 2 weeks doesn't sleep at all. Goes to bathroom sometimes when wakes up but not always. Sleps 6hrs last night - it was a good night. \par \par - Appetite: decreased, baseline wt ~180 --> 150s now\par \par - Motor: recurrent falls. \par Last fall in  w/ injury/requiring hospitalization. \par Ambulates with cane around the apartment, WC outside for longer distances d/t generalized weakness and SOB/on oxygen\par \par - Mood/behavior: anxious, depressed, withdrawn, "not interactive" as prior to fall in , disengaged, doesn't want to socialized with friends in the HEATHER, family has to encourage to go to dining area for meals\par \par - MOCA  on 22\par - CTH 22: not impressive \par \par - Seen by psych for anxiety years ago - Dr. Carrion?\par - Previously on Ambien 5mg QHS since at least  - last filled 22. Switched Ambien to Remeron QHS approx  d/t recurrent falls \par - Takes Xanax 0.25mg TID since at least  - sometimes takes it PRN?\par - Takes Zoloft\par - Takes Remeron \par \par HFpEF / CHronic SOBE / on oxygen\par - follows w/ cards Dr. Lopes - last f/u on 22 - tapered diuretics and antihypertensives. \par \par PMD Dr. Raulito Godinez at [facility] \par  [Driving Concerns] : not driving or driving without noted concerns [[facility] >12  [de-identified] : Melecio Sp helps since moved to [facility]  [de-identified] : PHQ2 of 5 on 9/16/22  [GDS] : 9 on 9/23/22  [AdvancecareDate] : 9/23/22  [FreeTextEntry4] : HCP form on file: dtr Shirlene is primary, alternate is son Ronald\par GOC: priority is "not being a burden to my children" and "dying peacefully in my sleep", does not want to go to the hospital, wants to die in comfort of home.  Does not want aggressive measures at EOL.  MOLST pending completion.

## 2022-09-27 NOTE — PHYSICAL EXAM
[Normal Outer Ear/Nose] : the ears and nose were normal in appearance [No Respiratory Distress] : no respiratory distress [No Acc Muscle Use] : no accessory muscle use [Respiration, Rhythm And Depth] : normal respiratory rhythm and effort [Auscultation Breath Sounds / Voice Sounds] : lungs were clear to auscultation bilaterally [Normal] : no spinal tenderness [No Clubbing, Cyanosis] : no clubbing or cyanosis of the fingernails [Involuntary Movements] : no involuntary movements were seen [Motor Tone] : muscle strength and tone were normal [Alert] : alert [Normal Hearing] : hearing was not normal [Normal Gait] : abnormal gait [Normal Insight/Judgment] : insight and judgment were not intact [de-identified] : +O2 via NC [de-identified] : bradycardic [de-identified] : Trace RLE edema [de-identified] : slow cautious unstable gait, better with cane [de-identified] : chronic b/l LE skin changes [de-identified] : +memory loss [de-identified] : Calm, depressed mood, DAIN-7 of 13/somewhat difficult on 9/23/22  [MocaTotal] : 18

## 2022-09-27 NOTE — ASSESSMENT
[FreeTextEntry1] : - c/w 24/7 supervision for safety and help with ADLs and for comfort/socialization and monitoring of symptoms\par \par - BP and HR relatively low today - including on repeat.  I personally spoke with NP Rosa @ [facility] Dr. Lopes' office - she kindly agreed to call alisha Cardenas to help schedule urgent f/u for re-eval and consider med adjustment\par \par BW and urine studies ordered - pt to complete today. \par \par Otherwise, we decide to \par - c/w Melatonin 6mg approx 6pm\par - c/w Zoloft 100mg daily\par - Trial increase Remeron to 15mg QHS - r/b/a discussed\par - Monitor closely for s/s of serotonin syndrome/serotonin toxicity (eg, hyperreflexia, clonus, hyperthermia, diaphoresis, tremor, autonomic instability, mental status changes) while on Remeron and Zoloft\par \par - Attempted to reach nurse Lesly at [facility]  to clarify Xanax use/administration - pending callback\par - c/w Xanax TID PRN for severe anxiety/panick attack only for now\par - f/u with psych Dr. Brito as scheduled in 11/22 \par - f/u in 1 week for re-eval \par \par HHA/family to keep sleep log, BM log, and Xanax use log and bring to every visit to review\par - c/w Senna QHS \par \par HIGH risk for falls/injury\par Maximize sensory input - f/u with optho, consider ENT/audiology f/u and trial HAs\par Fall precautions\par \par Repeat VS and orthostatics at next visit \par Would avoid aggressive BP control in this patient - may inc risk for falls/injury. \par \par Rest as per PMD. \par \par f/u within 1 week.  \par Advised to call with questions/concerns. \par If any new/worsening symptoms advised to call us asap or go to emergency room.\par \par

## 2022-09-27 NOTE — REASON FOR VISIT
[Follow-Up] : a follow-up visit [FreeTextEntry1] : anxiety, insomnia, recurrent falls, polypharmacy [FreeTextEntry3] : alisha Cardenas  [FreeTextEntry2] : who assist with history per pt request and d/t patient with cognitive impairment

## 2022-09-28 ENCOUNTER — INPATIENT (INPATIENT)
Facility: HOSPITAL | Age: 87
LOS: 1 days | Discharge: NURSING, REHAB OR LONG-TERM CARE | DRG: 811 | End: 2022-09-30
Attending: INTERNAL MEDICINE | Admitting: INTERNAL MEDICINE
Payer: MEDICARE

## 2022-09-28 ENCOUNTER — NON-APPOINTMENT (OUTPATIENT)
Age: 87
End: 2022-09-28

## 2022-09-28 VITALS
RESPIRATION RATE: 20 BRPM | OXYGEN SATURATION: 97 % | DIASTOLIC BLOOD PRESSURE: 64 MMHG | HEIGHT: 63 IN | SYSTOLIC BLOOD PRESSURE: 102 MMHG | TEMPERATURE: 98 F | WEIGHT: 151.9 LBS | HEART RATE: 91 BPM

## 2022-09-28 DIAGNOSIS — I48.20 CHRONIC ATRIAL FIBRILLATION, UNSPECIFIED: ICD-10-CM

## 2022-09-28 DIAGNOSIS — D62 ACUTE POSTHEMORRHAGIC ANEMIA: ICD-10-CM

## 2022-09-28 DIAGNOSIS — K62.5 HEMORRHAGE OF ANUS AND RECTUM: ICD-10-CM

## 2022-09-28 DIAGNOSIS — Z98.89 OTHER SPECIFIED POSTPROCEDURAL STATES: Chronic | ICD-10-CM

## 2022-09-28 DIAGNOSIS — I10 ESSENTIAL (PRIMARY) HYPERTENSION: ICD-10-CM

## 2022-09-28 DIAGNOSIS — N17.9 ACUTE KIDNEY FAILURE, UNSPECIFIED: ICD-10-CM

## 2022-09-28 DIAGNOSIS — H26.9 UNSPECIFIED CATARACT: Chronic | ICD-10-CM

## 2022-09-28 DIAGNOSIS — Z86.16 PERSONAL HISTORY OF COVID-19: ICD-10-CM

## 2022-09-28 DIAGNOSIS — K92.2 GASTROINTESTINAL HEMORRHAGE, UNSPECIFIED: ICD-10-CM

## 2022-09-28 LAB
25(OH)D3 SERPL-MCNC: 37.3 NG/ML
ABO RH CONFIRMATION: SIGNIFICANT CHANGE UP
ALBUMIN SERPL ELPH-MCNC: 3.2 G/DL — LOW (ref 3.3–5)
ALBUMIN SERPL ELPH-MCNC: 4.1 G/DL
ALP BLD-CCNC: 47 U/L
ALP SERPL-CCNC: 41 U/L — SIGNIFICANT CHANGE UP (ref 30–120)
ALT FLD-CCNC: 12 U/L DA — SIGNIFICANT CHANGE UP (ref 10–60)
ALT SERPL-CCNC: 9 U/L
ANION GAP SERPL CALC-SCNC: 18 MMOL/L
ANION GAP SERPL CALC-SCNC: 6 MMOL/L — SIGNIFICANT CHANGE UP (ref 5–17)
APPEARANCE UR: CLEAR — SIGNIFICANT CHANGE UP
APPEARANCE: CLEAR
APTT BLD: 71.4 SEC — HIGH (ref 27.5–35.5)
AST SERPL-CCNC: 19 U/L
AST SERPL-CCNC: 27 U/L — SIGNIFICANT CHANGE UP (ref 10–40)
BASOPHILS # BLD AUTO: 0.03 K/UL
BASOPHILS # BLD AUTO: 0.04 K/UL — SIGNIFICANT CHANGE UP (ref 0–0.2)
BASOPHILS NFR BLD AUTO: 0.3 %
BASOPHILS NFR BLD AUTO: 0.4 % — SIGNIFICANT CHANGE UP (ref 0–2)
BILIRUB SERPL-MCNC: 0.4 MG/DL
BILIRUB SERPL-MCNC: 0.5 MG/DL — SIGNIFICANT CHANGE UP (ref 0.2–1.2)
BILIRUB UR-MCNC: NEGATIVE — SIGNIFICANT CHANGE UP
BILIRUBIN URINE: NEGATIVE
BLD GP AB SCN SERPL QL: SIGNIFICANT CHANGE UP
BLOOD URINE: NEGATIVE
BUN SERPL-MCNC: 44 MG/DL
BUN SERPL-MCNC: 44 MG/DL — HIGH (ref 7–23)
CALCIUM SERPL-MCNC: 8.3 MG/DL — LOW (ref 8.4–10.5)
CALCIUM SERPL-MCNC: 8.8 MG/DL
CHLORIDE SERPL-SCNC: 96 MMOL/L — SIGNIFICANT CHANGE UP (ref 96–108)
CHLORIDE SERPL-SCNC: 97 MMOL/L
CHOLEST SERPL-MCNC: 94 MG/DL
CO2 SERPL-SCNC: 21 MMOL/L
CO2 SERPL-SCNC: 27 MMOL/L — SIGNIFICANT CHANGE UP (ref 22–31)
COLOR SPEC: YELLOW — SIGNIFICANT CHANGE UP
COLOR: YELLOW
COVID-19 NUCLEOCAPSID  GAM ANTIBODY INTERPRETATION: POSITIVE
CREAT SERPL-MCNC: 1.79 MG/DL — HIGH (ref 0.5–1.3)
CREAT SERPL-MCNC: 1.81 MG/DL
DIFF PNL FLD: NEGATIVE — SIGNIFICANT CHANGE UP
EGFR: 26 ML/MIN/1.73M2
EGFR: 27 ML/MIN/1.73M2 — LOW
EOSINOPHIL # BLD AUTO: 0.17 K/UL
EOSINOPHIL # BLD AUTO: 0.21 K/UL — SIGNIFICANT CHANGE UP (ref 0–0.5)
EOSINOPHIL NFR BLD AUTO: 1.7 %
EOSINOPHIL NFR BLD AUTO: 2.3 % — SIGNIFICANT CHANGE UP (ref 0–6)
ESTIMATED AVERAGE GLUCOSE: 146 MG/DL
FOLATE SERPL-MCNC: >20 NG/ML
GLUCOSE QUALITATIVE U: NEGATIVE
GLUCOSE SERPL-MCNC: 127 MG/DL
GLUCOSE SERPL-MCNC: 136 MG/DL — HIGH (ref 70–99)
GLUCOSE UR QL: NEGATIVE MG/DL — SIGNIFICANT CHANGE UP
HBA1C MFR BLD HPLC: 6.7 %
HCT VFR BLD CALC: 23 % — LOW (ref 34.5–45)
HCT VFR BLD CALC: 26.6 %
HDLC SERPL-MCNC: 34 MG/DL
HGB BLD-MCNC: 6.9 G/DL — CRITICAL LOW (ref 11.5–15.5)
HGB BLD-MCNC: 7.7 G/DL
IMM GRANULOCYTES NFR BLD AUTO: 1.3 %
IMM GRANULOCYTES NFR BLD AUTO: 1.4 % — HIGH (ref 0–0.9)
INR BLD: 2.39 RATIO — HIGH (ref 0.88–1.16)
KETONES UR-MCNC: NEGATIVE — SIGNIFICANT CHANGE UP
KETONES URINE: NEGATIVE
LDLC SERPL CALC-MCNC: 42 MG/DL
LEUKOCYTE ESTERASE UR-ACNC: NEGATIVE — SIGNIFICANT CHANGE UP
LEUKOCYTE ESTERASE URINE: NEGATIVE
LIDOCAIN IGE QN: 266 U/L — SIGNIFICANT CHANGE UP (ref 73–393)
LYMPHOCYTES # BLD AUTO: 1.48 K/UL — SIGNIFICANT CHANGE UP (ref 1–3.3)
LYMPHOCYTES # BLD AUTO: 1.55 K/UL
LYMPHOCYTES # BLD AUTO: 16 % — SIGNIFICANT CHANGE UP (ref 13–44)
LYMPHOCYTES NFR BLD AUTO: 15.4 %
MAGNESIUM SERPL-MCNC: 2.6 MG/DL
MAN DIFF?: NORMAL
MCHC RBC-ENTMCNC: 22.1 PG
MCHC RBC-ENTMCNC: 22.5 PG — LOW (ref 27–34)
MCHC RBC-ENTMCNC: 28.9 GM/DL
MCHC RBC-ENTMCNC: 30 GM/DL — LOW (ref 32–36)
MCV RBC AUTO: 74.9 FL — LOW (ref 80–100)
MCV RBC AUTO: 76.4 FL
MONOCYTES # BLD AUTO: 1.21 K/UL
MONOCYTES # BLD AUTO: 1.4 K/UL — HIGH (ref 0–0.9)
MONOCYTES NFR BLD AUTO: 12 %
MONOCYTES NFR BLD AUTO: 15.1 % — HIGH (ref 2–14)
NEUTROPHILS # BLD AUTO: 5.99 K/UL — SIGNIFICANT CHANGE UP (ref 1.8–7.4)
NEUTROPHILS # BLD AUTO: 6.96 K/UL
NEUTROPHILS NFR BLD AUTO: 64.8 % — SIGNIFICANT CHANGE UP (ref 43–77)
NEUTROPHILS NFR BLD AUTO: 69.3 %
NITRITE UR-MCNC: NEGATIVE — SIGNIFICANT CHANGE UP
NITRITE URINE: NEGATIVE
NONHDLC SERPL-MCNC: 60 MG/DL
NRBC # BLD: 0 /100 WBCS — SIGNIFICANT CHANGE UP (ref 0–0)
NT-PROBNP SERPL-MCNC: 2686 PG/ML
OB PNL STL: POSITIVE
PH UR: 6.5 — SIGNIFICANT CHANGE UP (ref 5–8)
PH URINE: 6
PLATELET # BLD AUTO: 208 K/UL — SIGNIFICANT CHANGE UP (ref 150–400)
PLATELET # BLD AUTO: 243 K/UL
POTASSIUM SERPL-MCNC: 4.8 MMOL/L — SIGNIFICANT CHANGE UP (ref 3.5–5.3)
POTASSIUM SERPL-SCNC: 4.8 MMOL/L — SIGNIFICANT CHANGE UP (ref 3.5–5.3)
POTASSIUM SERPL-SCNC: 4.9 MMOL/L
PROT SERPL-MCNC: 6.5 G/DL
PROT SERPL-MCNC: 6.7 G/DL — SIGNIFICANT CHANGE UP (ref 6–8.3)
PROT UR-MCNC: NEGATIVE MG/DL — SIGNIFICANT CHANGE UP
PROTEIN URINE: NEGATIVE
PROTHROM AB SERPL-ACNC: 28.5 SEC — HIGH (ref 10.5–13.4)
RBC # BLD: 3.07 M/UL — LOW (ref 3.8–5.2)
RBC # BLD: 3.48 M/UL
RBC # BLD: 3.55 M/UL — LOW (ref 3.8–5.2)
RBC # FLD: 17.1 % — HIGH (ref 10.3–14.5)
RBC # FLD: 17.6 %
RETICS #: 66 K/UL — SIGNIFICANT CHANGE UP (ref 25–125)
RETICS/RBC NFR: 1.9 % — SIGNIFICANT CHANGE UP (ref 0.5–2.5)
SARS-COV-2 AB SERPL QL IA: 133 INDEX
SARS-COV-2 RNA SPEC QL NAA+PROBE: SIGNIFICANT CHANGE UP
SODIUM SERPL-SCNC: 129 MMOL/L — LOW (ref 135–145)
SODIUM SERPL-SCNC: 136 MMOL/L
SP GR SPEC: 1.01 — SIGNIFICANT CHANGE UP (ref 1.01–1.02)
SPECIFIC GRAVITY URINE: 1.01
TRIGL SERPL-MCNC: 92 MG/DL
TROPONIN I, HIGH SENSITIVITY RESULT: 28.2 NG/L — SIGNIFICANT CHANGE UP
TSH SERPL-ACNC: 1.8 UIU/ML
UROBILINOGEN FLD QL: NEGATIVE MG/DL — SIGNIFICANT CHANGE UP
UROBILINOGEN URINE: NORMAL
VIT B12 SERPL-MCNC: 543 PG/ML
WBC # BLD: 9.25 K/UL — SIGNIFICANT CHANGE UP (ref 3.8–10.5)
WBC # FLD AUTO: 10.05 K/UL
WBC # FLD AUTO: 9.25 K/UL — SIGNIFICANT CHANGE UP (ref 3.8–10.5)

## 2022-09-28 PROCEDURE — 71045 X-RAY EXAM CHEST 1 VIEW: CPT | Mod: 26

## 2022-09-28 PROCEDURE — 76775 US EXAM ABDO BACK WALL LIM: CPT | Mod: 26

## 2022-09-28 PROCEDURE — 93010 ELECTROCARDIOGRAM REPORT: CPT

## 2022-09-28 PROCEDURE — 99285 EMERGENCY DEPT VISIT HI MDM: CPT

## 2022-09-28 PROCEDURE — 74176 CT ABD & PELVIS W/O CONTRAST: CPT | Mod: 26

## 2022-09-28 RX ORDER — ATORVASTATIN CALCIUM 80 MG/1
10 TABLET, FILM COATED ORAL AT BEDTIME
Refills: 0 | Status: DISCONTINUED | OUTPATIENT
Start: 2022-09-28 | End: 2022-09-30

## 2022-09-28 RX ORDER — ALPRAZOLAM 0.25 MG
1 TABLET ORAL
Qty: 0 | Refills: 0 | DISCHARGE

## 2022-09-28 RX ORDER — SODIUM CHLORIDE 9 MG/ML
1000 INJECTION INTRAMUSCULAR; INTRAVENOUS; SUBCUTANEOUS
Refills: 0 | Status: DISCONTINUED | OUTPATIENT
Start: 2022-09-28 | End: 2022-09-28

## 2022-09-28 RX ORDER — PANTOPRAZOLE SODIUM 20 MG/1
40 TABLET, DELAYED RELEASE ORAL EVERY 12 HOURS
Refills: 0 | Status: DISCONTINUED | OUTPATIENT
Start: 2022-09-28 | End: 2022-09-30

## 2022-09-28 RX ORDER — ACETAMINOPHEN 500 MG
650 TABLET ORAL EVERY 6 HOURS
Refills: 0 | Status: DISCONTINUED | OUTPATIENT
Start: 2022-09-28 | End: 2022-09-30

## 2022-09-28 RX ORDER — SERTRALINE 25 MG/1
1.5 TABLET, FILM COATED ORAL
Qty: 0 | Refills: 0 | DISCHARGE

## 2022-09-28 RX ORDER — LANOLIN ALCOHOL/MO/W.PET/CERES
3 CREAM (GRAM) TOPICAL AT BEDTIME
Refills: 0 | Status: DISCONTINUED | OUTPATIENT
Start: 2022-09-28 | End: 2022-09-30

## 2022-09-28 RX ORDER — SPIRONOLACTONE 25 MG/1
1 TABLET, FILM COATED ORAL
Qty: 0 | Refills: 0 | DISCHARGE

## 2022-09-28 RX ORDER — MIRTAZAPINE 45 MG/1
15 TABLET, ORALLY DISINTEGRATING ORAL AT BEDTIME
Refills: 0 | Status: DISCONTINUED | OUTPATIENT
Start: 2022-09-28 | End: 2022-09-30

## 2022-09-28 RX ORDER — SODIUM CHLORIDE 9 MG/ML
1000 INJECTION INTRAMUSCULAR; INTRAVENOUS; SUBCUTANEOUS
Refills: 0 | Status: DISCONTINUED | OUTPATIENT
Start: 2022-09-28 | End: 2022-09-29

## 2022-09-28 RX ORDER — METOPROLOL TARTRATE 50 MG
1 TABLET ORAL
Qty: 0 | Refills: 0 | DISCHARGE

## 2022-09-28 RX ORDER — ONDANSETRON 8 MG/1
4 TABLET, FILM COATED ORAL EVERY 6 HOURS
Refills: 0 | Status: DISCONTINUED | OUTPATIENT
Start: 2022-09-28 | End: 2022-09-30

## 2022-09-28 RX ORDER — SERTRALINE 25 MG/1
100 TABLET, FILM COATED ORAL DAILY
Refills: 0 | Status: DISCONTINUED | OUTPATIENT
Start: 2022-09-28 | End: 2022-09-30

## 2022-09-28 RX ADMIN — PANTOPRAZOLE SODIUM 40 MILLIGRAM(S): 20 TABLET, DELAYED RELEASE ORAL at 18:05

## 2022-09-28 RX ADMIN — ATORVASTATIN CALCIUM 10 MILLIGRAM(S): 80 TABLET, FILM COATED ORAL at 22:02

## 2022-09-28 RX ADMIN — SODIUM CHLORIDE 40 MILLILITER(S): 9 INJECTION INTRAMUSCULAR; INTRAVENOUS; SUBCUTANEOUS at 22:03

## 2022-09-28 RX ADMIN — Medication 3 MILLIGRAM(S): at 23:27

## 2022-09-28 RX ADMIN — SODIUM CHLORIDE 70 MILLILITER(S): 9 INJECTION INTRAMUSCULAR; INTRAVENOUS; SUBCUTANEOUS at 12:53

## 2022-09-28 RX ADMIN — MIRTAZAPINE 15 MILLIGRAM(S): 45 TABLET, ORALLY DISINTEGRATING ORAL at 22:02

## 2022-09-28 NOTE — CONSULT NOTE ADULT - ASSESSMENT
LGIB/anemia  monitor CBC, transfuse prn, CT ab/pel noted  may need bleeding scan if signs of active bleeding  hold anticoagulation  IR/CRS evaluation if decompensates   will monitor

## 2022-09-28 NOTE — CONSULT NOTE ADULT - SUBJECTIVE AND OBJECTIVE BOX
HPI:  Patient is a 89y old  Female with hx of CKD/Cr 1-1.3 in recent past, MV clip, aJairofib ( on pradaxa ), severe pulm HTN who presented to ED at the behest of outpt PMD for evaluation of acute anemia. Hgb was 6.9 in ED. Pt relates upper abd pain, melena.   Found to be in MEME/Cr 1.79. Home meds include diuretic, aldactone.   Pt c/o difficulty voiding, urgency. Bed side bladder scan showed 287cc of PVR.   Renal evaluation requested to address MEME.         PAST MEDICAL & SURGICAL HISTORY:  HTN (hypertension)  HLD (hyperlipidemia)  Atrial fibrillation  On Pradaxa  Syncope  Depression  Leg edema  Mitral valve stenosis, unspecified etiology  Hearing loss of left ear  H/O knee surgery  H/O external ear surgery  Cataract          FAMILY HISTORY:  Family history of blood disorder (Mother)        Allergies    codeine (Other)    Intolerances        MEDICATIONS  (STANDING):  sodium chloride 0.9%. 1000 milliLiter(s) (70 mL/Hr) IV Continuous <Continuous>    MEDICATIONS  (PRN):      Daily Height in cm: 160.02 (28 Sep 2022 12:20)    Daily     Drug Dosing Weight  Height (cm): 160 (28 Sep 2022 12:20)  Weight (kg): 68.9 (28 Sep 2022 12:20)  BMI (kg/m2): 26.9 (28 Sep 2022 12:20)  BSA (m2): 1.72 (28 Sep 2022 12:20)      REVIEW OF SYSTEMS:    acute anemia  melena  epigastric pain  MEME  CKD            I&O's Detail        PHYSICAL EXAM:    GENERAL: NAD  HEAD:  Atraumatic, normocephalic  EYES: EOMI, PERRLA. Conjunctiva and sclera clear  ENMT: moist mucous membranes.   NECK: Supple. No increase in JVP  NERVOUS SYSTEM:  Alert & Oriented X3. Motor Strength 5/5 B/L upper and lower extremities; DTRs 2+ intact and symmetric  CHEST/LUNG: Clear to auscultation bilaterally  HEART: S1S2  ABDOMEN: Soft, tender in epigastrium.  EXTREMITIES:  min edema      LABS:  CBC Full  -  ( 28 Sep 2022 12:49 )  WBC Count : 9.25 K/uL  RBC Count : 3.07 M/uL  Hemoglobin : 6.9 g/dL  Hematocrit : 23.0 %  Platelet Count - Automated : 208 K/uL  Mean Cell Volume : 74.9 fl  Mean Cell Hemoglobin : 22.5 pg  Mean Cell Hemoglobin Concentration : 30.0 gm/dL  Auto Neutrophil # : 5.99 K/uL  Auto Lymphocyte # : 1.48 K/uL  Auto Monocyte # : 1.40 K/uL  Auto Eosinophil # : 0.21 K/uL  Auto Basophil # : 0.04 K/uL  Auto Neutrophil % : 64.8 %  Auto Lymphocyte % : 16.0 %  Auto Monocyte % : 15.1 %  Auto Eosinophil % : 2.3 %  Auto Basophil % : 0.4 %    09-28    129<L>  |  96  |  44<H>  ----------------------------<  136<H>  4.8   |  27  |  1.79<H>    Ca    8.3<L>      28 Sep 2022 12:49    TPro  6.7  /  Alb  3.2<L>  /  TBili  0.5  /  DBili  x   /  AST  27  /  ALT  12  /  AlkPhos  41  09-28    CAPILLARY BLOOD GLUCOSE        PT/INR - ( 28 Sep 2022 12:49 )   PT: 28.5 sec;   INR: 2.39 ratio         PTT - ( 28 Sep 2022 12:49 )  PTT:71.4 sec        Impression:  * MEME -- pre-renal azotemia due to blood loss.   * HypoNa -- hypovolemic  * CKD 3. Cr 1-1.3 per historical data  * GI bleed, suspect upper  * A.fib, on Pradaxa  * Pulm HTN, hx of MV clip.     Recommendations:   * Hold Lasix, aldactone  * Transfuse blood to Hgb > 8.0  * Dec saline to 40cc/h  * Repeat bladder scan in am. If PVR is > 500cc, insert Hernández.   * GI eval

## 2022-09-28 NOTE — CONSULT NOTE ADULT - SUBJECTIVE AND OBJECTIVE BOX
Chief Complaint:  Patient is a 89y old  Female who presents with a chief complaint of abnormal labs (28 Sep 2022 15:51)    HTN (hypertension)    HLD (hyperlipidemia)    Atrial fibrillation    Syncope    Depression    Leg edema    Mitral valve stenosis, unspecified etiology    Hearing loss of left ear    H/O knee surgery    H/O external ear surgery    Cataracta       HPI:  This is an 89-year-old female with history of A. fib on Pradaxa, HTN, diastolic HF, HLD constipation, on home O2, brought by daughter for evaluation of low hemoglobin noted on outpatient blood work yesterday.  Patient admits to bloody stools and diarrhea yesterday with abdominal cramping.  Denies fever nausea vomiting chest pain shortness of breath lightheadedness or other symptoms.  Patient has not had colonoscopy in many years.   (28 Sep 2022 15:51)      codeine (Other)      acetaminophen     Tablet .. 650 milliGRAM(s) Oral every 6 hours PRN  aluminum hydroxide/magnesium hydroxide/simethicone Suspension 30 milliLiter(s) Oral every 4 hours PRN  atorvastatin 10 milliGRAM(s) Oral at bedtime  melatonin 3 milliGRAM(s) Oral at bedtime PRN  mirtazapine 15 milliGRAM(s) Oral at bedtime  ondansetron Injectable 4 milliGRAM(s) IV Push every 6 hours PRN  pantoprazole  Injectable 40 milliGRAM(s) IV Push every 12 hours  sertraline 100 milliGRAM(s) Oral daily  sodium chloride 0.9%. 1000 milliLiter(s) IV Continuous <Continuous>        FAMILY HISTORY:  Family history of blood disorder (Mother)          Review of Systems:    General:  No wt loss, fevers, chills, night sweats,fatigue,   Eyes:  Good vision, no reported pain  ENT:  No sore throat, pain, runny nose, dysphagia  CV:  No pain, palpitatioins, hypo/hypertension  Resp:  No dyspnea, cough, tachypnea, wheezing  :  No pain, bleeding, incontinence, nocturia  Muscle:  No pain, weakness  Neuro:  No weakness, tingling, memory problems  Psych:  No fatigue, insomnia, mood problems, depression  Endocrine:  No polyuria, polydypsia, cold/heat intolerance  Heme:  No petechiae, ecchymosis, easy bruisability  Skin:  No rash, tattoos, scars, edema    Relevant Family History:       Relevant Social History:       Physical Exam:    Vital Signs:  Vital Signs Last 24 Hrs  T(C): 36.9 (28 Sep 2022 21:00), Max: 37.2 (28 Sep 2022 17:30)  T(F): 98.4 (28 Sep 2022 21:00), Max: 98.9 (28 Sep 2022 17:30)  HR: 67 (28 Sep 2022 21:00) (62 - 91)  BP: 103/52 (28 Sep 2022 21:00) (94/55 - 118/41)  BP(mean): 69 (28 Sep 2022 21:00) (64 - 99)  RR: 23 (28 Sep 2022 21:00) (12 - 23)  SpO2: 100% (28 Sep 2022 21:00) (96% - 100%)    Parameters below as of 28 Sep 2022 21:00  Patient On (Oxygen Delivery Method): nasal cannula  O2 Flow (L/min): 3    Daily Height in cm: 160.02 (28 Sep 2022 12:20)    Daily     General:  Appears stated age, well-groomed, well-nourished, no distress  HEENT:  NC/AT,  conjunctivae clear and pink, no thyromegaly, nodules, adenopathy, no JVD  Chest:  Full & symmetric excursion, no increased effort, breath sounds clear  Cardiovascular:  Regular rhythm, S1, S2, no murmur/rub/S3/S4, no abdominal bruit, no edema  Abdomen:  Soft, non-tender, non-distended, normoactive bowel sounds,  no masses ,no hepatosplenomeagaly, no signs of chronic liver disease  Extremities:  no cyanosis,clubbing or edema  Skin:  No rash/erythema/ecchymoses/petechiae/wounds/abscess/warm/dry  Neuro/Psych:  Alert, oriented, no asterixis, no tremor, no encephalopathy    Laboratory:                            6.9    9.25  )-----------( 208      ( 28 Sep 2022 12:49 )             23.0     09-28    129<L>  |  96  |  44<H>  ----------------------------<  136<H>  4.8   |  27  |  1.79<H>    Ca    8.3<L>      28 Sep 2022 12:49    TPro  6.7  /  Alb  3.2<L>  /  TBili  0.5  /  DBili  x   /  AST  27  /  ALT  12  /  AlkPhos  41  09-28    LIVER FUNCTIONS - ( 28 Sep 2022 12:49 )  Alb: 3.2 g/dL / Pro: 6.7 g/dL / ALK PHOS: 41 U/L / ALT: 12 U/L DA / AST: 27 U/L / GGT: x           PT/INR - ( 28 Sep 2022 12:49 )   PT: 28.5 sec;   INR: 2.39 ratio         PTT - ( 28 Sep 2022 12:49 )  PTT:71.4 sec  Urinalysis Basic - ( 28 Sep 2022 21:05 )    Color: Yellow / Appearance: Clear / S.010 / pH: x  Gluc: x / Ketone: Negative  / Bili: Negative / Urobili: Negative mg/dL   Blood: x / Protein: Negative mg/dL / Nitrite: Negative   Leuk Esterase: Negative / RBC: x / WBC x   Sq Epi: x / Non Sq Epi: x / Bacteria: x      Amylase Serum--      Lipase [id number]       Ammonia--    Imaging:

## 2022-09-28 NOTE — H&P ADULT - PROBLEM SELECTOR PLAN 1
Admit  Clear liquids  IVF  IV PPI q12h  Serial CBCs q6h  Transfuse prn  Hold pradaxa  Anemia serologies  GI consult  Further work-up/management pending clinical course.

## 2022-09-28 NOTE — H&P ADULT - PROBLEM SELECTOR PLAN 3
MEME on CKD 3  Hold diuretics  Avoid nephrotoxic meds  Hernández  Monitor BMP  Renal consult  Further work-up/management pending clinical course.

## 2022-09-28 NOTE — PATIENT PROFILE ADULT - FALL HARM RISK - HARM RISK INTERVENTIONS

## 2022-09-28 NOTE — ED PROVIDER NOTE - CLINICAL SUMMARY MEDICAL DECISION MAKING FREE TEXT BOX
89 female on anticoagulation with rectal bleed and dropping hemoglobin.  Plan is labs to repeat H&H, type and screen, may need transfusion.  Likely GI consultation and possible admission for further care.

## 2022-09-28 NOTE — PATIENT PROFILE ADULT - FUNCTIONAL ASSESSMENT - DAILY ACTIVITY 1.
very small area of erythema on the left lateral thigh. No other lesions found. Non tender. Blanching.
4 = No assist / stand by assistance

## 2022-09-28 NOTE — ED PROVIDER NOTE - OBJECTIVE STATEMENT
89-year-old female with history of A. fib on Pradaxa brought by daughter for evaluation of low hemoglobin noted on outpatient blood work yesterday.  Patient admits to bloody stools and diarrhea yesterday.  Denies fever nausea vomiting chest pain shortness of breath abdominal pain or other symptom.  Started with a new geriatric specialist this week.  Patient has not had colonoscopy in many years.

## 2022-09-28 NOTE — H&P ADULT - HISTORY OF PRESENT ILLNESS
This is an 89-year-old female with history of A. fib on Pradaxa, HTN, diastolic HF, HLD constipation brought by daughter for evaluation of low hemoglobin noted on outpatient blood work yesterday.  Patient admits to bloody stools and diarrhea yesterday with abdominal cramping.  Denies fever nausea vomiting chest pain shortness of breath lightheadedness or other symptoms.  Patient has not had colonoscopy in many years.   This is an 89-year-old female with history of A. fib on Pradaxa, HTN, diastolic HF, HLD constipation, on home O2, brought by daughter for evaluation of low hemoglobin noted on outpatient blood work yesterday.  Patient admits to bloody stools and diarrhea yesterday with abdominal cramping.  Denies fever nausea vomiting chest pain shortness of breath lightheadedness or other symptoms.  Patient has not had colonoscopy in many years.

## 2022-09-28 NOTE — H&P ADULT - PROBLEM SELECTOR PLAN 4
Rate controlled  Monitor on tele  Hold pradaxa  Cardio consult  Further work-up/management pending clinical course.

## 2022-09-29 LAB
ANION GAP SERPL CALC-SCNC: 5 MMOL/L — SIGNIFICANT CHANGE UP (ref 5–17)
BUN SERPL-MCNC: 29 MG/DL — HIGH (ref 7–23)
CALCIUM SERPL-MCNC: 8.1 MG/DL — LOW (ref 8.4–10.5)
CHLORIDE SERPL-SCNC: 104 MMOL/L — SIGNIFICANT CHANGE UP (ref 96–108)
CO2 SERPL-SCNC: 28 MMOL/L — SIGNIFICANT CHANGE UP (ref 22–31)
CREAT SERPL-MCNC: 1.36 MG/DL — HIGH (ref 0.5–1.3)
EGFR: 37 ML/MIN/1.73M2 — LOW
FERRITIN SERPL-MCNC: 75 NG/ML — SIGNIFICANT CHANGE UP (ref 15–150)
FOLATE SERPL-MCNC: >20 NG/ML — SIGNIFICANT CHANGE UP
GLUCOSE SERPL-MCNC: 113 MG/DL — HIGH (ref 70–99)
HAPTOGLOB SERPL-MCNC: 96 MG/DL — SIGNIFICANT CHANGE UP (ref 34–200)
HCT VFR BLD CALC: 25.6 % — LOW (ref 34.5–45)
HCT VFR BLD CALC: 26.7 % — LOW (ref 34.5–45)
HCT VFR BLD CALC: 27.1 % — LOW (ref 34.5–45)
HCT VFR BLD CALC: 27.4 % — LOW (ref 34.5–45)
HGB BLD-MCNC: 7.9 G/DL — LOW (ref 11.5–15.5)
HGB BLD-MCNC: 8.3 G/DL — LOW (ref 11.5–15.5)
HGB BLD-MCNC: 8.4 G/DL — LOW (ref 11.5–15.5)
HGB BLD-MCNC: 8.4 G/DL — LOW (ref 11.5–15.5)
IRON SATN MFR SERPL: 104 UG/DL — SIGNIFICANT CHANGE UP (ref 30–160)
IRON SATN MFR SERPL: 30 % — SIGNIFICANT CHANGE UP (ref 14–50)
LDH SERPL L TO P-CCNC: 205 U/L — SIGNIFICANT CHANGE UP (ref 50–242)
MAGNESIUM SERPL-MCNC: 2.3 MG/DL — SIGNIFICANT CHANGE UP (ref 1.6–2.6)
MCHC RBC-ENTMCNC: 23.7 PG — LOW (ref 27–34)
MCHC RBC-ENTMCNC: 23.8 PG — LOW (ref 27–34)
MCHC RBC-ENTMCNC: 23.8 PG — LOW (ref 27–34)
MCHC RBC-ENTMCNC: 24 PG — LOW (ref 27–34)
MCHC RBC-ENTMCNC: 30.7 GM/DL — LOW (ref 32–36)
MCHC RBC-ENTMCNC: 30.9 GM/DL — LOW (ref 32–36)
MCHC RBC-ENTMCNC: 31 GM/DL — LOW (ref 32–36)
MCHC RBC-ENTMCNC: 31.1 GM/DL — LOW (ref 32–36)
MCV RBC AUTO: 76.3 FL — LOW (ref 80–100)
MCV RBC AUTO: 77.1 FL — LOW (ref 80–100)
MCV RBC AUTO: 77.2 FL — LOW (ref 80–100)
MCV RBC AUTO: 77.6 FL — LOW (ref 80–100)
NRBC # BLD: 0 /100 WBCS — SIGNIFICANT CHANGE UP (ref 0–0)
PLATELET # BLD AUTO: 156 K/UL — SIGNIFICANT CHANGE UP (ref 150–400)
PLATELET # BLD AUTO: 160 K/UL — SIGNIFICANT CHANGE UP (ref 150–400)
PLATELET # BLD AUTO: 173 K/UL — SIGNIFICANT CHANGE UP (ref 150–400)
PLATELET # BLD AUTO: 180 K/UL — SIGNIFICANT CHANGE UP (ref 150–400)
POTASSIUM SERPL-MCNC: 3.8 MMOL/L — SIGNIFICANT CHANGE UP (ref 3.5–5.3)
POTASSIUM SERPL-SCNC: 3.8 MMOL/L — SIGNIFICANT CHANGE UP (ref 3.5–5.3)
RBC # BLD: 3.32 M/UL — LOW (ref 3.8–5.2)
RBC # BLD: 3.46 M/UL — LOW (ref 3.8–5.2)
RBC # BLD: 3.53 M/UL — LOW (ref 3.8–5.2)
RBC # BLD: 3.55 M/UL — LOW (ref 3.8–5.2)
RBC # FLD: 16.6 % — HIGH (ref 10.3–14.5)
RBC # FLD: 16.7 % — HIGH (ref 10.3–14.5)
RBC # FLD: 16.9 % — HIGH (ref 10.3–14.5)
RBC # FLD: 17.2 % — HIGH (ref 10.3–14.5)
SODIUM SERPL-SCNC: 137 MMOL/L — SIGNIFICANT CHANGE UP (ref 135–145)
TIBC SERPL-MCNC: 348 UG/DL — SIGNIFICANT CHANGE UP (ref 220–430)
UIBC SERPL-MCNC: 243 UG/DL — SIGNIFICANT CHANGE UP (ref 110–370)
VIT B12 SERPL-MCNC: 454 PG/ML — SIGNIFICANT CHANGE UP (ref 232–1245)
WBC # BLD: 7.95 K/UL — SIGNIFICANT CHANGE UP (ref 3.8–10.5)
WBC # BLD: 8.93 K/UL — SIGNIFICANT CHANGE UP (ref 3.8–10.5)
WBC # BLD: 9.28 K/UL — SIGNIFICANT CHANGE UP (ref 3.8–10.5)
WBC # BLD: 9.34 K/UL — SIGNIFICANT CHANGE UP (ref 3.8–10.5)
WBC # FLD AUTO: 7.95 K/UL — SIGNIFICANT CHANGE UP (ref 3.8–10.5)
WBC # FLD AUTO: 8.93 K/UL — SIGNIFICANT CHANGE UP (ref 3.8–10.5)
WBC # FLD AUTO: 9.28 K/UL — SIGNIFICANT CHANGE UP (ref 3.8–10.5)
WBC # FLD AUTO: 9.34 K/UL — SIGNIFICANT CHANGE UP (ref 3.8–10.5)

## 2022-09-29 RX ADMIN — PANTOPRAZOLE SODIUM 40 MILLIGRAM(S): 20 TABLET, DELAYED RELEASE ORAL at 05:22

## 2022-09-29 RX ADMIN — PANTOPRAZOLE SODIUM 40 MILLIGRAM(S): 20 TABLET, DELAYED RELEASE ORAL at 18:00

## 2022-09-29 RX ADMIN — SERTRALINE 100 MILLIGRAM(S): 25 TABLET, FILM COATED ORAL at 12:17

## 2022-09-29 RX ADMIN — ATORVASTATIN CALCIUM 10 MILLIGRAM(S): 80 TABLET, FILM COATED ORAL at 21:16

## 2022-09-29 RX ADMIN — MIRTAZAPINE 15 MILLIGRAM(S): 45 TABLET, ORALLY DISINTEGRATING ORAL at 21:16

## 2022-09-29 RX ADMIN — Medication 3 MILLIGRAM(S): at 21:16

## 2022-09-29 NOTE — DIETITIAN INITIAL EVALUATION ADULT - PERTINENT MEDS FT
MEDICATIONS  (STANDING):  atorvastatin 10 milliGRAM(s) Oral at bedtime  mirtazapine 15 milliGRAM(s) Oral at bedtime  pantoprazole  Injectable 40 milliGRAM(s) IV Push every 12 hours  sertraline 100 milliGRAM(s) Oral daily  sodium chloride 0.9%. 1000 milliLiter(s) (40 mL/Hr) IV Continuous <Continuous>    MEDICATIONS  (PRN):  acetaminophen     Tablet .. 650 milliGRAM(s) Oral every 6 hours PRN Temp greater or equal to 38C (100.4F), Mild Pain (1 - 3)  aluminum hydroxide/magnesium hydroxide/simethicone Suspension 30 milliLiter(s) Oral every 4 hours PRN Dyspepsia  melatonin 3 milliGRAM(s) Oral at bedtime PRN Insomnia  ondansetron Injectable 4 milliGRAM(s) IV Push every 6 hours PRN Nausea and/or Vomiting

## 2022-09-29 NOTE — CONSULT NOTE ADULT - SUBJECTIVE AND OBJECTIVE BOX
History of Present Illness:    Past Medical/Surgical History:    Medications:    Family History: Non-contributory family history of premature cardiovascular atherosclerotic disease    Social History: No tobacco, alcohol or drug use    Review of Systems:  General: No fevers, chills, weight gain  Skin: No rashes, color changes  Cardiovascular: No chest pain, orthopnea  Respiratory: No shortness of breath, cough  Gastrointestinal: No nausea, abdominal pain  Genitourinary: No incontinence, pain with urination  Musculoskeletal: No pain, swelling, decreased range of motion  Neurological: No headache, weakness  Psychiatric: No depression, anxiety  Endocrine: No weight gain, increased thirst  All other systems are comprehensively negative.    Physical Exam:  Vitals:        Vital Signs Last 24 Hrs  T(C): 36.7 (29 Sep 2022 08:23), Max: 37.2 (28 Sep 2022 17:30)  T(F): 98 (29 Sep 2022 08:23), Max: 98.9 (28 Sep 2022 17:30)  HR: 74 (29 Sep 2022 08:00) (62 - 91)  BP: 109/56 (29 Sep 2022 08:00) (94/55 - 118/41)  BP(mean): 71 (29 Sep 2022 08:00) (56 - 99)  RR: 22 (29 Sep 2022 08:00) (12 - 32)  SpO2: 100% (29 Sep 2022 08:00) (96% - 100%)    Parameters below as of 29 Sep 2022 08:00  Patient On (Oxygen Delivery Method): nasal cannula  O2 Flow (L/min): 2    General: NAD  HEENT: MMM  Neck: No JVD, no carotid bruit  Lungs: CTAB  CV: RRR, nl S1/S2, no M/R/G  Abdomen: S/NT/ND, +BS  Extremities: No LE edema, no cyanosis  Neuro: AAOx3, non-focal  Skin: No rash    Labs:                        7.9    7.95  )-----------( 156      ( 29 Sep 2022 06:45 )             25.6     09-29    137  |  104  |  29<H>  ----------------------------<  113<H>  3.8   |  28  |  1.36<H>    Ca    8.1<L>      29 Sep 2022 06:45  Mg     2.3     09-29    TPro  6.7  /  Alb  3.2<L>  /  TBili  0.5  /  DBili  x   /  AST  27  /  ALT  12  /  AlkPhos  41  09-28        PT/INR - ( 28 Sep 2022 12:49 )   PT: 28.5 sec;   INR: 2.39 ratio         PTT - ( 28 Sep 2022 12:49 )  PTT:71.4 sec    ECG: Atrial fibrillation, nonspecific ST abnormality     History of Present Illness: The patient is an 89 year old female with a history of HTN, HL, atrial fibrillation, MitraClip, chronic diastolic heart failure who presents with GI bleed. She has had rectal bleeding for the past couple of days. She was noted to be anemic on labs. No chest pain, dizziness, palpitations, shortness of breath. She is on dabigatran for atrial fibrillation.    Past Medical/Surgical History:  HTN, HL, atrial fibrillation, MitraClip, chronic diastolic heart failure    Medications:  Home Medications:  Ambien 5 mg oral tablet: 1 tab(s) orally once a day (at bedtime), As Needed (28 Sep 2022 16:01)  DilTIAZem (Eqv-Cardizem CD) 120 mg/24 hours oral capsule, extended release: 1 cap(s) orally once a day (28 Sep 2022 16:01)  melatonin 3 mg oral tablet: 2 tab(s) orally once a day (at bedtime) (28 Sep 2022 16:01)  metoprolol tartrate 50 mg oral tablet: 1 tab(s) orally every 12 hours (28 Sep 2022 16:01)  mirtazapine 15 mg oral tablet: 1 tab(s) orally once a day (at bedtime) (28 Sep 2022 16:01)  sertraline 100 mg oral tablet: 1 tab(s) orally once a day (28 Sep 2022 16:01)  spironolactone 50 mg oral tablet: 1 tab(s) orally once a day (28 Sep 2022 16:01)  torsemide 20 mg oral tablet: 2 tab(s) orally once a day (28 Sep 2022 16:01)  Xanax 0.25 mg oral tablet: 1 tab(s) orally every 12 hours, As Needed - for anxiety (28 Sep 2022 16:01)      Family History: Non-contributory family history of premature cardiovascular atherosclerotic disease    Social History: No tobacco, alcohol or drug use    Review of Systems:  General: No fevers, chills, weight gain  Skin: No rashes, color changes  Cardiovascular: No chest pain, orthopnea  Respiratory: No shortness of breath, cough  Gastrointestinal: No nausea, abdominal pain  Genitourinary: No incontinence, pain with urination  Musculoskeletal: No pain, swelling, decreased range of motion  Neurological: No headache, weakness  Psychiatric: No depression, anxiety  Endocrine: No weight gain, increased thirst  All other systems are comprehensively negative.    Physical Exam:  Vitals:        Vital Signs Last 24 Hrs  T(C): 36.7 (29 Sep 2022 08:23), Max: 37.2 (28 Sep 2022 17:30)  T(F): 98 (29 Sep 2022 08:23), Max: 98.9 (28 Sep 2022 17:30)  HR: 74 (29 Sep 2022 08:00) (62 - 91)  BP: 109/56 (29 Sep 2022 08:00) (94/55 - 118/41)  BP(mean): 71 (29 Sep 2022 08:00) (56 - 99)  RR: 22 (29 Sep 2022 08:00) (12 - 32)  SpO2: 100% (29 Sep 2022 08:00) (96% - 100%)  Parameters below as of 29 Sep 2022 08:00  Patient On (Oxygen Delivery Method): nasal cannula  O2 Flow (L/min): 2  General: NAD  HEENT: MMM  Neck: No JVD, no carotid bruit  Lungs: CTAB  CV: Irregular, nl S1/S2, no M/R/G  Abdomen: S/NT/ND, +BS  Extremities: No LE edema, no cyanosis  Neuro: AAOx3, non-focal  Skin: No rash    Labs:                        7.9    7.95  )-----------( 156      ( 29 Sep 2022 06:45 )             25.6     09-29    137  |  104  |  29<H>  ----------------------------<  113<H>  3.8   |  28  |  1.36<H>    Ca    8.1<L>      29 Sep 2022 06:45  Mg     2.3     09-29    TPro  6.7  /  Alb  3.2<L>  /  TBili  0.5  /  DBili  x   /  AST  27  /  ALT  12  /  AlkPhos  41  09-28        PT/INR - ( 28 Sep 2022 12:49 )   PT: 28.5 sec;   INR: 2.39 ratio         PTT - ( 28 Sep 2022 12:49 )  PTT:71.4 sec    ECG: Atrial fibrillation, nonspecific ST abnormality

## 2022-09-29 NOTE — DIETITIAN INITIAL EVALUATION ADULT - ORAL INTAKE PTA/DIET HISTORY
Reported not following any therapeutic diet at assisted living, appetite/po intake was good/baseline. States has some implanted teeth, was eating soft foods. No vitamin/mineral or other nutrition supplements reported.

## 2022-09-29 NOTE — CONSULT NOTE ADULT - ASSESSMENT
The patient is an 89 year old female with a history of HTN, HL, atrial fibrillation, MitraClip, chronic diastolic heart failure who presents with GI bleed.    Plan:  - ECG with known AF  - Hold metoprolol and diltiazem for now  - Discontinue dabigatran  - GI eval  - If/when able, will resume anticoagulation with apixaban in place of dabigatran  - Discussed alternatives such as no anticoagulation and possibility of Watchman placement  - If plan includes colonoscopy, the patient is optimized from a cardiac standpoint to proceed for this

## 2022-09-29 NOTE — PROGRESS NOTE ADULT - PROBLEM SELECTOR PLAN 3
MEME on CKD 3  Hold diuretics  Avoid nephrotoxic meds  Monitor BMP  Renal f/u  Further work-up/management pending clinical course.

## 2022-09-29 NOTE — DIETITIAN INITIAL EVALUATION ADULT - OTHER INFO
Per H&P "This is an 89-year-old female with history of A. fib on Pradaxa, HTN, diastolic HF, HLD constipation, on home O2, brought by daughter for evaluation of low hemoglobin noted on outpatient blood work yesterday.  Patient admits to bloody stools and diarrhea yesterday with abdominal cramping." Per GI note today "LGIB likely diverticular 2 coagulopathy appears to have resolved"    Visited patient in room, presents with fair-good appetite/po intake, consuming >50-75% of clear liquid. Noted diet advanced to full liquid today. Denies n/v/d/c, no BM noted in house. No reported difficulty chewing or swallowing, pt prefers soft foods. NKFA. Reported # x3 months, reported been getting "water pills lately and lost 20# since", current adm weight 153#, 17#/10% weight loss in 3 months is clinically significant, will continue to monitor weight trends as able.    Pertinent medications/nutrition labs reviewed; noted elevated BUN, Cr, Nephology following; receiving IVF in house.      Educated on adequate protein/energy intake to prevent weight loss, prioritize protein at each meal. Recommend Ensure Enlive 8oz (220 kcal, 9 g protein) bid, Magic Cup 4oz (290 kcal, 9 g protein) qd to optimize intake. Food preferences obtained, will honor as able to encourage intake. Pt receptive, no nutrition related questions at this time. Explained typical diet progression: full liquid -> low Na/fiber, soft and bite sized. RD to continue to monitor nutrition status per protocol.

## 2022-09-29 NOTE — DIETITIAN INITIAL EVALUATION ADULT - ETIOLOGY
related to inability to consume sufficient protein/energy secondary to suboptimal po intake vs illness

## 2022-09-29 NOTE — DIETITIAN INITIAL EVALUATION ADULT - PERTINENT LABORATORY DATA
09-29    137  |  104  |  29<H>  ----------------------------<  113<H>  3.8   |  28  |  1.36<H>    Ca    8.1<L>      29 Sep 2022 06:45  Mg     2.3     09-29    TPro  6.7  /  Alb  3.2<L>  /  TBili  0.5  /  DBili  x   /  AST  27  /  ALT  12  /  AlkPhos  41  09-28

## 2022-09-30 ENCOUNTER — APPOINTMENT (OUTPATIENT)
Dept: GERIATRICS | Facility: CLINIC | Age: 87
End: 2022-09-30

## 2022-09-30 ENCOUNTER — TRANSCRIPTION ENCOUNTER (OUTPATIENT)
Age: 87
End: 2022-09-30

## 2022-09-30 VITALS
OXYGEN SATURATION: 95 % | RESPIRATION RATE: 25 BRPM | SYSTOLIC BLOOD PRESSURE: 116 MMHG | DIASTOLIC BLOOD PRESSURE: 43 MMHG | HEART RATE: 100 BPM

## 2022-09-30 LAB
ANION GAP SERPL CALC-SCNC: 5 MMOL/L — SIGNIFICANT CHANGE UP (ref 5–17)
BUN SERPL-MCNC: 15 MG/DL — SIGNIFICANT CHANGE UP (ref 7–23)
CALCIUM SERPL-MCNC: 8.1 MG/DL — LOW (ref 8.4–10.5)
CHLORIDE SERPL-SCNC: 107 MMOL/L — SIGNIFICANT CHANGE UP (ref 96–108)
CO2 SERPL-SCNC: 28 MMOL/L — SIGNIFICANT CHANGE UP (ref 22–31)
CREAT SERPL-MCNC: 1.05 MG/DL — SIGNIFICANT CHANGE UP (ref 0.5–1.3)
EGFR: 51 ML/MIN/1.73M2 — LOW
GLUCOSE SERPL-MCNC: 102 MG/DL — HIGH (ref 70–99)
HCT VFR BLD CALC: 28 % — LOW (ref 34.5–45)
HGB BLD-MCNC: 8.5 G/DL — LOW (ref 11.5–15.5)
MAGNESIUM SERPL-MCNC: 2.5 MG/DL — SIGNIFICANT CHANGE UP (ref 1.6–2.6)
MCHC RBC-ENTMCNC: 23.6 PG — LOW (ref 27–34)
MCHC RBC-ENTMCNC: 30.4 GM/DL — LOW (ref 32–36)
MCV RBC AUTO: 77.8 FL — LOW (ref 80–100)
NRBC # BLD: 0 /100 WBCS — SIGNIFICANT CHANGE UP (ref 0–0)
PLATELET # BLD AUTO: 160 K/UL — SIGNIFICANT CHANGE UP (ref 150–400)
POTASSIUM SERPL-MCNC: 4.3 MMOL/L — SIGNIFICANT CHANGE UP (ref 3.5–5.3)
POTASSIUM SERPL-SCNC: 4.3 MMOL/L — SIGNIFICANT CHANGE UP (ref 3.5–5.3)
RBC # BLD: 3.6 M/UL — LOW (ref 3.8–5.2)
RBC # FLD: 17.4 % — HIGH (ref 10.3–14.5)
SODIUM SERPL-SCNC: 140 MMOL/L — SIGNIFICANT CHANGE UP (ref 135–145)
WBC # BLD: 10.16 K/UL — SIGNIFICANT CHANGE UP (ref 3.8–10.5)
WBC # FLD AUTO: 10.16 K/UL — SIGNIFICANT CHANGE UP (ref 3.8–10.5)

## 2022-09-30 PROCEDURE — 85610 PROTHROMBIN TIME: CPT

## 2022-09-30 PROCEDURE — 84484 ASSAY OF TROPONIN QUANT: CPT

## 2022-09-30 PROCEDURE — 97161 PT EVAL LOW COMPLEX 20 MIN: CPT

## 2022-09-30 PROCEDURE — 36430 TRANSFUSION BLD/BLD COMPNT: CPT

## 2022-09-30 PROCEDURE — 86900 BLOOD TYPING SEROLOGIC ABO: CPT

## 2022-09-30 PROCEDURE — 99285 EMERGENCY DEPT VISIT HI MDM: CPT | Mod: 25

## 2022-09-30 PROCEDURE — 87635 SARS-COV-2 COVID-19 AMP PRB: CPT

## 2022-09-30 PROCEDURE — 83550 IRON BINDING TEST: CPT

## 2022-09-30 PROCEDURE — 85027 COMPLETE CBC AUTOMATED: CPT

## 2022-09-30 PROCEDURE — 85045 AUTOMATED RETICULOCYTE COUNT: CPT

## 2022-09-30 PROCEDURE — 85025 COMPLETE CBC W/AUTO DIFF WBC: CPT

## 2022-09-30 PROCEDURE — 74176 CT ABD & PELVIS W/O CONTRAST: CPT | Mod: MA

## 2022-09-30 PROCEDURE — 83690 ASSAY OF LIPASE: CPT

## 2022-09-30 PROCEDURE — 36415 COLL VENOUS BLD VENIPUNCTURE: CPT

## 2022-09-30 PROCEDURE — 86923 COMPATIBILITY TEST ELECTRIC: CPT

## 2022-09-30 PROCEDURE — 83010 ASSAY OF HAPTOGLOBIN QUANT: CPT

## 2022-09-30 PROCEDURE — 86850 RBC ANTIBODY SCREEN: CPT

## 2022-09-30 PROCEDURE — 71045 X-RAY EXAM CHEST 1 VIEW: CPT

## 2022-09-30 PROCEDURE — 80053 COMPREHEN METABOLIC PANEL: CPT

## 2022-09-30 PROCEDURE — 76775 US EXAM ABDO BACK WALL LIM: CPT

## 2022-09-30 PROCEDURE — 82607 VITAMIN B-12: CPT

## 2022-09-30 PROCEDURE — P9016: CPT

## 2022-09-30 PROCEDURE — 93005 ELECTROCARDIOGRAM TRACING: CPT

## 2022-09-30 PROCEDURE — 83735 ASSAY OF MAGNESIUM: CPT

## 2022-09-30 PROCEDURE — 83540 ASSAY OF IRON: CPT

## 2022-09-30 PROCEDURE — 86901 BLOOD TYPING SEROLOGIC RH(D): CPT

## 2022-09-30 PROCEDURE — 82728 ASSAY OF FERRITIN: CPT

## 2022-09-30 PROCEDURE — 85730 THROMBOPLASTIN TIME PARTIAL: CPT

## 2022-09-30 PROCEDURE — 82746 ASSAY OF FOLIC ACID SERUM: CPT

## 2022-09-30 PROCEDURE — 81003 URINALYSIS AUTO W/O SCOPE: CPT

## 2022-09-30 PROCEDURE — 80048 BASIC METABOLIC PNL TOTAL CA: CPT

## 2022-09-30 PROCEDURE — 82272 OCCULT BLD FECES 1-3 TESTS: CPT

## 2022-09-30 PROCEDURE — 83615 LACTATE (LD) (LDH) ENZYME: CPT

## 2022-09-30 RX ORDER — PANTOPRAZOLE SODIUM 20 MG/1
1 TABLET, DELAYED RELEASE ORAL
Qty: 30 | Refills: 0
Start: 2022-09-30 | End: 2022-10-29

## 2022-09-30 RX ORDER — SPIRONOLACTONE 25 MG/1
1 TABLET, FILM COATED ORAL
Qty: 0 | Refills: 0 | DISCHARGE

## 2022-09-30 RX ORDER — SPIRONOLACTONE 25 MG/1
25 TABLET, FILM COATED ORAL DAILY
Refills: 0 | Status: DISCONTINUED | OUTPATIENT
Start: 2022-10-01 | End: 2022-09-30

## 2022-09-30 RX ORDER — METOPROLOL TARTRATE 50 MG
25 TABLET ORAL
Refills: 0 | Status: DISCONTINUED | OUTPATIENT
Start: 2022-09-30 | End: 2022-09-30

## 2022-09-30 RX ORDER — METOPROLOL TARTRATE 50 MG
1 TABLET ORAL
Qty: 0 | Refills: 0 | DISCHARGE

## 2022-09-30 RX ORDER — METOPROLOL TARTRATE 50 MG
1 TABLET ORAL
Qty: 60 | Refills: 0
Start: 2022-09-30 | End: 2022-10-29

## 2022-09-30 RX ORDER — SPIRONOLACTONE 25 MG/1
1 TABLET, FILM COATED ORAL
Qty: 30 | Refills: 0
Start: 2022-09-30 | End: 2022-10-29

## 2022-09-30 RX ORDER — DILTIAZEM HCL 120 MG
1 CAPSULE, EXT RELEASE 24 HR ORAL
Qty: 0 | Refills: 0 | DISCHARGE

## 2022-09-30 RX ORDER — PANTOPRAZOLE SODIUM 20 MG/1
40 TABLET, DELAYED RELEASE ORAL
Refills: 0 | Status: DISCONTINUED | OUTPATIENT
Start: 2022-10-01 | End: 2022-09-30

## 2022-09-30 RX ADMIN — Medication 650 MILLIGRAM(S): at 12:21

## 2022-09-30 RX ADMIN — PANTOPRAZOLE SODIUM 40 MILLIGRAM(S): 20 TABLET, DELAYED RELEASE ORAL at 05:22

## 2022-09-30 RX ADMIN — SERTRALINE 100 MILLIGRAM(S): 25 TABLET, FILM COATED ORAL at 11:24

## 2022-09-30 RX ADMIN — Medication 20 MILLIGRAM(S): at 16:13

## 2022-09-30 NOTE — PROGRESS NOTE ADULT - PROBLEM SELECTOR PLAN 1
Likely 2/2 diverticular bleed  No further bleeding   Advance to regular diet  IVF  PPI qd  Hold AC for 7 more days and start eliquis 2.5mg bid  GI consult noted  Further work-up/management pending clinical course.
Likely 2/2 diverticular bleed  No further bleeding   Advance to full liquids  IVF  IV PPI q12h  Serial CBCs q6h  Transfuse prn  Hold pradaxa  F/u anemia serologies  GI consult noted  Further work-up/management pending clinical course.

## 2022-09-30 NOTE — DISCHARGE NOTE PROVIDER - CARE PROVIDER_API CALL
Marjorie Jones)  Geriatric Medicine; Internal Medicine  [address], [address]  [place] [address]  Phone: [phone]  Fax: [phone]  Established Patient  Follow Up Time: 1 week    Alexandra Lopes; PhD)  Adv Heart Fail Trnsplnt Cardio; Cardiovascular Disease; Internal Medicine  [address] [place] [address]  Phone: [phone]  Fax: [phone]  Established Patient  Follow Up Time: 1 week

## 2022-09-30 NOTE — DISCHARGE NOTE NURSING/CASE MANAGEMENT/SOCIAL WORK - NSDCDMETYPESERV_GEN_ALL_CORE_FT
You said you have a rolling walker, Cane, Wheelchair and portable and stationary oxygen at the Assisted Living.

## 2022-09-30 NOTE — DISCHARGE NOTE NURSING/CASE MANAGEMENT/SOCIAL WORK - NSSCNAMETXT_GEN_ALL_CORE
[facility])  Phone: [phone]  to visit within 72 hours at [facility] on [address] [phone].

## 2022-09-30 NOTE — PROGRESS NOTE ADULT - NUTRITIONAL ASSESSMENT
This patient has been assessed with a concern for Malnutrition and has been determined to have a diagnosis/diagnoses of Moderate protein-calorie malnutrition.    This patient is being managed with:   Diet Full Liquid-  Supplement Feeding Modality:  Oral  Ensure Enlive Cans or Servings Per Day:  1       Frequency:  Two Times a day  Entered: Sep 29 2022 12:59PM    Diet Full Liquid-  Entered: Sep 29 2022 10:51AM    The following pending diet order is being considered for treatment of Moderate protein-calorie malnutrition:null
This patient has been assessed with a concern for Malnutrition and has been determined to have a diagnosis/diagnoses of Moderate protein-calorie malnutrition.    This patient is being managed with:   Diet DASH/TLC-  Sodium & Cholesterol Restricted  Supplement Feeding Modality:  Oral  Ensure Enlive Cans or Servings Per Day:  1       Frequency:  Two Times a day  Entered: Sep 30 2022  9:53AM

## 2022-09-30 NOTE — DISCHARGE NOTE PROVIDER - NSDCFUSCHEDAPPT_GEN_ALL_CORE_FT
[facility]  HEARTFAIL [address]  Scheduled Appointment: 10/17/2022    Michael Oliveira  [facility]  VASCULAR [address]  Scheduled Appointment: 10/18/2022    Osiel Jerez  [facility]  CARDIOLOGY [address]  Scheduled Appointment: 11/09/2022    [facility]  HEARTFAIL [address]  Scheduled Appointment: 11/14/2022    Alexei Ventura  [facility]  [address]ARYNG [address]  Scheduled Appointment: 12/06/2022    Khari Henley  [facility]  PULMMED [address]  Scheduled Appointment: 12/28/2022     Marjorie Jones  [facility] Physician [facility]  GERIATRICS [address]   Scheduled Appointment: 10/11/2022    [facility]  HEARTFAIL [address]  Scheduled Appointment: 10/17/2022    Michael Oliveira  [facility] Physician [facility]  VASCULAR [address]  Scheduled Appointment: 10/18/2022    Osiel Jerez  [facility] Physician [facility]  CARDIOLOGY [address]  Scheduled Appointment: 11/09/2022    [facility]ician [facility]  HEARTFAIL [address]  Scheduled Appointment: 11/14/2022    Alexei Ventura  [facility] Physician [facility]  [address]NG [address]  Scheduled Appointment: 12/06/2022    Khari Henley  [facility]ician [facility]  PULMMED [address]  Scheduled Appointment: 12/28/2022

## 2022-09-30 NOTE — DISCHARGE NOTE PROVIDER - CARE PROVIDERS DIRECT ADDRESSES
,DirectAddress_Unknown,[email or web address]

## 2022-09-30 NOTE — DISCHARGE NOTE PROVIDER - PROVIDER TOKENS
PROVIDER:[TOKEN:[[id number]],FOLLOWUP:[1 week],ESTABLISHEDPATIENT:[T]],PROVIDER:[TOKEN:[[id number]],FOLLOWUP:[1 week],ESTABLISHEDPATIENT:[T]]

## 2022-09-30 NOTE — PROGRESS NOTE ADULT - PROVIDER SPECIALTY LIST ADULT
Cardiology
Critical Care
Nephrology
Nephrology
Gastroenterology
Gastroenterology
Internal Medicine
Internal Medicine

## 2022-09-30 NOTE — PROGRESS NOTE ADULT - PROBLEM SELECTOR PLAN 4
Rate controlled  Hold pradaxa  Cardio f/u  Further work-up/management pending clinical course.
Rate controlled  Monitor on tele  Hold pradaxa  Cardio f/u  Further work-up/management pending clinical course.

## 2022-09-30 NOTE — PHYSICAL THERAPY INITIAL EVALUATION ADULT - GAIT DEVIATIONS NOTED, PT EVAL
decreased natali/increased time in double stance/decreased velocity of limb motion/decreased step length/decreased stride length

## 2022-09-30 NOTE — PHYSICAL THERAPY INITIAL EVALUATION ADULT - REHAB POTENTIAL, PT EVAL
Patient's mindy-in-law dropped off [place] paperwork to be filled out. All info was given to Cora. Please call patient's daughter Laura when paperwork is ready for .    good, to achieve stated therapy goals

## 2022-09-30 NOTE — DISCHARGE NOTE PROVIDER - NSDCMRMEDTOKEN_GEN_ALL_CORE_FT
Ambien 5 mg oral tablet: 1 tab(s) orally once a day (at bedtime), As Needed  atorvastatin 10 mg oral tablet: 1 tab(s) orally once a day (at bedtime)  DilTIAZem (Eqv-Cardizem CD) 120 mg/24 hours oral capsule, extended release: 1 cap(s) orally once a day  melatonin 3 mg oral tablet: 2 tab(s) orally once a day (at bedtime)  metoprolol tartrate 50 mg oral tablet: 1 tab(s) orally every 12 hours  mirtazapine 15 mg oral tablet: 1 tab(s) orally once a day (at bedtime)  Pradaxa 150 mg oral capsule: 1 cap(s) orally 2 times a day  sertraline 100 mg oral tablet: 1 tab(s) orally once a day  spironolactone 50 mg oral tablet: 1 tab(s) orally once a day  torsemide 20 mg oral tablet: 2 tab(s) orally once a day  Xanax 0.25 mg oral tablet: 1 tab(s) orally every 12 hours, As Needed - for anxiety   Ambien 5 mg oral tablet: 1 tab(s) orally once a day (at bedtime), As Needed  atorvastatin 10 mg oral tablet: 1 tab(s) orally once a day (at bedtime)  melatonin 3 mg oral tablet: 2 tab(s) orally once a day (at bedtime)  metoprolol tartrate 25 mg oral tablet: 1 tab(s) orally 2 times a day  mirtazapine 15 mg oral tablet: 1 tab(s) orally once a day (at bedtime)  pantoprazole 40 mg oral delayed release tablet: 1 tab(s) orally once a day (before a meal)  sertraline 100 mg oral tablet: 1 tab(s) orally once a day  spironolactone 25 mg oral tablet: 1 tab(s) orally once a day  torsemide 20 mg oral tablet: 1 tab(s) orally once a day  Xanax 0.25 mg oral tablet: 1 tab(s) orally every 12 hours, As Needed - for anxiety

## 2022-09-30 NOTE — PHYSICAL THERAPY INITIAL EVALUATION ADULT - GENERAL OBSERVATIONS, REHAB EVAL
Pt rec'd semifowler position in bed. + telemetry + supplemental 02 ([id number]). Pt agreeable to PT treatment which was tolerated well.

## 2022-09-30 NOTE — DISCHARGE NOTE NURSING/CASE MANAGEMENT/SOCIAL WORK - NSDCPEFALRISK_GEN_ALL_CORE
For information on Fall & Injury Prevention, visit: [email or web address] OR  [email or web address] OR  [email or web address]://www.cdc.gov/steadi/patient.html

## 2022-09-30 NOTE — PROGRESS NOTE ADULT - SUBJECTIVE AND OBJECTIVE BOX
Patient is a 89y old  Female who presents with a chief complaint of abnormal labs (29 Sep 2022 09:27)      INTERVAL HPI/OVERNIGHT EVENTS: Patient seen and examined. NAD. No complaints.    Vital Signs Last 24 Hrs  T(C): 36.7 (29 Sep 2022 08:23), Max: 37.2 (28 Sep 2022 17:30)  T(F): 98 (29 Sep 2022 08:23), Max: 98.9 (28 Sep 2022 17:30)  HR: 74 (29 Sep 2022 08:00) (62 - 91)  BP: 109/56 (29 Sep 2022 08:00) (94/55 - 118/41)  BP(mean): 71 (29 Sep 2022 08:00) (56 - 99)  RR: 22 (29 Sep 2022 08:00) (12 - 32)  SpO2: 100% (29 Sep 2022 08:00) (96% - 100%)    Parameters below as of 29 Sep 2022 08:00  Patient On (Oxygen Delivery Method): nasal cannula  O2 Flow (L/min): 2          137  |  104  |  29<H>  ----------------------------<  113<H>  3.8   |  28  |  1.36<H>    Ca    8.1<L>      29 Sep 2022 06:45  Mg     2.3         TPro  6.7  /  Alb  3.2<L>  /  TBili  0.5  /  DBili  x   /  AST  27  /  ALT  12  /  AlkPhos  41                            7.9    7.95  )-----------( 156      ( 29 Sep 2022 06:45 )             25.6     PT/INR - ( 28 Sep 2022 12:49 )   PT: 28.5 sec;   INR: 2.39 ratio         PTT - ( 28 Sep 2022 12:49 )  PTT:71.4 sec  CAPILLARY BLOOD GLUCOSE        Urinalysis Basic - ( 28 Sep 2022 21:05 )    Color: Yellow / Appearance: Clear / S.010 / pH: x  Gluc: x / Ketone: Negative  / Bili: Negative / Urobili: Negative mg/dL   Blood: x / Protein: Negative mg/dL / Nitrite: Negative   Leuk Esterase: Negative / RBC: x / WBC x   Sq Epi: x / Non Sq Epi: x / Bacteria: x              acetaminophen     Tablet .. 650 milliGRAM(s) Oral every 6 hours PRN  aluminum hydroxide/magnesium hydroxide/simethicone Suspension 30 milliLiter(s) Oral every 4 hours PRN  atorvastatin 10 milliGRAM(s) Oral at bedtime  melatonin 3 milliGRAM(s) Oral at bedtime PRN  mirtazapine 15 milliGRAM(s) Oral at bedtime  ondansetron Injectable 4 milliGRAM(s) IV Push every 6 hours PRN  pantoprazole  Injectable 40 milliGRAM(s) IV Push every 12 hours  sertraline 100 milliGRAM(s) Oral daily  sodium chloride 0.9%. 1000 milliLiter(s) IV Continuous <Continuous>              REVIEW OF SYSTEMS:  CONSTITUTIONAL: No fever, no weight loss, or no fatigue  NECK: No pain, no stiffness  RESPIRATORY: No cough, no wheezing, no chills, no hemoptysis, No shortness of breath  CARDIOVASCULAR: No chest pain, no palpitations, no dizziness, no leg swelling  GASTROINTESTINAL: No abdominal pain. No nausea, no vomiting, no hematemesis; No diarrhea, no constipation. No melena, no hematochezia.  GENITOURINARY: No dysuria, no frequency, no hematuria, no incontinence  NEUROLOGICAL: No headaches, no loss of strength, no numbness, no tremors  SKIN: No itching, no burning  MUSCULOSKELETAL: No joint pain, no swelling; No muscle, no back, no extremity pain  PSYCHIATRIC: No depression, no mood swings,   HEME/LYMPH: No easy bruising, no bleeding gums  ALLERY AND IMMUNOLOGIC: No hives       Consultant(s) Notes Reviewed:  [X] YES  [ ] NO    PHYSICAL EXAM:  GENERAL: NAD  HEAD:  Atraumatic, Normocephalic  EYES: EOMI, PERRLA, conjunctiva and sclera clear  ENMT: No tonsillar erythema, exudates, or enlargement; Moist mucous membranes  NECK: Supple, No JVD  NERVOUS SYSTEM:  Awake & alert  CHEST/LUNG: Clear to auscultation bilaterally; No rales, rhonchi, wheezing,  HEART: Regular rate and rhythm  ABDOMEN: Soft, Nontender, Nondistended; Bowel sounds present  EXTREMITIES:  No clubbing, cyanosis, or edema  LYMPH: No lymphadenopathy noted  SKIN: No rashes      Advanced care planning discussed with patient/family [X] YES   [ ] NO    Advanced care planning discussed with patient/family. Patient's health status was discussed. All appropriate changes have been made regarding patient's end-of-life care. Advanced care planning forms reviewed/discussed/completed.  20 minutes spent.   
Subjective: no new complaints.       MEDICATIONS  (STANDING):  atorvastatin 10 milliGRAM(s) Oral at bedtime  metoprolol tartrate 25 milliGRAM(s) Oral two times a day  mirtazapine 15 milliGRAM(s) Oral at bedtime  predniSONE   Tablet 20 milliGRAM(s) Oral daily  sertraline 100 milliGRAM(s) Oral daily    MEDICATIONS  (PRN):  acetaminophen     Tablet .. 650 milliGRAM(s) Oral every 6 hours PRN Temp greater or equal to 38C (100.4F), Mild Pain (1 - 3)  aluminum hydroxide/magnesium hydroxide/simethicone Suspension 30 milliLiter(s) Oral every 4 hours PRN Dyspepsia  melatonin 3 milliGRAM(s) Oral at bedtime PRN Insomnia  ondansetron Injectable 4 milliGRAM(s) IV Push every 6 hours PRN Nausea and/or Vomiting          T(C): 36.8 (09-30-22 @ 13:47), Max: 36.9 (09-29-22 @ 15:59)  HR: 87 (09-30-22 @ 12:00) (77 - 106)  BP: 106/60 (09-30-22 @ 12:00) (93/44 - 139/76)  RR: 23 (09-30-22 @ 12:00) (19 - 30)  SpO2: 97% (09-30-22 @ 12:00) (96% - 100%)  Wt(kg): --        I&O's Detail    29 Sep 2022 07:01  -  30 Sep 2022 07:00  --------------------------------------------------------  IN:    sodium chloride 0.9%: 240 mL  Total IN: 240 mL    OUT:    Voided (mL): 1000 mL  Total OUT: 1000 mL    Total NET: -760 mL               PHYSICAL EXAM:    GENERAL: NAD  NECK: Supple, no inc in JVP  CHEST/LUNG: Clear  HEART: S1S2  ABDOMEN: Soft  EXTREMITIES:  trace edema      LABS:  CBC Full  -  ( 30 Sep 2022 06:30 )  WBC Count : 10.16 K/uL  RBC Count : 3.60 M/uL  Hemoglobin : 8.5 g/dL  Hematocrit : 28.0 %  Platelet Count - Automated : 160 K/uL  Mean Cell Volume : 77.8 fl  Mean Cell Hemoglobin : 23.6 pg  Mean Cell Hemoglobin Concentration : 30.4 gm/dL  Auto Neutrophil # : x  Auto Lymphocyte # : x  Auto Monocyte # : x  Auto Eosinophil # : x  Auto Basophil # : x  Auto Neutrophil % : x  Auto Lymphocyte % : x  Auto Monocyte % : x  Auto Eosinophil % : x  Auto Basophil % : x    09-30    140  |  107  |  15  ----------------------------<  102<H>  4.3   |  28  |  1.05    Ca    8.1<L>      30 Sep 2022 06:30  Mg     2.5     09-30          Impression:  * MEME -- pre-renal azotemia due to blood loss. Improved.   * HypoNa -- hypovolemic. Better.   * CKD 3. Cr 1-1.3 per historical data  * GI bleed  * A.fib, on Pradaxa  * Pulm HTN, hx of MV clip.     Recommendations:   * Resume Lasix, aldactone  * May dc from renal POV.             
Chief Complaint: GI bleed    Interval Events: No events overnight.    Review of Systems:  General: No fevers, chills, weight gain  Skin: No rashes, color changes  Cardiovascular: No chest pain, orthopnea  Respiratory: No shortness of breath, cough  Gastrointestinal: No nausea, abdominal pain  Genitourinary: No incontinence, pain with urination  Musculoskeletal: No pain, swelling, decreased range of motion  Neurological: No headache, weakness  Psychiatric: No depression, anxiety  Endocrine: No weight gain, increased thirst  All other systems are comprehensively negative.    Physical Exam:  Vitals:        Vital Signs Last 24 Hrs  T(C): 36.7 (30 Sep 2022 08:42), Max: 36.9 (29 Sep 2022 12:30)  T(F): 98 (30 Sep 2022 08:42), Max: 98.4 (29 Sep 2022 12:30)  HR: 90 (30 Sep 2022 08:00) (68 - 106)  BP: 116/55 (30 Sep 2022 08:00) (93/43 - 139/76)  BP(mean): 66 (30 Sep 2022 08:00) (58 - 97)  RR: 19 (30 Sep 2022 08:00) (15 - 30)  SpO2: 98% (30 Sep 2022 08:00) (95% - 100%)  Parameters below as of 30 Sep 2022 08:00  Patient On (Oxygen Delivery Method): nasal cannula  O2 Flow (L/min): 2  General: NAD  HEENT: MMM  Neck: No JVD, no carotid bruit  Lungs: CTAB  CV: RRR, nl S1/S2, no M/R/G  Abdomen: S/NT/ND, +BS  Extremities: No LE edema, no cyanosis  Neuro: AAOx3, non-focal  Skin: No rash    Labs:                        8.5    10.16 )-----------( 160      ( 30 Sep 2022 06:30 )             28.0     09-30    140  |  107  |  15  ----------------------------<  102<H>  4.3   |  28  |  1.05    Ca    8.1<L>      30 Sep 2022 06:30  Mg     2.5     09-30    TPro  6.7  /  Alb  3.2<L>  /  TBili  0.5  /  DBili  x   /  AST  27  /  ALT  12  /  AlkPhos  41  09-28        PT/INR - ( 28 Sep 2022 12:49 )   PT: 28.5 sec;   INR: 2.39 ratio         PTT - ( 28 Sep 2022 12:49 )  PTT:71.4 sec    Telemetry: AF
INTERVAL HPI/OVERNIGHT EVENTS:  no bleeding today    MEDICATIONS  (STANDING):  atorvastatin 10 milliGRAM(s) Oral at bedtime  mirtazapine 15 milliGRAM(s) Oral at bedtime  pantoprazole  Injectable 40 milliGRAM(s) IV Push every 12 hours  sertraline 100 milliGRAM(s) Oral daily  sodium chloride 0.9%. 1000 milliLiter(s) (40 mL/Hr) IV Continuous <Continuous>    MEDICATIONS  (PRN):  acetaminophen     Tablet .. 650 milliGRAM(s) Oral every 6 hours PRN Temp greater or equal to 38C (100.4F), Mild Pain (1 - 3)  aluminum hydroxide/magnesium hydroxide/simethicone Suspension 30 milliLiter(s) Oral every 4 hours PRN Dyspepsia  melatonin 3 milliGRAM(s) Oral at bedtime PRN Insomnia  ondansetron Injectable 4 milliGRAM(s) IV Push every 6 hours PRN Nausea and/or Vomiting      Allergies    codeine (Other)    Intolerances        Review of Systems:    General:  No wt loss, fevers, chills, night sweats,fatigue,   Eyes:  Good vision, no reported pain  ENT:  No sore throat, pain, runny nose, dysphagia  CV:  No pain, palpitatioins, hypo/hypertension  Resp:  No dyspnea, cough, tachypnea, wheezing  GI:  No pain, No nausea, No vomiting, No diarrhea, No constipatiion, No weight loss, No fever, No pruritis, No rectal bleeding, No tarry stools, No dysphagia,  :  No pain, bleeding, incontinence, nocturia  Muscle:  No pain, weakness  Neuro:  No weakness, tingling, memory problems  Psych:  No fatigue, insomnia, mood problems, depression  Endocrine:  No polyuria, polydypsia, cold/heat intolerance  Heme:  No petechiae, ecchymosis, easy bruisability  Skin:  No rash, tattoos, scars, edema      Vital Signs Last 24 Hrs  T(C): 36.7 (29 Sep 2022 08:23), Max: 37.2 (28 Sep 2022 17:30)  T(F): 98 (29 Sep 2022 08:23), Max: 98.9 (28 Sep 2022 17:30)  HR: 74 (29 Sep 2022 08:00) (62 - 91)  BP: 109/56 (29 Sep 2022 08:00) (94/55 - 118/41)  BP(mean): 71 (29 Sep 2022 08:00) (56 - 99)  RR: 22 (29 Sep 2022 08:00) (12 - 32)  SpO2: 100% (29 Sep 2022 08:00) (96% - 100%)    Parameters below as of 29 Sep 2022 08:00  Patient On (Oxygen Delivery Method): nasal cannula  O2 Flow (L/min): 2      PHYSICAL EXAM:    Constitutional: NAD, well-developed  HEENT: EOMI, throat clear  Neck: No LAD, supple  Respiratory: CTA and P  Cardiovascular: S1 and S2, RRR, no M  Gastrointestinal: BS+, soft, NT/ND, neg HSM,  Extremities: No peripheral edema, neg clubing, cyanosis  Vascular: 2+ peripheral pulses  Neurological: A/O x 3, no focal deficits  Psychiatric: Normal mood, normal affect  Skin: No rashes      LABS:                        7.9    7.95  )-----------( 156      ( 29 Sep 2022 06:45 )             25.6         137  |  104  |  29<H>  ----------------------------<  113<H>  3.8   |  28  |  1.36<H>    Ca    8.1<L>      29 Sep 2022 06:45  Mg     2.3         TPro  6.7  /  Alb  3.2<L>  /  TBili  0.5  /  DBili  x   /  AST  27  /  ALT  12  /  AlkPhos  41  -    PT/INR - ( 28 Sep 2022 12:49 )   PT: 28.5 sec;   INR: 2.39 ratio         PTT - ( 28 Sep 2022 12:49 )  PTT:71.4 sec  Urinalysis Basic - ( 28 Sep 2022 21:05 )    Color: Yellow / Appearance: Clear / S.010 / pH: x  Gluc: x / Ketone: Negative  / Bili: Negative / Urobili: Negative mg/dL   Blood: x / Protein: Negative mg/dL / Nitrite: Negative   Leuk Esterase: Negative / RBC: x / WBC x   Sq Epi: x / Non Sq Epi: x / Bacteria: x        RADIOLOGY & ADDITIONAL TESTS:  
Subjective: no abdominal pain. Hernández is out. She is voiding without difficulty.   Denies abd pain, hematochezia.       MEDICATIONS  (STANDING):  atorvastatin 10 milliGRAM(s) Oral at bedtime  mirtazapine 15 milliGRAM(s) Oral at bedtime  pantoprazole  Injectable 40 milliGRAM(s) IV Push every 12 hours  sertraline 100 milliGRAM(s) Oral daily    MEDICATIONS  (PRN):  acetaminophen     Tablet .. 650 milliGRAM(s) Oral every 6 hours PRN Temp greater or equal to 38C (100.4F), Mild Pain (1 - 3)  aluminum hydroxide/magnesium hydroxide/simethicone Suspension 30 milliLiter(s) Oral every 4 hours PRN Dyspepsia  melatonin 3 milliGRAM(s) Oral at bedtime PRN Insomnia  ondansetron Injectable 4 milliGRAM(s) IV Push every 6 hours PRN Nausea and/or Vomiting          T(C): 36.7 (22 @ 08:23), Max: 37.2 (22 @ 17:30)  HR: 69 (22 @ 10:00) (62 - 74)  BP: 98/41 (22 @ 10:00) (94/55 - 118/41)  RR: 22 (22 @ 10:00) (12 - 32)  SpO2: 98% (22 @ 10:00) (96% - 100%)  Wt(kg): --        I&O's Detail    28 Sep 2022 07:  -  29 Sep 2022 07:00  --------------------------------------------------------  IN:    Oral Fluid: 300 mL    PRBCs (Packed Red Blood Cells): 283 mL    sodium chloride 0.9%: 400 mL  Total IN: 983 mL    OUT:    Voided (mL): 1200 mL  Total OUT: 1200 mL    Total NET: -217 mL      29 Sep 2022 07:01  -  29 Sep 2022 13:17  --------------------------------------------------------  IN:    sodium chloride 0.9%: 200 mL  Total IN: 200 mL    OUT:  Total OUT: 0 mL    Total NET: 200 mL               PHYSICAL EXAM:    GENERAL: NAD  NECK: Supple, no inc in JVP  CHEST/LUNG: Clear  HEART: S1S2  ABDOMEN: Soft, Nontender, Nondistended; Bowel sounds present  EXTREMITIES:  no edema      LABS:  CBC Full  -  ( 29 Sep 2022 11:11 )  WBC Count : 8.93 K/uL  RBC Count : 3.46 M/uL  Hemoglobin : 8.3 g/dL  Hematocrit : 26.7 %  Platelet Count - Automated : 160 K/uL  Mean Cell Volume : 77.2 fl  Mean Cell Hemoglobin : 24.0 pg  Mean Cell Hemoglobin Concentration : 31.1 gm/dL  Auto Neutrophil # : x  Auto Lymphocyte # : x  Auto Monocyte # : x  Auto Eosinophil # : x  Auto Basophil # : x  Auto Neutrophil % : x  Auto Lymphocyte % : x  Auto Monocyte % : x  Auto Eosinophil % : x  Auto Basophil % : x        137  |  104  |  29<H>  ----------------------------<  113<H>  3.8   |  28  |  1.36<H>    Ca    8.1<L>      29 Sep 2022 06:45  Mg     2.3         TPro  6.7  /  Alb  3.2<L>  /  TBili  0.5  /  DBili  x   /  AST  27  /  ALT  12  /  AlkPhos  41      PT/INR - ( 28 Sep 2022 12:49 )   PT: 28.5 sec;   INR: 2.39 ratio         PTT - ( 28 Sep 2022 12:49 )  PTT:71.4 sec  Urinalysis Basic - ( 28 Sep 2022 21:05 )    Color: Yellow / Appearance: Clear / S.010 / pH: x  Gluc: x / Ketone: Negative  / Bili: Negative / Urobili: Negative mg/dL   Blood: x / Protein: Negative mg/dL / Nitrite: Negative   Leuk Esterase: Negative / RBC: x / WBC x   Sq Epi: x / Non Sq Epi: x / Bacteria: x      Impression:  * MEME -- pre-renal azotemia due to blood loss. Improved.   * HypoNa -- hypovolemic. Better.   * CKD 3. Cr 1-1.3 per historical data  * GI bleed, suspect upper  * A.fib, on Pradaxa  * Pulm HTN, hx of MV clip.     Recommendations:   * Stop IVFs  * Cont to hold Lasix, aldactone  * Keep Hgb > 8.0  * Repeat bladder scan. If PVR is > 500cc, insert Hernández.   * GI follow up                
INTERVAL HPI/OVERNIGHT EVENTS:  No new overnight event.  No N/V/D.  Tolerating diet.   no further bleeding    MEDICATIONS  (STANDING):  atorvastatin 10 milliGRAM(s) Oral at bedtime  metoprolol tartrate 25 milliGRAM(s) Oral two times a day  mirtazapine 15 milliGRAM(s) Oral at bedtime  predniSONE   Tablet 20 milliGRAM(s) Oral daily  sertraline 100 milliGRAM(s) Oral daily    MEDICATIONS  (PRN):  acetaminophen     Tablet .. 650 milliGRAM(s) Oral every 6 hours PRN Temp greater or equal to 38C (100.4F), Mild Pain (1 - 3)  aluminum hydroxide/magnesium hydroxide/simethicone Suspension 30 milliLiter(s) Oral every 4 hours PRN Dyspepsia  melatonin 3 milliGRAM(s) Oral at bedtime PRN Insomnia  ondansetron Injectable 4 milliGRAM(s) IV Push every 6 hours PRN Nausea and/or Vomiting      Allergies    codeine (Other)    Intolerances        Review of Systems:    General:  No wt loss, fevers, chills, night sweats,fatigue,   Eyes:  Good vision, no reported pain  ENT:  No sore throat, pain, runny nose, dysphagia  CV:  No pain, palpitatioins, hypo/hypertension  Resp:  No dyspnea, cough, tachypnea, wheezing  GI:  No pain, No nausea, No vomiting, No diarrhea, No constipatiion, No weight loss, No fever, No pruritis, No rectal bleeding, No tarry stools, No dysphagia,  :  No pain, bleeding, incontinence, nocturia  Muscle:  No pain, weakness  Neuro:  No weakness, tingling, memory problems  Psych:  No fatigue, insomnia, mood problems, depression  Endocrine:  No polyuria, polydypsia, cold/heat intolerance  Heme:  No petechiae, ecchymosis, easy bruisability  Skin:  No rash, tattoos, scars, edema      Vital Signs Last 24 Hrs  T(C): 36.8 (30 Sep 2022 13:47), Max: 36.9 (29 Sep 2022 15:59)  T(F): 98.3 (30 Sep 2022 13:47), Max: 98.4 (29 Sep 2022 15:59)  HR: 92 (30 Sep 2022 14:00) (77 - 106)  BP: 101/62 (30 Sep 2022 14:00) (93/44 - 139/76)  BP(mean): 73 (30 Sep 2022 14:00) (58 - 97)  RR: 25 (30 Sep 2022 14:00) (19 - 30)  SpO2: 94% (30 Sep 2022 14:00) (94% - 100%)    Parameters below as of 30 Sep 2022 14:00  Patient On (Oxygen Delivery Method): nasal cannula    O2 Concentration (%): 2    PHYSICAL EXAM:    Constitutional: NAD, well-developed  HEENT: EOMI, throat clear  Neck: No LAD, supple  Respiratory: CTA and P  Cardiovascular: S1 and S2, RRR, no M  Gastrointestinal: BS+, soft, NT/ND, neg HSM,  Extremities: No peripheral edema, neg clubing, cyanosis  Vascular: 2+ peripheral pulses  Neurological: A/O x 3, no focal deficits  Psychiatric: Normal mood, normal affect  Skin: No rashes      LABS:                        8.5    10.16 )-----------( 160      ( 30 Sep 2022 06:30 )             28.0     09-30    140  |  107  |  15  ----------------------------<  102<H>  4.3   |  28  |  1.05    Ca    8.1<L>      30 Sep 2022 06:30  Mg     2.5     09-30        Urinalysis Basic - ( 28 Sep 2022 21:05 )    Color: Yellow / Appearance: Clear / S.010 / pH: x  Gluc: x / Ketone: Negative  / Bili: Negative / Urobili: Negative mg/dL   Blood: x / Protein: Negative mg/dL / Nitrite: Negative   Leuk Esterase: Negative / RBC: x / WBC x   Sq Epi: x / Non Sq Epi: x / Bacteria: x        RADIOLOGY & ADDITIONAL TESTS:  
Patient is a 89y old  Female who presents with a chief complaint of abnormal labs (29 Sep 2022 22:57)    BRIEF HOSPITAL COURSE: This is an 89-year-old female with history of A. fib on Pradaxa, HTN, diastolic HF, HLD constipation, on home O2, brought by daughter for evaluation of low hemoglobin noted on outpatient blood work yesterday.  Patient admits to bloody stools and diarrhea yesterday with abdominal cramping.  Denies fever nausea vomiting chest pain shortness of breath lightheadedness or other symptoms.  Patient has not had colonoscopy in many years.    Events last 24 hours: Hgb remains stable at 8, no signs of further bleeding, no acute events, remains hemodynamically stable.    PAST MEDICAL & SURGICAL HISTORY:  HTN (hypertension)      HLD (hyperlipidemia)      Atrial fibrillation  On Pradaxa      Syncope      Depression      Leg edema      Mitral valve stenosis, unspecified etiology      Hearing loss of left ear      H/O knee surgery      H/O external ear surgery      Cataracta          Review of Systems:  CONSTITUTIONAL: No fever, chills, or fatigue.  EYES: No eye pain, visual disturbances, or discharge.  ENMT:  No difficulty hearing, tinnitus, or vertigo. No sinus or throat pain.  NECK: No pain or stiffness.  RESPIRATORY: No shortness of breath, cough, or wheezing.  CARDIOVASCULAR: No chest pain, palpitations, dizziness, or leg swelling.  GASTROINTESTINAL: No abdominal or epigastric pain. No nausea, vomiting, diarrhea, or constipation. No hematemesis, melena, or hematochezia.  GENITOURINARY: No dysuria, increased frequency, hematuria, or incontinence.  NEUROLOGICAL: No headaches, memory loss, loss of strength, numbness, or tremors.  SKIN: No itching, burning, rashes, or lesions.  MUSCULOSKELETAL: No joint pain or swelling. No muscle, back, or extremity pain.  PSYCHIATRIC: No depression, anxiety, mood swings, or difficulty sleeping.    Medications:        acetaminophen     Tablet .. 650 milliGRAM(s) Oral every 6 hours PRN  melatonin 3 milliGRAM(s) Oral at bedtime PRN  mirtazapine 15 milliGRAM(s) Oral at bedtime  ondansetron Injectable 4 milliGRAM(s) IV Push every 6 hours PRN  sertraline 100 milliGRAM(s) Oral daily        aluminum hydroxide/magnesium hydroxide/simethicone Suspension 30 milliLiter(s) Oral every 4 hours PRN  pantoprazole  Injectable 40 milliGRAM(s) IV Push every 12 hours      atorvastatin 10 milliGRAM(s) Oral at bedtime                  ICU Vital Signs Last 24 Hrs  T(C): 36.7 (29 Sep 2022 22:00), Max: 36.9 (29 Sep 2022 12:30)  T(F): 98 (29 Sep 2022 22:00), Max: 98.4 (29 Sep 2022 12:30)  HR: 78 (29 Sep 2022 22:00) (64 - 83)  BP: 109/48 (29 Sep 2022 22:00) (93/43 - 120/62)  BP(mean): 66 (29 Sep 2022 22:00) (54 - 78)  ABP: --  ABP(mean): --  RR: 27 (29 Sep 2022 22:00) (15 - 32)  SpO2: 98% (29 Sep 2022 22:00) (95% - 100%)    O2 Parameters below as of 29 Sep 2022 22:00  Patient On (Oxygen Delivery Method): nasal cannula  O2 Flow (L/min): 2              I&O's Detail    28 Sep 2022 07:01  -  29 Sep 2022 07:00  --------------------------------------------------------  IN:    Oral Fluid: 300 mL    PRBCs (Packed Red Blood Cells): 283 mL    sodium chloride 0.9%: 400 mL  Total IN: 983 mL    OUT:    Voided (mL): 1200 mL  Total OUT: 1200 mL    Total NET: -217 mL      29 Sep 2022 07:01  -  29 Sep 2022 23:41  --------------------------------------------------------  IN:    sodium chloride 0.9%: 240 mL  Total IN: 240 mL    OUT:    Voided (mL): 300 mL  Total OUT: 300 mL    Total NET: -60 mL          LABS:                        8.4    9.28  )-----------( 173      ( 29 Sep 2022 17:39 )             27.4         137  |  104  |  29<H>  ----------------------------<  113<H>  3.8   |  28  |  1.36<H>    Ca    8.1<L>      29 Sep 2022 06:45  Mg     2.3         TPro  6.7  /  Alb  3.2<L>  /  TBili  0.5  /  DBili  x   /  AST  27  /  ALT  12  /  AlkPhos  41            CAPILLARY BLOOD GLUCOSE        PT/INR - ( 28 Sep 2022 12:49 )   PT: 28.5 sec;   INR: 2.39 ratio         PTT - ( 28 Sep 2022 12:49 )  PTT:71.4 sec  Urinalysis Basic - ( 28 Sep 2022 21:05 )    Color: Yellow / Appearance: Clear / S.010 / pH: x  Gluc: x / Ketone: Negative  / Bili: Negative / Urobili: Negative mg/dL   Blood: x / Protein: Negative mg/dL / Nitrite: Negative   Leuk Esterase: Negative / RBC: x / WBC x   Sq Epi: x / Non Sq Epi: x / Bacteria: x      CULTURES:      Physical Examination:    General: Well appearing, lying in bed in NAD.      HEENT: Pupils equal, reactive to light. Symmetric. No scleral icterus or injection.    PULM: Clear to auscultation B/L. No wheezes, rales, or rhonchi apprecaited. No significant sputum production or increased respiratory effort.    NECK: Supple, no lymphadenopathy, trachea midline.    CVS: Regular rate and rhythm, no murmurs appreciated, +s1/s2.    ABD: Soft, nondistended, nontender, normoactive bowel sounds.    EXT: No edema, nontender.    SKIN: Warm and well perfused, no rashes noted.    NEURO: Alert, oriented, interactive, nonfocal.      RADIOLOGY: < from: CT Abdomen and Pelvis No Cont (22 @ 14:27) >    ACC: [id number] EXAM:  CT ABDOMEN AND PELVIS                          PROCEDURE DATE:  2022          INTERPRETATION:  CLINICAL INFORMATION: Bloody stools. Anemia.    COMPARISON: CT scan abdomen pelvis 2022.    CONTRAST/COMPLICATIONS:  IV Contrast: NONE  Oral Contrast: NONE  Complications: None reported at time of study completion    PROCEDURE:  CT of the Abdomen and Pelvis was performed.  Sagittal and coronal reformats were performed.    FINDINGS:    LOWER CHEST:  Cardiomegaly. Coronary artery calcifications.  Mild dependent bibasilar atelectatic changes.    Streak artifact degrades image quality.  The evaluation of the solid organ parenchyma is limited without   intravenous contrast.    LIVER: Stable cysts left hepatic lobe.  BILEDUCTS: Normal caliber.  GALLBLADDER: Biliary sludge/cholelithiasis.  SPLEEN: Within normal limits.  PANCREAS: Within normal limits.  ADRENALS: Within normal limits.  KIDNEYS/URETERS:  Bilateral renal cysts measuring up to 10 cm left kidney.  Faint, nonobstructing intrarenal calcification posterior upper pole right   kidney.    BLADDER: Within normal limits.  REPRODUCTIVE ORGANS: The uterus and adnexa appear within normal limits.    BOWEL:  Evaluation of the stomach is limited without distention.  Duodenal diverticulum.  Sigmoid diverticulosis, without CT evidence of diverticulitis.  No bowel obstruction.   Appendix normal.  PERITONEUM: No ascites.    VESSELS: Atherosclerotic changes.  RETROPERITONEUM/LYMPH NODES: No lymphadenopathy.    ABDOMINAL WALL: Fat-containing umbilical hernia.    BONES:  Degenerative changes spine.  Chronic compression deformity T10 vertebral body.  Small sclerotic focus right iliac bone, stable.    IMPRESSION:    Sigmoid diverticulosis, without CT evidence of diverticulitis.  The evaluation for active gastrointestinal bleeding is limited without   intravenous contrast.    Other findings as discussed above.        --- End of Report ---    < end of copied text >    
Patient is a 89y old  Female who presents with a chief complaint of abnormal labs (29 Sep 2022 22:57)      INTERVAL HPI/OVERNIGHT EVENTS: Patient seen and examined. NAD. No complaints.    Vital Signs Last 24 Hrs  T(C): 36.7 (30 Sep 2022 08:42), Max: 36.9 (29 Sep 2022 12:30)  T(F): 98 (30 Sep 2022 08:42), Max: 98.4 (29 Sep 2022 12:30)  HR: 90 (30 Sep 2022 08:00) (68 - 106)  BP: 116/55 (30 Sep 2022 08:00) (93/43 - 139/76)  BP(mean): 66 (30 Sep 2022 08:00) (58 - 97)  RR: 19 (30 Sep 2022 08:00) (15 - 30)  SpO2: 98% (30 Sep 2022 08:00) (95% - 100%)    Parameters below as of 30 Sep 2022 08:00  Patient On (Oxygen Delivery Method): nasal cannula  O2 Flow (L/min): 2      09-30    140  |  107  |  15  ----------------------------<  102<H>  4.3   |  28  |  1.05    Ca    8.1<L>      30 Sep 2022 06:30  Mg     2.5     09-30    TPro  6.7  /  Alb  3.2<L>  /  TBili  0.5  /  DBili  x   /  AST  27  /  ALT  12  /  AlkPhos  41  09-28                          8.5    10.16 )-----------( 160      ( 30 Sep 2022 06:30 )             28.0     PT/INR - ( 28 Sep 2022 12:49 )   PT: 28.5 sec;   INR: 2.39 ratio         PTT - ( 28 Sep 2022 12:49 )  PTT:71.4 sec  CAPILLARY BLOOD GLUCOSE        Urinalysis Basic - ( 28 Sep 2022 21:05 )    Color: Yellow / Appearance: Clear / S.010 / pH: x  Gluc: x / Ketone: Negative  / Bili: Negative / Urobili: Negative mg/dL   Blood: x / Protein: Negative mg/dL / Nitrite: Negative   Leuk Esterase: Negative / RBC: x / WBC x   Sq Epi: x / Non Sq Epi: x / Bacteria: x              acetaminophen     Tablet .. 650 milliGRAM(s) Oral every 6 hours PRN  aluminum hydroxide/magnesium hydroxide/simethicone Suspension 30 milliLiter(s) Oral every 4 hours PRN  atorvastatin 10 milliGRAM(s) Oral at bedtime  melatonin 3 milliGRAM(s) Oral at bedtime PRN  metoprolol tartrate 25 milliGRAM(s) Oral two times a day  mirtazapine 15 milliGRAM(s) Oral at bedtime  ondansetron Injectable 4 milliGRAM(s) IV Push every 6 hours PRN  pantoprazole  Injectable 40 milliGRAM(s) IV Push every 12 hours  sertraline 100 milliGRAM(s) Oral daily              REVIEW OF SYSTEMS:  CONSTITUTIONAL: No fever, no weight loss, or no fatigue  NECK: No pain, no stiffness  RESPIRATORY: No cough, no wheezing, no chills, no hemoptysis, No shortness of breath  CARDIOVASCULAR: No chest pain, no palpitations, no dizziness, no leg swelling  GASTROINTESTINAL: No abdominal pain. No nausea, no vomiting, no hematemesis; No diarrhea, no constipation. No melena, no hematochezia.  GENITOURINARY: No dysuria, no frequency, no hematuria, no incontinence  NEUROLOGICAL: No headaches, no loss of strength, no numbness, no tremors  SKIN: No itching, no burning  MUSCULOSKELETAL: No joint pain, no swelling; No muscle, no back, no extremity pain  PSYCHIATRIC: No depression, no mood swings,   HEME/LYMPH: No easy bruising, no bleeding gums  ALLERY AND IMMUNOLOGIC: No hives       Consultant(s) Notes Reviewed:  [X] YES  [ ] NO    PHYSICAL EXAM:  GENERAL: NAD  HEAD:  Atraumatic, Normocephalic  EYES: EOMI, PERRLA, conjunctiva and sclera clear  ENMT: No tonsillar erythema, exudates, or enlargement; Moist mucous membranes  NECK: Supple, No JVD  NERVOUS SYSTEM:  Awake & alert  CHEST/LUNG: Clear to auscultation bilaterally; No rales, rhonchi, wheezing,  HEART: IRRegular rate and rhythm  ABDOMEN: Soft, Nontender, Nondistended; Bowel sounds present  EXTREMITIES:  No clubbing, cyanosis, or edema  LYMPH: No lymphadenopathy noted  SKIN: No rashes      Advanced care planning discussed with patient/family [X] YES   [ ] NO    Advanced care planning discussed with patient/family. Patient's health status was discussed. All appropriate changes have been made regarding patient's end-of-life care. Advanced care planning forms reviewed/discussed/completed.  20 minutes spent.

## 2022-09-30 NOTE — PROGRESS NOTE ADULT - ASSESSMENT
LGIB  likely diverticular 2 coagulopathy  hold AC  appears to have resolved  liquids today   conservative management   will fu
LGIB  resolved  advance diet  hold ac for 1 week  can dc
The patient is an 89 year old female with a history of HTN, HL, atrial fibrillation, MitraClip, chronic diastolic heart failure who presents with GI bleed.    Plan:  - ECG with known AF  - Resume metoprolol tartrate at 25 mg bid  - Hold diltiazem  - Discontinue dabigatran  - GI follow-up  - If/when able, will resume anticoagulation with apixaban 2.5 mg bid in place of dabigatran  - Discussed alternatives such as no anticoagulation and possibility of Watchman placement  - If plan includes colonoscopy, the patient is optimized from a cardiac standpoint to proceed for this
This is an 89-year-old female with history of A. fib on Pradaxa, HTN, diastolic HF, HLD constipation, on home O2 admitted for    1. LGIB 2/2 divertic bleed 2/2 Coagulopathy  2. ABLA  3. MEME on CKD 3      Neuro:  - No Active issues  - Avoid Neuro Deliriogenic / sedative medications  - Aspiration Precautions, HOB > 30 degrees  - Zoloft, remeron    CV:  - Hold antihypertensives pt BP remains soft  - Statin    Pulm:  - Supplemental oxygen as needed to maintain SpO2 > 92%,  - Incentive spirometry                  GI:  - Continue Protonix 40mg BID  - Advance diet as tolerated to avoid Stress ulceration and optimize nutritional state    Renal:  - Even to net negative fluid balance as tolerated by hemodynamics and renal fx.    - Renal function improving Continue to monitor Bun/Cr and UOP  - Replacing electrolytes as needed with Goal K> 4, PO> 3, Mg> 2               - Strict I&O's  - Avoid Nephro toxic medication, hold diuretics  - Renally dose meds    Heme:  - SCDs for DVT/PE ppx     ID:  - Microbiology and Radiology reviewed   - trend CBC with diff, CMP  and fever curve  - Avoiding antibiotics unless there is a concern for a bacterial/fungal infection    Endo:  - ISS for aggressive glycemic control to limit FS glucose to < 180mg/dl.

## 2022-09-30 NOTE — DISCHARGE NOTE PROVIDER - DETAILS OF MALNUTRITION DIAGNOSIS/DIAGNOSES
This patient has been assessed with a concern for Malnutrition and was treated during this hospitalization for the following Nutrition diagnosis/diagnoses:     -  09/29/2022: Moderate protein-calorie malnutrition

## 2022-09-30 NOTE — PHYSICAL THERAPY INITIAL EVALUATION ADULT - PERTINENT HX OF CURRENT PROBLEM, REHAB EVAL
This is an 89-year-old female with history of A. fib on Pradaxa, HTN, diastolic HF, HLD constipation, on home O2, brought by daughter for evaluation of low hemoglobin noted on outpatient blood work yesterday.  Patient admits to bloody stools and diarrhea yesterday with abdominal cramping.  Denies fever nausea vomiting chest pain shortness of breath lightheadedness or other symptoms.  Patient has not had colonoscopy in many years.

## 2022-09-30 NOTE — PHYSICAL THERAPY INITIAL EVALUATION ADULT - ADDITIONAL COMMENTS
Pt resides at [facility] and was independent in all functional mobility with a SAC. Pt owns a RW but did not use.

## 2022-09-30 NOTE — DISCHARGE NOTE PROVIDER - HOSPITAL COURSE
This is an 89-year-old female with history of A. fib on Pradaxa, HTN, diastolic HF, HLD constipation, on home O2, brought by daughter for evaluation of low hemoglobin noted on outpatient blood work yesterday.  Patient admits to bloody stools and diarrhea yesterday with abdominal cramping.  Denies fever nausea vomiting chest pain shortness of breath lightheadedness or other symptoms.  Patient has not had colonoscopy in many years.   This is an 89-year-old female with history of A. fib on Pradaxa, HTN, diastolic HF, HLD constipation, on home O2, brought by daughter for evaluation of low hemoglobin noted on outpatient blood work yesterday.  Patient admits to bloody stools and diarrhea yesterday with abdominal cramping.  Denies fever nausea vomiting chest pain shortness of breath lightheadedness or other symptoms.  Patient has not had colonoscopy in many years.    Patient came with Hg 6.9  Transfused 2 units PRBC  Improved to 8.5 and stable  No further bleeding  Patient likely had diverticular bleed  CT: diverticulosis  Also MEME -- diuretics held while hospitalized  Restart AC in one week  +plantar fascitis -- seen by podiatry, rxed with steroids    >35 minutes spent on discharge    This is an 89-year-old female with history of A. fib on Pradaxa, HTN, diastolic HF, HLD constipation, on home O2, brought by daughter for evaluation of low hemoglobin noted on outpatient blood work yesterday.  Patient admits to bloody stools and diarrhea yesterday with abdominal cramping.  Denies fever nausea vomiting chest pain shortness of breath lightheadedness or other symptoms.  Patient has not had colonoscopy in many years.    Patient came with Hg 6.9  Transfused 2 units PRBC  Improved to 8.5 and stable  No further bleeding  Patient likely had diverticular bleed  CT: diverticulosis  Also MEME -- diuretics held while hospitalized  Restart AC in one week  +plantar fascitis -- will f/u with podiatry at [facility]    >35 minutes spent on discharge

## 2022-09-30 NOTE — DISCHARGE NOTE NURSING/CASE MANAGEMENT/SOCIAL WORK - NSDCCRTYPESERV_GEN_ALL_CORE_FT
Your family said you will have 24 hour aides to assist with your activities of daily living.  You also have assist from [facility] on [address] [phone] Staff.

## 2022-09-30 NOTE — PROGRESS NOTE ADULT - PROBLEM SELECTOR PLAN 3
MEME on CKD 3  Hold diuretics  Avoid nephrotoxic meds  Monitor BMP  Renal f/u  Further work-up/management pending clinical course. MEME on CKD 3  Restart torsemide 20mg qd and spironolactone 25mg qd (lower doses)  Avoid nephrotoxic meds  Monitor BMP as outpatient  Renal f/u  Further work-up/management pending clinical course.

## 2022-09-30 NOTE — DISCHARGE NOTE NURSING/CASE MANAGEMENT/SOCIAL WORK - PATIENT PORTAL LINK FT
You can access the FollowMyHealth Patient Portal offered by [facility] by registering at the following website: [email or web address]. By joining [facility]’s FollowMyHealth portal, you will also be able to view your health information using other applications (apps) compatible with our system.

## 2022-09-30 NOTE — CHART NOTE - NSCHARTNOTEFT_GEN_A_CORE
Called to see patient for right foot pain. Patient admitted for GI bleed and tried to ambulate with PT but noticed pain on plantar fascia of right foot. Pain is worse with ambulation. Denies any falls or trauma. Exam remarkable for pain with palpation of plantar fascia. D/w podiatry who will see patient. Patient unable to be discharged to [facility] as limited with ambulation at present

## 2022-09-30 NOTE — DISCHARGE NOTE PROVIDER - NSDCCPCAREPLAN_GEN_ALL_CORE_FT
PRINCIPAL DISCHARGE DIAGNOSIS  Diagnosis: Gastrointestinal bleed  Assessment and Plan of Treatment: Restart anticoagulation in 7 days  Repeat blood work in one week      SECONDARY DISCHARGE DIAGNOSES  Diagnosis: Chronic atrial fibrillation  Assessment and Plan of Treatment: Restart anticoagulation in 7 days. Recommend starting Eliquis 2.5mg twice daily. Please consult with your cardiologist first.  Continue new medication regimen as prescribed.

## 2022-10-03 LAB — VIT B1 SERPL-MCNC: 156.8 NMOL/L

## 2022-10-03 NOTE — REASON FOR VISIT
[Cardiac Failure] : cardiac failure [Other: _____] : [unfilled] [FreeTextEntry1] : Patient Instructions:\par \par 1. Reduce Diltiazem to 120 mg once daily\par \par 2. Reduce Torsemide to 40 mg once daily\par \par 3. Reduce Spironolactone 50 mg once daily\par \par 4. Check labs in one week \par \par 5. Follow up with [facility] in October and  Dr. Lopes in November.

## 2022-10-03 NOTE — HISTORY OF PRESENT ILLNESS
[FreeTextEntry1] : Mrs. Mix is a very pleasant 89 year old woman with history of HFpEF (LVEF 68% 7/2022), MR s/p Mitraclip (8/30/16), mixed pre- and post-capillary pulmonary hypertension, permanent AFib (on Pradaxa), hypertension, hyperlipidemia, CKD (b/l Cr 1.2-1.3), SHERYL and hypoxia on home O2 3-4L. Here today for follow up on her cardiomyopathy.\par \par She had a brief hospital admission 7/18-7/19/2022 at [facility] due to an unwitnessed fall that occurred at approximately 2 AM resulting in head trauma, work up included CT of the head which was negative. Ambien at that time was discontinued due to concern of further fall risk.\par \par Overall since her last OV on 8/24/2022, she has established with a geriatrician with further plans to see psychiatrist Dr. Brito in November. She feels adequate sleep initiation with the mirtazipine but continues to heavily rely on alprazolam during the day for suboptimal control of her anxiety. From a heart failure perspective, since the Torsemide was increased to 40 mg BID, she is down nearly 15 pounds and her lower extremities are no longer weeping fluid. She feels more mobile and her breathing is modestly improved. Feelings of lethargy and isolation  keep her from her usual walk to the dining [place]. As she typically can manage slowly with a walker but lately has been feeling less motivated.\par \par She denies CP, SOB at rest, orthopnea, PND, LH/dizziness, abdominal discomfort, or palpitations. Her appetite is fair and her bowel and bladder habits are unchanged. She is limiting her fluids and is following a low sodium diet. Using sugar free lozenges as needed for thirst. She does not use NSAIDs. She has not been admitted to the hospital or seen in the ER for Heart Failure in the interim.

## 2022-10-03 NOTE — PHYSICAL EXAM
[No Xanthelasma] : no xanthelasma [No Rub] : no rub [No Gallop] : no gallop [Soft] : abdomen soft [Non Tender] : non-tender [Normal Bowel Sounds] : normal bowel sounds [No Cyanosis] : no cyanosis [No Clubbing] : no clubbing [Normal Radial B/L] : normal radial B/L [Normal] : moves all extremities, no focal deficits, normal speech [Edema ___] : edema [unfilled] [No Rash] : no rash [Alert and Oriented] : alert and oriented [Appears Anxious] : appears anxious [de-identified] : unable to assess - wearing a mask [de-identified] : JVP 6-8 cm H2O with v-waves sitting in wheelchair [de-identified] : II/VI systolic murmur [de-identified] : Irregularly irregular S1 S2 [de-identified] : on 4 L of O2 via NC [de-identified] : unable to appreciate HSM or masses due to body habitus/seated in wheelchair [de-identified] : using wheelchair for distance [de-identified] : warm peripherally [de-identified] : depressed

## 2022-10-03 NOTE — DISCUSSION/SUMMARY
[Patient] : the patient [FreeTextEntry2] : and daughter [FreeTextEntry1] : 89 yo F with chronic diastolic HF, rate-controlled AFib, severe MR s/p Viola, CKD stage 3 and chronic hypoxia on home O2. She is ACC/AHA Stage C, NYHA Class III, and euvolemic. I have recommended the following:\par \par 1. Chronic diastolic heart failure - Stable and well compensated. Decrease Torsemide to 40 mg QD with an additional afternoon dose as needed for weight gain. Decrease frequency of Spironolactone to 50 mg QD. She did not tolerate Farxiga due to abdominal discomfort. If her volume status continues to be tenuous, I would ask her family to reconsider [facility]. In the past they had declined. [facility] [place] is agreeable to continue long-term weight and BP log 3x per week for one month. Check labs in 1 week. \par \par 2. Pulmonary hypertension - She has been stable for years despite severely elevated pulmonary pressures by TTE. At the time of her last RHC on 3/11/19, PA 61/21/35, PCW 12 without a response to inhaled NO and with PA 71.3%, PVR 3.77 Mendez. TTE from 3/15/22 reviewed with Dr. Moore (structural cardiology) who did not think there was significant or new MS.\par \par 3. AFib - Rate controlled on beta blocker and CCB. On Pradaxa for AC.\par \par 4. CKD stage 3 - Stable on current therapies. Baseline Cr 1.2-1.3. Recent labs from 8/08 SrCr (1.1). Intolerant of SGLT2-i (abdominal cramping).\par \par 5. Hypertension -  Well controlled on current regimen. Continue Diltiazem  mg QD. \par \par 6. Anxiety and Depression- suboptimal control on Sertraline 75 mg QD. Recently established with geriatrician Dr. Jones and future plans to establish with psychiatry Dr. Brito.\par \par 7. Insomnia- Continue Mirtazipine 7.5 mg QHS. Recommended against hypnotic sleep aids along with benzodiazepines. To establish care with Geriatric Psychiatry and follow up on formal recommendations.\par \par 8. Follow up with [facility] in 3-4 weeks and Dr. Lopes in November.

## 2022-10-03 NOTE — CARDIOLOGY SUMMARY
[de-identified] : \par 08/05/22 ECG Afib at 68 bpm, iRBBB, low voltage, left axis, NSTW abnormalities \par 03/15/22 ECG: AFib at 63 bpm, iRBBB, LAFB. No sig change from 11/24/21.\par 11/24/21 ECG: AFib at 48 bpm, iRBBB, LAFB. No sig change from prior 5/19/21\par 05/19/21 ECG: AFib at 61 bpm, iRBBB, LAFB. No sig change from prior.\par 11/18/20 ECG: AFib at 57 bpm, iRBBB, LAFB. No sig change from prior 8/28/20\par  [de-identified] : \par 03/10/22-03/30/22 Zio monitor: Persistent AFib (100% burden). HR ranging  bpm (average HR 64 bpm). Mild IVCD, rare VPD's and couplets. No symptoms.\par  [de-identified] : 07/19/22 TTE: LVIDd 5.4 cm, LVEF 68%, mild LVH ( SW 1.0 cm, PW 1.0 cm) no segmental WMA, RV is enlarged w/ decreased RVSF, severe JANEL, s/p lala clip w/mild MR (peak/mean 10  mmHg/ 4 mmHg @ HR 66), mild AR, mod-severe TR, severe PH (RVSP 83 mmHg)\par \par 03/15/22 TTE: LVIDd 5.0 cm, LVEF 70%, no segmental WMA, flattening of the septum c/w RV pressure overload, mild concentric LVH (septum 1.4 cm, PWT 1.1 cm), RVE with decreased RVSF, severe JANEL, s/p Mitraclip with mild-mod MR, mod-severe MS (peak gradient 15 mmHg, mean gradient 6 mmHg), severe TR, mild KY, severe PH (RVSP 79 mmHg).\par \par 11/18/20 TTE: LVIDd 4.6 cm, normal LVEF, concentric LVH (SW 1.2, PW 1.3 cm), flattening of septum c/w RV pressure overload, normal LA size, mod SONNY, RVE with decreased RV function, mild-mod MR s/p MitraClip (peak 10 mmHg, mean 4 mmHg), mod-severe TR, no effusion, severe PH (RVSP 95 mmHg). Iatrogenic transeptal flow noted (transeptal puncture for Clip).\par \par 03/01/18 TTE: LVEF 70%, LVIDd 5.1, septum/PWT 1.1, LA 5.7, severely dilated RV with severe RVSD, severe biatrial enlargement, severe TR, MR present but shadowed by Mitraclip,est RVSP 82, left to right atrial flow noted from transseptal puncture.\par  [de-identified] : \par 03/11/19 RHC baseline: no RA or RV reported, PA 61/21/35, PCW 12, Ao 93%, PA 71%, CO/CI (F) 6.09/3.26, PVR 3.77 Mendez.\par Nitric Oxide: RA 10, RV 57/12, PA 54/17/30, no PCW reported, Ao 93%, PA 77%, CO/CI (F) 8.19/4.38,  PVR 3.66 Mendez.\par

## 2022-10-04 ENCOUNTER — NON-APPOINTMENT (OUTPATIENT)
Age: 87
End: 2022-10-04

## 2022-10-06 ENCOUNTER — APPOINTMENT (OUTPATIENT)
Dept: HEART FAILURE | Facility: CLINIC | Age: 87
End: 2022-10-06

## 2022-10-06 VITALS
SYSTOLIC BLOOD PRESSURE: 109 MMHG | DIASTOLIC BLOOD PRESSURE: 54 MMHG | HEART RATE: 100 BPM | HEIGHT: 63 IN | OXYGEN SATURATION: 89 % | WEIGHT: 154 LBS | BODY MASS INDEX: 27.29 KG/M2

## 2022-10-06 LAB
ANION GAP SERPL CALC-SCNC: 14 MMOL/L
BUN SERPL-MCNC: 21 MG/DL
CALCIUM SERPL-MCNC: 8.8 MG/DL
CHLORIDE SERPL-SCNC: 100 MMOL/L
CK MB BLD-MCNC: 1.8 NG/ML
CO2 SERPL-SCNC: 22 MMOL/L
CREAT SERPL-MCNC: 1.24 MG/DL
EGFR: 42 ML/MIN/1.73M2
GLUCOSE SERPL-MCNC: 135 MG/DL
MAGNESIUM SERPL-MCNC: 2.1 MG/DL
NT-PROBNP SERPL-MCNC: 3791 PG/ML
POTASSIUM SERPL-SCNC: 3.9 MMOL/L
SODIUM SERPL-SCNC: 136 MMOL/L

## 2022-10-06 PROCEDURE — 99214 OFFICE O/P EST MOD 30 MIN: CPT

## 2022-10-06 RX ORDER — DILTIAZEM HYDROCHLORIDE 120 MG/1
120 CAPSULE, EXTENDED RELEASE ORAL DAILY
Qty: 30 | Refills: 5 | Status: DISCONTINUED | COMMUNITY
Start: 2022-03-15 | End: 2022-10-06

## 2022-10-06 RX ORDER — DABIGATRAN ETEXILATE MESYLATE 150 MG/1
150 CAPSULE ORAL TWICE DAILY
Qty: 180 | Refills: 3 | Status: DISCONTINUED | COMMUNITY
Start: 2019-03-20 | End: 2022-10-06

## 2022-10-06 RX ORDER — ATORVASTATIN CALCIUM 10 MG/1
10 TABLET, FILM COATED ORAL
Refills: 0 | Status: DISCONTINUED | COMMUNITY
Start: 2022-08-01 | End: 2022-10-06

## 2022-10-06 NOTE — CARDIOLOGY SUMMARY
[de-identified] : \par 08/05/22 ECG Afib at 68 bpm, iRBBB, low voltage, left axis, NSTW abnormalities \par 03/15/22 ECG: AFib at 63 bpm, iRBBB, LAFB. No sig change from 11/24/21.\par 11/24/21 ECG: AFib at 48 bpm, iRBBB, LAFB. No sig change from prior 5/19/21\par 05/19/21 ECG: AFib at 61 bpm, iRBBB, LAFB. No sig change from prior.\par 11/18/20 ECG: AFib at 57 bpm, iRBBB, LAFB. No sig change from prior 8/28/20\par  [de-identified] : \par 03/10/22-03/30/22 Zio monitor: Persistent AFib (100% burden). HR ranging  bpm (average HR 64 bpm). Mild IVCD, rare VPD's and couplets. No symptoms.\par  [de-identified] : \par 07/19/22 TTE: LVIDd 5.4 cm, LVEF 68%, mild LVH ( SW 1.0 cm, PW 1.0 cm) no segmental WMA, RV is enlarged w/ decreased RVSF, severe JANEL, s/p lala clip w/mild MR (peak/mean 10  mmHg/ 4 mmHg @ HR 66), mild AR, mod-severe TR, severe PH (RVSP 83 mmHg)\par \par 03/15/22 TTE: LVIDd 5.0 cm, LVEF 70%, no segmental WMA, flattening of the septum c/w RV pressure overload, mild concentric LVH (septum 1.4 cm, PWT 1.1 cm), RVE with decreased RVSF, severe JANEL, s/p Mitraclip with mild-mod MR, mod-severe MS (peak gradient 15 mmHg, mean gradient 6 mmHg), severe TR, mild ID, severe PH (RVSP 79 mmHg).\par \par 11/18/20 TTE: LVIDd 4.6 cm, normal LVEF, concentric LVH (SW 1.2, PW 1.3 cm), flattening of septum c/w RV pressure overload, normal LA size, mod SONNY, RVE with decreased RV function, mild-mod MR s/p MitraClip (peak 10 mmHg, mean 4 mmHg), mod-severe TR, no effusion, severe PH (RVSP 95 mmHg). Iatrogenic transeptal flow noted (transeptal puncture for Clip).\par \par 03/01/18 TTE: LVEF 70%, LVIDd 5.1, septum/PWT 1.1, LA 5.7, severely dilated RV with severe RVSD, severe biatrial enlargement, severe TR, MR present but shadowed by Mitraclip,est RVSP 82, left to right atrial flow noted from transseptal puncture.\par  [de-identified] : \par 03/11/19 RHC baseline: no RA or RV reported, PA 61/21/35, PCW 12, Ao 93%, PA 71%, CO/CI (F) 6.09/3.26, PVR 3.77 Mendez.\par Nitric Oxide: RA 10, RV 57/12, PA 54/17/30, no PCW reported, Ao 93%, PA 77%, CO/CI (F) 8.19/4.38,  PVR 3.66 Mnedez.\par

## 2022-10-06 NOTE — DISCUSSION/SUMMARY
[Patient] : the patient [FreeTextEntry2] : and daughter [FreeTextEntry1] : 89 yo F with chronic diastolic HF, rate-controlled AFib, severe MR s/p MitraClip, CKD stage 3 and chronic hypoxia on home O2 who presents for follow-up after recent hospitalization for hematochezia requiring blood products. She is ACC/AHA Stage C, stable NYHA Class III, and euvolemic despite recent reduction of diuretic regimen during her hospitalization. I have recommended the following:\par \par 1. Chronic diastolic heart failure - Stable and well compensated. Continue Torsemide 20 mg QD. She will keep a daily weight log on her personal scale at [facility] and inform our office of an acute change. Continue Spironolactone 25 mg QD. Previously did not tolerate Farxiga due to abdominal discomfort. Should we again encounter tenuous volume status, would ask her family to reconsider CardioMEMS. Labs from 9/30 with K 4.3, Cr 1.05 and pro-BNP from 9/27 2700. Labs today. \par \par 2. Pulmonary hypertension - Stable for years despite severely elevated pulmonary pressures by TTE. At the time of her last RHC on 3/11/19, PA 61/21/35, PCW 12 without a response to inhaled NO and with PA 71.3%, PVR 3.77 Mendez. TTE from 3/15/22 reviewed with Dr. Moore (structural cardiology) who did not think there was significant or new MS.\par \par 3. AFib - Rate controlled on beta blocker. Lopressor was recently reduced from 50 mg BID to 25 mg BID and Cardizem 120 mg stopped during her hospitalization for GIB given SBP in 80s. Had been recommended by general cardiology as an inpatient to resume AC with Eliquis 2.5 mg BID but is concerned of risk of recurrent bleed. Discussed with Dr. Lopes, risk of stoke and bleeding are equivalent at this time though stroke would be more difficult to recover from. Informed son Ronald who will have a family discussion to decide on management. Offered GI referral to help us determine risk of rebleeding. He will let us know if he would like to pursue this. \par \par 4. Lower GIB - Recieved 2U PRBC 9/28/22 for Hgb of 6.9 from 9.2 in August. Hgb on discharge 8.5 and without recurrence of bleed while off AC. Conservatively managed and thought to be a diverticular bleed by GI. Now on Protonix 40 mg QD. Currently remains off AC, pending family decision. \par \par 5. CKD stage 3 - Stable on current therapies. Baseline Cr 1.2-1.3. Recent labs from 9/30 with Cr 1.05\par \par 6. HLD - On Atorvastatin 10 mg qHS. Family is concerned muscular calf discomfort is related to statin. Given its role as long term prevention, reasonable to stop but also offered trialing Crestor 2.5 mg q48hrs which son has declined. \par \par 7. Hypertension -  Currently well controlled despite recent discontinuation of Diltiazem. On Lopressor as above\par \par 8. Anxiety and Depression- suboptimal control on Sertraline 75 mg QD. Recently established with geriatrician Dr. Jones and future plans to establish with psychiatry Dr. Brito.\par \par 9. Insomnia- Continue Mirtazipine 7.5 mg qHS. Recommended against hypnotic sleep aids along with benzodiazepines. To establish care with Geriatric Psychiatry and follow up on formal recommendations.\par \par 10. Follow up with the NP in one month and Dr. Lopes in 2 months\par

## 2022-10-06 NOTE — PHYSICAL EXAM
[No Xanthelasma] : no xanthelasma [No Rub] : no rub [No Gallop] : no gallop [Soft] : abdomen soft [Non Tender] : non-tender [Normal Bowel Sounds] : normal bowel sounds [Normal Radial B/L] : normal radial B/L [Edema ___] : edema [unfilled] [No Rash] : no rash [Alert and Oriented] : alert and oriented [Normal] : no edema, no cyanosis, no clubbing, no varicosities [de-identified] : unable to assess - wearing a mask [de-identified] : JVP 8 cm H2O, no HJR [de-identified] : II/VI systolic murmur [de-identified] : Irregularly irregular S1 S2 [de-identified] : on 4 L of O2 via NC [de-identified] : using wheelchair for distance [de-identified] : warm peripherally

## 2022-10-06 NOTE — HISTORY OF PRESENT ILLNESS
[FreeTextEntry1] : Mrs. Mix is a very pleasant 89 year old woman with history of HFpEF (LVEF 68% 7/2022), MR s/p Mitraclip (8/30/16), mixed pre- and post-capillary pulmonary hypertension, permanent AFib (on Pradaxa), hypertension, hyperlipidemia, CKD (b/l Cr 1.2-1.3), SHERYL and hypoxia on home O2 3-4L who presents today for follow-up after recent hospitalization for GIB. \par \par When last seen on 9/21 with Dr. Lopes, had successfully diuresed to euvolemia and Torsemide reduced to 40 mg QD for maintenance along with reduction of Cardizem for SBP in 90s. The following week, noted marginal SBP readings in 80s with outpatient labs notable for drop in Hgb to 7.7 and Cr of 1.8 for which she presented to [facility], found to have a lower GI bleed thought to be diverticular. She was transfused with 2U PRBC for Hgb of 6.9 (from 9.2 in August) and treated conservatively. Seen by general cardiology recommended to hold Pradaxa for one week and then start Eliquis 2.5 mg BID thereafter. Discharged on 9/30 with Hgb of 8.5 and on reduced diuretic/antihypertensive regimen (Torsemide 20 mg QD, Spironolactone 25 mg QD, Lopressor 25 mg BID and Cardizem 120 mg QD stopped). \par \par Since her return home, from a HF perspective has been stable. No increased GUERRA and able to manage walking 50 yards slowly with assistive device. She continues to use home O2, particularly at night when she sleeps. Weight has been stable at 154 lbs on Torsemide 20 mg QD. She has been keeping a personal weight log apart from igobubble. SBP generally 92-100s off Cardizem and HR 60-70s. Her appetite is good. She has not noted recurrent blood in her stools. Of note, has yet to resume AC either with Pradaxa or Eliquis given concern of bleeding. Her son is also concerned of continuation of statin given recent BLE muscle pain including calves. She denies CP, SOB at rest, maikol orthopnea (3 pillows for body comfort), PND, LH/dizziness, abdominal discomfort, palpitations, and syncope. Her bowel and bladder habits are unchanged and normal for her. She does not have a device. She has been limiting fluid and sodium in her diet and taking her medications as directed. She does not use NSAIDs.\par

## 2022-10-11 ENCOUNTER — APPOINTMENT (OUTPATIENT)
Dept: GERIATRICS | Facility: CLINIC | Age: 87
End: 2022-10-11

## 2022-10-11 VITALS
BODY MASS INDEX: 27.81 KG/M2 | DIASTOLIC BLOOD PRESSURE: 52 MMHG | SYSTOLIC BLOOD PRESSURE: 104 MMHG | TEMPERATURE: 96.8 F | OXYGEN SATURATION: 96 % | RESPIRATION RATE: 20 BRPM | HEART RATE: 102 BPM | WEIGHT: 157 LBS

## 2022-10-11 PROCEDURE — 99495 TRANSJ CARE MGMT MOD F2F 14D: CPT

## 2022-10-12 NOTE — PHYSICAL EXAM
[Alert] : alert [Normal Outer Ear/Nose] : the ears and nose were normal in appearance [No Respiratory Distress] : no respiratory distress [No Acc Muscle Use] : no accessory muscle use [Respiration, Rhythm And Depth] : normal respiratory rhythm and effort [Auscultation Breath Sounds / Voice Sounds] : lungs were clear to auscultation bilaterally [Normal] : no spinal tenderness [No Clubbing, Cyanosis] : no clubbing or cyanosis of the fingernails [Involuntary Movements] : no involuntary movements were seen [Motor Tone] : muscle strength and tone were normal [Normal Hearing] : hearing was not normal [Normal Gait] : abnormal gait [Normal Insight/Judgment] : insight and judgment were not intact [de-identified] : +O2 via NC [de-identified] : bradycardic [de-identified] : Trace RLE edema [de-identified] : slow cautious unstable gait, better with cane [de-identified] : chronic b/l LE skin changes [de-identified] : +memory loss [de-identified] : Calm, depressed mood, DANI-7 of 13/somewhat difficult on 9/23/22  [MocaTotal] : 18

## 2022-10-12 NOTE — HISTORY OF PRESENT ILLNESS
[Any fall with injury in past year] : Patient reported fall with injury in the past year [Independent] : transferring/mobility [Full assistance needed] : Assistance needed managing medications [FAST Score: ____] : Functional Assessment Scale (FAST) Score: [unfilled] [Cane] : cane [Walker] : walker [Wheelchair] : wheelchair [Smoke Detector] : smoke detector [Carbon Monoxide Detector] : carbon monoxide detector [Mild] : Stage: Mild [Stable] : Status: Stable [Memory Lapses Or Loss] : stable memory impairment [Patient Observed To Be Agitated] : denies agitation [Hostility Toward Caregivers] : denies aggression [Sleep Disturbances] : stable sleep disturbances [] : denies wandering [Fixed Beliefs Contradicted By Reality (Delusions)] : denies delusions [Difficulty Finding Desired Words] : denies difficulty finding desired words [Designated Healthcare Proxy] : Designated healthcare proxy [FreeTextEntry1] : Hospitalized since last visit for anemia/GIB. \par \par Per hospital discharge summary reviewed in [facility]: \par Discharge Date 30-Sep-2022\par Admission Date 28-Sep-2022 \par Patient came with Hg 6.9\par Transfused 2 units PRBC\par Improved to 8.5 and stable\par No further bleeding\par Patient likely had diverticular bleed\par CT: diverticulosis\par Also MEME -- diuretics held while hospitalized\par Restart AC in one week\par +plantar fascitis -- will f/u with podiatry at [facility]\par \par Seen by cards Dr. Terry on 10/6/22 - visit note appreciated in EMR. Son declined statin.  Family to consider resuming AC. \par \par TODAY reports main concern is constipation. +Flatulance.  Afraid she will have FI so tries to hold the gas in.  Dtr states this limits her ability to go out for activities in the [facility]. \par \par Takes 3 different medications for constipation at bedtime? Dulcolax?  Unclear as pt has these in the apartment and managing them herself but doesn't remember what she takes or when she last took it. Unreliable historian. \par \par Sleeping better. Only one night in past 2 weeks had a bad night of sleep, otherwise slept well per dtr Shirlene who reviewed the sleep log kept by the HHAs.  \par \par Pt states she walks down to eat regularly. Dtr states pt does not walk down but is taken down in the WC by the HHA. \par More alert per dtr but still confused. \par \par Dtr frustrated with HHAs.  Using [facility] home care agency. \par \par Pt now has / supervision with 2 HHAs - one during daytime and another at nighttime. This is going well so far. \par \par Shirlene forgot to bring slleep log and BM log to visit today. \par Dtr states last Xanax was last administered on 22. \par \par Forgot to bring med list from [facility] today - states patient told her she would bring it. \par \par INSOMNIA / ANXIETY / CONFUSION \par - 22: feels awful - "not myself". Ashamed - doesn't want anyone to see her this way.  \par Worried about many things including having enough oxygen, about falling, being a burden to her children. \par Used to be active independent and "happy go lizz."  States "everybody loved me!" \par More anxious after   of COVID.  Previously on xanax PRN for anxiety.  After   started taking Xanax TID pretty regularly. Significant decline after fall in .  Started on Zoloft after that.  Recently off Ambien and switched to Remeron instead. \par Wants to go back to being independent and active as she used to be prior to fall. \par Miserable living at the [facility] - "I don't want to see all the old and crippled people, I don't want to get that way." \par Doesn't want to be a burden on her children.  \par Xanax was held today - last dose was last night - "because otherwise she is completely out of it and too sleepy" per dtr.\par - 22: Took a fall at the [facility] [place] approx end of July or beginning of Aug '22 - bad fall but "didn't realize it".  Hospitalized at [facility] and "all kinds of tests were OK." Had bruises and pain but no fractures. \par Since then "always afraid of something."  Afraid of falling. Afraid that sometimes oxygen won't come and won't have anything to breathe with. One fear leads to another fear. Feels like she's "going crazy." Feels like something bad is going to happen to her. Frightened. Can't sleep.  Can't go to bathroom. Last BM was 3 days ago - "very hard". \par "Everything I do is hard and I don't know what to do." \par "Fear of losing my mind." \par h/o COVID approx ? - mild symptoms, no treatment \par \par - Sleep - better now\par \par - Appetite: decreased, baseline wt ~180 --> 150s now\par \par - Motor: h/o recurrent falls. \par Last fall in  w/ injury/requiring hospitalization. \par Ambulates with cane around the apartment, WC outside for longer distances d/t generalized weakness and SOB/on oxygen\par \par - Mood/behavior: rare episodes of anxiety in past few weeks \par \par - MOCA  on 22\par - CTH 22: not impressive \par \par - Seen by psych for anxiety years ago - Dr. Carrion?\par - Previously on Ambien 5mg QHS since at least  - last filled 22. Switched Ambien to Remeron QHS approx  d/t recurrent falls \par - Previously on Xanax 0.25mg TID - has not needed to use it since last visit \par - Takes Zoloft\par - Takes Remeron \par \par HFpEF / CHronic SOBE / on oxygen\par - follows w/ cards Dr. Lopes\par \par PMD Dr. Raulito Godinez at [facility] \par  [Driving Concerns] : not driving or driving without noted concerns [[facility]] : >12  [de-identified] : [facility]le[facility] Sp helps since moved to [facility]  [de-identified] : PHQ2 of 5 on 9/16/22  [GDS] : 9 on 9/23/22  [AdvancecareDate] : 9/23/22  [FreeTextEntry4] : HCP form on file: dtr Shirlene is primary, alternate is son Ronald\par GOC: priority is "not being a burden to my children" and "dying peacefully in my sleep", does not want to go to the hospital, wants to die in comfort of home.  Does not want aggressive measures at EOL.  MOLST pending completion.

## 2022-10-12 NOTE — ASSESSMENT
[FreeTextEntry1] : Hospital d/c summary reviewed in EMR\par \par BW from 10/6/22 reviewed in EMR\par \par Called [facility] and obtained current meds list.\par Pt still on Lipitor - pt no longer c/o LE pain/cramps per daughter - c/w same \par Xanax was changed on med list to 0.25mg BID PRN - unclear when was last adminsitered - 9/27/22?\par Pt/dtr agree to use Xanax 0.25mg daily PRN severe anxiety/panic attack only\par - c/w Zoloft, Remeron, Melatonin for anxiety/sleep for now\par - Will consider trial Zoloft taper as may inc risk for bleeding\par - Monitor closely for s/s of serotonin syndrome/serotonin toxicity (eg, hyperreflexia, clonus, hyperthermia, diaphoresis, tremor, autonomic instability, mental status changes) while on Remeron and Zoloft\par - f/u with psych Dr. Brito as scheduled in 11/22 \par \par On Senna 2 tabs QHS PRN - unclear when was last administered? \par Dtr will clarify meds (including laxatives, etc.) which pt has in the apartment\par Adv to try Senna 1-2 tabs QHS for constipation, can add Miralax daily PRN if no BM x 2 days\par Dtr to bring BM and sleep log to next visit for review\par Adv to keep log of significant anxiety/confusion/agitation events to review at next visit\par \par - c/w 24/7 supervision for safety and help with ADLs and for comfort/socialization and monitoring of symptoms\par HIGH risk for falls/injury\par Maximize sensory input - f/u with optho, consider ENT/audiology f/u and trial HAs\par Fall precautions\par \par Discussed r/b/a of resuming AC.  Family in agreement to resume. \par Will touch base with cardiology team to clarify agent/dosing - start Eliquis 2.5mg BID? \par \par Repeat VS and orthostatics at next visit \par \par Rest as per PMD\par \par - f/u in 1 week for re-eval, unless earlier PRN \par \par

## 2022-10-12 NOTE — REASON FOR VISIT
[Post Hospitalization] : a post hospitalization visit [FreeTextEntry1] : anxiety, insomnia, recurrent falls, polypharmacy, recently hospitalized for anemia and suspected GIB [FreeTextEntry3] : tyree Garber  [FreeTextEntry2] : who assist with history per pt request and d/t patient with cognitive impairment

## 2022-10-14 DIAGNOSIS — R09.81 NASAL CONGESTION: ICD-10-CM

## 2022-10-15 ENCOUNTER — NON-APPOINTMENT (OUTPATIENT)
Age: 87
End: 2022-10-15

## 2022-10-17 ENCOUNTER — APPOINTMENT (OUTPATIENT)
Dept: HEART FAILURE | Facility: CLINIC | Age: 87
End: 2022-10-17

## 2022-10-17 LAB
BASOPHILS # BLD AUTO: 0.03 K/UL
BASOPHILS NFR BLD AUTO: 0.4 %
EOSINOPHIL # BLD AUTO: 0.19 K/UL
EOSINOPHIL NFR BLD AUTO: 2.3 %
HCT VFR BLD CALC: 30.1 %
HGB BLD-MCNC: 8.8 G/DL
IMM GRANULOCYTES NFR BLD AUTO: 0.6 %
LYMPHOCYTES # BLD AUTO: 0.92 K/UL
LYMPHOCYTES NFR BLD AUTO: 11.1 %
MAN DIFF?: NORMAL
MCHC RBC-ENTMCNC: 24 PG
MCHC RBC-ENTMCNC: 29.2 GM/DL
MCV RBC AUTO: 82 FL
MONOCYTES # BLD AUTO: 0.96 K/UL
MONOCYTES NFR BLD AUTO: 11.6 %
NEUTROPHILS # BLD AUTO: 6.13 K/UL
NEUTROPHILS NFR BLD AUTO: 74 %
PLATELET # BLD AUTO: 209 K/UL
RBC # BLD: 3.67 M/UL
RBC # FLD: 19 %
WBC # FLD AUTO: 8.28 K/UL

## 2022-10-18 ENCOUNTER — APPOINTMENT (OUTPATIENT)
Dept: VASCULAR SURGERY | Facility: CLINIC | Age: 87
End: 2022-10-18

## 2022-10-21 ENCOUNTER — APPOINTMENT (OUTPATIENT)
Dept: GERIATRICS | Facility: CLINIC | Age: 87
End: 2022-10-21

## 2022-10-21 ENCOUNTER — NON-APPOINTMENT (OUTPATIENT)
Age: 87
End: 2022-10-21

## 2022-10-21 DIAGNOSIS — K08.9 DISORDER OF TEETH AND SUPPORTING STRUCTURES, UNSPECIFIED: ICD-10-CM

## 2022-10-21 DIAGNOSIS — K59.00 CONSTIPATION, UNSPECIFIED: ICD-10-CM

## 2022-10-21 PROCEDURE — 99215 OFFICE O/P EST HI 40 MIN: CPT | Mod: 95

## 2022-10-21 RX ORDER — POLYETHYLENE GLYCOL 3350 17 G/17G
17 POWDER, FOR SOLUTION ORAL
Qty: 1 | Refills: 3 | Status: ACTIVE | COMMUNITY
Start: 2022-10-21 | End: 1900-01-01

## 2022-10-21 NOTE — PHYSICAL EXAM
[Normal Outer Ear/Nose] : the ears and nose were normal in appearance [No Respiratory Distress] : no respiratory distress [No Acc Muscle Use] : no accessory muscle use [Respiration, Rhythm And Depth] : normal respiratory rhythm and effort [No Clubbing, Cyanosis] : no clubbing or cyanosis of the fingernails [MocaTotal] : 18 [Normal] : alert, in no acute distress [Sclera] : the sclera and conjunctiva were normal [Normal Appearance] : the appearance of the neck was normal [Normal Hearing] : hearing was not normal [Normal Gait] : abnormal gait [Normal Insight/Judgment] : insight and judgment were not intact [de-identified] : +O2 via NC [de-identified] : Calm, DAIN-7 of 13/somewhat difficult on 9/23/22

## 2022-10-21 NOTE — REASON FOR VISIT
[Follow-Up] : a follow-up visit [FreeTextEntry1] : anxiety, insomnia, recurrent falls, polypharmacy [FreeTextEntry3] : tyree Garber  [FreeTextEntry2] : who assist with history per pt request and d/t patient with cognitive impairment

## 2022-10-27 ENCOUNTER — NON-APPOINTMENT (OUTPATIENT)
Age: 87
End: 2022-10-27

## 2022-10-27 LAB
BASOPHILS # BLD AUTO: 0.05 K/UL
BASOPHILS NFR BLD AUTO: 0.8 %
EOSINOPHIL # BLD AUTO: 0.31 K/UL
EOSINOPHIL NFR BLD AUTO: 5 %
HCT VFR BLD CALC: 26.1 %
HGB BLD-MCNC: 7.6 G/DL
IMM GRANULOCYTES NFR BLD AUTO: 0.8 %
LYMPHOCYTES # BLD AUTO: 0.95 K/UL
LYMPHOCYTES NFR BLD AUTO: 15.2 %
MAN DIFF?: NORMAL
MCHC RBC-ENTMCNC: 22.2 PG
MCHC RBC-ENTMCNC: 29.1 GM/DL
MCV RBC AUTO: 76.1 FL
MONOCYTES # BLD AUTO: 1.11 K/UL
MONOCYTES NFR BLD AUTO: 17.8 %
NEUTROPHILS # BLD AUTO: 3.77 K/UL
NEUTROPHILS NFR BLD AUTO: 60.4 %
PLATELET # BLD AUTO: 222 K/UL
RBC # BLD: 3.43 M/UL
RBC # FLD: 20.7 %
WBC # FLD AUTO: 6.24 K/UL

## 2022-10-31 LAB
BASOPHILS # BLD AUTO: 0.02 K/UL
BASOPHILS NFR BLD AUTO: 0.3 %
EOSINOPHIL # BLD AUTO: 0.25 K/UL
EOSINOPHIL NFR BLD AUTO: 3.8 %
HCT VFR BLD CALC: 27.6 %
HGB BLD-MCNC: 7.8 G/DL
IMM GRANULOCYTES NFR BLD AUTO: 0.9 %
LYMPHOCYTES # BLD AUTO: 0.77 K/UL
LYMPHOCYTES NFR BLD AUTO: 11.7 %
MAN DIFF?: NORMAL
MCHC RBC-ENTMCNC: 22.2 PG
MCHC RBC-ENTMCNC: 28.3 GM/DL
MCV RBC AUTO: 78.4 FL
MONOCYTES # BLD AUTO: 0.93 K/UL
MONOCYTES NFR BLD AUTO: 14.1 %
NEUTROPHILS # BLD AUTO: 4.56 K/UL
NEUTROPHILS NFR BLD AUTO: 69.2 %
PLATELET # BLD AUTO: 230 K/UL
RBC # BLD: 3.52 M/UL
RBC # FLD: 20.6 %
WBC # FLD AUTO: 6.59 K/UL

## 2022-11-04 ENCOUNTER — INPATIENT (INPATIENT)
Facility: HOSPITAL | Age: 87
LOS: 3 days | Discharge: NURSING, REHAB OR LONG-TERM CARE | DRG: 871 | End: 2022-11-08
Attending: INTERNAL MEDICINE | Admitting: INTERNAL MEDICINE
Payer: MEDICARE

## 2022-11-04 VITALS
SYSTOLIC BLOOD PRESSURE: 135 MMHG | HEIGHT: 63 IN | TEMPERATURE: 99 F | HEART RATE: 111 BPM | OXYGEN SATURATION: 100 % | WEIGHT: 160.94 LBS | DIASTOLIC BLOOD PRESSURE: 66 MMHG | RESPIRATION RATE: 32 BRPM

## 2022-11-04 DIAGNOSIS — H26.9 UNSPECIFIED CATARACT: Chronic | ICD-10-CM

## 2022-11-04 DIAGNOSIS — Z98.89 OTHER SPECIFIED POSTPROCEDURAL STATES: Chronic | ICD-10-CM

## 2022-11-04 LAB
ANION GAP SERPL CALC-SCNC: 12 MMOL/L — SIGNIFICANT CHANGE UP (ref 5–17)
APTT BLD: 30.2 SEC — SIGNIFICANT CHANGE UP (ref 27.5–35.5)
BUN SERPL-MCNC: 31 MG/DL — HIGH (ref 7–23)
CALCIUM SERPL-MCNC: 8.1 MG/DL — LOW (ref 8.4–10.5)
CHLORIDE SERPL-SCNC: 102 MMOL/L — SIGNIFICANT CHANGE UP (ref 96–108)
CO2 SERPL-SCNC: 25 MMOL/L — SIGNIFICANT CHANGE UP (ref 22–31)
CREAT SERPL-MCNC: 1.67 MG/DL — HIGH (ref 0.5–1.3)
EGFR: 29 ML/MIN/1.73M2 — LOW
GLUCOSE SERPL-MCNC: 153 MG/DL — HIGH (ref 70–99)
HCT VFR BLD CALC: 28.1 % — LOW (ref 34.5–45)
HGB BLD-MCNC: 8.2 G/DL — LOW (ref 11.5–15.5)
INR BLD: 1.51 RATIO — HIGH (ref 0.88–1.16)
MCHC RBC-ENTMCNC: 21.9 PG — LOW (ref 27–34)
MCHC RBC-ENTMCNC: 29.2 GM/DL — LOW (ref 32–36)
MCV RBC AUTO: 75.1 FL — LOW (ref 80–100)
PLATELET # BLD AUTO: 176 K/UL — SIGNIFICANT CHANGE UP (ref 150–400)
POTASSIUM SERPL-MCNC: 4.5 MMOL/L — SIGNIFICANT CHANGE UP (ref 3.5–5.3)
POTASSIUM SERPL-SCNC: 4.5 MMOL/L — SIGNIFICANT CHANGE UP (ref 3.5–5.3)
PROTHROM AB SERPL-ACNC: 17.9 SEC — HIGH (ref 10.5–13.4)
RBC # BLD: 3.74 M/UL — LOW (ref 3.8–5.2)
RBC # FLD: 20.3 % — HIGH (ref 10.3–14.5)
SODIUM SERPL-SCNC: 139 MMOL/L — SIGNIFICANT CHANGE UP (ref 135–145)
WBC # BLD: 13.45 K/UL — HIGH (ref 3.8–10.5)
WBC # FLD AUTO: 13.45 K/UL — HIGH (ref 3.8–10.5)

## 2022-11-04 PROCEDURE — 71045 X-RAY EXAM CHEST 1 VIEW: CPT | Mod: 26

## 2022-11-04 PROCEDURE — 99285 EMERGENCY DEPT VISIT HI MDM: CPT | Mod: CS

## 2022-11-04 RX ORDER — VANCOMYCIN HCL 1 G
1000 VIAL (EA) INTRAVENOUS ONCE
Refills: 0 | Status: COMPLETED | OUTPATIENT
Start: 2022-11-04 | End: 2022-11-04

## 2022-11-04 RX ORDER — PIPERACILLIN AND TAZOBACTAM 4; .5 G/20ML; G/20ML
3.38 INJECTION, POWDER, LYOPHILIZED, FOR SOLUTION INTRAVENOUS ONCE
Refills: 0 | Status: COMPLETED | OUTPATIENT
Start: 2022-11-04 | End: 2022-11-04

## 2022-11-04 RX ORDER — ACETAMINOPHEN 500 MG
1000 TABLET ORAL ONCE
Refills: 0 | Status: COMPLETED | OUTPATIENT
Start: 2022-11-04 | End: 2022-11-04

## 2022-11-04 RX ADMIN — Medication 1000 MILLIGRAM(S): at 23:41

## 2022-11-04 RX ADMIN — Medication 400 MILLIGRAM(S): at 23:35

## 2022-11-04 RX ADMIN — Medication 1000 MILLIGRAM(S): at 23:50

## 2022-11-04 NOTE — ED ADULT NURSE NOTE - NSIMPLEMENTINTERV_GEN_ALL_ED
Implemented All Fall Risk Interventions:  [place] to call system. Call bell, personal items and telephone within reach. Instruct patient to call for assistance. Room bathroom lighting operational. Non-slip footwear when patient is off stretcher. Physically safe environment: no spills, clutter or unnecessary equipment. Stretcher in lowest position, wheels locked, appropriate side rails in place. Provide visual cue, wrist band, yellow gown, etc. Monitor gait and stability. Monitor for mental status changes and reorient to person, place, and time. Review medications for side effects contributing to fall risk. Reinforce activity limits and safety measures with patient and family.

## 2022-11-05 DIAGNOSIS — N17.9 ACUTE KIDNEY FAILURE, UNSPECIFIED: ICD-10-CM

## 2022-11-05 DIAGNOSIS — J96.21 ACUTE AND CHRONIC RESPIRATORY FAILURE WITH HYPOXIA: ICD-10-CM

## 2022-11-05 DIAGNOSIS — D50.9 IRON DEFICIENCY ANEMIA, UNSPECIFIED: ICD-10-CM

## 2022-11-05 DIAGNOSIS — A41.9 SEPSIS, UNSPECIFIED ORGANISM: ICD-10-CM

## 2022-11-05 DIAGNOSIS — I50.33 ACUTE ON CHRONIC DIASTOLIC (CONGESTIVE) HEART FAILURE: ICD-10-CM

## 2022-11-05 DIAGNOSIS — R09.89 OTHER SPECIFIED SYMPTOMS AND SIGNS INVOLVING THE CIRCULATORY AND RESPIRATORY SYSTEMS: ICD-10-CM

## 2022-11-05 DIAGNOSIS — J18.9 PNEUMONIA, UNSPECIFIED ORGANISM: ICD-10-CM

## 2022-11-05 DIAGNOSIS — L03.116 CELLULITIS OF LEFT LOWER LIMB: ICD-10-CM

## 2022-11-05 DIAGNOSIS — Z29.9 ENCOUNTER FOR PROPHYLACTIC MEASURES, UNSPECIFIED: ICD-10-CM

## 2022-11-05 LAB
ALBUMIN SERPL ELPH-MCNC: 3.1 G/DL — LOW (ref 3.3–5)
ALP SERPL-CCNC: 66 U/L — SIGNIFICANT CHANGE UP (ref 30–120)
ALT FLD-CCNC: 13 U/L DA — SIGNIFICANT CHANGE UP (ref 10–60)
ANION GAP SERPL CALC-SCNC: 8 MMOL/L — SIGNIFICANT CHANGE UP (ref 5–17)
ANISOCYTOSIS BLD QL: SLIGHT — SIGNIFICANT CHANGE UP
APPEARANCE UR: CLEAR — SIGNIFICANT CHANGE UP
AST SERPL-CCNC: 19 U/L — SIGNIFICANT CHANGE UP (ref 10–40)
BACTERIA # UR AUTO: NEGATIVE — SIGNIFICANT CHANGE UP
BASE EXCESS BLDA CALC-SCNC: -1.4 MMOL/L — SIGNIFICANT CHANGE UP (ref -2–3)
BASOPHILS # BLD AUTO: 0.02 K/UL — SIGNIFICANT CHANGE UP (ref 0–0.2)
BASOPHILS # BLD AUTO: 0.04 K/UL — SIGNIFICANT CHANGE UP (ref 0–0.2)
BASOPHILS NFR BLD AUTO: 0.2 % — SIGNIFICANT CHANGE UP (ref 0–2)
BASOPHILS NFR BLD AUTO: 0.3 % — SIGNIFICANT CHANGE UP (ref 0–2)
BILIRUB SERPL-MCNC: 0.5 MG/DL — SIGNIFICANT CHANGE UP (ref 0.2–1.2)
BILIRUB UR-MCNC: NEGATIVE — SIGNIFICANT CHANGE UP
BLD GP AB SCN SERPL QL: SIGNIFICANT CHANGE UP
BUN SERPL-MCNC: 30 MG/DL — HIGH (ref 7–23)
CALCIUM SERPL-MCNC: 7.8 MG/DL — LOW (ref 8.4–10.5)
CHLORIDE SERPL-SCNC: 105 MMOL/L — SIGNIFICANT CHANGE UP (ref 96–108)
CK MB BLD-MCNC: 3.2 % — SIGNIFICANT CHANGE UP (ref 0–3.5)
CK MB CFR SERPL CALC: 0.9 NG/ML — SIGNIFICANT CHANGE UP (ref 0–3.6)
CK SERPL-CCNC: 28 U/L — SIGNIFICANT CHANGE UP (ref 26–192)
CO2 SERPL-SCNC: 26 MMOL/L — SIGNIFICANT CHANGE UP (ref 22–31)
COLOR SPEC: YELLOW — SIGNIFICANT CHANGE UP
CREAT SERPL-MCNC: 1.56 MG/DL — HIGH (ref 0.5–1.3)
CRP SERPL-MCNC: 22 MG/L — HIGH
DIFF PNL FLD: NEGATIVE — SIGNIFICANT CHANGE UP
EGFR: 31 ML/MIN/1.73M2 — LOW
EOSINOPHIL # BLD AUTO: 0.03 K/UL — SIGNIFICANT CHANGE UP (ref 0–0.5)
EOSINOPHIL # BLD AUTO: 0.19 K/UL — SIGNIFICANT CHANGE UP (ref 0–0.5)
EOSINOPHIL NFR BLD AUTO: 0.2 % — SIGNIFICANT CHANGE UP (ref 0–6)
EOSINOPHIL NFR BLD AUTO: 1.4 % — SIGNIFICANT CHANGE UP (ref 0–6)
EPI CELLS # UR: SIGNIFICANT CHANGE UP
GLUCOSE SERPL-MCNC: 132 MG/DL — HIGH (ref 70–99)
GLUCOSE UR QL: NEGATIVE MG/DL — SIGNIFICANT CHANGE UP
HCO3 BLDA-SCNC: 24 MMOL/L — SIGNIFICANT CHANGE UP (ref 21–28)
HCT VFR BLD CALC: 23.3 % — LOW (ref 34.5–45)
HGB BLD-MCNC: 6.9 G/DL — CRITICAL LOW (ref 11.5–15.5)
HOROWITZ INDEX BLDA+IHG-RTO: 100 — SIGNIFICANT CHANGE UP
HYPOCHROMIA BLD QL: SLIGHT — SIGNIFICANT CHANGE UP
IMM GRANULOCYTES NFR BLD AUTO: 0.8 % — SIGNIFICANT CHANGE UP (ref 0–0.9)
IMM GRANULOCYTES NFR BLD AUTO: 1.2 % — HIGH (ref 0–0.9)
KETONES UR-MCNC: NEGATIVE — SIGNIFICANT CHANGE UP
LACTATE SERPL-SCNC: 1.2 MMOL/L — SIGNIFICANT CHANGE UP (ref 0.7–2)
LACTATE SERPL-SCNC: 2.9 MMOL/L — HIGH (ref 0.7–2)
LEUKOCYTE ESTERASE UR-ACNC: NEGATIVE — SIGNIFICANT CHANGE UP
LYMPHOCYTES # BLD AUTO: 1.09 K/UL — SIGNIFICANT CHANGE UP (ref 1–3.3)
LYMPHOCYTES # BLD AUTO: 1.48 K/UL — SIGNIFICANT CHANGE UP (ref 1–3.3)
LYMPHOCYTES # BLD AUTO: 11 % — LOW (ref 13–44)
LYMPHOCYTES # BLD AUTO: 8.6 % — LOW (ref 13–44)
MANUAL SMEAR VERIFICATION: SIGNIFICANT CHANGE UP
MCHC RBC-ENTMCNC: 21.9 PG — LOW (ref 27–34)
MCHC RBC-ENTMCNC: 29.6 GM/DL — LOW (ref 32–36)
MCV RBC AUTO: 74 FL — LOW (ref 80–100)
MONOCYTES # BLD AUTO: 1.33 K/UL — HIGH (ref 0–0.9)
MONOCYTES # BLD AUTO: 1.38 K/UL — HIGH (ref 0–0.9)
MONOCYTES NFR BLD AUTO: 10.3 % — SIGNIFICANT CHANGE UP (ref 2–14)
MONOCYTES NFR BLD AUTO: 10.5 % — SIGNIFICANT CHANGE UP (ref 2–14)
NEUTROPHILS # BLD AUTO: 10.08 K/UL — HIGH (ref 1.8–7.4)
NEUTROPHILS # BLD AUTO: 10.2 K/UL — HIGH (ref 1.8–7.4)
NEUTROPHILS NFR BLD AUTO: 75.8 % — SIGNIFICANT CHANGE UP (ref 43–77)
NEUTROPHILS NFR BLD AUTO: 79.7 % — HIGH (ref 43–77)
NITRITE UR-MCNC: NEGATIVE — SIGNIFICANT CHANGE UP
NRBC # BLD: 0 /100 WBCS — SIGNIFICANT CHANGE UP (ref 0–0)
NRBC # BLD: 0 /100 WBCS — SIGNIFICANT CHANGE UP (ref 0–0)
NT-PROBNP SERPL-SCNC: 6253 PG/ML — HIGH (ref 0–450)
PCO2 BLDA: 39 MMHG — HIGH (ref 32–35)
PH BLDA: 7.39 — SIGNIFICANT CHANGE UP (ref 7.35–7.45)
PH UR: 5 — SIGNIFICANT CHANGE UP (ref 5–8)
PLAT MORPH BLD: NORMAL — SIGNIFICANT CHANGE UP
PLATELET # BLD AUTO: 144 K/UL — LOW (ref 150–400)
PO2 BLDA: 231 MMHG — HIGH (ref 83–108)
POLYCHROMASIA BLD QL SMEAR: SLIGHT — SIGNIFICANT CHANGE UP
POTASSIUM SERPL-MCNC: 4 MMOL/L — SIGNIFICANT CHANGE UP (ref 3.5–5.3)
POTASSIUM SERPL-SCNC: 4 MMOL/L — SIGNIFICANT CHANGE UP (ref 3.5–5.3)
PROCALCITONIN SERPL-MCNC: 0.19 NG/ML — HIGH (ref 0.02–0.1)
PROT SERPL-MCNC: 7 G/DL — SIGNIFICANT CHANGE UP (ref 6–8.3)
PROT UR-MCNC: 15 MG/DL
RBC # BLD: 3.15 M/UL — LOW (ref 3.8–5.2)
RBC # FLD: 19.9 % — HIGH (ref 10.3–14.5)
RBC BLD AUTO: ABNORMAL
RBC CASTS # UR COMP ASSIST: NEGATIVE /HPF — SIGNIFICANT CHANGE UP (ref 0–4)
SAO2 % BLDA: 98.5 % — HIGH (ref 94–98)
SARS-COV-2 RNA SPEC QL NAA+PROBE: SIGNIFICANT CHANGE UP
SODIUM SERPL-SCNC: 139 MMOL/L — SIGNIFICANT CHANGE UP (ref 135–145)
SP GR SPEC: 1.01 — SIGNIFICANT CHANGE UP (ref 1.01–1.02)
TARGETS BLD QL SMEAR: SLIGHT — SIGNIFICANT CHANGE UP
TROPONIN I, HIGH SENSITIVITY RESULT: 44.4 NG/L — SIGNIFICANT CHANGE UP
UROBILINOGEN FLD QL: NEGATIVE MG/DL — SIGNIFICANT CHANGE UP
WBC # BLD: 12.65 K/UL — HIGH (ref 3.8–10.5)
WBC # FLD AUTO: 12.65 K/UL — HIGH (ref 3.8–10.5)
WBC UR QL: NEGATIVE — SIGNIFICANT CHANGE UP

## 2022-11-05 PROCEDURE — 71045 X-RAY EXAM CHEST 1 VIEW: CPT | Mod: 26

## 2022-11-05 PROCEDURE — 93010 ELECTROCARDIOGRAM REPORT: CPT

## 2022-11-05 PROCEDURE — 93306 TTE W/DOPPLER COMPLETE: CPT | Mod: 26

## 2022-11-05 PROCEDURE — 99223 1ST HOSP IP/OBS HIGH 75: CPT | Mod: AI

## 2022-11-05 PROCEDURE — 93970 EXTREMITY STUDY: CPT | Mod: 26

## 2022-11-05 PROCEDURE — 71250 CT THORAX DX C-: CPT | Mod: 26

## 2022-11-05 RX ORDER — PIPERACILLIN AND TAZOBACTAM 4; .5 G/20ML; G/20ML
3.38 INJECTION, POWDER, LYOPHILIZED, FOR SOLUTION INTRAVENOUS EVERY 8 HOURS
Refills: 0 | Status: DISCONTINUED | OUTPATIENT
Start: 2022-11-05 | End: 2022-11-06

## 2022-11-05 RX ORDER — VANCOMYCIN HCL 1 G
1000 VIAL (EA) INTRAVENOUS EVERY 24 HOURS
Refills: 0 | Status: DISCONTINUED | OUTPATIENT
Start: 2022-11-05 | End: 2022-11-06

## 2022-11-05 RX ORDER — METOPROLOL TARTRATE 50 MG
25 TABLET ORAL
Refills: 0 | Status: DISCONTINUED | OUTPATIENT
Start: 2022-11-05 | End: 2022-11-08

## 2022-11-05 RX ORDER — SENNA PLUS 8.6 MG/1
2 TABLET ORAL AT BEDTIME
Refills: 0 | Status: DISCONTINUED | OUTPATIENT
Start: 2022-11-05 | End: 2022-11-08

## 2022-11-05 RX ORDER — MOMETASONE FUROATE 220 UG/1
1 INHALANT RESPIRATORY (INHALATION) DAILY
Refills: 0 | Status: DISCONTINUED | OUTPATIENT
Start: 2022-11-05 | End: 2022-11-08

## 2022-11-05 RX ORDER — SODIUM CHLORIDE 9 MG/ML
1000 INJECTION INTRAMUSCULAR; INTRAVENOUS; SUBCUTANEOUS ONCE
Refills: 0 | Status: COMPLETED | OUTPATIENT
Start: 2022-11-05 | End: 2022-11-05

## 2022-11-05 RX ORDER — LANOLIN ALCOHOL/MO/W.PET/CERES
3 CREAM (GRAM) TOPICAL AT BEDTIME
Refills: 0 | Status: DISCONTINUED | OUTPATIENT
Start: 2022-11-05 | End: 2022-11-08

## 2022-11-05 RX ORDER — PANTOPRAZOLE SODIUM 20 MG/1
40 TABLET, DELAYED RELEASE ORAL
Refills: 0 | Status: DISCONTINUED | OUTPATIENT
Start: 2022-11-05 | End: 2022-11-08

## 2022-11-05 RX ORDER — PANTOPRAZOLE SODIUM 20 MG/1
40 TABLET, DELAYED RELEASE ORAL
Refills: 0 | Status: DISCONTINUED | OUTPATIENT
Start: 2022-11-05 | End: 2022-11-05

## 2022-11-05 RX ORDER — FERROUS SULFATE 325(65) MG
325 TABLET ORAL DAILY
Refills: 0 | Status: DISCONTINUED | OUTPATIENT
Start: 2022-11-05 | End: 2022-11-08

## 2022-11-05 RX ORDER — ALPRAZOLAM 0.25 MG
0.5 TABLET ORAL ONCE
Refills: 0 | Status: DISCONTINUED | OUTPATIENT
Start: 2022-11-05 | End: 2022-11-05

## 2022-11-05 RX ORDER — FUROSEMIDE 40 MG
40 TABLET ORAL EVERY 12 HOURS
Refills: 0 | Status: DISCONTINUED | OUTPATIENT
Start: 2022-11-05 | End: 2022-11-07

## 2022-11-05 RX ORDER — ZOLPIDEM TARTRATE 10 MG/1
1 TABLET ORAL
Qty: 0 | Refills: 0 | DISCHARGE

## 2022-11-05 RX ORDER — SPIRONOLACTONE 25 MG/1
25 TABLET, FILM COATED ORAL DAILY
Refills: 0 | Status: DISCONTINUED | OUTPATIENT
Start: 2022-11-05 | End: 2022-11-08

## 2022-11-05 RX ORDER — APIXABAN 2.5 MG/1
2.5 TABLET, FILM COATED ORAL EVERY 12 HOURS
Refills: 0 | Status: DISCONTINUED | OUTPATIENT
Start: 2022-11-05 | End: 2022-11-05

## 2022-11-05 RX ORDER — ALPRAZOLAM 0.25 MG
0.25 TABLET ORAL
Refills: 0 | Status: DISCONTINUED | OUTPATIENT
Start: 2022-11-05 | End: 2022-11-08

## 2022-11-05 RX ORDER — POLYETHYLENE GLYCOL 3350 17 G/17G
17 POWDER, FOR SOLUTION ORAL AT BEDTIME
Refills: 0 | Status: DISCONTINUED | OUTPATIENT
Start: 2022-11-05 | End: 2022-11-08

## 2022-11-05 RX ORDER — MIRTAZAPINE 45 MG/1
15 TABLET, ORALLY DISINTEGRATING ORAL AT BEDTIME
Refills: 0 | Status: DISCONTINUED | OUTPATIENT
Start: 2022-11-05 | End: 2022-11-08

## 2022-11-05 RX ORDER — SERTRALINE 25 MG/1
100 TABLET, FILM COATED ORAL DAILY
Refills: 0 | Status: DISCONTINUED | OUTPATIENT
Start: 2022-11-05 | End: 2022-11-08

## 2022-11-05 RX ORDER — ATORVASTATIN CALCIUM 80 MG/1
10 TABLET, FILM COATED ORAL AT BEDTIME
Refills: 0 | Status: DISCONTINUED | OUTPATIENT
Start: 2022-11-05 | End: 2022-11-08

## 2022-11-05 RX ADMIN — PIPERACILLIN AND TAZOBACTAM 200 GRAM(S): 4; .5 INJECTION, POWDER, LYOPHILIZED, FOR SOLUTION INTRAVENOUS at 00:02

## 2022-11-05 RX ADMIN — PIPERACILLIN AND TAZOBACTAM 25 GRAM(S): 4; .5 INJECTION, POWDER, LYOPHILIZED, FOR SOLUTION INTRAVENOUS at 21:59

## 2022-11-05 RX ADMIN — Medication 250 MILLIGRAM(S): at 00:34

## 2022-11-05 RX ADMIN — MIRTAZAPINE 15 MILLIGRAM(S): 45 TABLET, ORALLY DISINTEGRATING ORAL at 21:58

## 2022-11-05 RX ADMIN — SPIRONOLACTONE 25 MILLIGRAM(S): 25 TABLET, FILM COATED ORAL at 06:21

## 2022-11-05 RX ADMIN — Medication 25 MILLIGRAM(S): at 06:22

## 2022-11-05 RX ADMIN — SENNA PLUS 2 TABLET(S): 8.6 TABLET ORAL at 21:58

## 2022-11-05 RX ADMIN — Medication 0.25 MILLIGRAM(S): at 15:39

## 2022-11-05 RX ADMIN — SERTRALINE 100 MILLIGRAM(S): 25 TABLET, FILM COATED ORAL at 11:56

## 2022-11-05 RX ADMIN — Medication 250 MILLIGRAM(S): at 21:57

## 2022-11-05 RX ADMIN — SODIUM CHLORIDE 1000 MILLILITER(S): 9 INJECTION INTRAMUSCULAR; INTRAVENOUS; SUBCUTANEOUS at 01:00

## 2022-11-05 RX ADMIN — Medication 325 MILLIGRAM(S): at 11:56

## 2022-11-05 RX ADMIN — PANTOPRAZOLE SODIUM 40 MILLIGRAM(S): 20 TABLET, DELAYED RELEASE ORAL at 06:22

## 2022-11-05 RX ADMIN — SODIUM CHLORIDE 1000 MILLILITER(S): 9 INJECTION INTRAMUSCULAR; INTRAVENOUS; SUBCUTANEOUS at 00:00

## 2022-11-05 RX ADMIN — ATORVASTATIN CALCIUM 10 MILLIGRAM(S): 80 TABLET, FILM COATED ORAL at 21:58

## 2022-11-05 RX ADMIN — Medication 20 MILLIGRAM(S): at 06:22

## 2022-11-05 RX ADMIN — Medication 40 MILLIGRAM(S): at 08:07

## 2022-11-05 RX ADMIN — PIPERACILLIN AND TAZOBACTAM 3.38 GRAM(S): 4; .5 INJECTION, POWDER, LYOPHILIZED, FOR SOLUTION INTRAVENOUS at 00:30

## 2022-11-05 RX ADMIN — POLYETHYLENE GLYCOL 3350 17 GRAM(S): 17 POWDER, FOR SOLUTION ORAL at 21:58

## 2022-11-05 RX ADMIN — Medication 0.5 MILLIGRAM(S): at 14:27

## 2022-11-05 RX ADMIN — Medication 1000 MILLIGRAM(S): at 01:30

## 2022-11-05 RX ADMIN — MOMETASONE FUROATE 1 PUFF(S): 220 INHALANT RESPIRATORY (INHALATION) at 09:25

## 2022-11-05 NOTE — H&P ADULT - NSHPPHYSICALEXAM_GEN_ALL_CORE
-    Vital Signs Last 24 Hrs  T(C): 37.3 (04 Nov 2022 23:07), Max: 37.3 (04 Nov 2022 23:07)  T(F): 99.1 (04 Nov 2022 23:07), Max: 99.1 (04 Nov 2022 23:07)  HR: 83 (05 Nov 2022 02:17) (83 - 111)  BP: 92/55 (05 Nov 2022 02:17) (92/55 - 135/66)  BP(mean): --  RR: 20 (05 Nov 2022 02:17) (20 - 32)  SpO2: 98% (05 Nov 2022 02:17) (98% - 100%)    Parameters below as of 04 Nov 2022 23:07  Patient On (Oxygen Delivery Method): nasal cannula  O2 Flow (L/min): 4        PHYSICAL EXAM:  		  GENERAL: NAD, well-groomed, well-developed.  HEAD:  Atraumatic, Norm cephalic, large mass over the right cheeck.  EYES: PERRLA, conjunctiva clear.  ENMT: no nasal discharge, MMM.   NECK: Supple, No JVD.  NERVOUS SYSTEM:  Alert & oriented X3, neurologically intact grossly.  CHEST/LUNG: Good air entry B/L, (+)medium sized insp B/L basal rales, no rhonchi, or wheezing.  HEART: Normal S1 & S2, grade III/VI HSM max over the apex propagated towards axilla, no extra sounds.  ABDOMEN: Soft, non-tender, non-distended; bowel sounds present, no palpable masses or organomegaly.  EXTREMITIES:  No clubbing or cyanosis, (+) B/L leg edema, (+) palm sized erythema & underlying firm lumpy tender area medial aspect of left thigh, with coin sized erythema at medial aspect of left knee.   VASCULAR: 2+ radial, brachial pulses B/L.  SKIN: B/L stasis dermatitis.  PSYCH: In good mood, smiling al the time, normal behavior.

## 2022-11-05 NOTE — CONSULT NOTE ADULT - ASSESSMENT
Assessment   89 y/o DNR/DNI F with PMH of HTN, Dyslipidemia, MR s/p TMVR, A. Fib off Eliquis 5 weeks ago for GI bleeding, Chronic Diastolic CHF, Chronic Respiratory Failure, COVID-19, CKD stage 3, GIB, Anemia, Anxiety, is admitted to hospital for SOB and acute on chronic  hypoxic RF likely 2/2 to PNA vs HFpEF Exacerbation. Hb 6.9 in setting of recent GI bleed, gave 1 unit of PRBC. CT chest shows no pneumonia, procal negative, L thigh warm possible cellulites will continue abx  Send RVP. Suspected HFpEF exacerbation as cause, treated with 40 mg BID of lasix, will monitor I/O's. ECho reviewed showed preserved EF function with RVH and decereased function along with mild-mod MR and TR. Possible acute RV failure component, will trend I/O's if failing to make urine possibly will add inotropic support for assist although in active Afib. Doppler B/l LE negative for DVT. Pt is accepted to SPCU for treatment of  1. Acute on chronic Hypoxic RF  2.  HFpEF exacerbation with possible RV failure component   3. PNA  4. Afib  5. Recent GI bleed.   6. MEME on CKD  7. L thigh cellulitis     Plan   Neuro: Xanax prn for anxiety  Cardio: HD stable at this time. Maintain MAP > 65. Echo reviewed, normal EF, RV hypertrophy and decreased function with Mild to Mod MR and TR. Lasix 40 mg BID. Metoprolol for afib, currently rate controlled. Continue aldactone. Cardiology consult appreciated   Pulm: Maintain Sat > 88 %. Currently on 3 L NC. If further desat will trial BIPAP for presumed HFpEF exac component.   GI: NPO. PPI changed to BID given hx of GI bleed and acute drop from 8.2 to 6.9.  Bowel regimen. GI consult  Renal: Acute on chronic kidney disease likely 2/2 to congestive nephropathy. Scr downtrending. Lasix BID. Hernández, Strict I/os. Trend Scr and lytes and replete as needed. Avoid nephrotoxic agents  Heme: NO AC in setting of recent bleed. S/p 1 unit of PRBC. PPI changed to BID. Will trend Hb.  ID: Bcx and Ucx pending. Procal negative. Ct chest engative for pneumonia Suspected L thigh cellulitis, will continue Zosyn and Vanco. MRSA swab, if negative will d/c vanco. RVP panel send off. ID consult apprecaited.  Endo: No active issues  Dispo: Accepted to SPCU for active treatment for HfpEF with possible BIPAP for acute on chronic hypxoic RF. DNR/DNI       Assessment   89 y/o DNR/DNI F with PMH of HTN, Dyslipidemia, MR s/p TMVR, A. Fib off Eliquis 5 weeks ago for GI bleeding, Chronic Diastolic CHF, Chronic Respiratory Failure, COVID-19, CKD stage 3, GIB, Anemia, Anxiety, is admitted to hospital for SOB and acute on chronic  hypoxic RF likely 2/2 to PNA vs HFpEF Exacerbation. Hb 6.9 in setting of recent GI bleed, gave 1 unit of PRBC. CT chest shows no pneumonia, procal negative, L thigh warm possible cellulites will continue abx  Send RVP. Suspected HFpEF exacerbation as cause, treated with 40 mg BID of lasix, will monitor I/O's. ECho reviewed showed preserved EF function with RVH and decereased function along with mild-mod MR and TR. . Doppler B/l LE negative for DVT. Pt is accepted to SPCU for treatment of  1. Acute on chronic Hypoxic RF  2.  HFpEF exacerbation with possible RV failure component   3. PNA  4. Afib  5. Recent GI bleed.   6. MEME on CKD  7. L thigh cellulitis     Plan   Neuro: Xanax prn for anxiety  Cardio: HD stable at this time. Maintain MAP > 65. Echo reviewed, normal EF, RV hypertrophy and decreased function with Mild to Mod MR and TR. Lasix 40 mg BID. Metoprolol for afib, currently rate controlled. Continue aldactone. Cardiology consult appreciated   Pulm: Maintain Sat > 88 %. Currently on 3 L NC. If further desat will trial BIPAP for presumed HFpEF exac component.   GI: NPO. PPI changed to BID given hx of GI bleed and acute drop from 8.2 to 6.9.  Bowel regimen. GI consult  Renal: Acute on chronic kidney disease likely 2/2 to congestive nephropathy. Scr downtrending. Lasix BID. Hernández, Strict I/os. Trend Scr and lytes and replete as needed. Avoid nephrotoxic agents  Heme: NO AC in setting of recent bleed. S/p 1 unit of PRBC. PPI changed to BID. Will trend Hb.  ID: Bcx and Ucx pending. Procal negative. Ct chest engative for pneumonia Suspected L thigh cellulitis, will continue Zosyn and Vanco. MRSA swab, if negative will d/c vanco. RVP panel send off. ID consult apprecaited.  Endo: No active issues  Dispo: Accepted to SPCU for active treatment for HfpEF with possible BIPAP for acute on chronic hypxoic RF. DNR/DNI    Case Discussed with eICU attending    Assessment   89 y/o DNR/DNI F with PMH of HTN, Dyslipidemia, MR s/p TMVR, A. Fib off Eliquis 5 weeks ago for GI bleeding, Chronic Diastolic CHF, Chronic Respiratory Failure, COVID-19, CKD stage 3, GIB, Anemia, Anxiety, is admitted to hospital for SOB and acute on chronic  hypoxic RF likely 2/2 to PNA vs HFpEF Exacerbation. Hb 6.9 in setting of recent GI bleed, gave 1 unit of PRBC. CT chest shows no pneumonia, procal negative, L thigh warm possible cellulites will continue abx  Send RVP. Suspected HFpEF exacerbation as cause, treated with 40 mg BID of lasix, will monitor I/O's. ECho reviewed showed preserved EF function with RVH and decereased function along with mild-mod MR and TR. . Doppler B/l LE negative for DVT. Pt is accepted to SPCU for treatment of  1. Acute on chronic Hypoxic RF  2.  HFpEF exacerbation with possible RV failure component   3. PNA  4. Afib  5. Recent GI bleed.   6. MEME on CKD  7. L thigh cellulitis     Plan   Neuro: Xanax prn for anxiety  Cardio: HD stable at this time. Maintain MAP > 65. Echo reviewed, normal EF, RV hypertrophy and decreased function with Mild to Mod MR and TR. Lasix 40 mg BID. Metoprolol for afib, currently rate controlled. Continue aldactone. Cardiology consult appreciated   Pulm: Maintain Sat > 88 %. Currently on 3 L NC. If further desat will trial BIPAP for presumed HFpEF exac component.   GI: Changed to NPO. PPI changed to BID given hx of GI bleed and acute drop from 8.2 to 6.9.  Bowel regimen. GI consult  Renal: Acute on chronic kidney disease likely 2/2 to congestive nephropathy. Scr downtrending. Lasix BID. Hernández, Strict I/os. Trend Scr and lytes and replete as needed. Avoid nephrotoxic agents  Heme: NO AC in setting of recent bleed. S/p 1 unit of PRBC. PPI changed to BID. Will trend Hb.  ID: Bcx and Ucx pending. Procal negative. Ct chest engative for pneumonia Suspected L thigh cellulitis, will continue Zosyn and Vanco. MRSA swab, if negative will d/c vanco. RVP panel send off. ID consult apprecaited.  Endo: No active issues  Dispo: Accepted to SPCU for active treatment for HfpEF with possible BIPAP for acute on chronic hypxoic RF. DNR/DNI    Case Discussed with eICU attending

## 2022-11-05 NOTE — CHART NOTE - NSCHARTNOTEFT_GEN_A_CORE
Patient seen and examined. Acutely SOB but not on any monitors.  Conversing with me and able to sign consent for blood transfusion.  Concerened for worsening hypoxic respiratory failure.  Instructed to place on tele and get a pulse OX.  May need BIPAP support and transfer to SPCU.  Patient does not want any aggressive measure and wants to be DNR/DNI Patient seen and examined. Acutely SOB but not on any monitors.  Conversing with me and able to sign consent for blood transfusion.  Concerened for worsening hypoxic respiratory failure/flash pulmonary edema from Arrythmia.  Instructed to place on tele and get a pulse OX, 12 Lead EKG and CXR.  May need BIPAP support and transfer to SPCU.  Patient does not want any aggressive measure and wants to be DNR/DNI.  Will follow.

## 2022-11-05 NOTE — H&P ADULT - PROBLEM SELECTOR PLAN 4
Ml 2ry to cellulitis Vs PNA, serum lactic acid level of 2.9 mmol/L on arrival, down to 1.2 mmol/L, antibiotic therapy as above, ID Dr. Real was called.

## 2022-11-05 NOTE — H&P ADULT - PROBLEM SELECTOR PLAN 2
ML 2ry to the above, r/o PNA, r/o PE, patient on home oxygen at 3.5 L/min 24h a day, negative COVID-19 PCR, negative B/L venous duplex for DVT, couldn't get a CTA to r/o PE as an underlying cause given her calculated Cr. Clearance of 26, can consider a VQ scan, couldn't r/u PNA with portable CXR, was started empirically on Vancomycin & Zosyn, get chest CT without contrast in am, supplemental oxygen, pulmonary consult with Dr. Nowak was called.

## 2022-11-05 NOTE — ED PROVIDER NOTE - OBJECTIVE STATEMENT
89 year old female with a history of a-fib, HTN, CHF, HLD presents with SOB.  Patient lives in the [place], was driven to ED by her daughter who is at the bedside.  Patient is on home oxygen 24 hours a day, usually 3-3.5 liters.  She has an aide at the [place] and patient c/o to aide that she was feeling short of breath today.  A visiting nurse O2 sat in the low 80s and increased oxygen to 4 liters.  However, daughter reports O2 sat kept fluctuating and patient started to become agitated.  Patient also reports productive cough x 2 months.  No fever.  She received COVID vaccines 11/2.  PMD Dr. Jones, Cardiology Alexandra Lopes 89 year old female with a history of a-fib, HTN, CHF, HLD presents with SOB.  Patient lives in the [place], was driven to ED by her daughter who is at the bedside.  Patient is on home oxygen 24 hours a day, usually 3-3.5 liters.  She has an aide at the [place] and patient c/o to aide that she was feeling short of breath today.  A visiting nurse O2 sat in the low 80s and increased oxygen to 4 liters.  However, daughter reports O2 sat kept fluctuating and patient started to become agitated.  Patient also reports productive cough x 2 months.  No fever.  She received COVID vaccine 11/2.  PMD Dr. Jones, Cardiology Alexandra Lopes

## 2022-11-05 NOTE — H&P ADULT - PROBLEM SELECTOR PLAN 1
no ACEIs or ARBs given the kidney dysfunction, hold Torsemide, started on Lasix 40 mg IVP Q 12h, continue Spironolactone, supplemental oxygen, daily body weight, TTE & TSH in am, cardiology consult with Dr. Meeks was called.

## 2022-11-05 NOTE — CONSULT NOTE ADULT - SUBJECTIVE AND OBJECTIVE BOX
REASON FOR CONSULT: HFpEF exac    CONSULT REQUESTED BY: Hospitalist     Patient is a 89y old  Female who presents with a chief complaint of Respiratory distress. (2022 16:33)      BRIEF HOSPITAL COURSE:   89 y/o F wit PMH of HTN, Dyslipidemia, MR s/p TMVR, A. Fib off Eliquis 5 weeks ago for GI bleeding, Chronic Diastolic CHF, Chronic Respiratory Failure, COVID-19, CKD stage 3, GIB, Anemia, Anxiety, Depression who presented from [facility] with respiratory distress. Patient daughter at the bedside states that they called her from the NH as her mom got SOB, with chills, no reported fever, also she reports that her mom was coughing over the past 2 months with "lumps" of thick greenish yellow non bloody sputum. Treated for possible PNA with vanc/zosyn.     Events last 24 hours:   Upgraded to SPCU for acute hypoxic RF and respiratory distress 2/2 to HFpEF for possible BIPAP     PAST MEDICAL & SURGICAL HISTORY:  HTN (hypertension)      HLD (hyperlipidemia)      Atrial fibrillation  On Pradaxa      Syncope      Depression      Leg edema      Mitral valve stenosis, unspecified etiology      Hearing loss of left ear      H/O knee surgery      H/O external ear surgery      Cataracta        Allergies    codeine (Other)    Intolerances      FAMILY HISTORY:  Family history of blood disorder (Mother)        Review of Systems:  Negative 2/2 to mental status     Medications:  piperacillin/tazobactam IVPB.. 3.375 Gram(s) IV Intermittent every 8 hours  vancomycin  IVPB 1000 milliGRAM(s) IV Intermittent every 24 hours    furosemide   Injectable 40 milliGRAM(s) IV Push every 12 hours  metoprolol tartrate 25 milliGRAM(s) Oral two times a day  spironolactone 25 milliGRAM(s) Oral daily    mometasone 220 MICROgram(s) Inhaler 1 Puff(s) Inhalation daily    ALPRAZolam 0.25 milliGRAM(s) Oral two times a day PRN  melatonin 3 milliGRAM(s) Oral at bedtime PRN  mirtazapine 15 milliGRAM(s) Oral at bedtime  sertraline 100 milliGRAM(s) Oral daily        pantoprazole    Tablet 40 milliGRAM(s) Oral before breakfast  polyethylene glycol 3350 17 Gram(s) Oral at bedtime  senna 2 Tablet(s) Oral at bedtime      atorvastatin 10 milliGRAM(s) Oral at bedtime    ferrous    sulfate 325 milliGRAM(s) Oral daily                ICU Vital Signs Last 24 Hrs  T(C): 36.4 (2022 20:00), Max: 37.3 (2022 23:07)  T(F): 97.6 (2022 20:00), Max: 99.1 (2022 23:07)  HR: 92 (2022 20:06) (83 - 116)  BP: 104/58 (2022 20:02) (92/55 - 160/82)  BP(mean): 73 (2022 20:02) (73 - 73)  ABP: --  ABP(mean): --  RR: 19 (2022 20:06) (17 - 32)  SpO2: 92% (2022 20:06) (68% - 100%)    O2 Parameters below as of 2022 20:02  Patient On (Oxygen Delivery Method): nasal cannula  O2 Flow (L/min): 3        Vital Signs Last 24 Hrs  T(C): 36.4 (2022 20:00), Max: 37.3 (2022 23:07)  T(F): 97.6 (2022 20:00), Max: 99.1 (2022 23:07)  HR: 92 (2022 20:06) (83 - 116)  BP: 104/58 (2022 20:02) (92/55 - 160/82)  BP(mean): 73 (2022 20:02) (73 - 73)  RR: 19 (2022 20:06) (17 - 32)  SpO2: 92% (2022 20:06) (68% - 100%)    Parameters below as of 2022 20:02  Patient On (Oxygen Delivery Method): nasal cannula  O2 Flow (L/min): 3      ABG - ( 2022 14:18 )  pH, Arterial: 7.39  pH, Blood: x     /  pCO2: 39    /  pO2: 231   / HCO3: 24    / Base Excess: -1.4  /  SaO2: 98.5                I&O's Detail        LABS:                        6.9    12.65 )-----------( 144      ( 2022 05:56 )             23.3     11-05    139  |  105  |  30<H>  ----------------------------<  132<H>  4.0   |  26  |  1.56<H>    Ca    7.8<L>      2022 05:56    TPro  7.0  /  Alb  3.1<L>  /  TBili  0.5  /  DBili  x   /  AST  19  /  ALT  13  /  AlkPhos  66  11-      CARDIAC MARKERS ( 2022 23:35 )  x     / x     / 28 U/L / x     / 0.9 ng/mL      CAPILLARY BLOOD GLUCOSE        PT/INR - ( 2022 23:35 )   PT: 17.9 sec;   INR: 1.51 ratio         PTT - ( 2022 23:35 )  PTT:30.2 sec  Urinalysis Basic - ( 2022 01:15 )    Color: Yellow / Appearance: Clear / S.015 / pH: x  Gluc: x / Ketone: Negative  / Bili: Negative / Urobili: Negative mg/dL   Blood: x / Protein: 15 mg/dL / Nitrite: Negative   Leuk Esterase: Negative / RBC: Negative /HPF / WBC Negative   Sq Epi: x / Non Sq Epi: Occasional / Bacteria: Negative      CULTURES:      Physical Examination:    General: No acute distress.      Neuro: Demented at baseline. Arousalable Cn2-12 intact.     HEENT: Pupils equal, reactive to light.  Symmetric.    PULM: Diminished B/l no significant sputum production    CVS: Afib rate controlled , no murmurs, rubs, or gallops    ABD: Soft, nondistended, nontender, normoactive bowel sounds, no masses    EXT: + 1 pedal edema., nontender    SKIN: Warm and well perfused, L thigh red, warm possible cellulitis     RADIOLOGY:   < from: CT Chest No Cont (22 @ 09:11) >  IMPRESSION: There is no pneumonia.    Small bilateral pleural effusions.    --- End of Report ---      < end of copied text >    < from: US Transthoracic Echocardiogram w/Doppler Complete (22 @ 12:27) >  IMPRESSION:  1. Suboptimal technical quality due to reported patient uncooperativeness   during scanning.  2. Normal left ventricular size and systolic function with estimated   ejection fraction 60-65%. Moderate left ventricular hypertrophy.  3. Right ventricular enlargement with decreased function.  4. Biatrial enlargement.  5. Bushra-clip present. Mild to moderate mitral regurgitation.  6. Mild to moderate tricuspid regurgitation.  7. Pulmonary artery systolic pressure estimated at 47 mmHg, assuming a   right atrial pressure of 8 mmHg, consistent with mild pulmonary     < end of copied text >

## 2022-11-05 NOTE — ED PROVIDER NOTE - RESPIRATORY, MLM
Breath sounds clear and equal bilaterally.  No wheezing, no rales. Speaking in short sentences, no accessory muscle use

## 2022-11-05 NOTE — H&P ADULT - PROBLEM SELECTOR PLAN 5
Stage 3 CKD, BUN/Cr 31/1.67 versus 15/1.05 on previous admission 5 weeks ago on the day of discharge, no IVF given the active CHF on diuretic therapy, avoid nephrotoxins as possible & renally dose meds, trend renal indices, consider nephrology consult if not improving.

## 2022-11-05 NOTE — CONSULT NOTE ADULT - ASSESSMENT
89 y/o F wit PMH of HTN, Dyslipidemia, MR s/p TMVR, A. Fib off Eliquis 5 weeks ago for GI bleeding, Chronic Diastolic CHF, Chronic Respiratory Failure, COVID-19, CKD stage 3, GIB, Anemia, Anxiety, Depression who presented from [facility] with respiratory distress. 89 y/o F wit PMH of HTN, Dyslipidemia, MR s/p TMVR, A. Fib off Eliquis 5 weeks ago for GI bleeding, Chronic Diastolic CHF, Chronic Respiratory Failure, COVID-19, CKD stage 3, GIB, Anemia, Anxiety, Depression who presented from [facility] with respiratory distress.    HF  hypoxemia  pleural eff  anemia  GI Bleed  valv heart disease  AF  HTN  OP  OA  PVD    doubt PNA - biomarkers - CT chest - eval clinically  suspect CHF HFPEF and Pulm HTN - exacerbation  diuresis in progress  cardio eval in progress  TTE repeat  I and O  serial labs  Anemia - Gi bleed - AF on AC - serial Hgb > 7, transfuse as needed  outpatient record reviewed - follows with Dr Lory Duong in [place]  O2 support as needed - Keep Sat > 88 pct  oral and skin care  LE doppler NEG for DVT  CXR reviewed  old record reviewed  GOC discussion  assist with needs  spoke with pt and daughter at the bedside, dtr works for [employer]

## 2022-11-05 NOTE — ED PROVIDER NOTE - CONSTITUTIONAL, MLM
Well appearing, awake, alert, oriented to person, place, time/situation and patient anxious normal...

## 2022-11-05 NOTE — CONSULT NOTE ADULT - ASSESSMENT
89 y/o F wit PMH of HTN, Dyslipidemia, MR s/p TMVR, A. Fib off Eliquis 5 weeks ago for GI bleeding, Chronic Diastolic CHF, Chronic Respiratory Failure, COVID-19, CKD stage 3, GIB, Anemia, Anxiety, Depression presented with respiratory distress.     Sepsis 2/2 PNA vs. L thigh Cellulitis  Acute on Chronic CHF exacerbation  Acute on chronic RF on NRB/NC  MEME on CKD  - pt p/w resp distress  - cx pending  Plan:   Send MRSA swab  Send uPNA ag  F/u pending cx  CT chest  C/w vancomycin, can d/c if MRSA negative (goal trough 15-20)  C/w zosyn  Trend temps/WBC  Trend renal fxn/renally dose medications/avoid nephrotoxic agents  Supportive care   Supplemental O2 as needed    D/w Dr. Jacob    Infectious Diseases will continue to follow. Please call with any questions.   Suzy Real M.D.  [facility] [phone]

## 2022-11-05 NOTE — H&P ADULT - PROBLEM SELECTOR PLAN 7
restarted patient on Eliquis given her high risk for DVT, and her A. Fib with high [id number] score, monitor H & H while inpatient, no need for further DVT prophylaxis.

## 2022-11-05 NOTE — CONSULT NOTE ADULT - SUBJECTIVE AND OBJECTIVE BOX
[facility], Division of Infectious Diseases  VANE Shine S. Shah, Y. Patel, [facility]  [phone]    ARNULFO ALEX  89y, Female  [id number]    HPI--  HPI:  This is a 89 y/o F wit PMH of HTN, Dyslipidemia, MR s/p TMVR, A. Fib off Eliquis 5 weeks ago for GI bleeding, Chronic Diastolic CHF, Chronic Respiratory Failure, COVID-19, CKD stage 3, GIB, Anemia, Anxiety, Depression who presented from [facility] with respiratory distress. Patient daughter at the bedside states that they called her from the NH as her mom got SOB, with chills, no reported fever, also she reports that her mom was coughing over the past 2 months with "lumps" of thick greenish yellow non bloody sputum. Patient is demented, unable to give further history. (2022 01:45)        Active Medications--  ALPRAZolam 0.25 milliGRAM(s) Oral two times a day PRN  atorvastatin 10 milliGRAM(s) Oral at bedtime  ferrous    sulfate 325 milliGRAM(s) Oral daily  furosemide   Injectable 40 milliGRAM(s) IV Push every 12 hours  melatonin 3 milliGRAM(s) Oral at bedtime PRN  metoprolol tartrate 25 milliGRAM(s) Oral two times a day  mirtazapine 15 milliGRAM(s) Oral at bedtime  mometasone 220 MICROgram(s) Inhaler 1 Puff(s) Inhalation daily  pantoprazole    Tablet 40 milliGRAM(s) Oral before breakfast  piperacillin/tazobactam IVPB.. 3.375 Gram(s) IV Intermittent every 8 hours  polyethylene glycol 3350 17 Gram(s) Oral at bedtime  senna 2 Tablet(s) Oral at bedtime  sertraline 100 milliGRAM(s) Oral daily  spironolactone 25 milliGRAM(s) Oral daily  vancomycin  IVPB 1000 milliGRAM(s) IV Intermittent every 24 hours    Antimicrobials:   piperacillin/tazobactam IVPB.. 3.375 Gram(s) IV Intermittent every 8 hours  vancomycin  IVPB 1000 milliGRAM(s) IV Intermittent every 24 hours    Immunologic:     ROS:  CONSTITUTIONAL: No fevers or chills. No weakness or headache. No weight changes.  EYES/ENT: No visual or hearing changes. No sore throat or throat pain .  NECK: No pain or stiffness  RESPIRATORY: No cough, wheezing, or hemoptysis. No shortness of breath  CARDIOVASCULAR: No chest pain or palpitations  GASTROINTESTINAL: No abdominal pain. No nausea or vomiting. No diarrhea or constipation.  GENITOURINARY: No dysuria, frequency or hematuria  NEUROLOGICAL: No numbness or weakness  SKIN: No itching or rashes  PSYCHIATRIC: Pleasant. Appropriate affect    Allergies: codeine (Other)    PMH -- HTN (hypertension)    HLD (hyperlipidemia)    Atrial fibrillation    Syncope    Depression    Leg edema    Mitral valve stenosis, unspecified etiology    Hearing loss of left ear      PSH -- H/O knee surgery    H/O external ear surgery    Cataracta      FH -- No pertinent family history in first degree relatives    Family history of blood disorder (Mother)      Social History --  EtOH: denies   Tobacco: denies   Drug Use: denies     Travel/Environmental/Occupational History:    Physical Exam--  Vital Signs Last 24 Hrs  T(F): 97.8 (2022 07:50), Max: 99.1 (2022 23:07)  HR: 109 (2022 13:53) (83 - 116)  BP: 132/75 (2022 13:53) (92/55 - 160/82)  RR: 22 (2022 13:53) (19 - 32)  SpO2: 100% (2022 13:53) (68% - 100%)  General: nontoxic-appearing, no acute distress  HEENT: NC/AT, EOMI, anicteric, conjunctiva pink and moist, oropharynx clear, dentition fair  Neck: Not rigid. No sense of mass. No LAD  Lungs: Clear bilaterally without rales, wheezing or rhonchi  Heart: Regular rate and rhythm. No murmur, rub or gallop.  Abdomen: Soft. Nondistended. Nontender. Bowel sounds present. No organomegaly.  Back: No spinal tenderness. No costovertebral angle tenderness.  Extremities: No cyanosis or clubbing. No edema.   Skin: Warm. Dry. Good turgor. No rash. No vasculitic stigmata.  Psychiatric: Appropriate affect and mood for situation.   Lines:    Laboratory & Imaging Data:  CBC:                       6.9    12.65 )-----------( 144      ( 2022 05:56 )             23.3     CMP: 11-05    139  |  105  |  30<H>  ----------------------------<  132<H>  4.0   |  26  |  1.56<H>    Ca    7.8<L>      2022 05:56    TPro  7.0  /  Alb  3.1<L>  /  TBili  0.5  /  DBili  x   /  AST  19  /  ALT  13  /  AlkPhos  66  11-04    LIVER FUNCTIONS - ( 2022 23:35 )  Alb: 3.1 g/dL / Pro: 7.0 g/dL / ALK PHOS: 66 U/L / ALT: 13 U/L DA / AST: 19 U/L / GGT: x           Urinalysis Basic - ( 2022 01:15 )    Color: Yellow / Appearance: Clear / S.015 / pH: x  Gluc: x / Ketone: Negative  / Bili: Negative / Urobili: Negative mg/dL   Blood: x / Protein: 15 mg/dL / Nitrite: Negative   Leuk Esterase: Negative / RBC: Negative /HPF / WBC Negative   Sq Epi: x / Non Sq Epi: Occasional / Bacteria: Negative        Microbiology: reviewed        Radiology: reviewed     [facility], Division of Infectious Diseases  VANE Shine S. Shah, Y. Patel, G. [facility]  [phone]    ARNULFO ALEX  89y, Female  [id number]    HPI--  HPI:  This is a 89 y/o F wit PMH of HTN, Dyslipidemia, MR s/p TMVR, A. Fib off Eliquis 5 weeks ago for GI bleeding, Chronic Diastolic CHF, Chronic Respiratory Failure, COVID-19, CKD stage 3, GIB, Anemia, Anxiety, Depression who presented from [facility] with respiratory distress. Patient daughter at the bedside states that they called her from the NH as her mom got SOB, with chills, no reported fever, also she reports that her mom was coughing over the past 2 months with "lumps" of thick greenish yellow non bloody sputum. Patient is demented, unable to give further history. (2022 01:45)    Pt seen and examined at bedside  Reporting that she is only having some trouble breathing, getting winded while speaking w/ me  Denies f/c/n/v/d  Aide from [place] at bedside    Additional hx as above      Active Medications--  ALPRAZolam 0.25 milliGRAM(s) Oral two times a day PRN  atorvastatin 10 milliGRAM(s) Oral at bedtime  ferrous    sulfate 325 milliGRAM(s) Oral daily  furosemide   Injectable 40 milliGRAM(s) IV Push every 12 hours  melatonin 3 milliGRAM(s) Oral at bedtime PRN  metoprolol tartrate 25 milliGRAM(s) Oral two times a day  mirtazapine 15 milliGRAM(s) Oral at bedtime  mometasone 220 MICROgram(s) Inhaler 1 Puff(s) Inhalation daily  pantoprazole    Tablet 40 milliGRAM(s) Oral before breakfast  piperacillin/tazobactam IVPB.. 3.375 Gram(s) IV Intermittent every 8 hours  polyethylene glycol 3350 17 Gram(s) Oral at bedtime  senna 2 Tablet(s) Oral at bedtime  sertraline 100 milliGRAM(s) Oral daily  spironolactone 25 milliGRAM(s) Oral daily  vancomycin  IVPB 1000 milliGRAM(s) IV Intermittent every 24 hours    Antimicrobials:   piperacillin/tazobactam IVPB.. 3.375 Gram(s) IV Intermittent every 8 hours  vancomycin  IVPB 1000 milliGRAM(s) IV Intermittent every 24 hours    Immunologic:     ROS:  CONSTITUTIONAL: No fevers or chills. No weakness or headache. No weight changes.  EYES/ENT: No visual or hearing changes. No sore throat or throat pain .  NECK: No pain or stiffness  RESPIRATORY: No cough, wheezing, or hemoptysis. No shortness of breath  CARDIOVASCULAR: No chest pain or palpitations  GASTROINTESTINAL: No abdominal pain. No nausea or vomiting. No diarrhea or constipation.  GENITOURINARY: No dysuria, frequency or hematuria  NEUROLOGICAL: No numbness or weakness  SKIN: No itching or rashes  PSYCHIATRIC: Pleasant. Appropriate affect    Allergies: codeine (Other)    PMH -- HTN (hypertension)    HLD (hyperlipidemia)    Atrial fibrillation    Syncope    Depression    Leg edema    Mitral valve stenosis, unspecified etiology    Hearing loss of left ear      PSH -- H/O knee surgery    H/O external ear surgery    Cataracta      FH -- No pertinent family history in first degree relatives    Family history of blood disorder (Mother)      Social History --  EtOH: denies   Tobacco: denies   Drug Use: denies     Travel/Environmental/Occupational History:    Physical Exam--  Vital Signs Last 24 Hrs  T(F): 97.8 (2022 07:50), Max: 99.1 (2022 23:07)  HR: 109 (2022 13:53) (83 - 116)  BP: 132/75 (2022 13:53) (92/55 - 160/82)  RR: 22 (2022 13:53) (19 - 32)  SpO2: 100% (2022 13:53) (68% - 100%)  General: NAD  HEENT: NC/AT, EOMI  Lungs: decreased b/l breath sounds   Heart: RRR  Abdomen: Soft. Nondistended. Nontender.   Extremities: L thigh cellulitis/erythema/edema  Skin: Warm. Dry. Good turgor.     Laboratory & Imaging Data:  CBC:                       6.9    12.65 )-----------( 144      ( 2022 05:56 )             23.3     CMP:     139  |  105  |  30<H>  ----------------------------<  132<H>  4.0   |  26  |  1.56<H>    Ca    7.8<L>      2022 05:56    TPro  7.0  /  Alb  3.1<L>  /  TBili  0.5  /  DBili  x   /  AST  19  /  ALT  13  /  AlkPhos  66  11-04    LIVER FUNCTIONS - ( 2022 23:35 )  Alb: 3.1 g/dL / Pro: 7.0 g/dL / ALK PHOS: 66 U/L / ALT: 13 U/L DA / AST: 19 U/L / GGT: x           Urinalysis Basic - ( 2022 01:15 )    Color: Yellow / Appearance: Clear / S.015 / pH: x  Gluc: x / Ketone: Negative  / Bili: Negative / Urobili: Negative mg/dL   Blood: x / Protein: 15 mg/dL / Nitrite: Negative   Leuk Esterase: Negative / RBC: Negative /HPF / WBC Negative   Sq Epi: x / Non Sq Epi: Occasional / Bacteria: Negative        Microbiology: reviewed        Radiology: reviewed  < from: Xray Chest 1 View-PORTABLE IMMEDIATE (Xray Chest 1 View-PORTABLE IMMEDIATE .) (22 @ 14:07) >    ACC: [id number] EXAM:  XR CHEST PORTABLE IMMED 1V                          PROCEDURE DATE:  2022          INTERPRETATION:  INDICATION: Short of breath    Portable chest 1:59 PM    COMPARISON: 2022    FINDINGS:  Heart/Vascular: The mediastinum, hilum and aorta are stable. Cardiomegaly.  2. Mitral clips.  Pulmonary: Midline trachea. There are small bilateral pleural effusions.    Bones: There is no fracture.  Lines and catheter: None    Impression:    Small bilateral pleural effusions.    --- End of Report ---             JOSE DELEON DO; Attending Radiologist  This document has been electronically signed. 2022  3:14PM    < end of copied text >

## 2022-11-05 NOTE — H&P ADULT - PROBLEM SELECTOR PLAN 3
of unclear cause, already was started on Vancomycin & Zosyn for PNA as stated above, monitor Vancomycin trough level, ID consult with Dr. Real was called.

## 2022-11-05 NOTE — H&P ADULT - HISTORY OF PRESENT ILLNESS
This is a 89 y/o F wit PMH of HTN, Dyslipidemia, MR s/p TMVR, A. Fib off Eliquis 5 weeks ago for GI bleeding, Chronic Diastolic CHF, Chronic Respiratory Failure, COVID-19, CKD stage 3, GIB, Anemia, Anxiety, Depression who presented from [facility] with respiratory distress. Patient daughter at the bedside states that they called her from the NH as her mom got SOB, with chills, no reported fever, also she reports that her mom was coughing over the past 2 months with "lumps" of thick greenish yellow non bloody sputum. Patient is demented, unable to give further history.

## 2022-11-05 NOTE — PROVIDER CONTACT NOTE (OTHER) - BACKGROUND
Pt is admitted with pneumonia. Pt is A+Ox3. VSS. Pt has a history of Afib, HTN, HLD and acute respiratory failure.

## 2022-11-05 NOTE — CHART NOTE - NSCHARTNOTEFT_GEN_A_CORE
All Imaging reviewed.  Imaging does not show any evidence of fluid overload. She is in Atrial Fibrillation but rate was between 110-120.  Patient continued to look anxious and therefore decision to give her Xanax as I felt most of this was stemming from a Panic Attack.  Will hold off on blood transfusion and transfuse once stable. Transfer to SPCU for 24 hour monitoring. Updated family and they are in agreement.

## 2022-11-05 NOTE — CONSULT NOTE ADULT - ASSESSMENT
The patient is an 89 year old female with a history of HTN, HL, atrial fibrillation, MitraClip, chronic diastolic heart failure, GI bleed who presents with shortness of breath in the setting of acute on chronic diastolic heart failure.    Plan:  - ECG with known AF  - CT chest with small bilateral pleural effusions  - BNP 6253  - Cardiac enzymes negative  - Echo pending  - Continue furosemide 40 mg IV q12h  - Continue metoprolol tartrate 25 mg bid  - Continue spironolactone 25 mg daily  - Hemoglobin again low at 6.9 - transfuse. Remain off of anticoagulation for now. This can be re-evaluated as an outpatient.

## 2022-11-05 NOTE — CONSULT NOTE ADULT - CONSULT REASON
Acute on chronic diastolic heart failure
PNA
Acute on chornic hypoxic RF
sob  andrade  resp distress  hypoxemia

## 2022-11-05 NOTE — H&P ADULT - NSHPLABSRESULTS_GEN_ALL_CORE
-    Lactate Trend   @ 01:03 Lactate:1.2    @ 23:35 Lactate:2.9                           8.2    13.45 )-----------( 176      ( 2022 23:35 )             28.1                139  |  102  |  31<H>  ----------------------------<  153<H>  4.5   |  25  |  1.67<H>    Ca    8.1<L>      2022 23:35    TPro  7.0  /  Alb  3.1<L>  /  TBili  0.5  /  DBili  x   /  AST  19  /  ALT  13  /  AlkPhos  66            Urinalysis Basic - ( 2022 01:15 )    Color: Yellow / Appearance: Clear / S.015 / pH: x  Gluc: x / Ketone: Negative  / Bili: Negative / Urobili: Negative mg/dL   Blood: x / Protein: 15 mg/dL / Nitrite: Negative   Leuk Esterase: Negative / RBC: Negative /HPF / WBC Negative   Sq Epi: x / Non Sq Epi: Occasional / Bacteria: Negative      PT/INR - ( 2022 23:35 )   PT: 17.9 sec;   INR: 1.51 ratio    PTT - ( 2022 23:35 )  PTT:30.2 sec      CARDIAC MARKERS ( 2022 23:35 )  x     / x     / 28 U/L / x     / 0.9 ng/mL      COVID-19 PCR: NotDetec (2022 23:35)  COVID-19 PCR: NotDetec (28 Sep 2022 12:49)      Troponin I, High Sensitivity (22 @ 23:35)   Troponin I, High Sensitivity Result: 44.4  Serum Pro-Brain Natriuretic Peptide (22 @ 23:35)   Serum Pro-Brain Natriuretic Peptide: 6253 pg/mL         CXR:    As per my review shows left humeral surgical neck malunion, enlarged cardiac shadow size, aortic arch calcifications, Mitral valve clips, B/L increased interstitial markings with PVC, can't r/o pulmonary infiltrates, no pleural effusion, or pneumothorax. Pending official report.         EKG:    As per my review shows A. Fib with CVR at 95/min, RAD, incomplete RBBB, normal QRS voltage & duration, with delayed transition, no ST-T abnormality.    -

## 2022-11-05 NOTE — PATIENT PROFILE ADULT - FALL HARM RISK - HARM RISK INTERVENTIONS

## 2022-11-05 NOTE — H&P ADULT - ASSESSMENT
89 y/o F wit PMH of HTN, Dyslipidemia, MR s/p TMVR, A. Fib off Eliquis 5 weeks ago for GI bleeding, Chronic Diastolic CHF, Chronic Respiratory Failure, COVID-19, CKD stage 3, GIB, Anemia, Anxiety, Depression presented with respiratory distress.

## 2022-11-05 NOTE — H&P ADULT - PROBLEM SELECTOR PLAN 6
ML anemia of blood loss, had GIB of unknown cause while was on Eliquis about 5 weeks ago and Eliquis was then discontinued, H & H stable compared with the discharge H & H, will resume Eliquis & monitor H & H, started her on PO Iron therapy.

## 2022-11-05 NOTE — CONSULT NOTE ADULT - SUBJECTIVE AND OBJECTIVE BOX
History of Present Illness: The patient is an 89 year old female with a history of HTN, HL, atrial fibrillation, MitraClip, chronic diastolic heart failure, GI bleed who presents with shortness of breath. She has had shortness of breath, cough productive of yellow/green sputum for the last few weeks. Yesterday her breathing worsened significantly. No leg swelling, chest pain, palpitations. She was recently admitted for GI bleed and was taken off of anticoagulation.    Past Medical/Surgical History:  HTN, HL, atrial fibrillation, MitraClip, chronic diastolic heart failure, GI bleed     Medications:  Home Medications:  fluticasone propionate 55 mcg/inh inhalation powder: 1 puff(s) inhaled every 12 hours (05 Nov 2022 00:44)  loperamide 2 mg oral capsule: 1 cap(s) orally every 4 hours, As Needed (05 Nov 2022 00:44)  melatonin 3 mg oral tablet: 2 tab(s) orally once a day (at bedtime) (05 Nov 2022 00:44)  MiraLax oral powder for reconstitution: orally once a day (at bedtime) (05 Nov 2022 00:44)  mirtazapine 15 mg oral tablet: 1 tab(s) orally once a day (at bedtime) (05 Nov 2022 00:44)  Senna 8.6 mg oral tablet: 1 tab(s) orally once a day (at bedtime) (05 Nov 2022 00:44)  sertraline 100 mg oral tablet: 1 tab(s) orally once a day (05 Nov 2022 00:44)  Xanax 0.25 mg oral tablet: 1 tab(s) orally every 12 hours, As Needed - for anxiety (05 Nov 2022 00:44)      Family History: Non-contributory family history of premature cardiovascular atherosclerotic disease    Social History: No tobacco, alcohol or drug use    Review of Systems:  General: No fevers, chills, weight gain  Skin: No rashes, color changes  Cardiovascular: No chest pain, orthopnea  Respiratory: +shortness of breath, cough  Gastrointestinal: No nausea, abdominal pain  Genitourinary: No incontinence, pain with urination  Musculoskeletal: No pain, swelling, decreased range of motion  Neurological: No headache, weakness  Psychiatric: No depression, anxiety  Endocrine: No weight gain, increased thirst  All other systems are comprehensively negative.    Physical Exam:  Vitals:        Vital Signs Last 24 Hrs  T(C): 36.6 (05 Nov 2022 07:50), Max: 37.3 (04 Nov 2022 23:07)  T(F): 97.8 (05 Nov 2022 07:50), Max: 99.1 (04 Nov 2022 23:07)  HR: 83 (05 Nov 2022 07:50) (83 - 111)  BP: 121/60 (05 Nov 2022 07:50) (92/55 - 135/66)  BP(mean): --  RR: 19 (05 Nov 2022 07:50) (19 - 32)  SpO2: 97% (05 Nov 2022 07:50) (96% - 100%)  Parameters below as of 05 Nov 2022 07:50  Patient On (Oxygen Delivery Method): nasal cannula  General: NAD  HEENT: MMM  Neck: No JVD, no carotid bruit  Lungs: Decreased at bases  CV: Irregular, nl S1/S2, no M/R/G  Abdomen: S/NT/ND, +BS  Extremities: Trace LE edema, no cyanosis  Neuro: AAOx3, non-focal  Skin: No rash    Labs:                        6.9    12.65 )-----------( 144      ( 05 Nov 2022 05:56 )             23.3     11-05    139  |  105  |  30<H>  ----------------------------<  132<H>  4.0   |  26  |  1.56<H>    Ca    7.8<L>      05 Nov 2022 05:56    TPro  7.0  /  Alb  3.1<L>  /  TBili  0.5  /  DBili  x   /  AST  19  /  ALT  13  /  AlkPhos  66  11-04    CARDIAC MARKERS ( 04 Nov 2022 23:35 )  x     / x     / 28 U/L / x     / 0.9 ng/mL      PT/INR - ( 04 Nov 2022 23:35 )   PT: 17.9 sec;   INR: 1.51 ratio         PTT - ( 04 Nov 2022 23:35 )  PTT:30.2 sec    ECG/Telemetry: AF, nonspecific ST abnormality

## 2022-11-05 NOTE — ED PROVIDER NOTE - CLINICAL SUMMARY MEDICAL DECISION MAKING FREE TEXT BOX
89 year old female p/w SOB and hypoxia today.  Persistent productive cough x 2 months.  Resides at [place].  Mildy tachycardic with low-grade fever.  Check labs, lactate, cultures, EKG, CXR, abx needed.  Likely admission.  Evaluate for CHF exacerbation, PNA, pleural effusion, COVID

## 2022-11-05 NOTE — CONSULT NOTE ADULT - SUBJECTIVE AND OBJECTIVE BOX
Date/Time Patient Seen:  		  Referring MD:   Data Reviewed	       Patient is a 89y old  Female who presents with a chief complaint of Respiratory distress. (05 Nov 2022 01:45)      Subjective/HPI   This is a 89 y/o F wit PMH of HTN, Dyslipidemia, MR s/p TMVR, A. Fib off Eliquis 5 weeks ago for GI bleeding, Chronic Diastolic CHF, Chronic Respiratory Failure, COVID-19, CKD stage 3, GIB, Anemia, Anxiety, Depression who presented from [facility] with respiratory distress. Patient daughter at the bedside states that they called her from the NH as her mom got SOB, with chills, no reported fever, also she reports that her mom was coughing over the past 2 months with "lumps" of thick greenish yellow non bloody sputum. Patient is demented, unable to give further history.  PAST MEDICAL & SURGICAL HISTORY:  HTN (hypertension)    HLD (hyperlipidemia)    Atrial fibrillation  On Pradaxa    Syncope    Depression    Leg edema    Mitral valve stenosis, unspecified etiology    Hearing loss of left ear    H/O knee surgery    H/O external ear surgery    Cataracta          Medication list         MEDICATIONS  (STANDING):  apixaban 2.5 milliGRAM(s) Oral every 12 hours  atorvastatin 10 milliGRAM(s) Oral at bedtime  furosemide   Injectable 40 milliGRAM(s) IV Push every 12 hours  metoprolol tartrate 25 milliGRAM(s) Oral two times a day  mirtazapine 15 milliGRAM(s) Oral at bedtime  mometasone 220 MICROgram(s) Inhaler 1 Puff(s) Inhalation daily  pantoprazole    Tablet 40 milliGRAM(s) Oral before breakfast  piperacillin/tazobactam IVPB.. 3.375 Gram(s) IV Intermittent every 8 hours  polyethylene glycol 3350 17 Gram(s) Oral at bedtime  senna 2 Tablet(s) Oral at bedtime  sertraline 100 milliGRAM(s) Oral daily  spironolactone 25 milliGRAM(s) Oral daily  vancomycin  IVPB 1000 milliGRAM(s) IV Intermittent every 24 hours    MEDICATIONS  (PRN):  ALPRAZolam 0.25 milliGRAM(s) Oral two times a day PRN Anxiety.  melatonin 3 milliGRAM(s) Oral at bedtime PRN Insomnia         Vitals log        ICU Vital Signs Last 24 Hrs  T(C): 36.4 (05 Nov 2022 05:35), Max: 37.3 (04 Nov 2022 23:07)  T(F): 97.5 (05 Nov 2022 05:35), Max: 99.1 (04 Nov 2022 23:07)  HR: 89 (05 Nov 2022 05:35) (83 - 111)  BP: 111/59 (05 Nov 2022 05:35) (92/55 - 135/66)  BP(mean): --  ABP: --  ABP(mean): --  RR: 23 (05 Nov 2022 05:35) (20 - 32)  SpO2: 96% (05 Nov 2022 05:35) (96% - 100%)    O2 Parameters below as of 05 Nov 2022 05:35  Patient On (Oxygen Delivery Method): nasal cannula  O2 Flow (L/min): 3    FAMILY HISTORY:  Mother  Still living? Unknown  Family history of blood disorder, Age at diagnosis: Age Unknown.     Social History:  · Substance use	No  · Social History (marital status, living situation, occupation, and sexual history)	-    , lives in [facility], non smoker, no ETOH or drug abuse.     Tobacco Screening:  · Core Measure Site	No  · Has the patient used tobacco in the past 30 days?	No    Risk Assessment:    Present on Admission:  Deep Venous Thrombosis	suspected  Pulmonary Embolus	suspected  Urinary Catheter	no  Central Venous Catheter/PICC Line	no  Surgical Site Incision	no  Pressure Ulcer(s)	no     HIV Screening:  · In accordance with [place] State law, we offer every patient who comes to our ED an HIV test. Would you like to be tested today?	Opt out             Input and Output:  I&O's Detail      Lab Data                        6.9    12.65 )-----------( 144      ( 05 Nov 2022 05:56 )             23.3     11-05    139  |  105  |  30<H>  ----------------------------<  132<H>  4.0   |  26  |  1.56<H>    Ca    7.8<L>      05 Nov 2022 05:56    TPro  7.0  /  Alb  3.1<L>  /  TBili  0.5  /  DBili  x   /  AST  19  /  ALT  13  /  AlkPhos  66  11-04      CARDIAC MARKERS ( 04 Nov 2022 23:35 )  x     / x     / 28 U/L / x     / 0.9 ng/mL        Review of Systems	  weakness      Objective     Physical Examination        Pertinent Lab findings & Imaging      Hernández:  NO   Adequate UO     I&O's Detail           Discussed with:     Cultures:	        Radiology        ACC: [id number] EXAM:  US DPLX LWR EXT VEINS COMPL BI                          PROCEDURE DATE:  11/05/2022          INTERPRETATION:  CLINICAL INFORMATION: Leg swelling    COMPARISON: None available.    TECHNIQUE: Duplex sonography of the BILATERAL LOWER extremity veins with   color and spectral Doppler, with and without compression.    FINDINGS:    RIGHT:  Normal compressibility of the RIGHT common femoral, femoral and popliteal   veins.  Doppler examination shows normal spontaneous and phasic flow.  No RIGHT calf vein thrombosis is detected.    LEFT:  Normal compressibility of the LEFT common femoral, femoral and popliteal   veins.  Doppler examination shows normal spontaneous and phasic flow.  No LEFT calf vein thrombosis is detected.    IMPRESSION:  No evidence of deep venous thrombosis in either lower extremity.          --- End of Report ---            KYAW ACOSTA MD; Attending Radiologist  This document has been electronically signed. Nov 5 2022  5:54AM                       Date/Time Patient Seen:  		  Referring MD:   Data Reviewed	       Patient is a 89y old  Female who presents with a chief complaint of Respiratory distress. (05 Nov 2022 01:45)      Subjective/HPI  in bed  seen and examined  dtr at bedside  vs noted  labs reviewed  imaging reviewed  H and P reviewed  er provider note reviewed  cxr noted     This is a 89 y/o F wit PMH of HTN, Dyslipidemia, MR s/p TMVR, A. Fib off Eliquis 5 weeks ago for GI bleeding, Chronic Diastolic CHF, Chronic Respiratory Failure, COVID-19, CKD stage 3, GIB, Anemia, Anxiety, Depression who presented from [facility] with respiratory distress. Patient daughter at the bedside states that they called her from the NH as her mom got SOB, with chills, no reported fever, also she reports that her mom was coughing over the past 2 months with "lumps" of thick greenish yellow non bloody sputum. Patient is demented, unable to give further history.  PAST MEDICAL & SURGICAL HISTORY:  HTN (hypertension)    HLD (hyperlipidemia)    Atrial fibrillation  On Pradaxa    Syncope    Depression    Leg edema    Mitral valve stenosis, unspecified etiology    Hearing loss of left ear    H/O knee surgery    H/O external ear surgery    Cataracta          Medication list         MEDICATIONS  (STANDING):  apixaban 2.5 milliGRAM(s) Oral every 12 hours  atorvastatin 10 milliGRAM(s) Oral at bedtime  furosemide   Injectable 40 milliGRAM(s) IV Push every 12 hours  metoprolol tartrate 25 milliGRAM(s) Oral two times a day  mirtazapine 15 milliGRAM(s) Oral at bedtime  mometasone 220 MICROgram(s) Inhaler 1 Puff(s) Inhalation daily  pantoprazole    Tablet 40 milliGRAM(s) Oral before breakfast  piperacillin/tazobactam IVPB.. 3.375 Gram(s) IV Intermittent every 8 hours  polyethylene glycol 3350 17 Gram(s) Oral at bedtime  senna 2 Tablet(s) Oral at bedtime  sertraline 100 milliGRAM(s) Oral daily  spironolactone 25 milliGRAM(s) Oral daily  vancomycin  IVPB 1000 milliGRAM(s) IV Intermittent every 24 hours    MEDICATIONS  (PRN):  ALPRAZolam 0.25 milliGRAM(s) Oral two times a day PRN Anxiety.  melatonin 3 milliGRAM(s) Oral at bedtime PRN Insomnia         Vitals log        ICU Vital Signs Last 24 Hrs  T(C): 36.4 (05 Nov 2022 05:35), Max: 37.3 (04 Nov 2022 23:07)  T(F): 97.5 (05 Nov 2022 05:35), Max: 99.1 (04 Nov 2022 23:07)  HR: 89 (05 Nov 2022 05:35) (83 - 111)  BP: 111/59 (05 Nov 2022 05:35) (92/55 - 135/66)  BP(mean): --  ABP: --  ABP(mean): --  RR: 23 (05 Nov 2022 05:35) (20 - 32)  SpO2: 96% (05 Nov 2022 05:35) (96% - 100%)    O2 Parameters below as of 05 Nov 2022 05:35  Patient On (Oxygen Delivery Method): nasal cannula  O2 Flow (L/min): 3    FAMILY HISTORY:  Mother  Still living? Unknown  Family history of blood disorder, Age at diagnosis: Age Unknown.     Social History:  · Substance use	No  · Social History (marital status, living situation, occupation, and sexual history)	-    , lives in [facility], non smoker, no ETOH or drug abuse.     Tobacco Screening:  · Core Measure Site	No  · Has the patient used tobacco in the past 30 days?	No    Risk Assessment:    Present on Admission:  Deep Venous Thrombosis	suspected  Pulmonary Embolus	suspected  Urinary Catheter	no  Central Venous Catheter/PICC Line	no  Surgical Site Incision	no  Pressure Ulcer(s)	no     HIV Screening:  · In accordance with [place] State law, we offer every patient who comes to our ED an HIV test. Would you like to be tested today?	Opt out         non smoker  non drinker  has children  follows with Dr Henley  ros - sob  family hx reviewed  allergies reviewed  [place]  single      Input and Output:  I&O's Detail      Lab Data                        6.9    12.65 )-----------( 144      ( 05 Nov 2022 05:56 )             23.3     11-05    139  |  105  |  30<H>  ----------------------------<  132<H>  4.0   |  26  |  1.56<H>    Ca    7.8<L>      05 Nov 2022 05:56    TPro  7.0  /  Alb  3.1<L>  /  TBili  0.5  /  DBili  x   /  AST  19  /  ALT  13  /  AlkPhos  66  11-04      CARDIAC MARKERS ( 04 Nov 2022 23:35 )  x     / x     / 28 U/L / x     / 0.9 ng/mL        Review of Systems	  weakness      Objective     Physical Examination    heart s1s2  lung dec BS  head nc  verbal  alert  cn grossly int  moves extr  pvd  on o2 support      Pertinent Lab findings & Imaging      Hernández:  NO   Adequate UO     I&O's Detail           Discussed with:     Cultures:	        Radiology        ACC: [id number] EXAM:  US DPLX LWR EXT VEINS COMPL BI                          PROCEDURE DATE:  11/05/2022          INTERPRETATION:  CLINICAL INFORMATION: Leg swelling    COMPARISON: None available.    TECHNIQUE: Duplex sonography of the BILATERAL LOWER extremity veins with   color and spectral Doppler, with and without compression.    FINDINGS:    RIGHT:  Normal compressibility of the RIGHT common femoral, femoral and popliteal   veins.  Doppler examination shows normal spontaneous and phasic flow.  No RIGHT calf vein thrombosis is detected.    LEFT:  Normal compressibility of the LEFT common femoral, femoral and popliteal   veins.  Doppler examination shows normal spontaneous and phasic flow.  No LEFT calf vein thrombosis is detected.    IMPRESSION:  No evidence of deep venous thrombosis in either lower extremity.          --- End of Report ---            KYAW ACOSTA MD; Attending Radiologist  This document has been electronically signed. Nov 5 2022  5:54AM

## 2022-11-05 NOTE — ED PROVIDER NOTE - PRINCIPAL DIAGNOSIS
[de-identified] : pt with fluid in the ears but hearing OK \par growing well, meeting milestones eating well\par speech has improved recently \par chronic rhinitis and gets abx for it - 10 in past year \par has a tongue tie, did not cut due to consider about bleeding with circ 
Pneumonia due to infectious organism

## 2022-11-06 DIAGNOSIS — F41.9 ANXIETY DISORDER, UNSPECIFIED: ICD-10-CM

## 2022-11-06 DIAGNOSIS — R22.0 LOCALIZED SWELLING, MASS AND LUMP, HEAD: ICD-10-CM

## 2022-11-06 DIAGNOSIS — I48.91 UNSPECIFIED ATRIAL FIBRILLATION: ICD-10-CM

## 2022-11-06 LAB
ALBUMIN SERPL ELPH-MCNC: 2.9 G/DL — LOW (ref 3.3–5)
ALP SERPL-CCNC: 50 U/L — SIGNIFICANT CHANGE UP (ref 30–120)
ALT FLD-CCNC: 17 U/L DA — SIGNIFICANT CHANGE UP (ref 10–60)
ANION GAP SERPL CALC-SCNC: 10 MMOL/L — SIGNIFICANT CHANGE UP (ref 5–17)
AST SERPL-CCNC: 16 U/L — SIGNIFICANT CHANGE UP (ref 10–40)
BILIRUB SERPL-MCNC: 0.7 MG/DL — SIGNIFICANT CHANGE UP (ref 0.2–1.2)
BUN SERPL-MCNC: 24 MG/DL — HIGH (ref 7–23)
CALCIUM SERPL-MCNC: 7.9 MG/DL — LOW (ref 8.4–10.5)
CHLORIDE SERPL-SCNC: 104 MMOL/L — SIGNIFICANT CHANGE UP (ref 96–108)
CO2 SERPL-SCNC: 28 MMOL/L — SIGNIFICANT CHANGE UP (ref 22–31)
CREAT SERPL-MCNC: 1.33 MG/DL — HIGH (ref 0.5–1.3)
CULTURE RESULTS: SIGNIFICANT CHANGE UP
EGFR: 38 ML/MIN/1.73M2 — LOW
GLUCOSE SERPL-MCNC: 121 MG/DL — HIGH (ref 70–99)
HCT VFR BLD CALC: 27.9 % — LOW (ref 34.5–45)
HGB BLD-MCNC: 8.4 G/DL — LOW (ref 11.5–15.5)
MAGNESIUM SERPL-MCNC: 2.1 MG/DL — SIGNIFICANT CHANGE UP (ref 1.6–2.6)
MCHC RBC-ENTMCNC: 23.1 PG — LOW (ref 27–34)
MCHC RBC-ENTMCNC: 30.1 GM/DL — LOW (ref 32–36)
MCV RBC AUTO: 76.9 FL — LOW (ref 80–100)
MRSA PCR RESULT.: DETECTED
NRBC # BLD: 0 /100 WBCS — SIGNIFICANT CHANGE UP (ref 0–0)
OB PNL STL: NEGATIVE — SIGNIFICANT CHANGE UP
PHOSPHATE SERPL-MCNC: 3.4 MG/DL — SIGNIFICANT CHANGE UP (ref 2.5–4.5)
PLATELET # BLD AUTO: 134 K/UL — LOW (ref 150–400)
POTASSIUM SERPL-MCNC: 3.6 MMOL/L — SIGNIFICANT CHANGE UP (ref 3.5–5.3)
POTASSIUM SERPL-SCNC: 3.6 MMOL/L — SIGNIFICANT CHANGE UP (ref 3.5–5.3)
PROT SERPL-MCNC: 6.2 G/DL — SIGNIFICANT CHANGE UP (ref 6–8.3)
RAPID RVP RESULT: SIGNIFICANT CHANGE UP
RBC # BLD: 3.63 M/UL — LOW (ref 3.8–5.2)
RBC # FLD: 21.2 % — HIGH (ref 10.3–14.5)
S AUREUS DNA NOSE QL NAA+PROBE: DETECTED
S PNEUM AG UR QL: NEGATIVE — SIGNIFICANT CHANGE UP
SARS-COV-2 RNA SPEC QL NAA+PROBE: SIGNIFICANT CHANGE UP
SODIUM SERPL-SCNC: 142 MMOL/L — SIGNIFICANT CHANGE UP (ref 135–145)
SPECIMEN SOURCE: SIGNIFICANT CHANGE UP
TROPONIN I, HIGH SENSITIVITY RESULT: 81.1 NG/L — HIGH
VANCOMYCIN TROUGH SERPL-MCNC: 9.8 UG/ML — LOW (ref 10–20)
WBC # BLD: 8.5 K/UL — SIGNIFICANT CHANGE UP (ref 3.8–10.5)
WBC # FLD AUTO: 8.5 K/UL — SIGNIFICANT CHANGE UP (ref 3.8–10.5)

## 2022-11-06 PROCEDURE — 99233 SBSQ HOSP IP/OBS HIGH 50: CPT

## 2022-11-06 RX ORDER — POTASSIUM CHLORIDE 20 MEQ
40 PACKET (EA) ORAL ONCE
Refills: 0 | Status: COMPLETED | OUTPATIENT
Start: 2022-11-06 | End: 2022-11-06

## 2022-11-06 RX ORDER — VANCOMYCIN HCL 1 G
1000 VIAL (EA) INTRAVENOUS EVERY 24 HOURS
Refills: 0 | Status: DISCONTINUED | OUTPATIENT
Start: 2022-11-06 | End: 2022-11-07

## 2022-11-06 RX ADMIN — Medication 40 MILLIGRAM(S): at 17:47

## 2022-11-06 RX ADMIN — Medication 250 MILLIGRAM(S): at 22:45

## 2022-11-06 RX ADMIN — Medication 40 MILLIEQUIVALENT(S): at 11:55

## 2022-11-06 RX ADMIN — Medication 0.25 MILLIGRAM(S): at 14:09

## 2022-11-06 RX ADMIN — PANTOPRAZOLE SODIUM 40 MILLIGRAM(S): 20 TABLET, DELAYED RELEASE ORAL at 17:47

## 2022-11-06 RX ADMIN — PIPERACILLIN AND TAZOBACTAM 25 GRAM(S): 4; .5 INJECTION, POWDER, LYOPHILIZED, FOR SOLUTION INTRAVENOUS at 06:03

## 2022-11-06 RX ADMIN — MOMETASONE FUROATE 1 PUFF(S): 220 INHALANT RESPIRATORY (INHALATION) at 07:00

## 2022-11-06 RX ADMIN — SENNA PLUS 2 TABLET(S): 8.6 TABLET ORAL at 21:32

## 2022-11-06 RX ADMIN — SERTRALINE 100 MILLIGRAM(S): 25 TABLET, FILM COATED ORAL at 11:55

## 2022-11-06 RX ADMIN — PANTOPRAZOLE SODIUM 40 MILLIGRAM(S): 20 TABLET, DELAYED RELEASE ORAL at 06:03

## 2022-11-06 RX ADMIN — Medication 25 MILLIGRAM(S): at 21:32

## 2022-11-06 RX ADMIN — Medication 0.25 MILLIGRAM(S): at 22:45

## 2022-11-06 RX ADMIN — Medication 40 MILLIGRAM(S): at 06:02

## 2022-11-06 RX ADMIN — MIRTAZAPINE 15 MILLIGRAM(S): 45 TABLET, ORALLY DISINTEGRATING ORAL at 21:32

## 2022-11-06 RX ADMIN — Medication 25 MILLIGRAM(S): at 06:03

## 2022-11-06 RX ADMIN — POLYETHYLENE GLYCOL 3350 17 GRAM(S): 17 POWDER, FOR SOLUTION ORAL at 21:30

## 2022-11-06 RX ADMIN — ATORVASTATIN CALCIUM 10 MILLIGRAM(S): 80 TABLET, FILM COATED ORAL at 21:32

## 2022-11-06 RX ADMIN — SPIRONOLACTONE 25 MILLIGRAM(S): 25 TABLET, FILM COATED ORAL at 06:04

## 2022-11-06 RX ADMIN — Medication 325 MILLIGRAM(S): at 11:55

## 2022-11-06 NOTE — PROGRESS NOTE ADULT - ASSESSMENT
89 y/o F wit PMH of HTN, Dyslipidemia, MR s/p TMVR, A. Fib off Eliquis 5 weeks ago for GI bleeding, Chronic Diastolic CHF, Chronic Respiratory Failure, COVID-19, CKD stage 3, GIB, Anemia, Anxiety, Depression presented with respiratory distress.     ?PNA  Acute on Chronic CHF exacerbation  Acute on chronic RF on NRB/NC  MEME on CKD  - pt p/w resp distress  - CXR w/ no consolidation  - cx NGTD  Plan:   Can d/c Abx and monitor off Abx given no obvious infection  Trend temps/WBC  Trend renal fxn/renally dose medications/avoid nephrotoxic agents  Supportive care   Supplemental O2 as needed    D/w Dr. Bella    Infectious Diseases will continue to follow. Please call with any questions.   Suzy Real M.D.  [facility] [phone]   89 y/o F wit PMH of HTN, Dyslipidemia, MR s/p TMVR, A. Fib off Eliquis 5 weeks ago for GI bleeding, Chronic Diastolic CHF, Chronic Respiratory Failure, COVID-19, CKD stage 3, GIB, Anemia, Anxiety, Depression presented with respiratory distress.     ?PNA  Acute on Chronic CHF exacerbation  Acute on chronic RF on NRB/NC  MEME on CKD  - pt p/w resp distress  - CXR w/ no consolidation  - cx NGTD  Plan:   D/c zosyn  Trend temps/WBC  Trend renal fxn/renally dose medications/avoid nephrotoxic agents  Supportive care   Supplemental O2 as needed    L thigh cellulitis  - DVT study negative  Plan:  serial exams  c/w vancomycin    D/w Dr. Bella    Infectious Diseases will continue to follow. Please call with any questions.   Suzy Real M.D.  [facility] [phone]

## 2022-11-06 NOTE — PROGRESS NOTE ADULT - ASSESSMENT
The patient is an 89 year old female with a history of HTN, HL, atrial fibrillation, MitraClip, chronic diastolic heart failure, GI bleed who presents with shortness of breath in the setting of acute on chronic diastolic heart failure.    Plan:  - ECG with known AF  - Troponin borderline elevated at 81 in the setting of demand ischemia from heart failure and anemia. No need to trend further.  - CT chest with small bilateral pleural effusions  - BNP 6253  - Cardiac enzymes negative  - Echo with normal LV systolic function, normal MitraClip function, mild pulm HTN  - Continue furosemide 40 mg IV q12h  - Continue metoprolol tartrate 25 mg bid  - Continue spironolactone 25 mg daily  - Hemoglobin improved after transfusion  - Remain off of anticoagulation for now. This can be re-evaluated as an outpatient.

## 2022-11-06 NOTE — PROGRESS NOTE ADULT - SUBJECTIVE AND OBJECTIVE BOX
Patient is a 89y old  Female who presents with a chief complaint of SOB.       INTERVAL HPI/OVERNIGHT EVENTS: Pt states she is feeling better with significant improvement in SOB. Denies cough, fever, chills, CP, abd pain, n/v, melena, hematochezia.     MEDICATIONS  (STANDING):  atorvastatin 10 milliGRAM(s) Oral at bedtime  ferrous    sulfate 325 milliGRAM(s) Oral daily  furosemide   Injectable 40 milliGRAM(s) IV Push every 12 hours  metoprolol tartrate 25 milliGRAM(s) Oral two times a day  mirtazapine 15 milliGRAM(s) Oral at bedtime  mometasone 220 MICROgram(s) Inhaler 1 Puff(s) Inhalation daily  pantoprazole  Injectable 40 milliGRAM(s) IV Push two times a day  polyethylene glycol 3350 17 Gram(s) Oral at bedtime  senna 2 Tablet(s) Oral at bedtime  sertraline 100 milliGRAM(s) Oral daily  spironolactone 25 milliGRAM(s) Oral daily    MEDICATIONS  (PRN):  ALPRAZolam 0.25 milliGRAM(s) Oral two times a day PRN Anxiety.  melatonin 3 milliGRAM(s) Oral at bedtime PRN Insomnia      Allergies    codeine (Other)    Intolerances        REVIEW OF SYSTEMS:  CONSTITUTIONAL: No fever or chills  HEENT:  No headache, no sore throat  RESPIRATORY: +SOB (improving); No cough, wheezing.  CARDIOVASCULAR: No chest pain, palpitations  GASTROINTESTINAL: No abd pain, nausea, vomiting, or diarrhea  GENITOURINARY: No dysuria, frequency, or hematuria  NEUROLOGICAL: no focal weakness or dizziness  MUSCULOSKELETAL: no myalgias     Vital Signs Last 24 Hrs  T(C): 36.2 (2022 12:30), Max: 36.5 (2022 04:00)  T(F): 97.2 (2022 12:30), Max: 97.7 (2022 04:00)  HR: 98 (2022 18:00) (81 - 104)  BP: 109/66 (2022 18:00) (86/55 - 123/71)  BP(mean): 79 (2022 18:00) (59 - 100)  RR: 20 (2022 18:00) (17 - 34)  SpO2: 94% (2022 18:00) (84% - 98%)    Parameters below as of 2022 18:00  Patient On (Oxygen Delivery Method): nasal cannula  O2 Flow (L/min): 3      PHYSICAL EXAM:  GENERAL: NAD at rest lying down at 30 degree incline   HEENT:  anicteric, moist mucous membranes; swelling in cheeks b/l (chronic intermittent finding per pt)  CHEST/LUNG: decreased breath sounds, crackles in bases  HEART:  irregular at 88bpm, S1, S2  ABDOMEN:  BS+, soft, nontender, nondistended  EXTREMITIES: trace LE edema, no cyanosis, or calf tenderness  NERVOUS SYSTEM: answers questions and follows commands appropriately    LABS:                        8.4    8.50  )-----------( 134      ( 2022 06:48 )             27.9     CBC Full  -  ( 2022 06:48 )  WBC Count : 8.50 K/uL  Hemoglobin : 8.4 g/dL  Hematocrit : 27.9 %  Platelet Count - Automated : 134 K/uL  Mean Cell Volume : 76.9 fl  Mean Cell Hemoglobin : 23.1 pg  Mean Cell Hemoglobin Concentration : 30.1 gm/dL  Auto Neutrophil # : x  Auto Lymphocyte # : x  Auto Monocyte # : x  Auto Eosinophil # : x  Auto Basophil # : x  Auto Neutrophil % : x  Auto Lymphocyte % : x  Auto Monocyte % : x  Auto Eosinophil % : x  Auto Basophil % : x    2022 09:20    142    |  104    |  24     ----------------------------<  121    3.6     |  28     |  1.33     Ca    7.9        2022 09:20  Phos  3.4       2022 09:20  Mg     2.1       2022 09:20    TPro  6.2    /  Alb  2.9    /  TBili  0.7    /  DBili  x      /  AST  16     /  ALT  17     /  AlkPhos  50     2022 09:20    PT/INR - ( 2022 23:35 )   PT: 17.9 sec;   INR: 1.51 ratio         PTT - ( 2022 23:35 )  PTT:30.2 sec  Urinalysis Basic - ( 2022 01:15 )    Color: Yellow / Appearance: Clear / S.015 / pH: x  Gluc: x / Ketone: Negative  / Bili: Negative / Urobili: Negative mg/dL   Blood: x / Protein: 15 mg/dL / Nitrite: Negative   Leuk Esterase: Negative / RBC: Negative /HPF / WBC Negative   Sq Epi: x / Non Sq Epi: Occasional / Bacteria: Negative      CAPILLARY BLOOD GLUCOSE            Culture - Urine (collected 22 @ 01:15)  Source: Clean Catch Clean Catch (Midstream)  Final Report (22 @ 12:09):    <10,000 CFU/mL Normal Urogenital Tika    Culture - Blood (collected 22 @ 23:35)  Source: .Blood Blood-Peripheral  Preliminary Report (22 @ 13:01):    No growth to date.    Culture - Blood (collected 22 @ 23:35)  Source: .Blood Blood-Peripheral  Preliminary Report (22 @ 13:01):    No growth to date.        RADIOLOGY & ADDITIONAL TESTS:    Personally reviewed.     Consultant(s) Notes Reviewed:  [x] YES  [ ] NO     Patient is a 89y old  Female who presents with a chief complaint of SOB.        INTERVAL HPI/OVERNIGHT EVENTS: Pt states she is feeling better with significant improvement in SOB. Denies cough, fever, chills, CP, abd pain, n/v, melena, hematochezia.     MEDICATIONS  (STANDING):  atorvastatin 10 milliGRAM(s) Oral at bedtime  ferrous    sulfate 325 milliGRAM(s) Oral daily  furosemide   Injectable 40 milliGRAM(s) IV Push every 12 hours  metoprolol tartrate 25 milliGRAM(s) Oral two times a day  mirtazapine 15 milliGRAM(s) Oral at bedtime  mometasone 220 MICROgram(s) Inhaler 1 Puff(s) Inhalation daily  pantoprazole  Injectable 40 milliGRAM(s) IV Push two times a day  polyethylene glycol 3350 17 Gram(s) Oral at bedtime  senna 2 Tablet(s) Oral at bedtime  sertraline 100 milliGRAM(s) Oral daily  spironolactone 25 milliGRAM(s) Oral daily    MEDICATIONS  (PRN):  ALPRAZolam 0.25 milliGRAM(s) Oral two times a day PRN Anxiety.  melatonin 3 milliGRAM(s) Oral at bedtime PRN Insomnia      Allergies    codeine (Other)    Intolerances        REVIEW OF SYSTEMS:  CONSTITUTIONAL: No fever or chills  HEENT:  No headache, no sore throat  RESPIRATORY: +SOB (improving); No cough, wheezing.  CARDIOVASCULAR: No chest pain, palpitations  GASTROINTESTINAL: No abd pain, nausea, vomiting, or diarrhea  GENITOURINARY: No dysuria, frequency, or hematuria  NEUROLOGICAL: no focal weakness or dizziness  MUSCULOSKELETAL: no myalgias     Vital Signs Last 24 Hrs  T(C): 36.2 (2022 12:30), Max: 36.5 (2022 04:00)  T(F): 97.2 (2022 12:30), Max: 97.7 (2022 04:00)  HR: 98 (2022 18:00) (81 - 104)  BP: 109/66 (2022 18:00) (86/55 - 123/71)  BP(mean): 79 (2022 18:00) (59 - 100)  RR: 20 (2022 18:00) (17 - 34)  SpO2: 94% (2022 18:00) (84% - 98%)    Parameters below as of 2022 18:00  Patient On (Oxygen Delivery Method): nasal cannula  O2 Flow (L/min): 3      PHYSICAL EXAM:  GENERAL: NAD at rest lying down at 30 degree incline   HEENT:  anicteric, moist mucous membranes; swelling in cheeks b/l (chronic intermittent finding per pt)  CHEST/LUNG: decreased breath sounds, crackles in bases  HEART:  irregular at 88bpm, S1, S2  ABDOMEN:  BS+, soft, nontender, nondistended  EXTREMITIES: trace LE edema, no cyanosis, or calf tenderness  NERVOUS SYSTEM: answers questions and follows commands appropriately    LABS:                        8.4    8.50  )-----------( 134      ( 2022 06:48 )             27.9     CBC Full  -  ( 2022 06:48 )  WBC Count : 8.50 K/uL  Hemoglobin : 8.4 g/dL  Hematocrit : 27.9 %  Platelet Count - Automated : 134 K/uL  Mean Cell Volume : 76.9 fl  Mean Cell Hemoglobin : 23.1 pg  Mean Cell Hemoglobin Concentration : 30.1 gm/dL  Auto Neutrophil # : x  Auto Lymphocyte # : x  Auto Monocyte # : x  Auto Eosinophil # : x  Auto Basophil # : x  Auto Neutrophil % : x  Auto Lymphocyte % : x  Auto Monocyte % : x  Auto Eosinophil % : x  Auto Basophil % : x    2022 09:20    142    |  104    |  24     ----------------------------<  121    3.6     |  28     |  1.33     Ca    7.9        2022 09:20  Phos  3.4       2022 09:20  Mg     2.1       2022 09:20    TPro  6.2    /  Alb  2.9    /  TBili  0.7    /  DBili  x      /  AST  16     /  ALT  17     /  AlkPhos  50     2022 09:20    PT/INR - ( 2022 23:35 )   PT: 17.9 sec;   INR: 1.51 ratio         PTT - ( 2022 23:35 )  PTT:30.2 sec  Urinalysis Basic - ( 2022 01:15 )    Color: Yellow / Appearance: Clear / S.015 / pH: x  Gluc: x / Ketone: Negative  / Bili: Negative / Urobili: Negative mg/dL   Blood: x / Protein: 15 mg/dL / Nitrite: Negative   Leuk Esterase: Negative / RBC: Negative /HPF / WBC Negative   Sq Epi: x / Non Sq Epi: Occasional / Bacteria: Negative      CAPILLARY BLOOD GLUCOSE            Culture - Urine (collected 22 @ 01:15)  Source: Clean Catch Clean Catch (Midstream)  Final Report (22 @ 12:09):    <10,000 CFU/mL Normal Urogenital Tika    Culture - Blood (collected 22 @ 23:35)  Source: .Blood Blood-Peripheral  Preliminary Report (22 @ 13:01):    No growth to date.    Culture - Blood (collected 22 @ 23:35)  Source: .Blood Blood-Peripheral  Preliminary Report (22 @ 13:01):    No growth to date.        RADIOLOGY & ADDITIONAL TESTS:    Personally reviewed.     Consultant(s) Notes Reviewed:  [x] YES  [ ] NO

## 2022-11-06 NOTE — DIETITIAN INITIAL EVALUATION ADULT - OTHER INFO
Visited patient in room, family at bedside, presents with good appetite/po intake, consuming ~50-75% of meals. Denies n/v/d/, c/o chronic constipation, last BM 3 days ago, bowel regimen in place, also offered prune juice to aid in constipation. Missing/broken teeth, still following up with her dentist, prefers soft and bite sized foods. NKFA.     Pertinent medications/nutrition labs reviewed; noted elevated BUn, receiving diuretic, iron supplement in house. No education provided at this time as pt was previously educated, recommended yogurt every day and encouraged po intake. Food preferences obtained, will honor as able to encourage intake. RD to continue to monitor nutrition status per protocol.

## 2022-11-06 NOTE — DIETITIAN INITIAL EVALUATION ADULT - PERTINENT LABORATORY DATA
11-06    142  |  104  |  24<H>  ----------------------------<  121<H>  3.6   |  28  |  1.33<H>    Ca    7.9<L>      06 Nov 2022 09:20  Phos  3.4     11-06  Mg     2.1     11-06    TPro  6.2  /  Alb  2.9<L>  /  TBili  0.7  /  DBili  x   /  AST  16  /  ALT  17  /  AlkPhos  50  11-06

## 2022-11-06 NOTE — DIETITIAN INITIAL EVALUATION ADULT - ORAL INTAKE PTA/DIET HISTORY
Pt known to writer from previous admission >1 month ago. Was on a soft foods, NKFA. Previous reported # x3 months Pt known to writer from previous admission >1 month ago. Was on a soft foods, NKFA. Previous reported #, previous admt weight 153# (9/29); current admt weight 160.9#, noted pt on diuretic, wt changes partial likely r/t fluid shift and increased in po intake.

## 2022-11-06 NOTE — PROGRESS NOTE ADULT - SUBJECTIVE AND OBJECTIVE BOX
Chief Complaint: Shortness of breath    Interval Events: No events overnight.    Review of Systems:  General: No fevers, chills, weight gain  Skin: No rashes, color changes  Cardiovascular: No chest pain, orthopnea  Respiratory: No shortness of breath, cough  Gastrointestinal: No nausea, abdominal pain  Genitourinary: No incontinence, pain with urination  Musculoskeletal: No pain, swelling, decreased range of motion  Neurological: No headache, weakness  Psychiatric: No depression, anxiety  Endocrine: No weight gain, increased thirst  All other systems are comprehensively negative.    Physical Exam:  Vitals:        Vital Signs Last 24 Hrs  T(C): 36.2 (06 Nov 2022 08:15), Max: 36.7 (05 Nov 2022 18:23)  T(F): 97.1 (06 Nov 2022 08:15), Max: 98.1 (05 Nov 2022 18:23)  HR: 88 (06 Nov 2022 11:00) (82 - 116)  BP: 108/53 (06 Nov 2022 11:00) (86/55 - 160/82)  BP(mean): 70 (06 Nov 2022 11:00) (59 - 100)  RR: 18 (06 Nov 2022 11:00) (17 - 34)  SpO2: 91% (06 Nov 2022 11:00) (68% - 100%)  Parameters below as of 06 Nov 2022 11:00  Patient On (Oxygen Delivery Method): nasal cannula  O2 Flow (L/min): 3  General: NAD  HEENT: MMM  Neck: No JVD, no carotid bruit  Lungs: CTAB  CV: RRR, nl S1/S2, no M/R/G  Abdomen: S/NT/ND, +BS  Extremities: No LE edema, no cyanosis  Neuro: AAOx3, non-focal  Skin: No rash    Labs:                        8.4    8.50  )-----------( 134      ( 06 Nov 2022 06:48 )             27.9     11-06    142  |  104  |  24<H>  ----------------------------<  121<H>  3.6   |  28  |  1.33<H>    Ca    7.9<L>      06 Nov 2022 09:20  Phos  3.4     11-06  Mg     2.1     11-06    TPro  6.2  /  Alb  2.9<L>  /  TBili  0.7  /  DBili  x   /  AST  16  /  ALT  17  /  AlkPhos  50  11-06    CARDIAC MARKERS ( 04 Nov 2022 23:35 )  x     / x     / 28 U/L / x     / 0.9 ng/mL      PT/INR - ( 04 Nov 2022 23:35 )   PT: 17.9 sec;   INR: 1.51 ratio         PTT - ( 04 Nov 2022 23:35 )  PTT:30.2 sec    ECG/Telemetry: AF

## 2022-11-06 NOTE — DIETITIAN INITIAL EVALUATION ADULT - REASON FOR ADMISSION
Per H&P "89 y/o F wit PMH of HTN, Dyslipidemia, MR s/p TMVR, A. Fib off Eliquis 5 weeks ago for GI bleeding, Chronic Diastolic CHF, Chronic Respiratory Failure, COVID-19, CKD stage 3, GIB, Anemia, Anxiety, Depression presented with respiratory distress."

## 2022-11-06 NOTE — PROGRESS NOTE ADULT - ASSESSMENT
89 y/o F wit PMH of HTN, Dyslipidemia, MR s/p TMVR, A. Fib off Eliquis 5 weeks ago for GI bleeding, Chronic Diastolic CHF, Chronic Respiratory Failure, COVID-19, CKD stage 3, GIB, Anemia, Anxiety, Depression who presented from [facility] with respiratory distress.    HF  hypoxemia  pleural eff  anemia  GI Bleed  valv heart disease  AF  HTN  OP  OA  PVD    TTE reviewed  on ABX  VS noted  s/p PRBC 1 unit  CT chest does not show PNA    doubt PNA - biomarkers - CT chest - eval clinically  suspect CHF HFPEF and Pulm HTN - exacerbation  cardio eval in progress  TTE repeat noted  I and O  serial labs  Anemia - Gi bleed - AF on AC - serial Hgb > 7, transfuse as needed  outpatient record reviewed - follows with Dr Lory Duong in [place]  O2 support as needed - Keep Sat > 88 pct  oral and skin care  LE doppler NEG for DVT  CXR reviewed  old record reviewed  GOC discussion  assist with needs  spoke with pt and daughter at the bedside, dtr works for [employer]

## 2022-11-06 NOTE — DIETITIAN INITIAL EVALUATION ADULT - NS FNS DIET ORDER
Diet, Regular:   DASH/TLC {Sodium & Cholesterol Restricted}  Soft and Bite Sized (SOFTBTSZ) (11-06-22 @ 09:50)

## 2022-11-06 NOTE — DIETITIAN INITIAL EVALUATION ADULT - PERTINENT MEDS FT
MEDICATIONS  (STANDING):  atorvastatin 10 milliGRAM(s) Oral at bedtime  ferrous    sulfate 325 milliGRAM(s) Oral daily  furosemide   Injectable 40 milliGRAM(s) IV Push every 12 hours  metoprolol tartrate 25 milliGRAM(s) Oral two times a day  mirtazapine 15 milliGRAM(s) Oral at bedtime  mometasone 220 MICROgram(s) Inhaler 1 Puff(s) Inhalation daily  pantoprazole  Injectable 40 milliGRAM(s) IV Push two times a day  piperacillin/tazobactam IVPB.. 3.375 Gram(s) IV Intermittent every 8 hours  polyethylene glycol 3350 17 Gram(s) Oral at bedtime  potassium chloride    Tablet ER 40 milliEquivalent(s) Oral once  senna 2 Tablet(s) Oral at bedtime  sertraline 100 milliGRAM(s) Oral daily  spironolactone 25 milliGRAM(s) Oral daily  vancomycin  IVPB 1000 milliGRAM(s) IV Intermittent every 24 hours    MEDICATIONS  (PRN):  ALPRAZolam 0.25 milliGRAM(s) Oral two times a day PRN Anxiety.  melatonin 3 milliGRAM(s) Oral at bedtime PRN Insomnia

## 2022-11-06 NOTE — PROGRESS NOTE ADULT - SUBJECTIVE AND OBJECTIVE BOX
Date/Time Patient Seen:  		  Referring MD:   Data Reviewed	       Patient is a 89y old  Female who presents with a chief complaint of Respiratory distress. (05 Nov 2022 22:37)      Subjective/HPI     PAST MEDICAL & SURGICAL HISTORY:  HTN (hypertension)    HLD (hyperlipidemia)    Atrial fibrillation  On Pradaxa    Syncope    Depression    Leg edema    Mitral valve stenosis, unspecified etiology    Hearing loss of left ear    H/O knee surgery    H/O external ear surgery    Cataracta          Medication list         MEDICATIONS  (STANDING):  atorvastatin 10 milliGRAM(s) Oral at bedtime  ferrous    sulfate 325 milliGRAM(s) Oral daily  furosemide   Injectable 40 milliGRAM(s) IV Push every 12 hours  metoprolol tartrate 25 milliGRAM(s) Oral two times a day  mirtazapine 15 milliGRAM(s) Oral at bedtime  mometasone 220 MICROgram(s) Inhaler 1 Puff(s) Inhalation daily  pantoprazole  Injectable 40 milliGRAM(s) IV Push two times a day  piperacillin/tazobactam IVPB.. 3.375 Gram(s) IV Intermittent every 8 hours  polyethylene glycol 3350 17 Gram(s) Oral at bedtime  senna 2 Tablet(s) Oral at bedtime  sertraline 100 milliGRAM(s) Oral daily  spironolactone 25 milliGRAM(s) Oral daily  vancomycin  IVPB 1000 milliGRAM(s) IV Intermittent every 24 hours    MEDICATIONS  (PRN):  ALPRAZolam 0.25 milliGRAM(s) Oral two times a day PRN Anxiety.  melatonin 3 milliGRAM(s) Oral at bedtime PRN Insomnia         Vitals log        ICU Vital Signs Last 24 Hrs  T(C): 36.5 (06 Nov 2022 04:00), Max: 36.7 (05 Nov 2022 18:23)  T(F): 97.7 (06 Nov 2022 04:00), Max: 98.1 (05 Nov 2022 18:23)  HR: 100 (06 Nov 2022 05:00) (82 - 116)  BP: 112/58 (06 Nov 2022 05:00) (90/47 - 160/82)  BP(mean): 75 (06 Nov 2022 05:00) (59 - 100)  ABP: --  ABP(mean): --  RR: 21 (06 Nov 2022 05:00) (17 - 33)  SpO2: 95% (06 Nov 2022 05:00) (68% - 100%)    O2 Parameters below as of 05 Nov 2022 20:02  Patient On (Oxygen Delivery Method): nasal cannula  O2 Flow (L/min): 3               Input and Output:  I&O's Detail    05 Nov 2022 08:01  -  06 Nov 2022 06:32  --------------------------------------------------------  IN:    IV PiggyBack: 450 mL  Total IN: 450 mL    OUT:  Total OUT: 0 mL    Total NET: 450 mL          Lab Data                        6.9    12.65 )-----------( 144      ( 05 Nov 2022 05:56 )             23.3     11-05    139  |  105  |  30<H>  ----------------------------<  132<H>  4.0   |  26  |  1.56<H>    Ca    7.8<L>      05 Nov 2022 05:56    TPro  7.0  /  Alb  3.1<L>  /  TBili  0.5  /  DBili  x   /  AST  19  /  ALT  13  /  AlkPhos  66  11-04    ABG - ( 05 Nov 2022 14:18 )  pH, Arterial: 7.39  pH, Blood: x     /  pCO2: 39    /  pO2: 231   / HCO3: 24    / Base Excess: -1.4  /  SaO2: 98.5              CARDIAC MARKERS ( 04 Nov 2022 23:35 )  x     / x     / 28 U/L / x     / 0.9 ng/mL        Review of Systems	      Objective     Physical Examination    heart s1s2  lung dec BS  head nc      Pertinent Lab findings & Imaging      Edgar:  NO   Adequate UO     I&O's Detail    05 Nov 2022 08:01  -  06 Nov 2022 06:32  --------------------------------------------------------  IN:    IV PiggyBack: 450 mL  Total IN: 450 mL    OUT:  Total OUT: 0 mL    Total NET: 450 mL               Discussed with:     Cultures:	        Radiology

## 2022-11-06 NOTE — PROGRESS NOTE ADULT - SUBJECTIVE AND OBJECTIVE BOX
[facility], Division of Infectious Diseases  VANE Shine Y. Patel, S. Shah, G. [facility]  [phone]    Name: ARNULFO ALEX  Age: 89y  Gender: Female  MRN: [id number]    Interval History:  Patient seen and examined at bedside   No acute overnight events. Afebrile  Sleeping  Aide at bedside  Notes reviewed    Antibiotics:      Medications:  ALPRAZolam 0.25 milliGRAM(s) Oral two times a day PRN  atorvastatin 10 milliGRAM(s) Oral at bedtime  ferrous    sulfate 325 milliGRAM(s) Oral daily  furosemide   Injectable 40 milliGRAM(s) IV Push every 12 hours  melatonin 3 milliGRAM(s) Oral at bedtime PRN  metoprolol tartrate 25 milliGRAM(s) Oral two times a day  mirtazapine 15 milliGRAM(s) Oral at bedtime  mometasone 220 MICROgram(s) Inhaler 1 Puff(s) Inhalation daily  pantoprazole  Injectable 40 milliGRAM(s) IV Push two times a day  polyethylene glycol 3350 17 Gram(s) Oral at bedtime  senna 2 Tablet(s) Oral at bedtime  sertraline 100 milliGRAM(s) Oral daily  spironolactone 25 milliGRAM(s) Oral daily      Review of Systems:  unable to obtain    Allergies: codeine (Other)    For details regarding the patient's past medical history, social history, family history, and other miscellaneous elements, please refer the initial infectious diseases consultation and/or the admitting history and physical examination for this admission.    Objective:  Vitals:   T(C): 36.2 (22 @ 12:30), Max: 36.7 (22 @ 18:23)  HR: 86 (22 @ 16:00) (82 - 104)  BP: 99/58 (22 @ 16:00) (86/55 - 123/71)  RR: 25 (22 @ 16:00) (17 - 34)  SpO2: 95% (22 @ 16:00) (84% - 98%)    Physical Examination:  General: no acute distress  HEENT: NC/AT  Cardio: S1, S2 heard, RRR, no murmurs  Resp: no use resp acc muscles, on NC  Abd: soft  Ext: no edema or cyanosis  Skin: warm, dry, no visible rash      Laboratory Studies:  CBC:                       8.4    8.50  )-----------( 134      ( 2022 06:48 )             27.9     CMP:     142  |  104  |  24<H>  ----------------------------<  121<H>  3.6   |  28  |  1.33<H>    Ca    7.9<L>      2022 09:20  Phos  3.4       Mg     2.1         TPro  6.2  /  Alb  2.9<L>  /  TBili  0.7  /  DBili  x   /  AST  16  /  ALT  17  /  AlkPhos  50      LIVER FUNCTIONS - ( 2022 09:20 )  Alb: 2.9 g/dL / Pro: 6.2 g/dL / ALK PHOS: 50 U/L / ALT: 17 U/L DA / AST: 16 U/L / GGT: x           Urinalysis Basic - ( 2022 01:15 )    Color: Yellow / Appearance: Clear / S.015 / pH: x  Gluc: x / Ketone: Negative  / Bili: Negative / Urobili: Negative mg/dL   Blood: x / Protein: 15 mg/dL / Nitrite: Negative   Leuk Esterase: Negative / RBC: Negative /HPF / WBC Negative   Sq Epi: x / Non Sq Epi: Occasional / Bacteria: Negative        Microbiology: reviewed    Culture - Urine (collected 22 @ 01:15)  Source: Clean Catch Clean Catch (Midstream)  Final Report (22 @ 12:09):    <10,000 CFU/mL Normal Urogenital Tika    Culture - Blood (collected 22 @ 23:35)  Source: .Blood Blood-Peripheral  Preliminary Report (22 @ 13:01):    No growth to date.    Culture - Blood (collected 22 @ 23:35)  Source: .Blood Blood-Peripheral  Preliminary Report (22 @ 13:01):    No growth to date.          Radiology: reviewed    < from: Xray Chest 1 View-PORTABLE IMMEDIATE (Xray Chest 1 View-PORTABLE IMMEDIATE .) (22 @ 14:07) >    ACC: [id number] EXAM:  XR CHEST PORTABLE IMMED 1V                          PROCEDURE DATE:  2022          INTERPRETATION:  INDICATION: Short of breath    Portable chest 1:59 PM    COMPARISON: 2022    FINDINGS:  Heart/Vascular: The mediastinum, hilum and aorta are stable. Cardiomegaly.  2. Mitral clips.  Pulmonary: Midline trachea. There are small bilateral pleural effusions.    Bones: There is no fracture.  Lines and catheter: None    Impression:    Small bilateral pleural effusions.    --- End of Report ---             JOSE DELEON DO; Attending Radiologist  This document has been electronically signed. 2022  3:14PM    < end of copied text >

## 2022-11-06 NOTE — DIETITIAN INITIAL EVALUATION ADULT - REASON INDICATOR FOR ASSESSMENT
Policy: length of stay in scu; hx of CHF, vito Policy: length of stay in scu; hx of CHF, malnutrition

## 2022-11-06 NOTE — PROGRESS NOTE ADULT - ASSESSMENT
89yo F with PMH of HTN, Dyslipidemia, MR s/p MitraClip, A. Fib (off Eliquis 5 weeks ago for LGIB), Diastolic CHF, Chronic Hypoxic Respiratory Failure (on 3.5L NC), hx of COVID-19, CKD stage 3, Anemia, Anxiety, Depression presented with dyspnea a/w acute on chronic hypoxic respiratory failure due to acute on chronic diastolic CHF.

## 2022-11-07 LAB
ALBUMIN SERPL ELPH-MCNC: 2.9 G/DL — LOW (ref 3.3–5)
ALP SERPL-CCNC: 57 U/L — SIGNIFICANT CHANGE UP (ref 30–120)
ALT FLD-CCNC: 17 U/L DA — SIGNIFICANT CHANGE UP (ref 10–60)
ANION GAP SERPL CALC-SCNC: 8 MMOL/L — SIGNIFICANT CHANGE UP (ref 5–17)
AST SERPL-CCNC: 22 U/L — SIGNIFICANT CHANGE UP (ref 10–40)
BILIRUB SERPL-MCNC: 0.7 MG/DL — SIGNIFICANT CHANGE UP (ref 0.2–1.2)
BUN SERPL-MCNC: 19 MG/DL — SIGNIFICANT CHANGE UP (ref 7–23)
CALCIUM SERPL-MCNC: 8.1 MG/DL — LOW (ref 8.4–10.5)
CHLORIDE SERPL-SCNC: 104 MMOL/L — SIGNIFICANT CHANGE UP (ref 96–108)
CO2 SERPL-SCNC: 30 MMOL/L — SIGNIFICANT CHANGE UP (ref 22–31)
CREAT SERPL-MCNC: 1.15 MG/DL — SIGNIFICANT CHANGE UP (ref 0.5–1.3)
EGFR: 45 ML/MIN/1.73M2 — LOW
FERRITIN SERPL-MCNC: 118 NG/ML — SIGNIFICANT CHANGE UP (ref 15–150)
GLUCOSE SERPL-MCNC: 144 MG/DL — HIGH (ref 70–99)
HCT VFR BLD CALC: 29.3 % — LOW (ref 34.5–45)
HGB BLD-MCNC: 8.6 G/DL — LOW (ref 11.5–15.5)
IRON SATN MFR SERPL: 20 UG/DL — LOW (ref 30–160)
IRON SATN MFR SERPL: 6 % — LOW (ref 14–50)
LEGIONELLA AG UR QL: NEGATIVE — SIGNIFICANT CHANGE UP
MCHC RBC-ENTMCNC: 22.5 PG — LOW (ref 27–34)
MCHC RBC-ENTMCNC: 29.4 GM/DL — LOW (ref 32–36)
MCV RBC AUTO: 76.7 FL — LOW (ref 80–100)
NRBC # BLD: 0 /100 WBCS — SIGNIFICANT CHANGE UP (ref 0–0)
PLATELET # BLD AUTO: 153 K/UL — SIGNIFICANT CHANGE UP (ref 150–400)
POTASSIUM SERPL-MCNC: 3.9 MMOL/L — SIGNIFICANT CHANGE UP (ref 3.5–5.3)
POTASSIUM SERPL-SCNC: 3.9 MMOL/L — SIGNIFICANT CHANGE UP (ref 3.5–5.3)
PROT SERPL-MCNC: 6.5 G/DL — SIGNIFICANT CHANGE UP (ref 6–8.3)
RBC # BLD: 3.82 M/UL — SIGNIFICANT CHANGE UP (ref 3.8–5.2)
RBC # FLD: 21.6 % — HIGH (ref 10.3–14.5)
SODIUM SERPL-SCNC: 142 MMOL/L — SIGNIFICANT CHANGE UP (ref 135–145)
TIBC SERPL-MCNC: 339 UG/DL — SIGNIFICANT CHANGE UP (ref 220–430)
UIBC SERPL-MCNC: 320 UG/DL — SIGNIFICANT CHANGE UP (ref 110–370)
VIT B12 SERPL-MCNC: 921 PG/ML — SIGNIFICANT CHANGE UP (ref 232–1245)
WBC # BLD: 8.49 K/UL — SIGNIFICANT CHANGE UP (ref 3.8–10.5)
WBC # FLD AUTO: 8.49 K/UL — SIGNIFICANT CHANGE UP (ref 3.8–10.5)

## 2022-11-07 PROCEDURE — 99233 SBSQ HOSP IP/OBS HIGH 50: CPT

## 2022-11-07 PROCEDURE — 70488 CT MAXILLOFACIAL W/O & W/DYE: CPT | Mod: 26

## 2022-11-07 RX ORDER — IRON SUCROSE 20 MG/ML
200 INJECTION, SOLUTION INTRAVENOUS ONCE
Refills: 0 | Status: COMPLETED | OUTPATIENT
Start: 2022-11-07 | End: 2022-11-07

## 2022-11-07 RX ORDER — ENOXAPARIN SODIUM 100 MG/ML
70 INJECTION SUBCUTANEOUS EVERY 12 HOURS
Refills: 0 | Status: DISCONTINUED | OUTPATIENT
Start: 2022-11-07 | End: 2022-11-08

## 2022-11-07 RX ORDER — MAGNESIUM HYDROXIDE 400 MG/1
30 TABLET, CHEWABLE ORAL DAILY
Refills: 0 | Status: DISCONTINUED | OUTPATIENT
Start: 2022-11-07 | End: 2022-11-08

## 2022-11-07 RX ORDER — ENOXAPARIN SODIUM 100 MG/ML
40 INJECTION SUBCUTANEOUS EVERY 24 HOURS
Refills: 0 | Status: DISCONTINUED | OUTPATIENT
Start: 2022-11-07 | End: 2022-11-07

## 2022-11-07 RX ORDER — FUROSEMIDE 40 MG
40 TABLET ORAL DAILY
Refills: 0 | Status: DISCONTINUED | OUTPATIENT
Start: 2022-11-08 | End: 2022-11-08

## 2022-11-07 RX ORDER — ACETAMINOPHEN 500 MG
650 TABLET ORAL ONCE
Refills: 0 | Status: COMPLETED | OUTPATIENT
Start: 2022-11-07 | End: 2022-11-07

## 2022-11-07 RX ADMIN — Medication 650 MILLIGRAM(S): at 22:33

## 2022-11-07 RX ADMIN — Medication 325 MILLIGRAM(S): at 14:02

## 2022-11-07 RX ADMIN — PANTOPRAZOLE SODIUM 40 MILLIGRAM(S): 20 TABLET, DELAYED RELEASE ORAL at 17:09

## 2022-11-07 RX ADMIN — PANTOPRAZOLE SODIUM 40 MILLIGRAM(S): 20 TABLET, DELAYED RELEASE ORAL at 05:22

## 2022-11-07 RX ADMIN — Medication 40 MILLIGRAM(S): at 05:22

## 2022-11-07 RX ADMIN — SERTRALINE 100 MILLIGRAM(S): 25 TABLET, FILM COATED ORAL at 14:02

## 2022-11-07 RX ADMIN — Medication 3 MILLIGRAM(S): at 20:49

## 2022-11-07 RX ADMIN — MOMETASONE FUROATE 1 PUFF(S): 220 INHALANT RESPIRATORY (INHALATION) at 06:53

## 2022-11-07 RX ADMIN — Medication 0.25 MILLIGRAM(S): at 05:22

## 2022-11-07 RX ADMIN — Medication 25 MILLIGRAM(S): at 17:09

## 2022-11-07 RX ADMIN — Medication 0.25 MILLIGRAM(S): at 20:50

## 2022-11-07 RX ADMIN — ATORVASTATIN CALCIUM 10 MILLIGRAM(S): 80 TABLET, FILM COATED ORAL at 21:06

## 2022-11-07 RX ADMIN — Medication 650 MILLIGRAM(S): at 23:33

## 2022-11-07 RX ADMIN — MIRTAZAPINE 15 MILLIGRAM(S): 45 TABLET, ORALLY DISINTEGRATING ORAL at 21:06

## 2022-11-07 RX ADMIN — IRON SUCROSE 110 MILLIGRAM(S): 20 INJECTION, SOLUTION INTRAVENOUS at 14:01

## 2022-11-07 RX ADMIN — POLYETHYLENE GLYCOL 3350 17 GRAM(S): 17 POWDER, FOR SOLUTION ORAL at 21:06

## 2022-11-07 RX ADMIN — SPIRONOLACTONE 25 MILLIGRAM(S): 25 TABLET, FILM COATED ORAL at 05:33

## 2022-11-07 RX ADMIN — SENNA PLUS 2 TABLET(S): 8.6 TABLET ORAL at 21:06

## 2022-11-07 RX ADMIN — ENOXAPARIN SODIUM 70 MILLIGRAM(S): 100 INJECTION SUBCUTANEOUS at 17:09

## 2022-11-07 NOTE — PROGRESS NOTE ADULT - PROBLEM SELECTOR PLAN 4
-afib w/ RVR on admission  -rates now better controlled and in the 90s  -c/w metoprolol with hold parameters  -had 6 beats of NSVT, which can also benefit from metoprolol  -repleted potassium -- goal K>4 and Mg>2  -monitor on tele  -not on A/C due to LGIB
-afib w/ RVR on admission  -rates now better controlled and in the 90s  -c/w metoprolol with hold parameters  -had 6 beats of NSVT, which can also benefit from metoprolol  -repleted potassium -- goal K>4 and Mg>2  -monitor on tele  -not on A/C due to LGIB

## 2022-11-07 NOTE — PROGRESS NOTE ADULT - PROBLEM SELECTOR PLAN 2
-likely due to acute on chronic diastolic CHF   -c/w diuretics and management as described above  -PNA ruled out with CT  -had been on a NRB  -patient on home oxygen at 3.5 L/min 24h a day and is now back down to home O2 requirements   -negative COVID-19 PCR ; B/L venous duplex negative for DVT  -checked RVP to r/out other viral resp infection and was negative as well  -pulmonary (She), cardio (Neal), and ID (Addie), recs appreciated
-likely due to acute on chronic diastolic CHF   -c/w diuretics and management as described above  -PNA ruled out with CT  -had been on a NRB  -patient on home oxygen at 3.5 L/min 24h a day and is now back down to home O2 requirements   -negative COVID-19 PCR ; B/L venous duplex negative for DVT  -checked RVP to r/out other viral resp infection and was negative as well  -pulmonary (She), cardio (Neal), and ID (Addie), recs appreciated

## 2022-11-07 NOTE — PROGRESS NOTE ADULT - ASSESSMENT
The patient is an 89 year old female with a history of HTN, HL, atrial fibrillation, MitraClip, chronic diastolic heart failure, GI bleed who presents with shortness of breath in the setting of acute on chronic diastolic heart failure.    Plan:  - ECG with known AF  - Troponin borderline elevated at 81 in the setting of demand ischemia from heart failure and anemia. No need to trend further.  - CT chest with small bilateral pleural effusions  - BNP 6253  - Cardiac enzymes negative  - Echo with normal LV systolic function, normal MitraClip function, mild pulm HTN  - Lower furosemide to 40 mg PO daily  - Continue metoprolol tartrate 25 mg bid  - Continue spironolactone 25 mg daily  - Hemoglobin improved after transfusion  - Remain off of anticoagulation for now. This can be re-evaluated as an outpatient.

## 2022-11-07 NOTE — PROGRESS NOTE ADULT - SUBJECTIVE AND OBJECTIVE BOX
Chief Complaint: Shortness of breath    Interval Events: No events overnight.    Review of Systems:  General: No fevers, chills, weight gain  Skin: No rashes, color changes  Cardiovascular: No chest pain, orthopnea  Respiratory: No shortness of breath, cough  Gastrointestinal: No nausea, abdominal pain  Genitourinary: No incontinence, pain with urination  Musculoskeletal: No pain, swelling, decreased range of motion  Neurological: No headache, weakness  Psychiatric: No depression, anxiety  Endocrine: No weight gain, increased thirst  All other systems are comprehensively negative.    Physical Exam:  Vital Signs Last 24 Hrs  T(C): 36.5 (07 Nov 2022 07:57), Max: 36.6 (07 Nov 2022 00:00)  T(F): 97.7 (07 Nov 2022 07:57), Max: 97.9 (07 Nov 2022 00:00)  HR: 100 (07 Nov 2022 08:00) (81 - 107)  BP: 114/89 (07 Nov 2022 08:00) (97/61 - 118/61)  BP(mean): 98 (07 Nov 2022 08:00) (66 - 99)  RR: 17 (07 Nov 2022 08:00) (17 - 35)  SpO2: 95% (07 Nov 2022 08:00) (88% - 97%)  Parameters below as of 07 Nov 2022 08:00  Patient On (Oxygen Delivery Method): nasal cannula  O2 Flow (L/min): 3  General: NAD  HEENT: MMM  Neck: No JVD, no carotid bruit  Lungs: CTAB  CV: RRR, nl S1/S2, no M/R/G  Abdomen: S/NT/ND, +BS  Extremities: No LE edema, no cyanosis  Neuro: AAOx3, non-focal  Skin: No rash    Labs:    11-07    142  |  104  |  19  ----------------------------<  144<H>  3.9   |  30  |  1.15    Ca    8.1<L>      07 Nov 2022 06:00  Phos  3.4     11-06  Mg     2.1     11-06    TPro  6.5  /  Alb  2.9<L>  /  TBili  0.7  /  DBili  x   /  AST  22  /  ALT  17  /  AlkPhos  57  11-07                        8.6    8.49  )-----------( 153      ( 07 Nov 2022 06:00 )             29.3       ECG/Telemetry: AF

## 2022-11-07 NOTE — PROGRESS NOTE ADULT - SUBJECTIVE AND OBJECTIVE BOX
Patient is a 89y old  Female who presents with a chief complaint of SOB.        INTERVAL HPI/OVERNIGHT EVENTS:   Patient seen and examined bedside.   no overnight events  doing well on 6L NC   states she feels pain in the right cheek next to the cyst     REVIEW OF SYSTEMS: remaining ROS negative     MEDICATIONS  (STANDING):  atorvastatin 10 milliGRAM(s) Oral at bedtime  ferrous    sulfate 325 milliGRAM(s) Oral daily  furosemide   Injectable 40 milliGRAM(s) IV Push every 12 hours  metoprolol tartrate 25 milliGRAM(s) Oral two times a day  mirtazapine 15 milliGRAM(s) Oral at bedtime  mometasone 220 MICROgram(s) Inhaler 1 Puff(s) Inhalation daily  pantoprazole  Injectable 40 milliGRAM(s) IV Push two times a day  polyethylene glycol 3350 17 Gram(s) Oral at bedtime  senna 2 Tablet(s) Oral at bedtime  sertraline 100 milliGRAM(s) Oral daily  spironolactone 25 milliGRAM(s) Oral daily    MEDICATIONS  (PRN):  ALPRAZolam 0.25 milliGRAM(s) Oral two times a day PRN Anxiety.  melatonin 3 milliGRAM(s) Oral at bedtime PRN Insomnia      Allergies    codeine (Other)    Intolerances      Vital Signs Last 24 Hrs  T(C): 36.4 (2022 11:34), Max: 36.6 (2022 00:00)  T(F): 97.6 (2022 11:34), Max: 97.9 (2022 00:00)  HR: 100 (2022 08:00) (81 - 107)  BP: 114/89 (2022 08:00) (97/61 - 118/61)  BP(mean): 98 (2022 08:00) (66 - 99)  RR: 17 (2022 08:00) (17 - 35)  SpO2: 95% (2022 08:00) (88% - 97%)    Parameters below as of 2022 08:00  Patient On (Oxygen Delivery Method): nasal cannula  O2 Flow (L/min): 3        PHYSICAL EXAM:    GENERAL: NAD well-developed, no increased WOB, not using accessory muscles, speaking in full sentences, appears comfortable   HEAD:  Atraumatic, Normocephalic  EYES: EOMI, PERRLA, conjunctiva and sclera clear  ENMT: No tonsillar erythema, exudates, or enlargement; Moist MM, poor dentition, b/l cheek cysts, right small area of tenderness and fluctuance behind right cyst of cheek   NECK: Supple, No JVD,   NERVOUS SYSTEM:  Alert & Oriented X3, Good concentration; nonfocal   CHEST/LUNG: CTAB;  No rales, rhonchi, wheezing, or rubs  HEART: Regular rate and rhythm; No murmurs, rubs, or gallops  ABDOMEN: Soft, Nontender, Nondistended; Bowel sounds present  EXTREMITIES:  2+ Peripheral Pulses b/l, No clubbing, cyanosis, calf tenderness or edema b/l         LABS:                                   8.6    8.49  )-----------( 153      ( 2022 06:00 )             29.3       142  |  104  |  19  ----------------------------<  144<H>  3.9   |  30  |  1.15    Ca    8.1<L>      2022 06:00  Phos  3.4       Mg     2.1         TPro  6.5  /  Alb  2.9<L>  /  TBili  0.7  /  DBili  x   /  AST  22  /  ALT  17  /  AlkPhos  57      Urinalysis Basic - ( 2022 01:15 )    Color: Yellow / Appearance: Clear / S.015 / pH: x  Gluc: x / Ketone: Negative  / Bili: Negative / Urobili: Negative mg/dL   Blood: x / Protein: 15 mg/dL / Nitrite: Negative   Leuk Esterase: Negative / RBC: Negative /HPF / WBC Negative   Sq Epi: x / Non Sq Epi: Occasional / Bacteria: Negative      CAPILLARY BLOOD GLUCOSE            Culture - Urine (collected 22 @ 01:15)  Source: Clean Catch Clean Catch (Midstream)  Final Report (22 @ 12:09):    <10,000 CFU/mL Normal Urogenital Tika    Culture - Blood (collected 22 @ 23:35)  Source: .Blood Blood-Peripheral  Preliminary Report (22 @ 13:01):    No growth to date.    Culture - Blood (collected 22 @ 23:35)  Source: .Blood Blood-Peripheral  Preliminary Report (22 @ 13:01):    No growth to date.        RADIOLOGY & ADDITIONAL TESTS:    Consultant(s) Notes Reveiwed [x ] Yes     Care Discussed with [x ] Consultants  [x ] Patient  [x ] Family--daughter Gabriel [phone]  [ x] /   [x ] Other; RN   Patient is a 89y old  Female who presents with a chief complaint of SOB.        INTERVAL HPI/OVERNIGHT EVENTS:   Patient seen and examined bedside.   no overnight events  doing well on 3L NC (her home O2 is also 3L NC)   states she feels pain in the right cheek next to the cyst     REVIEW OF SYSTEMS: remaining ROS negative     MEDICATIONS  (STANDING):  atorvastatin 10 milliGRAM(s) Oral at bedtime  ferrous    sulfate 325 milliGRAM(s) Oral daily  furosemide   Injectable 40 milliGRAM(s) IV Push every 12 hours  metoprolol tartrate 25 milliGRAM(s) Oral two times a day  mirtazapine 15 milliGRAM(s) Oral at bedtime  mometasone 220 MICROgram(s) Inhaler 1 Puff(s) Inhalation daily  pantoprazole  Injectable 40 milliGRAM(s) IV Push two times a day  polyethylene glycol 3350 17 Gram(s) Oral at bedtime  senna 2 Tablet(s) Oral at bedtime  sertraline 100 milliGRAM(s) Oral daily  spironolactone 25 milliGRAM(s) Oral daily    MEDICATIONS  (PRN):  ALPRAZolam 0.25 milliGRAM(s) Oral two times a day PRN Anxiety.  melatonin 3 milliGRAM(s) Oral at bedtime PRN Insomnia      Allergies    codeine (Other)    Intolerances      Vital Signs Last 24 Hrs  T(C): 36.4 (2022 11:34), Max: 36.6 (2022 00:00)  T(F): 97.6 (2022 11:34), Max: 97.9 (2022 00:00)  HR: 100 (2022 08:00) (81 - 107)  BP: 114/89 (2022 08:00) (97/61 - 118/61)  BP(mean): 98 (2022 08:00) (66 - 99)  RR: 17 (2022 08:00) (17 - 35)  SpO2: 95% (2022 08:00) (88% - 97%)    Parameters below as of 2022 08:00  Patient On (Oxygen Delivery Method): nasal cannula  O2 Flow (L/min): 3        PHYSICAL EXAM:    GENERAL: NAD well-developed, no increased WOB, not using accessory muscles, speaking in full sentences, appears comfortable   HEAD:  Atraumatic, Normocephalic  EYES: EOMI, PERRLA, conjunctiva and sclera clear  ENMT: No tonsillar erythema, exudates, or enlargement; Moist MM, poor dentition, b/l cheek cysts, right small area of tenderness and fluctuance behind right cyst of cheek   NECK: Supple, No JVD,   NERVOUS SYSTEM:  Alert & Oriented X3, Good concentration; nonfocal   CHEST/LUNG: CTAB;  No rales, rhonchi, wheezing, or rubs  HEART: Regular rate and rhythm; No murmurs, rubs, or gallops  ABDOMEN: Soft, Nontender, Nondistended; Bowel sounds present  EXTREMITIES:  2+ Peripheral Pulses b/l, No clubbing, cyanosis, calf tenderness or edema b/l         LABS:                                   8.6    8.49  )-----------( 153      ( 2022 06:00 )             29.3       142  |  104  |  19  ----------------------------<  144<H>  3.9   |  30  |  1.15    Ca    8.1<L>      2022 06:00  Phos  3.4       Mg     2.1         TPro  6.5  /  Alb  2.9<L>  /  TBili  0.7  /  DBili  x   /  AST  22  /  ALT  17  /  AlkPhos  57      Urinalysis Basic - ( 2022 01:15 )    Color: Yellow / Appearance: Clear / S.015 / pH: x  Gluc: x / Ketone: Negative  / Bili: Negative / Urobili: Negative mg/dL   Blood: x / Protein: 15 mg/dL / Nitrite: Negative   Leuk Esterase: Negative / RBC: Negative /HPF / WBC Negative   Sq Epi: x / Non Sq Epi: Occasional / Bacteria: Negative      CAPILLARY BLOOD GLUCOSE            Culture - Urine (collected 22 @ 01:15)  Source: Clean Catch Clean Catch (Midstream)  Final Report (22 @ 12:09):    <10,000 CFU/mL Normal Urogenital Tika    Culture - Blood (collected 22 @ 23:35)  Source: .Blood Blood-Peripheral  Preliminary Report (22 @ 13:01):    No growth to date.    Culture - Blood (collected 22 @ 23:35)  Source: .Blood Blood-Peripheral  Preliminary Report (22 @ 13:01):    No growth to date.        RADIOLOGY & ADDITIONAL TESTS:    Consultant(s) Notes Reveiwed [x ] Yes     Care Discussed with [x ] Consultants  [x ] Patient  [x ] Family--daughter Gabriel [phone]  [ x] /   [x ] Other; RN    Care plan and all findings were discussed in detail with patient and daughter Gabriel.  All questions and concerns addressed

## 2022-11-07 NOTE — PROGRESS NOTE ADULT - PROBLEM SELECTOR PLAN 3
-found to have erythema on admission ; unclear if has cellulitis; will c/w vancomycin for now and reassess tomorrow   -ID (Addie), recs appreciated  -MRSA nares positive  -LE Doppler negative for DVT
-found to have erythema on admission ; unclear if has cellulitis; will c/w vancomycin for now and reassess tomorrow   -ID (Addie), recs appreciated  -MRSA nares positive  -LE Doppler negative for DVT

## 2022-11-07 NOTE — SWALLOW BEDSIDE ASSESSMENT ADULT - SLP GENERAL OBSERVATIONS
Pt received upright in bed A&A Ox4, +O2NC SpO2 90-95%, private aide at bedside, pain scale 0/10 pre & post eval

## 2022-11-07 NOTE — PROGRESS NOTE ADULT - ASSESSMENT
89 y/o F wit PMH of HTN, Dyslipidemia, MR s/p TMVR, A. Fib off Eliquis 5 weeks ago for GI bleeding, Chronic Diastolic CHF, Chronic Respiratory Failure, COVID-19, CKD stage 3, GIB, Anemia, Anxiety, Depression presented with respiratory distress.     ?PNA  Acute on Chronic CHF exacerbation  Acute on chronic RF on NRB/NC  MEME on CKD  - pt p/w resp distress  - CXR w/ no consolidation  - cx NGTD  Plan:   D/c zosyn  Trend temps/WBC  Trend renal fxn/renally dose medications/avoid nephrotoxic agents  Supportive care   Supplemental O2 as needed    ?L thigh cellulitis  - DVT study negative  - exam inconsistent w/ cellulitis, slight erythema, normal temp  Plan:  serial exams  d/c vancomycin  monitor off Abx    Infectious Diseases will continue to follow. Please call with any questions.   Suzy Real M.D.  [facility] of Infectious Diseases [phone]

## 2022-11-07 NOTE — PROGRESS NOTE ADULT - PROBLEM SELECTOR PLAN 5
-MEME on Stage 3 CKD  -renal function improved despite diuresis so suspect was cardiorenal   -c/w diuretics for now and monitor renal function; pt approaching euvolemia and will likely transition to oral diuretics tomorrow  -monitor BMP
-MEME on Stage 3 CKD  -renal function improved despite diuresis so suspect was cardiorenal   -c/w diuretics for now and monitor renal function; pt approaching euvolemia and will likely transition to oral diuretics tomorrow  -monitor BMP

## 2022-11-07 NOTE — PROGRESS NOTE ADULT - SUBJECTIVE AND OBJECTIVE BOX
[facility], Division of Infectious Diseases  [facility]el, S. [facility], G. [facility]  [phone]    Name: ARNULFO ALEX  Age: 89y  Gender: Female  MRN: [id number]    Interval History:  Patient seen and examined at bedside  No acute overnight events. Afebrile  No complaints  Notes reviewed    Antibiotics:      Medications:  ALPRAZolam 0.25 milliGRAM(s) Oral two times a day PRN  atorvastatin 10 milliGRAM(s) Oral at bedtime  ferrous    sulfate 325 milliGRAM(s) Oral daily  melatonin 3 milliGRAM(s) Oral at bedtime PRN  metoprolol tartrate 25 milliGRAM(s) Oral two times a day  mirtazapine 15 milliGRAM(s) Oral at bedtime  mometasone 220 MICROgram(s) Inhaler 1 Puff(s) Inhalation daily  pantoprazole  Injectable 40 milliGRAM(s) IV Push two times a day  polyethylene glycol 3350 17 Gram(s) Oral at bedtime  senna 2 Tablet(s) Oral at bedtime  sertraline 100 milliGRAM(s) Oral daily  spironolactone 25 milliGRAM(s) Oral daily      Review of Systems:  A 10-point review of systems was obtained.     Pertinent positives and negatives--  Constitutional: No fevers. No Chills. No Rigors.   Cardiovascular: No chest pain. No palpitations.  Respiratory: No shortness of breath. No cough.  Gastrointestinal: No nausea or vomiting. No diarrhea or constipation.   Psychiatric: Pleasant. Appropriate affect.    Review of systems otherwise negative except as previously noted.    Allergies: codeine (Other)    For details regarding the patient's past medical history, social history, family history, and other miscellaneous elements, please refer the initial infectious diseases consultation and/or the admitting history and physical examination for this admission.    Objective:  Vitals:   T(C): 36.4 (11-07-22 @ 11:34), Max: 36.6 (11-07-22 @ 00:00)  HR: 100 (11-07-22 @ 08:00) (81 - 107)  BP: 114/89 (11-07-22 @ 08:00) (97/61 - 118/61)  RR: 17 (11-07-22 @ 08:00) (17 - 35)  SpO2: 95% (11-07-22 @ 08:00) (88% - 97%)    Physical Examination:  General: no acute distress  HEENT: NC/AT, EOMI  Cardio: S1, S2 heard, RRR, no murmurs  Resp: decreased breath sounds, whezzing  Abd: soft, NT, ND  Ext: no edema or cyanosis  Skin: warm, dry, no visible rash      Laboratory Studies:  CBC:                       8.6    8.49  )-----------( 153      ( 07 Nov 2022 06:00 )             29.3     CMP: 11-07    142  |  104  |  19  ----------------------------<  144<H>  3.9   |  30  |  1.15    Ca    8.1<L>      07 Nov 2022 06:00  Phos  3.4     11-06  Mg     2.1     11-06    TPro  6.5  /  Alb  2.9<L>  /  TBili  0.7  /  DBili  x   /  AST  22  /  ALT  17  /  AlkPhos  57  11-07    LIVER FUNCTIONS - ( 07 Nov 2022 06:00 )  Alb: 2.9 g/dL / Pro: 6.5 g/dL / ALK PHOS: 57 U/L / ALT: 17 U/L DA / AST: 22 U/L / GGT: x               Microbiology: reviewed    Culture - Urine (collected 11-05-22 @ 01:15)  Source: Clean Catch Clean Catch (Midstream)  Final Report (11-06-22 @ 12:09):    <10,000 CFU/mL Normal Urogenital Tika    Culture - Blood (collected 11-04-22 @ 23:35)  Source: .Blood Blood-Peripheral  Preliminary Report (11-06-22 @ 13:01):    No growth to date.    Culture - Blood (collected 11-04-22 @ 23:35)  Source: .Blood Blood-Peripheral  Preliminary Report (11-06-22 @ 13:01):    No growth to date.          Radiology: reviewed

## 2022-11-07 NOTE — PROGRESS NOTE ADULT - TIME BILLING
Note written by attending, see above.  Time spent: 45min. More than 50% of the visit was spent counseling the patient and her family on medical condition - acute on chronic hypoxic respiratory failure due to acute on chronic diastolic CHF, MEME on CKD 3, anemia, cheek swelling.
min spent reviewing chart, examining patient, discussing plan with patient and family and staff, writing progress note and placing orders.

## 2022-11-07 NOTE — PHYSICAL THERAPY INITIAL EVALUATION ADULT - TRANSFER SKILLS, REHAB EVAL
Problem: Mobility Impaired (Adult and Pediatric)  Goal: *Acute Goals and Plan of Care (Insert Text)  STG:  (1.)Ms. Bar Woods will move from supine to sit and sit to supine  with CONTACT GUARD ASSIST within 3 treatment day(s). (2.)Ms. Bar Woods will transfer from bed to chair and chair to bed with MINIMAL ASSIST using the least restrictive device within 3 treatment day(s). (3.)Ms. Bar Woods will ambulate with MINIMAL ASSIST for 30 feet with the least restrictive device within 3 treatment day(s). LTG:  (1.)Ms. Bar Woods will move from supine to sit and sit to supine  in bed with STAND BY ASSIST within 7 treatment day(s). (2.)Ms. Bar Woods will transfer from bed to chair and chair to bed with STAND BY ASSIST using the least restrictive device within 7 treatment day(s). (3.)Ms. Bar Woods will ambulate with CONTACT GUARD ASSIST for 75 feet with the least restrictive device within 7 treatment day(s). ________________________________________________________________________________________________      PHYSICAL THERAPY: Daily Note and AM 3/11/2019  INPATIENT: PT Visit Days : 1  [id number] [address] / Plan: [address] [place] [address] / Product Type: Medicaid /       NAME/AGE/GENDER: Mara Bradford is a 61 y.o. female   PRIMARY DIAGNOSIS: Seizure ([address] [R56.9]  Metastatic cancer to brain ([address] [C79.31]  Hyperglycemia [R73.9] Seizure ([address] Seizure ([address]  Procedure(s) (LRB):  RIGHT CRANIOTOMY  FOR RESECTION OF TUMOR WITH STEALTH/ MRI @  0900 / ROOM 3101 (N/A)     ICD-10: Treatment Diagnosis:    · Generalized Muscle Weakness (M62.81)  · Difficulty in walking, Not elsewhere classified (R26.2)  · History of falling (Z91.81)   Precaution/Allergies: Iodides tincture [iodine]      ASSESSMENT:     Ms. Bar Woods is supine on arrival, on 2 L/min O2 via n.c, small catheter, IV. Pt A & O x 3 but confused in conversation. Pt required redirection with tasks at times.  Pt required Min/Mod A x 2 for supine to sit, able to maintain static sitting balance at EOB through prop sitting x 5-10 min. Pt attempted sit to stand x 3 standing attempts with MAX A x 2, bed elevated. Pt unable to weight shifting well in standing to advance B LEs for ambulation. Pt attempted to take side steps at bedside, unable to perform. Pt unsafe to transfer to chair at this time due to safety concerns, fall risk. Pt with BM in standing, returned to supine with MAX A x 2 and called for assist with cleaning. Pt making slow progress toward goals, craniotomy scheduled for Wednesday. PT to cont to follow for acute care needs. per initial:  Pt lives with her son and daughter in 1 story home with 5 steps to enter with railing available. Pt reports gait with use of rollator or wheelchair dependent on how she is feeling that day. Pt reports independence with ADLs and requires use of 1.5 L supplemental O2 at baseline. Pt reports 2 falls. This section established at most recent assessment   PROBLEM LIST (Impairments causing functional limitations):  1. Decreased Strength  2. Decreased ADL/Functional Activities  3. Decreased Transfer Abilities  4. Decreased Ambulation Ability/Technique  5. Decreased Balance  6. Increased Pain  7. Decreased Activity Tolerance  8. Decreased Pacing Skills  9. Increased Fatigue  10. Increased Shortness of Breath  11. Decreased [place] with Home Exercise Program   INTERVENTIONS PLANNED: (Benefits and precautions of physical therapy have been discussed with the patient.)  1. Balance Exercise  2. Bed Mobility  3. Family Education  4. Gait Training  5. Home Exercise Program (HEP)  6. Neuromuscular Re-education/Strengthening  7. Range of Motion (ROM)  8. Therapeutic Activites  9. Therapeutic Exercise/Strengthening  10.  Transfer Training     TREATMENT PLAN: Frequency/Duration: 3 times a week for duration of hospital stay  Rehabilitation Potential For Stated Goals: Good     RECOMMENDED REHABILITATION/EQUIPMENT: (at time of discharge pending progress): Due to the probability of continued deficits (see above) this patient will likely need continued skilled physical therapy after discharge. Equipment:    None at this time              HISTORY:   History of Present Injury/Illness (Reason for Referral):  See H&P below  Patient is a 60 y/o female with squamous cell carcinoma of the lung, COPD, extreme obesity, diastolic CHF, HTN, HLD, FLETCHER, pulmonary HTN, HLD who presents from home with new altered mental status. EMS reports hyperglycemia. On arrival to ED she was unresponsive with seizure activity. Glucose is 558. She was loaded with IV keppra and also given IV ativan. A head CT shows brain mass in the right parieto-occipital lobe with surrounding vasogenic edema. She then received IV decadron. She was tachycardic with WBC ct of 14.2 so sepsis protocol initiated with fluids and antibiotics. Initially family had said she was DNR but later stated to do everything Except intubation. Will admit to ICU.     Past Medical History/Comorbidities:   Ms. Wilfred Naik  has a past medical history of Abnormality of gait and mobility, Acute on chronic respiratory failure ([address] (7/28/2018), Ambulatory dysfunction, Arthritis of knee, Asthma, CHF (congestive heart failure), NYHA class I ([address] Chronic hypoxemic respiratory failure ([address] COPD (chronic obstructive pulmonary disease) with emphysema ([address] Cor pulmonale ([address] Diabetes mellitus without complication ([address] Dyslipidemia, Extreme obesity with respiratory disorder ([address] Fall (7/28/2018), Hyperlipidemia, Hypertension, essential, benign, Hypomagnesemia, Insomnia (12/27/2016), Nocturnal hypoxia, Obesity with alveolar hypoventilation (HCC), Orthopnea, FLETCHER (obstructive sleep apnea), Primary pulmonary hypertension ([address] Pulmonary edema, noncardiac, and Sleeps in sitting position due to orthopnea.   Ms. Wilfred Naik  has a past surgical history that includes hx cholecystectomy (1991); hx tonsillectomy; and hx tubal ligation (). Social History/Living Environment:   Home Environment: Private residence  # Steps to Enter: 5  Rails to Enter: Yes  One/Two Story Residence: One story  Living Alone: No  Support Systems: Child(kira)  Patient Expects to be Discharged to[de-identified] Unknown  Current DME Used/Available at Home: Wheelchair  Tub or Shower Type: Tub/Shower combination  Prior Level of Function/Work/Activity:  Use of rollator or wheelchair for mobility, 2 falls   Number of Personal Factors/Comorbidities that affect the Plan of Care: 3+: HIGH COMPLEXITY   EXAMINATION:   Most Recent Physical Functioning:   Gross Assessment:                  Posture:  Posture (WDL): Exceptions to WDL  Posture Assessment: Forward head, Rounded shoulders  Balance:  Sitting: Impaired  Sitting - Static: Prop sitting  Sitting - Dynamic: Occassional  Standing: Impaired  Standing - Static: Constant support;Poor Bed Mobility:  Rolling: Maximum assistance  Supine to Sit: Maximum assistance;Assist x2  Sit to Supine: Maximum assistance;Assist x2  Wheelchair Mobility:     Transfers:  Sit to Stand: Maximum assistance;Assist x2  Stand to Sit: Maximum assistance;Assist x2  Gait:            Body Structures Involved:  1. Nerves  2. Lungs  3. Bones  4. Joints  5. Muscles  6. Ligaments Body Functions Affected:  1. Mental  2. Sensory/Pain  3. Cardio  4. Respiratory  5. Neuromusculoskeletal  6. Movement Related Activities and Participation Affected:  1. General Tasks and Demands  2. Mobility  3. Self Care  4. Domestic Life  5. Interpersonal Interactions and Relationships  6. Community, Social and [place]   Number of elements that affect the Plan of Care: 4+: HIGH COMPLEXITY   CLINICAL PRESENTATION:   Presentation: Evolving clinical presentation with changing clinical characteristics: MODERATE COMPLEXITY   CLINICAL DECISION MAKIN [facility] Inpatient Short Form  How much difficulty does the patient currently have. ..  Unable A Lot A Little None   1. Turning over in bed (including adjusting bedclothes, sheets and blankets)? [] 1   [] 2   [x] 3   [] 4   2. Sitting down on and standing up from a chair with arms ( e.g., wheelchair, bedside commode, etc.)   [x] 1   [] 2   [] 3   [] 4   3. Moving from lying on back to sitting on the side of the bed? [] 1   [] 2   [x] 3   [] 4   How much help from another person does the patient currently need. .. Total A Lot A Little None   4. Moving to and from a bed to a chair (including a wheelchair)? [x] 1   [] 2   [] 3   [] 4   5. Need to walk in hospital room? [x] 1   [] 2   [] 3   [] 4   6. Climbing 3-5 steps with a railing? [x] 1   [] 2   [] 3   [] 4   © 2007, Trustees of [address] [place] [address], under license to [employer]. All rights reserved      Score:  Initial: 10 Most Recent: X (Date: -- )    Interpretation of Tool:  Represents activities that are increasingly more difficult (i.e. Bed mobility, Transfers, Gait). Medical Necessity:     · Patient is expected to demonstrate progress in strength, balance, coordination and functional technique to decrease assistance required with gait, transfers, and functional mobility. .  Reason for Services/Other Comments:  · Patient continues to require skilled intervention due to  decreased strength, decreased balance, decreased functional tolerance, decreased cardiopulmonary endurance affecting participation in basic ADLs and functional tasks. Use of outcome tool(s) and clinical judgement create a POC that gives a: Questionable prediction of patient's progress: MODERATE COMPLEXITY          TREATMENT:   (In addition to Assessment/Re-Assessment sessions the following treatments were rendered)   Pre-treatment Symptoms/Complaints:  \"I need something hot on my backside\"  Pain: Initial:   Pain Intensity 1: 0  Post Session:  0/10 in supine post mobility     Therapeutic Activity: (    24 min):   Therapeutic activities including Bed transfers, static sitting balance, weight shifting, sit to stand attempts, LE advancement attempts to improve mobility, strength, balance and coordination. Required moderate to maximal verbal and tactile cues   to promote static and dynamic balance in standing and promote coordination of bilateral, lower extremity(s). Braces/Orthotics/Lines/Etc:   · IV  · small catheter  · O2 Device: Heated, Hi flow nasal cannula  Treatment/Session Assessment:    · Response to Treatment: Mod to max A x 2 for mobility  · Interdisciplinary Collaboration:   o Physical Therapist  o Registered Nurse  o Rehabilitation Attendant  · After treatment position/precautions:   o Supine in bed  o Bed/Chair-wheels locked  o Bed in low position  o Call light within reach  o PCT at bedside for cleaning   · Compliance with Program/Exercises: Will assess as treatment progresses  · Recommendations/Intent for next treatment session: \"Next visit will focus on advancements to more challenging activities and reduction in assistance provided\".   Total Treatment Duration:  PT Patient Time In/Time Out  Time In: 1131  Time Out: [facility],  needed assist

## 2022-11-07 NOTE — PROGRESS NOTE ADULT - PROBLEM SELECTOR PLAN 2
Left a message for the patient to see how she is doing and if I can assist her in scheduling an appointment for her anxiety.  The patient has not been seen by NP since March and is requesting a refill on her Alprazolam.  Waiting for the patient to call back to assist her in scheduling an appointment.     - cont diltiazem  mg daily   - cont metoprolol tartrate 200 mg BID   - cont pradaxa

## 2022-11-07 NOTE — PROGRESS NOTE ADULT - SUBJECTIVE AND OBJECTIVE BOX
Date/Time Patient Seen:  		  Referring MD:   Data Reviewed	       Patient is a 89y old  Female who presents with a chief complaint of acute on chronic hypoxic respiratory failure due to PNA vs acute on chronic diastolic CHF (06 Nov 2022 18:39)      Subjective/HPI     PAST MEDICAL & SURGICAL HISTORY:  HTN (hypertension)    HLD (hyperlipidemia)    Atrial fibrillation  On Pradaxa    Syncope    Depression    Leg edema    Mitral valve stenosis, unspecified etiology    Hearing loss of left ear    H/O knee surgery    H/O external ear surgery    Cataracta          Medication list         MEDICATIONS  (STANDING):  atorvastatin 10 milliGRAM(s) Oral at bedtime  ferrous    sulfate 325 milliGRAM(s) Oral daily  furosemide   Injectable 40 milliGRAM(s) IV Push every 12 hours  metoprolol tartrate 25 milliGRAM(s) Oral two times a day  mirtazapine 15 milliGRAM(s) Oral at bedtime  mometasone 220 MICROgram(s) Inhaler 1 Puff(s) Inhalation daily  pantoprazole  Injectable 40 milliGRAM(s) IV Push two times a day  polyethylene glycol 3350 17 Gram(s) Oral at bedtime  senna 2 Tablet(s) Oral at bedtime  sertraline 100 milliGRAM(s) Oral daily  spironolactone 25 milliGRAM(s) Oral daily  vancomycin  IVPB 1000 milliGRAM(s) IV Intermittent every 24 hours    MEDICATIONS  (PRN):  ALPRAZolam 0.25 milliGRAM(s) Oral two times a day PRN Anxiety.  melatonin 3 milliGRAM(s) Oral at bedtime PRN Insomnia         Vitals log        ICU Vital Signs Last 24 Hrs  T(C): 36.5 (07 Nov 2022 04:09), Max: 36.6 (07 Nov 2022 00:00)  T(F): 97.7 (07 Nov 2022 04:09), Max: 97.9 (07 Nov 2022 00:00)  HR: 100 (07 Nov 2022 05:00) (81 - 107)  BP: 118/61 (07 Nov 2022 05:00) (86/55 - 118/61)  BP(mean): 78 (07 Nov 2022 05:00) (66 - 99)  ABP: --  ABP(mean): --  RR: 29 (07 Nov 2022 05:00) (17 - 35)  SpO2: 93% (07 Nov 2022 05:00) (84% - 97%)    O2 Parameters below as of 07 Nov 2022 05:00  Patient On (Oxygen Delivery Method): ventilator    O2 Concentration (%): 50             Input and Output:  I&O's Detail    05 Nov 2022 08:01  -  06 Nov 2022 07:00  --------------------------------------------------------  IN:    IV PiggyBack: 450 mL  Total IN: 450 mL    OUT:  Total OUT: 0 mL    Total NET: 450 mL      06 Nov 2022 07:01  -  07 Nov 2022 06:23  --------------------------------------------------------  IN:    Oral Fluid: 480 mL  Total IN: 480 mL    OUT:    Voided (mL): 1350 mL  Total OUT: 1350 mL    Total NET: -870 mL          Lab Data                        8.4    8.50  )-----------( 134      ( 06 Nov 2022 06:48 )             27.9     11-06    142  |  104  |  24<H>  ----------------------------<  121<H>  3.6   |  28  |  1.33<H>    Ca    7.9<L>      06 Nov 2022 09:20  Phos  3.4     11-06  Mg     2.1     11-06    TPro  6.2  /  Alb  2.9<L>  /  TBili  0.7  /  DBili  x   /  AST  16  /  ALT  17  /  AlkPhos  50  11-06    ABG - ( 05 Nov 2022 14:18 )  pH, Arterial: 7.39  pH, Blood: x     /  pCO2: 39    /  pO2: 231   / HCO3: 24    / Base Excess: -1.4  /  SaO2: 98.5                    Review of Systems	      Objective     Physical Examination  heart s1s2  lung dec BS  head nc        Pertinent Lab findings & Imaging      Edgar:  NO   Adequate UO     I&O's Detail    05 Nov 2022 08:01  -  06 Nov 2022 07:00  --------------------------------------------------------  IN:    IV PiggyBack: 450 mL  Total IN: 450 mL    OUT:  Total OUT: 0 mL    Total NET: 450 mL      06 Nov 2022 07:01  -  07 Nov 2022 06:23  --------------------------------------------------------  IN:    Oral Fluid: 480 mL  Total IN: 480 mL    OUT:    Voided (mL): 1350 mL  Total OUT: 1350 mL    Total NET: -870 mL               Discussed with:     Cultures:	        Radiology

## 2022-11-07 NOTE — PROGRESS NOTE ADULT - PROBLEM SELECTOR PLAN 8
-reportedly has chronic b/l cheek swelling, which is quite pronounced -- does not appear infected   -unclear etiology   -pt has appt with oral surgeon - rec to discuss if may be related to salivary gland issue  -possible outpt ENT eval with more imaging
-reportedly has chronic b/l cheek swelling, which is quite pronounced -- does not appear infected   -unclear etiology   -pt has appt with oral surgeon - rec to discuss if may be related to salivary gland issue  -possible outpt ENT eval with more imaging

## 2022-11-07 NOTE — PROGRESS NOTE ADULT - ASSESSMENT
89 y/o F wit PMH of HTN, Dyslipidemia, MR s/p TMVR, A. Fib off Eliquis 5 weeks ago for GI bleeding, Chronic Diastolic CHF, Chronic Respiratory Failure, COVID-19, CKD stage 3, GIB, Anemia, Anxiety, Depression who presented from [facility] with respiratory distress.    HF  hypoxemia  pleural eff  anemia  GI Bleed  valv heart disease  AF  HTN  OP  OA  PVD    TTE reviewed  on ABX, VANCO  on LASIX IV  VS noted  s/p PRBC 1 unit  CT chest does not show PNA    doubt PNA - biomarkers - CT chest - eval clinically  suspect CHF HFPEF and Pulm HTN - exacerbation  cardio eval in progress  TTE repeat noted  I and O  serial labs  Anemia - Gi bleed - AF on AC - serial Hgb > 7, transfuse as needed  outpatient record reviewed - follows with Dr Lory Duong in [place]  O2 support as needed - Keep Sat > 88 pct  oral and skin care  LE doppler NEG for DVT  CXR reviewed  old record reviewed  GOC discussion  assist with needs  spoke with pt and daughter at the bedside, dtr works for [employer]

## 2022-11-07 NOTE — PHYSICAL THERAPY INITIAL EVALUATION ADULT - PERTINENT HX OF CURRENT PROBLEM, REHAB EVAL
This is a 89 y/o F wit PMH of HTN, Dyslipidemia, MR s/p TMVR, A. Fib off Eliquis 5 weeks ago for GI bleeding, Chronic Diastolic CHF, Chronic Respiratory Failure, COVID-19, CKD stage 3, GIB, Anemia, Anxiety, Depression who presented from [facility] with respiratory distress. Patient daughter at the bedside states that they called her from the NH as her mom got SOB, with chills, no reported fever, also she reports that her mom was coughing over the past 2 months with "lumps" of thick greenish yellow non bloody sputum. CXR->small bilateral pleural effusions.  Neg. for PNA

## 2022-11-07 NOTE — PROGRESS NOTE ADULT - PROBLEM SELECTOR PLAN 9
-Eliquis had been held by ICU PA for concern of recent GIB and given pt's anemia  -c/w SCDs for VTE ppx  -if H&H stable tomorrow, consider restarting A/C
-Eliquis had been held by ICU PA for concern of recent GIB and given pt's anemia  -c/w SCDs for VTE ppx  -if H&H stable tomorrow, consider restarting A/C

## 2022-11-07 NOTE — PROGRESS NOTE ADULT - PROBLEM SELECTOR PLAN 6
-suspect pt has iron deficiency anemia  -check iron studies  -pt had LGIB of unknown cause while pt was on Eliquis about 5 weeks ago and Eliquis was then discontinued  -pt's Hgb was down 6.9 on this admission and had good response to 1un PRBC with Hgb up to 8.4 this morning  -c/w ferrous sulfate  -FOBT negative today
-suspect pt has iron deficiency anemia  -check iron studies  -pt had LGIB of unknown cause while pt was on Eliquis about 5 weeks ago and Eliquis was then discontinued  -pt's Hgb was down 6.9 on this admission and had good response to 1un PRBC with Hgb up to 8.4 this morning  -c/w ferrous sulfate  -FOBT negative today

## 2022-11-07 NOTE — PROGRESS NOTE ADULT - ASSESSMENT
89yo F with PMH of HTN, Dyslipidemia, MR s/p MitraClip, A. Fib (off Eliquis 5 weeks ago for LGIB), Diastolic CHF, Chronic Hypoxic Respiratory Failure (on 3.5L NC), hx of COVID-19, CKD stage 3, Anemia, Anxiety, Depression presented with dyspnea a/w acute on chronic hypoxic respiratory failure due to acute on chronic diastolic CHF.  89yo F with PMH of HTN, Dyslipidemia, MR s/p VANE Rod (off Eliquis 5 weeks ago for LGIB), Diastolic CHF, Chronic Hypoxic Respiratory Failure (on 3.5L NC), hx of COVID-19, CKD stage 3, Anemia, Anxiety, Depression presented with dyspnea a/w acute on chronic hypoxic respiratory failure due to acute on chronic diastolic CHF.     Assessment and Plan:    Acute on chronic diastolic congestive heart failure.   PNA ruled out, abx stopped   -pt's family reports pt's diuretics had been decreased due to variation in BP  -CT Chest showed no evidence of PNA, but did note b/l small pleural effusions and pt's pro-BNP was high at 6253  -b/l LE duplex --neg for DVT   -TTE performed - no significant change in EF or valvular pathology  -c/w lasix 40mg PO daily, aldactone,   -symptomatically and clinically improving and now back down to home level of supplemental oxygen  -monitor daily weight, I&Os  -cardiology following      Acute on chronic respiratory failure with hypoxia.   ·  Plan: -likely due to acute on chronic diastolic CHF   -c/w diuretics and management as described above  follows with Dr Lory Duong in [place]  -PNA ruled out   pulmonary following     Atrial fib RVR POA, now rate controlled   discussed with daughter Gabriel [phone], patient was previously on Pradaxa and developed a diverticular bleed, was seen by GI in the past, she was seen by her PMD Dr. Jones who rstarted her AC with eliquis 2.5mg bid, daughter stated that she had a long discussion with patient and patient's PMD and risks vs benefits were discussed and she prefers to remain on the eliquis , patient has CBC checked every 2 weeks with her PMD. Daughter stated she prefers that patient remains on AC, given that H/H stable x 2 days and no e/o bleeding currently, will restart ther lovenox for now, monitor CBC, on discharge can resume eliquis 2.5mg bid   -c/w BB       Cellulitis of left thigh ruled out   -LE Doppler negative for DVT.  ID following , d/c abx      Cheek swelling. b/l cheek cysts   -reportedly has chronic b/l cheek swelling, which is quite pronounced    -pt has appt with oral surgeon - rec to discuss if may be related to salivary gland issue  -11/7 pain behind the right cheek cyst with small tender fluctuance --will obtain CT maxillofacial w/wo IV contrast  may need ENT eval depending on findings     Acute kidney injury superimposed on CKD.   ·  Plan: -MEME on Stage 3 CKD  -renal function improved despite diuresis so suspect was cardiorenal   avoid nephrotoxic meds, dose all meds per eGFR   monitor Cr     Microcytic anemia. VALENTINO.   -check iron studies  Hgb  6.9 on this admission and had good response to 1un PRBC with Hgb up to 8.4, H/H has been stable x 2   -c/w ferrous sulfate  11/7 given venofer x 1   -FOBT negative   monitor      Anxiety and depression.   denies SI/HI   ·  Plan: -c/w sertraline, remeron and xanax PRN.    Prevenative measures   -ther lovenox --dvt ppx  -fall, aspiration precautions   swallow eval   DNR/DNI

## 2022-11-08 ENCOUNTER — TRANSCRIPTION ENCOUNTER (OUTPATIENT)
Age: 87
End: 2022-11-08

## 2022-11-08 VITALS
TEMPERATURE: 98 F | OXYGEN SATURATION: 94 % | RESPIRATION RATE: 23 BRPM | HEART RATE: 101 BPM | DIASTOLIC BLOOD PRESSURE: 78 MMHG | SYSTOLIC BLOOD PRESSURE: 107 MMHG

## 2022-11-08 LAB
ALBUMIN SERPL ELPH-MCNC: 2.7 G/DL — LOW (ref 3.3–5)
ALP SERPL-CCNC: 55 U/L — SIGNIFICANT CHANGE UP (ref 30–120)
ALT FLD-CCNC: 10 U/L DA — SIGNIFICANT CHANGE UP (ref 10–60)
ANION GAP SERPL CALC-SCNC: 5 MMOL/L — SIGNIFICANT CHANGE UP (ref 5–17)
AST SERPL-CCNC: 15 U/L — SIGNIFICANT CHANGE UP (ref 10–40)
BILIRUB SERPL-MCNC: 0.7 MG/DL — SIGNIFICANT CHANGE UP (ref 0.2–1.2)
BUN SERPL-MCNC: 13 MG/DL — SIGNIFICANT CHANGE UP (ref 7–23)
CALCIUM SERPL-MCNC: 8.1 MG/DL — LOW (ref 8.4–10.5)
CHLORIDE SERPL-SCNC: 104 MMOL/L — SIGNIFICANT CHANGE UP (ref 96–108)
CO2 SERPL-SCNC: 32 MMOL/L — HIGH (ref 22–31)
CREAT SERPL-MCNC: 1.09 MG/DL — SIGNIFICANT CHANGE UP (ref 0.5–1.3)
EGFR: 48 ML/MIN/1.73M2 — LOW
FOLATE SERPL-MCNC: 18.7 NG/ML — SIGNIFICANT CHANGE UP
GLUCOSE SERPL-MCNC: 97 MG/DL — SIGNIFICANT CHANGE UP (ref 70–99)
HCT VFR BLD CALC: 30.5 % — LOW (ref 34.5–45)
HGB BLD-MCNC: 8.9 G/DL — LOW (ref 11.5–15.5)
INR BLD: 1.44 RATIO — HIGH (ref 0.88–1.16)
MAGNESIUM SERPL-MCNC: 2 MG/DL — SIGNIFICANT CHANGE UP (ref 1.6–2.6)
MCHC RBC-ENTMCNC: 22.8 PG — LOW (ref 27–34)
MCHC RBC-ENTMCNC: 29.2 GM/DL — LOW (ref 32–36)
MCV RBC AUTO: 78 FL — LOW (ref 80–100)
NRBC # BLD: 0 /100 WBCS — SIGNIFICANT CHANGE UP (ref 0–0)
PHOSPHATE SERPL-MCNC: 3.2 MG/DL — SIGNIFICANT CHANGE UP (ref 2.5–4.5)
PLATELET # BLD AUTO: 156 K/UL — SIGNIFICANT CHANGE UP (ref 150–400)
POTASSIUM SERPL-MCNC: 3.6 MMOL/L — SIGNIFICANT CHANGE UP (ref 3.5–5.3)
POTASSIUM SERPL-SCNC: 3.6 MMOL/L — SIGNIFICANT CHANGE UP (ref 3.5–5.3)
PROT SERPL-MCNC: 6.3 G/DL — SIGNIFICANT CHANGE UP (ref 6–8.3)
PROTHROM AB SERPL-ACNC: 17.1 SEC — HIGH (ref 10.5–13.4)
RBC # BLD: 3.91 M/UL — SIGNIFICANT CHANGE UP (ref 3.8–5.2)
RBC # FLD: 22.5 % — HIGH (ref 10.3–14.5)
SODIUM SERPL-SCNC: 141 MMOL/L — SIGNIFICANT CHANGE UP (ref 135–145)
VIT B12 SERPL-MCNC: 892 PG/ML — SIGNIFICANT CHANGE UP (ref 232–1245)
WBC # BLD: 7.31 K/UL — SIGNIFICANT CHANGE UP (ref 3.8–10.5)
WBC # FLD AUTO: 7.31 K/UL — SIGNIFICANT CHANGE UP (ref 3.8–10.5)

## 2022-11-08 PROCEDURE — 93970 EXTREMITY STUDY: CPT

## 2022-11-08 PROCEDURE — 86901 BLOOD TYPING SEROLOGIC RH(D): CPT

## 2022-11-08 PROCEDURE — 87040 BLOOD CULTURE FOR BACTERIA: CPT

## 2022-11-08 PROCEDURE — 86923 COMPATIBILITY TEST ELECTRIC: CPT

## 2022-11-08 PROCEDURE — 80202 ASSAY OF VANCOMYCIN: CPT

## 2022-11-08 PROCEDURE — 82272 OCCULT BLD FECES 1-3 TESTS: CPT

## 2022-11-08 PROCEDURE — 82550 ASSAY OF CK (CPK): CPT

## 2022-11-08 PROCEDURE — 96375 TX/PRO/DX INJ NEW DRUG ADDON: CPT

## 2022-11-08 PROCEDURE — 94640 AIRWAY INHALATION TREATMENT: CPT

## 2022-11-08 PROCEDURE — 85610 PROTHROMBIN TIME: CPT

## 2022-11-08 PROCEDURE — 83605 ASSAY OF LACTIC ACID: CPT

## 2022-11-08 PROCEDURE — 83540 ASSAY OF IRON: CPT

## 2022-11-08 PROCEDURE — 87449 NOS EACH ORGANISM AG IA: CPT

## 2022-11-08 PROCEDURE — 97116 GAIT TRAINING THERAPY: CPT

## 2022-11-08 PROCEDURE — 83036 HEMOGLOBIN GLYCOSYLATED A1C: CPT

## 2022-11-08 PROCEDURE — 86140 C-REACTIVE PROTEIN: CPT

## 2022-11-08 PROCEDURE — 97530 THERAPEUTIC ACTIVITIES: CPT

## 2022-11-08 PROCEDURE — P9016: CPT

## 2022-11-08 PROCEDURE — 87635 SARS-COV-2 COVID-19 AMP PRB: CPT

## 2022-11-08 PROCEDURE — 85027 COMPLETE CBC AUTOMATED: CPT

## 2022-11-08 PROCEDURE — 36600 WITHDRAWAL OF ARTERIAL BLOOD: CPT

## 2022-11-08 PROCEDURE — 71250 CT THORAX DX C-: CPT

## 2022-11-08 PROCEDURE — 82607 VITAMIN B-12: CPT

## 2022-11-08 PROCEDURE — 71045 X-RAY EXAM CHEST 1 VIEW: CPT

## 2022-11-08 PROCEDURE — 93005 ELECTROCARDIOGRAM TRACING: CPT

## 2022-11-08 PROCEDURE — 83550 IRON BINDING TEST: CPT

## 2022-11-08 PROCEDURE — 80048 BASIC METABOLIC PNL TOTAL CA: CPT

## 2022-11-08 PROCEDURE — 96365 THER/PROPH/DIAG IV INF INIT: CPT

## 2022-11-08 PROCEDURE — 84145 PROCALCITONIN (PCT): CPT

## 2022-11-08 PROCEDURE — 85730 THROMBOPLASTIN TIME PARTIAL: CPT

## 2022-11-08 PROCEDURE — 36430 TRANSFUSION BLD/BLD COMPNT: CPT

## 2022-11-08 PROCEDURE — 70488 CT MAXILLOFACIAL W/O & W/DYE: CPT

## 2022-11-08 PROCEDURE — 92526 ORAL FUNCTION THERAPY: CPT

## 2022-11-08 PROCEDURE — 86900 BLOOD TYPING SEROLOGIC ABO: CPT

## 2022-11-08 PROCEDURE — 82553 CREATINE MB FRACTION: CPT

## 2022-11-08 PROCEDURE — 83735 ASSAY OF MAGNESIUM: CPT

## 2022-11-08 PROCEDURE — 82746 ASSAY OF FOLIC ACID SERUM: CPT

## 2022-11-08 PROCEDURE — 87899 AGENT NOS ASSAY W/OPTIC: CPT

## 2022-11-08 PROCEDURE — 87086 URINE CULTURE/COLONY COUNT: CPT

## 2022-11-08 PROCEDURE — 99239 HOSP IP/OBS DSCHRG MGMT >30: CPT

## 2022-11-08 PROCEDURE — 84100 ASSAY OF PHOSPHORUS: CPT

## 2022-11-08 PROCEDURE — 87640 STAPH A DNA AMP PROBE: CPT

## 2022-11-08 PROCEDURE — 87641 MR-STAPH DNA AMP PROBE: CPT

## 2022-11-08 PROCEDURE — 84484 ASSAY OF TROPONIN QUANT: CPT

## 2022-11-08 PROCEDURE — 36415 COLL VENOUS BLD VENIPUNCTURE: CPT

## 2022-11-08 PROCEDURE — 86850 RBC ANTIBODY SCREEN: CPT

## 2022-11-08 PROCEDURE — 99285 EMERGENCY DEPT VISIT HI MDM: CPT

## 2022-11-08 PROCEDURE — 80053 COMPREHEN METABOLIC PANEL: CPT

## 2022-11-08 PROCEDURE — 83880 ASSAY OF NATRIURETIC PEPTIDE: CPT

## 2022-11-08 PROCEDURE — 82728 ASSAY OF FERRITIN: CPT

## 2022-11-08 PROCEDURE — 85025 COMPLETE CBC W/AUTO DIFF WBC: CPT

## 2022-11-08 PROCEDURE — 93306 TTE W/DOPPLER COMPLETE: CPT

## 2022-11-08 PROCEDURE — 81001 URINALYSIS AUTO W/SCOPE: CPT

## 2022-11-08 PROCEDURE — 92610 EVALUATE SWALLOWING FUNCTION: CPT

## 2022-11-08 PROCEDURE — 0225U NFCT DS DNA&RNA 21 SARSCOV2: CPT

## 2022-11-08 PROCEDURE — 82803 BLOOD GASES ANY COMBINATION: CPT

## 2022-11-08 RX ORDER — FERROUS SULFATE 325(65) MG
1 TABLET ORAL
Qty: 0 | Refills: 0 | DISCHARGE
Start: 2022-11-08

## 2022-11-08 RX ORDER — SPIRONOLACTONE 25 MG/1
1 TABLET, FILM COATED ORAL
Qty: 0 | Refills: 0 | DISCHARGE
Start: 2022-11-08

## 2022-11-08 RX ORDER — FUROSEMIDE 40 MG
1 TABLET ORAL
Qty: 30 | Refills: 0
Start: 2022-11-08 | End: 2022-12-07

## 2022-11-08 RX ADMIN — PANTOPRAZOLE SODIUM 40 MILLIGRAM(S): 20 TABLET, DELAYED RELEASE ORAL at 06:40

## 2022-11-08 RX ADMIN — Medication 40 MILLIGRAM(S): at 06:40

## 2022-11-08 RX ADMIN — ENOXAPARIN SODIUM 70 MILLIGRAM(S): 100 INJECTION SUBCUTANEOUS at 06:40

## 2022-11-08 NOTE — SWALLOW BEDSIDE ASSESSMENT ADULT - SWALLOW EVAL: RECOMMENDED DIET
easy to chew and thin liquids with aspiration precautions
Soft & bite sized with Thin liquids, as tolerated

## 2022-11-08 NOTE — SWALLOW BEDSIDE ASSESSMENT ADULT - SWALLOW EVAL: DIAGNOSIS
1. Functional oral dysphagia for pureed, regular solids, and thin liquids marked by adequate oral acceptance, increased mastication time with regular solids likely due to missing dentition; however overall function. Timely manipulation, collection, and transport with adequate clearance post swallow across all PO consistencies. 2. Mild pharyngeal dysphagia for pureed, regular solids, and thin liquids marked by suspected delayed pharyngeal swallow trigger and hyolaryngeal elevation noted by digital palpation without evidence of airway penetration/aspiration. 3. Recommend easy to chew and thin liquid with aspiration precautions
Oral & pharyngeal stages deemed functional for puree, soft & bite sized and thin liquids despite incomplete dentition.  No overt s/s penetration/aspiration noted.

## 2022-11-08 NOTE — DISCHARGE NOTE PROVIDER - NSDCMRMEDTOKEN_GEN_ALL_CORE_FT
atorvastatin 10 mg oral tablet: 1 tab(s) orally once a day (at bedtime)  Eliquis 2.5 mg oral tablet: 1 tab(s) orally 2 times a day  ferrous sulfate 325 mg (65 mg elemental iron) oral tablet: 1 tab(s) orally once a day  fluticasone propionate 55 mcg/inh inhalation powder: 1 puff(s) inhaled every 12 hours  furosemide 40 mg oral tablet: 1 tab(s) orally once a day  loperamide 2 mg oral capsule: 1 cap(s) orally every 4 hours, As Needed  melatonin 3 mg oral tablet: 2 tab(s) orally once a day (at bedtime)  metoprolol tartrate 25 mg oral tablet: 1 tab(s) orally 2 times a day  MiraLax oral powder for reconstitution: orally once a day (at bedtime)  mirtazapine 15 mg oral tablet: 1 tab(s) orally once a day (at bedtime)  Senna 8.6 mg oral tablet: 1 tab(s) orally once a day (at bedtime)  sertraline 100 mg oral tablet: 1 tab(s) orally once a day  spironolactone 25 mg oral tablet: 1 tab(s) orally once a day  Xanax 0.25 mg oral tablet: 1 tab(s) orally every 12 hours, As Needed - for anxiety

## 2022-11-08 NOTE — SWALLOW BEDSIDE ASSESSMENT ADULT - SWALLOW EVAL: STRUCTURAL ABNORMALITIES
none present
+Facial swelling bilaterally, CT Maxillofacial results pending.  Pt reports swelling occurs when she has a cavity or retains fluid and does not impact eating/drinking.

## 2022-11-08 NOTE — SWALLOW BEDSIDE ASSESSMENT ADULT - SWALLOW EVAL: RECOMMENDED FEEDING/EATING TECHNIQUES
allow for swallow between intakes/alternate food with liquid/maintain upright posture during/after eating for 30 mins/no straws/oral hygiene/position upright (90 degrees)/small sips/bites
check mouth frequently for oral residue/pocketing/crush medication (when feasible)/maintain upright posture during/after eating for 30 mins/oral hygiene/position upright (90 degrees)/small sips/bites

## 2022-11-08 NOTE — SWALLOW BEDSIDE ASSESSMENT ADULT - ORAL PREPARATORY PHASE
Within functional limits
increased mastication time with regular solids likely due to missing dentition/Within functional limits

## 2022-11-08 NOTE — DISCHARGE NOTE NURSING/CASE MANAGEMENT/SOCIAL WORK - PATIENT PORTAL LINK FT
You can access the FollowMyHealth Patient Portal offered by [facility] by registering at the following website: [email or web address]. By joining [employer]’s FollowMyHealth portal, you will also be able to view your health information using other applications (apps) compatible with our system.

## 2022-11-08 NOTE — PROGRESS NOTE ADULT - PROBLEM SELECTOR PROBLEM 2
Acute on chronic respiratory failure with hypoxia

## 2022-11-08 NOTE — PROGRESS NOTE ADULT - ASSESSMENT
The patient is an 89 year old female with a history of HTN, HL, atrial fibrillation, MitraClip, chronic diastolic heart failure, GI bleed who presents with shortness of breath in the setting of acute on chronic diastolic heart failure.    Plan:  - ECG with known AF  - Troponin borderline elevated at 81 in the setting of demand ischemia from heart failure and anemia. No need to trend further.  - CT chest with small bilateral pleural effusions  - BNP 6253  - Cardiac enzymes negative  - Echo with normal LV systolic function, normal MitraClip function, mild pulm HTN  - Continue furosemide 40 mg PO daily  - Continue metoprolol tartrate 25 mg bid  - Continue spironolactone 25 mg daily  - Hemoglobin improved after transfusion  - Remain off of anticoagulation for now. This can be re-evaluated as an outpatient.

## 2022-11-08 NOTE — PROGRESS NOTE ADULT - SUBJECTIVE AND OBJECTIVE BOX
Subjective: Patient seen and examined. No overnight events.  CT results reviewed     MEDICATIONS  (STANDING):  atorvastatin 10 milliGRAM(s) Oral at bedtime  enoxaparin Injectable 70 milliGRAM(s) SubCutaneous every 12 hours  ferrous    sulfate 325 milliGRAM(s) Oral daily  furosemide    Tablet 40 milliGRAM(s) Oral daily  metoprolol tartrate 25 milliGRAM(s) Oral two times a day  mirtazapine 15 milliGRAM(s) Oral at bedtime  mometasone 220 MICROgram(s) Inhaler 1 Puff(s) Inhalation daily  pantoprazole  Injectable 40 milliGRAM(s) IV Push two times a day  polyethylene glycol 3350 17 Gram(s) Oral at bedtime  senna 2 Tablet(s) Oral at bedtime  sertraline 100 milliGRAM(s) Oral daily  spironolactone 25 milliGRAM(s) Oral daily    MEDICATIONS  (PRN):  ALPRAZolam 0.25 milliGRAM(s) Oral two times a day PRN Anxiety.  magnesium hydroxide Suspension 30 milliLiter(s) Oral daily PRN Constipation  melatonin 3 milliGRAM(s) Oral at bedtime PRN Insomnia      Allergies    codeine (Other)    Intolerances        Vital Signs Last 24 Hrs  T(C): 36.7 (08 Nov 2022 03:36), Max: 36.8 (08 Nov 2022 00:00)  T(F): 98.1 (08 Nov 2022 03:36), Max: 98.3 (08 Nov 2022 00:00)  HR: 98 (08 Nov 2022 06:00) (88 - 105)  BP: 103/62 (08 Nov 2022 06:00) (95/59 - 118/60)  BP(mean): 72 (08 Nov 2022 06:00) (69 - 98)  RR: 26 (08 Nov 2022 06:00) (17 - 41)  SpO2: 94% (08 Nov 2022 06:00) (82% - 96%)    Parameters below as of 07 Nov 2022 16:00  Patient On (Oxygen Delivery Method): nasal cannula  O2 Flow (L/min): 3      PHYSICAL EXAM:  GENERAL: NAD, well-groomed, well-developed  HEAD:  Atraumatic, Normocephalic  ENMT: No tonsillar erythema, exudates, or enlargement; Moist MM, poor dentition, b/l cheek cysts, right small area of tenderness and fluctuance behind right cyst of cheek   NECK: Supple, No JVD, Normal thyroid  NERVOUS SYSTEM:  All 4 extremities mobile, no gross sensory deficits.   CHEST/LUNG: Clear to auscultation bilaterally; No rales, rhonchi, wheezing, or rubs  HEART: Regular rate and rhythm; No murmurs, rubs, or gallops  ABDOMEN: Soft, Nontender, Nondistended; Bowel sounds present  EXTREMITIES:  2+ Peripheral Pulses, No clubbing, cyanosis, or edema      LABS:                        8.9    7.31  )-----------( 156      ( 08 Nov 2022 06:53 )             30.5     08 Nov 2022 06:53    141    |  104    |  13     ----------------------------<  97     3.6     |  32     |  1.09     Ca    8.1        08 Nov 2022 06:53  Phos  3.2       08 Nov 2022 06:53  Mg     2.0       08 Nov 2022 06:53    TPro  6.3    /  Alb  2.7    /  TBili  0.7    /  DBili  x      /  AST  15     /  ALT  10     /  AlkPhos  55     08 Nov 2022 06:53    PT/INR - ( 08 Nov 2022 06:53 )   PT: 17.1 sec;   INR: 1.44 ratio             CAPILLARY BLOOD GLUCOSE          RADIOLOGY & ADDITIONAL TESTS:    Imaging Personally Reviewed:  [ ] YES     Consultant(s) Notes Reviewed:      Care Discussed with Consultants/Other Providers:    Advanced Directives: [ ] DNR  [ ] No feeding tube  [ ] MOLST in chart  [ ] MOLST completed today  [ ] Unknown

## 2022-11-08 NOTE — SWALLOW BEDSIDE ASSESSMENT ADULT - COMMENTS
Chart reviewed order received for swallow eval.  Pt received upright in bed A&A Ox4, +O2NC SpO2 90-95%, private aide at bedside, pain scale 0/10 pre & post eval.  Swallow eval completed see below for details.  Pt & private aide educated on rx's, verbalized understanding.  Pt left as received NAD ORIN Georges & Dr. Jacob notified.  Will follow to reassess swallow/check PO tolerance x1.    Per charting, pt is a "89yo F with PMH of HTN, Dyslipidemia, MR s/p MitraClip, A. Fib (off Eliquis 5 weeks ago for LGIB), Diastolic CHF, Chronic Hypoxic Respiratory Failure (on 3.5L NC), hx of COVID-19, CKD stage 3, Anemia, Anxiety, Depression presented with dyspnea a/w acute on chronic hypoxic respiratory failure due to acute on chronic diastolic CHF."    CT Chest 11/5/22: "IMPRESSION: There is no pneumonia.    Small bilateral pleural effusions."
Chart reviewed. Patient seen at chairside this PM for a follow up session to assess diet tolerance/advancement at which time she was alert, oriented x3, cooperative, and denied pain. Patient seen for initial assessment of swallow function on 11/7/22, at which time a soft & bite-sized and thin liquid diet level were recommended (see report for further details). WBC WFL. No new CXR administered. Patient on supplemental O2 via NC, 3L. Patient with ability to follow low level commands, given max cues. Vocal quality and speech production WFL. Baseline cough noted.  Patient and patient's daughter at bedside reported the patient with limited dentition and is awaiting dental work-up to be performed soon and therefore "softer" solids is her preference.     RECOMMENDATIONS: easy to chew and thin liquids, with aspiration precautions during meals. Slow pacing, single/small bites/sips, and alternate solids with liquids. Discussed results and recommendations with patient, patient's family, RN, and attempted to call out/leave message for MD castaneda.

## 2022-11-08 NOTE — PROGRESS NOTE ADULT - REASON FOR ADMISSION
Respiratory distress.
acute on chronic hypoxic respiratory failure due to PNA vs acute on chronic diastolic CHF
Respiratory distress.
Respiratory distress.

## 2022-11-08 NOTE — SWALLOW BEDSIDE ASSESSMENT ADULT - ASR SWALLOW RECOMMEND DIAG
objective testing not warranted d/t no overt signs on baseline diet level and no concerns of PNA as per recent chest imaging/charting.
Objective assessment not rx'd due to no overt s/s penetration/aspiration noted.

## 2022-11-08 NOTE — DISCHARGE NOTE PROVIDER - NSDCFUADDAPPT_GEN_ALL_CORE_FT
Patient has private aide 24/7 at [facility] as well as home oxygen, W/C, and [employer]. Physical Therapy through  [facility]

## 2022-11-08 NOTE — PROGRESS NOTE ADULT - ASSESSMENT
89 y/o F wit PMH of HTN, Dyslipidemia, MR s/p TMVR, A. Fib off Eliquis 5 weeks ago for GI bleeding, Chronic Diastolic CHF, Chronic Respiratory Failure, COVID-19, CKD stage 3, GIB, Anemia, Anxiety, Depression who presented from [facility] with respiratory distress.    HF  hypoxemia  pleural eff  anemia  GI Bleed  valv heart disease  AF  HTN  OP  OA  PVD    TTE reviewed  CT maxillofacial noted  on Lovenox BID  on LASIX  VS noted  Labs reviewed  SLP Eval noted - PO diet on order    doubt PNA - biomarkers - CT chest - eval clinically  suspect CHF HFPEF and Pulm HTN - exacerbation  cardio eval in progress  TTE repeat noted  I and O  serial labs  Anemia - Gi bleed - AF on AC - serial Hgb > 7, transfuse as needed  outpatient record reviewed - follows with Dr Lory Duong in [place]  O2 support as needed - Keep Sat > 88 pct  oral and skin care  LE doppler NEG for DVT  CXR reviewed  old record reviewed  GOC discussion  assist with needs  spoke with pt and daughter at the bedside, dtr works for [employer]

## 2022-11-08 NOTE — SWALLOW BEDSIDE ASSESSMENT ADULT - SWALLOW EVAL: CURRENT DIET
Soft & bite sized with Thin liquids per MD order
soft & bite-sized and thin liquids, as per MD order

## 2022-11-08 NOTE — DISCHARGE NOTE NURSING/CASE MANAGEMENT/SOCIAL WORK - NSDCPEFALRISK_GEN_ALL_CORE
For information on Fall & Injury Prevention, visit: [email or web address] OR  [email or web address] OR  https://www.cdc.gov/steadi/patient.html

## 2022-11-08 NOTE — SWALLOW BEDSIDE ASSESSMENT ADULT - SLP PERTINENT HISTORY OF CURRENT PROBLEM
Per charting, the patient is a "89 y/o F wit PMH of HTN, Dyslipidemia, MR s/p TMVR, A. Fib off Eliquis 5 weeks ago for GI bleeding, Chronic Diastolic CHF, Chronic Respiratory Failure, COVID-19, CKD stage 3, GIB, Anemia, Anxiety, Depression who presented from [facility] with respiratory distress."

## 2022-11-08 NOTE — PROGRESS NOTE ADULT - SUBJECTIVE AND OBJECTIVE BOX
Date/Time Patient Seen:  		  Referring MD:   Data Reviewed	       Patient is a 89y old  Female who presents with a chief complaint of Respiratory distress. (07 Nov 2022 12:50)      Subjective/HPI     PAST MEDICAL & SURGICAL HISTORY:  HTN (hypertension)    HLD (hyperlipidemia)    Atrial fibrillation  On Pradaxa    Syncope    Depression    Leg edema    Mitral valve stenosis, unspecified etiology    Hearing loss of left ear    H/O knee surgery    H/O external ear surgery    Cataracta          Medication list         MEDICATIONS  (STANDING):  atorvastatin 10 milliGRAM(s) Oral at bedtime  enoxaparin Injectable 70 milliGRAM(s) SubCutaneous every 12 hours  ferrous    sulfate 325 milliGRAM(s) Oral daily  furosemide    Tablet 40 milliGRAM(s) Oral daily  metoprolol tartrate 25 milliGRAM(s) Oral two times a day  mirtazapine 15 milliGRAM(s) Oral at bedtime  mometasone 220 MICROgram(s) Inhaler 1 Puff(s) Inhalation daily  pantoprazole  Injectable 40 milliGRAM(s) IV Push two times a day  polyethylene glycol 3350 17 Gram(s) Oral at bedtime  senna 2 Tablet(s) Oral at bedtime  sertraline 100 milliGRAM(s) Oral daily  spironolactone 25 milliGRAM(s) Oral daily    MEDICATIONS  (PRN):  ALPRAZolam 0.25 milliGRAM(s) Oral two times a day PRN Anxiety.  magnesium hydroxide Suspension 30 milliLiter(s) Oral daily PRN Constipation  melatonin 3 milliGRAM(s) Oral at bedtime PRN Insomnia         Vitals log        ICU Vital Signs Last 24 Hrs  T(C): 36.7 (08 Nov 2022 03:36), Max: 36.8 (08 Nov 2022 00:00)  T(F): 98.1 (08 Nov 2022 03:36), Max: 98.3 (08 Nov 2022 00:00)  HR: 97 (08 Nov 2022 05:00) (88 - 105)  BP: 101/59 (08 Nov 2022 05:00) (95/59 - 118/60)  BP(mean): 72 (08 Nov 2022 05:00) (69 - 98)  ABP: --  ABP(mean): --  RR: 27 (08 Nov 2022 05:00) (17 - 41)  SpO2: 88% (08 Nov 2022 05:00) (82% - 96%)    O2 Parameters below as of 07 Nov 2022 16:00  Patient On (Oxygen Delivery Method): nasal cannula  O2 Flow (L/min): 3               Input and Output:  I&O's Detail    06 Nov 2022 07:01  -  07 Nov 2022 07:00  --------------------------------------------------------  IN:    Oral Fluid: 480 mL  Total IN: 480 mL    OUT:    Voided (mL): 1350 mL  Total OUT: 1350 mL    Total NET: -870 mL      07 Nov 2022 07:01  -  08 Nov 2022 06:33  --------------------------------------------------------  IN:    Oral Fluid: 360 mL  Total IN: 360 mL    OUT:    Voided (mL): 400 mL  Total OUT: 400 mL    Total NET: -40 mL          Lab Data                        8.6    8.49  )-----------( 153      ( 07 Nov 2022 06:00 )             29.3     11-07    142  |  104  |  19  ----------------------------<  144<H>  3.9   |  30  |  1.15    Ca    8.1<L>      07 Nov 2022 06:00  Phos  3.4     11-06  Mg     2.1     11-06    TPro  6.5  /  Alb  2.9<L>  /  TBili  0.7  /  DBili  x   /  AST  22  /  ALT  17  /  AlkPhos  57  11-07            Review of Systems	      Objective     Physical Examination    heart s1s2  lung dec BS  head nc  on o2 support      Pertinent Lab findings & Imaging      Edgar:  NO   Adequate UO     I&O's Detail    06 Nov 2022 07:01  -  07 Nov 2022 07:00  --------------------------------------------------------  IN:    Oral Fluid: 480 mL  Total IN: 480 mL    OUT:    Voided (mL): 1350 mL  Total OUT: 1350 mL    Total NET: -870 mL      07 Nov 2022 07:01  -  08 Nov 2022 06:33  --------------------------------------------------------  IN:    Oral Fluid: 360 mL  Total IN: 360 mL    OUT:    Voided (mL): 400 mL  Total OUT: 400 mL    Total NET: -40 mL               Discussed with:     Cultures:	        Radiology

## 2022-11-08 NOTE — DISCHARGE NOTE PROVIDER - NSDCFUSCHEDAPPT_GEN_ALL_CORE_FT
[facility] [address]  Scheduled Appointment: 11/14/2022    Alexei Ventura  [facility]  OT[address]NG [address]  Scheduled Appointment: 12/06/2022    Alexandra Lopes  [facility] [address]  Scheduled Appointment: 12/07/2022    Khari Henley  [facility]  PULMMED [address]  Scheduled Appointment: 12/28/2022

## 2022-11-08 NOTE — PROGRESS NOTE ADULT - SUBJECTIVE AND OBJECTIVE BOX
Chief Complaint: Shortness of breath    Interval Events: No events overnight.    Review of Systems:  General: No fevers, chills, weight gain  Skin: No rashes, color changes  Cardiovascular: No chest pain, orthopnea  Respiratory: No shortness of breath, cough  Gastrointestinal: No nausea, abdominal pain  Genitourinary: No incontinence, pain with urination  Musculoskeletal: No pain, swelling, decreased range of motion  Neurological: No headache, weakness  Psychiatric: No depression, anxiety  Endocrine: No weight gain, increased thirst  All other systems are comprehensively negative.    Physical Exam:  Vital Signs Last 24 Hrs  T(C): 36.7 (08 Nov 2022 03:36), Max: 36.8 (08 Nov 2022 00:00)  T(F): 98.1 (08 Nov 2022 03:36), Max: 98.3 (08 Nov 2022 00:00)  HR: 98 (08 Nov 2022 06:00) (88 - 105)  BP: 103/62 (08 Nov 2022 06:00) (95/59 - 118/60)  BP(mean): 72 (08 Nov 2022 06:00) (69 - 87)  RR: 26 (08 Nov 2022 06:00) (18 - 41)  SpO2: 94% (08 Nov 2022 06:00) (82% - 96%)  Parameters below as of 07 Nov 2022 16:00  Patient On (Oxygen Delivery Method): nasal cannula  O2 Flow (L/min): 3  General: NAD  HEENT: MMM  Neck: No JVD, no carotid bruit  Lungs: CTAB  CV: RRR, nl S1/S2, no M/R/G  Abdomen: S/NT/ND, +BS  Extremities: No LE edema, no cyanosis  Neuro: AAOx3, non-focal  Skin: No rash    Labs:    11-08    141  |  104  |  13  ----------------------------<  97  3.6   |  32<H>  |  1.09    Ca    8.1<L>      08 Nov 2022 06:53  Phos  3.2     11-08  Mg     2.0     11-08    TPro  6.3  /  Alb  2.7<L>  /  TBili  0.7  /  DBili  x   /  AST  15  /  ALT  10  /  AlkPhos  55  11-08                        8.9    7.31  )-----------( 156      ( 08 Nov 2022 06:53 )             30.5         ECG/Telemetry: AF

## 2022-11-08 NOTE — DISCHARGE NOTE PROVIDER - NSDCCPCAREPLAN_GEN_ALL_CORE_FT
PRINCIPAL DISCHARGE DIAGNOSIS  Diagnosis: Anemia due to acute blood loss  Assessment and Plan of Treatment: You were found to have acute anemia.  You were trasnfused 1U of blood and repeat blood work shows appropriate levels.  Blood Loss is thought to be related to loss in your GI Tract for which evaluation with GI has been had in the past.      SECONDARY DISCHARGE DIAGNOSES  Diagnosis: Acute decompensated heart failure  Assessment and Plan of Treatment: This is thought to be directely related to your acute anemia leading to your Shortness of Breath.  You were evaluated by our cardiologist and placed on diuretics.  Change in dose of your directics has been made.    Diagnosis: Swelling, cheek  Assessment and Plan of Treatment: This was evaluated with imaging and found to not be related to a stone or abscess. Follow up with your Oral Surgeon.

## 2022-11-08 NOTE — PROGRESS NOTE ADULT - ASSESSMENT
89yo F with PMH of HTN, Dyslipidemia, MR s/p VANE Rod (off Eliquis 5 weeks ago for LGIB), Diastolic CHF, Chronic Hypoxic Respiratory Failure (on 3.5L NC), hx of COVID-19, CKD stage 3, Anemia, Anxiety, Depression presented with dyspnea a/w acute on chronic hypoxic respiratory failure due to acute on chronic diastolic CHF.     Assessment and Plan:    Acute on chronic diastolic congestive heart failure.   PNA ruled out, abx stopped   -pt's family reports pt's diuretics had been decreased due to variation in BP  -CT Chest showed no evidence of PNA, but did note b/l small pleural effusions and pt's pro-BNP was high at 6253  -b/l LE duplex --neg for DVT   -TTE performed - no significant change in EF or valvular pathology  -c/w lasix 40mg PO daily, aldactone,   -symptomatically and clinically improving and now back down to home level of supplemental oxygen  -monitor daily weight, I&Os  -cardiology following      Acute on chronic respiratory failure with hypoxia.   ·  Plan: -likely due to acute on chronic diastolic CHF   -c/w diuretics and management as described above  follows with Dr Lory Duong in [place]  -PNA ruled out   pulmonary following     Atrial fib RVR POA, now rate controlled   discussed with daughter Gabriel [phone], patient was previously on Pradaxa and developed a diverticular bleed, was seen by GI in the past, she was seen by her PMD Dr. Jones who rstarted her AC with eliquis 2.5mg bid, daughter stated that she had a long discussion with patient and patient's PMD and risks vs benefits were discussed and she prefers to remain on the eliquis , patient has CBC checked every 2 weeks with her PMD. Daughter stated she prefers that patient remains on AC, given that H/H stable x 2 days and no e/o bleeding currently, will restart ther lovenox for now, monitor CBC, on discharge can resume eliquis 2.5mg bid   -c/w BB       Cellulitis of left thigh ruled out   -LE Doppler negative for DVT.  ID following , d/c abx      Cheek swelling. b/l cheek cysts   -reportedly has chronic b/l cheek swelling, which is quite pronounced    -pt has appt with oral surgeon - rec to discuss if may be related to salivary gland issue  -11/7 pain behind the right cheek cyst with small tender fluctuance --CT Imaging reviewed with no abscess or stone    Acute kidney injury superimposed on CKD.   ·  Plan: -MEME on Stage 3 CKD  -renal function improved despite diuresis so suspect was cardiorenal   avoid nephrotoxic meds, dose all meds per eGFR   monitor Cr     Microcytic anemia. VALENTINO.   -check iron studies  Hgb  6.9 on this admission and had good response to 1un PRBC with Hgb up to 8.4, H/H has been stable x 2   -c/w ferrous sulfate  11/7 given venofer x 1   -FOBT negative   monitor      Anxiety and depression.   denies SI/HI   ·  Plan: -c/w sertraline, remeron and xanax PRN.    Prevenative measures   -ther lovenox --dvt ppx  -fall, aspiration precautions   swallow eval   DNR/DNI

## 2022-11-08 NOTE — DISCHARGE NOTE PROVIDER - HOSPITAL COURSE
89yo F with PMH of HTN, Dyslipidemia, MR s/p VANE Rod (off Eliquis 5 weeks ago for LGIB), Diastolic CHF, Chronic Hypoxic Respiratory Failure (on 3.5L NC), hx of COVID-19, CKD stage 3, Anemia, Anxiety, Depression presented with dyspnea a/w acute on chronic hypoxic respiratory failure due to acute on chronic diastolic CHF.     Assessment and Plan:    Acute on chronic diastolic congestive heart failure.   PNA ruled out, abx stopped   -pt's family reports pt's diuretics had been decreased due to variation in BP  -CT Chest showed no evidence of PNA, but did note b/l small pleural effusions and pt's pro-BNP was high at 6253  -b/l LE duplex --neg for DVT   -TTE performed - no significant change in EF or valvular pathology  -c/w lasix 40mg PO daily, aldactone,   -symptomatically and clinically improving and now back down to home level of supplemental oxygen  -monitor daily weight, I&Os  -cardiology following      Acute on chronic respiratory failure with hypoxia.   ·  Plan: -likely due to acute on chronic diastolic CHF   -c/w diuretics and management as described above  follows with Dr Lory Duong in [place]  -PNA ruled out   pulmonary following     Atrial fib RVR POA, now rate controlled   discussed with daughter Gabriel [phone], patient was previously on Pradaxa and developed a diverticular bleed, was seen by GI in the past, she was seen by her PMD Dr. Jones who rstarted her AC with eliquis 2.5mg bid, daughter stated that she had a long discussion with patient and patient's PMD and risks vs benefits were discussed and she prefers to remain on the eliquis , patient has CBC checked every 2 weeks with her PMD. Daughter stated she prefers that patient remains on AC, given that H/H stable x 2 days and no e/o bleeding currently, will restart ther lovenox for now, monitor CBC, on discharge can resume eliquis 2.5mg bid   -c/w BB       Cellulitis of left thigh ruled out   -LE Doppler negative for DVT.  ID following , d/c abx      Cheek swelling. b/l cheek cysts   -reportedly has chronic b/l cheek swelling, which is quite pronounced    -pt has appt with oral surgeon - rec to discuss if may be related to salivary gland issue  -11/7 pain behind the right cheek cyst with small tender fluctuance --CT Imaging reviewed with no abscess or stone    Acute kidney injury superimposed on CKD.   ·  Plan: -MEME on Stage 3 CKD  -renal function improved despite diuresis so suspect was cardiorenal   avoid nephrotoxic meds, dose all meds per eGFR   monitor Cr     Microcytic anemia. VALENTINO.   -check iron studies  Hgb  6.9 on this admission and had good response to 1un PRBC with Hgb up to 8.4, H/H has been stable x 2   -c/w ferrous sulfate  11/7 given venofer x 1   -FOBT negative   monitor      Anxiety and depression.   denies SI/HI   ·  Plan: -c/w sertraline, remeron and xanax PRN.    Prevenative measures   -ther lovenox --dvt ppx  -fall, aspiration precautions   swallow eval   DNR/DNI

## 2022-11-08 NOTE — DISCHARGE NOTE NURSING/CASE MANAGEMENT/SOCIAL WORK - NSDCFUADDAPPT_GEN_ALL_CORE_FT
Patient has private aide 24/7 at [facility] as well as home oxygen, W/C, and Rolling Walker. Physical Therapy to be requested at [facility]. Patient has private aide 24/7 at [facility] as well as home oxygen, W/C, and Rolling Walker. Physical Therapy through  [facility]

## 2022-11-08 NOTE — PROGRESS NOTE ADULT - PROVIDER SPECIALTY LIST ADULT
Cardiology
Pulmonology
Pulmonology
Infectious Disease
Infectious Disease
Pulmonology
Hospitalist

## 2022-11-08 NOTE — SWALLOW BEDSIDE ASSESSMENT ADULT - ADDITIONAL RECOMMENDATIONS
Patient is safely tolerating least restrictive diet level within functional limits, and no additional follow up is warranted. Please reconsult this department should there be an overall change in swallow function noted.
Speech to follow to reassess swallow/check PO tolerance x1.

## 2022-11-09 ENCOUNTER — TRANSCRIPTION ENCOUNTER (OUTPATIENT)
Age: 87
End: 2022-11-09

## 2022-11-09 ENCOUNTER — NON-APPOINTMENT (OUTPATIENT)
Age: 87
End: 2022-11-09

## 2022-11-09 ENCOUNTER — APPOINTMENT (OUTPATIENT)
Dept: CARDIOLOGY | Facility: CLINIC | Age: 87
End: 2022-11-09

## 2022-11-10 LAB
CULTURE RESULTS: SIGNIFICANT CHANGE UP
CULTURE RESULTS: SIGNIFICANT CHANGE UP
SPECIMEN SOURCE: SIGNIFICANT CHANGE UP
SPECIMEN SOURCE: SIGNIFICANT CHANGE UP

## 2022-11-14 ENCOUNTER — APPOINTMENT (OUTPATIENT)
Dept: HEART FAILURE | Facility: CLINIC | Age: 87
End: 2022-11-14

## 2022-11-14 ENCOUNTER — LABORATORY RESULT (OUTPATIENT)
Age: 87
End: 2022-11-14

## 2022-11-14 VITALS
HEIGHT: 63 IN | WEIGHT: 162 LBS | BODY MASS INDEX: 28.7 KG/M2 | OXYGEN SATURATION: 95 % | SYSTOLIC BLOOD PRESSURE: 110 MMHG | DIASTOLIC BLOOD PRESSURE: 70 MMHG | TEMPERATURE: 98.8 F | HEART RATE: 94 BPM | RESPIRATION RATE: 20 BRPM

## 2022-11-14 PROCEDURE — 99214 OFFICE O/P EST MOD 30 MIN: CPT

## 2022-11-15 LAB
ALBUMIN SERPL ELPH-MCNC: 3.6 G/DL
ALP BLD-CCNC: 64 U/L
ALT SERPL-CCNC: 10 U/L
ANION GAP SERPL CALC-SCNC: 12 MMOL/L
AST SERPL-CCNC: 21 U/L
BASOPHILS # BLD AUTO: 0 K/UL
BASOPHILS NFR BLD AUTO: 0 %
BILIRUB SERPL-MCNC: 0.5 MG/DL
BUN SERPL-MCNC: 24 MG/DL
CALCIUM SERPL-MCNC: 8.5 MG/DL
CHLORIDE SERPL-SCNC: 102 MMOL/L
CO2 SERPL-SCNC: 28 MMOL/L
CREAT SERPL-MCNC: 1.18 MG/DL
EGFR: 44 ML/MIN/1.73M2
EOSINOPHIL # BLD AUTO: 0.13 K/UL
EOSINOPHIL NFR BLD AUTO: 1.7 %
GLUCOSE SERPL-MCNC: 96 MG/DL
HCT VFR BLD CALC: 33.2 %
HGB BLD-MCNC: 9.2 G/DL
LYMPHOCYTES # BLD AUTO: 0.67 K/UL
LYMPHOCYTES NFR BLD AUTO: 8.7 %
MAN DIFF?: NORMAL
MCHC RBC-ENTMCNC: 23.2 PG
MCHC RBC-ENTMCNC: 27.7 GM/DL
MCV RBC AUTO: 83.6 FL
MONOCYTES # BLD AUTO: 1.14 K/UL
MONOCYTES NFR BLD AUTO: 14.8 %
NEUTROPHILS # BLD AUTO: 5.44 K/UL
NEUTROPHILS NFR BLD AUTO: 69.5 %
NT-PROBNP SERPL-MCNC: 3261 PG/ML
PLATELET # BLD AUTO: 209 K/UL
POTASSIUM SERPL-SCNC: 4.5 MMOL/L
PROT SERPL-MCNC: 6.5 G/DL
RBC # BLD: 3.97 M/UL
RBC # FLD: 26.4 %
SODIUM SERPL-SCNC: 142 MMOL/L
WBC # FLD AUTO: 7.73 K/UL

## 2022-11-16 NOTE — ED ADULT NURSE NOTE - NS ED NURSE LEVEL OF CONSCIOUSNESS MENTAL STATUS
Problem: Infection  Goal: Absence of Infection Signs and Symptoms  Outcome: Progressing     Problem: Pain Acute  Goal: Optimal Pain Control and Function  Intervention: Prevent or Manage Pain   Goal Outcome Evaluation:       Right elbow swollen, warm and dark pink in color. Denies any discomfort. +ROM. +Radial pulse. IV antibiotics scheduled. Independent.  Aggie Curiel RN                 
  Problem: Infection  Goal: Absence of Infection Signs and Symptoms  Outcome: Progressing     Problem: Pain Acute  Goal: Optimal Pain Control and Function  Outcome: Adequate for Care Transition  Intervention: Develop Pain Management Plan  Recent Flowsheet Documentation  Taken 11/14/2022 3202 by Rachael Arevalo RN  Pain Management Interventions:   medication (see MAR)   distraction   emotional support   pillow support provided   repositioned   rest   Goal Outcome Evaluation:      Plan of Care Reviewed With: patient continues to have swelling and redness of right elbow. Pt has not complained of any pain this shift. Pt stated early in shift discomfort and itching which has subsided.                    
  Problem: Infection  Goal: Absence of Infection Signs and Symptoms  Outcome: Progressing     Problem: Plan of Care - These are the overarching goals to be used throughout the patient stay.    Goal: Optimal Comfort and Wellbeing  Outcome: Progressing   Goal Outcome Evaluation:    Pt continues to receive IV antibiotics for infection of R elbow. Pt reports feeling less pain this shift stating that her symptoms are subsiding and pain level at 3/10.   Pt able to rest.                    
  Problem: Infection  Goal: Absence of Infection Signs and Symptoms  Outcome: Progressing     Problem: Plan of Care - These are the overarching goals to be used throughout the patient stay.    Goal: Optimal Comfort and Wellbeing  Outcome: Progressing  Intervention: Monitor Pain and Promote Comfort  Recent Flowsheet Documentation  Taken 11/16/2022 1009 by Corrine Lu RN  Pain Management Interventions: medication (see MAR)   Goal Outcome Evaluation:    Patient denies any pain to right elbow. Per patient swelling is down and able to move arm/elbow more freely. Requesting tylenol for abdominal discomfort from menses. Continues with IV antibiotic. ID suggest oral antibiotics and Ortho will see her in one week after discharge per notes may discharge this evening.                      
  Problem: Infection  Goal: Absence of Infection Signs and Symptoms  Outcome: Progressing   Goal Outcome Evaluation:               Pt on IV Vanco and Cefpamine as ordered.          
  Problem: Plan of Care - These are the overarching goals to be used throughout the patient stay.    Goal: Absence of Hospital-Acquired Illness or Injury  Intervention: Identify and Manage Fall Risk  Recent Flowsheet Documentation  Taken 11/16/2022 0045 by Tracy Gilbert RN  Safety Promotion/Fall Prevention:    patient and family education    nonskid shoes/slippers when out of bed  Intervention: Prevent Skin Injury  Recent Flowsheet Documentation  Taken 11/16/2022 0045 by Tracy Gilbert RN  Body Position: position changed independently     Problem: Plan of Care - These are the overarching goals to be used throughout the patient stay.    Goal: Optimal Comfort and Wellbeing  Outcome: Progressing     Problem: Infection  Goal: Absence of Infection Signs and Symptoms  Outcome: Progressing   Goal Outcome Evaluation:  Pt denies pain, comfortable in bed. Rt elbow is pink with subsiding swelling, pt claimed it is far better this time. Afebrile. /min. Still on Vancomycin and Rocephin antbx. Slept good. Mag 1.7, K 3.6, Creatinine 0.51.                      
  Problem: Plan of Care - These are the overarching goals to be used throughout the patient stay.    Goal: Plan of Care Review  Description: The Plan of Care Review/Shift note should be completed every shift.  The Outcome Evaluation is a brief statement about your assessment that the patient is improving, declining, or no change.  This information will be displayed automatically on your shift note.  Outcome: Progressing     Problem: Plan of Care - These are the overarching goals to be used throughout the patient stay.    Goal: Optimal Comfort and Wellbeing  Outcome: Progressing     Problem: Pain Acute  Goal: Optimal Pain Control and Function  Outcome: Progressing   Goal Outcome Evaluation:      VSS. Pt denies pain. Very sweaty beginning of shift, pt stated she had broke her fever. No change in right elbow outline. No significant events overnight.      
Goal Outcome Evaluation:       Patient received discharge instructions and acknowledged understanding. IV was removed. Medications given to her from pharmacy. Patient was brought out to front entrance via w/c. Belonging with her. Friend in entrance to transport her home.               
Goal Outcome Evaluation:      Pt denied pain, up in room indep. Eating/drinking well. Rt elbow is pink with small amount of swelling, continues on IV abx  Problem: Infection  Goal: Absence of Infection Signs and Symptoms  Outcome: Progressing     Problem: Plan of Care - These are the overarching goals to be used throughout the patient stay.    Goal: Optimal Comfort and Wellbeing  Outcome: Progressing     Problem: Plan of Care - These are the overarching goals to be used throughout the patient stay.    Goal: Absence of Hospital-Acquired Illness or Injury  Intervention: Identify and Manage Fall Risk  Recent Flowsheet Documentation  Taken 11/15/2022 1600 by Katie Ybarra RN  Safety Promotion/Fall Prevention:   patient and family education   nonskid shoes/slippers when out of bed  Intervention: Prevent Skin Injury  Recent Flowsheet Documentation  Taken 11/15/2022 1600 by Katie Ybarra RN  Body Position: position changed independently     Problem: Plan of Care - These are the overarching goals to be used throughout the patient stay.    Goal: Absence of Hospital-Acquired Illness or Injury  Intervention: Identify and Manage Fall Risk  Recent Flowsheet Documentation  Taken 11/15/2022 1600 by Katie Ybarra RN  Safety Promotion/Fall Prevention:   patient and family education   nonskid shoes/slippers when out of bed  Intervention: Prevent Skin Injury  Recent Flowsheet Documentation  Taken 11/15/2022 1600 by Katie Ybarra RN  Body Position: position changed independently                     
Patient is A&Ox4. She is cooperative with cares. Patient c/o pain in R elbow. Pain is relieved with oral pain meds. Redness and swelling is slightly outside of marked area. Up to bathroom independently.  Problem: Plan of Care - These are the overarching goals to be used throughout the patient stay.    Goal: Optimal Comfort and Wellbeing  Outcome: Progressing     Problem: Pain Acute  Goal: Optimal Pain Control and Function  Outcome: Progressing  Intervention: Prevent or Manage Pain  Recent Flowsheet Documentation  Taken 11/13/2022 1600 by Margarita Menchaca RN  Medication Review/Management: medications reviewed     Problem: Plan of Care - These are the overarching goals to be used throughout the patient stay.    Goal: Absence of Hospital-Acquired Illness or Injury  Intervention: Identify and Manage Fall Risk  Recent Flowsheet Documentation  Taken 11/13/2022 1600 by Margarita Menchaca RN  Safety Promotion/Fall Prevention: clutter free environment maintained  Intervention: Prevent Skin Injury  Recent Flowsheet Documentation  Taken 11/13/2022 1600 by Margarita Menchaca, RN  Body Position: position changed independently  Taken 11/13/2022 1559 by Margarita Menchaca RN  Body Position: position changed independently   Goal Outcome Evaluation:                        
Awake/Alert

## 2022-11-21 ENCOUNTER — NON-APPOINTMENT (OUTPATIENT)
Age: 87
End: 2022-11-21

## 2022-11-22 ENCOUNTER — LABORATORY RESULT (OUTPATIENT)
Age: 87
End: 2022-11-22

## 2022-11-22 ENCOUNTER — APPOINTMENT (OUTPATIENT)
Dept: GERIATRICS | Facility: CLINIC | Age: 87
End: 2022-11-22

## 2022-11-22 VITALS
SYSTOLIC BLOOD PRESSURE: 108 MMHG | HEART RATE: 90 BPM | DIASTOLIC BLOOD PRESSURE: 58 MMHG | WEIGHT: 165 LBS | RESPIRATION RATE: 18 BRPM | TEMPERATURE: 97.7 F | OXYGEN SATURATION: 96 % | BODY MASS INDEX: 29.23 KG/M2

## 2022-11-22 PROCEDURE — 99495 TRANSJ CARE MGMT MOD F2F 14D: CPT

## 2022-11-22 RX ORDER — APIXABAN 2.5 MG/1
2.5 TABLET, FILM COATED ORAL
Qty: 60 | Refills: 0 | Status: DISCONTINUED | COMMUNITY
Start: 2022-10-12 | End: 2022-11-22

## 2022-11-22 NOTE — ASSESSMENT
[FreeTextEntry1] : Mood symptoms now improved, manageable \par Will consider inc Remeron to 22.5mg QHS as previously discussed and taper Zoloft to 75mg but hold off for now\par - Sleep hygiene, including adv inc daytime activity and avoid/limit daytime naps\par - Monitor closely for s/s of serotonin syndrome/serotonin toxicity (eg, hyperreflexia, clonus, hyperthermia, diaphoresis, tremor, autonomic instability, mental status changes) while on Remeron and Zoloft\par - f/u with psych Dr. Brito as scheduled in 11/22 \par - Adv to keep log of significant anxiety/confusion/agitation events to review at next visit\par \par Constipation - controlled, c/w Senna QHS, and Miralax PRN \par \par Will check BW today - f/u \par \par Adv to try ACE wraps to b/l LE daily in AM, off in evening\par RN demonstrated wrapping technique to alisha Cardenas today who will teach HHA, otherwise will try to get home care RN to help\par Leg elevation when not walking\par \par Off AC now\par f/u with cards 12/7 as planned\par \par - c/w 24/7 supervision for safety and help with ADLs and for comfort/socialization and monitoring of symptoms\par HIGH risk for falls/injury\par Maximize sensory input - f/u with optho, consider ENT/audiology f/u and trial HAs\par Fall precautions\par \par Repeat VS and orthostatics at next visit \par \par Immunization records updated\par \par - f/u in 1 mo, unless earlier PRN \par \par

## 2022-11-22 NOTE — HISTORY OF PRESENT ILLNESS
[Any fall with injury in past year] : Patient reported fall with injury in the past year [Independent] : transferring/mobility [Full assistance needed] : Assistance needed managing medications [FAST Score: ____] : Functional Assessment Scale (FAST) Score: [unfilled] [Cane] : cane [Walker] : walker [Wheelchair] : wheelchair [Smoke Detector] : smoke detector [Carbon Monoxide Detector] : carbon monoxide detector [Mild] : Stage: Mild [Stable] : Status: Stable [Memory Lapses Or Loss] : stable memory impairment [Patient Observed To Be Agitated] : denies agitation [Hostility Toward Caregivers] : denies aggression [Sleep Disturbances] : stable sleep disturbances [] : denies wandering [Fixed Beliefs Contradicted By Reality (Delusions)] : denies delusions [Difficulty Finding Desired Words] : denies difficulty finding desired words [Designated Healthcare Proxy] : Designated healthcare proxy [Home] : at home, [unfilled] , at the time of the visit. [Medical Office: ([facility])___] : at the medical office located in  [Family Member] : family member [FreeTextEntry3] : tyree Garber  [FreeTextEntry1] : \par Hospitalized since last visit for Resp failure d/t CHF exacerbation, and worsenign anemia requiring blood transfusion. \par D/c summary reviewed in [place]. \par Discharge Date 2022\par Admission Date 2022 \par \par TODAY reports feels much better after hospitalization - back to usual self. \par \par On Torsemide 2 tabs daily now. \par Off Eliquis now, although was discharged from hospital on it. \par \par Sleeping "OK" but anxious before visits - wakes up very early. \par On Remeron 15mg QHS only, not 22.5mg as previously discussed. \par On Zoloft 100mg daily. \par \par Constipation is controlled.  Has daily BM "like clockwork". \par On senna QHS, Miralax PRN. \par \par Has VNS services for R anterior leg wound. \par \par Continues with  supervision for safety and help with ADLs. One daytime and another nighttime HHA. \par \par INSOMNIA / ANXIETY / CONFUSION \par - 22: feels awful - "not myself". Ashamed - doesn't want anyone to see her this way.  \par Worried about many things including having enough oxygen, about falling, being a burden to her children. \par Used to be active independent and "happy go lizz."  States "everybody loved me!" \par More anxious after   of COVID.  Previously on xanax PRN for anxiety.  After   started taking Xanax TID pretty regularly. Significant decline after fall in .  Started on Zoloft after that.  Recently off Ambien and switched to Remeron instead. \par Wants to go back to being independent and active as she used to be prior to fall. \par Miserable living at the [facility] - "I don't want to see all the old and crippled people, I don't want to get that way." \par Doesn't want to be a burden on her children.  \par Xanax was held today - last dose was last night - "because otherwise she is completely out of it and too sleepy" per dtr.\par - 22: Took a fall at the [facility] [place] approx end of July or beginning of Aug '22 - bad fall but "didn't realize it".  Hospitalized at [facility] and "all kinds of tests were OK." Had bruises and pain but no fractures. \par Since then "always afraid of something."  Afraid of falling. Afraid that sometimes oxygen won't come and won't have anything to breathe with. One fear leads to another fear. Feels like she's "going crazy." Feels like something bad is going to happen to her. Frightened. Can't sleep.  Can't go to bathroom. Last BM was 3 days ago - "very hard". \par "Everything I do is hard and I don't know what to do." \par "Fear of losing my mind." \par h/o COVID approx ? - mild symptoms, no treatment \par \par - Sleep - \par \par - Appetite: good, baseline wt ~180 \par \par - Motor: h/o recurrent falls. \par Last fall in  w/ injury/requiring hospitalization. \par Ambulates with cane around the apartment, WC outside for longer distances d/t generalized weakness and SOB/on oxygen\par \par - Mood/behavior: no episodes of intolerable anxiety since last visit  \par \par - MOCA  on 22\par - CTH 22: not impressive \par \par - Seen by psych for anxiety years ago - Dr. Carrion?\par - Previously on Ambien 5mg QHS since at least  - last filled 22. Switched Ambien to Remeron QHS approx  d/t recurrent falls \par - Previously on Xanax 0.25mg TID - has not needed to use it since last visit \par - Takes Zoloft\par - Takes Remeron \par \par HFpEF / CHronic SOBE / on oxygen / AFIB\par - follows w/ cards Dr. Lopes\par - Previously on Eliquis - stopped d/t recurrent GIB\par \par \par PMD Dr. Raulito Godinez at [facility] \par  [Driving Concerns] : not driving or driving without noted concerns [[facility]] : >12  [de-identified] : Melecio Sp helps since moved to [facility]  [de-identified] : PHQ2 of 5 on 9/16/22  [GDS] : 9 on 9/23/22  [AdvancecareDate] : 9/23/22  [FreeTextEntry4] : HCP form on file: dtr Shirlene is primary, alternate is son Ronald\par GOC: priority is "not being a burden to my children" and "dying peacefully in my sleep", does not want to go to the hospital, wants to die in comfort of home.  Does not want aggressive measures at EOL.  MOLST pending completion.

## 2022-11-22 NOTE — PHYSICAL EXAM
[No Xanthelasma] : no xanthelasma [No Rub] : no rub [No Gallop] : no gallop [Soft] : abdomen soft [Non Tender] : non-tender [Normal Bowel Sounds] : normal bowel sounds [Normal Radial B/L] : normal radial B/L [Edema ___] : edema [unfilled] [No Rash] : no rash [Normal] : moves all extremities, no focal deficits, normal speech [Alert and Oriented] : alert and oriented [de-identified] : unable to assess - wearing a mask [de-identified] : JVP 8-10 cm H2O, no HJR [de-identified] : II/VI systolic murmur [de-identified] : Irregularly irregular S1 S2 [de-identified] : on 4 L of O2 via NC [de-identified] : using wheelchair for distance [de-identified] : warm peripherally

## 2022-11-22 NOTE — DISCUSSION/SUMMARY
[Patient] : the patient [FreeTextEntry2] : and daughter [FreeTextEntry1] : 89 yo F with chronic diastolic HF, rate-controlled AFib, severe MR s/p Viola, CKD stage 3 and chronic hypoxia on home O2 who presents for follow-up after recent hospitalization for hematochezia requiring blood products. She is ACC/AHA Stage C, stable NYHA Class III, volume overloaded today I have recommended the following:\par \par 1. Chronic diastolic heart failure - Volume overloaded today, will switch lasix back to torsemide 40mg for 3 days, reasses if more is needed. Then would continue Torsemide 20 mg QD. She will keep a daily weight log on her personal scale at [place] and inform our office of an acute change. Continue Spironolactone 25 mg QD. Previously did not tolerate Farxiga due to abdominal discomfort.\par  Labs from 11/8 with K 3.6, Cr 1.09 and pro-BNP  Labs today. \par \par 2. Pulmonary hypertension - Stable for years despite severely elevated pulmonary pressures by TTE. At the time of her last RHC on 3/11/19, PA 61/21/35, PCW 12 without a response to inhaled NO and with PA 71.3%, PVR 3.77 Mendez. TTE from 3/15/22 reviewed with Dr. Moore (structural cardiology) who did not think there was significant or new MS.\par \par 3. AFib - Rate controlled on beta blocker. Continue Lopressor 25 mg BID and Cardizem 120 mg stopped during her hospitalization for GIB given SBP in 80s. She has resumed AC with Eliquis 2.5 mg BID\par \par 4. Lower GIB - Recieved 1U PRBC 11/6/22 for Hgb of 6.9 Hgb on discharge 8.4  Conservatively managed and thought to be a diverticular bleed by GI. Now on Protonix 40 mg QD.\par \par 5. CKD stage 3 - Stable on current therapies. Baseline Cr 1.2-1.3. Recent labs 11/8 scr 1.09\par \par 6. HLD - Continue Atorvastatin 10 mg qHS. \par \par 7. Hypertension -  Currently well controlled despite recent discontinuation of Diltiazem. On Lopressor as above\par \par 8. Anxiety and Depression- suboptimal control on Sertraline 75 mg QD. Recently established with geriatrician Dr. Jones and future plans to establish with psychiatry Dr. Brito.\par \par 9. Insomnia- Continue Mirtazipine 7.5 mg qHS. Recommended against hypnotic sleep aids along with benzodiazepines. To establish care with Geriatric Psychiatry and follow up on formal recommendations.\par \par 10. Follow up with Dr. Lopes in 1 month as previously scheduled\par

## 2022-11-22 NOTE — REASON FOR VISIT
[Post Hospitalization] : a post hospitalization visit [FreeTextEntry1] : CHF, anxiety, insomnia, recurrent falls, polypharmacy [FreeTextEntry3] : alisha Cardenas  [FreeTextEntry2] : who assist with history per pt request and d/t patient with cognitive impairment

## 2022-11-22 NOTE — CARDIOLOGY SUMMARY
[de-identified] : \par 08/05/22 ECG Afib at 68 bpm, iRBBB, low voltage, left axis, NSTW abnormalities \par 03/15/22 ECG: AFib at 63 bpm, iRBBB, LAFB. No sig change from 11/24/21.\par 11/24/21 ECG: AFib at 48 bpm, iRBBB, LAFB. No sig change from prior 5/19/21\par 05/19/21 ECG: AFib at 61 bpm, iRBBB, LAFB. No sig change from prior.\par 11/18/20 ECG: AFib at 57 bpm, iRBBB, LAFB. No sig change from prior 8/28/20\par  [de-identified] : \par 03/10/22-03/30/22 Zio monitor: Persistent AFib (100% burden). HR ranging  bpm (average HR 64 bpm). Mild IVCD, rare VPD's and couplets. No symptoms.\par  [de-identified] : \par 07/19/22 TTE: LVIDd 5.4 cm, LVEF 68%, mild LVH ( SW 1.0 cm, PW 1.0 cm) no segmental WMA, RV is enlarged w/ decreased RVSF, severe JANEL, s/p lala clip w/mild MR (peak/mean 10  mmHg/ 4 mmHg @ HR 66), mild AR, mod-severe TR, severe PH (RVSP 83 mmHg)\par \par 03/15/22 TTE: LVIDd 5.0 cm, LVEF 70%, no segmental WMA, flattening of the septum c/w RV pressure overload, mild concentric LVH (septum 1.4 cm, PWT 1.1 cm), RVE with decreased RVSF, severe JANEL, s/p Mitraclip with mild-mod MR, mod-severe MS (peak gradient 15 mmHg, mean gradient 6 mmHg), severe TR, mild OH, severe PH (RVSP 79 mmHg).\par \par 11/18/20 TTE: LVIDd 4.6 cm, normal LVEF, concentric LVH (SW 1.2, PW 1.3 cm), flattening of septum c/w RV pressure overload, normal LA size, mod SONNY, RVE with decreased RV function, mild-mod MR s/p MitraClip (peak 10 mmHg, mean 4 mmHg), mod-severe TR, no effusion, severe PH (RVSP 95 mmHg). Iatrogenic transeptal flow noted (transeptal puncture for Clip).\par \par 03/01/18 TTE: LVEF 70%, LVIDd 5.1, septum/PWT 1.1, LA 5.7, severely dilated RV with severe RVSD, severe biatrial enlargement, severe TR, MR present but shadowed by Mitraclip,est RVSP 82, left to right atrial flow noted from transseptal puncture.\par  [de-identified] : \par 03/11/19 RHC baseline: no RA or RV reported, PA 61/21/35, PCW 12, Ao 93%, PA 71%, CO/CI (F) 6.09/3.26, PVR 3.77 Mendez.\par Nitric Oxide: RA 10, RV 57/12, PA 54/17/30, no PCW reported, Ao 93%, PA 77%, CO/CI (F) 8.19/4.38,  PVR 3.66 Mendez.\par

## 2022-11-22 NOTE — HISTORY OF PRESENT ILLNESS
[FreeTextEntry1] : Mrs. Mix is a very pleasant 89 year old woman with history of HFpEF (LVEF 68% 7/2022), MR s/p Mitraclip (8/30/16), mixed pre- and post-capillary pulmonary hypertension, permanent AFib (on Pradaxa), hypertension, hyperlipidemia, CKD (b/l Cr 1.2-1.3), SHERYL and hypoxia on home O2 3-4L who presents today for follow-up after recent hospitalization for hypoxia. \par \myrtle Presents today after a recent hospitalization at [facility] 11/5-11/8 for respiratory distress/hypoxia. She resides at the [facility], was noted to be more sob with low O2 sats. She was given IV lasix, concern for PNS and was started on antibiotics. No evidence of PNA on ch xray.  Her diuretics ahd been held as an outpatient due to marginal BPs. No DVT or cleulitis.  Hg 6.9 likely related to chronic diverticulitis, and she received 1UPRBCs repeat Hg 8.4.  No scopes were done and the family wanted her to resume AC, she was discharged on Eliquis 2.5mg bid and lasix 40mg daily, previously on torsemide. \par \par She states feeling ok from a HF perspective. No increased GUERRA and able to manage walking a long hallway daily, and receiving PT.  She continues to use home O2, particularly at night when she sleeps.  Discharge weight was 159#, today 162#, she states weights have been stable. Over the past few days her family has noted increased LE swelling.   Her appetite is good.  She denies CP, SOB at rest, maikol orthopnea (3 pillows for body comfort), PND, LH/dizziness, abdominal discomfort, palpitations, and syncope. Her bowel and bladder habits are unchanged and normal for her. She does not have a device. She has been limiting fluid and sodium in her diet and taking her medications as directed. She does not use NSAIDs.\par

## 2022-11-22 NOTE — REVIEW OF SYSTEMS
[Loss Of Hearing] : hearing loss [Easy Bleeding] : a tendency for easy bleeding [Easy Bruising] : a tendency for easy bruising [Negative] : Endocrine [As Noted in HPI] : as noted in HPI [FreeTextEntry3] : wears reading glasses  [FreeTextEntry4] : doesn't wear HA's

## 2022-11-22 NOTE — PHYSICAL EXAM
[Normal] : alert, in no acute distress [Sclera] : the sclera and conjunctiva were normal [Normal Outer Ear/Nose] : the ears and nose were normal in appearance [Normal Appearance] : the appearance of the neck was normal [No Respiratory Distress] : no respiratory distress [No Acc Muscle Use] : no accessory muscle use [Respiration, Rhythm And Depth] : normal respiratory rhythm and effort [No Clubbing, Cyanosis] : no clubbing or cyanosis of the fingernails [Normal Hearing] : hearing was not normal [Normal Gait] : abnormal gait [Normal Insight/Judgment] : insight and judgment were not intact [de-identified] : +O2 via NC [de-identified] : Calm, DAIN-7 of 13/somewhat difficult on 9/23/22

## 2022-11-23 LAB
ALBUMIN SERPL ELPH-MCNC: 3.8 G/DL
ALP BLD-CCNC: 58 U/L
ALT SERPL-CCNC: 11 U/L
ANION GAP SERPL CALC-SCNC: 16 MMOL/L
AST SERPL-CCNC: 20 U/L
BASOPHILS # BLD AUTO: 0.04 K/UL
BASOPHILS NFR BLD AUTO: 0.6 %
BILIRUB SERPL-MCNC: 0.6 MG/DL
BUN SERPL-MCNC: 18 MG/DL
CALCIUM SERPL-MCNC: 9 MG/DL
CHLORIDE SERPL-SCNC: 100 MMOL/L
CO2 SERPL-SCNC: 26 MMOL/L
CREAT SERPL-MCNC: 1.32 MG/DL
EGFR: 38 ML/MIN/1.73M2
EOSINOPHIL # BLD AUTO: 0.24 K/UL
EOSINOPHIL NFR BLD AUTO: 3.5 %
FERRITIN SERPL-MCNC: 184 NG/ML
GLUCOSE SERPL-MCNC: 94 MG/DL
HCT VFR BLD CALC: 35.9 %
HGB BLD-MCNC: 10 G/DL
IMM GRANULOCYTES NFR BLD AUTO: 0.9 %
IRON SATN MFR SERPL: 17 %
IRON SERPL-MCNC: 52 UG/DL
LYMPHOCYTES # BLD AUTO: 1.13 K/UL
LYMPHOCYTES NFR BLD AUTO: 16.3 %
MAN DIFF?: NORMAL
MCHC RBC-ENTMCNC: 24.1 PG
MCHC RBC-ENTMCNC: 27.9 GM/DL
MCV RBC AUTO: 86.5 FL
MONOCYTES # BLD AUTO: 1.05 K/UL
MONOCYTES NFR BLD AUTO: 15.1 %
NEUTROPHILS # BLD AUTO: 4.42 K/UL
NEUTROPHILS NFR BLD AUTO: 63.6 %
NT-PROBNP SERPL-MCNC: 1792 PG/ML
PLATELET # BLD AUTO: 218 K/UL
POTASSIUM SERPL-SCNC: 4.1 MMOL/L
PROT SERPL-MCNC: 6.5 G/DL
RBC # BLD: 4.15 M/UL
RBC # FLD: 29.2 %
SODIUM SERPL-SCNC: 142 MMOL/L
TIBC SERPL-MCNC: 314 UG/DL
UIBC SERPL-MCNC: 262 UG/DL
WBC # FLD AUTO: 6.94 K/UL

## 2022-12-05 ENCOUNTER — OUTPATIENT (OUTPATIENT)
Dept: OUTPATIENT SERVICES | Facility: HOSPITAL | Age: 87
LOS: 1 days | Discharge: HOME | End: 2022-12-05

## 2022-12-05 ENCOUNTER — NON-APPOINTMENT (OUTPATIENT)
Age: 87
End: 2022-12-05

## 2022-12-05 DIAGNOSIS — H26.9 UNSPECIFIED CATARACT: Chronic | ICD-10-CM

## 2022-12-05 DIAGNOSIS — Z98.89 OTHER SPECIFIED POSTPROCEDURAL STATES: Chronic | ICD-10-CM

## 2022-12-05 PROCEDURE — 90792 PSYCH DIAG EVAL W/MED SRVCS: CPT | Mod: 95

## 2022-12-06 ENCOUNTER — NON-APPOINTMENT (OUTPATIENT)
Age: 87
End: 2022-12-06

## 2022-12-06 ENCOUNTER — APPOINTMENT (OUTPATIENT)
Dept: OTOLARYNGOLOGY | Facility: CLINIC | Age: 87
End: 2022-12-06

## 2022-12-06 VITALS
HEIGHT: 63 IN | SYSTOLIC BLOOD PRESSURE: 107 MMHG | HEART RATE: 91 BPM | DIASTOLIC BLOOD PRESSURE: 67 MMHG | WEIGHT: 164 LBS | BODY MASS INDEX: 29.06 KG/M2

## 2022-12-06 DIAGNOSIS — F33.41 MAJOR DEPRESSIVE DISORDER, RECURRENT, IN PARTIAL REMISSION: ICD-10-CM

## 2022-12-06 DIAGNOSIS — F41.9 ANXIETY DISORDER, UNSPECIFIED: ICD-10-CM

## 2022-12-06 PROCEDURE — 99213 OFFICE O/P EST LOW 20 MIN: CPT

## 2022-12-06 NOTE — PHYSICAL EXAM
[] : septum deviated to the right [Normal] : lingual tonsils are normal [Midline] : trachea located in midline position [FreeTextEntry8] : Cerumen removed via curettage.  [FreeTextEntry9] : some scabbing removed via curettage, Ciprodex drops placed [de-identified] : nose is a little dry [de-identified] : dry mouth

## 2022-12-06 NOTE — REASON FOR VISIT
[Subsequent Evaluation] : a subsequent evaluation for [FreeTextEntry2] : deviated septum/ hearing loss

## 2022-12-06 NOTE — ASSESSMENT
[FreeTextEntry1] : Reviewed and reconciled medications, allergies, PMHx, PSHx, SocHx, FMHx. 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 Saline gel spray- 3 to 4x a day. Drink more water. FU 9 months.

## 2022-12-06 NOTE — HISTORY OF PRESENT ILLNESS
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 Pt is here with daughter. Daughter states that pt is doing well. Notes two hospitalizations for anemia since 6/8/22, but is doing well today.

## 2022-12-06 NOTE — ADDENDUM
[FreeTextEntry1] : Documented by Lorenzo Olivares acting as scribe for Dr. Ventura on 12/06/2022.\par \par All Medical record entries made by the Scribe were at my, Dr. Ventura, direction and personally dictated by me on 12/06/2022. I have reviewed the chart and agree that the record accurately reflects my personal performance of the history, physical exam, assessment and plan. I have also personally directed, reviewed, and agreed with the discharge instructions.

## 2022-12-07 ENCOUNTER — APPOINTMENT (OUTPATIENT)
Dept: HEART FAILURE | Facility: CLINIC | Age: 87
End: 2022-12-07

## 2022-12-07 VITALS
HEART RATE: 75 BPM | OXYGEN SATURATION: 94 % | SYSTOLIC BLOOD PRESSURE: 96 MMHG | TEMPERATURE: 98.24 F | DIASTOLIC BLOOD PRESSURE: 49 MMHG | HEIGHT: 63 IN | RESPIRATION RATE: 16 BRPM | WEIGHT: 165 LBS | BODY MASS INDEX: 29.23 KG/M2

## 2022-12-07 VITALS — SYSTOLIC BLOOD PRESSURE: 105 MMHG | DIASTOLIC BLOOD PRESSURE: 61 MMHG

## 2022-12-07 PROCEDURE — 99215 OFFICE O/P EST HI 40 MIN: CPT

## 2022-12-07 RX ORDER — TOBRAMYCIN AND DEXAMETHASONE 3; 1 MG/G; MG/G
0.3-0.1 OINTMENT OPHTHALMIC
Qty: 4 | Refills: 0 | Status: DISCONTINUED | COMMUNITY
Start: 2022-06-15

## 2022-12-07 RX ORDER — OLOPATADINE HYDROCHLORIDE 2 MG/ML
0.2 SOLUTION OPHTHALMIC
Qty: 2 | Refills: 0 | Status: DISCONTINUED | COMMUNITY
Start: 2022-06-11

## 2022-12-07 RX ORDER — ALPRAZOLAM 0.25 MG/1
0.25 TABLET ORAL TWICE DAILY
Refills: 0 | Status: DISCONTINUED | COMMUNITY
Start: 2022-09-16 | End: 2022-12-07

## 2022-12-07 RX ORDER — AMOXICILLIN AND CLAVULANATE POTASSIUM 875; 125 MG/1; MG/1
875-125 TABLET, COATED ORAL
Qty: 20 | Refills: 0 | Status: DISCONTINUED | COMMUNITY
Start: 2022-08-08

## 2022-12-07 RX ORDER — CEFUROXIME AXETIL 250 MG/1
250 TABLET ORAL
Qty: 20 | Refills: 0 | Status: DISCONTINUED | COMMUNITY
Start: 2022-06-15

## 2022-12-07 RX ORDER — SPIRONOLACTONE 50 MG/1
50 TABLET ORAL
Qty: 30 | Refills: 0 | Status: DISCONTINUED | COMMUNITY
Start: 2022-09-21

## 2022-12-07 RX ORDER — SERTRALINE HYDROCHLORIDE 100 MG/1
100 TABLET, FILM COATED ORAL
Qty: 90 | Refills: 0 | Status: DISCONTINUED | COMMUNITY
Start: 2022-09-23

## 2022-12-12 ENCOUNTER — NON-APPOINTMENT (OUTPATIENT)
Age: 87
End: 2022-12-12

## 2022-12-14 LAB
ANION GAP SERPL CALC-SCNC: 12 MMOL/L
BUN SERPL-MCNC: 23 MG/DL
CALCIUM SERPL-MCNC: 9.2 MG/DL
CHLORIDE SERPL-SCNC: 99 MMOL/L
CO2 SERPL-SCNC: 31 MMOL/L
CREAT SERPL-MCNC: 1.38 MG/DL
EGFR: 36 ML/MIN/1.73M2
GLUCOSE SERPL-MCNC: 125 MG/DL
MAGNESIUM SERPL-MCNC: 2.3 MG/DL
NT-PROBNP SERPL-MCNC: 2309 PG/ML
POTASSIUM SERPL-SCNC: 4.7 MMOL/L
SODIUM SERPL-SCNC: 142 MMOL/L

## 2022-12-16 ENCOUNTER — APPOINTMENT (OUTPATIENT)
Dept: HEART FAILURE | Facility: CLINIC | Age: 87
End: 2022-12-16

## 2022-12-16 PROCEDURE — 99442: CPT | Mod: 95

## 2022-12-19 ENCOUNTER — INPATIENT (INPATIENT)
Facility: HOSPITAL | Age: 87
LOS: 6 days | Discharge: NURSING, REHAB OR LONG-TERM CARE | DRG: 190 | End: 2022-12-26
Attending: INTERNAL MEDICINE | Admitting: INTERNAL MEDICINE
Payer: MEDICARE

## 2022-12-19 ENCOUNTER — TRANSCRIPTION ENCOUNTER (OUTPATIENT)
Age: 87
End: 2022-12-19

## 2022-12-19 VITALS
HEIGHT: 63 IN | OXYGEN SATURATION: 95 % | RESPIRATION RATE: 20 BRPM | DIASTOLIC BLOOD PRESSURE: 59 MMHG | SYSTOLIC BLOOD PRESSURE: 98 MMHG | WEIGHT: 175.05 LBS | TEMPERATURE: 98 F | HEART RATE: 89 BPM

## 2022-12-19 DIAGNOSIS — H26.9 UNSPECIFIED CATARACT: Chronic | ICD-10-CM

## 2022-12-19 DIAGNOSIS — J18.9 PNEUMONIA, UNSPECIFIED ORGANISM: ICD-10-CM

## 2022-12-19 DIAGNOSIS — Z98.89 OTHER SPECIFIED POSTPROCEDURAL STATES: Chronic | ICD-10-CM

## 2022-12-19 LAB
ALBUMIN SERPL ELPH-MCNC: 3.1 G/DL — LOW (ref 3.3–5)
ALP SERPL-CCNC: 59 U/L — SIGNIFICANT CHANGE UP (ref 30–120)
ALT FLD-CCNC: 12 U/L DA — SIGNIFICANT CHANGE UP (ref 10–60)
ANION GAP SERPL CALC-SCNC: 10 MMOL/L — SIGNIFICANT CHANGE UP (ref 5–17)
APPEARANCE UR: CLEAR — SIGNIFICANT CHANGE UP
APTT BLD: 30.6 SEC — SIGNIFICANT CHANGE UP (ref 27.5–35.5)
AST SERPL-CCNC: 27 U/L — SIGNIFICANT CHANGE UP (ref 10–40)
BASOPHILS # BLD AUTO: 0 K/UL — SIGNIFICANT CHANGE UP (ref 0–0.2)
BASOPHILS NFR BLD AUTO: 0 % — SIGNIFICANT CHANGE UP (ref 0–2)
BILIRUB SERPL-MCNC: 0.6 MG/DL — SIGNIFICANT CHANGE UP (ref 0.2–1.2)
BILIRUB UR-MCNC: NEGATIVE — SIGNIFICANT CHANGE UP
BUN SERPL-MCNC: 29 MG/DL — HIGH (ref 7–23)
CALCIUM SERPL-MCNC: 8.2 MG/DL — LOW (ref 8.4–10.5)
CHLORIDE SERPL-SCNC: 99 MMOL/L — SIGNIFICANT CHANGE UP (ref 96–108)
CO2 SERPL-SCNC: 29 MMOL/L — SIGNIFICANT CHANGE UP (ref 22–31)
COLOR SPEC: YELLOW — SIGNIFICANT CHANGE UP
CREAT SERPL-MCNC: 1.33 MG/DL — HIGH (ref 0.5–1.3)
DIFF PNL FLD: NEGATIVE — SIGNIFICANT CHANGE UP
EGFR: 38 ML/MIN/1.73M2 — LOW
EOSINOPHIL # BLD AUTO: 0.24 K/UL — SIGNIFICANT CHANGE UP (ref 0–0.5)
EOSINOPHIL NFR BLD AUTO: 5 % — SIGNIFICANT CHANGE UP (ref 0–6)
FLUAV H1 2009 PAND RNA SPEC QL NAA+PROBE: DETECTED
GLUCOSE SERPL-MCNC: 131 MG/DL — HIGH (ref 70–99)
GLUCOSE UR QL: NEGATIVE MG/DL — SIGNIFICANT CHANGE UP
HCT VFR BLD CALC: 34.4 % — LOW (ref 34.5–45)
HGB BLD-MCNC: 10.9 G/DL — LOW (ref 11.5–15.5)
INR BLD: 1.33 RATIO — HIGH (ref 0.88–1.16)
KETONES UR-MCNC: NEGATIVE — SIGNIFICANT CHANGE UP
LACTATE SERPL-SCNC: 1 MMOL/L — SIGNIFICANT CHANGE UP (ref 0.7–2)
LEUKOCYTE ESTERASE UR-ACNC: NEGATIVE — SIGNIFICANT CHANGE UP
LYMPHOCYTES # BLD AUTO: 1.22 K/UL — SIGNIFICANT CHANGE UP (ref 1–3.3)
LYMPHOCYTES # BLD AUTO: 25 % — SIGNIFICANT CHANGE UP (ref 13–44)
MCHC RBC-ENTMCNC: 26.7 PG — LOW (ref 27–34)
MCHC RBC-ENTMCNC: 31.7 GM/DL — LOW (ref 32–36)
MCV RBC AUTO: 84.1 FL — SIGNIFICANT CHANGE UP (ref 80–100)
MONOCYTES # BLD AUTO: 0.88 K/UL — SIGNIFICANT CHANGE UP (ref 0–0.9)
MONOCYTES NFR BLD AUTO: 18 % — HIGH (ref 2–14)
NEUTROPHILS # BLD AUTO: 2.54 K/UL — SIGNIFICANT CHANGE UP (ref 1.8–7.4)
NEUTROPHILS NFR BLD AUTO: 52 % — SIGNIFICANT CHANGE UP (ref 43–77)
NITRITE UR-MCNC: NEGATIVE — SIGNIFICANT CHANGE UP
NRBC # BLD: SIGNIFICANT CHANGE UP /100 WBCS (ref 0–0)
NT-PROBNP SERPL-SCNC: 4421 PG/ML — HIGH (ref 0–450)
PH UR: 7 — SIGNIFICANT CHANGE UP (ref 5–8)
PLATELET # BLD AUTO: 115 K/UL — LOW (ref 150–400)
POTASSIUM SERPL-MCNC: 4.1 MMOL/L — SIGNIFICANT CHANGE UP (ref 3.5–5.3)
POTASSIUM SERPL-SCNC: 4.1 MMOL/L — SIGNIFICANT CHANGE UP (ref 3.5–5.3)
PROT SERPL-MCNC: 6.7 G/DL — SIGNIFICANT CHANGE UP (ref 6–8.3)
PROT UR-MCNC: 15 MG/DL
PROTHROM AB SERPL-ACNC: 15.3 SEC — HIGH (ref 10.5–13.4)
RAPID RVP RESULT: DETECTED
RBC # BLD: 4.09 M/UL — SIGNIFICANT CHANGE UP (ref 3.8–5.2)
RBC # FLD: 24.3 % — HIGH (ref 10.3–14.5)
SARS-COV-2 RNA SPEC QL NAA+PROBE: SIGNIFICANT CHANGE UP
SODIUM SERPL-SCNC: 138 MMOL/L — SIGNIFICANT CHANGE UP (ref 135–145)
SP GR SPEC: 1.01 — SIGNIFICANT CHANGE UP (ref 1.01–1.02)
UROBILINOGEN FLD QL: NEGATIVE MG/DL — SIGNIFICANT CHANGE UP
WBC # BLD: 4.89 K/UL — SIGNIFICANT CHANGE UP (ref 3.8–10.5)
WBC # FLD AUTO: 4.89 K/UL — SIGNIFICANT CHANGE UP (ref 3.8–10.5)

## 2022-12-19 PROCEDURE — 93010 ELECTROCARDIOGRAM REPORT: CPT

## 2022-12-19 PROCEDURE — 71045 X-RAY EXAM CHEST 1 VIEW: CPT | Mod: 26

## 2022-12-19 PROCEDURE — 99285 EMERGENCY DEPT VISIT HI MDM: CPT | Mod: FS,CS

## 2022-12-19 RX ORDER — VANCOMYCIN HCL 1 G
1000 VIAL (EA) INTRAVENOUS EVERY 12 HOURS
Refills: 0 | Status: DISCONTINUED | OUTPATIENT
Start: 2022-12-19 | End: 2022-12-20

## 2022-12-19 RX ORDER — SODIUM CHLORIDE 9 MG/ML
500 INJECTION INTRAMUSCULAR; INTRAVENOUS; SUBCUTANEOUS ONCE
Refills: 0 | Status: COMPLETED | OUTPATIENT
Start: 2022-12-19 | End: 2022-12-19

## 2022-12-19 RX ORDER — IPRATROPIUM/ALBUTEROL SULFATE 18-103MCG
3 AEROSOL WITH ADAPTER (GRAM) INHALATION EVERY 6 HOURS
Refills: 0 | Status: DISCONTINUED | OUTPATIENT
Start: 2022-12-19 | End: 2022-12-26

## 2022-12-19 RX ORDER — SERTRALINE 25 MG/1
100 TABLET, FILM COATED ORAL DAILY
Refills: 0 | Status: DISCONTINUED | OUTPATIENT
Start: 2022-12-19 | End: 2022-12-26

## 2022-12-19 RX ORDER — APIXABAN 2.5 MG/1
1 TABLET, FILM COATED ORAL
Qty: 0 | Refills: 0 | DISCHARGE

## 2022-12-19 RX ORDER — ALPRAZOLAM 0.25 MG
0.25 TABLET ORAL ONCE
Refills: 0 | Status: DISCONTINUED | OUTPATIENT
Start: 2022-12-19 | End: 2022-12-26

## 2022-12-19 RX ORDER — PIPERACILLIN AND TAZOBACTAM 4; .5 G/20ML; G/20ML
3.38 INJECTION, POWDER, LYOPHILIZED, FOR SOLUTION INTRAVENOUS ONCE
Refills: 0 | Status: COMPLETED | OUTPATIENT
Start: 2022-12-19 | End: 2022-12-19

## 2022-12-19 RX ORDER — ATORVASTATIN CALCIUM 80 MG/1
10 TABLET, FILM COATED ORAL AT BEDTIME
Refills: 0 | Status: DISCONTINUED | OUTPATIENT
Start: 2022-12-19 | End: 2022-12-26

## 2022-12-19 RX ORDER — PIPERACILLIN AND TAZOBACTAM 4; .5 G/20ML; G/20ML
3.38 INJECTION, POWDER, LYOPHILIZED, FOR SOLUTION INTRAVENOUS ONCE
Refills: 0 | Status: COMPLETED | OUTPATIENT
Start: 2022-12-19 | End: 2022-12-20

## 2022-12-19 RX ORDER — LACTOBACILLUS ACIDOPHILUS 100MM CELL
1 CAPSULE ORAL
Refills: 0 | Status: DISCONTINUED | OUTPATIENT
Start: 2022-12-19 | End: 2022-12-26

## 2022-12-19 RX ORDER — ACETAMINOPHEN 500 MG
650 TABLET ORAL EVERY 6 HOURS
Refills: 0 | Status: DISCONTINUED | OUTPATIENT
Start: 2022-12-19 | End: 2022-12-26

## 2022-12-19 RX ORDER — APIXABAN 2.5 MG/1
2.5 TABLET, FILM COATED ORAL
Refills: 0 | Status: DISCONTINUED | OUTPATIENT
Start: 2022-12-19 | End: 2022-12-26

## 2022-12-19 RX ORDER — LOPERAMIDE HCL 2 MG
1 TABLET ORAL
Qty: 0 | Refills: 0 | DISCHARGE

## 2022-12-19 RX ORDER — METOPROLOL TARTRATE 50 MG
25 TABLET ORAL
Refills: 0 | Status: DISCONTINUED | OUTPATIENT
Start: 2022-12-19 | End: 2022-12-26

## 2022-12-19 RX ORDER — MIRTAZAPINE 45 MG/1
15 TABLET, ORALLY DISINTEGRATING ORAL AT BEDTIME
Refills: 0 | Status: DISCONTINUED | OUTPATIENT
Start: 2022-12-19 | End: 2022-12-26

## 2022-12-19 RX ORDER — SENNA PLUS 8.6 MG/1
1 TABLET ORAL
Qty: 0 | Refills: 0 | DISCHARGE

## 2022-12-19 RX ORDER — VANCOMYCIN HCL 1 G
1000 VIAL (EA) INTRAVENOUS ONCE
Refills: 0 | Status: COMPLETED | OUTPATIENT
Start: 2022-12-19 | End: 2022-12-19

## 2022-12-19 RX ORDER — SERTRALINE 25 MG/1
1 TABLET, FILM COATED ORAL
Qty: 0 | Refills: 0 | DISCHARGE

## 2022-12-19 RX ORDER — LANOLIN ALCOHOL/MO/W.PET/CERES
3 CREAM (GRAM) TOPICAL AT BEDTIME
Refills: 0 | Status: DISCONTINUED | OUTPATIENT
Start: 2022-12-19 | End: 2022-12-26

## 2022-12-19 RX ADMIN — SERTRALINE 100 MILLIGRAM(S): 25 TABLET, FILM COATED ORAL at 23:47

## 2022-12-19 RX ADMIN — ATORVASTATIN CALCIUM 10 MILLIGRAM(S): 80 TABLET, FILM COATED ORAL at 23:47

## 2022-12-19 RX ADMIN — PIPERACILLIN AND TAZOBACTAM 200 GRAM(S): 4; .5 INJECTION, POWDER, LYOPHILIZED, FOR SOLUTION INTRAVENOUS at 22:15

## 2022-12-19 RX ADMIN — Medication 3 MILLIGRAM(S): at 23:47

## 2022-12-19 RX ADMIN — Medication 25 MILLIGRAM(S): at 23:47

## 2022-12-19 RX ADMIN — MIRTAZAPINE 15 MILLIGRAM(S): 45 TABLET, ORALLY DISINTEGRATING ORAL at 23:47

## 2022-12-19 RX ADMIN — SODIUM CHLORIDE 500 MILLILITER(S): 9 INJECTION INTRAMUSCULAR; INTRAVENOUS; SUBCUTANEOUS at 23:48

## 2022-12-19 RX ADMIN — Medication 250 MILLIGRAM(S): at 22:46

## 2022-12-19 NOTE — H&P ADULT - TIME BILLING
Your child was seen in the ER today for headache.  We evaluated him with a CAT scan which did not show any evidence of brain bleeding or skull fracture.  Please follow-up with the pediatrician in 3 to 5 days for ongoing headache.  He can take Tylenol Motrin for his headaches.  If he has any worsening symptoms please return to the ER.  
I have discussed care plan with patient and HCP,expressed understanding of problems treatment and their effect and side effects, alternatives in detail,I have asked if they have any questions and concerns and appropriately addressed them to best of my ability  Reviewed all diagonostic tests, lab results and drug drug interactions, and medications

## 2022-12-19 NOTE — ED PROVIDER NOTE - MUSCULOSKELETAL, MLM
Please see dr Mary Sox notes  please make the appropriate arrangements and contact patient  Thank you  Spine appears normal, range of motion is not limited, no muscle or joint tenderness Spine appears normal, range of motion is not limited, no muscle or joint tenderness, calf NT B, pitting edema BLE

## 2022-12-19 NOTE — ED ADULT NURSE NOTE - CHPI ED NUR SYMPTOMS NEG
This is a duplicate encounter   no abdominal pain/no chills/no decreased eating/drinking/no diarrhea/no fever/no headache/no rash/no vomiting

## 2022-12-19 NOTE — H&P ADULT - ASSESSMENT
89yo F with PMH of HTN, Dyslipidemia, MR s/p VANE Rod (off Eliquis 5 weeks ago for LGIB), Diastolic CHF, Chronic Hypoxic Respiratory Failure (on 3.5L NC), hx of COVID-19, CKD stage 3, Anemia, Anxiety, Depression brought in by ambulance from the [facility] [place] for evaluation of pneumonia.  Patient states that she has had a cough with yellow sputum over the past 2 weeks which has been getting worse.  States that she had chest x-ray at the facility yesterday and was told today that she has pneumonia. States that she feels tired. Denies chest pain, shortness of breath, fever, abdominal pain, nausea/vomiting, calf pain.  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 Fib (off Eliquis 5 weeks ago for LGIB), Diastolic CHF, Chronic Hypoxic Respiratory Failure (on 3.5L NC), hx of COVID-19, CKD stage 3, Anemia, Anxiety, Depression brought in by ambulance from the [facility] [place] for evaluation of pneumonia.  Patient states that she has had a cough with yellow sputum over the past 2 weeks which has been getting worse.  States that she had chest x-ray at the facility yesterday and was told today that she has pneumonia. States that she feels tired. Denies chest pain, shortness of breath, fever, abdominal pain, nausea/vomiting, calf pain.  IN ED afebrile, no fever no leucocytosis, had x ray chest suspected PNA nad started on zosyn and vanc, and had elevated BUN creat- likely charis with dehydration, ereceived fluid bolus,, admitted for further management for  Pneumonia with influenza A  COPD with ch resp failure on o2  HTN  HLD  AFIB  Ch Diastolic heart failure  charis base line creat 1.15, diuretics held  started on IV abx, ID and pulmonary consult called, cardio consult

## 2022-12-19 NOTE — ED CLERICAL - NS ED CLERK NOTE PRE-ARRIVAL INFORMATION; ADDITIONAL PRE-ARRIVAL INFORMATION
This patient is enrolled in the COPD or PNA STARS readmission program and has active care navigation.  Please call the contact number above to speak with the Care navigation team to obtain additional clinical information regarding this patient and/or to arrange close clinical follow up within 24 hours.

## 2022-12-19 NOTE — ED ADULT NURSE NOTE - CHIEF COMPLAINT QUOTE
PT BIB EMS from [facility] c/o pneumonia; as per facility pt tested positive for Pna; pt baseline on O2/3L

## 2022-12-19 NOTE — ED ADULT NURSE NOTE - NSIMPLEMENTINTERV_GEN_ALL_ED
Implemented All Fall Risk Interventions:  [place] to call system. Call bell, personal items and telephone within reach. Instruct patient to call for assistance. Room bathroom lighting operational. Non-slip footwear when patient is off stretcher. Physically safe environment: no spills, clutter or unnecessary equipment. Stretcher in lowest position, wheels locked, appropriate side rails in place. Provide visual cue, wrist band, yellow gown, etc. Monitor gait and stability. Monitor for mental status changes and reorient to person, place, and time. Review medications for side effects contributing to fall risk. Reinforce activity limits and safety measures with patient and family.

## 2022-12-19 NOTE — ED ADULT TRIAGE NOTE - CHIEF COMPLAINT QUOTE
PT BIB EMS from [facility] c/o pneumonia; as per facility pt tested positive for Pna; pt baseline on O2/3L

## 2022-12-19 NOTE — ED PROVIDER NOTE - CLINICAL SUMMARY MEDICAL DECISION MAKING FREE TEXT BOX
Juan Lew: 89 yo F TANJA from assisted living for dx of PNA. Pt with cough, had CXR earlier today, sent to hospital for management. Pt denies SOB, fever. On exam, pt wd, wn, NAD, alert, chest + rhonchi throughout, does not appear to be in respiratory distress, able to speak in full sentences; MDM: given dx of PNA, will send labs and cultures, start abx, CXR, expect admission.

## 2022-12-19 NOTE — ED PROVIDER NOTE - OBJECTIVE STATEMENT
89-year-old female with history of CHF on 3L O2, A. fib, anxiety, hypertension, hyperlipidemia, constipation brought in by ambulance from the [facility] [place] for evaluation of pneumonia.  Patient states that she has had a cough with yellow sputum over the past 2 weeks which has been getting worse.  States that she had chest x-ray at the facility yesterday and was told today that she has pneumonia. States that she feels tired. Denies chest pain, shortness of breath, fever, abdominal pain, nausea/vomiting, calf pain.

## 2022-12-19 NOTE — ED PROVIDER NOTE - NS ED ATTENDING STATEMENT MOD
This was a shared visit with the JULIUS. I reviewed and verified the documentation and independently performed the documented:

## 2022-12-19 NOTE — H&P ADULT - HISTORY OF PRESENT ILLNESS
89yo F with PMH of HTN, Dyslipidemia, MR s/p Viola, KIMBER. Fib (off Eliquis 5 weeks ago for LGIB), Diastolic CHF, Chronic Hypoxic Respiratory Failure (on 3.5L NC), hx of COVID-19, CKD stage 3, Anemia, Anxiety, Depression brought in by ambulance from the [facility] [place] for evaluation of pneumonia.  Patient states that she has had a cough with yellow sputum over the past 2 weeks which has been getting worse.  States that she had chest x-ray at the facility yesterday and was told today that she has pneumonia. States that she feels tired. Denies chest pain, shortness of breath, fever, abdominal pain, nausea/vomiting, calf pain.  IN ED afebrile, no fever no leucocytosis, had x ray chest suspected PNA nad started on zosyn and vanc, and had elevated BUN creat- likely charis with dehydration, ereceived fluid bolus,, admitted for further management

## 2022-12-19 NOTE — ED ADULT NURSE NOTE - IS THE PATIENT ABLE TO BE SCREENED?
Hydroxychloroquine Pregnancy And Lactation Text: This medication has been shown to cause fetal harm but it isn't assigned a Pregnancy Risk Category. There are small amounts excreted in breast milk. Yes

## 2022-12-20 DIAGNOSIS — I48.0 PAROXYSMAL ATRIAL FIBRILLATION: ICD-10-CM

## 2022-12-20 DIAGNOSIS — J44.1 CHRONIC OBSTRUCTIVE PULMONARY DISEASE WITH (ACUTE) EXACERBATION: ICD-10-CM

## 2022-12-20 DIAGNOSIS — N17.9 ACUTE KIDNEY FAILURE, UNSPECIFIED: ICD-10-CM

## 2022-12-20 DIAGNOSIS — I50.32 CHRONIC DIASTOLIC (CONGESTIVE) HEART FAILURE: ICD-10-CM

## 2022-12-20 DIAGNOSIS — I10 ESSENTIAL (PRIMARY) HYPERTENSION: ICD-10-CM

## 2022-12-20 DIAGNOSIS — J18.9 PNEUMONIA, UNSPECIFIED ORGANISM: ICD-10-CM

## 2022-12-20 LAB
ALBUMIN SERPL ELPH-MCNC: 2.7 G/DL — LOW (ref 3.3–5)
ALP SERPL-CCNC: 54 U/L — SIGNIFICANT CHANGE UP (ref 30–120)
ALT FLD-CCNC: 13 U/L DA — SIGNIFICANT CHANGE UP (ref 10–60)
ANION GAP SERPL CALC-SCNC: 6 MMOL/L — SIGNIFICANT CHANGE UP (ref 5–17)
AST SERPL-CCNC: 24 U/L — SIGNIFICANT CHANGE UP (ref 10–40)
BASOPHILS # BLD AUTO: 0.02 K/UL — SIGNIFICANT CHANGE UP (ref 0–0.2)
BASOPHILS NFR BLD AUTO: 0.4 % — SIGNIFICANT CHANGE UP (ref 0–2)
BILIRUB SERPL-MCNC: 0.6 MG/DL — SIGNIFICANT CHANGE UP (ref 0.2–1.2)
BUN SERPL-MCNC: 26 MG/DL — HIGH (ref 7–23)
CALCIUM SERPL-MCNC: 8.2 MG/DL — LOW (ref 8.4–10.5)
CHLORIDE SERPL-SCNC: 98 MMOL/L — SIGNIFICANT CHANGE UP (ref 96–108)
CO2 SERPL-SCNC: 33 MMOL/L — HIGH (ref 22–31)
CREAT SERPL-MCNC: 1.36 MG/DL — HIGH (ref 0.5–1.3)
CRP SERPL-MCNC: 11 MG/L — HIGH
EGFR: 37 ML/MIN/1.73M2 — LOW
EOSINOPHIL # BLD AUTO: 0.24 K/UL — SIGNIFICANT CHANGE UP (ref 0–0.5)
EOSINOPHIL NFR BLD AUTO: 5.1 % — SIGNIFICANT CHANGE UP (ref 0–6)
GLUCOSE SERPL-MCNC: 88 MG/DL — SIGNIFICANT CHANGE UP (ref 70–99)
HCT VFR BLD CALC: 34.1 % — LOW (ref 34.5–45)
HGB BLD-MCNC: 10.4 G/DL — LOW (ref 11.5–15.5)
IMM GRANULOCYTES NFR BLD AUTO: 0.4 % — SIGNIFICANT CHANGE UP (ref 0–0.9)
LYMPHOCYTES # BLD AUTO: 1.1 K/UL — SIGNIFICANT CHANGE UP (ref 1–3.3)
LYMPHOCYTES # BLD AUTO: 23.5 % — SIGNIFICANT CHANGE UP (ref 13–44)
MCHC RBC-ENTMCNC: 26.3 PG — LOW (ref 27–34)
MCHC RBC-ENTMCNC: 30.5 GM/DL — LOW (ref 32–36)
MCV RBC AUTO: 86.1 FL — SIGNIFICANT CHANGE UP (ref 80–100)
MONOCYTES # BLD AUTO: 0.66 K/UL — SIGNIFICANT CHANGE UP (ref 0–0.9)
MONOCYTES NFR BLD AUTO: 14.1 % — HIGH (ref 2–14)
NEUTROPHILS # BLD AUTO: 2.64 K/UL — SIGNIFICANT CHANGE UP (ref 1.8–7.4)
NEUTROPHILS NFR BLD AUTO: 56.5 % — SIGNIFICANT CHANGE UP (ref 43–77)
NRBC # BLD: 0 /100 WBCS — SIGNIFICANT CHANGE UP (ref 0–0)
PLATELET # BLD AUTO: 100 K/UL — LOW (ref 150–400)
POTASSIUM SERPL-MCNC: 3.4 MMOL/L — LOW (ref 3.5–5.3)
POTASSIUM SERPL-SCNC: 3.4 MMOL/L — LOW (ref 3.5–5.3)
PROCALCITONIN SERPL-MCNC: 0.09 NG/ML — SIGNIFICANT CHANGE UP (ref 0.02–0.1)
PROT SERPL-MCNC: 6.5 G/DL — SIGNIFICANT CHANGE UP (ref 6–8.3)
RBC # BLD: 3.96 M/UL — SIGNIFICANT CHANGE UP (ref 3.8–5.2)
RBC # FLD: 24.4 % — HIGH (ref 10.3–14.5)
SODIUM SERPL-SCNC: 137 MMOL/L — SIGNIFICANT CHANGE UP (ref 135–145)
WBC # BLD: 4.68 K/UL — SIGNIFICANT CHANGE UP (ref 3.8–10.5)
WBC # FLD AUTO: 4.68 K/UL — SIGNIFICANT CHANGE UP (ref 3.8–10.5)

## 2022-12-20 RX ORDER — PIPERACILLIN AND TAZOBACTAM 4; .5 G/20ML; G/20ML
3.38 INJECTION, POWDER, LYOPHILIZED, FOR SOLUTION INTRAVENOUS EVERY 8 HOURS
Refills: 0 | Status: DISCONTINUED | OUTPATIENT
Start: 2022-12-20 | End: 2022-12-21

## 2022-12-20 RX ADMIN — SERTRALINE 100 MILLIGRAM(S): 25 TABLET, FILM COATED ORAL at 13:06

## 2022-12-20 RX ADMIN — Medication 30 MILLIGRAM(S): at 17:32

## 2022-12-20 RX ADMIN — PIPERACILLIN AND TAZOBACTAM 25 GRAM(S): 4; .5 INJECTION, POWDER, LYOPHILIZED, FOR SOLUTION INTRAVENOUS at 05:59

## 2022-12-20 RX ADMIN — ATORVASTATIN CALCIUM 10 MILLIGRAM(S): 80 TABLET, FILM COATED ORAL at 21:32

## 2022-12-20 RX ADMIN — MIRTAZAPINE 15 MILLIGRAM(S): 45 TABLET, ORALLY DISINTEGRATING ORAL at 21:31

## 2022-12-20 RX ADMIN — PIPERACILLIN AND TAZOBACTAM 25 GRAM(S): 4; .5 INJECTION, POWDER, LYOPHILIZED, FOR SOLUTION INTRAVENOUS at 13:06

## 2022-12-20 RX ADMIN — PIPERACILLIN AND TAZOBACTAM 25 GRAM(S): 4; .5 INJECTION, POWDER, LYOPHILIZED, FOR SOLUTION INTRAVENOUS at 21:31

## 2022-12-20 RX ADMIN — Medication 3 MILLIGRAM(S): at 21:31

## 2022-12-20 RX ADMIN — Medication 1 TABLET(S): at 08:34

## 2022-12-20 RX ADMIN — Medication 1 TABLET(S): at 17:32

## 2022-12-20 NOTE — CONSULT NOTE ADULT - ASSESSMENT
Pt is 89W w/ PMHx of HTN, HLD, MR s/p MitraClip, A. Fib (off Eliquis 5 weeks ago for LGIB), Diastolic CHF, Chronic Hypoxic Respiratory Failure (on 3.5L NC), hx of COVID-19, CKD stage 3, Anemia, Anxiety, Depression brought in by ambulance from the [facility] [place] for evaluation of pneumonia.      Acute Influenza A  +/- superimposed bacterial PNA  COPD exacerbation 2/2 above  Acute on chronic RF 2/2 above  MEME  - RVP + Flu A  - COVID negative  - cx pending  - CXR reviewed, no PNA  - procal noted 0.19  Plan:   C/w tamiflu  Can d/c Abx  --procal <0.25  F/u pending cx  Trend temps/WBC  Trend renal fxn  Supportive care and supplemental O2 as needed    D/w Dr. Cabrera    Infectious Diseases will continue to follow. Please call with any questions.   Suzy Real M.D.  [facility] of [facility]ious Diseases [phone]

## 2022-12-20 NOTE — PROGRESS NOTE ADULT - SUBJECTIVE AND OBJECTIVE BOX
Patient is a 89y old  Female who presents with a chief complaint of cough (20 Dec 2022 06:45)      INTERVAL HPI/OVERNIGHT EVENTS:overnight events noted    Home Medications:  ferrous sulfate 325 mg (65 mg elemental iron) oral tablet: 1 tab(s) orally once a day (19 Dec 2022 23:16)  fluticasone propionate 55 mcg/inh inhalation powder: 1 puff(s) inhaled every 12 hours (19 Dec 2022 23:16)  loperamide 2 mg oral tablet: 1 cap(s) orally once a day, As Needed (19 Dec 2022 23:16)  melatonin 3 mg oral tablet: 2 tab(s) orally once a day (at bedtime) (19 Dec 2022 23:16)  MiraLax oral powder for reconstitution: orally once a day (at bedtime) (19 Dec 2022 23:16)  mirtazapine 15 mg oral tablet: 1 tab(s) orally once a day (at bedtime) (19 Dec 2022 23:16)  Nasal Saline 0.65% nasal spray: 1 spray(s) nasal 2 times a day both nostrils  (19 Dec 2022 23:16)  ramelteon 8 mg oral tablet: 1 tab(s) orally once a day (at bedtime) (19 Dec 2022 23:16)  Senna 8.6 mg oral tablet: 2 tab(s) orally once a day (19 Dec 2022 23:16)  sertraline 25 mg oral tablet: 3 tab(s) orally once a day (19 Dec 2022 23:16)  spironolactone 25 mg oral tablet: 1 tab(s) orally once a day (19 Dec 2022 23:16)  torsemide 20 mg oral tablet: 3 tab(s) orally 2 times a day (19 Dec 2022 23:16)  Xanax 0.25 mg oral tablet: 1 tab(s) orally every 12 hours, As Needed - for anxiety (19 Dec 2022 23:16)      MEDICATIONS  (STANDING):  apixaban 2.5 milliGRAM(s) Oral two times a day  atorvastatin 10 milliGRAM(s) Oral at bedtime  lactobacillus acidophilus 1 Tablet(s) Oral two times a day with meals  melatonin 3 milliGRAM(s) Oral at bedtime  metoprolol tartrate 25 milliGRAM(s) Oral two times a day  mirtazapine 15 milliGRAM(s) Oral at bedtime  oseltamivir 75 milliGRAM(s) Oral two times a day  piperacillin/tazobactam IVPB.. 3.375 Gram(s) IV Intermittent every 8 hours  sertraline 100 milliGRAM(s) Oral daily  vancomycin  IVPB 1000 milliGRAM(s) IV Intermittent every 12 hours    MEDICATIONS  (PRN):  acetaminophen     Tablet .. 650 milliGRAM(s) Oral every 6 hours PRN Temp greater or equal to 38C (100.4F), Mild Pain (1 - 3)  albuterol/ipratropium for Nebulization 3 milliLiter(s) Nebulizer every 6 hours PRN Bronchospasm  ALPRAZolam 0.25 milliGRAM(s) Oral Once PRN anxiety  guaiFENesin Oral Liquid (Sugar-Free) 200 milliGRAM(s) Oral every 6 hours PRN Cough      Allergies    codeine (Other)    Intolerances        REVIEW OF SYSTEMS:  CONSTITUTIONAL: No fever, weight loss, has fatigue  EYES: No eye pain, visual disturbances, or discharge  ENMT:  No difficulty hearing, tinnitus, vertigo; No sinus or throat pain  NECK: No pain or stiffness  BREASTS: No pain, masses, or nipple discharge  RESPIRATORY: has cough,   CARDIOVASCULAR: No chest pain, palpitations, dizziness, or leg swelling  GASTROINTESTINAL: No abdominal or epigastric pain. No nausea, vomiting,   GENITOURINARY: No dysuria, frequency, hematuria, or incontinence  NEUROLOGICAL: No headaches, memory loss, loss of strength, numbness, or tremors  SKIN: No itching, burning, rashes, or lesions   Vital Signs Last 24 Hrs  T(C): 36.7 (20 Dec 2022 06:33), Max: 36.9 (20 Dec 2022 00:30)  T(F): 98 (20 Dec 2022 06:33), Max: 98.4 (20 Dec 2022 00:30)  HR: 68 (20 Dec 2022 06:33) (68 - 89)  BP: 106/72 (20 Dec 2022 06:33) (98/59 - 120/74)  BP(mean): 89 (20 Dec 2022 00:30) (89 - 89)  RR: 19 (20 Dec 2022 06:33) (18 - 20)  SpO2: 95% (20 Dec 2022 06:33) (93% - 95%)    Parameters below as of 20 Dec 2022 06:33  Patient On (Oxygen Delivery Method): nasal cannula        PHYSICAL EXAM:  GENERAL: well-groomed, well-developed  HEAD:  Atraumatic, Normocephalic  EYES: EOMI, PERRLA, conjunctiva and sclera clear  ENMT: Moist mucous membranes,   NECK: Supple, No JVD, Normal thyroid  NERVOUS SYSTEM:  Alert & Oriented X3, non focal  CHEST/LUNG: Clear to percussion bilaterally; No rales, rhonchi, wheezing, or rubs  HEART: Regular rate and rhythm; No murmurs, rubs, or gallops  ABDOMEN: Soft, Nontender, Nondistended; Bowel sounds present  EXTREMITIES:  2+ Peripheral Pulses, No clubbing, cyanosis, or edema  LABS:                        10.9   4.89  )-----------( 115      ( 19 Dec 2022 21:34 )             34.4     12-    138  |  99  |  29<H>  ----------------------------<  131<H>  4.1   |  29  |  1.33<H>    Ca    8.2<L>      19 Dec 2022 21:34    TPro  6.7  /  Alb  3.1<L>  /  TBili  0.6  /  DBili  x   /  AST  27  /  ALT  12  /  AlkPhos  59  12-19    PT/INR - ( 19 Dec 2022 21:34 )   PT: 15.3 sec;   INR: 1.33 ratio         PTT - ( 19 Dec 2022 21:34 )  PTT:30.6 sec  Urinalysis Basic - ( 19 Dec 2022 23:00 )    Color: Yellow / Appearance: Clear / S.010 / pH: x  Gluc: x / Ketone: Negative  / Bili: Negative / Urobili: Negative mg/dL   Blood: x / Protein: 15 mg/dL / Nitrite: Negative   Leuk Esterase: Negative / RBC: Negative /HPF / WBC 0-2   Sq Epi: x / Non Sq Epi: Moderate / Bacteria: Moderate      CAPILLARY BLOOD GLUCOSE              I&O's Summary      RADIOLOGY & ADDITIONAL TESTS:    Imaging Personally Reviewed:  [x ] YES  [ ] NO    Consultant(s) Notes Reviewed:  [ x] YES  [ ] NO    Care Discussed with Consultants/Other Providers [x ] YES  [ ] NO

## 2022-12-20 NOTE — PATIENT PROFILE ADULT - FALL HARM RISK - HARM RISK INTERVENTIONS

## 2022-12-20 NOTE — PHARMACOTHERAPY INTERVENTION NOTE - COMMENTS
dosage change for treatment change needed DSC 75 mg change to 30mG for 5 days   message left for MD

## 2022-12-20 NOTE — GOALS OF CARE CONVERSATION - ADVANCED CARE PLANNING - CONVERSATION DETAILS
discussed with patient and family Shirlene Ware HCP, and pt is DNR, DNI, no limitation to tt, no feeding tube

## 2022-12-20 NOTE — CONSULT NOTE ADULT - SUBJECTIVE AND OBJECTIVE BOX
[facility], Division of Infectious Diseases  VANE Shine S. Shah, BRI Riz[facility]  [phone]    ARNULFO ALEX  89y, Female  [id number]    HPI--  HPI:  89yo F with PMH of HTN, Dyslipidemia, MR s/p VANE Rod (off Eliquis 5 weeks ago for LGIB), Diastolic CHF, Chronic Hypoxic Respiratory Failure (on 3.5L NC), hx of COVID-19, CKD stage 3, Anemia, Anxiety, Depression brought in by ambulance from the [facility] [place] for evaluation of pneumonia.  Patient states that she has had a cough with yellow sputum over the past 2 weeks which has been getting worse.  States that she had chest x-ray at the facility yesterday and was told today that she has pneumonia. States that she feels tired. Denies chest pain, shortness of breath, fever, abdominal pain, nausea/vomiting, calf pain.  IN ED afebrile, no fever no leucocytosis, had x ray chest suspected PNA nad started on zosyn and vanc, and had elevated BUN creat- likely charis with dehydration, ereceived fluid bolus,, admitted for further management (19 Dec 2022 22:44)    Pt seen and examined at bedside  Reporting hx as above  +Flu      Active Medications--  acetaminophen     Tablet .. 650 milliGRAM(s) Oral every 6 hours PRN  albuterol/ipratropium for Nebulization 3 milliLiter(s) Nebulizer every 6 hours PRN  ALPRAZolam 0.25 milliGRAM(s) Oral Once PRN  apixaban 2.5 milliGRAM(s) Oral two times a day  atorvastatin 10 milliGRAM(s) Oral at bedtime  guaiFENesin Oral Liquid (Sugar-Free) 200 milliGRAM(s) Oral every 6 hours PRN  lactobacillus acidophilus 1 Tablet(s) Oral two times a day with meals  melatonin 3 milliGRAM(s) Oral at bedtime  metoprolol tartrate 25 milliGRAM(s) Oral two times a day  mirtazapine 15 milliGRAM(s) Oral at bedtime  oseltamivir 30 milliGRAM(s) Oral two times a day  piperacillin/tazobactam IVPB.. 3.375 Gram(s) IV Intermittent every 8 hours  sertraline 100 milliGRAM(s) Oral daily  vancomycin  IVPB 1000 milliGRAM(s) IV Intermittent every 12 hours    Antimicrobials:   oseltamivir 30 milliGRAM(s) Oral two times a day  piperacillin/tazobactam IVPB.. 3.375 Gram(s) IV Intermittent every 8 hours  vancomycin  IVPB 1000 milliGRAM(s) IV Intermittent every 12 hours    Immunologic:     ROS: unable to obtain    Allergies: codeine (Other)    PMH -- HTN (hypertension)    HLD (hyperlipidemia)    Atrial fibrillation    Syncope    Depression    Leg edema    Mitral valve stenosis, unspecified etiology    Hearing loss of left ear      PSH -- H/O knee surgery    H/O external ear surgery    Cataracta      FH -- No pertinent family history in first degree relatives    Family history of blood disorder (Mother)      Social History --  EtOH: denies   Tobacco: denies   Drug Use: denies     Travel/Environmental/Occupational History:    Physical Exam--  Vital Signs Last 24 Hrs  T(F): 98 (20 Dec 2022 06:33), Max: 98.4 (20 Dec 2022 00:30)  HR: 68 (20 Dec 2022 06:33) (68 - 89)  BP: 106/72 (20 Dec 2022 06:33) (98/59 - 120/74)  RR: 19 (20 Dec 2022 06:33) (18 - 20)  SpO2: 95% (20 Dec 2022 06:33) (93% - 95%)  General: elderly W, NAD  HEENT: NC/AT, EOMI, anicteric  Lungs: decreased breath sounds  Heart: Regular rate and rhythm.   Extremities: No cyanosis or clubbing. No edema.   Skin: Warm. Dry. Good turgor.    Laboratory & Imaging Data:  CBC:                       10.9   4.89  )-----------( 115      ( 19 Dec 2022 21:34 )             34.4     CMP:     138  |  99  |  29<H>  ----------------------------<  131<H>  4.1   |  29  |  1.33<H>    Ca    8.2<L>      19 Dec 2022 21:34    TPro  6.7  /  Alb  3.1<L>  /  TBili  0.6  /  DBili  x   /  AST  27  /  ALT  12  /  AlkPhos  59  12    LIVER FUNCTIONS - ( 19 Dec 2022 21:34 )  Alb: 3.1 g/dL / Pro: 6.7 g/dL / ALK PHOS: 59 U/L / ALT: 12 U/L DA / AST: 27 U/L / GGT: x           Urinalysis Basic - ( 19 Dec 2022 23:00 )    Color: Yellow / Appearance: Clear / S.010 / pH: x  Gluc: x / Ketone: Negative  / Bili: Negative / Urobili: Negative mg/dL   Blood: x / Protein: 15 mg/dL / Nitrite: Negative   Leuk Esterase: Negative / RBC: Negative /HPF / WBC 0-2   Sq Epi: x / Non Sq Epi: Moderate / Bacteria: Moderate        Microbiology: reviewed        Radiology: reviewed    < from: Xray Chest 1 View- PORTABLE-Urgent (22 @ 22:12) >    ACC: [id number] EXAM:  XR CHEST PORTABLE URGENT 1V                          PROCEDURE DATE:  2022          INTERPRETATION:  Clinical information: Cough.    Single AP radiograph of the chest was obtained and compared to study of   2022.    Heart is enlarged in size. Mitral clips are noted in place. Lungs are   clear. Degenerative changes of the spine are noted.    IMPRESSION: There is no pneumonia.    --- End of Report ---            DIALLO THOMPSON MD; Attending Radiologist  This document has been electronically signed. Dec 20 2022 10:34AM    < end of copied text >

## 2022-12-20 NOTE — PROGRESS NOTE ADULT - ASSESSMENT
89yo F with PMH of HTN, Dyslipidemia, MR s/p KIMBER Rod. Fib (off Eliquis 5 weeks ago for LGIB), Diastolic CHF, Chronic Hypoxic Respiratory Failure (on 3.5L NC), hx of COVID-19, CKD stage 3, Anemia, Anxiety, Depression brought in by ambulance from the [facility] at [place] for evaluation of pneumonia.  Patient states that she has had a cough with yellow sputum over the past 2 weeks which has been getting worse.  States that she had chest x-ray at the facility yesterday and was told today that she has pneumonia. States that she feels tired. Denies chest pain, shortness of breath, fever, abdominal pain, nausea/vomiting, calf pain.  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

## 2022-12-20 NOTE — CONSULT NOTE ADULT - ASSESSMENT
89yo F with PMH of HTN, Dyslipidemia, MR s/p MitraClip, A. Fib (off Eliquis 5 weeks ago for LGIB), Diastolic CHF, Chronic Hypoxic Respiratory Failure (on 3.5L NC), hx of COVID-19, CKD stage 3, Anemia, Anxiety, Depression brought in by ambulance from the [place] [facility] [place]    flu  malaise  cough  htn  ckd  anemia  depression  HFpEF  hypoxemia  valv heart disease  pleural effusions

## 2022-12-20 NOTE — CONSULT NOTE ADULT - SUBJECTIVE AND OBJECTIVE BOX
Date/Time Patient Seen:  		  Referring MD:   Data Reviewed	       Patient is a 89y old  Female who presents with a chief complaint of cough (19 Dec 2022 22:44)      Subjective/HPI  flu pos  vs noted  labs reviewed  imaging reviewed  er provider note reviewed     89yo F with PMH of HTN, Dyslipidemia, MR s/VANE Ordoñez (off Eliquis 5 weeks ago for LGIB), Diastolic CHF, Chronic Hypoxic Respiratory Failure (on 3.5L NC), hx of COVID-19, CKD stage 3, Anemia, Anxiety, Depression brought in by ambulance from the [place] [facility] [place] for evaluation of pneumonia.  Patient states that she has had a cough with yellow sputum over the past 2 weeks which has been getting worse.  States that she had chest x-ray at the facility yesterday and was told today that she has pneumonia. States that she feels tired. Denies chest pain, shortness of breath, fever, abdominal pain, nausea/vomiting, calf pain.  IN ED afebrile, no fever no leucocytosis, had x ray chest suspected PNA nad started on zosyn and vanc, and had elevated BUN creat- likely charis with dehydration, ereceived fluid bolus,, admitted for further management  PAST MEDICAL & SURGICAL HISTORY:  HTN (hypertension)    HLD (hyperlipidemia)    Atrial fibrillation  On Pradaxa    Syncope    Depression    Leg edema    Mitral valve stenosis, unspecified etiology    Hearing loss of left ear    H/O knee surgery    H/O external ear surgery    Cataracta    PAST SURGICAL HISTORY:  Cataracta     H/O external ear surgery     H/O knee surgery.     FAMILY HISTORY:  Mother  Still living? Unknown  Family history of blood disorder, Age at diagnosis: Age Unknown.     Social History:  · Substance use	No     Tobacco Screening:  · Core Measure Site	No        Medication list         MEDICATIONS  (STANDING):  apixaban 2.5 milliGRAM(s) Oral two times a day  atorvastatin 10 milliGRAM(s) Oral at bedtime  lactobacillus acidophilus 1 Tablet(s) Oral two times a day with meals  melatonin 3 milliGRAM(s) Oral at bedtime  metoprolol tartrate 25 milliGRAM(s) Oral two times a day  mirtazapine 15 milliGRAM(s) Oral at bedtime  sertraline 100 milliGRAM(s) Oral daily  vancomycin  IVPB 1000 milliGRAM(s) IV Intermittent every 12 hours    MEDICATIONS  (PRN):  acetaminophen     Tablet .. 650 milliGRAM(s) Oral every 6 hours PRN Temp greater or equal to 38C (100.4F), Mild Pain (1 - 3)  albuterol/ipratropium for Nebulization 3 milliLiter(s) Nebulizer every 6 hours PRN Bronchospasm  ALPRAZolam 0.25 milliGRAM(s) Oral Once PRN anxiety         Vitals log        ICU Vital Signs Last 24 Hrs  T(C): 36.7 (20 Dec 2022 06:33), Max: 36.9 (20 Dec 2022 00:30)  T(F): 98 (20 Dec 2022 06:33), Max: 98.4 (20 Dec 2022 00:30)  HR: 68 (20 Dec 2022 06:33) (68 - 89)  BP: 106/72 (20 Dec 2022 06:33) (98/59 - 120/74)  BP(mean): 89 (20 Dec 2022 00:30) (89 - 89)  ABP: --  ABP(mean): --  RR: 19 (20 Dec 2022 06:33) (18 - 20)  SpO2: 95% (20 Dec 2022 06:33) (93% - 95%)    O2 Parameters below as of 20 Dec 2022 06:33  Patient On (Oxygen Delivery Method): nasal cannula                 Input and Output:  I&O's Detail      Lab Data                        10.9   4.89  )-----------( 115      ( 19 Dec 2022 21:34 )             34.4     12-19    138  |  99  |  29<H>  ----------------------------<  131<H>  4.1   |  29  |  1.33<H>    Ca    8.2<L>      19 Dec 2022 21:34    TPro  6.7  /  Alb  3.1<L>  /  TBili  0.6  /  DBili  x   /  AST  27  /  ALT  12  /  AlkPhos  59  12-19            Review of Systems	  weakness  cough  '    Objective     Physical Examination    heart s1s2  lung dec bS      Pertinent Lab findings & Imaging      Hernández:  NO   Adequate UO     I&O's Detail           Discussed with:     Cultures:	        Radiology    ACC: [id number] EXAM:  CT MAXILLOFACIAL WAW IC                          *** ADDENDUM***    Exam is compared to prior noncontrast maxillofacial CT of 7/18/2022.   Prior facial ultrasound of 7/23/2021 is reviewed.    Ultrasound and prior facial CT confirms the diagnosis of low flow   vascular malformations. As compared to prior CT generalized facial   swelling has mildly increased. This may in part be related to patient's   known CHF exacerbation. There is no localized abscess or radiographic   evidence of a thrombophlebitis. Superimposed cellulitis would be   difficult to exclude.  More localized facial swelling previously seen along the anterior aspect   of the left masseter muscle as well as superolateral to the right orbit   has improved and was likely related to localized trauma on prior exam.    --- End of Report ---    *** END OF ADDENDUM***      PROCEDURE DATE:  11/07/2022          INTERPRETATION:  CT MAXILLOFACIAL WITHOUT AND WITH IV CONTRAST    CLINICAL INFORMATION: r/o abscess vs stone    TECHNIQUE:  Pre and postcontrast enhanced CT.  Axial acquisition.  Sagittal and   coronal reformations.  Contrast administered: 90 cc Omipaque 350.  Contrast discarded: 10 cc.    FINDINGS:  Exam is limited by motion and metallic artifact.    Heterogeneous soft tissues within the  and parapharyngeal space   right greater than left. Extension into the soft tissues of the cheek   right greater than left.  Associated coarse calcifications. Prominent right pterygoid plexus.   Prominent right greater than left external jugular veins.  Abnormal intraconal/conal soft tissue also seen within the orbits with   mild enhancement. This is also greater on the right.  Low flow vascular malformations are favored. Correlate with patient's   history.    There is no evidence of an acute fracture.  Paranasal sinuses are well-aerated.  Patient is status post bilateral lens replacements.  Submandibular and parotid glands appear within normal limits.    Heterogeneous upper pole of the right lobe of the thyroid gland partly   seen.    IMPRESSION:  Abnormal soft tissue involving the right right greater than left face   with associated calcification. 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 Correlate with patient history.    --- End of Report ---    ***Please see the addendum at the top of this report. It may contain   additional important information or changes.****        JOSE WALSH MD; Attending Radiologist  This document has been electronically signed. Nov 7 2022  4:36PM  Addend:JOSE WALSH MD; Attending Radiologist  This addendum was electronically signed on: Nov 8 2022  8:58AM.

## 2022-12-20 NOTE — CONSULT NOTE ADULT - SUBJECTIVE AND OBJECTIVE BOX
History of Present Illness:    Past Medical/Surgical History:    Medications:    Family History: Non-contributory family history of premature cardiovascular atherosclerotic disease    Social History: No tobacco, alcohol or drug use    Review of Systems:  General: No fevers, chills, weight gain  Skin: No rashes, color changes  Cardiovascular: No chest pain, orthopnea  Respiratory: No shortness of breath, cough  Gastrointestinal: No nausea, abdominal pain  Genitourinary: No incontinence, pain with urination  Musculoskeletal: No pain, swelling, decreased range of motion  Neurological: No headache, weakness  Psychiatric: No depression, anxiety  Endocrine: No weight gain, increased thirst  All other systems are comprehensively negative.    Physical Exam:  Vitals:        Vital Signs Last 24 Hrs  T(C): 36.7 (20 Dec 2022 06:33), Max: 36.9 (20 Dec 2022 00:30)  T(F): 98 (20 Dec 2022 06:33), Max: 98.4 (20 Dec 2022 00:30)  HR: 68 (20 Dec 2022 06:33) (68 - 89)  BP: 106/72 (20 Dec 2022 06:33) (98/59 - 120/74)  BP(mean): 89 (20 Dec 2022 00:30) (89 - 89)  RR: 19 (20 Dec 2022 06:33) (18 - 20)  SpO2: 95% (20 Dec 2022 06:33) (93% - 95%)    Parameters below as of 20 Dec 2022 06:33  Patient On (Oxygen Delivery Method): nasal cannula      General: NAD  HEENT: MMM  Neck: No JVD, no carotid bruit  Lungs: CTAB  CV: RRR, nl S1/S2, no M/R/G  Abdomen: S/NT/ND, +BS  Extremities: No LE edema, no cyanosis  Neuro: AAOx3, non-focal  Skin: No rash    Labs:                        10.9   4.89  )-----------( 115      ( 19 Dec 2022 21:34 )             34.4     12-19    138  |  99  |  29<H>  ----------------------------<  131<H>  4.1   |  29  |  1.33<H>    Ca    8.2<L>      19 Dec 2022 21:34    TPro  6.7  /  Alb  3.1<L>  /  TBili  0.6  /  DBili  x   /  AST  27  /  ALT  12  /  AlkPhos  59  12-19        PT/INR - ( 19 Dec 2022 21:34 )   PT: 15.3 sec;   INR: 1.33 ratio         PTT - ( 19 Dec 2022 21:34 )  PTT:30.6 sec    ECG/Telemetry:      History of Present Illness: The patient is a 89 year old female with a history of HTN, HL, atrial fibrillation, MitraClip, chronic diastolic heart failure, GI bleed who presents with shortness of breath. She had noted worsening cough and shortness of breath at senior care. No leg swelling or chest pain.    Past Medical/Surgical History:  HTN, HL, atrial fibrillation, MitraClip, chronic diastolic heart failure, GI bleed    Medications:  Home Medications:  ferrous sulfate 325 mg (65 mg elemental iron) oral tablet: 1 tab(s) orally once a day (19 Dec 2022 23:16)  fluticasone propionate 55 mcg/inh inhalation powder: 1 puff(s) inhaled every 12 hours (19 Dec 2022 23:16)  loperamide 2 mg oral tablet: 1 cap(s) orally once a day, As Needed (19 Dec 2022 23:16)  melatonin 3 mg oral tablet: 2 tab(s) orally once a day (at bedtime) (19 Dec 2022 23:16)  MiraLax oral powder for reconstitution: orally once a day (at bedtime) (19 Dec 2022 23:16)  mirtazapine 15 mg oral tablet: 1 tab(s) orally once a day (at bedtime) (19 Dec 2022 23:16)  Nasal Saline 0.65% nasal spray: 1 spray(s) nasal 2 times a day both nostrils  (19 Dec 2022 23:16)  ramelteon 8 mg oral tablet: 1 tab(s) orally once a day (at bedtime) (19 Dec 2022 23:16)  Senna 8.6 mg oral tablet: 2 tab(s) orally once a day (19 Dec 2022 23:16)  sertraline 25 mg oral tablet: 3 tab(s) orally once a day (19 Dec 2022 23:16)  spironolactone 25 mg oral tablet: 1 tab(s) orally once a day (19 Dec 2022 23:16)  torsemide 20 mg oral tablet: 3 tab(s) orally 2 times a day (19 Dec 2022 23:16)  Xanax 0.25 mg oral tablet: 1 tab(s) orally every 12 hours, As Needed - for anxiety (19 Dec 2022 23:16)      Family History: Non-contributory family history of premature cardiovascular atherosclerotic disease    Social History: No tobacco, alcohol or drug use    Review of Systems:  General: No fevers, chills, weight gain  Skin: No rashes, color changes  Cardiovascular: No chest pain, orthopnea  Respiratory: +shortness of breath, +cough  Gastrointestinal: No nausea, abdominal pain  Genitourinary: No incontinence, pain with urination  Musculoskeletal: No pain, swelling, decreased range of motion  Neurological: No headache, weakness  Psychiatric: No depression, anxiety  Endocrine: No weight gain, increased thirst  All other systems are comprehensively negative.    Physical Exam:  Vitals:        Vital Signs Last 24 Hrs  T(C): 36.7 (20 Dec 2022 06:33), Max: 36.9 (20 Dec 2022 00:30)  T(F): 98 (20 Dec 2022 06:33), Max: 98.4 (20 Dec 2022 00:30)  HR: 68 (20 Dec 2022 06:33) (68 - 89)  BP: 106/72 (20 Dec 2022 06:33) (98/59 - 120/74)  BP(mean): 89 (20 Dec 2022 00:30) (89 - 89)  RR: 19 (20 Dec 2022 06:33) (18 - 20)  SpO2: 95% (20 Dec 2022 06:33) (93% - 95%)  Parameters below as of 20 Dec 2022 06:33  Patient On (Oxygen Delivery Method): nasal cannula  General: NAD  HEENT: MMM  Neck: No JVD, no carotid bruit  Lungs: CTAB  CV: RRR, nl S1/S2, no M/R/G  Abdomen: S/NT/ND, +BS  Extremities: No LE edema, no cyanosis  Neuro: AAOx3, non-focal  Skin: No rash    Labs:                        10.9   4.89  )-----------( 115      ( 19 Dec 2022 21:34 )             34.4     12-19    138  |  99  |  29<H>  ----------------------------<  131<H>  4.1   |  29  |  1.33<H>    Ca    8.2<L>      19 Dec 2022 21:34    TPro  6.7  /  Alb  3.1<L>  /  TBili  0.6  /  DBili  x   /  AST  27  /  ALT  12  /  AlkPhos  59  12-19        PT/INR - ( 19 Dec 2022 21:34 )   PT: 15.3 sec;   INR: 1.33 ratio         PTT - ( 19 Dec 2022 21:34 )  PTT:30.6 sec    ECG/Telemetry: AF, RAD, nonspecific ST abnormality

## 2022-12-20 NOTE — CONSULT NOTE ADULT - ASSESSMENT
The patient is a 89 year old female with a history of HTN, HL, atrial fibrillation, MitraClip, chronic diastolic heart failure, GI bleed who presents with shortness of breath in the setting of influenza.    Plan:  - ECG with known AF and otherwise nonspecific findings  - CXR with no PNA  - BNP 4421  - Echo 11/22 with normal LV systolic function, normal MitraClip function, mild pulm HTN  - Continue furosemide 40 mg PO daily  - Continue metoprolol tartrate 25 mg bid  - Influenza positive  - On zosyn and tamiflu  - ID follow-up The patient is a 89 year old female with a history of HTN, HL, atrial fibrillation, MitraClip, chronic diastolic heart failure, GI bleed who presents with shortness of breath in the setting of influenza.    Plan:  - ECG with known AF and otherwise nonspecific findings  - CXR with no PNA  - BNP 4421  - Echo 11/22 with normal LV systolic function, normal MitraClip function, mild pulm HTN  - Continue furosemide 40 mg PO daily  - Continue metoprolol tartrate 25 mg bid  - Continue apixaban 2.5 mg bid  - Influenza positive  - On zosyn and tamiflu  - ID follow-up

## 2022-12-21 LAB
ALBUMIN SERPL ELPH-MCNC: 2.9 G/DL — LOW (ref 3.3–5)
ALP SERPL-CCNC: 54 U/L — SIGNIFICANT CHANGE UP (ref 30–120)
ALT FLD-CCNC: 11 U/L DA — SIGNIFICANT CHANGE UP (ref 10–60)
ANION GAP SERPL CALC-SCNC: 6 MMOL/L — SIGNIFICANT CHANGE UP (ref 5–17)
AST SERPL-CCNC: 23 U/L — SIGNIFICANT CHANGE UP (ref 10–40)
BASOPHILS # BLD AUTO: 0.05 K/UL — SIGNIFICANT CHANGE UP (ref 0–0.2)
BASOPHILS NFR BLD AUTO: 1.1 % — SIGNIFICANT CHANGE UP (ref 0–2)
BILIRUB SERPL-MCNC: 0.7 MG/DL — SIGNIFICANT CHANGE UP (ref 0.2–1.2)
BUN SERPL-MCNC: 21 MG/DL — SIGNIFICANT CHANGE UP (ref 7–23)
CALCIUM SERPL-MCNC: 8.6 MG/DL — SIGNIFICANT CHANGE UP (ref 8.4–10.5)
CHLORIDE SERPL-SCNC: 101 MMOL/L — SIGNIFICANT CHANGE UP (ref 96–108)
CO2 SERPL-SCNC: 33 MMOL/L — HIGH (ref 22–31)
CREAT SERPL-MCNC: 1.25 MG/DL — SIGNIFICANT CHANGE UP (ref 0.5–1.3)
EGFR: 41 ML/MIN/1.73M2 — LOW
EOSINOPHIL # BLD AUTO: 0.38 K/UL — SIGNIFICANT CHANGE UP (ref 0–0.5)
EOSINOPHIL NFR BLD AUTO: 8.1 % — HIGH (ref 0–6)
GLUCOSE SERPL-MCNC: 98 MG/DL — SIGNIFICANT CHANGE UP (ref 70–99)
HCT VFR BLD CALC: 36 % — SIGNIFICANT CHANGE UP (ref 34.5–45)
HGB BLD-MCNC: 11 G/DL — LOW (ref 11.5–15.5)
IMM GRANULOCYTES NFR BLD AUTO: 0.4 % — SIGNIFICANT CHANGE UP (ref 0–0.9)
LYMPHOCYTES # BLD AUTO: 1.09 K/UL — SIGNIFICANT CHANGE UP (ref 1–3.3)
LYMPHOCYTES # BLD AUTO: 23.3 % — SIGNIFICANT CHANGE UP (ref 13–44)
MAGNESIUM SERPL-MCNC: 2.5 MG/DL — SIGNIFICANT CHANGE UP (ref 1.6–2.6)
MCHC RBC-ENTMCNC: 26.1 PG — LOW (ref 27–34)
MCHC RBC-ENTMCNC: 30.6 GM/DL — LOW (ref 32–36)
MCV RBC AUTO: 85.5 FL — SIGNIFICANT CHANGE UP (ref 80–100)
MONOCYTES # BLD AUTO: 0.54 K/UL — SIGNIFICANT CHANGE UP (ref 0–0.9)
MONOCYTES NFR BLD AUTO: 11.6 % — SIGNIFICANT CHANGE UP (ref 2–14)
NEUTROPHILS # BLD AUTO: 2.59 K/UL — SIGNIFICANT CHANGE UP (ref 1.8–7.4)
NEUTROPHILS NFR BLD AUTO: 55.5 % — SIGNIFICANT CHANGE UP (ref 43–77)
NRBC # BLD: 0 /100 WBCS — SIGNIFICANT CHANGE UP (ref 0–0)
PLATELET # BLD AUTO: 109 K/UL — LOW (ref 150–400)
POTASSIUM SERPL-MCNC: 3.7 MMOL/L — SIGNIFICANT CHANGE UP (ref 3.5–5.3)
POTASSIUM SERPL-SCNC: 3.7 MMOL/L — SIGNIFICANT CHANGE UP (ref 3.5–5.3)
PROT SERPL-MCNC: 6.6 G/DL — SIGNIFICANT CHANGE UP (ref 6–8.3)
RBC # BLD: 4.21 M/UL — SIGNIFICANT CHANGE UP (ref 3.8–5.2)
RBC # FLD: 24.4 % — HIGH (ref 10.3–14.5)
SODIUM SERPL-SCNC: 140 MMOL/L — SIGNIFICANT CHANGE UP (ref 135–145)
WBC # BLD: 4.67 K/UL — SIGNIFICANT CHANGE UP (ref 3.8–10.5)
WBC # FLD AUTO: 4.67 K/UL — SIGNIFICANT CHANGE UP (ref 3.8–10.5)

## 2022-12-21 RX ADMIN — Medication 1 TABLET(S): at 17:20

## 2022-12-21 RX ADMIN — Medication 20 MILLIGRAM(S): at 17:20

## 2022-12-21 RX ADMIN — APIXABAN 2.5 MILLIGRAM(S): 2.5 TABLET, FILM COATED ORAL at 17:21

## 2022-12-21 RX ADMIN — Medication 1 TABLET(S): at 08:14

## 2022-12-21 RX ADMIN — SERTRALINE 100 MILLIGRAM(S): 25 TABLET, FILM COATED ORAL at 11:02

## 2022-12-21 RX ADMIN — MIRTAZAPINE 15 MILLIGRAM(S): 45 TABLET, ORALLY DISINTEGRATING ORAL at 22:12

## 2022-12-21 RX ADMIN — Medication 30 MILLIGRAM(S): at 06:29

## 2022-12-21 RX ADMIN — PIPERACILLIN AND TAZOBACTAM 25 GRAM(S): 4; .5 INJECTION, POWDER, LYOPHILIZED, FOR SOLUTION INTRAVENOUS at 06:29

## 2022-12-21 RX ADMIN — APIXABAN 2.5 MILLIGRAM(S): 2.5 TABLET, FILM COATED ORAL at 06:29

## 2022-12-21 RX ADMIN — ATORVASTATIN CALCIUM 10 MILLIGRAM(S): 80 TABLET, FILM COATED ORAL at 22:13

## 2022-12-21 RX ADMIN — Medication 25 MILLIGRAM(S): at 17:20

## 2022-12-21 RX ADMIN — Medication 30 MILLIGRAM(S): at 17:20

## 2022-12-21 RX ADMIN — Medication 25 MILLIGRAM(S): at 06:29

## 2022-12-21 RX ADMIN — Medication 3 MILLIGRAM(S): at 22:12

## 2022-12-21 NOTE — PROGRESS NOTE ADULT - SUBJECTIVE AND OBJECTIVE BOX
Patient is a 89y old  Female who presents with a chief complaint of cough (21 Dec 2022 06:54)      INTERVAL HPI/OVERNIGHT EVENTS:overnight events noted    Home Medications:  ferrous sulfate 325 mg (65 mg elemental iron) oral tablet: 1 tab(s) orally once a day (19 Dec 2022 23:16)  fluticasone propionate 55 mcg/inh inhalation powder: 1 puff(s) inhaled every 12 hours (19 Dec 2022 23:16)  loperamide 2 mg oral tablet: 1 cap(s) orally once a day, As Needed (19 Dec 2022 23:16)  melatonin 3 mg oral tablet: 2 tab(s) orally once a day (at bedtime) (19 Dec 2022 23:16)  MiraLax oral powder for reconstitution: orally once a day (at bedtime) (19 Dec 2022 23:16)  mirtazapine 15 mg oral tablet: 1 tab(s) orally once a day (at bedtime) (19 Dec 2022 23:16)  Nasal Saline 0.65% nasal spray: 1 spray(s) nasal 2 times a day both nostrils  (19 Dec 2022 23:16)  ramelteon 8 mg oral tablet: 1 tab(s) orally once a day (at bedtime) (19 Dec 2022 23:16)  Senna 8.6 mg oral tablet: 2 tab(s) orally once a day (19 Dec 2022 23:16)  sertraline 25 mg oral tablet: 3 tab(s) orally once a day (19 Dec 2022 23:16)  spironolactone 25 mg oral tablet: 1 tab(s) orally once a day (19 Dec 2022 23:16)  torsemide 20 mg oral tablet: 3 tab(s) orally 2 times a day (19 Dec 2022 23:16)  Xanax 0.25 mg oral tablet: 1 tab(s) orally every 12 hours, As Needed - for anxiety (19 Dec 2022 23:16)      MEDICATIONS  (STANDING):  apixaban 2.5 milliGRAM(s) Oral two times a day  atorvastatin 10 milliGRAM(s) Oral at bedtime  lactobacillus acidophilus 1 Tablet(s) Oral two times a day with meals  melatonin 3 milliGRAM(s) Oral at bedtime  metoprolol tartrate 25 milliGRAM(s) Oral two times a day  mirtazapine 15 milliGRAM(s) Oral at bedtime  oseltamivir 30 milliGRAM(s) Oral two times a day  piperacillin/tazobactam IVPB.. 3.375 Gram(s) IV Intermittent every 8 hours  sertraline 100 milliGRAM(s) Oral daily    MEDICATIONS  (PRN):  acetaminophen     Tablet .. 650 milliGRAM(s) Oral every 6 hours PRN Temp greater or equal to 38C (100.4F), Mild Pain (1 - 3)  albuterol/ipratropium for Nebulization 3 milliLiter(s) Nebulizer every 6 hours PRN Bronchospasm  ALPRAZolam 0.25 milliGRAM(s) Oral Once PRN anxiety  guaiFENesin Oral Liquid (Sugar-Free) 200 milliGRAM(s) Oral every 6 hours PRN Cough      Allergies    codeine (Other)    Intolerances        REVIEW OF SYSTEMS:  CONSTITUTIONAL: No fever, weight loss, or fatigue  EYES: No eye pain, visual disturbances, or discharge  ENMT:  No difficulty hearing, tinnitus, vertigo; No sinus or throat pain  NECK: No pain or stiffness  BREASTS: No pain, masses, or nipple discharge  RESPIRATORY: No cough, wheezing, chills or hemoptysis; No shortness of breath  CARDIOVASCULAR: No chest pain, palpitations, dizziness, or leg swelling  GASTROINTESTINAL: No abdominal or epigastric pain. No nausea, vomiting,   GENITOURINARY: No dysuria, frequency, hematuria, or incontinence  NEUROLOGICAL: poor memory non focal  SKIN: No itching, burning, rashes, or lesions     Vital Signs Last 24 Hrs  T(C): 37.1 (21 Dec 2022 05:34), Max: 37.1 (21 Dec 2022 05:34)  T(F): 98.8 (21 Dec 2022 05:34), Max: 98.8 (21 Dec 2022 05:34)  HR: 97 (21 Dec 2022 05:34) (68 - 97)  BP: 115/76 (21 Dec 2022 05:34) (100/57 - 115/76)  BP(mean): --  RR: 18 (21 Dec 2022 05:34) (17 - 18)  SpO2: 95% (21 Dec 2022 05:34) (94% - 100%)    Parameters below as of 21 Dec 2022 05:34  Patient On (Oxygen Delivery Method): nasal cannula        PHYSICAL EXAM:  GENERAL: well-groomed, well-developed  HEAD:  Atraumatic, Normocephalic  EYES: EOMI, PERRLA, conjunctiva and sclera clear  ENMT: Moist mucous membranes,   NECK: Supple, No JVD, Normal thyroid  NERVOUS SYSTEM:  Alert & Oriented X2, non focal  CHEST/LUNG: Clear to percussion bilaterally; No rales, rhonchi, wheezing, or rubs  HEART: Regular rate and rhythm; No murmurs, rubs, or gallops  ABDOMEN: Soft, Nontender, Nondistended; Bowel sounds present  EXTREMITIES:  2+ Peripheral Pulses, No clubbing, cyanosis, or edema    LABS:                        11.0   4.67  )-----------( 109      ( 21 Dec 2022 06:00 )             36.0     12-    140  |  101  |  21  ----------------------------<  98  3.7   |  33<H>  |  1.25    Ca    8.6      21 Dec 2022 06:00  Mg     2.5     12-    TPro  6.6  /  Alb  2.9<L>  /  TBili  0.7  /  DBili  x   /  AST  23  /  ALT  11  /  AlkPhos  54  12-    PT/INR - ( 19 Dec 2022 21:34 )   PT: 15.3 sec;   INR: 1.33 ratio         PTT - ( 19 Dec 2022 21:34 )  PTT:30.6 sec  Urinalysis Basic - ( 19 Dec 2022 23:00 )    Color: Yellow / Appearance: Clear / S.010 / pH: x  Gluc: x / Ketone: Negative  / Bili: Negative / Urobili: Negative mg/dL   Blood: x / Protein: 15 mg/dL / Nitrite: Negative   Leuk Esterase: Negative / RBC: Negative /HPF / WBC 0-2   Sq Epi: x / Non Sq Epi: Moderate / Bacteria: Moderate      CAPILLARY BLOOD GLUCOSE              I&O's Summary      RADIOLOGY & ADDITIONAL TESTS:    Imaging Personally Reviewed:  [ x] YES  [ ] NO    Consultant(s) Notes Reviewed:  [x ] YES  [ ] NO    Care Discussed with Consultants/Other Providers [x ] YES  [ ] NO

## 2022-12-21 NOTE — PROGRESS NOTE ADULT - ASSESSMENT
89yo F with PMH of HTN, Dyslipidemia, MR s/p Viola, KIMBER. Fib (off Eliquis 5 weeks ago for LGIB), Diastolic CHF, Chronic Hypoxic Respiratory Failure (on 3.5L NC), hx of COVID-19, CKD stage 3, Anemia, Anxiety, Depression brought in by ambulance from the [facility] [place]    flu  malaise  cough  htn  ckd  anemia  depression  HFpEF  hypoxemia  valv heart disease  pleural effusions    ID and Cardio eval noted  on isolation for the FLU  tamiflu  acap - cough rx regimen PRN  on nebs prn  oral and skin care  cvs rx regimen  BP control  monitor VS and Sat  on AC for AF - rate and rhythm control  assist with needs  GOC - documented - DNR

## 2022-12-21 NOTE — PROGRESS NOTE ADULT - SUBJECTIVE AND OBJECTIVE BOX
[facility], Division of Infectious Diseases  [facility]  [phone]    Name: ARNULFO ALEX  Age: 89y  Gender: Female  MRN: [id number]    Interval History:  Patient seen and examined at bedside  No acute overnight events. Afebrile  No complaints  Notes reviewed    Antibiotics:  oseltamivir 30 milliGRAM(s) Oral two times a day      Medications:  acetaminophen     Tablet .. 650 milliGRAM(s) Oral every 6 hours PRN  albuterol/ipratropium for Nebulization 3 milliLiter(s) Nebulizer every 6 hours PRN  ALPRAZolam 0.25 milliGRAM(s) Oral Once PRN  apixaban 2.5 milliGRAM(s) Oral two times a day  atorvastatin 10 milliGRAM(s) Oral at bedtime  guaiFENesin Oral Liquid (Sugar-Free) 200 milliGRAM(s) Oral every 6 hours PRN  lactobacillus acidophilus 1 Tablet(s) Oral two times a day with meals  melatonin 3 milliGRAM(s) Oral at bedtime  metoprolol tartrate 25 milliGRAM(s) Oral two times a day  mirtazapine 15 milliGRAM(s) Oral at bedtime  oseltamivir 30 milliGRAM(s) Oral two times a day  sertraline 100 milliGRAM(s) Oral daily      Review of Systems:  unable to obtain    Allergies: codeine (Other)    For details regarding the patient's past medical history, social history, family history, and other miscellaneous elements, please refer the initial infectious diseases consultation and/or the admitting history and physical examination for this admission.    Objective:  Vitals:   T(C): 37.1 (22 @ 05:34), Max: 37.1 (22 @ 05:34)  HR: 97 (22 @ 05:34) (73 - 97)  BP: 115/76 (22 @ 05:34) (105/57 - 115/76)  RR: 18 (22 @ 05:34) (17 - 18)  SpO2: 95% (22 @ 05:34) (94% - 100%)    Physical Examination:  General: no acute distress  HEENT: NC/AT, EOMI  Cardio: S1, S2 heard, RRR, no murmurs  Resp: decreased b/l breath sounds  Abd: soft, NT, ND  Ext: no edema or cyanosis  Skin: warm, dry, no visible rash      Laboratory Studies:  CBC:                       11.0   4.67  )-----------( 109      ( 21 Dec 2022 06:00 )             36.0     CMP: 12-    140  |  101  |  21  ----------------------------<  98  3.7   |  33<H>  |  1.25    Ca    8.6      21 Dec 2022 06:00  Mg     2.5         TPro  6.6  /  Alb  2.9<L>  /  TBili  0.7  /  DBili  x   /  AST  23  /  ALT  11  /  AlkPhos  54  12    LIVER FUNCTIONS - ( 21 Dec 2022 06:00 )  Alb: 2.9 g/dL / Pro: 6.6 g/dL / ALK PHOS: 54 U/L / ALT: 11 U/L DA / AST: 23 U/L / GGT: x           Urinalysis Basic - ( 19 Dec 2022 23:00 )    Color: Yellow / Appearance: Clear / S.010 / pH: x  Gluc: x / Ketone: Negative  / Bili: Negative / Urobili: Negative mg/dL   Blood: x / Protein: 15 mg/dL / Nitrite: Negative   Leuk Esterase: Negative / RBC: Negative /HPF / WBC 0-2   Sq Epi: x / Non Sq Epi: Moderate / Bacteria: Moderate        Microbiology: reviewed        Radiology: reviewed

## 2022-12-21 NOTE — PROGRESS NOTE ADULT - SUBJECTIVE AND OBJECTIVE BOX
Chief Complaint: Shortness of breath    Interval Events: No events overnight.    Review of Systems:  General: No fevers, chills, weight gain  Skin: No rashes, color changes  Cardiovascular: No chest pain, orthopnea  Respiratory: No shortness of breath, +cough  Gastrointestinal: No nausea, abdominal pain  Genitourinary: No incontinence, pain with urination  Musculoskeletal: No pain, swelling, decreased range of motion  Neurological: No headache, weakness  Psychiatric: No depression, anxiety  Endocrine: No weight gain, increased thirst  All other systems are comprehensively negative.    Physical Exam:  Vitals:        Vital Signs Last 24 Hrs  T(C): 37.1 (21 Dec 2022 05:34), Max: 37.1 (21 Dec 2022 05:34)  T(F): 98.8 (21 Dec 2022 05:34), Max: 98.8 (21 Dec 2022 05:34)  HR: 97 (21 Dec 2022 05:34) (68 - 97)  BP: 115/76 (21 Dec 2022 05:34) (100/57 - 115/76)  BP(mean): --  RR: 18 (21 Dec 2022 05:34) (17 - 18)  SpO2: 95% (21 Dec 2022 05:34) (94% - 100%)  Parameters below as of 21 Dec 2022 05:34  Patient On (Oxygen Delivery Method): nasal cannula  General: NAD  HEENT: MMM  Neck: No JVD, no carotid bruit  Lungs: CTAB  CV: RRR, nl S1/S2, no M/R/G  Abdomen: S/NT/ND, +BS  Extremities: No LE edema, no cyanosis  Neuro: AAOx3, non-focal  Skin: No rash    Labs:                        11.0   4.67  )-----------( 109      ( 21 Dec 2022 06:00 )             36.0     12-21    140  |  101  |  21  ----------------------------<  98  3.7   |  33<H>  |  1.25    Ca    8.6      21 Dec 2022 06:00  Mg     2.5     12-21    TPro  6.6  /  Alb  2.9<L>  /  TBili  0.7  /  DBili  x   /  AST  23  /  ALT  11  /  AlkPhos  54  12-21        PT/INR - ( 19 Dec 2022 21:34 )   PT: 15.3 sec;   INR: 1.33 ratio         PTT - ( 19 Dec 2022 21:34 )  PTT:30.6 sec    ECG/Telemetry: AF

## 2022-12-21 NOTE — PROVIDER CONTACT NOTE (OTHER) - SITUATION
Pt daughter wants cardiologist team to speak with her heart failure specialist   Dr. Alexandra Lopes [phone]

## 2022-12-21 NOTE — PROGRESS NOTE ADULT - ASSESSMENT
Pt is 89W w/ PMHx of HTN, HLD, MR s/p MitraClip, A. Fib (off Eliquis 5 weeks ago for LGIB), Diastolic CHF, Chronic Hypoxic Respiratory Failure (on 3.5L NC), hx of COVID-19, CKD stage 3, Anemia, Anxiety, Depression brought in by ambulance from the [facility] [place] for evaluation of pneumonia.      Acute Influenza A  +/- superimposed bacterial PNA  COPD exacerbation 2/2 above  Acute on chronic RF 2/2 above  MEME  - RVP + Flu A  - COVID negative  - cx pending  - CXR reviewed, no PNA  - procal noted 0.19  Plan:   C/w tamiflu  Monitor off Abx  F/u pending cx  Trend temps/WBC  Trend renal fxn  Supportive care and supplemental O2 as needed      Infectious Diseases will continue to follow. Please call with any questions.   Suzy Real M.D.  [facility] Division of Infectious Diseases [phone]

## 2022-12-21 NOTE — PROGRESS NOTE ADULT - ASSESSMENT
The patient is a 89 year old female with a history of HTN, HL, atrial fibrillation, MitraClip, chronic diastolic heart failure, GI bleed who presents with shortness of breath in the setting of influenza.    Plan:  - ECG with known AF and otherwise nonspecific findings  - CXR with no PNA  - BNP 4421  - Echo 11/22 with normal LV systolic function, normal MitraClip function, mild pulm HTN  - Continue furosemide 40 mg PO daily  - Continue metoprolol tartrate 25 mg bid  - Continue apixaban 2.5 mg bid  - Influenza positive  - On zosyn and tamiflu  - ID follow-up

## 2022-12-21 NOTE — PROGRESS NOTE ADULT - ASSESSMENT
89yo F with PMH of HTN, Dyslipidemia, MR s/p Viola, KIMBER. Fib (off Eliquis 5 weeks ago for LGIB), Diastolic CHF, Chronic Hypoxic Respiratory Failure (on 3.5L NC), hx of COVID-19, CKD stage 3, Anemia, Anxiety, Depression brought in by ambulance from the [place] at [place] for evaluation of pneumonia.  Patient states that she has had a cough with yellow sputum over the past 2 weeks which has been getting worse.  States that she had chest x-ray at the facility yesterday and was told today that she has pneumonia. States that she feels tired. Denies chest pain, shortness of breath, fever, abdominal pain, nausea/vomiting, calf pain.  IN ED afebrile, no fever no leucocytosis, had x ray chest suspected PNA nad started on zosyn and vanc, and had elevated BUN creat- likely charis with dehydration, ereceived fluid bolus,, admitted for further management for  Pneumonia with influenza A  COPD with ch resp failure on o2  HTN  HLD  AFIB  Ch Diastolic heart failure  charis base line creat 1.15, diuretics held  started on IV abx, ID and pulmonary consult called, cardio consult  tamiflu started, no bacteroial element  abx stopped  goals of care discussed with patient and daughter Shirlene  DNR DNI MOLST filled 12/20.2022  no limitation on tt

## 2022-12-21 NOTE — PROGRESS NOTE ADULT - SUBJECTIVE AND OBJECTIVE BOX
Date/Time Patient Seen:  		  Referring MD:   Data Reviewed	       Patient is a 89y old  Female who presents with a chief complaint of cough (20 Dec 2022 11:50)      Subjective/HPI     PAST MEDICAL & SURGICAL HISTORY:  HTN (hypertension)    HLD (hyperlipidemia)    Atrial fibrillation  On Pradaxa    Syncope    Depression    Leg edema    Mitral valve stenosis, unspecified etiology    Hearing loss of left ear    H/O knee surgery    H/O external ear surgery    Cataracta          Medication list         MEDICATIONS  (STANDING):  apixaban 2.5 milliGRAM(s) Oral two times a day  atorvastatin 10 milliGRAM(s) Oral at bedtime  lactobacillus acidophilus 1 Tablet(s) Oral two times a day with meals  melatonin 3 milliGRAM(s) Oral at bedtime  metoprolol tartrate 25 milliGRAM(s) Oral two times a day  mirtazapine 15 milliGRAM(s) Oral at bedtime  oseltamivir 30 milliGRAM(s) Oral two times a day  piperacillin/tazobactam IVPB.. 3.375 Gram(s) IV Intermittent every 8 hours  sertraline 100 milliGRAM(s) Oral daily    MEDICATIONS  (PRN):  acetaminophen     Tablet .. 650 milliGRAM(s) Oral every 6 hours PRN Temp greater or equal to 38C (100.4F), Mild Pain (1 - 3)  albuterol/ipratropium for Nebulization 3 milliLiter(s) Nebulizer every 6 hours PRN Bronchospasm  ALPRAZolam 0.25 milliGRAM(s) Oral Once PRN anxiety  guaiFENesin Oral Liquid (Sugar-Free) 200 milliGRAM(s) Oral every 6 hours PRN Cough         Vitals log        ICU Vital Signs Last 24 Hrs  T(C): 37.1 (21 Dec 2022 05:34), Max: 37.1 (21 Dec 2022 05:34)  T(F): 98.8 (21 Dec 2022 05:34), Max: 98.8 (21 Dec 2022 05:34)  HR: 97 (21 Dec 2022 05:34) (68 - 97)  BP: 115/76 (21 Dec 2022 05:34) (100/57 - 115/76)  BP(mean): --  ABP: --  ABP(mean): --  RR: 18 (21 Dec 2022 05:34) (17 - 18)  SpO2: 95% (21 Dec 2022 05:34) (94% - 100%)    O2 Parameters below as of 21 Dec 2022 05:34  Patient On (Oxygen Delivery Method): nasal cannula                 Input and Output:  I&O's Detail      Lab Data                        10.4   4.68  )-----------( 100      ( 20 Dec 2022 12:21 )             34.1     12-20    137  |  98  |  26<H>  ----------------------------<  88  3.4<L>   |  33<H>  |  1.36<H>    Ca    8.2<L>      20 Dec 2022 12:21    TPro  6.5  /  Alb  2.7<L>  /  TBili  0.6  /  DBili  x   /  AST  24  /  ALT  13  /  AlkPhos  54  12-20            Review of Systems	      Objective     Physical Examination    heart s1s2  lung dec BS  head nc      Pertinent Lab findings & Imaging      Edgar:  NO   Adequate UO     I&O's Detail           Discussed with:     Cultures:	        Radiology

## 2022-12-22 LAB
ALBUMIN SERPL ELPH-MCNC: 3 G/DL — LOW (ref 3.3–5)
ALP SERPL-CCNC: 57 U/L — SIGNIFICANT CHANGE UP (ref 30–120)
ALT FLD-CCNC: 11 U/L DA — SIGNIFICANT CHANGE UP (ref 10–60)
ANION GAP SERPL CALC-SCNC: 8 MMOL/L — SIGNIFICANT CHANGE UP (ref 5–17)
AST SERPL-CCNC: 23 U/L — SIGNIFICANT CHANGE UP (ref 10–40)
BILIRUB SERPL-MCNC: 0.6 MG/DL — SIGNIFICANT CHANGE UP (ref 0.2–1.2)
BUN SERPL-MCNC: 17 MG/DL — SIGNIFICANT CHANGE UP (ref 7–23)
CALCIUM SERPL-MCNC: 8.4 MG/DL — SIGNIFICANT CHANGE UP (ref 8.4–10.5)
CHLORIDE SERPL-SCNC: 102 MMOL/L — SIGNIFICANT CHANGE UP (ref 96–108)
CO2 SERPL-SCNC: 30 MMOL/L — SIGNIFICANT CHANGE UP (ref 22–31)
CREAT SERPL-MCNC: 1.1 MG/DL — SIGNIFICANT CHANGE UP (ref 0.5–1.3)
EGFR: 48 ML/MIN/1.73M2 — LOW
GLUCOSE SERPL-MCNC: 107 MG/DL — HIGH (ref 70–99)
HCT VFR BLD CALC: 35.2 % — SIGNIFICANT CHANGE UP (ref 34.5–45)
HGB BLD-MCNC: 10.8 G/DL — LOW (ref 11.5–15.5)
MCHC RBC-ENTMCNC: 26.4 PG — LOW (ref 27–34)
MCHC RBC-ENTMCNC: 30.7 GM/DL — LOW (ref 32–36)
MCV RBC AUTO: 86.1 FL — SIGNIFICANT CHANGE UP (ref 80–100)
NRBC # BLD: 0 /100 WBCS — SIGNIFICANT CHANGE UP (ref 0–0)
PLATELET # BLD AUTO: 130 K/UL — LOW (ref 150–400)
POTASSIUM SERPL-MCNC: 3.7 MMOL/L — SIGNIFICANT CHANGE UP (ref 3.5–5.3)
POTASSIUM SERPL-SCNC: 3.7 MMOL/L — SIGNIFICANT CHANGE UP (ref 3.5–5.3)
PROT SERPL-MCNC: 6.9 G/DL — SIGNIFICANT CHANGE UP (ref 6–8.3)
RBC # BLD: 4.09 M/UL — SIGNIFICANT CHANGE UP (ref 3.8–5.2)
RBC # FLD: 24.4 % — HIGH (ref 10.3–14.5)
SODIUM SERPL-SCNC: 140 MMOL/L — SIGNIFICANT CHANGE UP (ref 135–145)
WBC # BLD: 5.77 K/UL — SIGNIFICANT CHANGE UP (ref 3.8–10.5)
WBC # FLD AUTO: 5.77 K/UL — SIGNIFICANT CHANGE UP (ref 3.8–10.5)

## 2022-12-22 RX ADMIN — APIXABAN 2.5 MILLIGRAM(S): 2.5 TABLET, FILM COATED ORAL at 06:07

## 2022-12-22 RX ADMIN — Medication 20 MILLIGRAM(S): at 17:26

## 2022-12-22 RX ADMIN — Medication 30 MILLIGRAM(S): at 17:26

## 2022-12-22 RX ADMIN — MIRTAZAPINE 15 MILLIGRAM(S): 45 TABLET, ORALLY DISINTEGRATING ORAL at 21:36

## 2022-12-22 RX ADMIN — SERTRALINE 100 MILLIGRAM(S): 25 TABLET, FILM COATED ORAL at 12:46

## 2022-12-22 RX ADMIN — ATORVASTATIN CALCIUM 10 MILLIGRAM(S): 80 TABLET, FILM COATED ORAL at 21:36

## 2022-12-22 RX ADMIN — Medication 30 MILLIGRAM(S): at 06:07

## 2022-12-22 RX ADMIN — Medication 1 TABLET(S): at 17:26

## 2022-12-22 RX ADMIN — Medication 20 MILLIGRAM(S): at 06:07

## 2022-12-22 RX ADMIN — Medication 1 TABLET(S): at 08:02

## 2022-12-22 RX ADMIN — Medication 3 MILLIGRAM(S): at 21:35

## 2022-12-22 RX ADMIN — Medication 25 MILLIGRAM(S): at 06:07

## 2022-12-22 RX ADMIN — APIXABAN 2.5 MILLIGRAM(S): 2.5 TABLET, FILM COATED ORAL at 17:26

## 2022-12-22 NOTE — PROGRESS NOTE ADULT - ASSESSMENT
89yo F with PMH of HTN, Dyslipidemia, MR s/p Viola, KIMBER. Fib (off Eliquis 5 weeks ago for LGIB), Diastolic CHF, Chronic Hypoxic Respiratory Failure (on 3.5L NC), hx of COVID-19, CKD stage 3, Anemia, Anxiety, Depression brought in by ambulance from the [facility] [place]    flu  malaise  cough  htn  ckd  anemia  depression  HFpEF  hypoxemia  valv heart disease  pleural effusions    on torsemide  on tamiflu  vs noted  labs reviewed  imaging reviewed  cm follow up noted    ID and Cardio eval noted  on isolation for the FLU  tamiflu  acap - cough rx regimen PRN  on nebs prn  oral and skin care  cvs rx regimen  BP control  monitor VS and Sat  on AC for AF - rate and rhythm control  assist with needs  GOC - documented - DNR

## 2022-12-22 NOTE — PROGRESS NOTE ADULT - ASSESSMENT
89yo F with PMH of HTN, Dyslipidemia, MR s/p Viola, KIMBER. Fib (off Eliquis 5 weeks ago for LGIB), Diastolic CHF, Chronic Hypoxic Respiratory Failure (on 3.5L NC), hx of COVID-19, CKD stage 3, Anemia, Anxiety, Depression brought in by ambulance from the [facility] at [place] for evaluation of pneumonia.  Patient states that she has had a cough with yellow sputum over the past 2 weeks which has been getting worse.  States that she had chest x-ray at the facility yesterday and was told today that she has pneumonia. States that she feels tired. Denies chest pain, shortness of breath, fever, abdominal pain, nausea/vomiting, calf pain.  IN ED afebrile, no fever no leucocytosis, had x ray chest suspected PNA nad started on zosyn and vanc, and had elevated BUN creat- likely charis with dehydration, ereceived fluid bolus,, admitted for further management for  Pneumonia with influenza A  COPD with ch resp failure on o2  HTN  HLD  AFIB  Ch Diastolic heart failure  charis base line creat 1.15, diuretics held  started on IV abx, ID and pulmonary consult called, cardio consult  tamiflu started, no bacteroial element  abx stopped  goals of care discussed with patient and daughter Shirlene  DNR DNI MOLST filled 12/20.2022  no limitation on tt

## 2022-12-22 NOTE — PHYSICAL THERAPY INITIAL EVALUATION ADULT - PERTINENT HX OF CURRENT PROBLEM, REHAB EVAL
Pt. brought in by ambulance from the [facility] at [place] for evaluation of pneumonia.  Patient states that she has had a cough with yellow sputum over the past 2 weeks which has been getting worse.  States that she had chest x-ray at the facility yesterday and was told today that she has pneumonia. States that she feels tired. Denies chest pain, shortness of breath, fever, abdominal pain, nausea/vomiting, calf pain.  IN ED afebrile, no fever no leucocytosis, had x ray chest suspected PNA nad started on zosyn and vanc, and had elevated BUN creat- likely charis with dehydration, ereceived fluid bolus,, admitted for further management.  Pt. with +flu.

## 2022-12-22 NOTE — PHYSICAL THERAPY INITIAL EVALUATION ADULT - ADDITIONAL COMMENTS
Lives in [facility].  Has 7x24 hr. aides.  No stairs.  Pt. uses walker for ambulation.

## 2022-12-22 NOTE — PROGRESS NOTE ADULT - SUBJECTIVE AND OBJECTIVE BOX
[facility], Division of Infectious Diseases  [facility], [facility], [facility]  [phone]    Name: ARNULFO ALEX  Age: 89y  Gender: Female  MRN: [id number]    Interval History:  Patient seen and examined at bedside  No acute overnight events. Afebrile  No complaints on NC  Notes reviewed    Antibiotics:  oseltamivir 30 milliGRAM(s) Oral two times a day      Medications:  acetaminophen     Tablet .. 650 milliGRAM(s) Oral every 6 hours PRN  albuterol/ipratropium for Nebulization 3 milliLiter(s) Nebulizer every 6 hours PRN  ALPRAZolam 0.25 milliGRAM(s) Oral Once PRN  apixaban 2.5 milliGRAM(s) Oral two times a day  atorvastatin 10 milliGRAM(s) Oral at bedtime  guaiFENesin Oral Liquid (Sugar-Free) 200 milliGRAM(s) Oral every 6 hours PRN  lactobacillus acidophilus 1 Tablet(s) Oral two times a day with meals  melatonin 3 milliGRAM(s) Oral at bedtime  metoprolol tartrate 25 milliGRAM(s) Oral two times a day  mirtazapine 15 milliGRAM(s) Oral at bedtime  oseltamivir 30 milliGRAM(s) Oral two times a day  sertraline 100 milliGRAM(s) Oral daily  torsemide 20 milliGRAM(s) Oral two times a day      Review of Systems:  A 10-point review of systems was obtained.     Pertinent positives and negatives--  Constitutional: No fevers. No Chills. No Rigors.   Cardiovascular: No chest pain. No palpitations.  Respiratory: No shortness of breath. No cough.  Gastrointestinal: No nausea or vomiting. No diarrhea or constipation.   Psychiatric: Pleasant. Appropriate affect.    Review of systems otherwise negative except as previously noted.    Allergies: codeine (Other)    For details regarding the patient's past medical history, social history, family history, and other miscellaneous elements, please refer the initial infectious diseases consultation and/or the admitting history and physical examination for this admission.    Objective:  Vitals:   T(C): 36.8 (12-22-22 @ 06:09), Max: 36.9 (12-21-22 @ 14:32)  HR: 72 (12-22-22 @ 06:09) (72 - 82)  BP: 121/69 (12-22-22 @ 06:09) (98/55 - 121/69)  RR: 17 (12-22-22 @ 06:09) (16 - 17)  SpO2: 96% (12-22-22 @ 06:09) (94% - 96%)    Physical Examination:  General: no acute distress  HEENT: NC/AT, EOMI,  Cardio: S1, S2 heard, RRR, no murmurs  Resp: decreased b/l breath sounds  Abd: soft, NT, ND  Ext: no edema or cyanosis  Skin: warm, dry, no visible rash      Laboratory Studies:  CBC:                       10.8   5.77  )-----------( 130      ( 22 Dec 2022 06:00 )             35.2     CMP: 12-22    140  |  102  |  17  ----------------------------<  107<H>  3.7   |  30  |  1.10    Ca    8.4      22 Dec 2022 06:00  Mg     2.5     12-21    TPro  6.9  /  Alb  3.0<L>  /  TBili  0.6  /  DBili  x   /  AST  23  /  ALT  11  /  AlkPhos  57  12-22    LIVER FUNCTIONS - ( 22 Dec 2022 06:00 )  Alb: 3.0 g/dL / Pro: 6.9 g/dL / ALK PHOS: 57 U/L / ALT: 11 U/L DA / AST: 23 U/L / GGT: x               Microbiology: reviewed    Culture - Blood (collected 12-19-22 @ 21:34)  Source: .Blood Blood-Peripheral  Preliminary Report (12-21-22 @ 14:01):    No growth to date.    Culture - Blood (collected 12-19-22 @ 21:34)  Source: .Blood Blood-Peripheral  Preliminary Report (12-21-22 @ 13:02):    No growth to date.          Radiology: reviewed

## 2022-12-22 NOTE — PROGRESS NOTE ADULT - ASSESSMENT
Pt is 89W w/ PMHx of HTN, HLD, MR s/p MitraClip, A. Fib (off Eliquis 5 weeks ago for LGIB), Diastolic CHF, Chronic Hypoxic Respiratory Failure (on 3.5L NC), hx of COVID-19, CKD stage 3, Anemia, Anxiety, Depression brought in by ambulance from the [facility] [place] at [place] for evaluation of pneumonia.      Acute Influenza A  +/- superimposed bacterial PNA  COPD exacerbation 2/2 above  Acute on chronic RF 2/2 above  MEME  - RVP + Flu A  - COVID negative  - cx NGTD  - CXR reviewed, no PNA  - procal noted 0.19  Plan:   C/w tamiflu x5 day course, last day 12/24  Monitor off Abx  Trend temps/WBC  Trend renal fxn  Supportive care and supplemental O2 as needed    Infectious Diseases will continue to follow. Please call with any questions.   Suzy Real M.D.  [facility] Division of Infectious Diseases [phone]

## 2022-12-22 NOTE — PROGRESS NOTE ADULT - SUBJECTIVE AND OBJECTIVE BOX
Date/Time Patient Seen:  		  Referring MD:   Data Reviewed	       Patient is a 89y old  Female who presents with a chief complaint of cough (21 Dec 2022 12:50)      Subjective/HPI     PAST MEDICAL & SURGICAL HISTORY:  HTN (hypertension)    HLD (hyperlipidemia)    Atrial fibrillation  On Pradaxa    Syncope    Depression    Leg edema    Mitral valve stenosis, unspecified etiology    Hearing loss of left ear    H/O knee surgery    H/O external ear surgery    Cataracta          Medication list         MEDICATIONS  (STANDING):  apixaban 2.5 milliGRAM(s) Oral two times a day  atorvastatin 10 milliGRAM(s) Oral at bedtime  lactobacillus acidophilus 1 Tablet(s) Oral two times a day with meals  melatonin 3 milliGRAM(s) Oral at bedtime  metoprolol tartrate 25 milliGRAM(s) Oral two times a day  mirtazapine 15 milliGRAM(s) Oral at bedtime  oseltamivir 30 milliGRAM(s) Oral two times a day  sertraline 100 milliGRAM(s) Oral daily  torsemide 20 milliGRAM(s) Oral two times a day    MEDICATIONS  (PRN):  acetaminophen     Tablet .. 650 milliGRAM(s) Oral every 6 hours PRN Temp greater or equal to 38C (100.4F), Mild Pain (1 - 3)  albuterol/ipratropium for Nebulization 3 milliLiter(s) Nebulizer every 6 hours PRN Bronchospasm  ALPRAZolam 0.25 milliGRAM(s) Oral Once PRN anxiety  guaiFENesin Oral Liquid (Sugar-Free) 200 milliGRAM(s) Oral every 6 hours PRN Cough         Vitals log        ICU Vital Signs Last 24 Hrs  T(C): 36.8 (22 Dec 2022 06:09), Max: 36.9 (21 Dec 2022 14:32)  T(F): 98.2 (22 Dec 2022 06:09), Max: 98.4 (21 Dec 2022 14:32)  HR: 72 (22 Dec 2022 06:09) (72 - 82)  BP: 121/69 (22 Dec 2022 06:09) (98/55 - 121/69)  BP(mean): --  ABP: --  ABP(mean): --  RR: 17 (22 Dec 2022 06:09) (16 - 17)  SpO2: 96% (22 Dec 2022 06:09) (94% - 96%)    O2 Parameters below as of 21 Dec 2022 14:32  Patient On (Oxygen Delivery Method): nasal cannula                 Input and Output:  I&O's Detail      Lab Data                        11.0   4.67  )-----------( 109      ( 21 Dec 2022 06:00 )             36.0     12-21    140  |  101  |  21  ----------------------------<  98  3.7   |  33<H>  |  1.25    Ca    8.6      21 Dec 2022 06:00  Mg     2.5     12-21    TPro  6.6  /  Alb  2.9<L>  /  TBili  0.7  /  DBili  x   /  AST  23  /  ALT  11  /  AlkPhos  54  12-21            Review of Systems	      Objective     Physical Examination    heart s1s2  lung dc BS  head nc      Pertinent Lab findings & Imaging      Edgar:  NO   Adequate UO     I&O's Detail           Discussed with:     Cultures:	        Radiology

## 2022-12-22 NOTE — PROGRESS NOTE ADULT - SUBJECTIVE AND OBJECTIVE BOX
Patient is a 89y old  Female who presents with a chief complaint of cough (22 Dec 2022 06:37)      INTERVAL HPI/OVERNIGHT EVENTS:    Home Medications:  ferrous sulfate 325 mg (65 mg elemental iron) oral tablet: 1 tab(s) orally once a day (19 Dec 2022 23:16)  fluticasone propionate 55 mcg/inh inhalation powder: 1 puff(s) inhaled every 12 hours (19 Dec 2022 23:16)  loperamide 2 mg oral tablet: 1 cap(s) orally once a day, As Needed (19 Dec 2022 23:16)  melatonin 3 mg oral tablet: 2 tab(s) orally once a day (at bedtime) (19 Dec 2022 23:16)  MiraLax oral powder for reconstitution: orally once a day (at bedtime) (19 Dec 2022 23:16)  mirtazapine 15 mg oral tablet: 1 tab(s) orally once a day (at bedtime) (19 Dec 2022 23:16)  Nasal Saline 0.65% nasal spray: 1 spray(s) nasal 2 times a day both nostrils  (19 Dec 2022 23:16)  ramelteon 8 mg oral tablet: 1 tab(s) orally once a day (at bedtime) (19 Dec 2022 23:16)  Senna 8.6 mg oral tablet: 2 tab(s) orally once a day (19 Dec 2022 23:16)  sertraline 25 mg oral tablet: 3 tab(s) orally once a day (19 Dec 2022 23:16)  spironolactone 25 mg oral tablet: 1 tab(s) orally once a day (19 Dec 2022 23:16)  torsemide 20 mg oral tablet: 3 tab(s) orally 2 times a day (19 Dec 2022 23:16)  Xanax 0.25 mg oral tablet: 1 tab(s) orally every 12 hours, As Needed - for anxiety (19 Dec 2022 23:16)      MEDICATIONS  (STANDING):  apixaban 2.5 milliGRAM(s) Oral two times a day  atorvastatin 10 milliGRAM(s) Oral at bedtime  lactobacillus acidophilus 1 Tablet(s) Oral two times a day with meals  melatonin 3 milliGRAM(s) Oral at bedtime  metoprolol tartrate 25 milliGRAM(s) Oral two times a day  mirtazapine 15 milliGRAM(s) Oral at bedtime  oseltamivir 30 milliGRAM(s) Oral two times a day  sertraline 100 milliGRAM(s) Oral daily  torsemide 20 milliGRAM(s) Oral two times a day    MEDICATIONS  (PRN):  acetaminophen     Tablet .. 650 milliGRAM(s) Oral every 6 hours PRN Temp greater or equal to 38C (100.4F), Mild Pain (1 - 3)  albuterol/ipratropium for Nebulization 3 milliLiter(s) Nebulizer every 6 hours PRN Bronchospasm  ALPRAZolam 0.25 milliGRAM(s) Oral Once PRN anxiety  guaiFENesin Oral Liquid (Sugar-Free) 200 milliGRAM(s) Oral every 6 hours PRN Cough      Allergies    codeine (Other)    Intolerances        REVIEW OF SYSTEMS:  CONSTITUTIONAL: No fever, weight loss, or fatigue  EYES: No eye pain, visual disturbances, or discharge  ENMT:  No difficulty hearing, tinnitus, vertigo; No sinus or throat pain  NECK: No pain or stiffness  BREASTS: No pain, masses, or nipple discharge  RESPIRATORY: No cough, wheezing, chills or hemoptysis; No shortness of breath  CARDIOVASCULAR: No chest pain, palpitations, dizziness, or leg swelling  GASTROINTESTINAL: No abdominal or epigastric pain. No nausea, vomiting, or hematemesis; No diarrhea or constipation. No melena or hematochezia.  GENITOURINARY: No dysuria, frequency, hematuria, or incontinence  NEUROLOGICAL: No headaches, memory loss, loss of strength, numbness, or tremors  SKIN: No itching, burning, rashes, or lesions   LYMPH NODES: No enlarged glands  ENDOCRINE: No heat or cold intolerance; No hair loss  MUSCULOSKELETAL: No joint pain or swelling; No muscle, back, or extremity pain  PSYCHIATRIC: No depression, anxiety, mood swings, or difficulty sleeping  HEME/LYMPH: No easy bruising, or bleeding gums  ALLERGY AND IMMUNOLOGIC: No hives or eczema    Vital Signs Last 24 Hrs  T(C): 36.8 (22 Dec 2022 06:09), Max: 36.9 (21 Dec 2022 14:32)  T(F): 98.2 (22 Dec 2022 06:09), Max: 98.4 (21 Dec 2022 14:32)  HR: 72 (22 Dec 2022 06:09) (72 - 82)  BP: 121/69 (22 Dec 2022 06:09) (98/55 - 121/69)  BP(mean): --  RR: 17 (22 Dec 2022 06:09) (16 - 17)  SpO2: 96% (22 Dec 2022 06:09) (94% - 96%)    Parameters below as of 21 Dec 2022 14:32  Patient On (Oxygen Delivery Method): nasal cannula        PHYSICAL EXAM:  GENERAL: NAD, well-groomed, well-developed  HEAD:  Atraumatic, Normocephalic  EYES: EOMI, PERRLA, conjunctiva and sclera clear  ENMT: Moist mucous membranes,   NECK: Supple, No JVD, Normal thyroid  NERVOUS SYSTEM:  Alert & Oriented X3, Good concentration; Motor Strength 5/5 B/L upper and lower extremities; DTRs 2+ intact and symmetric  CHEST/LUNG: Clear to percussion bilaterally; No rales, rhonchi, wheezing, or rubs  HEART: Regular rate and rhythm; No murmurs, rubs, or gallops  ABDOMEN: Soft, Nontender, Nondistended; Bowel sounds present  EXTREMITIES:  2+ Peripheral Pulses, No clubbing, cyanosis, or edema  LYMPH: No lymphadenopathy noted  SKIN: No rashes or lesions    LABS:                        10.8   5.77  )-----------( 130      ( 22 Dec 2022 06:00 )             35.2     12-22    140  |  102  |  17  ----------------------------<  107<H>  3.7   |  30  |  1.10    Ca    8.4      22 Dec 2022 06:00  Mg     2.5     12-21    TPro  6.9  /  Alb  3.0<L>  /  TBili  0.6  /  DBili  x   /  AST  23  /  ALT  11  /  AlkPhos  57  12-22        CAPILLARY BLOOD GLUCOSE            Culture - Blood (collected 12-19-22 @ 21:34)  Source: .Blood Blood-Peripheral  Preliminary Report (12-21-22 @ 14:01):    No growth to date.    Culture - Blood (collected 12-19-22 @ 21:34)  Source: .Blood Blood-Peripheral  Preliminary Report (12-21-22 @ 13:02):    No growth to date.        I&O's Summary      RADIOLOGY & ADDITIONAL TESTS:    Imaging Personally Reviewed:  [ ] YES  [ ] NO    Consultant(s) Notes Reviewed:  [ ] YES  [ ] NO    Care Discussed with Consultants/Other Providers [ ] YES  [ ] NO

## 2022-12-22 NOTE — PROGRESS NOTE ADULT - ASSESSMENT
The patient is a 89 year old female with a history of HTN, HL, atrial fibrillation, MitraClip, chronic diastolic heart failure, GI bleed who presents with shortness of breath in the setting of influenza.    Plan:  - ECG with known AF and otherwise nonspecific findings  - CXR with no PNA or pulm edema  - BNP 4421  - Echo 11/22 with normal LV systolic function, normal MitraClip function, mild pulm HTN  - Continue torsemide 20 mg PO bid - avoid overdiuresis  - Continue metoprolol tartrate 25 mg bid  - Continue apixaban 2.5 mg bid  - Influenza positive  - On tamiflu  - ID follow-up

## 2022-12-22 NOTE — PROGRESS NOTE ADULT - SUBJECTIVE AND OBJECTIVE BOX
Chief Complaint: Shortness of breath    Interval Events: No events overnight.    Review of Systems:  General: No fevers, chills, weight gain  Skin: No rashes, color changes  Cardiovascular: No chest pain, orthopnea  Respiratory: No shortness of breath, +cough  Gastrointestinal: No nausea, abdominal pain  Genitourinary: No incontinence, pain with urination  Musculoskeletal: No pain, swelling, decreased range of motion  Neurological: No headache, weakness  Psychiatric: No depression, anxiety  Endocrine: No weight gain, increased thirst  All other systems are comprehensively negative.    Physical Exam:  Vital Signs Last 24 Hrs  T(C): 36.8 (22 Dec 2022 06:09), Max: 36.9 (21 Dec 2022 14:32)  T(F): 98.2 (22 Dec 2022 06:09), Max: 98.4 (21 Dec 2022 14:32)  HR: 72 (22 Dec 2022 06:09) (72 - 82)  BP: 121/69 (22 Dec 2022 06:09) (98/55 - 121/69)  BP(mean): --  RR: 17 (22 Dec 2022 06:09) (16 - 17)  SpO2: 96% (22 Dec 2022 06:09) (94% - 96%)  Parameters below as of 21 Dec 2022 14:32  Patient On (Oxygen Delivery Method): nasal cannula  General: NAD  HEENT: MMM  Neck: No JVD, no carotid bruit  Lungs: CTAB  CV: RRR, nl S1/S2, no M/R/G  Abdomen: S/NT/ND, +BS  Extremities: No LE edema, no cyanosis  Neuro: AAOx3, non-focal  Skin: No rash    Labs:             12-22    140  |  102  |  17  ----------------------------<  107<H>  3.7   |  30  |  1.10    Ca    8.4      22 Dec 2022 06:00  Mg     2.5     12-21    TPro  6.9  /  Alb  3.0<L>  /  TBili  0.6  /  DBili  x   /  AST  23  /  ALT  11  /  AlkPhos  57  12-22                        10.8   5.77  )-----------( 130      ( 22 Dec 2022 06:00 )             35.2       ECG/Telemetry: AF

## 2022-12-23 RX ADMIN — Medication 20 MILLIGRAM(S): at 05:47

## 2022-12-23 RX ADMIN — Medication 1 TABLET(S): at 17:29

## 2022-12-23 RX ADMIN — APIXABAN 2.5 MILLIGRAM(S): 2.5 TABLET, FILM COATED ORAL at 17:29

## 2022-12-23 RX ADMIN — Medication 20 MILLIGRAM(S): at 14:41

## 2022-12-23 RX ADMIN — Medication 3 MILLIGRAM(S): at 22:10

## 2022-12-23 RX ADMIN — MIRTAZAPINE 15 MILLIGRAM(S): 45 TABLET, ORALLY DISINTEGRATING ORAL at 22:10

## 2022-12-23 RX ADMIN — Medication 25 MILLIGRAM(S): at 05:47

## 2022-12-23 RX ADMIN — ATORVASTATIN CALCIUM 10 MILLIGRAM(S): 80 TABLET, FILM COATED ORAL at 22:10

## 2022-12-23 RX ADMIN — Medication 30 MILLIGRAM(S): at 17:29

## 2022-12-23 RX ADMIN — Medication 1 TABLET(S): at 08:48

## 2022-12-23 RX ADMIN — APIXABAN 2.5 MILLIGRAM(S): 2.5 TABLET, FILM COATED ORAL at 05:48

## 2022-12-23 RX ADMIN — Medication 30 MILLIGRAM(S): at 05:47

## 2022-12-23 RX ADMIN — SERTRALINE 100 MILLIGRAM(S): 25 TABLET, FILM COATED ORAL at 11:52

## 2022-12-23 RX ADMIN — Medication 25 MILLIGRAM(S): at 17:29

## 2022-12-23 NOTE — DIETITIAN INITIAL EVALUATION ADULT - PERTINENT LABORATORY DATA
12-22    140  |  102  |  17  ----------------------------<  107<H>  3.7   |  30  |  1.10    Ca    8.4      22 Dec 2022 06:00    TPro  6.9  /  Alb  3.0<L>  /  TBili  0.6  /  DBili  x   /  AST  23  /  ALT  11  /  AlkPhos  57  12-22  A1C with Estimated Average Glucose Result: 6.6 % (11-08-22 @ 06:53)

## 2022-12-23 NOTE — PROGRESS NOTE ADULT - SUBJECTIVE AND OBJECTIVE BOX
Chief Complaint: Shortness of breath    Interval Events: No events overnight.    Review of Systems:  General: No fevers, chills, weight gain  Skin: No rashes, color changes  Cardiovascular: No chest pain, orthopnea  Respiratory: No shortness of breath, +cough  Gastrointestinal: No nausea, abdominal pain  Genitourinary: No incontinence, pain with urination  Musculoskeletal: No pain, swelling, decreased range of motion  Neurological: No headache, weakness  Psychiatric: No depression, anxiety  Endocrine: No weight gain, increased thirst  All other systems are comprehensively negative.    Physical Exam:  Vital Signs Last 24 Hrs  T(C): 36.7 (23 Dec 2022 05:49), Max: 36.7 (23 Dec 2022 05:49)  T(F): 98 (23 Dec 2022 05:49), Max: 98 (23 Dec 2022 05:49)  HR: 95 (23 Dec 2022 05:49) (76 - 95)  BP: 127/72 (23 Dec 2022 05:49) (100/60 - 127/72)  BP(mean): --  RR: 18 (23 Dec 2022 05:49) (18 - 18)  SpO2: 100% (23 Dec 2022 05:49) (94% - 100%)  Parameters below as of 23 Dec 2022 05:49  Patient On (Oxygen Delivery Method): nasal cannula  O2 Flow (L/min): 3  General: NAD  HEENT: MMM  Neck: No JVD, no carotid bruit  Lungs: CTAB  CV: RRR, nl S1/S2, no M/R/G  Abdomen: S/NT/ND, +BS  Extremities: No LE edema, no cyanosis  Neuro: AAOx3, non-focal  Skin: No rash    Labs:             12-22    140  |  102  |  17  ----------------------------<  107<H>  3.7   |  30  |  1.10    Ca    8.4      22 Dec 2022 06:00    TPro  6.9  /  Alb  3.0<L>  /  TBili  0.6  /  DBili  x   /  AST  23  /  ALT  11  /  AlkPhos  57  12-22                        10.8   5.77  )-----------( 130      ( 22 Dec 2022 06:00 )             35.2       ECG/Telemetry: AF

## 2022-12-23 NOTE — DIETITIAN INITIAL EVALUATION ADULT - PERTINENT MEDS FT
MEDICATIONS  (STANDING):  apixaban 2.5 milliGRAM(s) Oral two times a day  atorvastatin 10 milliGRAM(s) Oral at bedtime  lactobacillus acidophilus 1 Tablet(s) Oral two times a day with meals  melatonin 3 milliGRAM(s) Oral at bedtime  metoprolol tartrate 25 milliGRAM(s) Oral two times a day  mirtazapine 15 milliGRAM(s) Oral at bedtime  oseltamivir 30 milliGRAM(s) Oral two times a day  sertraline 100 milliGRAM(s) Oral daily  torsemide 20 milliGRAM(s) Oral two times a day    MEDICATIONS  (PRN):  acetaminophen     Tablet .. 650 milliGRAM(s) Oral every 6 hours PRN Temp greater or equal to 38C (100.4F), Mild Pain (1 - 3)  albuterol/ipratropium for Nebulization 3 milliLiter(s) Nebulizer every 6 hours PRN Bronchospasm  ALPRAZolam 0.25 milliGRAM(s) Oral Once PRN anxiety  guaiFENesin Oral Liquid (Sugar-Free) 200 milliGRAM(s) Oral every 6 hours PRN Cough

## 2022-12-23 NOTE — PROGRESS NOTE ADULT - ASSESSMENT
89yo F with PMH of HTN, Dyslipidemia, MR s/p Viola, KIMBER. Fib (off Eliquis 5 weeks ago for LGIB), Diastolic CHF, Chronic Hypoxic Respiratory Failure (on 3.5L NC), hx of COVID-19, CKD stage 3, Anemia, Anxiety, Depression brought in by ambulance from the [facility] [place]    flu  malaise  cough  htn  ckd  anemia  depression  HFpEF  hypoxemia  valv heart disease  pleural effusions    on torsemide  on tamiflu  vs noted  labs reviewed  imaging reviewed  cm follow up noted    ID and Cardio eval noted  on isolation for the FLU  tamiflu  acap - cough rx regimen PRN  on nebs prn  oral and skin care  cvs rx regimen  BP control  monitor VS and Sat  on AC for AF - rate and rhythm control  assist with needs  GOC - documented - DNR

## 2022-12-23 NOTE — PROGRESS NOTE ADULT - SUBJECTIVE AND OBJECTIVE BOX
[facility], Division of Infectious Diseases  [facility]  [phone]    Name: ARNULFO ALEX  Age: 89y  Gender: Female  MRN: [id number]    Interval History:  Patient seen and examined at bedside  No acute overnight events. Afebrile  Notes reviewed    Antibiotics:  oseltamivir 30 milliGRAM(s) Oral two times a day      Medications:  acetaminophen     Tablet .. 650 milliGRAM(s) Oral every 6 hours PRN  albuterol/ipratropium for Nebulization 3 milliLiter(s) Nebulizer every 6 hours PRN  ALPRAZolam 0.25 milliGRAM(s) Oral Once PRN  apixaban 2.5 milliGRAM(s) Oral two times a day  atorvastatin 10 milliGRAM(s) Oral at bedtime  guaiFENesin Oral Liquid (Sugar-Free) 200 milliGRAM(s) Oral every 6 hours PRN  lactobacillus acidophilus 1 Tablet(s) Oral two times a day with meals  melatonin 3 milliGRAM(s) Oral at bedtime  metoprolol tartrate 25 milliGRAM(s) Oral two times a day  mirtazapine 15 milliGRAM(s) Oral at bedtime  oseltamivir 30 milliGRAM(s) Oral two times a day  sertraline 100 milliGRAM(s) Oral daily  torsemide 20 milliGRAM(s) Oral two times a day      Review of Systems  Review of systems otherwise negative except as previously noted.    Allergies: codeine (Other)    For details regarding the patient's past medical history, social history, family history, and other miscellaneous elements, please refer the initial infectious diseases consultation and/or the admitting history and physical examination for this admission.    Objective:  Vitals:   T(C): 36.6 (12-23-22 @ 13:50), Max: 36.7 (12-23-22 @ 05:49)  HR: 74 (12-23-22 @ 13:50) (74 - 95)  BP: 91/55 (12-23-22 @ 13:50) (91/55 - 127/72)  RR: 16 (12-23-22 @ 13:50) (16 - 18)  SpO2: 95% (12-23-22 @ 13:50) (95% - 100%)    Physical Examination:  General: no acute distress  HEENT: NC/AT, EOMI,  Cardio: S1, S2 heard, RRR, no murmurs  Resp: decreased b/l breath sounds  Abd: soft, NT, ND  Ext: no edema or cyanosis  Skin: warm, dry, no visible rash      Laboratory Studies:  CBC:                       10.8   5.77  )-----------( 130      ( 22 Dec 2022 06:00 )             35.2     CMP: 12-22    140  |  102  |  17  ----------------------------<  107<H>  3.7   |  30  |  1.10    Ca    8.4      22 Dec 2022 06:00    TPro  6.9  /  Alb  3.0<L>  /  TBili  0.6  /  DBili  x   /  AST  23  /  ALT  11  /  AlkPhos  57  12-22    LIVER FUNCTIONS - ( 22 Dec 2022 06:00 )  Alb: 3.0 g/dL / Pro: 6.9 g/dL / ALK PHOS: 57 U/L / ALT: 11 U/L DA / AST: 23 U/L / GGT: x               Microbiology: reviewed    Culture - Blood (collected 12-19-22 @ 21:34)  Source: .Blood Blood-Peripheral  Preliminary Report (12-21-22 @ 14:01):    No growth to date.    Culture - Blood (collected 12-19-22 @ 21:34)  Source: .Blood Blood-Peripheral  Preliminary Report (12-21-22 @ 13:02):    No growth to date.          Radiology: reviewed

## 2022-12-23 NOTE — PROGRESS NOTE ADULT - SUBJECTIVE AND OBJECTIVE BOX
Patient is a 89y old  Female who presents with a chief complaint of cough (23 Dec 2022 06:25)      INTERVAL HPI/OVERNIGHT EVENTS:  overnight events noted  Home Medications:  ferrous sulfate 325 mg (65 mg elemental iron) oral tablet: 1 tab(s) orally once a day (19 Dec 2022 23:16)  fluticasone propionate 55 mcg/inh inhalation powder: 1 puff(s) inhaled every 12 hours (19 Dec 2022 23:16)  loperamide 2 mg oral tablet: 1 cap(s) orally once a day, As Needed (19 Dec 2022 23:16)  melatonin 3 mg oral tablet: 2 tab(s) orally once a day (at bedtime) (19 Dec 2022 23:16)  MiraLax oral powder for reconstitution: orally once a day (at bedtime) (19 Dec 2022 23:16)  mirtazapine 15 mg oral tablet: 1 tab(s) orally once a day (at bedtime) (19 Dec 2022 23:16)  Nasal Saline 0.65% nasal spray: 1 spray(s) nasal 2 times a day both nostrils  (19 Dec 2022 23:16)  ramelteon 8 mg oral tablet: 1 tab(s) orally once a day (at bedtime) (19 Dec 2022 23:16)  Senna 8.6 mg oral tablet: 2 tab(s) orally once a day (19 Dec 2022 23:16)  sertraline 25 mg oral tablet: 3 tab(s) orally once a day (19 Dec 2022 23:16)  spironolactone 25 mg oral tablet: 1 tab(s) orally once a day (19 Dec 2022 23:16)  torsemide 20 mg oral tablet: 3 tab(s) orally 2 times a day (19 Dec 2022 23:16)  Xanax 0.25 mg oral tablet: 1 tab(s) orally every 12 hours, As Needed - for anxiety (19 Dec 2022 23:16)      MEDICATIONS  (STANDING):  apixaban 2.5 milliGRAM(s) Oral two times a day  atorvastatin 10 milliGRAM(s) Oral at bedtime  lactobacillus acidophilus 1 Tablet(s) Oral two times a day with meals  melatonin 3 milliGRAM(s) Oral at bedtime  metoprolol tartrate 25 milliGRAM(s) Oral two times a day  mirtazapine 15 milliGRAM(s) Oral at bedtime  oseltamivir 30 milliGRAM(s) Oral two times a day  sertraline 100 milliGRAM(s) Oral daily  torsemide 20 milliGRAM(s) Oral two times a day    MEDICATIONS  (PRN):  acetaminophen     Tablet .. 650 milliGRAM(s) Oral every 6 hours PRN Temp greater or equal to 38C (100.4F), Mild Pain (1 - 3)  albuterol/ipratropium for Nebulization 3 milliLiter(s) Nebulizer every 6 hours PRN Bronchospasm  ALPRAZolam 0.25 milliGRAM(s) Oral Once PRN anxiety  guaiFENesin Oral Liquid (Sugar-Free) 200 milliGRAM(s) Oral every 6 hours PRN Cough      Allergies    codeine (Other)    Intolerances        REVIEW OF SYSTEMS:  CONSTITUTIONAL: No fever, weight loss, or fatigue  EYES: No eye pain, visual disturbances, or discharge  ENMT:  No difficulty hearing, tinnitus, vertigo; No sinus or throat pain  NECK: No pain or stiffness  BREASTS: No pain, masses, or nipple discharge  RESPIRATORY: No cough, wheezing, chills or hemoptysis; No shortness of breath, on o2 chronically  CARDIOVASCULAR: No chest pain, palpitations, dizziness, or leg swelling  GASTROINTESTINAL: No abdominal or epigastric pain. No nausea, vomiting, or hematemesis; No diarrhea or constipation.   GENITOURINARY: No dysuria, frequency, hematuria, or incontinence  NEUROLOGICAL: No headaches, memory loss, loss of strength, numbness, or tremors  SKIN: No itching, burning, rashes, or lesions   LYMPH NODES: No enlarged glands  ENDOCRINE: No heat or cold intolerance; No hair loss  MUSCULOSKELETAL: No joint pain or swelling; No muscle, back, or extremity pain  PSYCHIATRIC: No depression, anxiety, mood swings, or difficulty sleeping  HEME/LYMPH: No easy bruising, or bleeding gums  ALLERGY AND IMMUNOLOGIC: No hives or eczema    Vital Signs Last 24 Hrs  T(C): 36.7 (23 Dec 2022 05:49), Max: 36.7 (23 Dec 2022 05:49)  T(F): 98 (23 Dec 2022 05:49), Max: 98 (23 Dec 2022 05:49)  HR: 95 (23 Dec 2022 05:49) (76 - 95)  BP: 127/72 (23 Dec 2022 05:49) (100/60 - 127/72)  BP(mean): --  RR: 18 (23 Dec 2022 05:49) (18 - 18)  SpO2: 100% (23 Dec 2022 05:49) (94% - 100%)    Parameters below as of 23 Dec 2022 05:49  Patient On (Oxygen Delivery Method): nasal cannula  O2 Flow (L/min): 3      PHYSICAL EXAM:  GENERAL: NAD, well-groomed, well-developed  HEAD:  Atraumatic, Normocephalic  EYES: EOMI, PERRLA, conjunctiva and sclera clear  ENMT: Moist mucous membranes,   NECK: Supple, No JVD, Normal thyroid  NERVOUS SYSTEM:  Alert & Oriented X3, non focal  CHEST/LUNG: Clear to percussion bilaterally; No rales, rhonchi, wheezing, or rubs  HEART: Regular rate and rhythm; No murmurs, rubs, or gallops  ABDOMEN: Soft, Nontender, Nondistended; Bowel sounds present  EXTREMITIES:  2+ Peripheral Pulses, No clubbing, cyanosis, or edema  LYMPH: No lymphadenopathy noted  SKIN: No rashes or lesions    LABS:                        10.8   5.77  )-----------( 130      ( 22 Dec 2022 06:00 )             35.2     12-22    140  |  102  |  17  ----------------------------<  107<H>  3.7   |  30  |  1.10    Ca    8.4      22 Dec 2022 06:00    TPro  6.9  /  Alb  3.0<L>  /  TBili  0.6  /  DBili  x   /  AST  23  /  ALT  11  /  AlkPhos  57  12-22        CAPILLARY BLOOD GLUCOSE            Culture - Blood (collected 12-19-22 @ 21:34)  Source: .Blood Blood-Peripheral  Preliminary Report (12-21-22 @ 14:01):    No growth to date.    Culture - Blood (collected 12-19-22 @ 21:34)  Source: .Blood Blood-Peripheral  Preliminary Report (12-21-22 @ 13:02):    No growth to date.        I&O's Summary      RADIOLOGY & ADDITIONAL TESTS:    Imaging Personally Reviewed:  [x ] YES  [ ] NO    Consultant(s) Notes Reviewed:  [x ] YES  [ ] NO    Care Discussed with Consultants/Other Providers [ x] YES  [ ] NO

## 2022-12-23 NOTE — PROGRESS NOTE ADULT - ASSESSMENT
The patient is a 89 year old female with a history of HTN, HL, atrial fibrillation, MitraClip, chronic diastolic heart failure, GI bleed who presents with shortness of breath in the setting of influenza.    Plan:  - ECG with known AF and otherwise nonspecific findings  - CXR with no PNA or pulm edema  - BNP 4421  - Echo 11/22 with normal LV systolic function, normal MitraClip function, mild pulm HTN  - Continue torsemide 20 mg PO bid - avoid overdiuresis  - Continue metoprolol tartrate 25 mg bid  - Continue apixaban 2.5 mg bid  - Influenza positive  - On tamiflu  - ID follow-up  - Discharge planning

## 2022-12-23 NOTE — DIETITIAN INITIAL EVALUATION ADULT - ORAL INTAKE PTA/DIET HISTORY
Pt visited in room with daughter and aide at bedside; presents from [facility]. Pt reports good appetite and PO intake PTA, per transfer documents pt follows a NCS per daughter follows a low salt diet. Confirms NKFA and denies micronutrient or nutrition supplement use.

## 2022-12-23 NOTE — PROGRESS NOTE ADULT - SUBJECTIVE AND OBJECTIVE BOX
Date/Time Patient Seen:  		  Referring MD:   Data Reviewed	       Patient is a 89y old  Female who presents with a chief complaint of cough (22 Dec 2022 12:44)      Subjective/HPI     PAST MEDICAL & SURGICAL HISTORY:  HTN (hypertension)    HLD (hyperlipidemia)    Atrial fibrillation  On Pradaxa    Syncope    Depression    Leg edema    Mitral valve stenosis, unspecified etiology    Hearing loss of left ear    H/O knee surgery    H/O external ear surgery    Cataracta          Medication list         MEDICATIONS  (STANDING):  apixaban 2.5 milliGRAM(s) Oral two times a day  atorvastatin 10 milliGRAM(s) Oral at bedtime  lactobacillus acidophilus 1 Tablet(s) Oral two times a day with meals  melatonin 3 milliGRAM(s) Oral at bedtime  metoprolol tartrate 25 milliGRAM(s) Oral two times a day  mirtazapine 15 milliGRAM(s) Oral at bedtime  oseltamivir 30 milliGRAM(s) Oral two times a day  sertraline 100 milliGRAM(s) Oral daily  torsemide 20 milliGRAM(s) Oral two times a day    MEDICATIONS  (PRN):  acetaminophen     Tablet .. 650 milliGRAM(s) Oral every 6 hours PRN Temp greater or equal to 38C (100.4F), Mild Pain (1 - 3)  albuterol/ipratropium for Nebulization 3 milliLiter(s) Nebulizer every 6 hours PRN Bronchospasm  ALPRAZolam 0.25 milliGRAM(s) Oral Once PRN anxiety  guaiFENesin Oral Liquid (Sugar-Free) 200 milliGRAM(s) Oral every 6 hours PRN Cough         Vitals log        ICU Vital Signs Last 24 Hrs  T(C): 36.7 (23 Dec 2022 05:49), Max: 36.7 (23 Dec 2022 05:49)  T(F): 98 (23 Dec 2022 05:49), Max: 98 (23 Dec 2022 05:49)  HR: 95 (23 Dec 2022 05:49) (76 - 95)  BP: 127/72 (23 Dec 2022 05:49) (100/60 - 127/72)  BP(mean): --  ABP: --  ABP(mean): --  RR: 18 (23 Dec 2022 05:49) (18 - 18)  SpO2: 100% (23 Dec 2022 05:49) (94% - 100%)    O2 Parameters below as of 23 Dec 2022 05:49  Patient On (Oxygen Delivery Method): nasal cannula  O2 Flow (L/min): 3               Input and Output:  I&O's Detail      Lab Data                        10.8   5.77  )-----------( 130      ( 22 Dec 2022 06:00 )             35.2     12-22    140  |  102  |  17  ----------------------------<  107<H>  3.7   |  30  |  1.10    Ca    8.4      22 Dec 2022 06:00    TPro  6.9  /  Alb  3.0<L>  /  TBili  0.6  /  DBili  x   /  AST  23  /  ALT  11  /  AlkPhos  57  12-22            Review of Systems	      Objective     Physical Examination    heart s1s2  lung dec BS  head nc      Pertinent Lab findings & Imaging      Edgar:  NO   Adequate UO     I&O's Detail           Discussed with:     Cultures:	        Radiology

## 2022-12-23 NOTE — DIETITIAN INITIAL EVALUATION ADULT - REASON FOR ADMISSION
As per H&P "The pt is a 89yo F with PMH of HTN, Dyslipidemia, MR s/p MitraClip, A. Fib (off Eliquis 5 weeks ago for LGIB), Diastolic CHF, Chronic Hypoxic Respiratory Failure (on 3.5L NC), hx of COVID-19, CKD stage 3, Anemia, Anxiety, Depression brought in by ambulance from the [facility] at [place] for evaluation of pneumonia. Patient states that she has had a cough with yellow sputum over the past 2 weeks which has been getting worse.  States that she had chest x-ray at the facility yesterday and was told today that she has pneumonia. States that she feels tired. Denies chest pain, shortness of breath, fever, abdominal pain, nausea/vomiting, calf pain. In ED afebrile, no fever no leucocytosis, had x ray chest suspected PNA nad started on zosyn and vanc, and had elevated BUN creat- likely charis with dehydration, received fluid bolus, admitted for further management"

## 2022-12-23 NOTE — DIETITIAN INITIAL EVALUATION ADULT - OTHER INFO
Weight: Pt reports current wt of 163 lbs, upon admission pt with a wt of 175 lbs (12/19). Of note, pt has received diuretics prior to admission, noted with edema in-house. Will continue to monitor weights and trend as available.     Pt seen for nutrition assessment secondary to length of stay policy, admitted with PNA. Pt presently on a DASH/TLC soft and bite sized diet, noted missing teeth denies difficulty chewing/ swallowing of foods at this time and tolerating prescribed diet. Pt reports fair appetite/ PO intake likely due to the flu, per observation consumed ~75% of lunch meal provided in-house. Verbally discussed importance of consuming adequate protein intake at meals to optimize nutrition status. Pt made aware food preferences to be obtained daily to optimize PO intake. Encourage PO intake, monitor diet tolerance, and provide assistance at meals as needed. RD to follow up to continue to monitor pt's nutrition status.

## 2022-12-23 NOTE — DIETITIAN INITIAL EVALUATION ADULT - NS FNS DIET ORDER
Diet, Soft and Bite Sized:   DASH/TLC {Sodium & Cholesterol Restricted} (12-19-22 @ 23:03) [Active]

## 2022-12-23 NOTE — PROGRESS NOTE ADULT - ASSESSMENT
Pt is 89W w/ PMHx of HTN, HLD, MR s/p MitraClip, A. Fib (off Eliquis 5 weeks ago for LGIB), Diastolic CHF, Chronic Hypoxic Respiratory Failure (on 3.5L NC), hx of COVID-19, CKD stage 3, Anemia, Anxiety, Depression brought in by ambulance from the [facility] at [place] for evaluation of pneumonia.      Acute Influenza A  +/- superimposed bacterial PNA  COPD exacerbation 2/2 above  Acute on chronic RF 2/2 above  MEME  - RVP + Flu A  - COVID negative  - cx NGTD  - CXR reviewed, no PNA  - procal noted 0.19  Plan:   C/w tamiflu x5 day course, last day tomorrow 12/24  Monitor off Abx  Trend temps/WBC  Trend renal fxn  Supportive care and supplemental O2 as needed    Dispo planning per primary team    Please call with any questions.   Suzy Real M.D.  [facility] Division of Infectious Diseases [phone]

## 2022-12-23 NOTE — PROGRESS NOTE ADULT - ASSESSMENT
89yo F with PMH of HTN, Dyslipidemia, MR s/p Viola, KIMBER. Fib (off Eliquis 5 weeks ago for LGIB), Diastolic CHF, Chronic Hypoxic Respiratory Failure (on 3.5L NC), hx of COVID-19, CKD stage 3, Anemia, Anxiety, Depression brought in by ambulance from the [facility] at [place] for evaluation of pneumonia.  Patient states that she has had a cough with yellow sputum over the past 2 weeks which has been getting worse.  States that she had chest x-ray at the facility yesterday and was told today that she has pneumonia. States that she feels tired. Denies chest pain, shortness of breath, fever, abdominal pain, nausea/vomiting, calf pain.  IN ED afebrile, no fever no leucocytosis, had x ray chest suspected PNA nad started on zosyn and vanc, and had elevated BUN creat- likely charis with dehydration, ereceived fluid bolus,, admitted for further management for  Pneumonia with influenza A  COPD with ch resp failure on o2  HTN  HLD  AFIB  Ch Diastolic heart failure  charis base line creat 1.15, diuretics held  started on IV abx, ID and pulmonary consult called, cardio consult  tamiflu started, no bacteroial element  abx stopped  goals of care discussed with patient and daughter Shirlene  DNR DNI MOLST filled 12/20.2022  no limitation on tt  dc plan to [facility]

## 2022-12-24 LAB — URATE SERPL-MCNC: 8 MG/DL — HIGH (ref 2.5–7)

## 2022-12-24 PROCEDURE — 73620 X-RAY EXAM OF FOOT: CPT | Mod: 26,RT

## 2022-12-24 RX ORDER — IBUPROFEN 200 MG
400 TABLET ORAL
Refills: 0 | Status: COMPLETED | OUTPATIENT
Start: 2022-12-24 | End: 2022-12-26

## 2022-12-24 RX ADMIN — SERTRALINE 100 MILLIGRAM(S): 25 TABLET, FILM COATED ORAL at 12:25

## 2022-12-24 RX ADMIN — Medication 400 MILLIGRAM(S): at 16:13

## 2022-12-24 RX ADMIN — Medication 1 TABLET(S): at 17:13

## 2022-12-24 RX ADMIN — Medication 3 MILLIGRAM(S): at 21:58

## 2022-12-24 RX ADMIN — ATORVASTATIN CALCIUM 10 MILLIGRAM(S): 80 TABLET, FILM COATED ORAL at 21:58

## 2022-12-24 RX ADMIN — APIXABAN 2.5 MILLIGRAM(S): 2.5 TABLET, FILM COATED ORAL at 06:55

## 2022-12-24 RX ADMIN — APIXABAN 2.5 MILLIGRAM(S): 2.5 TABLET, FILM COATED ORAL at 17:14

## 2022-12-24 RX ADMIN — Medication 25 MILLIGRAM(S): at 17:14

## 2022-12-24 RX ADMIN — Medication 30 MILLIGRAM(S): at 17:14

## 2022-12-24 RX ADMIN — Medication 20 MILLIGRAM(S): at 06:55

## 2022-12-24 RX ADMIN — Medication 1 TABLET(S): at 08:59

## 2022-12-24 RX ADMIN — MIRTAZAPINE 15 MILLIGRAM(S): 45 TABLET, ORALLY DISINTEGRATING ORAL at 21:58

## 2022-12-24 RX ADMIN — Medication 200 MILLIGRAM(S): at 10:31

## 2022-12-24 RX ADMIN — Medication 400 MILLIGRAM(S): at 17:21

## 2022-12-24 RX ADMIN — Medication 30 MILLIGRAM(S): at 06:55

## 2022-12-24 NOTE — PROGRESS NOTE ADULT - ASSESSMENT
The patient is a 89 year old female with a history of HTN, HL, atrial fibrillation, MitraClip, chronic diastolic heart failure, GI bleed who presents with shortness of breath in the setting of influenza.    12/24/22  Seen at [facility]  Aide at bedside  Lying flat, comfortably  No cardiac complaints    Plan:  - ECG with known AF and otherwise nonspecific findings  - CXR with no PNA or pulm edema  - BNP 4421  - Echo 11/22 with normal LV systolic function, normal MitraClip function, mild pulm HTN-see above  - Will hold torsemide 20 mg PO for possible gout  - Motrin started  - Continue metoprolol tartrate 25 mg bid  - Continue apixaban 2.5 mg bid  - Influenza positive  - On tamiflu  - ID follow-up  - Discharge planning

## 2022-12-24 NOTE — PROGRESS NOTE ADULT - SUBJECTIVE AND OBJECTIVE BOX
Date/Time Patient Seen:  		  Referring MD:   Data Reviewed	       Patient is a 89y old  Female who presents with a chief complaint of cough (23 Dec 2022 14:30)      Subjective/HPI     PAST MEDICAL & SURGICAL HISTORY:  HTN (hypertension)    HLD (hyperlipidemia)    Atrial fibrillation  On Pradaxa    Syncope    Depression    Leg edema    Mitral valve stenosis, unspecified etiology    Hearing loss of left ear    H/O knee surgery    H/O external ear surgery    Cataracta          Medication list         MEDICATIONS  (STANDING):  apixaban 2.5 milliGRAM(s) Oral two times a day  atorvastatin 10 milliGRAM(s) Oral at bedtime  lactobacillus acidophilus 1 Tablet(s) Oral two times a day with meals  melatonin 3 milliGRAM(s) Oral at bedtime  metoprolol tartrate 25 milliGRAM(s) Oral two times a day  mirtazapine 15 milliGRAM(s) Oral at bedtime  oseltamivir 30 milliGRAM(s) Oral two times a day  sertraline 100 milliGRAM(s) Oral daily  torsemide 20 milliGRAM(s) Oral two times a day    MEDICATIONS  (PRN):  acetaminophen     Tablet .. 650 milliGRAM(s) Oral every 6 hours PRN Temp greater or equal to 38C (100.4F), Mild Pain (1 - 3)  albuterol/ipratropium for Nebulization 3 milliLiter(s) Nebulizer every 6 hours PRN Bronchospasm  ALPRAZolam 0.25 milliGRAM(s) Oral Once PRN anxiety  guaiFENesin Oral Liquid (Sugar-Free) 200 milliGRAM(s) Oral every 6 hours PRN Cough         Vitals log        ICU Vital Signs Last 24 Hrs  T(C): 36.8 (24 Dec 2022 05:37), Max: 36.8 (24 Dec 2022 05:37)  T(F): 98.2 (24 Dec 2022 05:37), Max: 98.2 (24 Dec 2022 05:37)  HR: 91 (24 Dec 2022 05:37) (74 - 98)  BP: 109/67 (24 Dec 2022 05:37) (91/55 - 119/68)  BP(mean): --  ABP: --  ABP(mean): --  RR: 18 (24 Dec 2022 05:37) (16 - 18)  SpO2: 96% (24 Dec 2022 05:37) (95% - 97%)    O2 Parameters below as of 24 Dec 2022 05:37  Patient On (Oxygen Delivery Method): nasal cannula                 Input and Output:  I&O's Detail      Lab Data                  Review of Systems	      Objective     Physical Examination    heart s1s2  lung dc BS  head nc      Pertinent Lab findings & Imaging      Hernández:  NO   Adequate UO     I&O's Detail           Discussed with:     Cultures:	        Radiology

## 2022-12-24 NOTE — PROGRESS NOTE ADULT - SUBJECTIVE AND OBJECTIVE BOX
Patient is a 89y Female with a known history of :  Pneumonia [J18.9]    COPD exacerbation [J44.1]    Benign essential HTN [I10]    Paroxysmal atrial fibrillation [I48.0]    Chronic diastolic congestive heart failure [I50.32]    MEME (acute kidney injury) [N17.9]      HPI:  89yo F with PMH of HTN, Dyslipidemia, MR s/p MitraClcarl, A. Fib (off Eliquis 5 weeks ago for LGIB), Diastolic CHF, Chronic Hypoxic Respiratory Failure (on 3.5L NC), hx of COVID-19, CKD stage 3, Anemia, Anxiety, Depression brought in by ambulance from the [facility] [place] for evaluation of pneumonia.  Patient states that she has had a cough with yellow sputum over the past 2 weeks which has been getting worse.  States that she had chest x-ray at the facility yesterday and was told today that she has pneumonia. States that she feels tired. Denies chest pain, shortness of breath, fever, abdominal pain, nausea/vomiting, calf pain.  IN ED afebrile, no fever no leucocytosis, had x ray chest suspected PNA nad started on zosyn and vanc, and had elevated BUN creat- likely meme with dehydration, ereceived fluid bolus,, admitted for further management (19 Dec 2022 22:44)      REVIEW OF SYSTEMS:    CONSTITUTIONAL: No fever, weight loss, or fatigue  EYES: No eye pain, visual disturbances, or discharge  ENMT:  No difficulty hearing, tinnitus, vertigo; No sinus or throat pain  NECK: No pain or stiffness  BREASTS: No pain, masses, or nipple discharge  RESPIRATORY: No cough, wheezing, chills or hemoptysis; No shortness of breath  CARDIOVASCULAR: No chest pain, palpitations, dizziness, or leg swelling  GASTROINTESTINAL: No abdominal or epigastric pain. No nausea, vomiting, or hematemesis; No diarrhea or constipation. No melena or hematochezia.  GENITOURINARY: No dysuria, frequency, hematuria, or incontinence  NEUROLOGICAL: No headaches, memory loss, loss of strength, numbness, or tremors  SKIN: No itching, burning, rashes, or lesions   LYMPH NODES: No enlarged glands  ENDOCRINE: No heat or cold intolerance; No hair loss  MUSCULOSKELETAL: No joint pain or swelling; No muscle, back, or extremity pain  PSYCHIATRIC: No depression, anxiety, mood swings, or difficulty sleeping  HEME/LYMPH: No easy bruising, or bleeding gums  ALLERGY AND IMMUNOLOGIC: No hives or eczema    MEDICATIONS  (STANDING):  apixaban 2.5 milliGRAM(s) Oral two times a day  atorvastatin 10 milliGRAM(s) Oral at bedtime  ibuprofen  Tablet. 400 milliGRAM(s) Oral two times a day  lactobacillus acidophilus 1 Tablet(s) Oral two times a day with meals  melatonin 3 milliGRAM(s) Oral at bedtime  metoprolol tartrate 25 milliGRAM(s) Oral two times a day  mirtazapine 15 milliGRAM(s) Oral at bedtime  oseltamivir 30 milliGRAM(s) Oral two times a day  sertraline 100 milliGRAM(s) Oral daily    MEDICATIONS  (PRN):  acetaminophen     Tablet .. 650 milliGRAM(s) Oral every 6 hours PRN Temp greater or equal to 38C (100.4F), Mild Pain (1 - 3)  albuterol/ipratropium for Nebulization 3 milliLiter(s) Nebulizer every 6 hours PRN Bronchospasm  ALPRAZolam 0.25 milliGRAM(s) Oral Once PRN anxiety  guaiFENesin Oral Liquid (Sugar-Free) 200 milliGRAM(s) Oral every 6 hours PRN Cough      ALLERGIES: codeine (Other)      FAMILY HISTORY:  Family history of blood disorder (Mother)        Social history:  Alochol:   Smoking:   Drug Use:   Marital Status:     PHYSICAL EXAMINATION:  -----------------------------  T(C): 36.8 (12-24-22 @ 05:37), Max: 36.8 (12-24-22 @ 05:37)  HR: 91 (12-24-22 @ 05:37) (74 - 98)  BP: 109/67 (12-24-22 @ 05:37) (91/55 - 119/68)  RR: 18 (12-24-22 @ 05:37) (16 - 18)  SpO2: 96% (12-24-22 @ 05:37) (95% - 97%)  Wt(kg): --        Constitutional: well developed, normal appearance, well groomed, well nourished, no deformities and no acute distress.   Eyes: the conjunctiva exhibited no abnormalities and the eyelids demonstrated no xanthelasmas.   HEENT: normal oral mucosa, no oral pallor and no oral cyanosis.   Neck: normal jugular venous A waves present, normal jugular venous V waves present and no jugular venous camargo A waves.   Pulmonary: no respiratory distress, normal respiratory rhythm and effort, no accessory muscle use and lungs were clear to auscultation bilaterally. Anteriorly  Cardiovascular: heart rate and rhythm were irreg/irreg, normal S1 and S2 and no rub, heave or thrill are present.   Musculoskeletal: the gait could not be assessed..   Extremities: no clubbing of the fingernails, no localized cyanosis, no petechial hemorrhages and no ischemic changes. Right leg with erythema (especially great toe) with warmth  Skin: normal skin color and pigmentation, no rash, no venous stasis, no skin lesions, no skin ulcer and no xanthoma was observed.   Psychiatric: oriented to person, place, and time, the affect was normal, the mood was normal and not feeling anxious.     LABS:   --------                       Radiology:    < from: US Transthoracic Echocardiogram w/Doppler Complete (11.05.22 @ 12:27) >    ACC: [id number] EXAM:  US TTE W DOPPLER COMPLETE                          PROCEDURE DATE:  11/05/2022          INTERPRETATION:  Ordering Physician: BIJAN YIP [id number]    Indication: Congestive heart failure    Technician: GHISLAINE Ernst    Study Quality: Suboptimal. The patient was described as uncooperative   during imaging.    Height: 5 feet 3 inches  Weight: 160 pounds  Blood Pressure: 111/92    MEASUREMENTS  IVS: 1.3 cm  PWT: 1.4 cm  LA: 4.9 cm  Aortic Root: 2.3 cm  LVIDd: 4.1 cm  LVIDs: 1.6 cm    LVEF: Approximately 60-65%    FINDINGS  Left Ventricle: Normal left ventricular size and systolic function with   estimated ejection fraction 60-65%. Moderate left ventricular hypertrophy.  Right Ventricle: Right ventricular enlargement with decreased function.  Left Atrium: Left atrial enlargement.  Right Atrium: Right atrial enlargement. The IVC is dilated (2.7 cm) (,   suggesting elevated right atrial pressure.  Mitral Valve: Mitral annular calcification. Bushra-clip present. Mild to   moderate mitral regurgitation.  Aortic Valve: Normal aortic valve.  Tricuspid Valve: Normal tricuspid valve. Mild to moderate tricuspid   regurgitation. Pulmonary artery systolic pressure estimated at 47 mmHg,   assuming a right atrial pressure of 8 mmHg, consistent with mild   pulmonary hypertension (note: Suboptimal Doppler; reported measurement   may underestimate severity of pulmonary hypertension).  Pulmonic Valve: Grossly normal pulmonic valve. Minimal pulmonic   insufficiency.  Pericardium/Pleura: Minimal pericardial effusion.    IMPRESSION:  1. Suboptimal technical quality due to reported patient uncooperativeness   during scanning.  2. Normal left ventricular size and systolic function with estimated   ejection fraction 60-65%. Moderate left ventricular hypertrophy.  3. Right ventricular enlargement with decreased function.  4. Biatrial enlargement.  5. Bushra-clip present. Mild to moderate mitral regurgitation.  6. Mild to moderate tricuspid regurgitation.  7. Pulmonary artery systolic pressure estimated at 47 mmHg, assuming a   right atrial pressure of 8 mmHg, consistent with mild pulmonary   hypertension (note: suboptimal Doppler; reported measurement may   underestimate severity of pulmonary hypertension).    --- End of Report ---            YARELY COLUNGA MD; Attending Cardiologist  This document has been electronically signed. Nov 5 2022  2:41PM    < end of copied text >

## 2022-12-24 NOTE — PROGRESS NOTE ADULT - SUBJECTIVE AND OBJECTIVE BOX
[facility], Division of Infectious Diseases  VANE Shine Y. Patel, S. Shah, G. [facility]  [phone]    Name: ARNULFO ALEX  Age: 89y  Gender: Female  MRN: [id number]    Interval History:  Patient seen and examined at bedside  No acute overnight events. Afebrile  R foot swelling and redness  Family at bedside  Notes reviewed    Antibiotics:  oseltamivir 30 milliGRAM(s) Oral two times a day      Medications:  acetaminophen     Tablet .. 650 milliGRAM(s) Oral every 6 hours PRN  albuterol/ipratropium for Nebulization 3 milliLiter(s) Nebulizer every 6 hours PRN  ALPRAZolam 0.25 milliGRAM(s) Oral Once PRN  apixaban 2.5 milliGRAM(s) Oral two times a day  atorvastatin 10 milliGRAM(s) Oral at bedtime  guaiFENesin Oral Liquid (Sugar-Free) 200 milliGRAM(s) Oral every 6 hours PRN  ibuprofen  Tablet. 400 milliGRAM(s) Oral two times a day  lactobacillus acidophilus 1 Tablet(s) Oral two times a day with meals  melatonin 3 milliGRAM(s) Oral at bedtime  metoprolol tartrate 25 milliGRAM(s) Oral two times a day  mirtazapine 15 milliGRAM(s) Oral at bedtime  oseltamivir 30 milliGRAM(s) Oral two times a day  sertraline 100 milliGRAM(s) Oral daily      Review of Systems:  A 10-point review of systems was obtained.   Review of systems otherwise negative except as previously noted.    Allergies: codeine (Other)    For details regarding the patient's past medical history, social history, family history, and other miscellaneous elements, please refer the initial infectious diseases consultation and/or the admitting history and physical examination for this admission.    Objective:  Vitals:   T(C): 37.1 (12-24-22 @ 11:15), Max: 37.1 (12-24-22 @ 11:15)  HR: 77 (12-24-22 @ 11:15) (77 - 98)  BP: 105/61 (12-24-22 @ 11:15) (105/61 - 119/68)  RR: 19 (12-24-22 @ 11:15) (17 - 19)  SpO2: 90% (12-24-22 @ 11:15) (90% - 97%)    Physical Examination:  General: no acute distress  HEENT: NC/AT, EOMI,  Cardio: S1, S2 heard, RRR, no murmurs  Resp: breath sounds heard bilaterally, no rales, wheezes or rhonchi  Abd: soft, NT, ND  Ext: R foot swelling and redness  Skin: warm, dry, no visible rash      Laboratory Studies:  CBC:   CMP:             Microbiology: reviewed    Culture - Blood (collected 12-19-22 @ 21:34)  Source: .Blood Blood-Peripheral  Preliminary Report (12-21-22 @ 14:01):    No growth to date.    Culture - Blood (collected 12-19-22 @ 21:34)  Source: .Blood Blood-Peripheral  Preliminary Report (12-21-22 @ 13:02):    No growth to date.          Radiology: reviewed

## 2022-12-24 NOTE — PROGRESS NOTE ADULT - SUBJECTIVE AND OBJECTIVE BOX
Patient is a 89y old  Female who presents with a chief complaint of cough (24 Dec 2022 10:08)      INTERVAL HPI/OVERNIGHT EVENTS:overnight events noted    Home Medications:  ferrous sulfate 325 mg (65 mg elemental iron) oral tablet: 1 tab(s) orally once a day (19 Dec 2022 23:16)  fluticasone propionate 55 mcg/inh inhalation powder: 1 puff(s) inhaled every 12 hours (19 Dec 2022 23:16)  loperamide 2 mg oral tablet: 1 cap(s) orally once a day, As Needed (19 Dec 2022 23:16)  melatonin 3 mg oral tablet: 2 tab(s) orally once a day (at bedtime) (19 Dec 2022 23:16)  MiraLax oral powder for reconstitution: orally once a day (at bedtime) (19 Dec 2022 23:16)  mirtazapine 15 mg oral tablet: 1 tab(s) orally once a day (at bedtime) (19 Dec 2022 23:16)  Nasal Saline 0.65% nasal spray: 1 spray(s) nasal 2 times a day both nostrils  (19 Dec 2022 23:16)  ramelteon 8 mg oral tablet: 1 tab(s) orally once a day (at bedtime) (19 Dec 2022 23:16)  Senna 8.6 mg oral tablet: 2 tab(s) orally once a day (19 Dec 2022 23:16)  sertraline 25 mg oral tablet: 3 tab(s) orally once a day (19 Dec 2022 23:16)  spironolactone 25 mg oral tablet: 1 tab(s) orally once a day (19 Dec 2022 23:16)  torsemide 20 mg oral tablet: 3 tab(s) orally 2 times a day (19 Dec 2022 23:16)  Xanax 0.25 mg oral tablet: 1 tab(s) orally every 12 hours, As Needed - for anxiety (19 Dec 2022 23:16)      MEDICATIONS  (STANDING):  apixaban 2.5 milliGRAM(s) Oral two times a day  atorvastatin 10 milliGRAM(s) Oral at bedtime  ibuprofen  Tablet. 400 milliGRAM(s) Oral two times a day  lactobacillus acidophilus 1 Tablet(s) Oral two times a day with meals  melatonin 3 milliGRAM(s) Oral at bedtime  metoprolol tartrate 25 milliGRAM(s) Oral two times a day  mirtazapine 15 milliGRAM(s) Oral at bedtime  oseltamivir 30 milliGRAM(s) Oral two times a day  sertraline 100 milliGRAM(s) Oral daily  torsemide 20 milliGRAM(s) Oral two times a day    MEDICATIONS  (PRN):  acetaminophen     Tablet .. 650 milliGRAM(s) Oral every 6 hours PRN Temp greater or equal to 38C (100.4F), Mild Pain (1 - 3)  albuterol/ipratropium for Nebulization 3 milliLiter(s) Nebulizer every 6 hours PRN Bronchospasm  ALPRAZolam 0.25 milliGRAM(s) Oral Once PRN anxiety  guaiFENesin Oral Liquid (Sugar-Free) 200 milliGRAM(s) Oral every 6 hours PRN Cough      Allergies    codeine (Other)    Intolerances        REVIEW OF SYSTEMS:  CONSTITUTIONAL: No fever, weight loss, or fatigue  EYES: No eye pain, visual disturbances, or discharge  ENMT:  No difficulty hearing, tinnitus, vertigo; No sinus or throat pain  NECK: No pain or stiffness  BREASTS: No pain, masses, or nipple discharge  RESPIRATORY: No cough, wheezing, chills or hemoptysis; No shortness of breath  CARDIOVASCULAR: No chest pain, palpitations, dizziness, or leg swelling  GASTROINTESTINAL: No abdominal or epigastric pain. No nausea, vomiting,   GENITOURINARY: No dysuria, frequency, hematuria, or incontinence  NEUROLOGICAL: No headaches, memory loss, loss of strength, numbness, or tremors  SKIN: No itching, burning, rashes, or lesions   MUSCULOSKELETAL: Rt Foot swollen red warn  PSYCHIATRIC: No depression, anxiety, mood swings, or difficulty sleeping  HEME/LYMPH: No easy bruising, or bleeding gums  ALLERGY AND IMMUNOLOGIC: No hives or eczema    Vital Signs Last 24 Hrs  T(C): 36.8 (24 Dec 2022 05:37), Max: 36.8 (24 Dec 2022 05:37)  T(F): 98.2 (24 Dec 2022 05:37), Max: 98.2 (24 Dec 2022 05:37)  HR: 91 (24 Dec 2022 05:37) (74 - 98)  BP: 109/67 (24 Dec 2022 05:37) (91/55 - 119/68)  BP(mean): --  RR: 18 (24 Dec 2022 05:37) (16 - 18)  SpO2: 96% (24 Dec 2022 05:37) (95% - 97%)    Parameters below as of 24 Dec 2022 05:37  Patient On (Oxygen Delivery Method): nasal cannula        PHYSICAL EXAM:  GENERAL: NAD, well-groomed, well-developed  HEAD:  Atraumatic, Normocephalic  EYES: EOMI, PERRLA, conjunctiva and sclera clear  ENMT: Moist mucous membranes,   NECK: Supple, No JVD, Normal thyroid  NERVOUS SYSTEM:  Alert & Oriented X3, Good concentration; Motor Strength 5/5 B/L upper and lower extremities; DTRs 2+ intact and symmetric  CHEST/LUNG: Clear to percussion bilaterally; No rales, rhonchi, wheezing, or rubs  HEART: Regular rate and rhythm; No murmurs, rubs, or gallops  ABDOMEN: Soft, Nontender, Nondistended; Bowel sounds present  EXTREMITIES:  2+ Peripheral Pulses, No clubbing, cyanosis, or edema  LYMPH: No lymphadenopathy noted  SKIN: No rashes or lesions    LABS:              CAPILLARY BLOOD GLUCOSE            Culture - Blood (collected 12-19-22 @ 21:34)  Source: .Blood Blood-Peripheral  Preliminary Report (12-21-22 @ 14:01):    No growth to date.    Culture - Blood (collected 12-19-22 @ 21:34)  Source: .Blood Blood-Peripheral  Preliminary Report (12-21-22 @ 13:02):    No growth to date.        I&O's Summary      RADIOLOGY & ADDITIONAL TESTS:    Imaging Personally Reviewed:  [x ] YES  [ ] NO    Consultant(s) Notes Reviewed:  [ x] YES  [ ] NO    Care Discussed with Consultants/Other Providers [x ] YES  [ ] NO

## 2022-12-24 NOTE — PROGRESS NOTE ADULT - SUBJECTIVE AND OBJECTIVE BOX
Patient is a 89y old  Female who presents with a chief complaint of cough (24 Dec 2022 06:50)      INTERVAL HPI/OVERNIGHT EVENTS:overnigt events noted    Home Medications:  ferrous sulfate 325 mg (65 mg elemental iron) oral tablet: 1 tab(s) orally once a day (19 Dec 2022 23:16)  fluticasone propionate 55 mcg/inh inhalation powder: 1 puff(s) inhaled every 12 hours (19 Dec 2022 23:16)  loperamide 2 mg oral tablet: 1 cap(s) orally once a day, As Needed (19 Dec 2022 23:16)  melatonin 3 mg oral tablet: 2 tab(s) orally once a day (at bedtime) (19 Dec 2022 23:16)  MiraLax oral powder for reconstitution: orally once a day (at bedtime) (19 Dec 2022 23:16)  mirtazapine 15 mg oral tablet: 1 tab(s) orally once a day (at bedtime) (19 Dec 2022 23:16)  Nasal Saline 0.65% nasal spray: 1 spray(s) nasal 2 times a day both nostrils  (19 Dec 2022 23:16)  ramelteon 8 mg oral tablet: 1 tab(s) orally once a day (at bedtime) (19 Dec 2022 23:16)  Senna 8.6 mg oral tablet: 2 tab(s) orally once a day (19 Dec 2022 23:16)  sertraline 25 mg oral tablet: 3 tab(s) orally once a day (19 Dec 2022 23:16)  spironolactone 25 mg oral tablet: 1 tab(s) orally once a day (19 Dec 2022 23:16)  torsemide 20 mg oral tablet: 3 tab(s) orally 2 times a day (19 Dec 2022 23:16)  Xanax 0.25 mg oral tablet: 1 tab(s) orally every 12 hours, As Needed - for anxiety (19 Dec 2022 23:16)      MEDICATIONS  (STANDING):  apixaban 2.5 milliGRAM(s) Oral two times a day  atorvastatin 10 milliGRAM(s) Oral at bedtime  lactobacillus acidophilus 1 Tablet(s) Oral two times a day with meals  melatonin 3 milliGRAM(s) Oral at bedtime  metoprolol tartrate 25 milliGRAM(s) Oral two times a day  mirtazapine 15 milliGRAM(s) Oral at bedtime  oseltamivir 30 milliGRAM(s) Oral two times a day  sertraline 100 milliGRAM(s) Oral daily  torsemide 20 milliGRAM(s) Oral two times a day    MEDICATIONS  (PRN):  acetaminophen     Tablet .. 650 milliGRAM(s) Oral every 6 hours PRN Temp greater or equal to 38C (100.4F), Mild Pain (1 - 3)  albuterol/ipratropium for Nebulization 3 milliLiter(s) Nebulizer every 6 hours PRN Bronchospasm  ALPRAZolam 0.25 milliGRAM(s) Oral Once PRN anxiety  guaiFENesin Oral Liquid (Sugar-Free) 200 milliGRAM(s) Oral every 6 hours PRN Cough      Allergies    codeine (Other)    Intolerances        REVIEW OF SYSTEMS:  CONSTITUTIONAL: No fever, weight loss, has fatigue  EYES: No eye pain, visual disturbances, or discharge  ENMT:  No difficulty hearing, tinnitus, vertigo; No sinus or throat pain  NECK: No pain or stiffness  BREASTS: No pain, masses, or nipple discharge  RESPIRATORY: has cough, no wheezing, chills or hemoptysis; No shortness of breath  CARDIOVASCULAR: No chest pain, palpitations, dizziness, or leg swelling  GASTROINTESTINAL: No abdominal or epigastric pain. No nausea, vomiting, or hematemesis; No diarrhea or constipation. GENITOURINARY: No dysuria, frequency, hematuria, or incontinence  NEUROLOGICAL: No headaches, memory loss, loss of strength, numbness, or tremors  SKIN: No itching, burning, rashes, or lesions   Vital Signs Last 24 Hrs  T(C): 36.8 (24 Dec 2022 05:37), Max: 36.8 (24 Dec 2022 05:37)  T(F): 98.2 (24 Dec 2022 05:37), Max: 98.2 (24 Dec 2022 05:37)  HR: 91 (24 Dec 2022 05:37) (74 - 98)  BP: 109/67 (24 Dec 2022 05:37) (91/55 - 119/68)  BP(mean): --  RR: 18 (24 Dec 2022 05:37) (16 - 18)  SpO2: 96% (24 Dec 2022 05:37) (95% - 97%)    Parameters below as of 24 Dec 2022 05:37  Patient On (Oxygen Delivery Method): nasal cannula        PHYSICAL EXAM:  GENERAL:  well-groomed, well-developed  HEAD:  Atraumatic, Normocephalic  EYES: EOMI, PERRLA, conjunctiva and sclera clear  ENMT: Moist mucous membranes,   NECK: Supple, No JVD, Normal thyroid  NERVOUS SYSTEM:  Alert & Oriented X3, non f  CHEST/LUNG: Clear to percussion bilaterallocaly; No rales, rhonchi, wheezing, or rubs  HEART: Regular rate and rhythm; No murmurs, rubs, or gallops  ABDOMEN: Soft, Nontender, Nondistended; Bowel sounds present  EXTREMITIES:  2+ Peripheral Pulses, No clubbing, cyanosis, or edema    LABS:              CAPILLARY BLOOD GLUCOSE            Culture - Blood (collected 12-19-22 @ 21:34)  Source: .Blood Blood-Peripheral  Preliminary Report (12-21-22 @ 14:01):    No growth to date.    Culture - Blood (collected 12-19-22 @ 21:34)  Source: .Blood Blood-Peripheral  Preliminary Report (12-21-22 @ 13:02):    No growth to date.        I&O's Summary      RADIOLOGY & ADDITIONAL TESTS:    Imaging Personally Reviewed:  [x ] YES  [ ] NO    Consultant(s) Notes Reviewed:  [ x] YES  [ ] NO    Care Discussed with Consultants/Other Providers [x ] YES  [ ] NO

## 2022-12-24 NOTE — PROGRESS NOTE ADULT - ASSESSMENT
Pt is 89W w/ PMHx of HTN, HLD, MR s/p MitraClip, A. Fib (off Eliquis 5 weeks ago for LGIB), Diastolic CHF, Chronic Hypoxic Respiratory Failure (on 3.5L NC), hx of COVID-19, CKD stage 3, Anemia, Anxiety, Depression brought in by ambulance from the [facility] at [place] for evaluation of pneumonia.      R foot swelling/redness  R/o cellulitis  - afebrile, no leukocytosis  - f/u uric acid, suspect gout >> cellulitis  - monitor off Abx  --if worsening tomorrow, can add PO course w/ keflex    Acute Influenza A  COPD exacerbation 2/2 above  Acute on chronic RF 2/2 above  MEME  - RVP + Flu A  - COVID negative  - cx NGTD  - CXR reviewed, no PNA  - procal noted 0.19  Plan:   C/w tamiflu x5 day course, last day tomorrow 12/24  Monitor off Abx  Trend temps/WBC  Trend renal fxn  Supportive care and supplemental O2 as needed    D/w Dr. Cabrera    Covering weekend/holiday service through 12/26  Infectious Diseases will continue to follow. Please call with any questions.   Suzy Real M.D.  [facility] Division of Infectious Diseases [phone]   Pt is 89W w/ PMHx of HTN, HLD, MR s/p MitraClip, A. Fib (off Eliquis 5 weeks ago for LGIB), Diastolic CHF, Chronic Hypoxic Respiratory Failure (on 3.5L NC), hx of COVID-19, CKD stage 3, Anemia, Anxiety, Depression brought in by ambulance from the [place] at [place] for evaluation of pneumonia.      R foot swelling/redness  R/o cellulitis  - afebrile, no leukocytosis  - f/u uric acid, suspect gout >> cellulitis  - monitor off Abx  --if worsening tomorrow, can add PO course w/ keflex    Acute Influenza A  COPD exacerbation 2/2 above  Acute on chronic RF 2/2 above  MEME  - RVP + Flu A  - COVID negative  - cx NGTD  - CXR reviewed, no PNA  - procal noted 0.19  Plan:   completed tamiflu today  Monitor off Abx  Trend temps/WBC  Trend renal fxn  Supportive care and supplemental O2 as needed    D/w Dr. Cabrera    Covering weekend/holiday service through 12/26  Infectious Diseases will continue to follow. Please call with any questions.   Suzy Real M.D.  [facility] Division of Infectious Diseases [phone]

## 2022-12-24 NOTE — PROGRESS NOTE ADULT - ASSESSMENT
89yo F with PMH of HTN, Dyslipidemia, MR s/p Viola, KIMBER. Fib (off Eliquis 5 weeks ago for LGIB), Diastolic CHF, Chronic Hypoxic Respiratory Failure (on 3.5L NC), hx of COVID-19, CKD stage 3, Anemia, Anxiety, Depression brought in by ambulance from the [facility] [place] for evaluation of pneumonia.  Patient states that she has had a cough with yellow sputum over the past 2 weeks which has been getting worse.  States that she had chest x-ray at the facility yesterday and was told today that she has pneumonia. States that she feels tired. Denies chest pain, shortness of breath, fever, abdominal pain, nausea/vomiting, calf pain.  IN ED afebrile, no fever no leucocytosis, had x ray chest suspected PNA nad started on zosyn and vanc, and had elevated BUN creat- likely charis with dehydration, ereceived fluid bolus,, admitted for further management for  Pneumonia with influenza A  COPD with ch resp failure on o2  HTN  HLD  AFIB  Ch Diastolic heart failure  charis base line creat 1.15, diuretics held  started on IV abx, ID and pulmonary consult called, cardio consult  tamiflu started, no bacteroial element  abx stopped  goals of care discussed with patient and daughter Shirlene  DNR DNI MOLST filled 12/20.2022  no limitation on tt  dc plan to [facility]             89yo F with PMH of HTN, Dyslipidemia, MR s/p Viola, KIMBER. Fib (off Eliquis 5 weeks ago for LGIB), Diastolic CHF, Chronic Hypoxic Respiratory Failure (on 3.5L NC), hx of COVID-19, CKD stage 3, Anemia, Anxiety, Depression brought in by ambulance from the [facility] [place] for evaluation of pneumonia.  Patient states that she has had a cough with yellow sputum over the past 2 weeks which has been getting worse.  States that she had chest x-ray at the facility yesterday and was told today that she has pneumonia. States that she feels tired. Denies chest pain, shortness of breath, fever, abdominal pain, nausea/vomiting, calf pain.  IN ED afebrile, no fever no leucocytosis, had x ray chest suspected PNA nad started on zosyn and vanc, and had elevated BUN creat- likely charis with dehydration, ereceived fluid bolus,, admitted for further management for  Pneumonia with influenza A  COPD with ch resp failure on o2  HTN  HLD  AFIB  Ch Diastolic heart failure  charis base line creat 1.15, diuretics held  started on IV abx, ID and pulmonary consult called, cardio consult  tamiflu started, no bacteroial element  abx stopped  goals of care discussed with patient and daughter Shirlene  DNR DNI BERNADINE filled 12/20.2022  rt foot swollen suddenly today with redness and heat  no trauma   x ray foot, NSAID, check uric acid

## 2022-12-24 NOTE — PROGRESS NOTE ADULT - ASSESSMENT
89yo F with PMH of HTN, Dyslipidemia, MR s/p Viola, KIMBER. Fib (off Eliquis 5 weeks ago for LGIB), Diastolic CHF, Chronic Hypoxic Respiratory Failure (on 3.5L NC), hx of COVID-19, CKD stage 3, Anemia, Anxiety, Depression brought in by ambulance from the [facility] [place] for evaluation of pneumonia.  Patient states that she has had a cough with yellow sputum over the past 2 weeks which has been getting worse.  States that she had chest x-ray at the facility yesterday and was told today that she has pneumonia. States that she feels tired. Denies chest pain, shortness of breath, fever, abdominal pain, nausea/vomiting, calf pain.  IN ED afebrile, no fever no leucocytosis, had x ray chest suspected PNA nad started on zosyn and vanc, and had elevated BUN creat- likely charis with dehydration, ereceived fluid bolus,, admitted for further management for  Pneumonia with influenza A  COPD with ch resp failure on o2  HTN  HLD  AFIB  Ch Diastolic heart failure  charis base line creat 1.15, diuretics held  started on IV abx, ID and pulmonary consult called, cardio consult  tamiflu started, no bacteroial element  abx stopped  goals of care discussed with patient and daughter Shirlene  DNR DNI MOLST filled 12/20.2022  no limitation on tt  rt foot swollen suddenly today with redness and heat  no trauma

## 2022-12-25 RX ORDER — SODIUM CHLORIDE 0.65 %
1 AEROSOL, SPRAY (ML) NASAL
Refills: 0 | Status: DISCONTINUED | OUTPATIENT
Start: 2022-12-25 | End: 2022-12-26

## 2022-12-25 RX ADMIN — Medication 1 SPRAY(S): at 21:18

## 2022-12-25 RX ADMIN — APIXABAN 2.5 MILLIGRAM(S): 2.5 TABLET, FILM COATED ORAL at 06:14

## 2022-12-25 RX ADMIN — APIXABAN 2.5 MILLIGRAM(S): 2.5 TABLET, FILM COATED ORAL at 18:38

## 2022-12-25 RX ADMIN — MIRTAZAPINE 15 MILLIGRAM(S): 45 TABLET, ORALLY DISINTEGRATING ORAL at 21:17

## 2022-12-25 RX ADMIN — Medication 650 MILLIGRAM(S): at 10:33

## 2022-12-25 RX ADMIN — Medication 650 MILLIGRAM(S): at 10:03

## 2022-12-25 RX ADMIN — Medication 1 TABLET(S): at 18:37

## 2022-12-25 RX ADMIN — Medication 400 MILLIGRAM(S): at 06:14

## 2022-12-25 RX ADMIN — Medication 3 MILLIGRAM(S): at 21:17

## 2022-12-25 RX ADMIN — Medication 30 MILLIGRAM(S): at 06:15

## 2022-12-25 RX ADMIN — ATORVASTATIN CALCIUM 10 MILLIGRAM(S): 80 TABLET, FILM COATED ORAL at 21:17

## 2022-12-25 RX ADMIN — Medication 200 MILLIGRAM(S): at 20:37

## 2022-12-25 RX ADMIN — SERTRALINE 100 MILLIGRAM(S): 25 TABLET, FILM COATED ORAL at 11:50

## 2022-12-25 NOTE — PROGRESS NOTE ADULT - SUBJECTIVE AND OBJECTIVE BOX
Patient is a 89y Female with a known history of :  Pneumonia [J18.9]    COPD exacerbation [J44.1]    Benign essential HTN [I10]    Paroxysmal atrial fibrillation [I48.0]    Chronic diastolic congestive heart failure [I50.32]    MEME (acute kidney injury) [N17.9]      HPI:  89yo F with PMH of HTN, Dyslipidemia, MR s/p MitraClcarl, A. Fib (off Eliquis 5 weeks ago for LGIB), Diastolic CHF, Chronic Hypoxic Respiratory Failure (on 3.5L NC), hx of COVID-19, CKD stage 3, Anemia, Anxiety, Depression brought in by ambulance from the [facility] [place] for evaluation of pneumonia.  Patient states that she has had a cough with yellow sputum over the past 2 weeks which has been getting worse.  States that she had chest x-ray at the facility yesterday and was told today that she has pneumonia. States that she feels tired. Denies chest pain, shortness of breath, fever, abdominal pain, nausea/vomiting, calf pain.  IN ED afebrile, no fever no leucocytosis, had x ray chest suspected PNA nad started on zosyn and vanc, and had elevated BUN creat- likely meme with dehydration, ereceived fluid bolus,, admitted for further management (19 Dec 2022 22:44)      REVIEW OF SYSTEMS:    CONSTITUTIONAL: No fever, weight loss, or fatigue  EYES: No eye pain, visual disturbances, or discharge  ENMT:  No difficulty hearing, tinnitus, vertigo; No sinus or throat pain  NECK: No pain or stiffness  BREASTS: No pain, masses, or nipple discharge  RESPIRATORY: No cough, wheezing, chills or hemoptysis; No shortness of breath  CARDIOVASCULAR: No chest pain, palpitations, dizziness, or leg swelling  GASTROINTESTINAL: No abdominal or epigastric pain. No nausea, vomiting, or hematemesis; No diarrhea or constipation. No melena or hematochezia.  GENITOURINARY: No dysuria, frequency, hematuria, or incontinence  NEUROLOGICAL: No headaches, memory loss, loss of strength, numbness, or tremors  SKIN: No itching, burning, rashes, or lesions   LYMPH NODES: No enlarged glands  ENDOCRINE: No heat or cold intolerance; No hair loss  MUSCULOSKELETAL: No joint pain or swelling; No muscle, back, or extremity pain  PSYCHIATRIC: No depression, anxiety, mood swings, or difficulty sleeping  HEME/LYMPH: No easy bruising, or bleeding gums  ALLERGY AND IMMUNOLOGIC: No hives or eczema    MEDICATIONS  (STANDING):  apixaban 2.5 milliGRAM(s) Oral two times a day  atorvastatin 10 milliGRAM(s) Oral at bedtime  ibuprofen  Tablet. 400 milliGRAM(s) Oral two times a day  lactobacillus acidophilus 1 Tablet(s) Oral two times a day with meals  melatonin 3 milliGRAM(s) Oral at bedtime  metoprolol tartrate 25 milliGRAM(s) Oral two times a day  mirtazapine 15 milliGRAM(s) Oral at bedtime  sertraline 100 milliGRAM(s) Oral daily    MEDICATIONS  (PRN):  acetaminophen     Tablet .. 650 milliGRAM(s) Oral every 6 hours PRN Temp greater or equal to 38C (100.4F), Mild Pain (1 - 3)  albuterol/ipratropium for Nebulization 3 milliLiter(s) Nebulizer every 6 hours PRN Bronchospasm  ALPRAZolam 0.25 milliGRAM(s) Oral Once PRN anxiety  guaiFENesin Oral Liquid (Sugar-Free) 200 milliGRAM(s) Oral every 6 hours PRN Cough      ALLERGIES: codeine (Other)      FAMILY HISTORY:  Family history of blood disorder (Mother)        Social history:  Alochol:   Smoking:   Drug Use:   Marital Status:     PHYSICAL EXAMINATION:  -----------------------------  T(C): 36.9 (12-25-22 @ 09:39), Max: 36.9 (12-25-22 @ 09:39)  HR: 77 (12-25-22 @ 09:39) (69 - 80)  BP: 94/56 (12-25-22 @ 09:39) (94/56 - 105/61)  RR: 16 (12-25-22 @ 09:39) (16 - 18)  SpO2: 95% (12-25-22 @ 09:39) (92% - 96%)  Wt(kg): --        Constitutional: well developed, normal appearance, well groomed, well nourished, no deformities and no acute distress.   Eyes: the conjunctiva exhibited no abnormalities and the eyelids demonstrated no xanthelasmas.   HEENT: normal oral mucosa, no oral pallor and no oral cyanosis.   Neck: normal jugular venous A waves present, normal jugular venous V waves present and no jugular venous camargo A waves.   Pulmonary: no respiratory distress, normal respiratory rhythm and effort, no accessory muscle use and lungs were clear to auscultation bilaterally. Anteriorly  Cardiovascular: heart rate and rhythm were normal, normal S1 and S2 and no murmur, gallop, rub, heave or thrill are present.   Musculoskeletal: the gait could not be assessed.   Extremities: no clubbing of the fingernails, no localized cyanosis, no petechial hemorrhages and no ischemic changes. Right foot much improved  Skin: normal skin color and pigmentation, no rash, no venous stasis, no skin lesions, no skin ulcer and no xanthoma was observed.   Psychiatric: oriented to person, place, and time, the affect was normal, the mood was normal and not feeling anxious.     LABS:   --------                       Radiology:

## 2022-12-25 NOTE — PROGRESS NOTE ADULT - ASSESSMENT
89yo F with PMH of HTN, Dyslipidemia, MR s/p Viola, KIMBER. Fib (off Eliquis 5 weeks ago for LGIB), Diastolic CHF, Chronic Hypoxic Respiratory Failure (on 3.5L NC), hx of COVID-19, CKD stage 3, Anemia, Anxiety, Depression brought in by ambulance from the [facility] at [place] for evaluation of pneumonia.  Patient states that she has had a cough with yellow sputum over the past 2 weeks which has been getting worse.  States that she had chest x-ray at the facility yesterday and was told today that she has pneumonia. States that she feels tired. Denies chest pain, shortness of breath, fever, abdominal pain, nausea/vomiting, calf pain.  IN ED afebrile, no fever no leucocytosis, had x ray chest suspected PNA nad started on zosyn and vanc, and had elevated BUN creat- likely charis with dehydration, ereceived fluid bolus,, admitted for further management for  Pneumonia with influenza A  COPD with ch resp failure on o2  HTN  HLD  AFIB  Ch Diastolic heart failure  charis base line creat 1.15, diuretics held  started on IV abx, ID and pulmonary consult called, cardio consult  tamiflu started, no bacteroial element  abx stopped  goals of care discussed with patient and daughter Shirlene  DNR DNI MOLST filled 12/20.2022  no limitation on tt  rt foot swollen suddenly today with redness and heat- likely gout , improved on Nsaid  no trauma-x ray neg for any osseous pathology

## 2022-12-25 NOTE — PROGRESS NOTE ADULT - ASSESSMENT
The patient is a 89 year old female with a history of HTN, HL, atrial fibrillation, MitraClip, chronic diastolic heart failure, GI bleed who presents with shortness of breath in the setting of influenza.    12/25/22  Seen at [facility]  Aide at bedside  Sitting at edge of bed  No cardiac complaints  Right foot much improved    Plan:  - ECG with known AF and otherwise nonspecific findings  - CXR with no PNA or pulm edema  - BNP 4421  - Echo 11/22 with normal LV systolic function, normal MitraClip function, mild pulm HTN  - Continue to hold torsemide 20 mg PO for possible gout  - Motrin  - Continue metoprolol tartrate 25 mg bid  - Continue apixaban 2.5 mg bid  - Influenza positive  - On tamiflu  - ID follow-up  - Discharge planning The patient is a 89 year old female with a history of HTN, HL, atrial fibrillation, MitraClip, chronic diastolic heart failure, GI bleed who presents with shortness of breath in the setting of influenza.    12/25/22  Seen at [facility]  Aide at bedside  Sitting at edge of bed  No cardiac complaints  Right foot much improved  Uric acid-8.0    Plan:  - ECG with known AF and otherwise nonspecific findings  - CXR with no PNA or pulm edema  - BNP 4421  - Echo 11/22 with normal LV systolic function, normal MitraClip function, mild pulm HTN  - Continue to hold torsemide 20 mg PO for possible gout  - Motrin  - Continue metoprolol tartrate 25 mg bid  - Continue apixaban 2.5 mg bid  - Influenza positive  - On tamiflu  - ID follow-up  - Discharge planning

## 2022-12-25 NOTE — PROGRESS NOTE ADULT - SUBJECTIVE AND OBJECTIVE BOX
Date/Time Patient Seen:  		  Referring MD:   Data Reviewed	       Patient is a 89y old  Female who presents with a chief complaint of cough (24 Dec 2022 17:24)      Subjective/HPI     PAST MEDICAL & SURGICAL HISTORY:  HTN (hypertension)    HLD (hyperlipidemia)    Atrial fibrillation  On Pradaxa    Syncope    Depression    Leg edema    Mitral valve stenosis, unspecified etiology    Hearing loss of left ear    H/O knee surgery    H/O external ear surgery    Cataracta          Medication list         MEDICATIONS  (STANDING):  apixaban 2.5 milliGRAM(s) Oral two times a day  atorvastatin 10 milliGRAM(s) Oral at bedtime  ibuprofen  Tablet. 400 milliGRAM(s) Oral two times a day  lactobacillus acidophilus 1 Tablet(s) Oral two times a day with meals  melatonin 3 milliGRAM(s) Oral at bedtime  metoprolol tartrate 25 milliGRAM(s) Oral two times a day  mirtazapine 15 milliGRAM(s) Oral at bedtime  sertraline 100 milliGRAM(s) Oral daily    MEDICATIONS  (PRN):  acetaminophen     Tablet .. 650 milliGRAM(s) Oral every 6 hours PRN Temp greater or equal to 38C (100.4F), Mild Pain (1 - 3)  albuterol/ipratropium for Nebulization 3 milliLiter(s) Nebulizer every 6 hours PRN Bronchospasm  ALPRAZolam 0.25 milliGRAM(s) Oral Once PRN anxiety  guaiFENesin Oral Liquid (Sugar-Free) 200 milliGRAM(s) Oral every 6 hours PRN Cough         Vitals log        ICU Vital Signs Last 24 Hrs  T(C): 36.5 (25 Dec 2022 05:51), Max: 37.1 (24 Dec 2022 11:15)  T(F): 97.7 (25 Dec 2022 05:51), Max: 98.8 (24 Dec 2022 11:15)  HR: 80 (25 Dec 2022 05:51) (69 - 80)  BP: 105/61 (25 Dec 2022 05:51) (95/59 - 105/61)  BP(mean): --  ABP: --  ABP(mean): --  RR: 18 (25 Dec 2022 05:51) (18 - 19)  SpO2: 96% (25 Dec 2022 05:51) (90% - 96%)    O2 Parameters below as of 25 Dec 2022 05:51  Patient On (Oxygen Delivery Method): nasal cannula                 Input and Output:  I&O's Detail      Lab Data                  Review of Systems	      Objective     Physical Examination    heart s1s2  lung dec bS  head nc      Pertinent Lab findings & Imaging      Hernández:  NO   Adequate UO     I&O's Detail           Discussed with:     Cultures:	        Radiology

## 2022-12-25 NOTE — PROGRESS NOTE ADULT - SUBJECTIVE AND OBJECTIVE BOX
Patient is a 89y old  Female who presents with a chief complaint of cough (26 Dec 2022 06:19)      INTERVAL HPI/OVERNIGHT EVENTS:overnight events noted    Home Medications:  ferrous sulfate 325 mg (65 mg elemental iron) oral tablet: 1 tab(s) orally once a day (19 Dec 2022 23:16)  fluticasone propionate 55 mcg/inh inhalation powder: 1 puff(s) inhaled every 12 hours (19 Dec 2022 23:16)  loperamide 2 mg oral tablet: 1 cap(s) orally once a day, As Needed (19 Dec 2022 23:16)  melatonin 3 mg oral tablet: 2 tab(s) orally once a day (at bedtime) (19 Dec 2022 23:16)  MiraLax oral powder for reconstitution: orally once a day (at bedtime) (19 Dec 2022 23:16)  mirtazapine 15 mg oral tablet: 1 tab(s) orally once a day (at bedtime) (19 Dec 2022 23:16)  Nasal Saline 0.65% nasal spray: 1 spray(s) nasal 2 times a day both nostrils  (19 Dec 2022 23:16)  ramelteon 8 mg oral tablet: 1 tab(s) orally once a day (at bedtime) (19 Dec 2022 23:16)  Senna 8.6 mg oral tablet: 2 tab(s) orally once a day (19 Dec 2022 23:16)  sertraline 25 mg oral tablet: 3 tab(s) orally once a day (19 Dec 2022 23:16)  spironolactone 25 mg oral tablet: 1 tab(s) orally once a day (19 Dec 2022 23:16)  torsemide 20 mg oral tablet: 3 tab(s) orally 2 times a day (19 Dec 2022 23:16)  Xanax 0.25 mg oral tablet: 1 tab(s) orally every 12 hours, As Needed - for anxiety (19 Dec 2022 23:16)      MEDICATIONS  (STANDING):  apixaban 2.5 milliGRAM(s) Oral two times a day  atorvastatin 10 milliGRAM(s) Oral at bedtime  lactobacillus acidophilus 1 Tablet(s) Oral two times a day with meals  melatonin 3 milliGRAM(s) Oral at bedtime  metoprolol tartrate 25 milliGRAM(s) Oral two times a day  mirtazapine 15 milliGRAM(s) Oral at bedtime  sertraline 100 milliGRAM(s) Oral daily    MEDICATIONS  (PRN):  acetaminophen     Tablet .. 650 milliGRAM(s) Oral every 6 hours PRN Temp greater or equal to 38C (100.4F), Mild Pain (1 - 3)  albuterol/ipratropium for Nebulization 3 milliLiter(s) Nebulizer every 6 hours PRN Bronchospasm  guaiFENesin Oral Liquid (Sugar-Free) 200 milliGRAM(s) Oral every 6 hours PRN Cough  sodium chloride 0.65% Nasal 1 Spray(s) Both Nostrils two times a day PRN Nasal Congestion      Allergies    codeine (Other)    Intolerances        REVIEW OF SYSTEMS:  CONSTITUTIONAL: No fever, weight loss, or fatigue  EYES: No eye pain, visual disturbances, or discharge  ENMT:  No difficulty hearing, tinnitus, vertigo; No sinus or throat pain  NECK: No pain or stiffness  BREASTS: No pain, masses, or nipple discharge  RESPIRATORY: No cough, wheezing, chills or hemoptysis; No shortness of breath  CARDIOVASCULAR: No chest pain, palpitations, dizziness, or leg swelling  GASTROINTESTINAL: No abdominal or epigastric pain. No nausea, vomiting, or hematemesis; No diarrhea or constipation. No melena or hematochezia.  GENITOURINARY: No dysuria, frequency, hematuria, or incontinence  NEUROLOGICAL: No headaches, memory loss, loss of strength, numbness, or tremors  SKIN: No itching, burning, rashes, or lesions   LYMPH NODES: No enlarged glands  ENDOCRINE: No heat or cold intolerance; No hair loss  MUSCULOSKELETAL:rt foot swelling  PSYCHIATRIC: No depression, anxiety, mood swings, or difficulty sleeping  HEME/LYMPH: No easy bruising, or bleeding gums  ALLERGY AND IMMUNOLOGIC: No hives or eczema    Vital Signs Last 24 Hrs  T(C): 36.9 (26 Dec 2022 06:26), Max: 36.9 (25 Dec 2022 09:39)  T(F): 98.5 (26 Dec 2022 06:26), Max: 98.5 (26 Dec 2022 06:26)  HR: 100 (26 Dec 2022 06:26) (72 - 100)  BP: 126/71 (26 Dec 2022 06:26) (94/56 - 126/71)  BP(mean): --  RR: 19 (26 Dec 2022 06:26) (16 - 19)  SpO2: 96% (26 Dec 2022 06:26) (92% - 96%)    Parameters below as of 26 Dec 2022 06:26  Patient On (Oxygen Delivery Method): nasal cannula  O2 Flow (L/min): 4      PHYSICAL EXAM:  GENERAL: NAD, well-groomed, well-developed  HEAD:  Atraumatic, Normocephalic  EYES: EOMI, PERRLA, conjunctiva and sclera clear  ENMT: Moist mucous membranes,   NECK: Supple, No JVD, Normal thyroid  NERVOUS SYSTEM:  Alert & Oriented X3, non focal  CHEST/LUNG: Clear to percussion bilaterally; No rales, rhonchi, wheezing, or rubs  HEART: Regular rate and rhythm; No murmurs, rubs, or gallops  ABDOMEN: Soft, Nontender, Nondistended; Bowel sounds present  EXTREMITIES rt foot swelling  LYMPH: No lymphadenopathy noted  SKIN: No rashes or lesions    LABS:              CAPILLARY BLOOD GLUCOSE            Culture - Blood (collected 12-19-22 @ 21:34)  Source: .Blood Blood-Peripheral  Final Report (12-25-22 @ 14:00):    No Growth Final    Culture - Blood (collected 12-19-22 @ 21:34)  Source: .Blood Blood-Peripheral  Final Report (12-25-22 @ 14:00):    No Growth Final        I&O's Summary      RADIOLOGY & ADDITIONAL TESTS:    Imaging Personally Reviewed:  [x ] YES  [ ] NO    Consultant(s) Notes Reviewed:  [x ] YES  [ ] NO    Care Discussed with Consultants/Other Providers [x ] YES  [ ] NO

## 2022-12-25 NOTE — PROGRESS NOTE ADULT - SUBJECTIVE AND OBJECTIVE BOX
[facility], Division of Infectious Diseases  VANE Shine Y. Patel, S. Shah, G. [facility]  [phone]    Name: ARNULFO ALEX  Age: 89y  Gender: Female  MRN: [id number]    Interval History:  Patient seen and examined at bedside  No acute overnight events. Afebrile  Foot feeling better  Notes reviewed    Antibiotics:      Medications:  acetaminophen     Tablet .. 650 milliGRAM(s) Oral every 6 hours PRN  albuterol/ipratropium for Nebulization 3 milliLiter(s) Nebulizer every 6 hours PRN  ALPRAZolam 0.25 milliGRAM(s) Oral Once PRN  apixaban 2.5 milliGRAM(s) Oral two times a day  atorvastatin 10 milliGRAM(s) Oral at bedtime  guaiFENesin Oral Liquid (Sugar-Free) 200 milliGRAM(s) Oral every 6 hours PRN  ibuprofen  Tablet. 400 milliGRAM(s) Oral two times a day  lactobacillus acidophilus 1 Tablet(s) Oral two times a day with meals  melatonin 3 milliGRAM(s) Oral at bedtime  metoprolol tartrate 25 milliGRAM(s) Oral two times a day  mirtazapine 15 milliGRAM(s) Oral at bedtime  sertraline 100 milliGRAM(s) Oral daily      Review of Systems:  Review of systems otherwise negative except as previously noted.    Allergies: codeine (Other)    For details regarding the patient's past medical history, social history, family history, and other miscellaneous elements, please refer the initial infectious diseases consultation and/or the admitting history and physical examination for this admission.    Objective:  Vitals:   T(C): 36.6 (12-25-22 @ 13:48), Max: 36.9 (12-25-22 @ 09:39)  HR: 74 (12-25-22 @ 13:48) (69 - 80)  BP: 101/62 (12-25-22 @ 13:48) (94/56 - 105/61)  RR: 16 (12-25-22 @ 13:48) (16 - 18)  SpO2: 94% (12-25-22 @ 13:48) (92% - 96%)    Physical Examination:  General: no acute distress  HEENT: NC/AT, EOMI,  Cardio: S1, S2 heard, RRR, no murmurs  Resp: breath sounds heard bilaterally, no rales, wheezes or rhonchi  Abd: soft, NT, ND  Ext: nR foot swelling improved  Skin: warm, dry, no visible rash      Laboratory Studies:  CBC:   CMP:             Microbiology: reviewed    Culture - Blood (collected 12-19-22 @ 21:34)  Source: .Blood Blood-Peripheral  Final Report (12-25-22 @ 14:00):    No Growth Final    Culture - Blood (collected 12-19-22 @ 21:34)  Source: .Blood Blood-Peripheral  Final Report (12-25-22 @ 14:00):    No Growth Final          Radiology: reviewed

## 2022-12-25 NOTE — PROGRESS NOTE ADULT - ASSESSMENT
89yo F with PMH of HTN, Dyslipidemia, MR s/p Viola, KIMBER. Fib (off Eliquis 5 weeks ago for LGIB), Diastolic CHF, Chronic Hypoxic Respiratory Failure (on 3.5L NC), hx of COVID-19, CKD stage 3, Anemia, Anxiety, Depression brought in by ambulance from the [facility] [place]    flu  malaise  cough  htn  ckd  anemia  depression  HFpEF  hypoxemia  valv heart disease  pleural effusions    foot pain - ibuprofen added - eval in progress  on tamiflu  vs noted  labs reviewed  imaging reviewed  cm follow up noted    ID and Cardio eval noted  on isolation for the FLU  tamiflu  acap - cough rx regimen PRN  on nebs prn  oral and skin care  cvs rx regimen  BP control  monitor VS and Sat  on AC for AF - rate and rhythm control  assist with needs  GOC - documented - DNR

## 2022-12-25 NOTE — PROGRESS NOTE ADULT - ASSESSMENT
Pt is 89W w/ PMHx of HTN, HLD, MR s/p MitraClip, A. Fib (off Eliquis 5 weeks ago for LGIB), Diastolic CHF, Chronic Hypoxic Respiratory Failure (on 3.5L NC), hx of COVID-19, CKD stage 3, Anemia, Anxiety, Depression brought in by ambulance from the [facility] at [place] for evaluation of pneumonia.      R foot swelling/redness  R/o cellulitis  - afebrile, no leukocytosis  - uric acid elevated, suspect gout >> cellulitis  - monitor off Abx    Acute Influenza A  COPD exacerbation 2/2 above  Acute on chronic RF 2/2 above  MEME  - RVP + Flu A  - COVID negative  - cx NGTD  - CXR reviewed, no PNA  - procal noted 0.19  Plan:   Completed Tamiflu  Monitor off Abx  Trend temps/WBC  Trend renal fxn  Supportive care and supplemental O2 as needed    Covering weekend/holiday service through 12/26  Infectious Diseases will continue to follow. Please call with any questions.   Suzy Real M.D.  [facility] [place] Division of Infectious Diseases [phone]

## 2022-12-26 ENCOUNTER — TRANSCRIPTION ENCOUNTER (OUTPATIENT)
Age: 87
End: 2022-12-26

## 2022-12-26 VITALS
RESPIRATION RATE: 17 BRPM | SYSTOLIC BLOOD PRESSURE: 101 MMHG | DIASTOLIC BLOOD PRESSURE: 60 MMHG | OXYGEN SATURATION: 97 % | TEMPERATURE: 98 F | HEART RATE: 69 BPM

## 2022-12-26 LAB
ALBUMIN SERPL ELPH-MCNC: 2.7 G/DL — LOW (ref 3.3–5)
ALP SERPL-CCNC: 57 U/L — SIGNIFICANT CHANGE UP (ref 30–120)
ALT FLD-CCNC: <10 U/L DA — LOW (ref 10–60)
ANION GAP SERPL CALC-SCNC: 8 MMOL/L — SIGNIFICANT CHANGE UP (ref 5–17)
AST SERPL-CCNC: 13 U/L — SIGNIFICANT CHANGE UP (ref 10–40)
BASOPHILS # BLD AUTO: 0.03 K/UL — SIGNIFICANT CHANGE UP (ref 0–0.2)
BASOPHILS NFR BLD AUTO: 0.4 % — SIGNIFICANT CHANGE UP (ref 0–2)
BILIRUB SERPL-MCNC: 0.8 MG/DL — SIGNIFICANT CHANGE UP (ref 0.2–1.2)
BUN SERPL-MCNC: 24 MG/DL — HIGH (ref 7–23)
CALCIUM SERPL-MCNC: 8.3 MG/DL — LOW (ref 8.4–10.5)
CHLORIDE SERPL-SCNC: 101 MMOL/L — SIGNIFICANT CHANGE UP (ref 96–108)
CO2 SERPL-SCNC: 29 MMOL/L — SIGNIFICANT CHANGE UP (ref 22–31)
CREAT SERPL-MCNC: 1.2 MG/DL — SIGNIFICANT CHANGE UP (ref 0.5–1.3)
EGFR: 43 ML/MIN/1.73M2 — LOW
EOSINOPHIL # BLD AUTO: 0.23 K/UL — SIGNIFICANT CHANGE UP (ref 0–0.5)
EOSINOPHIL NFR BLD AUTO: 2.8 % — SIGNIFICANT CHANGE UP (ref 0–6)
GLUCOSE SERPL-MCNC: 136 MG/DL — HIGH (ref 70–99)
HCT VFR BLD CALC: 35.2 % — SIGNIFICANT CHANGE UP (ref 34.5–45)
HGB BLD-MCNC: 10.9 G/DL — LOW (ref 11.5–15.5)
IMM GRANULOCYTES NFR BLD AUTO: 0.9 % — SIGNIFICANT CHANGE UP (ref 0–0.9)
LYMPHOCYTES # BLD AUTO: 1.04 K/UL — SIGNIFICANT CHANGE UP (ref 1–3.3)
LYMPHOCYTES # BLD AUTO: 12.8 % — LOW (ref 13–44)
MCHC RBC-ENTMCNC: 26.5 PG — LOW (ref 27–34)
MCHC RBC-ENTMCNC: 31 GM/DL — LOW (ref 32–36)
MCV RBC AUTO: 85.6 FL — SIGNIFICANT CHANGE UP (ref 80–100)
MONOCYTES # BLD AUTO: 1.06 K/UL — HIGH (ref 0–0.9)
MONOCYTES NFR BLD AUTO: 13 % — SIGNIFICANT CHANGE UP (ref 2–14)
NEUTROPHILS # BLD AUTO: 5.71 K/UL — SIGNIFICANT CHANGE UP (ref 1.8–7.4)
NEUTROPHILS NFR BLD AUTO: 70.1 % — SIGNIFICANT CHANGE UP (ref 43–77)
NRBC # BLD: 0 /100 WBCS — SIGNIFICANT CHANGE UP (ref 0–0)
PLATELET # BLD AUTO: 171 K/UL — SIGNIFICANT CHANGE UP (ref 150–400)
POTASSIUM SERPL-MCNC: 4.1 MMOL/L — SIGNIFICANT CHANGE UP (ref 3.5–5.3)
POTASSIUM SERPL-SCNC: 4.1 MMOL/L — SIGNIFICANT CHANGE UP (ref 3.5–5.3)
PROT SERPL-MCNC: 6.7 G/DL — SIGNIFICANT CHANGE UP (ref 6–8.3)
RBC # BLD: 4.11 M/UL — SIGNIFICANT CHANGE UP (ref 3.8–5.2)
RBC # FLD: 23.1 % — HIGH (ref 10.3–14.5)
SARS-COV-2 RNA SPEC QL NAA+PROBE: SIGNIFICANT CHANGE UP
SODIUM SERPL-SCNC: 138 MMOL/L — SIGNIFICANT CHANGE UP (ref 135–145)
WBC # BLD: 8.14 K/UL — SIGNIFICANT CHANGE UP (ref 3.8–10.5)
WBC # FLD AUTO: 8.14 K/UL — SIGNIFICANT CHANGE UP (ref 3.8–10.5)

## 2022-12-26 PROCEDURE — 84550 ASSAY OF BLOOD/URIC ACID: CPT

## 2022-12-26 PROCEDURE — 99285 EMERGENCY DEPT VISIT HI MDM: CPT

## 2022-12-26 PROCEDURE — 96375 TX/PRO/DX INJ NEW DRUG ADDON: CPT

## 2022-12-26 PROCEDURE — 80053 COMPREHEN METABOLIC PANEL: CPT

## 2022-12-26 PROCEDURE — 83605 ASSAY OF LACTIC ACID: CPT

## 2022-12-26 PROCEDURE — 83880 ASSAY OF NATRIURETIC PEPTIDE: CPT

## 2022-12-26 PROCEDURE — 87040 BLOOD CULTURE FOR BACTERIA: CPT

## 2022-12-26 PROCEDURE — 84145 PROCALCITONIN (PCT): CPT

## 2022-12-26 PROCEDURE — 86140 C-REACTIVE PROTEIN: CPT

## 2022-12-26 PROCEDURE — 96374 THER/PROPH/DIAG INJ IV PUSH: CPT

## 2022-12-26 PROCEDURE — 93005 ELECTROCARDIOGRAM TRACING: CPT

## 2022-12-26 PROCEDURE — 97161 PT EVAL LOW COMPLEX 20 MIN: CPT

## 2022-12-26 PROCEDURE — 71045 X-RAY EXAM CHEST 1 VIEW: CPT

## 2022-12-26 PROCEDURE — 85025 COMPLETE CBC W/AUTO DIFF WBC: CPT

## 2022-12-26 PROCEDURE — 0225U NFCT DS DNA&RNA 21 SARSCOV2: CPT

## 2022-12-26 PROCEDURE — 73620 X-RAY EXAM OF FOOT: CPT

## 2022-12-26 PROCEDURE — 85610 PROTHROMBIN TIME: CPT

## 2022-12-26 PROCEDURE — 85730 THROMBOPLASTIN TIME PARTIAL: CPT

## 2022-12-26 PROCEDURE — 87635 SARS-COV-2 COVID-19 AMP PRB: CPT

## 2022-12-26 PROCEDURE — 83735 ASSAY OF MAGNESIUM: CPT

## 2022-12-26 PROCEDURE — 36415 COLL VENOUS BLD VENIPUNCTURE: CPT

## 2022-12-26 PROCEDURE — 94640 AIRWAY INHALATION TREATMENT: CPT

## 2022-12-26 PROCEDURE — 81001 URINALYSIS AUTO W/SCOPE: CPT

## 2022-12-26 PROCEDURE — 85027 COMPLETE CBC AUTOMATED: CPT

## 2022-12-26 RX ORDER — POLYETHYLENE GLYCOL 3350 17 G/17G
0 POWDER, FOR SOLUTION ORAL
Qty: 0 | Refills: 0 | DISCHARGE

## 2022-12-26 RX ORDER — APIXABAN 2.5 MG/1
1 TABLET, FILM COATED ORAL
Qty: 0 | Refills: 0 | DISCHARGE
Start: 2022-12-26

## 2022-12-26 RX ORDER — SENNA PLUS 8.6 MG/1
2 TABLET ORAL
Qty: 0 | Refills: 0 | DISCHARGE

## 2022-12-26 RX ORDER — LOPERAMIDE HCL 2 MG
1 TABLET ORAL
Qty: 0 | Refills: 0 | DISCHARGE

## 2022-12-26 RX ORDER — COLCHICINE 0.6 MG
0.6 TABLET ORAL
Refills: 0 | Status: DISCONTINUED | OUTPATIENT
Start: 2022-12-26 | End: 2022-12-26

## 2022-12-26 RX ORDER — ACETAMINOPHEN 500 MG
2 TABLET ORAL
Qty: 80 | Refills: 0
Start: 2022-12-26 | End: 2023-01-04

## 2022-12-26 RX ORDER — COLCHICINE 0.6 MG
1 TABLET ORAL
Qty: 8 | Refills: 0
Start: 2022-12-26 | End: 2022-12-29

## 2022-12-26 RX ADMIN — Medication 3 MILLILITER(S): at 02:00

## 2022-12-26 RX ADMIN — APIXABAN 2.5 MILLIGRAM(S): 2.5 TABLET, FILM COATED ORAL at 06:32

## 2022-12-26 RX ADMIN — SERTRALINE 100 MILLIGRAM(S): 25 TABLET, FILM COATED ORAL at 11:46

## 2022-12-26 RX ADMIN — Medication 0.25 MILLIGRAM(S): at 01:37

## 2022-12-26 RX ADMIN — Medication 1 TABLET(S): at 08:36

## 2022-12-26 RX ADMIN — Medication 650 MILLIGRAM(S): at 06:34

## 2022-12-26 RX ADMIN — Medication 25 MILLIGRAM(S): at 06:31

## 2022-12-26 RX ADMIN — Medication 200 MILLIGRAM(S): at 02:46

## 2022-12-26 NOTE — PROGRESS NOTE ADULT - ASSESSMENT
89yo F with PMH of HTN, Dyslipidemia, MR s/p Viola, KIMBER. Fib (off Eliquis 5 weeks ago for LGIB), Diastolic CHF, Chronic Hypoxic Respiratory Failure (on 3.5L NC), hx of COVID-19, CKD stage 3, Anemia, Anxiety, Depression brought in by ambulance from the [facility] [place] for evaluation of pneumonia.  Patient states that she has had a cough with yellow sputum over the past 2 weeks which has been getting worse.  States that she had chest x-ray at the facility yesterday and was told today that she has pneumonia. States that she feels tired. Denies chest pain, shortness of breath, fever, abdominal pain, nausea/vomiting, calf pain.  IN ED afebrile, no fever no leucocytosis, had x ray chest suspected PNA nad started on zosyn and vanc, and had elevated BUN creat- likely charis with dehydration, ereceived fluid bolus,, admitted for further management for  Pneumonia with influenza A  COPD with ch resp failure on o2  HTN  HLD  AFIB  Ch Diastolic heart failure  charis base line creat 1.15, diuretics held  started on IV abx, ID and pulmonary consult called, cardio consult  tamiflu started, no bacteroial element  abx stopped  goals of care discussed with patient and daughter Shirlene  DNR DNI MOLST filled 12/20.2022  no limitation on tt  rt foot swollen suddenly today with redness and heat- likely gout , improved on Nsaid  no trauma-x ray neg for any osseous pathology

## 2022-12-26 NOTE — REASON FOR VISIT
[Cardiac Failure] : cardiac failure [Other: _____] : [unfilled] [FreeTextEntry1] : PATIENT INSTRUCTIONS:\par \par 1. INCREASE the TORSEMIDE to 40 mg taken TWICE a day\par \par 2. INCREASE the SPIRONOLACTONE to 25 mg taken TWICE  a day\par \par 3. Continue the remainder of your medications\par \par 4. Labs either Monday or Tuesday next week\par \par 5. The NP will call you on Monday to check in on your weights\par \par 6. NP telehealth visit on Friday 12/16 \par \par 7. Follow-up with Dr. Lopes in 6 months, sooner if needed

## 2022-12-26 NOTE — END OF VISIT
[FreeTextEntry3] : I, Dr. Lopes, personally performed the evaluation and management (E/M) services for this established patient who presents today with (a) new problem(s)/exacerbation of (an) existing condition(s).  That E/M includes conducting the examination, assessing all new/exacerbated conditions, and establishing a new plan of care.  Today, my JULIUS, [employer]Charts, was here to observe my evaluation and management services for this new problem/exacerbated condition to be followed going forward.  [Time Spent: ___ minutes] : I have spent [unfilled] minutes of time on the encounter.

## 2022-12-26 NOTE — PHYSICAL EXAM
[No Xanthelasma] : no xanthelasma [No Rub] : no rub [No Gallop] : no gallop [Soft] : abdomen soft [Non Tender] : non-tender [Normal Bowel Sounds] : normal bowel sounds [Normal Radial B/L] : normal radial B/L [Edema ___] : edema [unfilled] [No Rash] : no rash [Normal] : moves all extremities, no focal deficits, normal speech [Alert and Oriented] : alert and oriented [No Cyanosis] : no cyanosis [No Clubbing] : no clubbing [de-identified] : R cheek/jaw swelling [de-identified] : II/VI systolic murmur at base [de-identified] : JVD moderately elevated sitting upright [de-identified] : Irregularly irregular S1 S2 [de-identified] : on 3L of O2 via NC [de-identified] : unable to appreciate HSM or masses due to body habitus [de-identified] : using wheelchair for distance [de-identified] : warm peripherally

## 2022-12-26 NOTE — DISCHARGE NOTE PROVIDER - NSDCMRMEDTOKEN_GEN_ALL_CORE_FT
acetaminophen 325 mg oral tablet: 2 tab(s) orally every 6 hours, As Needed -Temp greater or equal to 38C (100.4F),  Pain mod  apixaban 2.5 mg oral tablet: 1 tab(s) orally 2 times a day  atorvastatin 10 mg oral tablet: 1 tab(s) orally once a day (at bedtime)  colchicine 0.6 mg oral tablet: 1 tab(s) orally 2 times a day  ferrous sulfate 325 mg (65 mg elemental iron) oral tablet: 1 tab(s) orally once a day  fluticasone propionate 55 mcg/inh inhalation powder: 1 puff(s) inhaled every 12 hours  melatonin 3 mg oral tablet: 2 tab(s) orally once a day (at bedtime)  metoprolol tartrate 25 mg oral tablet: 1 tab(s) orally 2 times a day  MiraLax oral powder for reconstitution: orally once a day (at bedtime) if needed for constipation  mirtazapine 15 mg oral tablet: 1 tab(s) orally once a day (at bedtime)  Nasal Saline 0.65% nasal spray: 1 spray(s) nasal 2 times a day both nostrils   ramelteon 8 mg oral tablet: 1 tab(s) orally once a day (at bedtime)  sertraline 25 mg oral tablet: 3 tab(s) orally once a day  Xanax 0.25 mg oral tablet: 1 tab(s) orally every 12 hours, As Needed - for anxiety   acetaminophen 325 mg oral tablet: 2 tab(s) orally every 6 hours, As Needed -Temp greater or equal to 38C (100.4F),  Pain mod  apixaban 2.5 mg oral tablet: 1 tab(s) orally 2 times a day  atorvastatin 10 mg oral tablet: 1 tab(s) orally once a day (at bedtime)  colchicine 0.6 mg oral tablet: 1 tab(s) orally 2 times a day  ferrous sulfate 325 mg (65 mg elemental iron) oral tablet: 1 tab(s) orally once a day  fluticasone propionate 55 mcg/inh inhalation powder: 1 puff(s) inhaled every 12 hours  melatonin 3 mg oral tablet: 2 tab(s) orally once a day (at bedtime)  metoprolol tartrate 25 mg oral tablet: 1 tab(s) orally 2 times a day  MiraLax oral powder for reconstitution: orally once a day (at bedtime) if needed for constipation  mirtazapine 15 mg oral tablet: 1 tab(s) orally once a day (at bedtime)  Nasal Saline 0.65% nasal spray: 1 spray(s) nasal 2 times a day both nostrils   PT OT:   ramelteon 8 mg oral tablet: 1 tab(s) orally once a day (at bedtime)  sertraline 25 mg oral tablet: 3 tab(s) orally once a day  Xanax 0.25 mg oral tablet: 1 tab(s) orally every 12 hours, As Needed - for anxiety

## 2022-12-26 NOTE — DISCHARGE NOTE NURSING/CASE MANAGEMENT/SOCIAL WORK - PATIENT PORTAL LINK FT
You can access the FollowMyHealth Patient Portal offered by [facility] by registering at the following website: [email or web address]. By joining "[employer]’s FollowMyHealth portal, you will also be able to view your health information using other applications (apps) compatible with our system.

## 2022-12-26 NOTE — PROGRESS NOTE ADULT - PROVIDER SPECIALTY LIST ADULT
Pulmonology
Cardiology
Internal Medicine
Pulmonology
Cardiology
Cardiology
Infectious Disease
Internal Medicine
Pulmonology
Pulmonology
Internal Medicine

## 2022-12-26 NOTE — DISCHARGE NOTE PROVIDER - NSDCFUSCHEDAPPT_GEN_ALL_CORE_FT
[facility]  HEARTFAIL [address] [facility] [address]  Scheduled Appointment: 12/27/2022    Khari Henley  [facility]  PULMMED [address] [facility] [address]  Scheduled Appointment: 12/28/2022    Marjorie Jones  [facility]  GERIATRICS [address]   Scheduled Appointment: 01/11/2023

## 2022-12-26 NOTE — PROGRESS NOTE ADULT - ASSESSMENT
The patient is a 89 year old female with a history of HTN, HL, atrial fibrillation, MitraClip, chronic diastolic heart failure, GI bleed who presents with shortness of breath in the setting of influenza.    12/26/22  Seen at [facility]  Son at bedside  Sitting in chair  No cardiac complaints  Right foot much improved  Uric acid-8.0 (12/24/22)    Plan:  - ECG with known AF and otherwise nonspecific findings  - CXR with no PNA or pulm edema  - BNP 4421  - Echo 11/22 with normal LV systolic function, normal MitraClip function, mild pulm HTN  - Continue to hold torsemide 20 mg PO for possible gout  - Motrin  - Continue metoprolol tartrate 25 mg bid  - Continue apixaban 2.5 mg bid  - Influenza positive  - On tamiflu  - ID follow-up  - Discharge planning

## 2022-12-26 NOTE — DISCHARGE NOTE PROVIDER - NSDCCPCAREPLAN_GEN_ALL_CORE_FT
PRINCIPAL DISCHARGE DIAGNOSIS  Diagnosis: Pneumonia due to influenza A virus  Assessment and Plan of Treatment: completed tamiflu      SECONDARY DISCHARGE DIAGNOSES  Diagnosis: Gout  Assessment and Plan of Treatment: colchicine, f up with rheumatologist

## 2022-12-26 NOTE — DISCHARGE NOTE PROVIDER - NSFTFHOMEHTHYNRD_GEN_ALL_CORE
DATE OF SERVICE:  07/20/2021

 

SUBJECTIVE:  Naye is postop day #1.  Oral intake 200.  Urine output 900.  MARY drains x4 has

put out 150, 20, 25, and 20 of a light red drainage.  Pain has been controlled with Tylenol.

 

REVIEW OF SYSTEMS:  Remainder of review of systems negative for any pertinent positives and

negatives.

 

OBJECTIVE:  GENERAL:  Vivian Schoenborn is a pleasant 75-year-old female.

VITAL SIGNS:  TPR is 96.9, 53, 18, blood pressure 161/88.

HEENT:  Negative.

NECK:  Supple.

HEART:  Regular rate and rhythm.

SKIN:  Ace wrap pressure dressing over bilateral mastectomy site incisions.  MARY drains x4 as

noted above.

ABDOMEN:  Soft, nontender.

EXTREMITIES:  Without peripheral edema.

 

ASSESSMENT:  Bilateral modified radical mastectomy for cancer, left breast, and prophylactic

right mastectomy for left breast cancer.  Date of procedure:  07/19/2021.  Surgeon:  Fabricio Garcia MD.

 

PLAN:

1. Regular diet.

2. Set up home health care.

3. MARY drain home teaching.

4. Plan discharge in a.m.

5. We will evaluate p.r.n. or in a.m.

 

 

 

 

Guillermina Villa PA-C

DD:  07/20/2021 08:14:46

DT:  07/20/2021 09:19:42

Job #:  [id number] Yes

## 2022-12-26 NOTE — PROGRESS NOTE ADULT - REASON FOR ADMISSION
cough

## 2022-12-26 NOTE — PROGRESS NOTE ADULT - SUBJECTIVE AND OBJECTIVE BOX
Patient is a 89y old  Female who presents with a chief complaint of cough (26 Dec 2022 06:19)      INTERVAL HPI/OVERNIGHT EVENTS:    Home Medications:  ferrous sulfate 325 mg (65 mg elemental iron) oral tablet: 1 tab(s) orally once a day (19 Dec 2022 23:16)  fluticasone propionate 55 mcg/inh inhalation powder: 1 puff(s) inhaled every 12 hours (19 Dec 2022 23:16)  loperamide 2 mg oral tablet: 1 cap(s) orally once a day, As Needed (19 Dec 2022 23:16)  melatonin 3 mg oral tablet: 2 tab(s) orally once a day (at bedtime) (19 Dec 2022 23:16)  MiraLax oral powder for reconstitution: orally once a day (at bedtime) (19 Dec 2022 23:16)  mirtazapine 15 mg oral tablet: 1 tab(s) orally once a day (at bedtime) (19 Dec 2022 23:16)  Nasal Saline 0.65% nasal spray: 1 spray(s) nasal 2 times a day both nostrils  (19 Dec 2022 23:16)  ramelteon 8 mg oral tablet: 1 tab(s) orally once a day (at bedtime) (19 Dec 2022 23:16)  Senna 8.6 mg oral tablet: 2 tab(s) orally once a day (19 Dec 2022 23:16)  sertraline 25 mg oral tablet: 3 tab(s) orally once a day (19 Dec 2022 23:16)  spironolactone 25 mg oral tablet: 1 tab(s) orally once a day (19 Dec 2022 23:16)  torsemide 20 mg oral tablet: 3 tab(s) orally 2 times a day (19 Dec 2022 23:16)  Xanax 0.25 mg oral tablet: 1 tab(s) orally every 12 hours, As Needed - for anxiety (19 Dec 2022 23:16)      MEDICATIONS  (STANDING):  apixaban 2.5 milliGRAM(s) Oral two times a day  atorvastatin 10 milliGRAM(s) Oral at bedtime  lactobacillus acidophilus 1 Tablet(s) Oral two times a day with meals  melatonin 3 milliGRAM(s) Oral at bedtime  metoprolol tartrate 25 milliGRAM(s) Oral two times a day  mirtazapine 15 milliGRAM(s) Oral at bedtime  sertraline 100 milliGRAM(s) Oral daily    MEDICATIONS  (PRN):  acetaminophen     Tablet .. 650 milliGRAM(s) Oral every 6 hours PRN Temp greater or equal to 38C (100.4F), Mild Pain (1 - 3)  albuterol/ipratropium for Nebulization 3 milliLiter(s) Nebulizer every 6 hours PRN Bronchospasm  guaiFENesin Oral Liquid (Sugar-Free) 200 milliGRAM(s) Oral every 6 hours PRN Cough  sodium chloride 0.65% Nasal 1 Spray(s) Both Nostrils two times a day PRN Nasal Congestion      Allergies    codeine (Other)    Intolerances        REVIEW OF SYSTEMS:  CONSTITUTIONAL: No fever, weight loss, or fatigue  EYES: No eye pain, visual disturbances, or discharge  ENMT:  No difficulty hearing, tinnitus, vertigo; No sinus or throat pain  NECK: No pain or stiffness  BREASTS: No pain, masses, or nipple discharge  RESPIRATORY: No cough, wheezing, chills or hemoptysis; No shortness of breath  CARDIOVASCULAR: No chest pain, palpitations, dizziness, or leg swelling  GASTROINTESTINAL: No abdominal or epigastric pain. No nausea, vomiting, or hematemesis; No diarrhea or constipation. No melena or hematochezia.  GENITOURINARY: No dysuria, frequency, hematuria, or incontinence  NEUROLOGICAL: No headaches, memory loss, loss of strength, numbness, or tremors  SKIN: No itching, burning, rashes, or lesions   LYMPH NODES: No enlarged glands  ENDOCRINE: No heat or cold intolerance; No hair loss  MUSCULOSKELETAL: No joint pain or swelling; No muscle, back, or extremity pain  PSYCHIATRIC: No depression, anxiety, mood swings, or difficulty sleeping  HEME/LYMPH: No easy bruising, or bleeding gums  ALLERGY AND IMMUNOLOGIC: No hives or eczema    Vital Signs Last 24 Hrs  T(C): 36.9 (26 Dec 2022 06:26), Max: 36.9 (26 Dec 2022 06:26)  T(F): 98.5 (26 Dec 2022 06:26), Max: 98.5 (26 Dec 2022 06:26)  HR: 100 (26 Dec 2022 06:26) (72 - 100)  BP: 126/71 (26 Dec 2022 06:26) (101/62 - 126/71)  BP(mean): --  RR: 19 (26 Dec 2022 06:26) (16 - 19)  SpO2: 96% (26 Dec 2022 06:26) (92% - 96%)    Parameters below as of 26 Dec 2022 06:26  Patient On (Oxygen Delivery Method): nasal cannula  O2 Flow (L/min): 4      PHYSICAL EXAM:  GENERAL: NAD, well-groomed, well-developed  HEAD:  Atraumatic, Normocephalic  EYES: EOMI, PERRLA, conjunctiva and sclera clear  ENMT: Moist mucous membranes,   NECK: Supple, No JVD, Normal thyroid  NERVOUS SYSTEM:  Alert & Oriented X3, Good concentration; Motor Strength 5/5 B/L upper and lower extremities; DTRs 2+ intact and symmetric  CHEST/LUNG: Clear to percussion bilaterally; No rales, rhonchi, wheezing, or rubs  HEART: Regular rate and rhythm; No murmurs, rubs, or gallops  ABDOMEN: Soft, Nontender, Nondistended; Bowel sounds present  EXTREMITIES:  2+ Peripheral Pulses, No clubbing, cyanosis, or edema  LYMPH: No lymphadenopathy noted  SKIN: No rashes or lesions    LABS:              CAPILLARY BLOOD GLUCOSE            Culture - Blood (collected 12-19-22 @ 21:34)  Source: .Blood Blood-Peripheral  Final Report (12-25-22 @ 14:00):    No Growth Final    Culture - Blood (collected 12-19-22 @ 21:34)  Source: .Blood Blood-Peripheral  Final Report (12-25-22 @ 14:00):    No Growth Final        I&O's Summary      RADIOLOGY & ADDITIONAL TESTS:    Imaging Personally Reviewed:  [ ] YES  [ ] NO    Consultant(s) Notes Reviewed:  [ ] YES  [ ] NO    Care Discussed with Consultants/Other Providers [ ] YES  [ ] NO Patient is a 89y old  Female who presents with a chief complaint of cough (26 Dec 2022 06:19)      INTERVAL HPI/OVERNIGHT EVENTS:overnight evnts notee    Home Medications:  ferrous sulfate 325 mg (65 mg elemental iron) oral tablet: 1 tab(s) orally once a day (19 Dec 2022 23:16)  fluticasone propionate 55 mcg/inh inhalation powder: 1 puff(s) inhaled every 12 hours (19 Dec 2022 23:16)  loperamide 2 mg oral tablet: 1 cap(s) orally once a day, As Needed (19 Dec 2022 23:16)  melatonin 3 mg oral tablet: 2 tab(s) orally once a day (at bedtime) (19 Dec 2022 23:16)  MiraLax oral powder for reconstitution: orally once a day (at bedtime) (19 Dec 2022 23:16)  mirtazapine 15 mg oral tablet: 1 tab(s) orally once a day (at bedtime) (19 Dec 2022 23:16)  Nasal Saline 0.65% nasal spray: 1 spray(s) nasal 2 times a day both nostrils  (19 Dec 2022 23:16)  ramelteon 8 mg oral tablet: 1 tab(s) orally once a day (at bedtime) (19 Dec 2022 23:16)  Senna 8.6 mg oral tablet: 2 tab(s) orally once a day (19 Dec 2022 23:16)  sertraline 25 mg oral tablet: 3 tab(s) orally once a day (19 Dec 2022 23:16)  spironolactone 25 mg oral tablet: 1 tab(s) orally once a day (19 Dec 2022 23:16)  torsemide 20 mg oral tablet: 3 tab(s) orally 2 times a day (19 Dec 2022 23:16)  Xanax 0.25 mg oral tablet: 1 tab(s) orally every 12 hours, As Needed - for anxiety (19 Dec 2022 23:16)      MEDICATIONS  (STANDING):  apixaban 2.5 milliGRAM(s) Oral two times a day  atorvastatin 10 milliGRAM(s) Oral at bedtime  lactobacillus acidophilus 1 Tablet(s) Oral two times a day with meals  melatonin 3 milliGRAM(s) Oral at bedtime  metoprolol tartrate 25 milliGRAM(s) Oral two times a day  mirtazapine 15 milliGRAM(s) Oral at bedtime  sertraline 100 milliGRAM(s) Oral daily    MEDICATIONS  (PRN):  acetaminophen     Tablet .. 650 milliGRAM(s) Oral every 6 hours PRN Temp greater or equal to 38C (100.4F), Mild Pain (1 - 3)  albuterol/ipratropium for Nebulization 3 milliLiter(s) Nebulizer every 6 hours PRN Bronchospasm  guaiFENesin Oral Liquid (Sugar-Free) 200 milliGRAM(s) Oral every 6 hours PRN Cough  sodium chloride 0.65% Nasal 1 Spray(s) Both Nostrils two times a day PRN Nasal Congestion      Allergies    codeine (Other)    Intolerances        REVIEW OF SYSTEMS:  CONSTITUTIONAL: No fever, weight loss, or fatigue  EYES: No eye pain, visual disturbances, or discharge  ENMT:  No difficulty hearing, tinnitus, vertigo; No sinus or throat pain  NECK: No pain or stiffness  BREASTS: No pain, masses, or nipple discharge  RESPIRATORY: No cough, wheezing, chills or hemoptysis; No shortness of breath  CARDIOVASCULAR: No chest pain, palpitations, dizziness, or leg swelling  GASTROINTESTINAL: No abdominal or epigastric pain. No nausea, vomiting, or hematemesis;   GENITOURINARY: No dysuria, frequency, hematuria, or incontinence  NEUROLOGICAL: No headaches, memory loss, loss of strength, numbness, or tremors  SKIN: No itching, burning, rashes, or lesions       Vital Signs Last 24 Hrs  T(C): 36.9 (26 Dec 2022 06:26), Max: 36.9 (26 Dec 2022 06:26)  T(F): 98.5 (26 Dec 2022 06:26), Max: 98.5 (26 Dec 2022 06:26)  HR: 100 (26 Dec 2022 06:26) (72 - 100)  BP: 126/71 (26 Dec 2022 06:26) (101/62 - 126/71)  BP(mean): --  RR: 19 (26 Dec 2022 06:26) (16 - 19)  SpO2: 96% (26 Dec 2022 06:26) (92% - 96%)    Parameters below as of 26 Dec 2022 06:26  Patient On (Oxygen Delivery Method): nasal cannula  O2 Flow (L/min): 4      PHYSICAL EXAM:  GENERAL:, well-groomed, well-developed  HEAD:  Atraumatic, Normocephalic  EYES: EOMI, PERRLA, conjunctiva and sclera clear  ENMT: Moist mucous membranes,   NECK: Supple, No JVD, Normal thyroid  NERVOUS SYSTEM:  Alert & Oriented X3, non focal  CHEST/LUNG: Clear to percussion bilaterally; No rales, rhonchi, wheezing, or rubs  HEART: Regular rate and rhythm; No murmurs, rubs, or gallops  ABDOMEN: Soft, Nontender, Nondistended; Bowel sounds present  EXTREMITIES:  Rt foot swelling improving  LYMPH: No lymphadenopathy noted  SKIN: No rashes or lesions    LABS:              CAPILLARY BLOOD GLUCOSE            Culture - Blood (collected 12-19-22 @ 21:34)  Source: .Blood Blood-Peripheral  Final Report (12-25-22 @ 14:00):    No Growth Final    Culture - Blood (collected 12-19-22 @ 21:34)  Source: .Blood Blood-Peripheral  Final Report (12-25-22 @ 14:00):    No Growth Final        I&O's Summary      RADIOLOGY & ADDITIONAL TESTS:    Imaging Personally Reviewed:  [x ] YES  [ ] NO    Consultant(s) Notes Reviewed:  [ x] YES  [ ] NO    Care Discussed with Consultants/Other Providers [x ] YES  [ ] NO

## 2022-12-26 NOTE — PROGRESS NOTE ADULT - PROBLEM SELECTOR PLAN 5
cardiac meds to cont restart diuresis as soon as can

## 2022-12-26 NOTE — PROGRESS NOTE ADULT - PROBLEM SELECTOR PLAN 6
held diuresis, gentle hydration

## 2022-12-26 NOTE — HISTORY OF PRESENT ILLNESS
[FreeTextEntry1] : Mrs. Mix is a very pleasant 89 year old woman with history of HFpEF (LVEF 68% 7/2022), MR s/p Mitraclip (8/30/16), mixed pre- and post-capillary pulmonary hypertension, permanent AFib off AC since 11/2022 due to GIB requiring transfusions, hypertension, hyperlipidemia, CKD (b/l Cr 1.2-1.3), SHERYL and hypoxia on home O2 3-4L who presents today for routine follow-up of her cardiomyopathy. \par \par Since last seen by Dr. Lopes 3 months ago, developed GIB requiring transfusions. Hospitalized at [facility] in September for Hgb of 6.9 in the setting of GIB thought to be diverticular and elected to manage conservatively. She was transfused with PRBC and Pradaxa was stopped. With retrial of AC in October, Eliquis 2.5 mg BID, again developed a drop in Hgb, readmitted and transfused. Of note, she was discharged on Lasix instead of Torsemide. \par \par More recently, reported uptrending weight and LE swelling for which she was instructed to resume Torsemide and increase the dosage to 40 mg QD since 11/21. She notes no improvement in weight or LE swelling with this. Home weight today was 163.4 and office weight is 165 lbs which is notably elevated from 151 lbs in September when she was euvolemic. She can manage walking slowly with her walker short distances and is using home O2 continuously, 3L. She denies CP, SOB at rest, orthopnea, PND, LH/dizziness, abdominal discomfort, palpitations, and syncope. No observed hematochezia or melena. Her appetite is normal and her bowel and bladder habits are unchanged and normal for her. She does not have an ICD. She has been limiting fluid and sodium in her diet and taking her medications as directed. She does not use NSAIDs.\par

## 2022-12-26 NOTE — PROGRESS NOTE ADULT - PROBLEM SELECTOR PLAN 4
rate control ac

## 2022-12-26 NOTE — PROGRESS NOTE ADULT - PROBLEM SELECTOR PROBLEM 6
MEME (acute kidney injury)

## 2022-12-26 NOTE — PROGRESS NOTE ADULT - PROBLEM SELECTOR PROBLEM 3
Benign essential HTN
no
Benign essential HTN

## 2022-12-26 NOTE — PROGRESS NOTE ADULT - SUBJECTIVE AND OBJECTIVE BOX
Patient is a 89y Female with a known history of :  Pneumonia [J18.9]    COPD exacerbation [J44.1]    Benign essential HTN [I10]    Paroxysmal atrial fibrillation [I48.0]    Chronic diastolic congestive heart failure [I50.32]    MEME (acute kidney injury) [N17.9]      HPI:  89yo F with PMH of HTN, Dyslipidemia, MR s/p MitraClcarl, A. Fib (off Eliquis 5 weeks ago for LGIB), Diastolic CHF, Chronic Hypoxic Respiratory Failure (on 3.5L NC), hx of COVID-19, CKD stage 3, Anemia, Anxiety, Depression brought in by ambulance from the [facility] at [place] for evaluation of pneumonia.  Patient states that she has had a cough with yellow sputum over the past 2 weeks which has been getting worse.  States that she had chest x-ray at the facility yesterday and was told today that she has pneumonia. States that she feels tired. Denies chest pain, shortness of breath, fever, abdominal pain, nausea/vomiting, calf pain.  IN ED afebrile, no fever no leucocytosis, had x ray chest suspected PNA nad started on zosyn and vanc, and had elevated BUN creat- likely meme with dehydration, ereceived fluid bolus,, admitted for further management (19 Dec 2022 22:44)      REVIEW OF SYSTEMS:    CONSTITUTIONAL: No fever, weight loss, or fatigue  EYES: No eye pain, visual disturbances, or discharge  ENMT:  No difficulty hearing, tinnitus, vertigo; No sinus or throat pain  NECK: No pain or stiffness  BREASTS: No pain, masses, or nipple discharge  RESPIRATORY: No cough, wheezing, chills or hemoptysis; No shortness of breath  CARDIOVASCULAR: No chest pain, palpitations, dizziness, or leg swelling  GASTROINTESTINAL: No abdominal or epigastric pain. No nausea, vomiting, or hematemesis; No diarrhea or constipation. No melena or hematochezia.  GENITOURINARY: No dysuria, frequency, hematuria, or incontinence  NEUROLOGICAL: No headaches, memory loss, loss of strength, numbness, or tremors  SKIN: No itching, burning, rashes, or lesions   LYMPH NODES: No enlarged glands  ENDOCRINE: No heat or cold intolerance; No hair loss  MUSCULOSKELETAL: No joint pain or swelling; No muscle, back, or extremity pain  PSYCHIATRIC: No depression, anxiety, mood swings, or difficulty sleeping  HEME/LYMPH: No easy bruising, or bleeding gums  ALLERGY AND IMMUNOLOGIC: No hives or eczema    MEDICATIONS  (STANDING):  apixaban 2.5 milliGRAM(s) Oral two times a day  atorvastatin 10 milliGRAM(s) Oral at bedtime  colchicine 0.6 milliGRAM(s) Oral two times a day  lactobacillus acidophilus 1 Tablet(s) Oral two times a day with meals  melatonin 3 milliGRAM(s) Oral at bedtime  metoprolol tartrate 25 milliGRAM(s) Oral two times a day  mirtazapine 15 milliGRAM(s) Oral at bedtime  sertraline 100 milliGRAM(s) Oral daily    MEDICATIONS  (PRN):  acetaminophen     Tablet .. 650 milliGRAM(s) Oral every 6 hours PRN Temp greater or equal to 38C (100.4F), Mild Pain (1 - 3)  albuterol/ipratropium for Nebulization 3 milliLiter(s) Nebulizer every 6 hours PRN Bronchospasm  guaiFENesin Oral Liquid (Sugar-Free) 200 milliGRAM(s) Oral every 6 hours PRN Cough  sodium chloride 0.65% Nasal 1 Spray(s) Both Nostrils two times a day PRN Nasal Congestion      ALLERGIES: codeine (Other)      FAMILY HISTORY:  Family history of blood disorder (Mother)        Social history:  Alochol:   Smoking:   Drug Use:   Marital Status:     PHYSICAL EXAMINATION:  -----------------------------  T(C): 36.9 (12-26-22 @ 06:26), Max: 36.9 (12-26-22 @ 06:26)  HR: 100 (12-26-22 @ 06:26) (72 - 100)  BP: 126/71 (12-26-22 @ 06:26) (101/62 - 126/71)  RR: 19 (12-26-22 @ 06:26) (16 - 19)  SpO2: 96% (12-26-22 @ 06:26) (92% - 96%)  Wt(kg): --        Constitutional: well developed, normal appearance, well groomed, well nourished, no deformities and no acute distress.   Eyes: the conjunctiva exhibited no abnormalities and the eyelids demonstrated no xanthelasmas.   HEENT: normal oral mucosa, no oral pallor and no oral cyanosis.   Neck: normal jugular venous A waves present, normal jugular venous V waves present and no jugular venous camargo A waves.   Pulmonary: no respiratory distress, normal respiratory rhythm and effort, no accessory muscle use and lungs were clear to auscultation bilaterally. Anteriorly  Cardiovascular: heart rate and rhythm were normal, normal S1 and S2 and no murmur, gallop, rub, heave or thrill are present.   Musculoskeletal: the gait could not be assessed.   Extremities: no clubbing of the fingernails, no localized cyanosis, no petechial hemorrhages and no ischemic changes. Wearing stocking and shoe, but still swollen and improved per patient   Skin: normal skin color and pigmentation, no rash, no venous stasis, no skin lesions, no skin ulcer and no xanthoma was observed.   Psychiatric: oriented to person, place, and time, the affect was normal, the mood was normal and not feeling anxious.     LABS:   --------  12-26    138  |  101  |  24<H>  ----------------------------<  136<H>  4.1   |  29  |  1.20    Ca    8.3<L>      26 Dec 2022 10:08    TPro  6.7  /  Alb  2.7<L>  /  TBili  0.8  /  DBili  x   /  AST  13  /  ALT  <10<L>  /  AlkPhos  57  12-26                         10.9   8.14  )-----------( 171      ( 26 Dec 2022 10:08 )             35.2                   Radiology:

## 2022-12-26 NOTE — PROGRESS NOTE ADULT - ASSESSMENT
89yo F with PMH of HTN, Dyslipidemia, MR s/p MitraClcarl, A. Fib (off Eliquis 5 weeks ago for LGIB), Diastolic CHF, Chronic Hypoxic Respiratory Failure (on 3.5L NC), hx of COVID-19, CKD stage 3, Anemia, Anxiety, Depression brought in by ambulance from the [facility] [place]    flu  malaise  cough  htn  ckd  anemia  depression  HFpEF  hypoxemia  valv heart disease  pleural effusions      s/p tamiflu  vs noted  labs reviewed  imaging reviewed  cm follow up noted    ID and Cardio eval noted  on isolation for the FLU  acap - cough rx regimen PRN  on nebs prn  oral and skin care  cvs rx regimen  BP control  monitor VS and Sat  on AC for AF - rate and rhythm control  assist with needs  GOC - documented - DNR

## 2022-12-26 NOTE — PROGRESS NOTE ADULT - PROBLEM SELECTOR PLAN 3
cont home meds

## 2022-12-26 NOTE — DISCUSSION/SUMMARY
[Patient] : the patient [FreeTextEntry1] : 89 yo F with chronic diastolic HF, rate-controlled AFib, severe MR s/p Viola, CKD stage 3 and chronic hypoxia on home O2 who presents for follow-up after recent hospitalizations for hematochezia requiring blood products. She is ACC/AHA Stage C, NYHA Class III, and volume expanded. I have recommended the following:\par \par 1. Acute on chronic diastolic heart failure - Generally stable, but ~10 lbs above dry weight. Increase Torsemide to 40 mg BID and empirically increase Spironolactone to 25 mg BID to preserve K. She previously diuresed well to this. Will target a weight on home scale of 153 lbs and clinic scale 155 lbs. Previously did not tolerate Farxiga due to abdominal discomfort. Labs early next week. \par \par 2. Pulmonary hypertension - Stable for years despite severely elevated pulmonary pressures by TTE. At the time of her last RHC on 3/11/19, PA 61/21/35, PCW 12 without a response to inhaled NO and with PA 71.3%, PVR 3.77 Mendez. TTE from 3/15/22 reviewed with Dr. Moore (structural cardiology) who did not think there was significant or new MS.\par \par 3. AFib - Rate controlled on beta blocker. Off AC due to recurrent GIB requiring transfusions.\par \par 4. Lower GIB - Hospitalized September 2022 for Hgb of 6.9 s/p 2U PRBC and Pradaxa was stopped. Failed trial of Eliquis 2.5 mg BID, readmitted October 2022 requiring transfusion. GI thought this to be a diverticular bleed and family opted to manage conservatively. \par \par 5. CKD stage 3 - Stable on current therapies. Baseline Cr 1.2-1.3. \par \par 6. Hypertension - Well controlled\par \par 7. Anxiety and Depression- On Sertraline 75 mg QD. Recently established with geriatrician Dr. Jones\par \par 8. Insomnia- Continue Mirtazipine 7.5 mg qHS. Recommended against hypnotic sleep aids and benzodiazepines.\par \par 9. Follow-up with the HF NP via telephone on Monday 12/12 then a formal NP telehealth visit on Friday 12/16. Return to Dr. Lopes in 6 months, sooner if needed.

## 2022-12-26 NOTE — PROGRESS NOTE ADULT - SUBJECTIVE AND OBJECTIVE BOX
Date/Time Patient Seen:  		  Referring MD:   Data Reviewed	       Patient is a 89y old  Female who presents with a chief complaint of cough (25 Dec 2022 15:50)      Subjective/HPI     PAST MEDICAL & SURGICAL HISTORY:  HTN (hypertension)    HLD (hyperlipidemia)    Atrial fibrillation  On Pradaxa    Syncope    Depression    Leg edema    Mitral valve stenosis, unspecified etiology    Hearing loss of left ear    H/O knee surgery    H/O external ear surgery    Cataracta          Medication list         MEDICATIONS  (STANDING):  apixaban 2.5 milliGRAM(s) Oral two times a day  atorvastatin 10 milliGRAM(s) Oral at bedtime  ibuprofen  Tablet. 400 milliGRAM(s) Oral two times a day  lactobacillus acidophilus 1 Tablet(s) Oral two times a day with meals  melatonin 3 milliGRAM(s) Oral at bedtime  metoprolol tartrate 25 milliGRAM(s) Oral two times a day  mirtazapine 15 milliGRAM(s) Oral at bedtime  sertraline 100 milliGRAM(s) Oral daily    MEDICATIONS  (PRN):  acetaminophen     Tablet .. 650 milliGRAM(s) Oral every 6 hours PRN Temp greater or equal to 38C (100.4F), Mild Pain (1 - 3)  albuterol/ipratropium for Nebulization 3 milliLiter(s) Nebulizer every 6 hours PRN Bronchospasm  guaiFENesin Oral Liquid (Sugar-Free) 200 milliGRAM(s) Oral every 6 hours PRN Cough  sodium chloride 0.65% Nasal 1 Spray(s) Both Nostrils two times a day PRN Nasal Congestion         Vitals log        ICU Vital Signs Last 24 Hrs  T(C): 36.7 (25 Dec 2022 18:32), Max: 36.9 (25 Dec 2022 09:39)  T(F): 98 (25 Dec 2022 18:32), Max: 98.4 (25 Dec 2022 09:39)  HR: 72 (25 Dec 2022 18:32) (72 - 77)  BP: 104/65 (25 Dec 2022 18:32) (94/56 - 104/65)  BP(mean): --  ABP: --  ABP(mean): --  RR: 18 (25 Dec 2022 18:32) (16 - 18)  SpO2: 92% (25 Dec 2022 18:32) (92% - 95%)    O2 Parameters below as of 25 Dec 2022 18:32  Patient On (Oxygen Delivery Method): nasal cannula  O2 Flow (L/min): 4               Input and Output:  I&O's Detail      Lab Data                  Review of Systems	      Objective     Physical Examination  heart s1s2  lung dec BS  head nc        Pertinent Lab findings & Imaging      Hernández:  NO   Adequate UO     I&O's Detail           Discussed with:     Cultures:	        Radiology

## 2022-12-26 NOTE — CARDIOLOGY SUMMARY
[de-identified] : \par 08/05/22 ECG Afib at 68 bpm, iRBBB, low voltage, left axis, NSTW abnormalities \par 03/15/22 ECG: AFib at 63 bpm, iRBBB, LAFB. No sig change from 11/24/21.\par 11/24/21 ECG: AFib at 48 bpm, iRBBB, LAFB. No sig change from prior 5/19/21\par 05/19/21 ECG: AFib at 61 bpm, iRBBB, LAFB. No sig change from prior.\par 11/18/20 ECG: AFib at 57 bpm, iRBBB, LAFB. No sig change from prior 8/28/20\par  [de-identified] : \par 03/10/22-03/30/22 Zio monitor: Persistent AFib (100% burden). HR ranging  bpm (average HR 64 bpm). Mild IVCD, rare VPD's and couplets. No symptoms.\par  [de-identified] : \par 07/19/22 TTE: LVIDd 5.4 cm, LVEF 68%, mild LVH ( SW 1.0 cm, PW 1.0 cm) no segmental WMA, RV is enlarged w/ decreased RVSF, severe JANEL, s/p lala clip w/mild MR (peak/mean 10  mmHg/ 4 mmHg @ HR 66), mild AR, mod-severe TR, severe PH (RVSP 83 mmHg)\par \par 03/15/22 TTE: LVIDd 5.0 cm, LVEF 70%, no segmental WMA, flattening of the septum c/w RV pressure overload, mild concentric LVH (septum 1.4 cm, PWT 1.1 cm), RVE with decreased RVSF, severe JANEL, s/p Mitraclip with mild-mod MR, mod-severe MS (peak gradient 15 mmHg, mean gradient 6 mmHg), severe TR, mild OR, severe PH (RVSP 79 mmHg).\par \par 11/18/20 TTE: LVIDd 4.6 cm, normal LVEF, concentric LVH (SW 1.2, PW 1.3 cm), flattening of septum c/w RV pressure overload, normal LA size, mod SONNY, RVE with decreased RV function, mild-mod MR s/p MitraClip (peak 10 mmHg, mean 4 mmHg), mod-severe TR, no effusion, severe PH (RVSP 95 mmHg). Iatrogenic transeptal flow noted (transeptal puncture for Clip).\par \par 03/01/18 TTE: LVEF 70%, LVIDd 5.1, septum/PWT 1.1, LA 5.7, severely dilated RV with severe RVSD, severe biatrial enlargement, severe TR, MR present but shadowed by Mitraclip,est RVSP 82, left to right atrial flow noted from transseptal puncture.\par  [de-identified] : \par 03/11/19 RHC baseline: no RA or RV reported, PA 61/21/35, PCW 12, Ao 93%, PA 71%, CO/CI (F) 6.09/3.26, PVR 3.77 Mendez.\par Nitric Oxide: RA 10, RV 57/12, PA 54/17/30, no PCW reported, Ao 93%, PA 77%, CO/CI (F) 8.19/4.38,  PVR 3.66 Mendez.\par

## 2022-12-26 NOTE — PROGRESS NOTE ADULT - PROBLEM SELECTOR PLAN 1
zosyn , tamiflu pulmonary consult, Id consult noted zosyn , tamiflu pulmonary consult, Id consult noted abx stopped tamiflu cpmpleted

## 2022-12-26 NOTE — PROGRESS NOTE ADULT - NUTRITIONAL ASSESSMENT
89yo F with PMH of HTN, Dyslipidemia, MR s/p KIMBER Rod. Lorenzo (off Eliquis 5 weeks ago for LGIB), Diastolic CHF, Chronic Hypoxic Respiratory Failure (on 3.5L NC), hx of COVID-19, CKD stage 3, Anemia, Anxiety, Depression brought in by ambulance from the [facility] [place] for evaluation of pneumonia.  Patient states that she has had a cough with yellow sputum over the past 2 weeks which has been getting worse.  States that she had chest x-ray at the facility yesterday and was told today that she has pneumonia. States that she feels tired. Denies chest pain, shortness of breath, fever, abdominal pain, nausea/vomiting, calf pain.  IN ED afebrile, no fever no leucocytosis, had x ray chest suspected PNA nad started on zosyn and vanc, and had elevated BUN creat- likely charis with dehydration, ereceived fluid bolus,, admitted for further management for  Pneumonia with influenza A  COPD with ch resp failure on o2  HTN  HLD  AFIB  Ch Diastolic heart failure  charis base line creat 1.15, diuretics held  started on IV abx, ID and pulmonary consult called, cardio consult  tamiflu started, no bacterial element  abx stopped  RT foot swelling - uric acid 8- likely ac gouty arthritis- started on colchicine, advised out pt f up with rheumatologist.   d/w son panda  goals of care discussed with patient and daughter Shirlene  DNR DNI MOLST filled 12/20.2022  no limitation on tt  rt foot swollen suddenly today with redness and heat- likely gout , improved on Nsaid  no trauma-x ray neg for any osseous pathology

## 2022-12-27 ENCOUNTER — NON-APPOINTMENT (OUTPATIENT)
Age: 87
End: 2022-12-27

## 2022-12-27 ENCOUNTER — APPOINTMENT (OUTPATIENT)
Dept: HEART FAILURE | Facility: CLINIC | Age: 87
End: 2022-12-27

## 2022-12-28 ENCOUNTER — NON-APPOINTMENT (OUTPATIENT)
Age: 87
End: 2022-12-28

## 2022-12-28 ENCOUNTER — APPOINTMENT (OUTPATIENT)
Dept: PULMONOLOGY | Facility: CLINIC | Age: 87
End: 2022-12-28
Payer: MEDICARE

## 2022-12-28 VITALS
HEIGHT: 63 IN | SYSTOLIC BLOOD PRESSURE: 111 MMHG | BODY MASS INDEX: 26.75 KG/M2 | HEART RATE: 94 BPM | DIASTOLIC BLOOD PRESSURE: 53 MMHG | WEIGHT: 151 LBS | OXYGEN SATURATION: 90 %

## 2022-12-28 PROCEDURE — 71046 X-RAY EXAM CHEST 2 VIEWS: CPT

## 2022-12-28 PROCEDURE — 99214 OFFICE O/P EST MOD 30 MIN: CPT | Mod: 25

## 2022-12-28 RX ORDER — BLOOD-GLUCOSE METER
KIT MISCELLANEOUS
Qty: 1 | Refills: 0 | Status: ACTIVE | COMMUNITY
Start: 2022-12-28 | End: 1900-01-01

## 2022-12-28 NOTE — PROCEDURE
[FreeTextEntry1] : Chest x-ray demonstrates resolution of previously noted right lower lobe opacity seen on hospital chest x-ray bilateral pleural effusions are noted.

## 2022-12-28 NOTE — HISTORY OF PRESENT ILLNESS
[TextBox_4] : Hospitalized for acute respiratory illness determined to have flu.  Noted abnormal chest x-ray treated with course of antibiotics.  Feeling better still dependent on oxygen.  Has ongoing issues with cough productive of yellow sputum cough varies from day-to-day no current fever or chills reports chronic shortness of breath without change\par \par

## 2022-12-28 NOTE — ASSESSMENT
[FreeTextEntry1] : Chronic hypoxemic respiratory failure secondary to heart failure.  Status post recent pneumonia chest x-ray reverted back to baseline.\par \par Continue supplemental oxygen\par \par Advised as needed nebulizer treatments

## 2023-01-09 ENCOUNTER — APPOINTMENT (OUTPATIENT)
Dept: GERIATRICS | Facility: CLINIC | Age: 88
End: 2023-01-09

## 2023-01-11 ENCOUNTER — APPOINTMENT (OUTPATIENT)
Dept: GERIATRICS | Facility: CLINIC | Age: 88
End: 2023-01-11
Payer: MEDICARE

## 2023-01-11 VITALS
BODY MASS INDEX: 26.39 KG/M2 | RESPIRATION RATE: 16 BRPM | HEART RATE: 90 BPM | TEMPERATURE: 97.6 F | WEIGHT: 149 LBS | DIASTOLIC BLOOD PRESSURE: 60 MMHG | SYSTOLIC BLOOD PRESSURE: 102 MMHG | OXYGEN SATURATION: 97 %

## 2023-01-11 PROCEDURE — 99215 OFFICE O/P EST HI 40 MIN: CPT

## 2023-01-11 NOTE — ASSESSMENT
[FreeTextEntry1] : Recovered nicely from recent flu/hospitalization. Monitor for new/worsening symptoms and adv notify MD if any noted. \par \par Off colchicine. Stable. Monitor for recurrence. \par \par Will repeat labs, including uric acid.  Dtr wishes to do blood draw at cards visit next week as our phlebotomost not available today. Otherwise will stop by nearby [facility]. \par \par Monitor CBC while on Eliquis. \par \par Mood/sleep now controlled. Will trial taper Zoloft further to 50mg daily (as can inc risk of GIB). Change communicated w/ herber Lantigua. f/u with psych advised. \par \par - Sleep hygiene, including adv inc daytime activity and avoid/limit daytime naps\par - Monitor closely for s/s of serotonin syndrome/serotonin toxicity (eg, hyperreflexia, clonus, hyperthermia, diaphoresis, tremor, autonomic instability, mental status changes) while on Remeron and Zoloft\par \par c/w Leg elevation when not walking\par \par - c/w 24/7 supervision for safety and help with ADLs and for comfort/socialization and monitoring of symptoms\par HIGH risk for falls/injury\par Maximize sensory input - f/u with optho, consider ENT/audiology f/u and trial HAs\par Fall precautions\par \par - f/u in 3 mo, unless earlier PRN \par \par 
room air

## 2023-01-11 NOTE — SOCIAL HISTORY
[Alone] : lives alone [Assisted living] : in assisted living [FreeTextEntry1] : [facility] 24/7

## 2023-01-11 NOTE — HISTORY OF PRESENT ILLNESS
[Any fall with injury in past year] : Patient reported fall with injury in the past year [Independent] : transferring/mobility [Full assistance needed] : Assistance needed managing medications [FAST Score: ____] : Functional Assessment Scale (FAST) Score: [unfilled] [Cane] : cane [Walker] : walker [Wheelchair] : wheelchair [Smoke Detector] : smoke detector [Carbon Monoxide Detector] : carbon monoxide detector [Mild] : Stage: Mild [Stable] : Status: Stable [Memory Lapses Or Loss] : stable memory impairment [Patient Observed To Be Agitated] : denies agitation [Hostility Toward Caregivers] : denies aggression [Sleep Disturbances] : stable sleep disturbances [] : denies wandering [Fixed Beliefs Contradicted By Reality (Delusions)] : denies delusions [Difficulty Finding Desired Words] : denies difficulty finding desired words [Designated Healthcare Proxy] : Designated healthcare proxy [DNR] : DNR [DNI] : DNI [FreeTextEntry1] : Hospitalized Discharge Date 26-Dec-2022 / Admission Date 19-Dec-2022 for Influenza A c/b MEME and Rt foot acute gout attack. Completed Tamiflu, MEME improved w/ holding diuretics, started on colchicine. \par Discharge summary reviewed in [facility] EMR. \par \par Seen by laura Henley 22 - visit note appreciated in EMR - ordered nebs PRN\par \par TODAY reports feels well and denies having any complaints. Was having a lot of phlegm up until 2 days ago, now resolved. \par \par Dtr states pt is back to [facility]. \par \par Mood has been good. No anxiety attacks. Enjoys company and lives for her grandchildren. \par Sleeping well on current regimen/routine. \par Has not been using Xanax. \par \par Back on Eliquis since hospitalization. \par Has f/u appt with cards next week per dtr. \par No recent blood in stool or urine noted. \par \par Constipation is controlled. \par On senna QHS, Miralax PRN. \par \par Continues with  supervision for safety and help with ADLs. One daytime and another nighttime HHA. \par \par INSOMNIA / ANXIETY / DEPRESSION / CONFUSION \par - 22: feels awful - "not myself". Ashamed - doesn't want anyone to see her this way.  \par Worried about many things including having enough oxygen, about falling, being a burden to her children. \par Used to be active independent and "happy go lizz."  States "everybody loved me!" \par More anxious after   of COVID.  Previously on xanax PRN for anxiety.  After   started taking Xanax TID pretty regularly. Significant decline after fall in .  Started on Zoloft after that.  Recently off Ambien and switched to Remeron instead. \par Wants to go back to being independent and active as she used to be prior to fall. \par Miserable living at the half-way - "I don't want to see all the old and crippled people, I don't want to get that way." \par Doesn't want to be a burden on her children.  \par Xanax was held today - last dose was last night - "because otherwise she is completely out of it and too sleepy" per dtr.\par - 22: Took a fall at the half-way [place] approx end of July or beginning of Aug '22 - bad fall but "didn't realize it".  Hospitalized at [facility] and "all kinds of tests were OK." Had bruises and pain but no fractures. \par Since then "always afraid of something."  Afraid of falling. Afraid that sometimes oxygen won't come and won't have anything to breathe with. One fear leads to another fear. Feels like she's "going crazy." Feels like something bad is going to happen to her. Frightened. Can't sleep.  Can't go to bathroom. Last BM was 3 days ago - "very hard". \par "Everything I do is hard and I don't know what to do." \par "Fear of losing my mind." \par h/o COVID approx ? - mild symptoms, no treatment \par \par - Appetite: good, baseline wt ~180 \par \par - Motor: h/o recurrent falls. \par Last fall in  w/ injury/requiring hospitalization. \par Ambulates with cane around the apartment, WC outside for longer distances d/t generalized weakness and SOB/on oxygen\par \par - Mood/behavior: no episodes of intolerable anxiety since last visit  \par \par - MOCA  on 22\par - CTH 22: not impressive \par \par - Follows w/ herber Lantigua\par - Previously on Ambien 5mg QHS since at least  - last filled 22. Switched Ambien to Remeron QHS approx  d/t recurrent falls \par - Previously on Xanax 0.25mg TID - has not needed to use it \par - Takes Zoloft\par - Takes Remeron \par - Takes Rozerem \par - Takes Melatonin\par \par HFpEF / Chronic SOBE / on oxygen / AFIB\par - follows w/ yanira Lopes / Dr. Terry [Driving Concerns] : not driving or driving without noted concerns [[facility] : >12  [de-identified] : Melecio Sp helps since moved to [facility]  [de-identified] : PHQ2 of 5 on 9/16/22  [GDS] : 9 on 9/23/22  [AdvancecareDate] : 1/11/23 [FreeTextEntry4] : HCP form on file: dtr Shirlene is primary, alternate is alisha Cardenas\par MOLST completed in hospital: DNR, DNI - f/u for copy of form\par GO: priority is "not being a burden to my children" and "dying peacefully in my sleep", does not want to go to the hospital, wants to die in comfort of home.  Does not want aggressive measures at EOL.

## 2023-01-11 NOTE — REASON FOR VISIT
[Post Hospitalization] : a post hospitalization visit [FreeTextEntry1] : CHF, anxiety, insomnia, recurrent falls, polypharmacy [FreeTextEntry3] : tyree Garber  [FreeTextEntry2] : who assists with history per pt request and d/t patient with cognitive impairment

## 2023-01-11 NOTE — REVIEW OF SYSTEMS
[Loss Of Hearing] : hearing loss [As Noted in HPI] : as noted in HPI [Easy Bleeding] : a tendency for easy bleeding [Easy Bruising] : a tendency for easy bruising [Negative] : Integumentary [FreeTextEntry3] : wears reading glasses  [FreeTextEntry4] : doesn't wear HA's

## 2023-01-11 NOTE — PHYSICAL EXAM
[Normal] : alert, in no acute distress [Sclera] : the sclera and conjunctiva were normal [Normal Outer Ear/Nose] : the ears and nose were normal in appearance [Normal Appearance] : the appearance of the neck was normal [No Respiratory Distress] : no respiratory distress [No Acc Muscle Use] : no accessory muscle use [Respiration, Rhythm And Depth] : normal respiratory rhythm and effort [No Clubbing, Cyanosis] : no clubbing or cyanosis of the fingernails [Normal Hearing] : hearing was not normal [Normal Gait] : abnormal gait [Normal Insight/Judgment] : insight and judgment were not intact [de-identified] : +O2 via NC [de-identified] : Calm, DAIN-7 of 13/somewhat difficult on 9/23/22

## 2023-02-06 ENCOUNTER — APPOINTMENT (OUTPATIENT)
Dept: HEART FAILURE | Facility: CLINIC | Age: 88
End: 2023-02-06
Payer: MEDICARE

## 2023-02-06 VITALS
SYSTOLIC BLOOD PRESSURE: 102 MMHG | WEIGHT: 149 LBS | HEART RATE: 88 BPM | BODY MASS INDEX: 26.4 KG/M2 | DIASTOLIC BLOOD PRESSURE: 66 MMHG | TEMPERATURE: 97.6 F | HEIGHT: 63 IN | OXYGEN SATURATION: 95 %

## 2023-02-06 PROCEDURE — 99214 OFFICE O/P EST MOD 30 MIN: CPT

## 2023-02-06 RX ORDER — PANTOPRAZOLE 40 MG/1
40 TABLET, DELAYED RELEASE ORAL DAILY
Qty: 90 | Refills: 3 | Status: DISCONTINUED | COMMUNITY
Start: 2022-10-06 | End: 2023-02-06

## 2023-02-06 NOTE — PHYSICAL EXAM
[No Xanthelasma] : no xanthelasma [No Rub] : no rub [No Gallop] : no gallop [Soft] : abdomen soft [Non Tender] : non-tender [Normal Bowel Sounds] : normal bowel sounds [No Cyanosis] : no cyanosis [No Clubbing] : no clubbing [Normal Radial B/L] : normal radial B/L [Edema ___] : edema [unfilled] [No Rash] : no rash [Normal] : moves all extremities, no focal deficits, normal speech [Alert and Oriented] : alert and oriented [de-identified] : JVP 8 cm H2O with mild HJR [de-identified] : II/VI systolic murmur at base [de-identified] : Irregularly irregular S1 S2 [de-identified] : on 3L of O2 via NC [de-identified] : unable to appreciate HSM or masses due to body habitus [de-identified] : using wheelchair for distance [de-identified] : warm peripherally

## 2023-02-06 NOTE — HISTORY OF PRESENT ILLNESS
[FreeTextEntry1] : Mrs. Mix is a very pleasant 89 year old woman with history of HFpEF (LVEF 68% 7/2022), MR s/p Mitraclip (8/30/16), mixed pre- and post-capillary pulmonary hypertension, permanent AFib off AC since 11/2022 due to GIB requiring transfusions, hypertension, hyperlipidemia, CKD (b/l Cr 1.2-1.3), SHERYL and hypoxia on home O2 3-4L who presents today for routine follow-up of her cardiomyopathy. \par \par She was hospitalized at [facility] in September 2022 for Hgb of 6.9 in the setting of GIB thought to be diverticular and elected to manage conservatively. She was transfused with PRBC and Pradaxa was stopped. With retrial of AC in October, Eliquis 2.5 mg BID, again developed a drop in Hgb, readmitted and transfused. Of note, she was discharged on Lasix instead of Torsemide. \par \par More recently she was admitted to [facility] from 12/19-12/26/22 where she presented for cough and CXR performed outpt suggestive of PNA. She tested +Flu A and briefly received a course of IV ABT which were ultimately stopped by ID as her repeat CXR did not suggest PNA. She was discharged off diuretics as per gen cards recs however, due to misreading her instructions had continued Torsemide 60 mg BID. Her daughter in law called the office the day after discharge stating that her weight was down to 151 lbs which is ~14 lbs down from last clinic visit on 12/7, so she was instructed to lower torsemide to 60 mg QD.\par \par Since this time her weight has been ranging 147-150 lbs. She reports an improvement in her breathing and is able to walk to the dining room 3 times per day with her walker without any SOB or fatigue. She no longer has a cough. She sleeps with the HOB elevated but denies any PND. She is on continuous home O2 3LNC. \par \par She denies CP, SOB at rest, LH/dizziness, abdominal discomfort, palpitations, and syncope. Of note, her Eliquis was resumed at her prior hospitalization and she denies any observed hematochezia or melena. Her appetite is normal and her bowel and bladder habits are unchanged and normal for her. She does not have an ICD. She has been limiting fluid and sodium in her diet and taking her medications as directed. She does not use NSAIDs.

## 2023-02-06 NOTE — CARDIOLOGY SUMMARY
[de-identified] : \par 08/05/22 ECG Afib at 68 bpm, iRBBB, low voltage, left axis, NSTW abnormalities \par 03/15/22 ECG: AFib at 63 bpm, iRBBB, LAFB. No sig change from 11/24/21.\par 11/24/21 ECG: AFib at 48 bpm, iRBBB, LAFB. No sig change from prior 5/19/21\par 05/19/21 ECG: AFib at 61 bpm, iRBBB, LAFB. No sig change from prior.\par 11/18/20 ECG: AFib at 57 bpm, iRBBB, LAFB. No sig change from prior 8/28/20\par  [de-identified] : \par 03/10/22-03/30/22 Zio monitor: Persistent AFib (100% burden). HR ranging  bpm (average HR 64 bpm). Mild IVCD, rare VPD's and couplets. No symptoms.\par  [de-identified] : \par 07/19/22 TTE: LVIDd 5.4 cm, LVEF 68%, mild LVH ( SW 1.0 cm, PW 1.0 cm) no segmental WMA, RV is enlarged w/ decreased RVSF, severe JANEL, s/p lala clip w/mild MR (peak/mean 10  mmHg/ 4 mmHg @ HR 66), mild AR, mod-severe TR, severe PH (RVSP 83 mmHg)\par \par 03/15/22 TTE: LVIDd 5.0 cm, LVEF 70%, no segmental WMA, flattening of the septum c/w RV pressure overload, mild concentric LVH (septum 1.4 cm, PWT 1.1 cm), RVE with decreased RVSF, severe JANEL, s/p Mitraclip with mild-mod MR, mod-severe MS (peak gradient 15 mmHg, mean gradient 6 mmHg), severe TR, mild MS, severe PH (RVSP 79 mmHg).\par \par 11/18/20 TTE: LVIDd 4.6 cm, normal LVEF, concentric LVH (SW 1.2, PW 1.3 cm), flattening of septum c/w RV pressure overload, normal LA size, mod SONNY, RVE with decreased RV function, mild-mod MR s/p MitraClip (peak 10 mmHg, mean 4 mmHg), mod-severe TR, no effusion, severe PH (RVSP 95 mmHg). Iatrogenic transeptal flow noted (transeptal puncture for Clip).\par \par 03/01/18 TTE: LVEF 70%, LVIDd 5.1, septum/PWT 1.1, LA 5.7, severely dilated RV with severe RVSD, severe biatrial enlargement, severe TR, MR present but shadowed by Mitraclip,est RVSP 82, left to right atrial flow noted from transseptal puncture.\par  [de-identified] : \par 03/11/19 RHC baseline: no RA or RV reported, PA 61/21/35, PCW 12, Ao 93%, PA 71%, CO/CI (F) 6.09/3.26, PVR 3.77 Mendez.\par Nitric Oxide: RA 10, RV 57/12, PA 54/17/30, no PCW reported, Ao 93%, PA 77%, CO/CI (F) 8.19/4.38,  PVR 3.66 Mendez.\par

## 2023-02-06 NOTE — REASON FOR VISIT
Addended by: Sherrie Pham on: 3/13/2020 01:11 PM     Modules accepted: Orders [Cardiac Failure] : cardiac failure [Other: _____] : [unfilled]

## 2023-02-06 NOTE — DISCUSSION/SUMMARY
[Patient] : the patient [FreeTextEntry2] : and son [FreeTextEntry1] : 89 yo F with chronic diastolic HF, rate-controlled AFib, severe MR s/p MitraClip, CKD stage 3 and chronic hypoxia on home O2 who presents for follow-up of her HF. She is ACC/AHA Stage C, NYHA Class II-III, and mildly volume overloaded (but has not taken her diuretic yet today). I have recommended the following:\par \par 1. Chronic diastolic heart failure - Generally stable and mildly volume overloaded which is likely due to missing her diuretic today to come to her appointment. She will continue torsemide 60 mg daily and spironolactone 25 mg BID. She is aware to call with any weight gain of 3 lbs in a day or 5 lbs in a week. Previously did not tolerate Farxiga due to abdominal discomfort. Labs today.\par \par 2. Pulmonary hypertension - Stable for years despite severely elevated pulmonary pressures by TTE. At the time of her last RHC on 3/11/19, PA 61/21/35, PCW 12 without a response to inhaled NO and with PA 71.3%, PVR 3.77 Mendez. TTE from 3/15/22 reviewed with Dr. Moore (structural cardiology) who did not think there was significant or new MS.\par \par 3. AFib - Rate controlled on beta blocker. Was previously off AC due to recurrent GIB requiring transfusions, but is now back on it after her recent hospitalization without any overt signs of bleeding. Will check CBC today.\par \par 4. Lower GIB - Hospitalized September 2022 for Hgb of 6.9 s/p 2U PRBC and Pradaxa was stopped. Failed trial of Eliquis 2.5 mg BID, readmitted October 2022 requiring transfusion. GI thought this to be a diverticular bleed and family opted to manage conservatively. Now back on AC as above, will monitor for s/s of bleeding and check CBC today.\par \par 5. CKD stage 3 - Baseline Cr 1.2-1.3. Will check labs today.\par \par 6. Hypertension - Well controlled.\par \par 7. Anxiety and Depression- On Sertraline 75 mg QD. Recently established with geriatrician Dr. Jones\par \par 8. Insomnia- Continue Mirtazipine 7.5 mg qHS. Recommended against hypnotic sleep aids and benzodiazepines.\par \par 9. Follow-up with Dr. Lopes in 4 months or sooner if needed.

## 2023-02-07 LAB
ANION GAP SERPL CALC-SCNC: 15 MMOL/L
BASOPHILS # BLD AUTO: 0.04 K/UL
BASOPHILS NFR BLD AUTO: 0.6 %
BUN SERPL-MCNC: 31 MG/DL
CALCIUM SERPL-MCNC: 9.2 MG/DL
CHLORIDE SERPL-SCNC: 97 MMOL/L
CO2 SERPL-SCNC: 30 MMOL/L
CREAT SERPL-MCNC: 1.43 MG/DL
EGFR: 35 ML/MIN/1.73M2
EOSINOPHIL # BLD AUTO: 0.45 K/UL
EOSINOPHIL NFR BLD AUTO: 6.5 %
GLUCOSE SERPL-MCNC: 96 MG/DL
HCT VFR BLD CALC: 38.9 %
HGB BLD-MCNC: 11.9 G/DL
IMM GRANULOCYTES NFR BLD AUTO: 0.6 %
LYMPHOCYTES # BLD AUTO: 1.37 K/UL
LYMPHOCYTES NFR BLD AUTO: 19.8 %
MAGNESIUM SERPL-MCNC: 2.6 MG/DL
MAN DIFF?: NORMAL
MCHC RBC-ENTMCNC: 28.3 PG
MCHC RBC-ENTMCNC: 30.6 GM/DL
MCV RBC AUTO: 92.4 FL
MONOCYTES # BLD AUTO: 1.04 K/UL
MONOCYTES NFR BLD AUTO: 15 %
NEUTROPHILS # BLD AUTO: 3.98 K/UL
NEUTROPHILS NFR BLD AUTO: 57.5 %
NT-PROBNP SERPL-MCNC: 2892 PG/ML
PLATELET # BLD AUTO: 154 K/UL
POTASSIUM SERPL-SCNC: 4.4 MMOL/L
RBC # BLD: 4.21 M/UL
RBC # FLD: 18.5 %
SODIUM SERPL-SCNC: 142 MMOL/L
WBC # FLD AUTO: 6.92 K/UL

## 2023-02-08 RX ORDER — APIXABAN 2.5 MG/1
2.5 TABLET, FILM COATED ORAL
Qty: 180 | Refills: 1 | Status: DISCONTINUED | COMMUNITY
Start: 2022-12-29 | End: 2023-02-08

## 2023-02-11 ENCOUNTER — NON-APPOINTMENT (OUTPATIENT)
Age: 88
End: 2023-02-11

## 2023-03-10 ENCOUNTER — NON-APPOINTMENT (OUTPATIENT)
Age: 88
End: 2023-03-10

## 2023-03-13 ENCOUNTER — NON-APPOINTMENT (OUTPATIENT)
Age: 88
End: 2023-03-13

## 2023-03-14 ENCOUNTER — APPOINTMENT (OUTPATIENT)
Dept: DERMATOLOGY | Facility: CLINIC | Age: 88
End: 2023-03-14
Payer: MEDICARE

## 2023-03-14 PROCEDURE — 99203 OFFICE O/P NEW LOW 30 MIN: CPT

## 2023-03-14 RX ORDER — TRIAMCINOLONE ACETONIDE 1 MG/G
0.1 OINTMENT TOPICAL
Qty: 2 | Refills: 1 | Status: ACTIVE | COMMUNITY
Start: 2023-03-14 | End: 1900-01-01

## 2023-03-14 NOTE — PHYSICAL EXAM
[FreeTextEntry3] : purpuric macules on bilateral forearms, medial thighs, chest\par eczematous patches/plaques on bilateral lower extremities with small eczematous patch on left posterior shoulder

## 2023-03-14 NOTE — HISTORY OF PRESENT ILLNESS
[FreeTextEntry1] : rash [de-identified] : 89 yo F with CHF here for evaluation of rash on legs - itchy\par Also has dark spots on thighs, arms, chest

## 2023-03-14 NOTE — ASSESSMENT
[FreeTextEntry1] : Favor stasis dermatitis likely in the setting of chronic venous stasis \par - New diagnosis, likely chronic, uncertain clinical course\par - TAC 0.1% ointment BID. Side effects of long term use of topical steroids discussed with patient including but not limited to thinning of the skin, atrophy and dyspigmentation. discussed frequent use of topical steroids when skin flaring and moisturizers when no longer active. 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

## 2023-03-31 NOTE — PATIENT PROFILE ADULT - NSPROMUTPARTICIPCAREFT_GEN_A_NUR
Patient called to clarify dose of metformin XR. She thought we talked about taking 500 mg twice daily though prescription says 1,000 mg once daily.  Clarified either is appropriate and likely has similar efficacy. She can change to one or other depends on how she tolerates/prefers to take.    Virgil Morgan, PharmD, [facility]  Medication Therapy Management Pharmacist  Pager: [phone]     none

## 2023-04-04 ENCOUNTER — OUTPATIENT (OUTPATIENT)
Dept: OUTPATIENT SERVICES | Facility: HOSPITAL | Age: 88
LOS: 1 days | End: 2023-04-04
Payer: MEDICARE

## 2023-04-04 ENCOUNTER — APPOINTMENT (OUTPATIENT)
Dept: GERIATRICS | Facility: CLINIC | Age: 88
End: 2023-04-04
Payer: MEDICARE

## 2023-04-04 ENCOUNTER — RESULT REVIEW (OUTPATIENT)
Age: 88
End: 2023-04-04

## 2023-04-04 ENCOUNTER — APPOINTMENT (OUTPATIENT)
Dept: ULTRASOUND IMAGING | Facility: IMAGING CENTER | Age: 88
End: 2023-04-04
Payer: MEDICARE

## 2023-04-04 VITALS
SYSTOLIC BLOOD PRESSURE: 104 MMHG | WEIGHT: 151 LBS | BODY MASS INDEX: 26.75 KG/M2 | HEART RATE: 85 BPM | TEMPERATURE: 97.7 F | OXYGEN SATURATION: 93 % | RESPIRATION RATE: 17 BRPM | DIASTOLIC BLOOD PRESSURE: 64 MMHG

## 2023-04-04 DIAGNOSIS — R19.7 DIARRHEA, UNSPECIFIED: ICD-10-CM

## 2023-04-04 DIAGNOSIS — J10.1 INFLUENZA DUE TO OTHER IDENTIFIED INFLUENZA VIRUS WITH OTHER RESPIRATORY MANIFESTATIONS: ICD-10-CM

## 2023-04-04 DIAGNOSIS — R68.2 DRY MOUTH, UNSPECIFIED: ICD-10-CM

## 2023-04-04 DIAGNOSIS — M79.89 OTHER SPECIFIED SOFT TISSUE DISORDERS: ICD-10-CM

## 2023-04-04 DIAGNOSIS — M10.9 GOUT, UNSPECIFIED: ICD-10-CM

## 2023-04-04 DIAGNOSIS — Z98.89 OTHER SPECIFIED POSTPROCEDURAL STATES: Chronic | ICD-10-CM

## 2023-04-04 DIAGNOSIS — Z87.19 PERSONAL HISTORY OF OTHER DISEASES OF THE DIGESTIVE SYSTEM: ICD-10-CM

## 2023-04-04 DIAGNOSIS — N18.9 ACUTE KIDNEY FAILURE, UNSPECIFIED: ICD-10-CM

## 2023-04-04 DIAGNOSIS — Z86.718 PERSONAL HISTORY OF OTHER VENOUS THROMBOSIS AND EMBOLISM: ICD-10-CM

## 2023-04-04 DIAGNOSIS — N17.9 ACUTE KIDNEY FAILURE, UNSPECIFIED: ICD-10-CM

## 2023-04-04 DIAGNOSIS — I50.33 ACUTE ON CHRONIC DIASTOLIC (CONGESTIVE) HEART FAILURE: ICD-10-CM

## 2023-04-04 DIAGNOSIS — I82.492 ACUTE EMBOLISM AND THROMBOSIS OF OTHER SPECIFIED DEEP VEIN OF LEFT LOWER EXTREMITY: ICD-10-CM

## 2023-04-04 DIAGNOSIS — H60.42 CHOLESTEATOMA OF LEFT EXTERNAL EAR: ICD-10-CM

## 2023-04-04 DIAGNOSIS — Z23 ENCOUNTER FOR IMMUNIZATION: ICD-10-CM

## 2023-04-04 PROCEDURE — 93970 EXTREMITY STUDY: CPT | Mod: 26

## 2023-04-04 PROCEDURE — 99215 OFFICE O/P EST HI 40 MIN: CPT

## 2023-04-04 PROCEDURE — 93970 EXTREMITY STUDY: CPT

## 2023-04-04 NOTE — HISTORY OF PRESENT ILLNESS
[Any fall with injury in past year] : Patient reported fall with injury in the past year [Independent] : transferring/mobility [Full assistance needed] : Assistance needed managing medications [FAST Score: ____] : Functional Assessment Scale (FAST) Score: [unfilled] [Cane] : cane [Walker] : walker [Wheelchair] : wheelchair [Smoke Detector] : smoke detector [Carbon Monoxide Detector] : carbon monoxide detector [Mild] : Stage: Mild [Stable] : Status: Stable [Memory Lapses Or Loss] : stable memory impairment [Patient Observed To Be Agitated] : denies agitation [Hostility Toward Caregivers] : denies aggression [Sleep Disturbances] : stable sleep disturbances [] : denies wandering [Fixed Beliefs Contradicted By Reality (Delusions)] : denies delusions [Difficulty Finding Desired Words] : denies difficulty finding desired words [Designated Healthcare Proxy] : Designated healthcare proxy [DNR] : DNR [DNI] : DNI [FreeTextEntry1] : No acute events since last visit including falls, hospitalizations, ED visits, urgent care visits.\par Seen by cards  - visit note appreciated in EMR - tapered off Eliquis d/t h/o GIB/anemia requiring Hospitalization. \par \par Seen by derm Dr. Hinton on 3/14/23 - visit note appreciated in EMR - imp: venous stasis dermatitis b/l  - started on Triamcinolone ointment BID \par \par TODAY reports feels well and denies having any complaints. \par \par LE redness improving per pt with topical steroid. \par \par Mood has been good. No anxiety attacks. \par Sleeping well on current regimen/routine. \par Has not been using Xanax. \par \par No recent blood in stool or urine noted. \par Constipation is controlled w/ Senna QHS, Miralax PRN. \par \par Lives in [facility] w/  supervision for safety and help with ADLs. One daytime and another nighttime HHA. \par \par INSOMNIA / ANXIETY / DEPRESSION / CONFUSION \par - 22: feels awful - "not myself". Ashamed - doesn't want anyone to see her this way.  \par Worried about many things including having enough oxygen, about falling, being a burden to her children. \par Used to be active independent and "happy go lizz."  States "everybody loved me!" \par More anxious after   of COVID.  Previously on xanax PRN for anxiety.  After   started taking Xanax TID pretty regularly. Significant decline after fall in .  Started on Zoloft after that.  Recently off Ambien and switched to Remeron instead. \par Wants to go back to being independent and active as she used to be prior to fall. \par Miserable living at the [facility] - "I don't want to see all the old and crippled people, I don't want to get that way." \par Doesn't want to be a burden on her children.  \par Xanax was held today - last dose was last night - "because otherwise she is completely out of it and too sleepy" per dtr.\par - 22: Took a fall at the [employer] approx end of July or beginning of Aug '22 - bad fall but "didn't realize it".  Hospitalized at [facility] and "all kinds of tests were OK." Had bruises and pain but no fractures. \par Since then "always afraid of something."  Afraid of falling. Afraid that sometimes oxygen won't come and won't have anything to breathe with. One fear leads to another fear. Feels like she's "going crazy." Feels like something bad is going to happen to her. Frightened. Can't sleep.  Can't go to bathroom. Last BM was 3 days ago - "very hard". \par "Everything I do is hard and I don't know what to do." \par "Fear of losing my mind." \par h/o COVID approx ? - mild symptoms, no treatment \par \par - Appetite: good, baseline wt ~180 \par \par - Motor: h/o recurrent falls. \par Last fall in  w/ injury/requiring hospitalization. \par Ambulates with cane around the apartment, WC outside for longer distances d/t generalized weakness and SOB/on oxygen\par \par - Mood/behavior: no episodes of intolerable anxiety since last visit  \par \par - MOCA  on 22\par - CTH 22: not impressive \par \par - Follows w/ herber Lantigua\par - Previously on Ambien 5mg QHS since at least  - last filled 22. Switched Ambien to Remeron QHS approx  d/t recurrent falls \par - Previously on Xanax 0.25mg TID - has not needed to use it \par - Takes Zoloft\par - Takes Remeron \par - Takes Rozerem \par - Takes Melatonin\par \par HFpEF / Chronic SOBE / on oxygen / AFIB\par - follows w/ yanira Lopes / Dr. Terry\par - Off Eliquis d/t h/o GIB/anemia requiring Hospitalization [Driving Concerns] : not driving or driving without noted concerns [[facility]] : >12  [de-identified] : Melecio Sp helps since moved to [facility]  [de-identified] : PHQ2 of 5 on 9/16/22  [GDS] : 9 on 9/23/22  [AdvancecareDate] : 1/11/23 [FreeTextEntry4] : HCP form on file: dtr Shirlene is primary, alternate is alisha Cardenas\par MOLST completed in hospital - pending copy for chart: DNR, DNI - f/u for copy of form\par GOC: priority is "not being a burden to my children" and "dying peacefully in my sleep", does not want to go to the hospital, wants to die in comfort of home.  Does not want aggressive measures at EOL.

## 2023-04-04 NOTE — SOCIAL HISTORY
[Alone] : lives alone [Assisted living] : in assisted living [FreeTextEntry1] : [facility] 24/7

## 2023-04-04 NOTE — PHYSICAL EXAM
[Sclera] : the sclera and conjunctiva were normal [Normal Outer Ear/Nose] : the ears and nose were normal in appearance [Normal Appearance] : the appearance of the neck was normal [No Respiratory Distress] : no respiratory distress [No Acc Muscle Use] : no accessory muscle use [Respiration, Rhythm And Depth] : normal respiratory rhythm and effort [No Clubbing, Cyanosis] : no clubbing or cyanosis of the fingernails [Pedal Pulses Normal] : the pedal pulses are present [Normal] : no spinal tenderness [Normal Hearing] : hearing was not normal [Normal Gait] : abnormal gait [Normal Insight/Judgment] : insight and judgment were not intact [de-identified] : +O2 via NC [de-identified] : RLE edema > LLE [de-identified] : unstable gait, RLE edema and redness [de-identified] : chronic skin changes, intact skin  [de-identified] : unsteady gait  [de-identified] : Calm, pleasant

## 2023-04-04 NOTE — REASON FOR VISIT
[Post Hospitalization] : a post hospitalization visit [FreeTextEntry1] : LE swelling/redness, CHF, anxiety, insomnia, recurrent falls, polypharmacy [FreeTextEntry3] : DANYELLE Cardenas  [FreeTextEntry2] : who assists with history per pt request and d/t patient with cognitive impairment

## 2023-04-19 ENCOUNTER — APPOINTMENT (OUTPATIENT)
Dept: DERMATOLOGY | Facility: CLINIC | Age: 88
End: 2023-04-19
Payer: MEDICARE

## 2023-04-19 DIAGNOSIS — I87.2 VENOUS INSUFFICIENCY (CHRONIC) (PERIPHERAL): ICD-10-CM

## 2023-04-19 PROCEDURE — 99213 OFFICE O/P EST LOW 20 MIN: CPT

## 2023-04-20 PROBLEM — I87.2 VENOUS STASIS DERMATITIS OF BOTH LOWER EXTREMITIES: Status: ACTIVE | Noted: 2017-05-12

## 2023-04-20 NOTE — HISTORY OF PRESENT ILLNESS
[FreeTextEntry1] : followup rash  [de-identified] : rash and pruritus signficantly improved with topical steroid\par pt stopped using a few days ago now just using emollients

## 2023-04-20 NOTE — ASSESSMENT
[FreeTextEntry1] : Favor stasis dermatitis likely in the setting of chronic venous stasis \par NOW improved with topical steroid\par Reserve steroid (- TAC 0.1% ointment BID) for flares with erythema/pruritus, otherwise just use emollients -\par  Side effects of long term use of topical steroids discussed with patient including but not limited to thinning of the skin, atrophy and dyspigmentation. discussed frequent use of topical steroids when skin flaring and moisturizers when no longer active. if no improvement in 3-4 weeks/rapid recurrence when stopping topical steroids, recommend re-evaluation.\par - counseled on leg elevation, pt already on diuresis, wear compression stockings if can tolerate \par \par rtc prn \par

## 2023-04-22 ENCOUNTER — NON-APPOINTMENT (OUTPATIENT)
Age: 88
End: 2023-04-22

## 2023-04-25 ENCOUNTER — RX RENEWAL (OUTPATIENT)
Age: 88
End: 2023-04-25

## 2023-05-01 NOTE — PROGRESS NOTE ADULT - PROBLEM SELECTOR PLAN 2
o2 nebs prn
Home
o2 nebs prn

## 2023-05-08 ENCOUNTER — RX RENEWAL (OUTPATIENT)
Age: 88
End: 2023-05-08

## 2023-05-17 ENCOUNTER — TRANSCRIPTION ENCOUNTER (OUTPATIENT)
Age: 88
End: 2023-05-17

## 2023-05-26 ENCOUNTER — NON-APPOINTMENT (OUTPATIENT)
Age: 88
End: 2023-05-26

## 2023-06-02 ENCOUNTER — APPOINTMENT (OUTPATIENT)
Dept: GERIATRICS | Facility: CLINIC | Age: 88
End: 2023-06-02
Payer: MEDICARE

## 2023-06-02 VITALS
WEIGHT: 152.13 LBS | DIASTOLIC BLOOD PRESSURE: 54 MMHG | HEART RATE: 98 BPM | OXYGEN SATURATION: 96 % | BODY MASS INDEX: 26.95 KG/M2 | SYSTOLIC BLOOD PRESSURE: 94 MMHG | TEMPERATURE: 98 F | RESPIRATION RATE: 18 BRPM

## 2023-06-02 PROCEDURE — 99215 OFFICE O/P EST HI 40 MIN: CPT

## 2023-06-02 NOTE — PHYSICAL EXAM
[Sclera] : the sclera and conjunctiva were normal [Normal Outer Ear/Nose] : the ears and nose were normal in appearance [Normal Appearance] : the appearance of the neck was normal [No Respiratory Distress] : no respiratory distress [No Acc Muscle Use] : no accessory muscle use [Respiration, Rhythm And Depth] : normal respiratory rhythm and effort [Pedal Pulses Normal] : the pedal pulses are present [Normal] : no spinal tenderness [No Clubbing, Cyanosis] : no clubbing or cyanosis of the fingernails [Normal Hearing] : hearing was not normal [Normal Gait] : abnormal gait [Normal Insight/Judgment] : insight and judgment were not intact [de-identified] : +O2 via NC [de-identified] : RLE edema > LLE [de-identified] : unstable gait, RLE edema and redness [de-identified] : Lt anterior leg ulcer 2.5x10.5, greyish slough on top of beefy red base, yellowish/brown exudate on dressing noted, LLE not warmer to touch than RLE  [de-identified] : unsteady gait  [de-identified] : Calm, pleasant

## 2023-06-02 NOTE — REASON FOR VISIT
[Acute] : an acute visit [Family Member] : family member [FreeTextEntry1] : Leg wound [FreeTextEntry3] : DANYELLE Cardenas  [FreeTextEntry2] : who assists with history per pt request and d/t patient with cognitive impairment

## 2023-06-02 NOTE — ASSESSMENT
[FreeTextEntry1] : Recent ED visit, and labs reviewed in EMR\par CBC ok - monitor \par proBNP baseline\par BMP ok \par \par LLE skin tear/wound cleansed, slough gently removed from wound base, no pain or bleeding during wound care\par Not infected\par Would benefit from daily wound care - will refer to home care\par To cleanse with NS/gauze, apply santyl until granulation tissue well established, and cover with hydrogel dressing daily and PRN until exudate resolves. \par If any worsening adv to f/u for re-evaluation asap, or in 1 mo if not healing. \par \par MOnitor LE swelling for worsening\par c/w Leg elevation when not walking\par \par Mood/sleep controlled. \par c/w Zoloft 50mg daily for now. Will consider further taper at next visit if stable. \par f/u with herber Lantigua\par - Monitor closely for s/s of serotonin syndrome/serotonin toxicity (eg, hyperreflexia, clonus, hyperthermia, diaphoresis, tremor, autonomic instability, mental status changes) while on Remeron and Zoloft\par \par - c/w 24/7 supervision for safety and help with ADLs and for comfort/socialization and monitoring of symptoms\par HIGH risk for falls/injury\par Maximize sensory input - f/u with optho, consider ENT/audiology f/u and trial HAs\par Fall precautions\par \par f/u copy of MOLST \par \par AWV pend\par Plan for repeat Cog eval at subsequent visit \par \par - f/u in 3-4 mo, unless earlier PRN \par \par

## 2023-06-02 NOTE — SOCIAL HISTORY
[Alone] : lives alone [Assisted living] : in assisted living [FreeTextEntry1] : [facility] 24/7

## 2023-06-02 NOTE — HISTORY OF PRESENT ILLNESS
[Any fall with injury in past year] : Patient reported fall with injury in the past year [Independent] : transferring/mobility [Full assistance needed] : Assistance needed managing medications [FAST Score: ____] : Functional Assessment Scale (FAST) Score: [unfilled] [Cane] : cane [Walker] : walker [Wheelchair] : wheelchair [Smoke Detector] : smoke detector [Carbon Monoxide Detector] : carbon monoxide detector [Mild] : Stage: Mild [Stable] : Status: Stable [Memory Lapses Or Loss] : stable memory impairment [Patient Observed To Be Agitated] : denies agitation [Hostility Toward Caregivers] : denies aggression [Sleep Disturbances] : stable sleep disturbances [] : denies wandering [Fixed Beliefs Contradicted By Reality (Delusions)] : denies delusions [Difficulty Finding Desired Words] : denies difficulty finding desired words [Designated Healthcare Proxy] : Designated healthcare proxy [DNR] : DNR [DNI] : DNI [FreeTextEntry1] : \par TODAY reports feels well except has wound on LLE x 2 weeks. \par \par States was lowering her kneehigh approx 2 weeks ago and noted that she was taking skin off along with the kneehigh stocking. \par Wound healing slowly but still present. Alot of discharge. \par Minimal pain - tolerable. \par Denies fever, chills, other complaints. \par \par Seen in urgent care on  for LLE wound - referred to wound care clinic - has appt on 6/15. \par Wellness nurse at [facility] cleaned and dressed wound yesterday with nonstick dressing.\par \par No recent blood in stool or urine noted. \par Constipation is controlled w/ Senna QHS, Miralax PRN. \par \par - Appetite: good, baseline wt ~180 \par \par - Motor: h/o recurrent falls. \par Last fall in  w/ injury/requiring hospitalization. \par Ambulates with cane around the apartment, WC outside for longer distances d/t generalized weakness and SOB/on oxygen\par \par - Mood/behavior: Mood has been good. No anxiety attacks. \par Has not been using Xanax.\par \par Sleeping well on current regimen/routine. \par \par INSOMNIA / ANXIETY / DEPRESSION / CONFUSION \par - 22: feels awful - "not myself". Ashamed - doesn't want anyone to see her this way.  \par Worried about many things including having enough oxygen, about falling, being a burden to her children. \par Used to be active independent and "happy go lizz."  States "everybody loved me!" \par More anxious after   of COVID.  Previously on xanax PRN for anxiety.  After   started taking Xanax TID pretty regularly. Significant decline after fall in .  Started on Zoloft after that.  Recently off Ambien and switched to Remeron instead. \par Wants to go back to being independent and active as she used to be prior to fall. \par Miserable living at the [facility] - "I don't want to see all the old and crippled people, I don't want to get that way." \par Doesn't want to be a burden on her children.  \par Xanax was held today - last dose was last night - "because otherwise she is completely out of it and too sleepy" per dtr.\par - 22: Took a fall at the [facility] [place] approx end of July or beginning of Aug '22 - bad fall but "didn't realize it".  Hospitalized at [facility] and "all kinds of tests were OK." Had bruises and pain but no fractures. \par Since then "always afraid of something."  Afraid of falling. Afraid that sometimes oxygen won't come and won't have anything to breathe with. One fear leads to another fear. Feels like she's "going crazy." Feels like something bad is going to happen to her. Frightened. Can't sleep.  Can't go to bathroom. Last BM was 3 days ago - "very hard". \par "Everything I do is hard and I don't know what to do." \par "Fear of losing my mind." \par h/o COVID approx ? - mild symptoms, no treatment \par \par - MOCA  on 22\par - CTH 22: not impressive \par \par - Follows w/ herber Lantigua\par - Previously on Ambien 5mg QHS since at least  - last filled 22. Switched Ambien to Remeron QHS approx  d/t recurrent falls \par - Previously on Xanax 0.25mg TID - has not needed to use it \par - Takes Zoloft\par - Takes Remeron \par - Takes Rozerem \par - Takes Melatonin\par \par HFpEF / Chronic SOBE / on oxygen / AFIB\par - follows w/ yanira Lopes / Dr. Terry\par - Off Eliquis d/t h/o GIB/anemia requiring Hospitalization [Driving Concerns] : not driving or driving without noted concerns [facility] : >12  [de-identified] : Melecio Sp helps since moved to [facility]  [de-identified] : PHQ2 of 5 on 9/16/22  [GDS] : 9 on 9/23/22  [AdvancecareDate] : 1/11/23 [FreeTextEntry4] : HCP form on file: dtr Shirlene is primary, alternate is alisha Cardenas\par MOLST completed in hospital - pending copy for chart: DNR, DNI - f/u for copy of form\par GOC: priority is "not being a burden to my children" and "dying peacefully in my sleep", does not want to go to the hospital, wants to die in comfort of home.  Does not want aggressive measures at EOL.

## 2023-06-02 NOTE — REVIEW OF SYSTEMS
[Loss Of Hearing] : hearing loss [As Noted in HPI] : as noted in HPI [Easy Bleeding] : a tendency for easy bleeding [Easy Bruising] : a tendency for easy bruising [Negative] : Endocrine [FreeTextEntry3] : wears reading glasses  [FreeTextEntry4] : doesn't wear HA's

## 2023-06-07 ENCOUNTER — APPOINTMENT (OUTPATIENT)
Dept: HEART FAILURE | Facility: CLINIC | Age: 88
End: 2023-06-07
Payer: MEDICARE

## 2023-06-07 VITALS
BODY MASS INDEX: 27.11 KG/M2 | WEIGHT: 153 LBS | TEMPERATURE: 98.2 F | HEIGHT: 63 IN | DIASTOLIC BLOOD PRESSURE: 61 MMHG | SYSTOLIC BLOOD PRESSURE: 99 MMHG | HEART RATE: 83 BPM | OXYGEN SATURATION: 93 %

## 2023-06-07 PROCEDURE — 99215 OFFICE O/P EST HI 40 MIN: CPT

## 2023-06-13 ENCOUNTER — FORM ENCOUNTER (OUTPATIENT)
Age: 88
End: 2023-06-13

## 2023-06-14 ENCOUNTER — APPOINTMENT (OUTPATIENT)
Dept: PULMONOLOGY | Facility: CLINIC | Age: 88
End: 2023-06-14
Payer: MEDICARE

## 2023-06-14 PROCEDURE — 71046 X-RAY EXAM CHEST 2 VIEWS: CPT

## 2023-06-14 PROCEDURE — 99213 OFFICE O/P EST LOW 20 MIN: CPT | Mod: 25

## 2023-06-14 NOTE — ASSESSMENT
[FreeTextEntry1] : Overall doing better.  May come off oxygen during the daytime as room air sat is 95%.  Son is not happy with the supplier as he wants a 15 foot oxygen tubing.  I will get him a prescription for it and he will purchase it on his own.  Follow-up in 3 months

## 2023-06-14 NOTE — PROCEDURE
[FreeTextEntry1] : Chest x-ray demonstrates resolution of bilateral pleural effusions.  Marked cardiomegaly noted without interval change

## 2023-06-14 NOTE — HISTORY OF PRESENT ILLNESS
[TextBox_4] : Prior: Hospitalized for acute respiratory illness determined to have flu.  Noted abnormal chest x-ray treated with course of antibiotics.  Feeling better still dependent on oxygen.  Has ongoing issues with cough productive of yellow sputum cough varies from day-to-day no current fever or chills reports chronic shortness of breath without change\par Current: Feels well from pulmonary perspective continues on oxygen.  Feels okay.  Is very nervous and dependent on the oxygen.  Feels otherwise okay.\par

## 2023-06-15 ENCOUNTER — APPOINTMENT (OUTPATIENT)
Dept: WOUND CARE | Facility: CLINIC | Age: 88
End: 2023-06-15

## 2023-06-16 ENCOUNTER — NON-APPOINTMENT (OUTPATIENT)
Age: 88
End: 2023-06-16

## 2023-06-16 NOTE — CARDIOLOGY SUMMARY
[de-identified] : \par 08/05/22 ECG Afib at 68 bpm, iRBBB, low voltage, left axis, NSTW abnormalities \par 03/15/22 ECG: AFib at 63 bpm, iRBBB, LAFB. No sig change from 11/24/21.\par 11/24/21 ECG: AFib at 48 bpm, iRBBB, LAFB. No sig change from prior 5/19/21\par 05/19/21 ECG: AFib at 61 bpm, iRBBB, LAFB. No sig change from prior.\par 11/18/20 ECG: AFib at 57 bpm, iRBBB, LAFB. No sig change from prior 8/28/20\par  [de-identified] : \par 03/10/22-03/30/22 Zio monitor: Persistent AFib (100% burden). HR ranging  bpm (average HR 64 bpm). Mild IVCD, rare VPD's and couplets. No symptoms.\par  [de-identified] : \par 07/19/22 TTE: LVIDd 5.4 cm, LVEF 68%, mild LVH ( SW 1.0 cm, PW 1.0 cm) no segmental WMA, RV is enlarged w/ decreased RVSF, severe JANEL, s/p lala clip w/mild MR (peak/mean 10  mmHg/ 4 mmHg @ HR 66), mild AR, mod-severe TR, severe PH (RVSP 83 mmHg)\par \par 03/15/22 TTE: LVIDd 5.0 cm, LVEF 70%, no segmental WMA, flattening of the septum c/w RV pressure overload, mild concentric LVH (septum 1.4 cm, PWT 1.1 cm), RVE with decreased RVSF, severe JNAEL, s/p Mitraclip with mild-mod MR, mod-severe MS (peak gradient 15 mmHg, mean gradient 6 mmHg), severe TR, mild NM, severe PH (RVSP 79 mmHg).\par \par 11/18/20 TTE: LVIDd 4.6 cm, normal LVEF, concentric LVH (SW 1.2, PW 1.3 cm), flattening of septum c/w RV pressure overload, normal LA size, mod SONNY, RVE with decreased RV function, mild-mod MR s/p MitraClip (peak 10 mmHg, mean 4 mmHg), mod-severe TR, no effusion, severe PH (RVSP 95 mmHg). Iatrogenic transeptal flow noted (transeptal puncture for Clip).\par \par 03/01/18 TTE: LVEF 70%, LVIDd 5.1, septum/PWT 1.1, LA 5.7, severely dilated RV with severe RVSD, severe biatrial enlargement, severe TR, MR present but shadowed by Mitraclip,est RVSP 82, left to right atrial flow noted from transseptal puncture.\par  [de-identified] : \par 03/11/19 RHC baseline: no RA or RV reported, PA 61/21/35, PCW 12, Ao 93%, PA 71%, CO/CI (F) 6.09/3.26, PVR 3.77 Mendez.\par Nitric Oxide: RA 10, RV 57/12, PA 54/17/30, no PCW reported, Ao 93%, PA 77%, CO/CI (F) 8.19/4.38,  PVR 3.66 Mendez.\par

## 2023-06-16 NOTE — HISTORY OF PRESENT ILLNESS
[FreeTextEntry1] : Mrs. Mix is a very pleasant 89 year old woman with history of HFpEF (LVEF 68% 7/2022), MR s/p Mitraclip (8/30/16), mixed pre- and post-capillary pulmonary hypertension, permanent AFib off AC since 11/2022 due to GIB requiring transfusions, hypertension, hyperlipidemia, CKD (b/l Cr 1.2-1.3), SHERYL and hypoxia on home O2 3-4L who presents today for routine follow-up of her cardiomyopathy. \par \par When last seen in clinic by Dr. Lopes in December was notably hypervolemic, approx 10 lbs above dry weight but with adjustment of Torsemide, diuresed well in subsequent weeks. In interim, she was briefly admitted to [facility]  in December for Flu A with concern for superimposed PNA and was treated with ABX. More recently, feels she is back to her baseline. Fatigue is improving and is able to walk 2-3 blocks slowly with her cane or sometimes walker. Inclines continue to be difficult due to SOB. She is utilizing home O2 continuously. Weight has been stable between 152-153 lbs on Torsemide 60 mg QD. SBP generally 90/60s. Longstanding 2 pillow orthopnea. \par \par She denies CP, SOB at rest, PND, LH/dizziness, abdominal discomfort, palpitations, and syncope. Her appetite is normal and her bowel and bladder habits are unchanged and normal for her. She does not have an ICD. She has been limiting fluid and sodium in her diet and taking her medications as directed. She does not use NSAIDs. She has not been admitted to the hospital or seen in the ER for HF in the interim. \par \par \par \par

## 2023-06-16 NOTE — REASON FOR VISIT
[Cardiac Failure] : cardiac failure [Other: _____] : [unfilled] [FreeTextEntry1] : PATIENT INSTRUCTIONS:\par \par 1. START JARDIANCE 5 MG taken DAILY in the morning\par \par 2. Continue your medications as prescribed\par \par 3. If your weight starts to downtrend, please contact our office and we will provide you with instructions on how to adjust your diuretics. \par \par 4. Labs in 2 weeks at [place]\par \par 5. Follow-up with Dr. Lopes in 6 months, sooner if needed

## 2023-06-16 NOTE — PHYSICAL EXAM
[No Xanthelasma] : no xanthelasma [No Rub] : no rub [No Gallop] : no gallop [Soft] : abdomen soft [Non Tender] : non-tender [Normal Bowel Sounds] : normal bowel sounds [No Cyanosis] : no cyanosis [No Clubbing] : no clubbing [Normal Radial B/L] : normal radial B/L [Edema ___] : edema [unfilled] [No Rash] : no rash [Normal] : moves all extremities, no focal deficits, normal speech [Alert and Oriented] : alert and oriented [de-identified] : +perioral cyanosis, MMM [de-identified] : JVP 8-10 cm H2O with prominent V waves [de-identified] : II/VI systolic murmur at base [de-identified] : Irregularly irregular S1 S2 [de-identified] : on 3L of O2 via NC [de-identified] : unable to appreciate HSM or masses due to body habitus [de-identified] : using wheelchair for distance [de-identified] :  L shin dressing C/D/I [de-identified] : warm peripherally

## 2023-06-16 NOTE — END OF VISIT
[FreeTextEntry3] : I, Dr. Lopes, personally performed the evaluation and management (E/M) services for this established patient who presents today with (a) new problem(s)/exacerbation of (an) existing condition(s).  That E/M includes conducting the examination, assessing all new/exacerbated conditions, and establishing a new plan of care.  Today, my JULIUS, [employer], was here to observe my evaluation and management services for this new problem/exacerbated condition to be followed going forward.  [Time Spent: ___ minutes] : I have spent [unfilled] minutes of time on the encounter.

## 2023-06-16 NOTE — DISCUSSION/SUMMARY
[Patient] : the patient [FreeTextEntry2] : and son [FreeTextEntry1] : 89 yo F with chronic diastolic HF, rate-controlled AFib, severe MR s/p MitraClip, CKD stage 3 and chronic hypoxia on home O2 who presents for follow-up of her HF. She is ACC/AHA Stage C, NYHA Class II-III, and euvolemic. I have recommended the following:\par \par 1. Chronic diastolic heart failure - Stable and compensated. Will retrial SGLT2-i, Jardiance at 5 mg QD as she reportedly did not tolerate Farxiga in past due to ABD discomfort. Continue Torsemide 60 mg QD for now but should weight lower, can reduce to 40 mg QD. Continue Spironolactone 25 mg BID. She is not a candidate for alooma research trial as SOB is driving by pulmonary issue requiring home O2. Labs from February with K 4.4, Cr 1.4 and pro-BNP of 2892 (from 2309 in December). Labs in 1-2 weeks at [place]. \par \par 2. Pulmonary hypertension - Stable for years despite severely elevated pulmonary pressures by TTE. At the time of her last RHC on 3/11/19, PA 61/21/35, PCW 12 without a response to inhaled NO and with PA 71.3%, PVR 3.77 Mendez. TTE from 3/15/22 reviewed with Dr. Moore (structural cardiology) who did not think there was significant or new MS.\par \par 3. AFib - Rate controlled on beta blocker. Off AC due to recurrent GIB requiring transfusions. \par \par 4. Lower GIB - Hospitalized September 2022 for Hgb of 6.9 s/p 2U PRBC and Pradaxa was stopped. Failed trial of Eliquis 2.5 mg BID, readmitted October 2022 requiring transfusion. GI thought this to be a diverticular bleed and family opted to manage conservatively. \par \par 5. CKD stage 3 - Baseline Cr 1.2-1.3. Recent labs generally stable. Starting SGLT2-i above to delay further progression. \par \par 6. Hypertension - Well controlled.\par \par 7. Anxiety and Depression- On Sertraline 75 mg QD. Followed by geriatrician Dr. Jones\par \par 8. Insomnia- On Mirtazipine 7.5 mg qHS. Recommended against hypnotic sleep aids and benzodiazepines.\par \par 9. Follow-up with Dr. Lopes in 6 months .

## 2023-06-22 NOTE — H&P ADULT - PROBLEM SELECTOR PROBLEM 5
Problem: Chronic Conditions and Co-morbidities  Goal: Patient's chronic conditions and co-morbidity symptoms are monitored and maintained or improved  6/22/2023 0817 by Yana Peña RN  Outcome: Progressing  6/22/2023 0611 by Nadia Dillon RN  Flowsheets (Taken 6/22/2023 7213)  Care Plan - Patient's Chronic Conditions and Co-Morbidity Symptoms are Monitored and Maintained or Improved: Monitor and assess patient's chronic conditions and comorbid symptoms for stability, deterioration, or improvement
Problem: Chronic Conditions and Co-morbidities  Goal: Patient's chronic conditions and co-morbidity symptoms are monitored and maintained or improved  Flowsheets (Taken 6/22/2023 0611)  Care Plan - Patient's Chronic Conditions and Co-Morbidity Symptoms are Monitored and Maintained or Improved: Monitor and assess patient's chronic conditions and comorbid symptoms for stability, deterioration, or improvement
HTN (hypertension)

## 2023-06-23 NOTE — PROGRESS NOTE ADULT - PROBLEM SELECTOR PROBLEM 3
[facility] Followup    Patient:  Serenity Wong Date:  2023   :  1937 Meds:  Reviewed and updated in Epic   86 year old female      Date and type of surgery: #26 mm Armenta Jorge Luis valve + perc 22 with Dr. Santiago and Dr. Glass    Reason for visit:  1 year follow up s/p TAVR    Cardiologist:  Souleymane  Appointment Date:  2022    History of Present Illness:    Serenity Wong is a pleasant 86 year old female who presents today for a one year follow up s/p TAVR.  She reports she is very fatigued.  She was admitted to the hospital last October with GI bleeding and Anemia.  She remains Anemic but stable.  Remains on warfarin but feels limited on what she can eat because of her INR levels.  She has lost a lot of weight in the past year.  Reports a poor appetite and that everything tastes bland.  Reports she is  was told she is unable to take drink boost due to interference with warfarin which she enjoys.  She denies CP, PND, orthopnea, palpitations or BLE edema.  Has some mild KIMBALL with exertion.  Denies s/s of bleeding.  Plans to see her hematologist next week    Physical Examination    Vitals:    23 1231 23 1255   BP: (!) 156/62 128/60   Pulse: 81    Resp: 16    SpO2: 100%    Weight: 49 kg (108 lb 0.4 oz)    Height: 5' 2.5\" (1.588 m)        Weight Weight: 49 kg (108 lb 0.4 oz) (23 1231)   Height Height: 5' 2.5\" (158.8 cm) (23 1231)   Body mass index BMI (Calculated): 19.44 (23 1231)     General: No acute distress  Lungs:  Clear to auscultation bilaterally  Heart:  Regular rate and rhythm and S1, S2 normal  Extremities:  extremities normal, atraumatic, no cyanosis or edema    NYHA (New York Heart Association):  II    Diagnostics:  Echo today - pending    Impression/Plan:    1. Severe Aortic Valve Stenosis s/p TAVR: Doing well. Echo done today - we will call her with results. Reinforced prophylactic dental abx to prevent infective endocarditis. Encouraged  to call with questions and concerns. Follow-up as needed with our clinic.     2. PAF: +Sotalol, Warfarin. Continue close follow up with Dr. Courtney.  She asked today about switching to Eliquis and also asked about Watchman procedure.  Will defer this to cardiology/EP    Disposition:  Home with her family      Current Outpatient Medications   Medication Sig   • warfarin (COUMADIN) 1 MG tablet (warfarin) TAKE 2 TABLETS DAILY OR AS DIRECTED BY ANTICOAGULATION CLINIC   • amoxicillin (AMOXIL) 500 MG capsule TAKE 4 CAPSULES 1 HOUR PRIOR TO DENTAL PROCEDURE   • sotalol (BETAPACE) 80 MG tablet Take 0.5 tablets by mouth in the morning and 0.5 tablets in the evening. Short term supply until mail order arrives   • rosuvastatin (CRESTOR) 10 MG tablet TAKE 1 TABLET DAILY   • Synthroid 50 MCG tablet TAKE 1 TABLET DAILY FOR UNDERACTIVE THYROID   • pantoprazole (PROTONIX) 40 MG tablet TAKE 1 TABLET IN THE MORNING AND 1 TABLET IN THE EVENING   • Ferrous Sulfate (Iron) 325 (65 Fe) MG Tab Take 1 tablet by mouth daily.   • turmeric 500 MG capsule Take 1,000 mg by mouth daily. gummies   • vitamin E 100 units capsule Take 100 Units by mouth daily.   • Multiple Vitamins-Minerals (ICAPS PO) Take 1 capsule by mouth 2 times daily.   • Flaxseed, Linseed, (FLAXSEED OIL PO) Take 1,200 mg by mouth daily.     No current facility-administered medications for this visit.      HTN (hypertension)

## 2023-06-27 ENCOUNTER — APPOINTMENT (OUTPATIENT)
Dept: HEART FAILURE | Facility: CLINIC | Age: 88
End: 2023-06-27
Payer: MEDICARE

## 2023-06-27 PROCEDURE — 99443: CPT | Mod: 95

## 2023-06-27 RX ORDER — TORSEMIDE 20 MG/1
20 TABLET ORAL
Qty: 2 | Refills: 0 | Status: COMPLETED | COMMUNITY
Start: 2023-06-27 | End: 2023-06-28

## 2023-06-30 NOTE — ED ADULT NURSE NOTE - EENT WDL
Eyes with no visual disturbances.  Ears clean and dry and no hearing difficulties. Nose with pink mucosa and no drainage.  Mouth mucous membranes moist and pink.  No tenderness or swelling to throat or neck.
no

## 2023-07-10 ENCOUNTER — APPOINTMENT (OUTPATIENT)
Dept: GERIATRICS | Facility: CLINIC | Age: 88
End: 2023-07-10

## 2023-07-19 ENCOUNTER — OUTPATIENT (OUTPATIENT)
Dept: OUTPATIENT SERVICES | Facility: HOSPITAL | Age: 88
LOS: 1 days | End: 2023-07-19
Payer: MEDICARE

## 2023-07-19 ENCOUNTER — APPOINTMENT (OUTPATIENT)
Dept: WOUND CARE | Facility: CLINIC | Age: 88
End: 2023-07-19
Payer: MEDICARE

## 2023-07-19 VITALS
TEMPERATURE: 96.9 F | DIASTOLIC BLOOD PRESSURE: 66 MMHG | OXYGEN SATURATION: 96 % | HEART RATE: 96 BPM | RESPIRATION RATE: 18 BRPM | SYSTOLIC BLOOD PRESSURE: 108 MMHG

## 2023-07-19 DIAGNOSIS — Z98.89 OTHER SPECIFIED POSTPROCEDURAL STATES: Chronic | ICD-10-CM
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
## 2023-07-19 DIAGNOSIS — H26.9 UNSPECIFIED CATARACT: Chronic | ICD-10-CM
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
## 2023-07-19 PROCEDURE — 99205 OFFICE O/P NEW HI 60 MIN: CPT

## 2023-07-19 PROCEDURE — G0463: CPT

## 2023-07-19 NOTE — VITALS
[Pain related to present condition?] : The patient's  pain is related to present condition. [Sharp] : sharp [Shooting] : shooting [FreeTextEntry3] : TABBY [FreeTextEntry1] : Massage

## 2023-07-19 NOTE — HISTORY OF PRESENT ILLNESS
[FreeTextEntry1] : Ms. ARNULFO ALEX   presents to the office with a wound since . The wound is located on  the LLE. The patient has complaints of moderate drainage.  The patient has been dressing the wound with gauze.  The patient denies fevers or chills. The patient has localized pain to the wound upon dressing changes. The patient has no other complaints or associated symptoms. hgbA1C 6.7% as of 9/22\par \par

## 2023-07-19 NOTE — PHYSICAL EXAM
[Skin Ulcer] : ulcer [Alert] : alert [Oriented to Person] : oriented to person [Oriented to Place] : oriented to place [Oriented to Time] : oriented to time [Calm] : calm [Please See PDF for Tissue Analytics] : Please See PDF for [employer] Analytics. [1+] : left 1+ [Ankle Swelling (On Exam)] : present [Ankle Swelling Bilaterally] : bilaterally  [Ankle Swelling On The Left] : moderate [Varicose Veins Of Lower Extremities] : bilaterally [Abdomen Tenderness] : ~T ~M No abdominal tenderness [de-identified] : nad [de-identified] : non traumatic [de-identified] : supple [de-identified] : Oxygen 24 hrs/day [de-identified] : soft [de-identified] : ambulates with assist device [de-identified] : TABBY

## 2023-07-19 NOTE — REVIEW OF SYSTEMS
[Chills] : chills [Recent Weight Loss (___ Lbs)] : recent [unfilled] ~Ulb weight loss [Loss Of Hearing] : hearing loss [Lower Ext Edema] : lower extremity edema [Shortness Of Breath] : shortness of breath [Abdominal Pain] : abdominal pain [Constipation] : constipation [Diarrhea] : diarrhea [Skin Wound] : skin wound [Difficulty Walking] : difficulty walking [Negative] : Psychiatric [As Noted in HPI] : as noted in HPI [FreeTextEntry3] : cataracts [FreeTextEntry5] : Afib, CHF [FreeTextEntry6] : oxygen 24 hrs  [FreeTextEntry9] : walker to ambulate

## 2023-07-19 NOTE — PLAN
[FreeTextEntry1] : 7/19/23\par Plan:\par LLE\par Wash wound with soap and water\par Cover with Sorbion Sachet XL.\par Theo/ACE wrap\par Wrap bilateral lower extremities with Theo/ACE\par Change dressing 3 times per week.\par Leg elevation as tolerated\par Encouraged ambulation or exercise.\par Optimization of nutrition.\par Home care orders in place\par The patient was  counseled as to the signs and symptoms of infection, and instructed  in the event they suspect new or worsening infection or if the patient is significantly ill (fever, altered mental status, etc.), to present to the nearest ED. \par Follow up appointment scheduled for 4 weeks\par

## 2023-07-19 NOTE — ASSESSMENT
[FreeTextEntry1] : 7-19-23:\par Wound Assessment and Plan:\par \par The patient presents with a wound to the LLE.  Swelling noted to the bilateral extremities L>R.\par Pt has home care nurse.  Pt with CHF.  Pt has been advised by Cards to use compression garments but has been unable to put them on \par On exam:\par LLE: Wound bed is red with macerated wound edges. Large amount of serous drainage. scant slough\par No clinical sign of infection\par s/p mechanical debridement\par today wound was covered with Sorbion Sachet XL.\par Theo/ACE wrap\par Change dressing 3 times per week.\par Leg elevation as tolerated\par Encouraged ambulation or exercise.\par Optimization of nutrition.\par Home care orders in place\par Follow up appointment scheduled for 4 weeks\par \par \par \par

## 2023-07-25 DIAGNOSIS — S81.802A UNSPECIFIED OPEN WOUND, LEFT LOWER LEG, INITIAL ENCOUNTER: ICD-10-CM

## 2023-07-25 DIAGNOSIS — R60.0 LOCALIZED EDEMA: ICD-10-CM

## 2023-07-27 LAB
ANION GAP SERPL CALC-SCNC: 16 MMOL/L
BUN SERPL-MCNC: 35 MG/DL
CALCIUM SERPL-MCNC: 8.9 MG/DL
CHLORIDE SERPL-SCNC: 94 MMOL/L
CO2 SERPL-SCNC: 28 MMOL/L
CREAT SERPL-MCNC: 1.6 MG/DL
EGFR: 30 ML/MIN/1.73M2
GLUCOSE SERPL-MCNC: 134 MG/DL
MAGNESIUM SERPL-MCNC: 2.4 MG/DL
NT-PROBNP SERPL-MCNC: 5501 PG/ML
POTASSIUM SERPL-SCNC: 4.3 MMOL/L
SODIUM SERPL-SCNC: 138 MMOL/L

## 2023-07-31 ENCOUNTER — APPOINTMENT (OUTPATIENT)
Dept: HEART FAILURE | Facility: CLINIC | Age: 88
End: 2023-07-31
Payer: MEDICARE

## 2023-07-31 PROCEDURE — 99443: CPT | Mod: 95

## 2023-08-04 ENCOUNTER — NON-APPOINTMENT (OUTPATIENT)
Age: 88
End: 2023-08-04

## 2023-08-04 LAB
ANION GAP SERPL CALC-SCNC: 17 MMOL/L
BUN SERPL-MCNC: 36 MG/DL
CALCIUM SERPL-MCNC: 9.1 MG/DL
CHLORIDE SERPL-SCNC: 95 MMOL/L
CO2 SERPL-SCNC: 27 MMOL/L
CREAT SERPL-MCNC: 1.58 MG/DL
EGFR: 31 ML/MIN/1.73M2
GLUCOSE SERPL-MCNC: 171 MG/DL
HCT VFR BLD CALC: 36.1 %
HGB BLD-MCNC: 10.8 G/DL
MCHC RBC-ENTMCNC: 27.3 PG
MCHC RBC-ENTMCNC: 29.9 GM/DL
MCV RBC AUTO: 91.4 FL
NT-PROBNP SERPL-MCNC: 4851 PG/ML
PLATELET # BLD AUTO: 252 K/UL
POTASSIUM SERPL-SCNC: 4.8 MMOL/L
RBC # BLD: 3.95 M/UL
RBC # FLD: 14.6 %
SODIUM SERPL-SCNC: 138 MMOL/L
WBC # FLD AUTO: 8.55 K/UL

## 2023-08-07 ENCOUNTER — TRANSCRIPTION ENCOUNTER (OUTPATIENT)
Age: 88
End: 2023-08-07

## 2023-08-08 ENCOUNTER — TRANSCRIPTION ENCOUNTER (OUTPATIENT)
Age: 88
End: 2023-08-08

## 2023-08-11 ENCOUNTER — OUTPATIENT (OUTPATIENT)
Dept: OUTPATIENT SERVICES | Facility: HOSPITAL | Age: 88
LOS: 1 days | End: 2023-08-11
Payer: MEDICARE

## 2023-08-11 ENCOUNTER — APPOINTMENT (OUTPATIENT)
Dept: WOUND CARE | Facility: CLINIC | Age: 88
End: 2023-08-11
Payer: MEDICARE

## 2023-08-11 VITALS — HEIGHT: 63 IN | BODY MASS INDEX: 27.11 KG/M2 | TEMPERATURE: 97.1 F | WEIGHT: 153 LBS

## 2023-08-11 DIAGNOSIS — S81.801A UNSPECIFIED OPEN WOUND, RIGHT LOWER LEG, INITIAL ENCOUNTER: ICD-10-CM

## 2023-08-11 DIAGNOSIS — Z98.89 OTHER SPECIFIED POSTPROCEDURAL STATES: Chronic | ICD-10-CM

## 2023-08-11 DIAGNOSIS — H26.9 UNSPECIFIED CATARACT: Chronic | ICD-10-CM

## 2023-08-11 PROCEDURE — 11042 DBRDMT SUBQ TIS 1ST 20SQCM/<: CPT

## 2023-08-11 NOTE — ASSESSMENT
[FreeTextEntry1] : 7-19-23: Wound Assessment and Plan:  The patient presents with a wound to the LLE.  Swelling noted to the bilateral extremities L>R. Pt has home care nurse.  Pt with CHF.  Pt has been advised by Cards to use compression garments but has been unable to put them on  On exam: LLE: Wound bed is red with macerated wound edges. Large amount of serous drainage. scant slough No clinical sign of infection s/p mechanical debridement today wound was covered with Sorbion Sachet XL. Theo/ACE wrap Change dressing 3 times per week. Leg elevation as tolerated Encouraged ambulation or exercise. Optimization of nutrition. Home care orders in place Follow up appointment scheduled for 4 weeks  8-11-23: Pt here for f/u Accompanied by son No new complaints Has nursing 3x/week On exam: LLE:  red wound bed, no maceration.  periowund with dry skin.  No s/s of infection. s/p mechanical debridement  NEW RLE:  wound on knee,  scab s/p excisional debridement No s/s of infection.

## 2023-08-11 NOTE — REVIEW OF SYSTEMS
[As Noted in HPI] : as noted in HPI [Chills] : chills [Recent Weight Loss (___ Lbs)] : recent [unfilled] ~Ulb weight loss [Loss Of Hearing] : hearing loss [Lower Ext Edema] : lower extremity edema [Shortness Of Breath] : shortness of breath [Abdominal Pain] : abdominal pain [Constipation] : constipation [Diarrhea] : diarrhea [Skin Wound] : skin wound [Difficulty Walking] : difficulty walking [Negative] : Psychiatric [FreeTextEntry3] : cataracts [FreeTextEntry5] : Afib, CHF [FreeTextEntry6] : oxygen 24 hrs  [FreeTextEntry9] : walker to ambulate

## 2023-08-11 NOTE — REASON FOR VISIT
[Follow-Up: _____] : a [unfilled] follow-up visit [Family Member] : family member [FreeTextEntry1] : LLE wound

## 2023-08-11 NOTE — PHYSICAL EXAM
[1+] : left 1+ [Ankle Swelling (On Exam)] : present [Ankle Swelling Bilaterally] : bilaterally  [Ankle Swelling On The Left] : moderate [Varicose Veins Of Lower Extremities] : bilaterally [Skin Ulcer] : ulcer [Alert] : alert [Oriented to Person] : oriented to person [Oriented to Place] : oriented to place [Oriented to Time] : oriented to time [Calm] : calm [Please See PDF for Tissue Analytics] : Please See PDF for Tissue Analytics. [de-identified] : nad [de-identified] : non traumatic [de-identified] : supple [de-identified] : Oxygen 24 hrs/day [de-identified] : ambulates with assist device [de-identified] : TABBY

## 2023-08-29 ENCOUNTER — APPOINTMENT (OUTPATIENT)
Dept: HEART FAILURE | Facility: CLINIC | Age: 88
End: 2023-08-29
Payer: MEDICARE

## 2023-08-29 VITALS
DIASTOLIC BLOOD PRESSURE: 58 MMHG | WEIGHT: 150 LBS | HEIGHT: 63 IN | BODY MASS INDEX: 26.58 KG/M2 | OXYGEN SATURATION: 90 % | TEMPERATURE: 98.1 F | SYSTOLIC BLOOD PRESSURE: 95 MMHG | HEART RATE: 85 BPM

## 2023-08-29 PROCEDURE — 99214 OFFICE O/P EST MOD 30 MIN: CPT

## 2023-08-29 NOTE — HISTORY OF PRESENT ILLNESS
[FreeTextEntry1] : Mrs. Mix is a very pleasant 89 year old woman with history of HFpEF (LVEF 68% 7/2022), MR s/p Mitraclip (8/30/16), mixed pre- and post-capillary pulmonary hypertension, permanent AFib off AC since 11/2022 due to GIB requiring transfusions, hypertension, hyperlipidemia, CKD (b/l Cr 1.2-1.3), SHERYL and hypoxia on home O2 3-4L who presents today for routine follow-up of her cardiomyopathy.   Reports doing well. No significant clinical change. Remains in [place], utilizing supplemental O2 throughout the day. Taking Torsemide 60 mg QD with stable weight. Home log ranging 149-151 lbs the past week. Uses cane for short distances and wheelchair to clinic today. Endorses longstanding 2 pillow orthopnea. Denies LH/dizziness, CP, SOB at rest, PND, abdominal discomfort, palpitations, and syncope. Her appetite is normal and her bowel and bladder habits are unchanged and normal for her. She does not have an ICD. She has been limiting fluid and sodium in her diet and taking her medications as directed. She does not use NSAIDs. She has not been admitted to the hospital or seen in the ER for HF in the interim.

## 2023-08-29 NOTE — PHYSICAL EXAM
[No Xanthelasma] : no xanthelasma [No Rub] : no rub [No Gallop] : no gallop [Soft] : abdomen soft [Non Tender] : non-tender [Normal Bowel Sounds] : normal bowel sounds [No Cyanosis] : no cyanosis [No Clubbing] : no clubbing [Normal Radial B/L] : normal radial B/L [No Rash] : no rash [Normal] : moves all extremities, no focal deficits, normal speech [Alert and Oriented] : alert and oriented [Normal Venous Pressure] : normal venous pressure [de-identified] : JVP 8 cm H2O, no HJR [de-identified] : II/VI systolic murmur at base [de-identified] : Irregularly irregular S1 S2 [de-identified] : on 3L O2 via NC [de-identified] : unable to appreciate HSM or masses due to body habitus [de-identified] : using wheelchair for distance [de-identified] :  LLE dressign C/D/I. RLE 1+ pretibial edema  [de-identified] : warm peripherally

## 2023-08-29 NOTE — REVIEW OF SYSTEMS
[FreeTextEntry1] : The remaining 14-point ROS is otherwise negative or non contributory except as noted in the HPI

## 2023-08-29 NOTE — CARDIOLOGY SUMMARY
[de-identified] : \par  08/05/22 ECG Afib at 68 bpm, iRBBB, low voltage, left axis, NSTW abnormalities \par  03/15/22 ECG: AFib at 63 bpm, iRBBB, LAFB. No sig change from 11/24/21.\par  11/24/21 ECG: AFib at 48 bpm, iRBBB, LAFB. No sig change from prior 5/19/21\par  05/19/21 ECG: AFib at 61 bpm, iRBBB, LAFB. No sig change from prior.\par  11/18/20 ECG: AFib at 57 bpm, iRBBB, LAFB. No sig change from prior 8/28/20\par   [de-identified] : \par  03/10/22-03/30/22 Zio monitor: Persistent AFib (100% burden). HR ranging  bpm (average HR 64 bpm). Mild IVCD, rare VPD's and couplets. No symptoms.\par   [de-identified] : \par  07/19/22 TTE: LVIDd 5.4 cm, LVEF 68%, mild LVH ( SW 1.0 cm, PW 1.0 cm) no segmental WMA, RV is enlarged w/ decreased RVSF, severe JANEL, s/p lala clip w/mild MR (peak/mean 10  mmHg/ 4 mmHg @ HR 66), mild AR, mod-severe TR, severe PH (RVSP 83 mmHg)\par  \par  03/15/22 TTE: LVIDd 5.0 cm, LVEF 70%, no segmental WMA, flattening of the septum c/w RV pressure overload, mild concentric LVH (septum 1.4 cm, PWT 1.1 cm), RVE with decreased RVSF, severe JANEL, s/p Mitraclip with mild-mod MR, mod-severe MS (peak gradient 15 mmHg, mean gradient 6 mmHg), severe TR, mild FL, severe PH (RVSP 79 mmHg).\par  \par  11/18/20 TTE: LVIDd 4.6 cm, normal LVEF, concentric LVH (SW 1.2, PW 1.3 cm), flattening of septum c/w RV pressure overload, normal LA size, mod SONNY, RVE with decreased RV function, mild-mod MR s/p MitraClip (peak 10 mmHg, mean 4 mmHg), mod-severe TR, no effusion, severe PH (RVSP 95 mmHg). Iatrogenic transeptal flow noted (transeptal puncture for Clip).\par  \par  03/01/18 TTE: LVEF 70%, LVIDd 5.1, septum/PWT 1.1, LA 5.7, severely dilated RV with severe RVSD, severe biatrial enlargement, severe TR, MR present but shadowed by Mitraclip,est RVSP 82, left to right atrial flow noted from transseptal puncture.\par   [de-identified] : \par  03/11/19 RHC baseline: no RA or RV reported, PA 61/21/35, PCW 12, Ao 93%, PA 71%, CO/CI (F) 6.09/3.26, PVR 3.77 Mendez.\par  Nitric Oxide: RA 10, RV 57/12, PA 54/17/30, no PCW reported, Ao 93%, PA 77%, CO/CI (F) 8.19/4.38,  PVR 3.66 Mendez.\par

## 2023-08-29 NOTE — DISCUSSION/SUMMARY
[Patient] : the patient [FreeTextEntry2] : and son [FreeTextEntry1] : 89 year old woman with chronic diastolic HF, rate controlled AFib, severe MR s/p MitraClip, CKD stage 3 and chronic hypoxia on home O2 who is clinically stable from a HF perspective in recent months. She is ACC/AHA Stage C, NYHA Class III, and euvolemic. I have recommended the followin. Chronic diastolic heart failure - Stable and compensated. Continue Torsemide 60 mg QD, advised to monitor daily weights to guide need for additional dose in afternoon. Continue Farxiga 10 mg QD (started in 2023) and Spironolactone 25 mg BID. Labs from 8/3 with K 4.8, Cr 1.58 and stable pro-BNP 4851 (from 5501)  2. Pulmonary hypertension - Stable for years despite severely elevated pulmonary pressures by TTE. At the time of her last RHC on 3/11/19, PA 61/21/35, PCW 12 without a response to inhaled NO and with PA 71.3%, PVR 3.77 Mendez. TTE from 3/15/22 reviewed with Dr. Moore (structural cardiology) who did not think there was significant or new MS.  3. AFib - Rate controlled on beta blocker. Off AC due to recurrent GIB requiring transfusions.   4. Lower GIB - Hospitalized 2022 for Hgb of 6.9 s/p 2U PRBC and Pradaxa was stopped. Failed trial of Eliquis 2.5 mg BID, readmitted 2022 requiring transfusion. GI thought this to be a diverticular bleed and family opted to manage conservatively.   5. CKD stage 3 - Baseline Cr 1.2-1.3. Recent labs following introduction of SGLT2-i with mild rise to 1.6 in July and more recently stable at 1.58. Continue SGLT2-i to delay further progression.   6. Hypertension - Well controlled.  7. Anxiety and Depression- On Sertraline 75 mg QD. Followed by geriatrician Dr. Jones  8. Insomnia- On Mirtazipine 7.5 mg qHS. Recommended against hypnotic sleep aids and benzodiazepines.  9. Follow-up with Dr. Lopes as scheduled in December, sooner if needed.

## 2023-09-08 ENCOUNTER — APPOINTMENT (OUTPATIENT)
Dept: WOUND CARE | Facility: CLINIC | Age: 88
End: 2023-09-08
Payer: MEDICARE

## 2023-09-08 ENCOUNTER — OUTPATIENT (OUTPATIENT)
Dept: OUTPATIENT SERVICES | Facility: HOSPITAL | Age: 88
LOS: 1 days | End: 2023-09-08
Payer: MEDICARE

## 2023-09-08 VITALS
DIASTOLIC BLOOD PRESSURE: 65 MMHG | HEART RATE: 90 BPM | TEMPERATURE: 96.9 F | SYSTOLIC BLOOD PRESSURE: 112 MMHG | OXYGEN SATURATION: 94 % | RESPIRATION RATE: 18 BRPM

## 2023-09-08 DIAGNOSIS — Z98.89 OTHER SPECIFIED POSTPROCEDURAL STATES: Chronic | ICD-10-CM

## 2023-09-08 DIAGNOSIS — H26.9 UNSPECIFIED CATARACT: Chronic | ICD-10-CM

## 2023-09-08 DIAGNOSIS — S81.801D UNSPECIFIED OPEN WOUND, RIGHT LOWER LEG, SUBSEQUENT ENCOUNTER: ICD-10-CM

## 2023-09-08 PROCEDURE — G0463: CPT

## 2023-09-08 PROCEDURE — 99214 OFFICE O/P EST MOD 30 MIN: CPT

## 2023-09-08 NOTE — REVIEW OF SYSTEMS
[Loss Of Hearing] : hearing loss [Lower Ext Edema] : lower extremity edema [Shortness Of Breath] : shortness of breath [Constipation] : constipation [Skin Wound] : skin wound [Difficulty Walking] : difficulty walking [Fever] : no fever [Chills] : no chills [Negative] : Constitutional [FreeTextEntry3] : cataracts [FreeTextEntry5] : Afib, CHF [FreeTextEntry6] : oxygen 24 hrs  [FreeTextEntry9] : walker to ambulate

## 2023-09-08 NOTE — PHYSICAL EXAM
[1+] : left 1+ [Ankle Swelling (On Exam)] : present [Ankle Swelling Bilaterally] : bilaterally  [Ankle Swelling On The Right] : mild [Varicose Veins Of Lower Extremities] : bilaterally [Ankle Swelling On The Left] : moderate [Skin Ulcer] : ulcer [Alert] : alert [Oriented to Person] : oriented to person [Oriented to Place] : oriented to place [Oriented to Time] : oriented to time [Calm] : calm [Please See PDF for Tissue Analytics] : Please See PDF for [employer] Analytics. [de-identified] : nad [de-identified] : non traumatic [de-identified] : supple [de-identified] : Oxygen 24 hrs/day [de-identified] : ambulates with assist device [de-identified] : TABBY

## 2023-09-08 NOTE — PLAN
[FreeTextEntry1] : 7/19/23 Plan: LLE Wash wound with soap and water Cover with Sorbion Sachet XL. Theo/ACE wrap Wrap bilateral lower extremities with Theo/ACE Change dressing 3 times per week. Leg elevation as tolerated Encouraged ambulation or exercise. Optimization of nutrition. Home care orders in place The patient was  counseled as to the signs and symptoms of infection, and instructed  in the event they suspect new or worsening infection or if the patient is significantly ill (fever, altered mental status, etc.), to present to the nearest ED.  Follow up appointment scheduled for 4 weeks  8-11-23: BLE: Wash wound with soap and water LLE: Cover with Sorbion Sachet XL. Theo/ACE wrap RLE: medihoney/adaptic theo. Pt doesnt want ace on this leg Change dressing 3 times per week. Leg elevation as tolerated Encouraged ambulation or exercise. Optimization of nutrition. Nursing orders given The patient was counseled as to the signs and symptoms of infection, and instructed in the event they suspect new or worsening infection or if the patient is significantly ill (fever, altered mental status, etc.), to present to the nearest ED.  f/u 4 weeks   9/8/23 Plan: Wash soap with soap and water Cover with Vashe soaked gauze, leave in place for 5 min. Do not rinse. Apply extrasorb, Theo, ACE Change 3x/wk Nursing orders given pt instructed not to move dressing f/u 3-4 weeks

## 2023-09-08 NOTE — ASSESSMENT
[FreeTextEntry1] : 7-19-23: Wound Assessment and Plan:  The patient presents with a wound to the LLE.  Swelling noted to the bilateral extremities L>R. Pt has home care nurse.  Pt with CHF.  Pt has been advised by Cards to use compression garments but has been unable to put them on  On exam: LLE: Wound bed is red with macerated wound edges. Large amount of serous drainage. scant slough No clinical sign of infection s/p mechanical debridement today wound was covered with Sorbion Sachet XL. Theo/ACE wrap Change dressing 3 times per week. Leg elevation as tolerated Encouraged ambulation or exercise. Optimization of nutrition. Home care orders in place Follow up appointment scheduled for 4 weeks  8-11-23: Pt here for f/u Accompanied by son No new complaints Has nursing 3x/week On exam: LLE:  red wound bed, no maceration.  periwound with dry skin.  No s/s of infection. s/p mechanical debridement  NEW RLE:  wound on knee,  scab s/p excisional debridement No s/s of infection.   9/8/23 Patient here for follow up of LLE wound. She reports significant pain in her ankle. She had moved dressing up above the ankle due to itching. Appears to have been too tight around the ankle. Accompanied by son Has visiting nurse 3x/wk denies fever or chills or pain at wound On exam: RLE knee wound - healed LLE - wound is clean and pink. Surrounding skin dry and flaky. Mechanical debridement performed, all the dry flaky skin was removed. Skin below is healthy. Skin is somewhat hyperemic around the ankle where the bandage had been on tight and tender at this site only to touch No purulence or evidence of infection.

## 2023-09-11 DIAGNOSIS — S81.801A UNSPECIFIED OPEN WOUND, RIGHT LOWER LEG, INITIAL ENCOUNTER: ICD-10-CM

## 2023-09-11 DIAGNOSIS — S81.802D UNSPECIFIED OPEN WOUND, LEFT LOWER LEG, SUBSEQUENT ENCOUNTER: ICD-10-CM

## 2023-09-18 DIAGNOSIS — S81.802D UNSPECIFIED OPEN WOUND, LEFT LOWER LEG, SUBSEQUENT ENCOUNTER: ICD-10-CM

## 2023-09-18 DIAGNOSIS — Z87.828 PERSONAL HISTORY OF OTHER (HEALED) PHYSICAL INJURY AND TRAUMA: ICD-10-CM

## 2023-09-18 DIAGNOSIS — S81.801D UNSPECIFIED OPEN WOUND, RIGHT LOWER LEG, SUBSEQUENT ENCOUNTER: ICD-10-CM

## 2023-09-29 ENCOUNTER — OUTPATIENT (OUTPATIENT)
Dept: OUTPATIENT SERVICES | Facility: HOSPITAL | Age: 88
LOS: 1 days | End: 2023-09-29
Payer: MEDICARE

## 2023-09-29 ENCOUNTER — APPOINTMENT (OUTPATIENT)
Dept: WOUND CARE | Facility: HOSPITAL | Age: 88
End: 2023-09-29
Payer: MEDICARE

## 2023-09-29 VITALS
DIASTOLIC BLOOD PRESSURE: 73 MMHG | TEMPERATURE: 96.9 F | OXYGEN SATURATION: 96 % | WEIGHT: 150 LBS | HEART RATE: 100 BPM | HEIGHT: 63 IN | RESPIRATION RATE: 22 BRPM | BODY MASS INDEX: 26.58 KG/M2 | SYSTOLIC BLOOD PRESSURE: 118 MMHG

## 2023-09-29 DIAGNOSIS — Z98.89 OTHER SPECIFIED POSTPROCEDURAL STATES: Chronic | ICD-10-CM

## 2023-09-29 DIAGNOSIS — H26.9 UNSPECIFIED CATARACT: Chronic | ICD-10-CM

## 2023-09-29 PROCEDURE — G0463: CPT

## 2023-09-29 PROCEDURE — 99214 OFFICE O/P EST MOD 30 MIN: CPT

## 2023-10-04 ENCOUNTER — APPOINTMENT (OUTPATIENT)
Dept: OTOLARYNGOLOGY | Facility: CLINIC | Age: 88
End: 2023-10-04
Payer: MEDICARE

## 2023-10-04 ENCOUNTER — APPOINTMENT (OUTPATIENT)
Dept: PHARMACY | Facility: CLINIC | Age: 88
End: 2023-10-04
Payer: SELF-PAY

## 2023-10-04 VITALS
SYSTOLIC BLOOD PRESSURE: 104 MMHG | HEIGHT: 63 IN | DIASTOLIC BLOOD PRESSURE: 61 MMHG | HEART RATE: 99 BPM | WEIGHT: 150 LBS | BODY MASS INDEX: 26.58 KG/M2

## 2023-10-04 DIAGNOSIS — H93.8X9 OTHER SPECIFIED DISORDERS OF EAR, UNSPECIFIED EAR: ICD-10-CM

## 2023-10-04 DIAGNOSIS — J34.2 DEVIATED NASAL SEPTUM: ICD-10-CM

## 2023-10-04 DIAGNOSIS — J31.0 CHRONIC RHINITIS: ICD-10-CM

## 2023-10-04 DIAGNOSIS — H60.60 UNSPECIFIED CHRONIC OTITIS EXTERNA, UNSPECIFIED EAR: ICD-10-CM

## 2023-10-04 DIAGNOSIS — H70.12 CHRONIC MASTOIDITIS, LEFT EAR: ICD-10-CM

## 2023-10-04 DIAGNOSIS — H90.3 SENSORINEURAL HEARING LOSS, BILATERAL: ICD-10-CM

## 2023-10-04 PROCEDURE — 92557 COMPREHENSIVE HEARING TEST: CPT

## 2023-10-04 PROCEDURE — V5014J: CUSTOM | Mod: RT

## 2023-10-04 PROCEDURE — 92567 TYMPANOMETRY: CPT

## 2023-10-04 PROCEDURE — 99213 OFFICE O/P EST LOW 20 MIN: CPT

## 2023-10-04 RX ORDER — ASPIRIN 81 MG
6.5 TABLET, DELAYED RELEASE (ENTERIC COATED) ORAL TWICE DAILY
Qty: 1 | Refills: 5 | Status: ACTIVE | OUTPATIENT
Start: 2023-10-04

## 2023-10-10 ENCOUNTER — APPOINTMENT (OUTPATIENT)
Dept: GERIATRICS | Facility: CLINIC | Age: 88
End: 2023-10-10
Payer: MEDICARE

## 2023-10-10 VITALS
RESPIRATION RATE: 17 BRPM | HEIGHT: 63 IN | BODY MASS INDEX: 26.4 KG/M2 | DIASTOLIC BLOOD PRESSURE: 61 MMHG | HEART RATE: 81 BPM | WEIGHT: 149 LBS | OXYGEN SATURATION: 97 % | SYSTOLIC BLOOD PRESSURE: 91 MMHG

## 2023-10-10 DIAGNOSIS — R73.9 HYPERGLYCEMIA, UNSPECIFIED: ICD-10-CM

## 2023-10-10 DIAGNOSIS — Z13.29 ENCOUNTER FOR SCREENING FOR OTHER SUSPECTED ENDOCRINE DISORDER: ICD-10-CM

## 2023-10-10 DIAGNOSIS — M79.89 OTHER SPECIFIED SOFT TISSUE DISORDERS: ICD-10-CM

## 2023-10-10 DIAGNOSIS — S41.111A LACERATION W/OUT FOREIGN BODY OF RIGHT UPPER ARM, INITIAL ENCOUNTER: ICD-10-CM

## 2023-10-10 DIAGNOSIS — Z00.00 ENCOUNTER FOR GENERAL ADULT MEDICAL EXAMINATION W/OUT ABNORMAL FINDINGS: ICD-10-CM

## 2023-10-10 DIAGNOSIS — E78.5 HYPERLIPIDEMIA, UNSPECIFIED: ICD-10-CM

## 2023-10-10 PROCEDURE — G0439: CPT

## 2023-10-10 PROCEDURE — 99214 OFFICE O/P EST MOD 30 MIN: CPT | Mod: 25

## 2023-10-11 ENCOUNTER — APPOINTMENT (OUTPATIENT)
Dept: PULMONOLOGY | Facility: CLINIC | Age: 88
End: 2023-10-11

## 2023-10-11 LAB
25(OH)D3 SERPL-MCNC: 32.8 NG/ML
ALBUMIN SERPL ELPH-MCNC: 3.9 G/DL
ALP BLD-CCNC: 62 U/L
ALT SERPL-CCNC: 9 U/L
ANION GAP SERPL CALC-SCNC: 17 MMOL/L
AST SERPL-CCNC: 20 U/L
BASOPHILS # BLD AUTO: 0.04 K/UL
BASOPHILS NFR BLD AUTO: 0.5 %
BILIRUB SERPL-MCNC: 0.5 MG/DL
BUN SERPL-MCNC: 40 MG/DL
CALCIUM SERPL-MCNC: 9.3 MG/DL
CHLORIDE SERPL-SCNC: 95 MMOL/L
CHOLEST SERPL-MCNC: 96 MG/DL
CO2 SERPL-SCNC: 25 MMOL/L
CREAT SERPL-MCNC: 1.63 MG/DL
EGFR: 30 ML/MIN/1.73M2
EOSINOPHIL # BLD AUTO: 0.23 K/UL
EOSINOPHIL NFR BLD AUTO: 2.7 %
ESTIMATED AVERAGE GLUCOSE: 169 MG/DL
FOLATE SERPL-MCNC: >20 NG/ML
GLUCOSE SERPL-MCNC: 100 MG/DL
HBA1C MFR BLD HPLC: 7.5 %
HCT VFR BLD CALC: 36.7 %
HDLC SERPL-MCNC: 36 MG/DL
HGB BLD-MCNC: 11.1 G/DL
IMM GRANULOCYTES NFR BLD AUTO: 1 %
LDLC SERPL CALC-MCNC: 45 MG/DL
LYMPHOCYTES # BLD AUTO: 1.31 K/UL
LYMPHOCYTES NFR BLD AUTO: 15.7 %
MAN DIFF?: NORMAL
MCHC RBC-ENTMCNC: 27.5 PG
MCHC RBC-ENTMCNC: 30.2 GM/DL
MCV RBC AUTO: 90.8 FL
MONOCYTES # BLD AUTO: 1.21 K/UL
MONOCYTES NFR BLD AUTO: 14.5 %
NEUTROPHILS # BLD AUTO: 5.5 K/UL
NEUTROPHILS NFR BLD AUTO: 65.6 %
NONHDLC SERPL-MCNC: 60 MG/DL
NT-PROBNP SERPL-MCNC: 4586 PG/ML
PLATELET # BLD AUTO: 230 K/UL
POTASSIUM SERPL-SCNC: 4.6 MMOL/L
PROT SERPL-MCNC: 7.2 G/DL
RBC # BLD: 4.04 M/UL
RBC # FLD: 18.7 %
SODIUM SERPL-SCNC: 137 MMOL/L
TRIGL SERPL-MCNC: 70 MG/DL
TSH SERPL-ACNC: 2.41 UIU/ML
VIT B12 SERPL-MCNC: 716 PG/ML
WBC # FLD AUTO: 8.37 K/UL

## 2023-10-13 ENCOUNTER — TRANSCRIPTION ENCOUNTER (OUTPATIENT)
Age: 88
End: 2023-10-13

## 2023-10-16 ENCOUNTER — NON-APPOINTMENT (OUTPATIENT)
Age: 88
End: 2023-10-16

## 2023-10-23 ENCOUNTER — APPOINTMENT (OUTPATIENT)
Dept: GERIATRICS | Facility: CLINIC | Age: 88
End: 2023-10-23
Payer: MEDICARE

## 2023-10-23 PROCEDURE — 99213 OFFICE O/P EST LOW 20 MIN: CPT

## 2023-10-24 ENCOUNTER — APPOINTMENT (OUTPATIENT)
Dept: GERIATRICS | Facility: CLINIC | Age: 88
End: 2023-10-24
Payer: MEDICARE

## 2023-10-24 VITALS
TEMPERATURE: 97.8 F | OXYGEN SATURATION: 95 % | DIASTOLIC BLOOD PRESSURE: 63 MMHG | WEIGHT: 149 LBS | BODY MASS INDEX: 26.39 KG/M2 | SYSTOLIC BLOOD PRESSURE: 110 MMHG | RESPIRATION RATE: 18 BRPM | HEART RATE: 99 BPM

## 2023-10-24 DIAGNOSIS — T36.95XA TOXIC GASTROENTERITIS AND COLITIS: ICD-10-CM

## 2023-10-24 DIAGNOSIS — K52.1 TOXIC GASTROENTERITIS AND COLITIS: ICD-10-CM

## 2023-10-24 PROCEDURE — 99214 OFFICE O/P EST MOD 30 MIN: CPT

## 2023-10-24 RX ORDER — CEPHALEXIN 250 MG/1
250 TABLET ORAL EVERY 8 HOURS
Qty: 21 | Refills: 0 | Status: DISCONTINUED | COMMUNITY
Start: 2023-10-24 | End: 2023-10-24

## 2023-10-24 RX ORDER — L. ACIDOPHILUS/L.BULGARICUS 1MM CELL
TABLET ORAL DAILY
Qty: 7 | Refills: 0 | Status: DISCONTINUED | COMMUNITY
Start: 2023-10-24 | End: 2023-10-24

## 2023-10-27 ENCOUNTER — OUTPATIENT (OUTPATIENT)
Dept: OUTPATIENT SERVICES | Facility: HOSPITAL | Age: 88
LOS: 1 days | End: 2023-10-27
Payer: MEDICARE

## 2023-10-27 ENCOUNTER — APPOINTMENT (OUTPATIENT)
Dept: WOUND CARE | Facility: HOSPITAL | Age: 88
End: 2023-10-27
Payer: MEDICARE

## 2023-10-27 VITALS
DIASTOLIC BLOOD PRESSURE: 62 MMHG | BODY MASS INDEX: 26.4 KG/M2 | HEIGHT: 63 IN | SYSTOLIC BLOOD PRESSURE: 121 MMHG | OXYGEN SATURATION: 96 % | WEIGHT: 149 LBS | HEART RATE: 66 BPM | RESPIRATION RATE: 18 BRPM | TEMPERATURE: 97.6 F

## 2023-10-27 DIAGNOSIS — Z98.89 OTHER SPECIFIED POSTPROCEDURAL STATES: Chronic | ICD-10-CM

## 2023-10-27 DIAGNOSIS — Z87.828 PERSONAL HISTORY OF OTHER (HEALED) PHYSICAL INJURY AND TRAUMA: ICD-10-CM

## 2023-10-27 DIAGNOSIS — S81.802D UNSPECIFIED OPEN WOUND, LEFT LOWER LEG, SUBSEQUENT ENCOUNTER: ICD-10-CM

## 2023-10-27 DIAGNOSIS — H26.9 UNSPECIFIED CATARACT: Chronic | ICD-10-CM

## 2023-10-27 DIAGNOSIS — M79.89 OTHER SPECIFIED SOFT TISSUE DISORDERS: ICD-10-CM

## 2023-10-27 PROCEDURE — 99214 OFFICE O/P EST MOD 30 MIN: CPT

## 2023-10-27 PROCEDURE — G0463: CPT

## 2023-10-31 ENCOUNTER — APPOINTMENT (OUTPATIENT)
Dept: GERIATRICS | Facility: CLINIC | Age: 88
End: 2023-10-31
Payer: MEDICARE

## 2023-10-31 VITALS
BODY MASS INDEX: 26.22 KG/M2 | SYSTOLIC BLOOD PRESSURE: 121 MMHG | HEART RATE: 87 BPM | RESPIRATION RATE: 17 BRPM | TEMPERATURE: 97.2 F | DIASTOLIC BLOOD PRESSURE: 76 MMHG | OXYGEN SATURATION: 95 % | HEIGHT: 63 IN | WEIGHT: 148 LBS

## 2023-10-31 DIAGNOSIS — S81.802A UNSPECIFIED OPEN WOUND, LEFT LOWER LEG, INITIAL ENCOUNTER: ICD-10-CM

## 2023-10-31 PROCEDURE — 99214 OFFICE O/P EST MOD 30 MIN: CPT

## 2023-10-31 RX ORDER — CEPHALEXIN 250 MG/1
250 TABLET ORAL
Qty: 21 | Refills: 0 | Status: COMPLETED | COMMUNITY
Start: 2023-10-24 | End: 2023-10-31

## 2023-11-06 DIAGNOSIS — S81.802D UNSPECIFIED OPEN WOUND, LEFT LOWER LEG, SUBSEQUENT ENCOUNTER: ICD-10-CM

## 2023-11-06 DIAGNOSIS — S51.801A UNSPECIFIED OPEN WOUND OF RIGHT FOREARM, INITIAL ENCOUNTER: ICD-10-CM

## 2023-11-06 DIAGNOSIS — Z87.828 PERSONAL HISTORY OF OTHER (HEALED) PHYSICAL INJURY AND TRAUMA: ICD-10-CM

## 2023-11-07 ENCOUNTER — TRANSCRIPTION ENCOUNTER (OUTPATIENT)
Age: 88
End: 2023-11-07

## 2023-11-09 RX ORDER — CHLORHEXIDINE GLUCONATE 4 %
325 (65 FE) LIQUID (ML) TOPICAL DAILY
Qty: 90 | Refills: 3 | Status: ACTIVE | COMMUNITY
Start: 2022-11-11 | End: 1900-01-01

## 2023-11-14 ENCOUNTER — APPOINTMENT (OUTPATIENT)
Dept: PULMONOLOGY | Facility: CLINIC | Age: 88
End: 2023-11-14

## 2023-11-15 ENCOUNTER — APPOINTMENT (OUTPATIENT)
Dept: PHARMACY | Facility: CLINIC | Age: 88
End: 2023-11-15
Payer: SELF-PAY

## 2023-11-15 PROCEDURE — V5010 ASSESSMENT FOR HEARING AID: CPT | Mod: NC

## 2023-11-22 ENCOUNTER — APPOINTMENT (OUTPATIENT)
Dept: PULMONOLOGY | Facility: CLINIC | Age: 88
End: 2023-11-22
Payer: MEDICARE

## 2023-11-22 VITALS
SYSTOLIC BLOOD PRESSURE: 98 MMHG | HEART RATE: 107 BPM | DIASTOLIC BLOOD PRESSURE: 57 MMHG | WEIGHT: 152 LBS | HEIGHT: 63 IN | BODY MASS INDEX: 26.93 KG/M2 | OXYGEN SATURATION: 94 %

## 2023-11-22 PROCEDURE — 71046 X-RAY EXAM CHEST 2 VIEWS: CPT

## 2023-11-22 PROCEDURE — 99213 OFFICE O/P EST LOW 20 MIN: CPT | Mod: 25

## 2023-11-27 ENCOUNTER — INPATIENT (INPATIENT)
Facility: HOSPITAL | Age: 88
LOS: 4 days | Discharge: NURSING, REHAB OR LONG-TERM CARE | DRG: 603 | End: 2023-12-02
Attending: HOSPITALIST | Admitting: HOSPITALIST
Payer: MEDICARE

## 2023-11-27 ENCOUNTER — APPOINTMENT (OUTPATIENT)
Dept: HEART FAILURE | Facility: CLINIC | Age: 88
End: 2023-11-27

## 2023-11-27 VITALS
HEIGHT: 62 IN | SYSTOLIC BLOOD PRESSURE: 86 MMHG | OXYGEN SATURATION: 96 % | WEIGHT: 153 LBS | RESPIRATION RATE: 19 BRPM | DIASTOLIC BLOOD PRESSURE: 54 MMHG | HEART RATE: 75 BPM | TEMPERATURE: 98 F

## 2023-11-27 DIAGNOSIS — Z98.89 OTHER SPECIFIED POSTPROCEDURAL STATES: Chronic | ICD-10-CM

## 2023-11-27 DIAGNOSIS — L03.116 CELLULITIS OF LEFT LOWER LIMB: ICD-10-CM

## 2023-11-27 DIAGNOSIS — H26.9 UNSPECIFIED CATARACT: Chronic | ICD-10-CM

## 2023-11-27 LAB
ALBUMIN SERPL ELPH-MCNC: 3.1 G/DL — LOW (ref 3.3–5)
ALBUMIN SERPL ELPH-MCNC: 3.1 G/DL — LOW (ref 3.3–5)
ALP SERPL-CCNC: 56 U/L — SIGNIFICANT CHANGE UP (ref 30–120)
ALP SERPL-CCNC: 56 U/L — SIGNIFICANT CHANGE UP (ref 30–120)
ALT FLD-CCNC: 16 U/L — SIGNIFICANT CHANGE UP (ref 10–60)
ALT FLD-CCNC: 16 U/L — SIGNIFICANT CHANGE UP (ref 10–60)
ANION GAP SERPL CALC-SCNC: 7 MMOL/L — SIGNIFICANT CHANGE UP (ref 5–17)
ANION GAP SERPL CALC-SCNC: 7 MMOL/L — SIGNIFICANT CHANGE UP (ref 5–17)
APPEARANCE UR: CLEAR — SIGNIFICANT CHANGE UP
APPEARANCE UR: CLEAR — SIGNIFICANT CHANGE UP
APTT BLD: 30.2 SEC — SIGNIFICANT CHANGE UP (ref 24.5–35.6)
APTT BLD: 30.2 SEC — SIGNIFICANT CHANGE UP (ref 24.5–35.6)
AST SERPL-CCNC: 18 U/L — SIGNIFICANT CHANGE UP (ref 10–40)
AST SERPL-CCNC: 18 U/L — SIGNIFICANT CHANGE UP (ref 10–40)
BASOPHILS # BLD AUTO: 0.03 K/UL — SIGNIFICANT CHANGE UP (ref 0–0.2)
BASOPHILS # BLD AUTO: 0.03 K/UL — SIGNIFICANT CHANGE UP (ref 0–0.2)
BASOPHILS NFR BLD AUTO: 0.4 % — SIGNIFICANT CHANGE UP (ref 0–2)
BASOPHILS NFR BLD AUTO: 0.4 % — SIGNIFICANT CHANGE UP (ref 0–2)
BILIRUB SERPL-MCNC: 0.4 MG/DL — SIGNIFICANT CHANGE UP (ref 0.2–1.2)
BILIRUB SERPL-MCNC: 0.4 MG/DL — SIGNIFICANT CHANGE UP (ref 0.2–1.2)
BILIRUB UR-MCNC: NEGATIVE — SIGNIFICANT CHANGE UP
BILIRUB UR-MCNC: NEGATIVE — SIGNIFICANT CHANGE UP
BUN SERPL-MCNC: 58 MG/DL — HIGH (ref 7–23)
BUN SERPL-MCNC: 58 MG/DL — HIGH (ref 7–23)
CALCIUM SERPL-MCNC: 9 MG/DL — SIGNIFICANT CHANGE UP (ref 8.4–10.5)
CALCIUM SERPL-MCNC: 9 MG/DL — SIGNIFICANT CHANGE UP (ref 8.4–10.5)
CHLORIDE SERPL-SCNC: 99 MMOL/L — SIGNIFICANT CHANGE UP (ref 96–108)
CHLORIDE SERPL-SCNC: 99 MMOL/L — SIGNIFICANT CHANGE UP (ref 96–108)
CO2 SERPL-SCNC: 31 MMOL/L — SIGNIFICANT CHANGE UP (ref 22–31)
CO2 SERPL-SCNC: 31 MMOL/L — SIGNIFICANT CHANGE UP (ref 22–31)
COLOR SPEC: YELLOW — SIGNIFICANT CHANGE UP
COLOR SPEC: YELLOW — SIGNIFICANT CHANGE UP
CREAT SERPL-MCNC: 1.74 MG/DL — HIGH (ref 0.5–1.3)
CREAT SERPL-MCNC: 1.74 MG/DL — HIGH (ref 0.5–1.3)
DIFF PNL FLD: NEGATIVE — SIGNIFICANT CHANGE UP
DIFF PNL FLD: NEGATIVE — SIGNIFICANT CHANGE UP
EGFR: 27 ML/MIN/1.73M2 — LOW
EGFR: 27 ML/MIN/1.73M2 — LOW
EOSINOPHIL # BLD AUTO: 0.18 K/UL — SIGNIFICANT CHANGE UP (ref 0–0.5)
EOSINOPHIL # BLD AUTO: 0.18 K/UL — SIGNIFICANT CHANGE UP (ref 0–0.5)
EOSINOPHIL NFR BLD AUTO: 2.1 % — SIGNIFICANT CHANGE UP (ref 0–6)
EOSINOPHIL NFR BLD AUTO: 2.1 % — SIGNIFICANT CHANGE UP (ref 0–6)
GLUCOSE SERPL-MCNC: 145 MG/DL — HIGH (ref 70–99)
GLUCOSE SERPL-MCNC: 145 MG/DL — HIGH (ref 70–99)
GLUCOSE UR QL: 500 MG/DL
GLUCOSE UR QL: 500 MG/DL
HCT VFR BLD CALC: 34.8 % — SIGNIFICANT CHANGE UP (ref 34.5–45)
HCT VFR BLD CALC: 34.8 % — SIGNIFICANT CHANGE UP (ref 34.5–45)
HGB BLD-MCNC: 10.4 G/DL — LOW (ref 11.5–15.5)
HGB BLD-MCNC: 10.4 G/DL — LOW (ref 11.5–15.5)
IMM GRANULOCYTES NFR BLD AUTO: 0.8 % — SIGNIFICANT CHANGE UP (ref 0–0.9)
IMM GRANULOCYTES NFR BLD AUTO: 0.8 % — SIGNIFICANT CHANGE UP (ref 0–0.9)
INR BLD: 1.31 RATIO — HIGH (ref 0.85–1.18)
INR BLD: 1.31 RATIO — HIGH (ref 0.85–1.18)
KETONES UR-MCNC: NEGATIVE MG/DL — SIGNIFICANT CHANGE UP
KETONES UR-MCNC: NEGATIVE MG/DL — SIGNIFICANT CHANGE UP
LACTATE SERPL-SCNC: 0.9 MMOL/L — SIGNIFICANT CHANGE UP (ref 0.7–2)
LACTATE SERPL-SCNC: 0.9 MMOL/L — SIGNIFICANT CHANGE UP (ref 0.7–2)
LEUKOCYTE ESTERASE UR-ACNC: NEGATIVE — SIGNIFICANT CHANGE UP
LEUKOCYTE ESTERASE UR-ACNC: NEGATIVE — SIGNIFICANT CHANGE UP
LYMPHOCYTES # BLD AUTO: 1.16 K/UL — SIGNIFICANT CHANGE UP (ref 1–3.3)
LYMPHOCYTES # BLD AUTO: 1.16 K/UL — SIGNIFICANT CHANGE UP (ref 1–3.3)
LYMPHOCYTES # BLD AUTO: 13.6 % — SIGNIFICANT CHANGE UP (ref 13–44)
LYMPHOCYTES # BLD AUTO: 13.6 % — SIGNIFICANT CHANGE UP (ref 13–44)
MCHC RBC-ENTMCNC: 27.8 PG — SIGNIFICANT CHANGE UP (ref 27–34)
MCHC RBC-ENTMCNC: 27.8 PG — SIGNIFICANT CHANGE UP (ref 27–34)
MCHC RBC-ENTMCNC: 29.9 GM/DL — LOW (ref 32–36)
MCHC RBC-ENTMCNC: 29.9 GM/DL — LOW (ref 32–36)
MCV RBC AUTO: 93 FL — SIGNIFICANT CHANGE UP (ref 80–100)
MCV RBC AUTO: 93 FL — SIGNIFICANT CHANGE UP (ref 80–100)
MONOCYTES # BLD AUTO: 1.25 K/UL — HIGH (ref 0–0.9)
MONOCYTES # BLD AUTO: 1.25 K/UL — HIGH (ref 0–0.9)
MONOCYTES NFR BLD AUTO: 14.7 % — HIGH (ref 2–14)
MONOCYTES NFR BLD AUTO: 14.7 % — HIGH (ref 2–14)
NEUTROPHILS # BLD AUTO: 5.81 K/UL — SIGNIFICANT CHANGE UP (ref 1.8–7.4)
NEUTROPHILS # BLD AUTO: 5.81 K/UL — SIGNIFICANT CHANGE UP (ref 1.8–7.4)
NEUTROPHILS NFR BLD AUTO: 68.4 % — SIGNIFICANT CHANGE UP (ref 43–77)
NEUTROPHILS NFR BLD AUTO: 68.4 % — SIGNIFICANT CHANGE UP (ref 43–77)
NITRITE UR-MCNC: NEGATIVE — SIGNIFICANT CHANGE UP
NITRITE UR-MCNC: NEGATIVE — SIGNIFICANT CHANGE UP
NRBC # BLD: 0 /100 WBCS — SIGNIFICANT CHANGE UP (ref 0–0)
NRBC # BLD: 0 /100 WBCS — SIGNIFICANT CHANGE UP (ref 0–0)
PH UR: 6 — SIGNIFICANT CHANGE UP (ref 5–8)
PH UR: 6 — SIGNIFICANT CHANGE UP (ref 5–8)
PLATELET # BLD AUTO: 191 K/UL — SIGNIFICANT CHANGE UP (ref 150–400)
PLATELET # BLD AUTO: 191 K/UL — SIGNIFICANT CHANGE UP (ref 150–400)
POTASSIUM SERPL-MCNC: 4.5 MMOL/L — SIGNIFICANT CHANGE UP (ref 3.5–5.3)
POTASSIUM SERPL-MCNC: 4.5 MMOL/L — SIGNIFICANT CHANGE UP (ref 3.5–5.3)
POTASSIUM SERPL-SCNC: 4.5 MMOL/L — SIGNIFICANT CHANGE UP (ref 3.5–5.3)
POTASSIUM SERPL-SCNC: 4.5 MMOL/L — SIGNIFICANT CHANGE UP (ref 3.5–5.3)
PROT SERPL-MCNC: 7.9 G/DL — SIGNIFICANT CHANGE UP (ref 6–8.3)
PROT SERPL-MCNC: 7.9 G/DL — SIGNIFICANT CHANGE UP (ref 6–8.3)
PROT UR-MCNC: NEGATIVE MG/DL — SIGNIFICANT CHANGE UP
PROT UR-MCNC: NEGATIVE MG/DL — SIGNIFICANT CHANGE UP
PROTHROM AB SERPL-ACNC: 14.2 SEC — HIGH (ref 9.5–13)
PROTHROM AB SERPL-ACNC: 14.2 SEC — HIGH (ref 9.5–13)
RBC # BLD: 3.74 M/UL — LOW (ref 3.8–5.2)
RBC # BLD: 3.74 M/UL — LOW (ref 3.8–5.2)
RBC # FLD: 16.9 % — HIGH (ref 10.3–14.5)
RBC # FLD: 16.9 % — HIGH (ref 10.3–14.5)
SODIUM SERPL-SCNC: 137 MMOL/L — SIGNIFICANT CHANGE UP (ref 135–145)
SODIUM SERPL-SCNC: 137 MMOL/L — SIGNIFICANT CHANGE UP (ref 135–145)
SP GR SPEC: 1.01 — SIGNIFICANT CHANGE UP (ref 1–1.03)
SP GR SPEC: 1.01 — SIGNIFICANT CHANGE UP (ref 1–1.03)
UROBILINOGEN FLD QL: 0.2 MG/DL — SIGNIFICANT CHANGE UP (ref 0.2–1)
UROBILINOGEN FLD QL: 0.2 MG/DL — SIGNIFICANT CHANGE UP (ref 0.2–1)
WBC # BLD: 8.5 K/UL — SIGNIFICANT CHANGE UP (ref 3.8–10.5)
WBC # BLD: 8.5 K/UL — SIGNIFICANT CHANGE UP (ref 3.8–10.5)
WBC # FLD AUTO: 8.5 K/UL — SIGNIFICANT CHANGE UP (ref 3.8–10.5)
WBC # FLD AUTO: 8.5 K/UL — SIGNIFICANT CHANGE UP (ref 3.8–10.5)

## 2023-11-27 PROCEDURE — 73620 X-RAY EXAM OF FOOT: CPT | Mod: 26,LT

## 2023-11-27 PROCEDURE — 93010 ELECTROCARDIOGRAM REPORT: CPT

## 2023-11-27 PROCEDURE — 99285 EMERGENCY DEPT VISIT HI MDM: CPT | Mod: FS

## 2023-11-27 PROCEDURE — 71045 X-RAY EXAM CHEST 1 VIEW: CPT | Mod: 26

## 2023-11-27 PROCEDURE — 73590 X-RAY EXAM OF LOWER LEG: CPT | Mod: 26,LT

## 2023-11-27 PROCEDURE — 99222 1ST HOSP IP/OBS MODERATE 55: CPT | Mod: AI

## 2023-11-27 RX ORDER — GABAPENTIN 400 MG/1
100 CAPSULE ORAL AT BEDTIME
Refills: 0 | Status: DISCONTINUED | OUTPATIENT
Start: 2023-11-27 | End: 2023-12-02

## 2023-11-27 RX ORDER — ALBUTEROL 90 UG/1
2.5 AEROSOL, METERED ORAL ONCE
Refills: 0 | Status: DISCONTINUED | OUTPATIENT
Start: 2023-11-27 | End: 2023-12-02

## 2023-11-27 RX ORDER — LANOLIN ALCOHOL/MO/W.PET/CERES
2 CREAM (GRAM) TOPICAL
Qty: 0 | Refills: 0 | DISCHARGE

## 2023-11-27 RX ORDER — LANOLIN ALCOHOL/MO/W.PET/CERES
3 CREAM (GRAM) TOPICAL AT BEDTIME
Refills: 0 | Status: DISCONTINUED | OUTPATIENT
Start: 2023-11-27 | End: 2023-12-02

## 2023-11-27 RX ORDER — ACETAMINOPHEN 500 MG
650 TABLET ORAL EVERY 6 HOURS
Refills: 0 | Status: DISCONTINUED | OUTPATIENT
Start: 2023-11-27 | End: 2023-12-02

## 2023-11-27 RX ORDER — POLYETHYLENE GLYCOL 3350 17 G/17G
17 POWDER, FOR SOLUTION ORAL DAILY
Refills: 0 | Status: DISCONTINUED | OUTPATIENT
Start: 2023-11-27 | End: 2023-12-02

## 2023-11-27 RX ORDER — MIRTAZAPINE 45 MG/1
1 TABLET, ORALLY DISINTEGRATING ORAL
Qty: 0 | Refills: 0 | DISCHARGE

## 2023-11-27 RX ORDER — HEPARIN SODIUM 5000 [USP'U]/ML
5000 INJECTION INTRAVENOUS; SUBCUTANEOUS EVERY 8 HOURS
Refills: 0 | Status: DISCONTINUED | OUTPATIENT
Start: 2023-11-27 | End: 2023-12-02

## 2023-11-27 RX ORDER — METOPROLOL TARTRATE 50 MG
25 TABLET ORAL
Refills: 0 | Status: DISCONTINUED | OUTPATIENT
Start: 2023-11-27 | End: 2023-12-02

## 2023-11-27 RX ORDER — PIPERACILLIN AND TAZOBACTAM 4; .5 G/20ML; G/20ML
3.38 INJECTION, POWDER, LYOPHILIZED, FOR SOLUTION INTRAVENOUS ONCE
Refills: 0 | Status: COMPLETED | OUTPATIENT
Start: 2023-11-27 | End: 2023-11-27

## 2023-11-27 RX ORDER — MIRTAZAPINE 45 MG/1
22.5 TABLET, ORALLY DISINTEGRATING ORAL AT BEDTIME
Refills: 0 | Status: DISCONTINUED | OUTPATIENT
Start: 2023-11-27 | End: 2023-12-02

## 2023-11-27 RX ORDER — SODIUM CHLORIDE 0.65 %
1 AEROSOL, SPRAY (ML) NASAL
Refills: 0 | Status: DISCONTINUED | OUTPATIENT
Start: 2023-11-27 | End: 2023-12-02

## 2023-11-27 RX ORDER — FERROUS SULFATE 325(65) MG
325 TABLET ORAL DAILY
Refills: 0 | Status: DISCONTINUED | OUTPATIENT
Start: 2023-11-27 | End: 2023-12-02

## 2023-11-27 RX ORDER — ALPRAZOLAM 0.25 MG
1 TABLET ORAL
Qty: 0 | Refills: 0 | DISCHARGE

## 2023-11-27 RX ORDER — SERTRALINE 25 MG/1
25 TABLET, FILM COATED ORAL DAILY
Refills: 0 | Status: DISCONTINUED | OUTPATIENT
Start: 2023-11-27 | End: 2023-12-02

## 2023-11-27 RX ORDER — SENNA PLUS 8.6 MG/1
2 TABLET ORAL AT BEDTIME
Refills: 0 | Status: DISCONTINUED | OUTPATIENT
Start: 2023-11-27 | End: 2023-12-02

## 2023-11-27 RX ORDER — PIPERACILLIN AND TAZOBACTAM 4; .5 G/20ML; G/20ML
3.38 INJECTION, POWDER, LYOPHILIZED, FOR SOLUTION INTRAVENOUS EVERY 8 HOURS
Refills: 0 | Status: DISCONTINUED | OUTPATIENT
Start: 2023-11-27 | End: 2023-12-02

## 2023-11-27 RX ORDER — FLUTICASONE PROPIONATE 50 MCG
1 SPRAY, SUSPENSION NASAL
Refills: 0 | Status: DISCONTINUED | OUTPATIENT
Start: 2023-11-27 | End: 2023-12-02

## 2023-11-27 RX ORDER — ATORVASTATIN CALCIUM 80 MG/1
10 TABLET, FILM COATED ORAL AT BEDTIME
Refills: 0 | Status: DISCONTINUED | OUTPATIENT
Start: 2023-11-27 | End: 2023-12-02

## 2023-11-27 RX ORDER — VANCOMYCIN HCL 1 G
1000 VIAL (EA) INTRAVENOUS ONCE
Refills: 0 | Status: COMPLETED | OUTPATIENT
Start: 2023-11-27 | End: 2023-11-27

## 2023-11-27 RX ADMIN — ATORVASTATIN CALCIUM 10 MILLIGRAM(S): 80 TABLET, FILM COATED ORAL at 22:36

## 2023-11-27 RX ADMIN — MIRTAZAPINE 22.5 MILLIGRAM(S): 45 TABLET, ORALLY DISINTEGRATING ORAL at 22:36

## 2023-11-27 RX ADMIN — Medication 3 MILLIGRAM(S): at 22:36

## 2023-11-27 RX ADMIN — SENNA PLUS 2 TABLET(S): 8.6 TABLET ORAL at 22:36

## 2023-11-27 RX ADMIN — GABAPENTIN 100 MILLIGRAM(S): 400 CAPSULE ORAL at 22:36

## 2023-11-27 RX ADMIN — Medication 250 MILLIGRAM(S): at 19:04

## 2023-11-27 RX ADMIN — PIPERACILLIN AND TAZOBACTAM 200 GRAM(S): 4; .5 INJECTION, POWDER, LYOPHILIZED, FOR SOLUTION INTRAVENOUS at 17:47

## 2023-11-27 NOTE — H&P ADULT - ASSESSMENT
89F with afib (was on eliquis but no longer), hx of CHF on home O2 (3L), depression, HTN/HLD, here for LLE wound.    LLE wound - unclear if it is cellulitis (no fever or leukocytosis).  Also consider PAD ulcer.    - admitted to medicine  - cont with zosyn  - was already given vancomycin -> if needed, can dose by level given her poor eGFR  - ID consult  - f/u cultures  - wound consult  - obtain arterial dopplers  - may need vascular consult  - pain control as needed    Afib   - no longer on elquis  - cont with metoprolol  - will need cardiology consult     CHF - no signs of exacerbation, clinically stable as per patient (eGFR was 43 last year, now it's 27)  - however, given poor eGFR -> hold torsemide and spironolactone for now  - will need cardiology input re: diuretics and jardiance  - jardiance not available   - cont with O2 supplementation    CAD  - cont with atorvastatin    Polyneuropathy  - cont with gabapentin    Depression  - ramelteon non-formulary here  - cont with sertraline    Preventive measures  - although she has afib, patient also has an open wound -> for now, will not give full anticoagulation and give heparin subcut for DVT ppx

## 2023-11-27 NOTE — PATIENT PROFILE ADULT - FALL HARM RISK - HARM RISK INTERVENTIONS

## 2023-11-27 NOTE — H&P ADULT - NSHPPHYSICALEXAM_GEN_ALL_CORE
PHYSICAL EXAM:  Vital Signs Last 24 Hrs  T(C): 36.8 (27 Nov 2023 21:40), Max: 36.8 (27 Nov 2023 21:40)  T(F): 98.3 (27 Nov 2023 21:40), Max: 98.3 (27 Nov 2023 21:40)  HR: 86 (27 Nov 2023 21:40) (74 - 86)  BP: 112/60 (27 Nov 2023 21:40) (86/54 - 118/63)  BP(mean): --  RR: 18 (27 Nov 2023 21:40) (18 - 19)  SpO2: 95% (27 Nov 2023 21:40) (95% - 96%)    Parameters below as of 27 Nov 2023 21:40  Patient On (Oxygen Delivery Method): nasal cannula  O2 Flow (L/min): 2    GENERAL:     elderly female in NAD  HEAD:     atraumatic, normocephalic  EYES:     EOMI, conjunctiva and sclera clear  RESPIRATORY:     diminished on right but no gross crackles, has difficulty with full complete sentences but patient reports she is at baseline  CARDIOVASCULAR:     irregular irregular with harsh systolic murmur  GASTROINTESTINAL:     soft, nontender, nondistended, bowel sounds present  EXTREMITIES:     LLE with slight edema  MUSCULOSKELETAL:     no joint pain or swelling or deformities  NERVOUS SYSTEM:     moves all 4s  SKIN:     large superficial foul smelling wound of medial side of LLE with surrounding erythema, tender to palpation  PSYCH:     appropriate, alert and orientated x3, good concentration

## 2023-11-27 NOTE — H&P ADULT - NSHPREVIEWOFSYSTEMS_GEN_ALL_CORE
REVIEW OF SYSTEMS:  CONSTITUTIONAL:    no fever or weight loss or fatigue  EYES:    no eye pain or visual disturbances or discharge  ENMT:     no difficulty hearing or tinnitus or vertigo, no sinus or throat pain  NECK:    no pain or stiffness  RESPIRATORY:    has chronic SOB 2/2 CHF, on home O2, currently at baseline despite having some difficulties with completing full sentences  CARDIOVASCULAR:    no chest pain or palpitations or dizziness or leg swelling  GASTROINTESTINAL:    no abdominal or epigastric pain. no nausea or vomiting or hematemesis, no diarrhea or constipation. no melena or hematochezia.  GENITOURINARY:    no dysuria or frequency or hematuria or incontinence  NEUROLOGICAL:    no headaches or memory loss or loss of strength or numbness or tremors  SKIN:    LLE wound  LYMPH NODES:    no enlarged glands  ENDOCRINE:    no heat or cold intolerance, no hair loss, no polydipsia or polyuria  MUSCULOSKELETAL:    no joint pain or swelling, no muscle or back or extremity pain  PSYCHIATRIC:    no depression or anxiety or mood swings or difficulty sleeping  HEME/LYMPH:    no easy bruising or bleeding gums  ALLERGY AND IMMUNOLOGIC:    no hives or eczema

## 2023-11-27 NOTE — H&P ADULT - HISTORY OF PRESENT ILLNESS
89F with afib on eliquis, hx of CHF on home O2 (3L), depression, HTN/HLD, here for LLE wound.  Patient reports having  89F with afib on eliquis, hx of CHF on home O2 (3L), depression, HTN/HLD, here for LLE wound.  About 3-4 months ago, patient was trying to change her stockings when she accidentally scraped her leg.  Since then, her wound healed after 2 weeks but then her leg developed  89F with afib on eliquis, hx of CHF on home O2 (3L), depression, HTN/HLD, here for LLE wound.  About 3-4 months ago, patient was trying to change her stockings when she accidentally scraped her leg.  Since then, her wound healed after 2 weeks but then her leg developed a non-healing superficial ulcer on the medial side of her lower left leg.     89F with afib (was on eliquis but no longer), hx of CHF on home O2 (3L), depression, HTN/HLD, here for LLE wound.  About 3-4 months ago, patient was trying to change her stockings when she accidentally scraped her leg.  Since then, her wound healed after 2 weeks but then her leg developed a non-healing superficial ulcer on the medial side of her lower left leg.  Has been seeing Ange Almeida and a VNS that has been changing it 3x/week (but reports that sometimes it would not get changed over a weekend and by Monday the dressings would be soaked).  No reports of any fevers.  Today the visiting nurse came and checked the wound and became concerned and told the patient to come here for further evaluation.  In the ED, patient was afebrile and had no leukocytosis.  However, since there was still concern for cellulitis, patient was started on vancomycin and zosyn.  Patient is being admitted for possible cellulitis.

## 2023-11-27 NOTE — ED PROVIDER NOTE - OBJECTIVE STATEMENT
89 F hx afib, depression, hearing loss, hld, htn referred by wound care due to concern for LLE cellulitis. Pt with wound that is being treated x months by dr perez. Pt lives at [facility] and has visiting nurse 3x/week. When nurse came today to change bandage and check wound, there was concern regarding it's appearance, was discussed with dr perez who was concerned for cellulitis and wanted pt to go to ED. Denies f/c, cp, sob. Not currently on abx. Family concerned that pt is not getting proper wound care. Pt with hx chf, on home O2, states she is at baseline.

## 2023-11-27 NOTE — ED ADULT TRIAGE NOTE - CHIEF COMPLAINT QUOTE
patient is from [facility], patient has c/o cellulitis on her left foot, visiting nurse sent patient for eval.

## 2023-11-27 NOTE — H&P ADULT - NSHPSOCIALHISTORY_GEN_ALL_CORE
lives at a facility ([facility]'s)  walks with a walker/sometimes a wheelchair

## 2023-11-27 NOTE — ED PROVIDER NOTE - CLINICAL SUMMARY MEDICAL DECISION MAKING FREE TEXT BOX
89-year-old female with history of cellulitis left lower leg for several months.  Has been followed by Dr. Cassidy wound care with topical treatments and dressing changes.  Brought by family for evaluation of increasing redness swelling drainage and pain in left lower leg.  Denies fever.  Currently not on any antibiotics.  Daughter at bedside feels patient is not getting proper care would like wound care evaluation.    VSS Afebrile, NAD  HEENT - clear  PERRL EOMI  Neck supple  lungs clear  Cor S1S2 RR - MGR  Abd soft nontender, no mass or HSM, no rebound  Ext FROM intact, Stasis cellulitis and dermatitis left lower leg with +1 pitting edema.  Trace pitting edema right lower leg.  Neuro Intact, no deficits.  Skin Warm and dry Stasis cellulitis and dermatitis left lower leg with areas of ulceration and excoriation and drainage.  No active bleeding.  Impression cellulitis.  Plan labs and x-ray and will recommend admission for further IV antibiotics and care.

## 2023-11-28 ENCOUNTER — NON-APPOINTMENT (OUTPATIENT)
Age: 88
End: 2023-11-28

## 2023-11-28 LAB
ALBUMIN SERPL ELPH-MCNC: 2.7 G/DL — LOW (ref 3.3–5)
ALBUMIN SERPL ELPH-MCNC: 2.7 G/DL — LOW (ref 3.3–5)
ALP SERPL-CCNC: 43 U/L — SIGNIFICANT CHANGE UP (ref 30–120)
ALP SERPL-CCNC: 43 U/L — SIGNIFICANT CHANGE UP (ref 30–120)
ALT FLD-CCNC: 13 U/L — SIGNIFICANT CHANGE UP (ref 10–60)
ALT FLD-CCNC: 13 U/L — SIGNIFICANT CHANGE UP (ref 10–60)
ANION GAP SERPL CALC-SCNC: 10 MMOL/L — SIGNIFICANT CHANGE UP (ref 5–17)
ANION GAP SERPL CALC-SCNC: 10 MMOL/L — SIGNIFICANT CHANGE UP (ref 5–17)
AST SERPL-CCNC: 16 U/L — SIGNIFICANT CHANGE UP (ref 10–40)
AST SERPL-CCNC: 16 U/L — SIGNIFICANT CHANGE UP (ref 10–40)
BASOPHILS # BLD AUTO: 0.03 K/UL — SIGNIFICANT CHANGE UP (ref 0–0.2)
BASOPHILS # BLD AUTO: 0.03 K/UL — SIGNIFICANT CHANGE UP (ref 0–0.2)
BASOPHILS NFR BLD AUTO: 0.4 % — SIGNIFICANT CHANGE UP (ref 0–2)
BASOPHILS NFR BLD AUTO: 0.4 % — SIGNIFICANT CHANGE UP (ref 0–2)
BILIRUB SERPL-MCNC: 0.6 MG/DL — SIGNIFICANT CHANGE UP (ref 0.2–1.2)
BILIRUB SERPL-MCNC: 0.6 MG/DL — SIGNIFICANT CHANGE UP (ref 0.2–1.2)
BUN SERPL-MCNC: 51 MG/DL — HIGH (ref 7–23)
BUN SERPL-MCNC: 51 MG/DL — HIGH (ref 7–23)
CALCIUM SERPL-MCNC: 8.8 MG/DL — SIGNIFICANT CHANGE UP (ref 8.4–10.5)
CALCIUM SERPL-MCNC: 8.8 MG/DL — SIGNIFICANT CHANGE UP (ref 8.4–10.5)
CHLORIDE SERPL-SCNC: 98 MMOL/L — SIGNIFICANT CHANGE UP (ref 96–108)
CHLORIDE SERPL-SCNC: 98 MMOL/L — SIGNIFICANT CHANGE UP (ref 96–108)
CO2 SERPL-SCNC: 27 MMOL/L — SIGNIFICANT CHANGE UP (ref 22–31)
CO2 SERPL-SCNC: 27 MMOL/L — SIGNIFICANT CHANGE UP (ref 22–31)
CREAT SERPL-MCNC: 1.79 MG/DL — HIGH (ref 0.5–1.3)
CREAT SERPL-MCNC: 1.79 MG/DL — HIGH (ref 0.5–1.3)
CULTURE RESULTS: SIGNIFICANT CHANGE UP
CULTURE RESULTS: SIGNIFICANT CHANGE UP
EGFR: 26 ML/MIN/1.73M2 — LOW
EGFR: 26 ML/MIN/1.73M2 — LOW
EOSINOPHIL # BLD AUTO: 0.13 K/UL — SIGNIFICANT CHANGE UP (ref 0–0.5)
EOSINOPHIL # BLD AUTO: 0.13 K/UL — SIGNIFICANT CHANGE UP (ref 0–0.5)
EOSINOPHIL NFR BLD AUTO: 1.6 % — SIGNIFICANT CHANGE UP (ref 0–6)
EOSINOPHIL NFR BLD AUTO: 1.6 % — SIGNIFICANT CHANGE UP (ref 0–6)
GLUCOSE SERPL-MCNC: 130 MG/DL — HIGH (ref 70–99)
GLUCOSE SERPL-MCNC: 130 MG/DL — HIGH (ref 70–99)
HCT VFR BLD CALC: 31.9 % — LOW (ref 34.5–45)
HCT VFR BLD CALC: 31.9 % — LOW (ref 34.5–45)
HGB BLD-MCNC: 9.9 G/DL — LOW (ref 11.5–15.5)
HGB BLD-MCNC: 9.9 G/DL — LOW (ref 11.5–15.5)
IMM GRANULOCYTES NFR BLD AUTO: 0.8 % — SIGNIFICANT CHANGE UP (ref 0–0.9)
IMM GRANULOCYTES NFR BLD AUTO: 0.8 % — SIGNIFICANT CHANGE UP (ref 0–0.9)
LYMPHOCYTES # BLD AUTO: 1.07 K/UL — SIGNIFICANT CHANGE UP (ref 1–3.3)
LYMPHOCYTES # BLD AUTO: 1.07 K/UL — SIGNIFICANT CHANGE UP (ref 1–3.3)
LYMPHOCYTES # BLD AUTO: 12.9 % — LOW (ref 13–44)
LYMPHOCYTES # BLD AUTO: 12.9 % — LOW (ref 13–44)
MAGNESIUM SERPL-MCNC: 2.4 MG/DL — SIGNIFICANT CHANGE UP (ref 1.6–2.6)
MAGNESIUM SERPL-MCNC: 2.4 MG/DL — SIGNIFICANT CHANGE UP (ref 1.6–2.6)
MCHC RBC-ENTMCNC: 28.6 PG — SIGNIFICANT CHANGE UP (ref 27–34)
MCHC RBC-ENTMCNC: 28.6 PG — SIGNIFICANT CHANGE UP (ref 27–34)
MCHC RBC-ENTMCNC: 31 GM/DL — LOW (ref 32–36)
MCHC RBC-ENTMCNC: 31 GM/DL — LOW (ref 32–36)
MCV RBC AUTO: 92.2 FL — SIGNIFICANT CHANGE UP (ref 80–100)
MCV RBC AUTO: 92.2 FL — SIGNIFICANT CHANGE UP (ref 80–100)
MONOCYTES # BLD AUTO: 1.32 K/UL — HIGH (ref 0–0.9)
MONOCYTES # BLD AUTO: 1.32 K/UL — HIGH (ref 0–0.9)
MONOCYTES NFR BLD AUTO: 15.9 % — HIGH (ref 2–14)
MONOCYTES NFR BLD AUTO: 15.9 % — HIGH (ref 2–14)
NEUTROPHILS # BLD AUTO: 5.67 K/UL — SIGNIFICANT CHANGE UP (ref 1.8–7.4)
NEUTROPHILS # BLD AUTO: 5.67 K/UL — SIGNIFICANT CHANGE UP (ref 1.8–7.4)
NEUTROPHILS NFR BLD AUTO: 68.4 % — SIGNIFICANT CHANGE UP (ref 43–77)
NEUTROPHILS NFR BLD AUTO: 68.4 % — SIGNIFICANT CHANGE UP (ref 43–77)
NRBC # BLD: 0 /100 WBCS — SIGNIFICANT CHANGE UP (ref 0–0)
NRBC # BLD: 0 /100 WBCS — SIGNIFICANT CHANGE UP (ref 0–0)
NT-PROBNP SERPL-SCNC: 6876 PG/ML — HIGH (ref 0–450)
NT-PROBNP SERPL-SCNC: 6876 PG/ML — HIGH (ref 0–450)
PHOSPHATE SERPL-MCNC: 4.8 MG/DL — HIGH (ref 2.5–4.5)
PHOSPHATE SERPL-MCNC: 4.8 MG/DL — HIGH (ref 2.5–4.5)
PLATELET # BLD AUTO: 181 K/UL — SIGNIFICANT CHANGE UP (ref 150–400)
PLATELET # BLD AUTO: 181 K/UL — SIGNIFICANT CHANGE UP (ref 150–400)
POTASSIUM SERPL-MCNC: 4.3 MMOL/L — SIGNIFICANT CHANGE UP (ref 3.5–5.3)
POTASSIUM SERPL-MCNC: 4.3 MMOL/L — SIGNIFICANT CHANGE UP (ref 3.5–5.3)
POTASSIUM SERPL-SCNC: 4.3 MMOL/L — SIGNIFICANT CHANGE UP (ref 3.5–5.3)
POTASSIUM SERPL-SCNC: 4.3 MMOL/L — SIGNIFICANT CHANGE UP (ref 3.5–5.3)
PROT SERPL-MCNC: 7.3 G/DL — SIGNIFICANT CHANGE UP (ref 6–8.3)
PROT SERPL-MCNC: 7.3 G/DL — SIGNIFICANT CHANGE UP (ref 6–8.3)
RBC # BLD: 3.46 M/UL — LOW (ref 3.8–5.2)
RBC # BLD: 3.46 M/UL — LOW (ref 3.8–5.2)
RBC # FLD: 16.7 % — HIGH (ref 10.3–14.5)
RBC # FLD: 16.7 % — HIGH (ref 10.3–14.5)
SODIUM SERPL-SCNC: 135 MMOL/L — SIGNIFICANT CHANGE UP (ref 135–145)
SODIUM SERPL-SCNC: 135 MMOL/L — SIGNIFICANT CHANGE UP (ref 135–145)
SPECIMEN SOURCE: SIGNIFICANT CHANGE UP
SPECIMEN SOURCE: SIGNIFICANT CHANGE UP
WBC # BLD: 8.29 K/UL — SIGNIFICANT CHANGE UP (ref 3.8–10.5)
WBC # BLD: 8.29 K/UL — SIGNIFICANT CHANGE UP (ref 3.8–10.5)
WBC # FLD AUTO: 8.29 K/UL — SIGNIFICANT CHANGE UP (ref 3.8–10.5)
WBC # FLD AUTO: 8.29 K/UL — SIGNIFICANT CHANGE UP (ref 3.8–10.5)

## 2023-11-28 PROCEDURE — 93925 LOWER EXTREMITY STUDY: CPT | Mod: 26

## 2023-11-28 PROCEDURE — 99233 SBSQ HOSP IP/OBS HIGH 50: CPT

## 2023-11-28 RX ORDER — SODIUM CHLORIDE 9 MG/ML
250 INJECTION INTRAMUSCULAR; INTRAVENOUS; SUBCUTANEOUS ONCE
Refills: 0 | Status: COMPLETED | OUTPATIENT
Start: 2023-11-28 | End: 2023-11-28

## 2023-11-28 RX ORDER — KETOROLAC TROMETHAMINE 30 MG/ML
15 SYRINGE (ML) INJECTION ONCE
Refills: 0 | Status: DISCONTINUED | OUTPATIENT
Start: 2023-11-28 | End: 2023-11-28

## 2023-11-28 RX ORDER — KETOROLAC TROMETHAMINE 30 MG/ML
15 SYRINGE (ML) INJECTION EVERY 6 HOURS
Refills: 0 | Status: DISCONTINUED | OUTPATIENT
Start: 2023-11-28 | End: 2023-12-02

## 2023-11-28 RX ORDER — SPIRONOLACTONE 25 MG/1
25 TABLET, FILM COATED ORAL
Refills: 0 | Status: DISCONTINUED | OUTPATIENT
Start: 2023-11-28 | End: 2023-12-02

## 2023-11-28 RX ORDER — IBUPROFEN 200 MG
400 TABLET ORAL ONCE
Refills: 0 | Status: COMPLETED | OUTPATIENT
Start: 2023-11-28 | End: 2023-11-28

## 2023-11-28 RX ORDER — VANCOMYCIN HCL 1 G
750 VIAL (EA) INTRAVENOUS EVERY 24 HOURS
Refills: 0 | Status: DISCONTINUED | OUTPATIENT
Start: 2023-11-28 | End: 2023-12-02

## 2023-11-28 RX ORDER — CYCLOBENZAPRINE HYDROCHLORIDE 10 MG/1
5 TABLET, FILM COATED ORAL THREE TIMES A DAY
Refills: 0 | Status: DISCONTINUED | OUTPATIENT
Start: 2023-11-28 | End: 2023-12-02

## 2023-11-28 RX ADMIN — Medication 1 SPRAY(S): at 06:06

## 2023-11-28 RX ADMIN — HEPARIN SODIUM 5000 UNIT(S): 5000 INJECTION INTRAVENOUS; SUBCUTANEOUS at 06:06

## 2023-11-28 RX ADMIN — Medication 400 MILLIGRAM(S): at 15:15

## 2023-11-28 RX ADMIN — HEPARIN SODIUM 5000 UNIT(S): 5000 INJECTION INTRAVENOUS; SUBCUTANEOUS at 21:13

## 2023-11-28 RX ADMIN — PIPERACILLIN AND TAZOBACTAM 25 GRAM(S): 4; .5 INJECTION, POWDER, LYOPHILIZED, FOR SOLUTION INTRAVENOUS at 02:12

## 2023-11-28 RX ADMIN — PIPERACILLIN AND TAZOBACTAM 25 GRAM(S): 4; .5 INJECTION, POWDER, LYOPHILIZED, FOR SOLUTION INTRAVENOUS at 12:13

## 2023-11-28 RX ADMIN — Medication 325 MILLIGRAM(S): at 11:26

## 2023-11-28 RX ADMIN — SENNA PLUS 2 TABLET(S): 8.6 TABLET ORAL at 21:12

## 2023-11-28 RX ADMIN — GABAPENTIN 100 MILLIGRAM(S): 400 CAPSULE ORAL at 21:12

## 2023-11-28 RX ADMIN — ATORVASTATIN CALCIUM 10 MILLIGRAM(S): 80 TABLET, FILM COATED ORAL at 21:12

## 2023-11-28 RX ADMIN — Medication 650 MILLIGRAM(S): at 10:57

## 2023-11-28 RX ADMIN — Medication 400 MILLIGRAM(S): at 16:15

## 2023-11-28 RX ADMIN — MIRTAZAPINE 22.5 MILLIGRAM(S): 45 TABLET, ORALLY DISINTEGRATING ORAL at 21:13

## 2023-11-28 RX ADMIN — Medication 250 MILLIGRAM(S): at 19:09

## 2023-11-28 RX ADMIN — PIPERACILLIN AND TAZOBACTAM 25 GRAM(S): 4; .5 INJECTION, POWDER, LYOPHILIZED, FOR SOLUTION INTRAVENOUS at 21:13

## 2023-11-28 RX ADMIN — Medication 15 MILLIGRAM(S): at 11:12

## 2023-11-28 RX ADMIN — SERTRALINE 25 MILLIGRAM(S): 25 TABLET, FILM COATED ORAL at 11:26

## 2023-11-28 RX ADMIN — Medication 20 MILLIGRAM(S): at 11:25

## 2023-11-28 RX ADMIN — SPIRONOLACTONE 25 MILLIGRAM(S): 25 TABLET, FILM COATED ORAL at 11:25

## 2023-11-28 RX ADMIN — HEPARIN SODIUM 5000 UNIT(S): 5000 INJECTION INTRAVENOUS; SUBCUTANEOUS at 14:25

## 2023-11-28 RX ADMIN — SODIUM CHLORIDE 250 MILLILITER(S): 9 INJECTION INTRAMUSCULAR; INTRAVENOUS; SUBCUTANEOUS at 17:24

## 2023-11-28 RX ADMIN — Medication 15 MILLIGRAM(S): at 11:27

## 2023-11-28 RX ADMIN — Medication 1 SPRAY(S): at 17:24

## 2023-11-28 RX ADMIN — Medication 650 MILLIGRAM(S): at 11:11

## 2023-11-28 NOTE — PATIENT CHOICE NOTE. - NSPTCHOICESTATE_GEN_ALL_CORE

## 2023-11-28 NOTE — CONSULT NOTE ADULT - ASSESSMENT
The patient is a 89 year old female with a history of HTN, HL, atrial fibrillation, MitraClip, chronic diastolic heart failure, GI bleed who presents with non-healing left leg ulcer/cellulitis.    Plan:  - ECG with known AF and otherwise nonspecific findings  - CXR with no PNA or pulm edema  - BNP 6876  - Echo 11/22 with normal LV systolic function, normal MitraClip function, mild pulm HTN  - Continue torsemide 60 mg daily  - Continue metoprolol tartrate 25 mg bid The patient is a 89 year old female with a history of HTN, HL, atrial fibrillation, MitraClip, chronic diastolic heart failure, GI bleed who presents with non-healing left leg ulcer/cellulitis.    Plan:  - ECG with known AF and otherwise nonspecific findings  - CXR with no PNA or pulm edema  - BNP 6876  - Echo 11/22 with normal LV systolic function, normal MitraClip function, mild pulm HTN  - Renal function stable although slightly worsened from prior  - Resume torsemide 60 mg daily  - Resume spironolactone 25 mg bid  - On discharge, resume Jardiance  - Continue metoprolol tartrate 25 mg bid  - Not on anticoagulation due to multiple prior GI bleeds

## 2023-11-28 NOTE — ADVANCED PRACTICE NURSE CONSULT - RECOMMEDATIONS
1. Xeroform with ABD or gauze, Theo, and ace wraps. Ace wraps can be removed at bedtime.  2. Consider ERASTO  2. Continue turning and positioning q2h and utilize offloading devices as per protocol.  3. Avoid use diapers and continue with Attends pads and pericare.  4. Waffle cushion when OOB to chair.  6. Nutrition Consult for optimization in pt w/ increased nutritional needs.  7. Encourage high quality protein, Brian/Prosource, MVI & Vit C to promote wound healing.   1. Xeroform with ABD or gauze, Theo, and ace wraps. Ace wraps can be removed at bedtime.  2. Consider ERASTO  2. Continue turning and positioning q2h and utilize offloading devices as per protocol.  3. Avoid use of diapers and continue with Attends pads and pericare.  4. Waffle cushion when OOB to chair.  6. Nutrition Consult for optimization in pt w/ increased nutritional needs.  7. Encourage high quality protein, Brian/Prosource, MVI & Vit C to promote wound healing.

## 2023-11-28 NOTE — CARE COORDINATION ASSESSMENT. - NSCAREPROVIDERS_GEN_ALL_CORE_FT
CARE PROVIDERS:  Accepting Physician: Hao Segundo  Administration: Artur Russo  Admitting: Hao Segundo  Attending: Hao Segundo  Consultant: Dominique Troy  Consultant: Beka De Jesus  ED ACP: Sharonda Romero  ED Attending: Kenney Salgado  ED Nurse: Elizabeth Horton  Infection Control: Leah Jose  Nurse: Janna Campos  Nurse: Elizabeth Horton  Nurse: Minerva Fairbanks  Outpatient Provider: Avila Hunt  Outpatient Provider: Mak Lantigua  Radiology Technician: Irasema Henning  Registered Dietitian: Sadia Jones  : Neda Wagner  Team: [facility]  Hospitalists, Team   CARE PROVIDERS:  Accepting Physician: Hao Segundo  Administration: Artur Russo  Admitting: Hao Segundo  Attending: Hao Segundo  Consultant: Beka De Jesus  Consultant: Dominique Troy  ED ACP: Sharonda Romero  ED Attending: Kenney Salgado  ED Nurse: Elizabeth Horton  Infection Control: Leah Jose  Nurse: Rosy Grimes  Nurse: Janna Campos  Nurse: Elizabeth Horton  Nurse: Minerva Fairbanks  Outpatient Provider: Avila Hunt  Outpatient Provider: Mak Lantigua  Radiology Technician: Irasema Henning  Registered Dietitian: Sadia Jones  : Neda Wagner  Team: [facility] Hospitalists, Team

## 2023-11-28 NOTE — CARE COORDINATION ASSESSMENT. - NSPASTMEDSURGHISTORY_GEN_ALL_CORE_FT
PAST MEDICAL & SURGICAL HISTORY:  Leg edema      Depression      Syncope      Atrial fibrillation  On Pradaxa      HLD (hyperlipidemia)      HTN (hypertension)      Cataracta      H/O external ear surgery      H/O knee surgery      Hearing loss of left ear      Mitral valve stenosis, unspecified etiology

## 2023-11-28 NOTE — CARE COORDINATION ASSESSMENT. - PRO ARRIVE FROM
CM met with patient and son- in law at bedside and spoke with daughter on the phone this morning. Patient is alert times 3.  Patient lives at [facility] in [place].  CM called the [facility] to find out her function status. Awaiting a call back from [facility] they are in a meeting. [facility] stated they need Attestation Form to return, PT eval, and Discharge summary.  Patient stated she cant walk because of her left lower leg  not healing cellulitis. Patient stated she uses a walker and cane at the [facility].       CM verified:   PCP: DR. Wilkerson  with [facility]  [phone].  Pharmacy:  Insurance: Medicare AARP.  : NO    Patient needs a PT consult. [facility] uses [facility] [phone] homecare  services for the assisted living.  Will continue to follow./assisted living facility CM met with patient and son- in law at bedside and spoke with daughter on the phone this morning. Patient is alert times 3.  Patient lives at [facility] in [place].  CM called the [facility] to find out her function status. Awaiting a call back from [facility] they are in a meeting. [facility] stated they need Attestation Form to return, PT eval, and Discharge summary.  Patient stated she cant walk because of her left lower leg  not healing cellulitis. Patient stated she uses a walker and cane at the [facility]. Patient stated she wears oxygen at [facility], and has portable.       CM verified:   PCP: DR. Wilkerson  with [facility]  [phone].  Pharmacy:  Insurance: Medicare AARP.  : NO    Patient needs a PT consult. [facility] uses [facility]/[place] [phone] homecare  services for the assisted living.  Will continue to follow./assisted living facility

## 2023-11-28 NOTE — CONSULT NOTE ADULT - ASSESSMENT
89F with afib (was on eliquis but no longer), hx of CHF on home O2 (3L), depression, HTN/HLD, here for LLE wound.    LLE Cellulitis w/ wound  +Multiple ulcerations  - adding vancomycin (+MRSA in the past)  - c/w zosyn  - f/u pending cx  - f/u pending MRSA swab  - appreciate wound care recs  - appreciate vascular recs  - pain control and supportive care per primary team    Infectious Diseases will continue to follow. Please call with any questions.   Suzy Real M.D.  [facility] Division of Infectious Diseases [phone]  For after 5 P.M. and weekends, please call [phone]

## 2023-11-28 NOTE — PROGRESS NOTE ADULT - ASSESSMENT
89F with afib (was on eliquis but no longer), hx of CHF on home O2 (3L), depression, HTN/HLD, here for LLE wound.    LLE wound - unclear if it is cellulitis (no fever or leukocytosis).  Also consider PAD ulcer.    - admitted to medicine  - cont with zosyn  - was already given vancomycin -> if needed, can dose by level given her poor eGFR  - ID consult  - f/u cultures  - wound consult  arterial dopplers performed, results pending  - may need vascular consult  - pain control as needed, allergic to codiene  tylenol or ibuprofen PRN    Afib   - no longer on Eliquis  - cont with metoprolol  - cardiology consulted, recs appreciated   Resume torsemide 60 mg daily  - Resume spironolactone 25 mg bid  - On discharge, resume Jardiance  - Continue metoprolol tartrate 25 mg bid  - Not on anticoagulation due to multiple prior GI bleeds      CHF - no signs of exacerbation, clinically stable as per patient (eGFR was 43 last year, now it's 27)  - however, given poor eGFR -> hold torsemide and spironolactone for now  - cardiology recs appreciated  - cont with O2 supplementation    CAD  - cont with atorvastatin    Polyneuropathy  - cont with gabapentin    Depression  - ramelteon non-formulary here  - cont with sertraline    Preventive measures  - although she has afib, patient also has an open wound -> for now, will not give full anticoagulation and give heparin subcut for DVT ppx        89F with afib (was on eliquis but no longer), hx of CHF on home O2 (3L), depression, HTN/HLD, here for LLE wound.    LLE wound - unclear if it is cellulitis (no fever or leukocytosis).  Also consider PAD ulcer.    - admitted to medicine  - cont with zosyn  - was already given vancomycin -> if needed, can dose by level given her poor eGFR  - ID consult recs appreciated  - f/u cultures  - wound consult, recs appreciated  arterial dopplers performed, results pending  - may need vascular consult  - pain control as needed, allergic to codiene  tylenol or ibuprofen PRN    Afib   - no longer on Eliquis  - cont with metoprolol  - cardiology consulted, recs appreciated   Resume torsemide 60 mg daily  - Resume spironolactone 25 mg bid  - On discharge, resume Jardiance  - Continue metoprolol tartrate 25 mg bid  - Not on anticoagulation due to multiple prior GI bleeds      CHF - no signs of exacerbation, clinically stable as per patient (eGFR was 43 last year, now it's 27)  - however, given poor eGFR -> hold torsemide and spironolactone for now  - cardiology recs appreciated  - cont with O2 supplementation    CAD  - cont with atorvastatin    Polyneuropathy  - cont with gabapentin    Depression  - ramelteon non-formulary here  - cont with sertraline    Preventive measures  - although she has afib, patient also has an open wound -> for now, will not give full anticoagulation and give heparin subcut for DVT ppx

## 2023-11-28 NOTE — PROGRESS NOTE ADULT - SUBJECTIVE AND OBJECTIVE BOX
Patient is a 89y old  Female who presents with a chief complaint of LLE non-healing wound, possibly cellulitis (28 Nov 2023 08:54)      INTERVAL HPI/OVERNIGHT EVENTS:    overnight events noted  H and P reviewed      MEDICATIONS  (STANDING):  atorvastatin 10 milliGRAM(s) Oral at bedtime  ferrous    sulfate 325 milliGRAM(s) Oral daily  fluticasone propionate 50 MICROgram(s)/spray Nasal Spray 1 Spray(s) Both Nostrils two times a day  gabapentin 100 milliGRAM(s) Oral at bedtime  heparin   Injectable 5000 Unit(s) SubCutaneous every 8 hours  metoprolol tartrate 25 milliGRAM(s) Oral two times a day  mirtazapine 22.5 milliGRAM(s) Oral at bedtime  piperacillin/tazobactam IVPB.. 3.375 Gram(s) IV Intermittent every 8 hours  senna 2 Tablet(s) Oral at bedtime  sertraline 25 milliGRAM(s) Oral daily  spironolactone 25 milliGRAM(s) Oral two times a day  torsemide 20 milliGRAM(s) Oral daily    MEDICATIONS  (PRN):  acetaminophen     Tablet .. 650 milliGRAM(s) Oral every 6 hours PRN Temp greater or equal to 38C (100.4F), Mild Pain (1 - 3)  albuterol    0.083%. 2.5 milliGRAM(s) Nebulizer once PRN Shortness of Breath and/or Wheezing  melatonin 3 milliGRAM(s) Oral at bedtime PRN Insomnia  polyethylene glycol 3350 17 Gram(s) Oral daily PRN Constipation  sodium chloride 0.65% Nasal 1 Spray(s) Both Nostrils two times a day PRN Nasal Congestion      Allergies    codeine (Other)    Intolerances        REVIEW OF SYSTEMS:  as above    Vital Signs Last 24 Hrs  T(C): 36.8 (28 Nov 2023 07:00), Max: 37 (27 Nov 2023 22:09)  T(F): 98.3 (28 Nov 2023 07:00), Max: 98.6 (27 Nov 2023 22:09)  HR: 100 (28 Nov 2023 07:00) (74 - 100)  BP: 122/64 (28 Nov 2023 07:00) (86/54 - 122/64)  BP(mean): --  RR: 16 (28 Nov 2023 07:00) (16 - 19)  SpO2: 98% (28 Nov 2023 07:00) (95% - 98%)    Parameters below as of 28 Nov 2023 07:00  Patient On (Oxygen Delivery Method): nasal cannula  O2 Flow (L/min): 3      PHYSICAL EXAM:  GENERAL:     elderly female in NAD  HEAD:     atraumatic, normocephalic  EYES:     EOMI, conjunctiva and sclera clear  RESPIRATORY:     diminished on right but no gross crackles, has difficulty with full complete sentences but patient reports she is at baseline  CARDIOVASCULAR:     irregular irregular with harsh systolic murmur  GASTROINTESTINAL:     soft, nontender, nondistended, bowel sounds present  EXTREMITIES:     LLE with slight edema  MUSCULOSKELETAL:     no joint pain or swelling or deformities  NERVOUS SYSTEM:     moves all 4s  SKIN:     large superficial foul smelling wound of medial side of LLE with surrounding erythema, tender to palpation  PSYCH:     appropriate, alert and orientated x3, good concentration    LABS:                        9.9    8.29  )-----------( 181      ( 28 Nov 2023 06:02 )             31.9     28 Nov 2023 06:02    135    |  98     |  51     ----------------------------<  130    4.3     |  27     |  1.79     Ca    8.8        28 Nov 2023 06:02  Phos  4.8       28 Nov 2023 06:02  Mg     2.4       28 Nov 2023 06:02    TPro  7.3    /  Alb  2.7    /  TBili  0.6    /  DBili  x      /  AST  16     /  ALT  13     /  AlkPhos  43     28 Nov 2023 06:02    PT/INR - ( 27 Nov 2023 17:01 )   PT: 14.2 sec;   INR: 1.31 ratio         PTT - ( 27 Nov 2023 17:01 )  PTT:30.2 sec  Urinalysis Basic - ( 28 Nov 2023 06:02 )    Color: x / Appearance: x / SG: x / pH: x  Gluc: 130 mg/dL / Ketone: x  / Bili: x / Urobili: x   Blood: x / Protein: x / Nitrite: x   Leuk Esterase: x / RBC: x / WBC x   Sq Epi: x / Non Sq Epi: x / Bacteria: x      CAPILLARY BLOOD GLUCOSE          RADIOLOGY & ADDITIONAL TESTS:

## 2023-11-28 NOTE — ADVANCED PRACTICE NURSE CONSULT - ASSESSMENT
89F with afib (was on eliquis but no longer), hx of CHF on home O2 (3L), depression, HTN/HLD, here for LLE wound. About 3-4 months ago, patient was trying to change her stockings when she accidentally scraped her leg. Since then, her wound healed after 2 weeks but then her leg developed a non-healing superficial ulcer on the medial side of her lower left leg.  Has been seeing Ange Almeida and a VNS that has been changing it 3x/week (but reports that sometimes it would not get changed over a weekend and by Monday the dressings would be soaked).  No reports of any fevers.  Today the visiting nurse came and checked the wound and became concerned and told the patient to come here for further evaluation.  In the ED, patient was afebrile and had no leukocytosis.  However, since there was still concern for cellulitis, patient was started on vancomycin and zosyn.  Patient is being admitted for possible cellulitis.    Upon arrival to the ED, patient lying supine in the stretcher on O2 NC. Patient alert and oriented and gave consent for assessment of her LLE wound. Daughter and son-in-law at bedside. Removal of the current ace wrap bandages and Theo revealed a LLE with nonpitting edema with a weak DP. Erythema extended from the L dorsal foot to slightly below the knee. Skin denuded, indurated, warm and tender to touch. Odor present. Partial thickness skin loss noted at the anterior portion of the shin measuring 5cm x 5.5.cm x 0.1cm with scant serosanguinous drainage. LLE gently cleansed with NS and pat dry. Xeroform gauze applied to opened and denuded areas. Covered with nonwoven 4x4 gauze and secured with Theo. Patient and daughter verbalized noncompliance at home with compression due to discomfort. Permission given by patient to apply 4" ace wrap x2 to provide light to moderate compression. Patient verbalizes understanding the use of compression to reduce swelling of the LLE and the option to remove at bedtime. Patient tolerated dressing change well and denies discomfort with compression.    Sacral region with hyperpigmentation and blanchable erythema. Patient aware to offload and reposition q2h. 89F with afib (was on eliquis but no longer), hx of CHF on home O2 (3L), depression, HTN/HLD, here for LLE wound. About 3-4 months ago, patient was trying to change her stockings when she accidentally scraped her leg. Since then, her wound healed after 2 weeks but then her leg developed a non-healing superficial ulcer on the medial side of her lower left leg.  Has been seeing Ange Almeida and a VNS that has been changing it 3x/week (but reports that sometimes it would not get changed over a weekend and by Monday the dressings would be soaked).  No reports of any fevers.  Today the visiting nurse came and checked the wound and became concerned and told the patient to come here for further evaluation.  In the ED, patient was afebrile and had no leukocytosis.  However, since there was still concern for cellulitis, patient was started on vancomycin and zosyn.  Patient is being admitted for possible cellulitis.    Upon arrival to the ED, patient lying supine in the stretcher on O2 NC. Patient alert and oriented and gave consent for assessment of her LLE wound. Daughter and son-in-law at bedside. Removal of the current ace wrap bandages and Theo revealed a LLE with nonpitting edema and a weak DP. Erythema extended from the L dorsal foot to slightly below the knee. Skin denuded, indurated, warm and tender to touch. Odor present. Partial thickness skin loss noted at the anterior portion of the shin measuring 5cm x 5.5.cm x 0.1cm with scant serosanguinous drainage. LLE gently cleansed with NS and pat dry. Xeroform gauze applied to opened and denuded areas. Covered with nonwoven 4x4 gauze and secured with Theo. Patient and daughter verbalized noncompliance at home with compression due to discomfort. Permission given by patient to apply 4" ace wrap x2 to provide light to moderate compression. Patient verbalizes understanding the use of compression to reduce swelling of the LLE and the option to remove at bedtime. Patient tolerated dressing change well and denies discomfort with compression.    Sacral region with hyperpigmentation and blanchable erythema. Patient aware to offload and reposition q2h.

## 2023-11-28 NOTE — PATIENT CHOICE NOTE. - NSPTCHOICENOTES_GEN_ALL_CORE
Patient from [facility] in [place] and they use [facility] [phone] homecare services.

## 2023-11-28 NOTE — CARE COORDINATION ASSESSMENT. - NSDCPLANSERVICES_GEN_ALL_CORE
89F with afib (was on eliquis but no longer), hx of CHF on home O2 (3L), depression, HTN/HLD, here for LLE wound.  LLE wound - unclear if it is cellulitis (no fever or leukocytosis).  Also consider PAD ulcer.  admitted to medicine cont with zosyn.    CM met with patient and son- in law at bedside and spoke with daughter on the phone this morning. Patient is alert times 3.  Patient lives at [facility] in [place].  CM called the [facility] to find out her function status. Awaiting a call back from [facility] they are in a meeting. [facility] stated they need Attestation Form to return, PT eval, and Discharge summary.  Patient stated she cant walk because of her left lower leg  not healing cellulitis. Patient stated she uses a walker and cane at the [facility].       CM verified:   PCP: DR. Wilkerson  with [facility]  [phone].  Pharmacy:  Insurance: Medicare AARP.  : NO    Patient needs a PT consult. [facility] uses [facility] [phone] homecare  services for the assisted living.  Will continue to follow./Home Care

## 2023-11-28 NOTE — CARE COORDINATION ASSESSMENT. - OTHER PERTINENT DISCHARGE PLANNING INFORMATION:
89F with afib (was on Eliquis but no longer), hx of CHF on home O2 (3L), depression, HTN/HLD, here for LLE wound.  LLE wound - unclear if it is cellulitis (no fever or leukocytosis).  Also consider PAD ulcer.  Met patient at bedside.  Explained role of CM, verbalized understanding. Pt was made aware a CM will remain available through hospitalization.  Contact information given in discharge/ transitions resource folder.

## 2023-11-28 NOTE — CONSULT NOTE ADULT - SUBJECTIVE AND OBJECTIVE BOX
[facility], Division of Infectious Diseases  VANE Shine S. Shah, Y. Patel, G. [facility]  [phone]    ARNULFO ALEX  89y, Female  [id number]    HPI--  HPI:  89F with afib (was on eliquis but no longer), hx of CHF on home O2 (3L), depression, HTN/HLD, here for LLE wound.  About 3-4 months ago, patient was trying to change her stockings when she accidentally scraped her leg.  Since then, her wound healed after 2 weeks but then her leg developed a non-healing superficial ulcer on the medial side of her lower left leg.  Has been seeing Ange Almeida and a VNS that has been changing it 3x/week (but reports that sometimes it would not get changed over a weekend and by Monday the dressings would be soaked).  No reports of any fevers.  Today the visiting nurse came and checked the wound and became concerned and told the patient to come here for further evaluation.  In the ED, patient was afebrile and had no leukocytosis.  However, since there was still concern for cellulitis, patient was started on vancomycin and zosyn.  Patient is being admitted for possible cellulitis.      (27 Nov 2023 21:26)    Pt seen in the ED  Daughter at bedside  Reporting LLE pain    Active Medications--  acetaminophen     Tablet .. 650 milliGRAM(s) Oral every 6 hours PRN  albuterol    0.083%. 2.5 milliGRAM(s) Nebulizer once PRN  atorvastatin 10 milliGRAM(s) Oral at bedtime  ferrous    sulfate 325 milliGRAM(s) Oral daily  fluticasone propionate 50 MICROgram(s)/spray Nasal Spray 1 Spray(s) Both Nostrils two times a day  gabapentin 100 milliGRAM(s) Oral at bedtime  heparin   Injectable 5000 Unit(s) SubCutaneous every 8 hours  melatonin 3 milliGRAM(s) Oral at bedtime PRN  metoprolol tartrate 25 milliGRAM(s) Oral two times a day  mirtazapine 22.5 milliGRAM(s) Oral at bedtime  piperacillin/tazobactam IVPB.. 3.375 Gram(s) IV Intermittent every 8 hours  polyethylene glycol 3350 17 Gram(s) Oral daily PRN  senna 2 Tablet(s) Oral at bedtime  sertraline 25 milliGRAM(s) Oral daily  sodium chloride 0.65% Nasal 1 Spray(s) Both Nostrils two times a day PRN  spironolactone 25 milliGRAM(s) Oral two times a day  torsemide 20 milliGRAM(s) Oral daily    Antimicrobials:   piperacillin/tazobactam IVPB.. 3.375 Gram(s) IV Intermittent every 8 hours    Immunologic:     ROS:  CONSTITUTIONAL: No fevers or chills. No weakness or headache. No weight changes.  EYES/ENT: No visual or hearing changes. No sore throat or throat pain .  NECK: No pain or stiffness  RESPIRATORY: No cough, wheezing, or hemoptysis. No shortness of breath  CARDIOVASCULAR: No chest pain or palpitations  GASTROINTESTINAL: No abdominal pain. No nausea or vomiting. No diarrhea or constipation.  GENITOURINARY: No dysuria, frequency or hematuria  NEUROLOGICAL: No numbness or weakness  SKIN: No itching or rashes  PSYCHIATRIC: Pleasant. Appropriate affect    Allergies: codeine (Other)    PMH -- HTN (hypertension)    HLD (hyperlipidemia)    Atrial fibrillation    Syncope    Depression    Leg edema    Mitral valve stenosis, unspecified etiology    Hearing loss of left ear      PSH -- H/O knee surgery    H/O external ear surgery    Cataracta      FH -- No pertinent family history in first degree relatives    Family history of blood disorder (Mother)      Social History --  EtOH: denies   Tobacco: denies   Drug Use: denies     Travel/Environmental/Occupational History:    Physical Exam--  Vital Signs Last 24 Hrs  T(F): 98.3 (28 Nov 2023 07:00), Max: 98.6 (27 Nov 2023 22:09)  HR: 100 (28 Nov 2023 07:00) (74 - 100)  BP: 122/64 (28 Nov 2023 07:00) (86/54 - 122/64)  RR: 16 (28 Nov 2023 07:00) (16 - 19)  SpO2: 98% (28 Nov 2023 07:00) (95% - 98%)  General: nontoxic-appearing, no acute distress  HEENT: NC/AT, EOMI,  Lungs: symmetric chest rise  Heart: RRR  Abdomen: appears soft  Extremities: LLE erythema, multiple ulcers covered w/ xeroform, TTP  Skin: Warm. Dry. Good turgor.     Laboratory & Imaging Data:  CBC:                       9.9    8.29  )-----------( 181      ( 28 Nov 2023 06:02 )             31.9     CMP: 11-28    135  |  98  |  51<H>  ----------------------------<  130<H>  4.3   |  27  |  1.79<H>    Ca    8.8      28 Nov 2023 06:02  Phos  4.8     11-28  Mg     2.4     11-28    TPro  7.3  /  Alb  2.7<L>  /  TBili  0.6  /  DBili  x   /  AST  16  /  ALT  13  /  AlkPhos  43  11-28    LIVER FUNCTIONS - ( 28 Nov 2023 06:02 )  Alb: 2.7 g/dL / Pro: 7.3 g/dL / ALK PHOS: 43 U/L / ALT: 13 U/L / AST: 16 U/L / GGT: x           Urinalysis Basic - ( 28 Nov 2023 06:02 )    Color: x / Appearance: x / SG: x / pH: x  Gluc: 130 mg/dL / Ketone: x  / Bili: x / Urobili: x   Blood: x / Protein: x / Nitrite: x   Leuk Esterase: x / RBC: x / WBC x   Sq Epi: x / Non Sq Epi: x / Bacteria: x        Microbiology: reviewed        Radiology: reviewed    < from: Xray Tibia + Fibula 2 Views, Left (11.27.23 @ 16:41) >    ACC: [id number] EXAM:  XR TIB FIB AP LAT 2 VIEWS LT   ORDERED BY: LOREN DIEHL     PROCEDURE DATE:  11/27/2023          INTERPRETATION:  Radiographs of the LEFT tibia and fibula    CLINICAL INFORMATION: Soft tissue lacerations. TECHNIQUE:  Frontal and   lateral views of the tibia and fibula were obtained.    FINDINGS:  No prior studies are available for review.    The tibia and fibula are intact.  No fracture or radiographic osseous lesion.  Diffuse subcutaneous edema/cellulitis without subcutaneous air or   radiopaque foreign body.    IMPRESSION:   No acute radiographic osseous pathology..    --- End of Report ---            GLALITO NGUYEN MD; Attending Radiologist  This document has been electronically signed. Nov 27 2023  5:48PM    < end of copied text >  < from: Xray Foot AP + Lateral, Left (11.27.23 @ 16:41) >    ACC: [id number] EXAM:  XR FOOT 2 VIEWS LT   ORDERED BY: LOREN DIEHL     PROCEDURE DATE:  11/27/2023          INTERPRETATION:  XR FOOT 2 VIEWS LEFT    Clinical History: Infection, left lower extremity wound    AP, lateral and oblique view of the left foot      FINDINGS:    There is no fracture, dislocation or joint effusion.  Mild generalized soft tissue swelling  Atherosclerotic change.  Large retrocalcaneal spur. Moderate hammertoe changes. Moderate hallux   valgus. Degenerative change first metatarsal phalangeal joint.  No radiopaque foreign body.    IMPRESSION:  1.  No fracture, radiopaque foreign body or plain film evidence of   osteomyelitis.  2.  Mild generalized soft tissue swelling    --- End of Report ---             JOSE DELEON DO; Attending Radiologist  This document has been electronically signed. Nov 28 2023 11:14AM    < end of copied text >  < from: Xray Chest 1 View- PORTABLE-Urgent (11.27.23 @ 16:41) >    ACC: [id number] EXAM:  XR CHEST PORTABLE URGENT 1V   ORDERED BY: LOREN DIEHL     PROCEDURE DATE:  11/27/2023          INTERPRETATION:  AP chest on November 27, 2023 at 4:23 PM. Patient has   sepsis.    Heart is enlarged. Mitral clips again noted.    On December 19, 2022 there was a mild central congestive picture which   has improved on this study. Old fracture deformity left upper humerus is   noted.    IMPRESSION: Improved CHF from prior. Other findings stable.    --- End of Report ---            GALLITO BLOOM MD; Attending Radiologist  This document has been electronically signed. Nov 27 2023  4:52PM    < end of copied text >  
History of Present Illness: The patient is a 89 year old female with a history of HTN, HL, atrial fibrillation, MitraClip, chronic diastolic heart failure, GI bleed who presents with non-healing left leg ulcer. Ulcer formed after she scraped her leg. No chest pain or shortness of breath. She was taken off of apixaban over the past year but is unsure why.    Past Medical/Surgical History:  HTN, HL, atrial fibrillation, MitraClip, chronic diastolic heart failure, GI bleed     Medications:  Home Medications:  albuterol 2.5 mg/3 mL (0.083%) inhalation solution: 3 milliliter(s) by nebulizer every 4 hours as needed for  shortness of breath and/or wheezing (27 Nov 2023 21:50)  ferrous sulfate 325 mg (65 mg elemental iron) oral tablet: 1 tab(s) orally once a day (19 Dec 2022 23:16)  fluticasone propionate 55 mcg/inh inhalation powder: 1 puff(s) inhaled every 12 hours (19 Dec 2022 23:16)  gabapentin 100 mg oral tablet: orally once a day (at bedtime) (27 Nov 2023 21:52)  Jardiance 10 mg oral tablet: 1 tab(s) orally (27 Nov 2023 21:53)  melatonin 2.5 mg oral capsule: 1 cap(s) orally once a day (at bedtime) as needed for  insomnia (27 Nov 2023 21:54)  MiraLax oral powder for reconstitution: orally once a day (at bedtime) if needed for constipation (26 Dec 2022 10:54)  Nasal Saline 0.65% nasal spray: 1 spray(s) nasal 2 times a day both nostrils  (19 Dec 2022 23:16)  ramelteon 8 mg oral tablet: 1 tab(s) orally once a day (at bedtime) (19 Dec 2022 23:16)  Remeron 15 mg oral tablet: 1.5 tab(s) orally once a day (at bedtime) (27 Nov 2023 21:56)  senna (sennosides) 17.2 mg oral tablet: 1 tab(s) orally once a day (at bedtime) (27 Nov 2023 21:59)  sertraline 25 mg oral tablet: 3 tab(s) orally once a day (19 Dec 2022 23:16)  spironolactone 25 mg oral tablet: 1 tab(s) orally 2 times a day (27 Nov 2023 21:59)  torsemide 20 mg oral tablet: 3 tab(s) orally once a day (at bedtime) (27 Nov 2023 22:00)      Family History: Non-contributory family history of premature cardiovascular atherosclerotic disease    Social History: No tobacco, alcohol or drug use    Review of Systems:  General: No fevers, chills, weight gain  Skin: No rashes, color changes  Cardiovascular: No chest pain, orthopnea  Respiratory: No shortness of breath, cough  Gastrointestinal: No nausea, abdominal pain  Genitourinary: No incontinence, pain with urination  Musculoskeletal: No pain, swelling, decreased range of motion  Neurological: No headache, weakness  Psychiatric: No depression, anxiety  Endocrine: No weight gain, increased thirst  All other systems are comprehensively negative.    Physical Exam:  Vitals:        Vital Signs Last 24 Hrs  T(C): 37 (28 Nov 2023 06:12), Max: 37 (27 Nov 2023 22:09)  T(F): 98.6 (28 Nov 2023 06:12), Max: 98.6 (27 Nov 2023 22:09)  HR: 93 (28 Nov 2023 06:12) (74 - 93)  BP: 96/57 (28 Nov 2023 06:12) (86/54 - 118/63)  BP(mean): --  RR: 16 (28 Nov 2023 06:12) (16 - 19)  SpO2: 96% (28 Nov 2023 06:12) (95% - 98%)  Parameters below as of 28 Nov 2023 06:12  Patient On (Oxygen Delivery Method): nasal cannula  O2 Flow (L/min): 3  General: NAD  HEENT: MMM  Neck: No JVD, no carotid bruit  Lungs: CTAB  CV: RRR, nl S1/S2, no M/R/G  Abdomen: S/NT/ND, +BS  Extremities: No LE edema, no cyanosis  Neuro: AAOx3, non-focal  Skin: No rash    Labs:                        9.9    8.29  )-----------( 181      ( 28 Nov 2023 06:02 )             31.9     11-28    135  |  98  |  51<H>  ----------------------------<  130<H>  4.3   |  27  |  1.79<H>    Ca    8.8      28 Nov 2023 06:02  Phos  4.8     11-28  Mg     2.4     11-28    TPro  7.3  /  Alb  2.7<L>  /  TBili  0.6  /  DBili  x   /  AST  16  /  ALT  13  /  AlkPhos  43  11-28        PT/INR - ( 27 Nov 2023 17:01 )   PT: 14.2 sec;   INR: 1.31 ratio         PTT - ( 27 Nov 2023 17:01 )  PTT:30.2 sec    ECG/Telemetry: AF, incomplete RBBB

## 2023-11-29 LAB
ALBUMIN SERPL ELPH-MCNC: 2.8 G/DL — LOW (ref 3.3–5)
ALBUMIN SERPL ELPH-MCNC: 2.8 G/DL — LOW (ref 3.3–5)
ALP SERPL-CCNC: 44 U/L — SIGNIFICANT CHANGE UP (ref 30–120)
ALP SERPL-CCNC: 44 U/L — SIGNIFICANT CHANGE UP (ref 30–120)
ALT FLD-CCNC: 15 U/L — SIGNIFICANT CHANGE UP (ref 10–60)
ALT FLD-CCNC: 15 U/L — SIGNIFICANT CHANGE UP (ref 10–60)
ANION GAP SERPL CALC-SCNC: 11 MMOL/L — SIGNIFICANT CHANGE UP (ref 5–17)
ANION GAP SERPL CALC-SCNC: 11 MMOL/L — SIGNIFICANT CHANGE UP (ref 5–17)
AST SERPL-CCNC: 18 U/L — SIGNIFICANT CHANGE UP (ref 10–40)
AST SERPL-CCNC: 18 U/L — SIGNIFICANT CHANGE UP (ref 10–40)
BILIRUB SERPL-MCNC: 0.5 MG/DL — SIGNIFICANT CHANGE UP (ref 0.2–1.2)
BILIRUB SERPL-MCNC: 0.5 MG/DL — SIGNIFICANT CHANGE UP (ref 0.2–1.2)
BUN SERPL-MCNC: 49 MG/DL — HIGH (ref 7–23)
BUN SERPL-MCNC: 49 MG/DL — HIGH (ref 7–23)
CALCIUM SERPL-MCNC: 8.8 MG/DL — SIGNIFICANT CHANGE UP (ref 8.4–10.5)
CALCIUM SERPL-MCNC: 8.8 MG/DL — SIGNIFICANT CHANGE UP (ref 8.4–10.5)
CHLORIDE SERPL-SCNC: 99 MMOL/L — SIGNIFICANT CHANGE UP (ref 96–108)
CHLORIDE SERPL-SCNC: 99 MMOL/L — SIGNIFICANT CHANGE UP (ref 96–108)
CO2 SERPL-SCNC: 27 MMOL/L — SIGNIFICANT CHANGE UP (ref 22–31)
CO2 SERPL-SCNC: 27 MMOL/L — SIGNIFICANT CHANGE UP (ref 22–31)
CREAT SERPL-MCNC: 1.74 MG/DL — HIGH (ref 0.5–1.3)
CREAT SERPL-MCNC: 1.74 MG/DL — HIGH (ref 0.5–1.3)
EGFR: 27 ML/MIN/1.73M2 — LOW
EGFR: 27 ML/MIN/1.73M2 — LOW
GLUCOSE SERPL-MCNC: 131 MG/DL — HIGH (ref 70–99)
GLUCOSE SERPL-MCNC: 131 MG/DL — HIGH (ref 70–99)
HCT VFR BLD CALC: 35.4 % — SIGNIFICANT CHANGE UP (ref 34.5–45)
HCT VFR BLD CALC: 35.4 % — SIGNIFICANT CHANGE UP (ref 34.5–45)
HGB BLD-MCNC: 10.6 G/DL — LOW (ref 11.5–15.5)
HGB BLD-MCNC: 10.6 G/DL — LOW (ref 11.5–15.5)
MCHC RBC-ENTMCNC: 27.9 PG — SIGNIFICANT CHANGE UP (ref 27–34)
MCHC RBC-ENTMCNC: 27.9 PG — SIGNIFICANT CHANGE UP (ref 27–34)
MCHC RBC-ENTMCNC: 29.9 GM/DL — LOW (ref 32–36)
MCHC RBC-ENTMCNC: 29.9 GM/DL — LOW (ref 32–36)
MCV RBC AUTO: 93.2 FL — SIGNIFICANT CHANGE UP (ref 80–100)
MCV RBC AUTO: 93.2 FL — SIGNIFICANT CHANGE UP (ref 80–100)
MRSA PCR RESULT.: DETECTED
MRSA PCR RESULT.: DETECTED
NRBC # BLD: 0 /100 WBCS — SIGNIFICANT CHANGE UP (ref 0–0)
NRBC # BLD: 0 /100 WBCS — SIGNIFICANT CHANGE UP (ref 0–0)
PLATELET # BLD AUTO: 175 K/UL — SIGNIFICANT CHANGE UP (ref 150–400)
PLATELET # BLD AUTO: 175 K/UL — SIGNIFICANT CHANGE UP (ref 150–400)
POTASSIUM SERPL-MCNC: 4.9 MMOL/L — SIGNIFICANT CHANGE UP (ref 3.5–5.3)
POTASSIUM SERPL-MCNC: 4.9 MMOL/L — SIGNIFICANT CHANGE UP (ref 3.5–5.3)
POTASSIUM SERPL-SCNC: 4.9 MMOL/L — SIGNIFICANT CHANGE UP (ref 3.5–5.3)
POTASSIUM SERPL-SCNC: 4.9 MMOL/L — SIGNIFICANT CHANGE UP (ref 3.5–5.3)
PROT SERPL-MCNC: 6.8 G/DL — SIGNIFICANT CHANGE UP (ref 6–8.3)
PROT SERPL-MCNC: 6.8 G/DL — SIGNIFICANT CHANGE UP (ref 6–8.3)
RBC # BLD: 3.8 M/UL — SIGNIFICANT CHANGE UP (ref 3.8–5.2)
RBC # BLD: 3.8 M/UL — SIGNIFICANT CHANGE UP (ref 3.8–5.2)
RBC # FLD: 16.7 % — HIGH (ref 10.3–14.5)
RBC # FLD: 16.7 % — HIGH (ref 10.3–14.5)
S AUREUS DNA NOSE QL NAA+PROBE: DETECTED
S AUREUS DNA NOSE QL NAA+PROBE: DETECTED
SODIUM SERPL-SCNC: 137 MMOL/L — SIGNIFICANT CHANGE UP (ref 135–145)
SODIUM SERPL-SCNC: 137 MMOL/L — SIGNIFICANT CHANGE UP (ref 135–145)
WBC # BLD: 6.34 K/UL — SIGNIFICANT CHANGE UP (ref 3.8–10.5)
WBC # BLD: 6.34 K/UL — SIGNIFICANT CHANGE UP (ref 3.8–10.5)
WBC # FLD AUTO: 6.34 K/UL — SIGNIFICANT CHANGE UP (ref 3.8–10.5)
WBC # FLD AUTO: 6.34 K/UL — SIGNIFICANT CHANGE UP (ref 3.8–10.5)

## 2023-11-29 PROCEDURE — 93970 EXTREMITY STUDY: CPT | Mod: 26

## 2023-11-29 PROCEDURE — 99233 SBSQ HOSP IP/OBS HIGH 50: CPT

## 2023-11-29 RX ORDER — MIRTAZAPINE 45 MG/1
7.5 TABLET, ORALLY DISINTEGRATING ORAL ONCE
Refills: 0 | Status: COMPLETED | OUTPATIENT
Start: 2023-11-29 | End: 2023-11-29

## 2023-11-29 RX ADMIN — HEPARIN SODIUM 5000 UNIT(S): 5000 INJECTION INTRAVENOUS; SUBCUTANEOUS at 14:32

## 2023-11-29 RX ADMIN — SERTRALINE 25 MILLIGRAM(S): 25 TABLET, FILM COATED ORAL at 12:50

## 2023-11-29 RX ADMIN — Medication 1 SPRAY(S): at 17:39

## 2023-11-29 RX ADMIN — PIPERACILLIN AND TAZOBACTAM 25 GRAM(S): 4; .5 INJECTION, POWDER, LYOPHILIZED, FOR SOLUTION INTRAVENOUS at 21:11

## 2023-11-29 RX ADMIN — HEPARIN SODIUM 5000 UNIT(S): 5000 INJECTION INTRAVENOUS; SUBCUTANEOUS at 05:11

## 2023-11-29 RX ADMIN — PIPERACILLIN AND TAZOBACTAM 25 GRAM(S): 4; .5 INJECTION, POWDER, LYOPHILIZED, FOR SOLUTION INTRAVENOUS at 05:06

## 2023-11-29 RX ADMIN — Medication 25 MILLIGRAM(S): at 05:11

## 2023-11-29 RX ADMIN — Medication 250 MILLIGRAM(S): at 19:31

## 2023-11-29 RX ADMIN — ATORVASTATIN CALCIUM 10 MILLIGRAM(S): 80 TABLET, FILM COATED ORAL at 21:11

## 2023-11-29 RX ADMIN — Medication 1 SPRAY(S): at 05:12

## 2023-11-29 RX ADMIN — MIRTAZAPINE 7.5 MILLIGRAM(S): 45 TABLET, ORALLY DISINTEGRATING ORAL at 00:36

## 2023-11-29 RX ADMIN — PIPERACILLIN AND TAZOBACTAM 25 GRAM(S): 4; .5 INJECTION, POWDER, LYOPHILIZED, FOR SOLUTION INTRAVENOUS at 12:50

## 2023-11-29 RX ADMIN — Medication 3 MILLIGRAM(S): at 21:11

## 2023-11-29 RX ADMIN — Medication 20 MILLIGRAM(S): at 05:11

## 2023-11-29 RX ADMIN — SPIRONOLACTONE 25 MILLIGRAM(S): 25 TABLET, FILM COATED ORAL at 17:39

## 2023-11-29 RX ADMIN — SENNA PLUS 2 TABLET(S): 8.6 TABLET ORAL at 21:11

## 2023-11-29 RX ADMIN — Medication 325 MILLIGRAM(S): at 12:50

## 2023-11-29 RX ADMIN — GABAPENTIN 100 MILLIGRAM(S): 400 CAPSULE ORAL at 21:11

## 2023-11-29 RX ADMIN — MIRTAZAPINE 22.5 MILLIGRAM(S): 45 TABLET, ORALLY DISINTEGRATING ORAL at 21:10

## 2023-11-29 RX ADMIN — Medication 1 SPRAY(S): at 17:43

## 2023-11-29 RX ADMIN — SPIRONOLACTONE 25 MILLIGRAM(S): 25 TABLET, FILM COATED ORAL at 05:11

## 2023-11-29 RX ADMIN — HEPARIN SODIUM 5000 UNIT(S): 5000 INJECTION INTRAVENOUS; SUBCUTANEOUS at 21:11

## 2023-11-29 NOTE — PHYSICAL THERAPY INITIAL EVALUATION ADULT - PERTINENT HX OF CURRENT PROBLEM, REHAB EVAL
pt is a 89F with afib (was on eliquis but no longer), hx of CHF on home O2 (3L), depression, HTN/HLD, here for LLE wound.  About 3-4 months ago, patient was trying to change her stockings when she accidentally scraped her leg.  Since then, her wound healed after 2 weeks but then her leg developed a non-healing superficial ulcer on the medial side of her lower left leg.  Has been seeing Ange Almeida and a VNS that has been changing it 3x/week (but reports that sometimes it would not get changed over a weekend and by Monday the dressings would be soaked).  No reports of any fevers.  Today the visiting nurse came and checked the wound and became concerned and told the patient to come here for further evaluation.  In the ED, patient was afebrile and had no leukocytosis.  However, since there was still concern for cellulitis, patient was started on vancomycin and zosyn.

## 2023-11-29 NOTE — DIETITIAN INITIAL EVALUATION ADULT - PERTINENT MEDS FT
MEDICATIONS  (STANDING):  atorvastatin 10 milliGRAM(s) Oral at bedtime  ferrous    sulfate 325 milliGRAM(s) Oral daily  fluticasone propionate 50 MICROgram(s)/spray Nasal Spray 1 Spray(s) Both Nostrils two times a day  gabapentin 100 milliGRAM(s) Oral at bedtime  heparin   Injectable 5000 Unit(s) SubCutaneous every 8 hours  metoprolol tartrate 25 milliGRAM(s) Oral two times a day  mirtazapine 22.5 milliGRAM(s) Oral at bedtime  piperacillin/tazobactam IVPB.. 3.375 Gram(s) IV Intermittent every 8 hours  senna 2 Tablet(s) Oral at bedtime  sertraline 25 milliGRAM(s) Oral daily  spironolactone 25 milliGRAM(s) Oral two times a day  torsemide 20 milliGRAM(s) Oral daily  vancomycin  IVPB 750 milliGRAM(s) IV Intermittent every 24 hours    MEDICATIONS  (PRN):  acetaminophen     Tablet .. 650 milliGRAM(s) Oral every 6 hours PRN Temp greater or equal to 38C (100.4F), Mild Pain (1 - 3)  albuterol    0.083%. 2.5 milliGRAM(s) Nebulizer once PRN Shortness of Breath and/or Wheezing  cyclobenzaprine 5 milliGRAM(s) Oral three times a day PRN Muscle Spasm  ketorolac   Injectable 15 milliGRAM(s) IV Push every 6 hours PRN Moderate Pain (4 - 6)  melatonin 3 milliGRAM(s) Oral at bedtime PRN Insomnia  polyethylene glycol 3350 17 Gram(s) Oral daily PRN Constipation  sodium chloride 0.65% Nasal 1 Spray(s) Both Nostrils two times a day PRN Nasal Congestion

## 2023-11-29 NOTE — DIETITIAN INITIAL EVALUATION ADULT - REASON FOR ADMISSION
Per H&P "89F with afib (was on eliquis but no longer), hx of CHF on home O2 (3L), depression, HTN/HLD, here for LLE wound.  About 3-4 months ago, patient was trying to change her stockings when she accidentally scraped her leg.  Since then, her wound healed after 2 weeks but then her leg developed a non-healing superficial ulcer on the medial side of her lower left leg.  Has been seeing Ange Almeida and a VNS that has been changing it 3x/week (but reports that sometimes it would not get changed over a weekend and by Monday the dressings would be soaked).  No reports of any fevers.  Today the visiting nurse came and checked the wound and became concerned and told the patient to come here for further evaluation.  In the ED, patient was afebrile and had no leukocytosis.  However, since there was still concern for cellulitis, patient was started on vancomycin and zosyn.  Patient is being admitted for possible cellulitis."

## 2023-11-29 NOTE — PHYSICAL THERAPY INITIAL EVALUATION ADULT - ADDITIONAL COMMENTS
pt lives at the [facility], has HHA 8 hours/day. Pt ambulates with RW normally. On 3L home O2

## 2023-11-29 NOTE — PROGRESS NOTE ADULT - SUBJECTIVE AND OBJECTIVE BOX
[facility], Division of Infectious Diseases  VANE Shine Y. Patel, S. Shah, G. [facility]  [phone]    Name: ARNULFO ALEX  Age: 89y  Gender: Female  MRN: [id number]    Interval History:  Patient seen and examined at bedside this morning  No acute overnight events. Afebrile  Feeling a bit better  Notes reviewed    Antibiotics:  piperacillin/tazobactam IVPB.. 3.375 Gram(s) IV Intermittent every 8 hours  vancomycin  IVPB 750 milliGRAM(s) IV Intermittent every 24 hours      Medications:  acetaminophen     Tablet .. 650 milliGRAM(s) Oral every 6 hours PRN  albuterol    0.083%. 2.5 milliGRAM(s) Nebulizer once PRN  atorvastatin 10 milliGRAM(s) Oral at bedtime  cyclobenzaprine 5 milliGRAM(s) Oral three times a day PRN  ferrous    sulfate 325 milliGRAM(s) Oral daily  fluticasone propionate 50 MICROgram(s)/spray Nasal Spray 1 Spray(s) Both Nostrils two times a day  gabapentin 100 milliGRAM(s) Oral at bedtime  heparin   Injectable 5000 Unit(s) SubCutaneous every 8 hours  ketorolac   Injectable 15 milliGRAM(s) IV Push every 6 hours PRN  melatonin 3 milliGRAM(s) Oral at bedtime PRN  metoprolol tartrate 25 milliGRAM(s) Oral two times a day  mirtazapine 22.5 milliGRAM(s) Oral at bedtime  piperacillin/tazobactam IVPB.. 3.375 Gram(s) IV Intermittent every 8 hours  polyethylene glycol 3350 17 Gram(s) Oral daily PRN  senna 2 Tablet(s) Oral at bedtime  sertraline 25 milliGRAM(s) Oral daily  sodium chloride 0.65% Nasal 1 Spray(s) Both Nostrils two times a day PRN  spironolactone 25 milliGRAM(s) Oral two times a day  torsemide 20 milliGRAM(s) Oral daily  vancomycin  IVPB 750 milliGRAM(s) IV Intermittent every 24 hours      Review of Systems:  A 10-point review of systems was obtained.   Review of systems otherwise negative except as previously noted.    Allergies: codeine (Other)    For details regarding the patient's past medical history, social history, family history, and other miscellaneous elements, please refer the initial infectious diseases consultation and/or the admitting history and physical examination for this admission.    Objective:  Vitals:   T(C): 36.4 (11-29-23 @ 09:04), Max: 36.8 (11-28-23 @ 17:25)  HR: 88 (11-29-23 @ 09:04) (75 - 88)  BP: 113/71 (11-29-23 @ 09:04) (91/50 - 128/82)  RR: 18 (11-29-23 @ 09:04) (16 - 20)  SpO2: 95% (11-29-23 @ 09:04) (95% - 96%)    Physical Examination:  General: no acute distress  HEENT: NC/AT, EOMI,   Cardio: S1, S2 heard, RRR, no murmurs  Resp: decreased b/l breath sounds  Abd: soft, NT, ND,  Ext: no edema or cyanosis  Skin: warm, dry, no visible rash      Laboratory Studies:  CBC:                       10.6   6.34  )-----------( 175      ( 29 Nov 2023 07:56 )             35.4     CMP: 11-29    137  |  99  |  49<H>  ----------------------------<  131<H>  4.9   |  27  |  1.74<H>    Ca    8.8      29 Nov 2023 07:56  Phos  4.8     11-28  Mg     2.4     11-28    TPro  6.8  /  Alb  2.8<L>  /  TBili  0.5  /  DBili  x   /  AST  18  /  ALT  15  /  AlkPhos  44  11-29    LIVER FUNCTIONS - ( 29 Nov 2023 07:56 )  Alb: 2.8 g/dL / Pro: 6.8 g/dL / ALK PHOS: 44 U/L / ALT: 15 U/L / AST: 18 U/L / GGT: x           Urinalysis Basic - ( 29 Nov 2023 07:56 )    Color: x / Appearance: x / SG: x / pH: x  Gluc: 131 mg/dL / Ketone: x  / Bili: x / Urobili: x   Blood: x / Protein: x / Nitrite: x   Leuk Esterase: x / RBC: x / WBC x   Sq Epi: x / Non Sq Epi: x / Bacteria: x        Microbiology: reviewed    Culture - Blood (collected 11-27-23 @ 17:01)  Source: .Blood Blood-Peripheral  Preliminary Report (11-28-23 @ 22:02):    No growth at 24 hours    Culture - Blood (collected 11-27-23 @ 17:01)  Source: .Blood Blood-Peripheral  Preliminary Report (11-28-23 @ 22:02):    No growth at 24 hours    Culture - Urine (collected 11-27-23 @ 16:48)  Source: Clean Catch Clean Catch (Midstream)  Final Report (11-28-23 @ 16:12):    <10,000 CFU/mL Normal Urogenital Tika          Radiology: reviewed

## 2023-11-29 NOTE — PROGRESS NOTE ADULT - SUBJECTIVE AND OBJECTIVE BOX
Chief Complaint: Leg ulcer    Interval Events: No events overnight.    Review of Systems:  General: No fevers, chills, weight gain  Skin: No rashes, color changes  Cardiovascular: No chest pain, orthopnea  Respiratory: No shortness of breath, cough  Gastrointestinal: No nausea, abdominal pain  Genitourinary: No incontinence, pain with urination  Musculoskeletal: No pain, swelling, decreased range of motion  Neurological: No headache, weakness  Psychiatric: No depression, anxiety  Endocrine: No weight gain, increased thirst  All other systems are comprehensively negative.    Physical Exam:  Vitals:        Vital Signs Last 24 Hrs  T(C): 36.7 (29 Nov 2023 05:11), Max: 36.8 (28 Nov 2023 17:25)  T(F): 98 (29 Nov 2023 05:11), Max: 98.3 (28 Nov 2023 17:25)  HR: 77 (29 Nov 2023 05:11) (75 - 82)  BP: 128/82 (29 Nov 2023 05:11) (91/50 - 128/82)  BP(mean): --  RR: 16 (29 Nov 2023 05:11) (16 - 20)  SpO2: 96% (29 Nov 2023 05:11) (95% - 96%)  Parameters below as of 29 Nov 2023 05:11  Patient On (Oxygen Delivery Method): nasal cannula  O2 Flow (L/min): 3  General: NAD  HEENT: MMM  Neck: No JVD, no carotid bruit  Lungs: CTAB  CV: RRR, nl S1/S2, no M/R/G  Abdomen: S/NT/ND, +BS  Extremities: No LE edema, no cyanosis  Neuro: AAOx3, non-focal  Skin: No rash    Labs:                        10.6   6.34  )-----------( 175      ( 29 Nov 2023 07:56 )             35.4     11-29    137  |  99  |  49<H>  ----------------------------<  131<H>  4.9   |  27  |  1.74<H>    Ca    8.8      29 Nov 2023 07:56  Phos  4.8     11-28  Mg     2.4     11-28    TPro  6.8  /  Alb  2.8<L>  /  TBili  0.5  /  DBili  x   /  AST  18  /  ALT  15  /  AlkPhos  44  11-29        PT/INR - ( 27 Nov 2023 17:01 )   PT: 14.2 sec;   INR: 1.31 ratio         PTT - ( 27 Nov 2023 17:01 )  PTT:30.2 sec    ECG/Telemetry: Atrial fibrillation

## 2023-11-29 NOTE — PROGRESS NOTE ADULT - ASSESSMENT
89F with afib (was on eliquis but no longer), hx of CHF on home O2 (3L), depression, HTN/HLD, here for LLE wound.    LLE Cellulitis w/ wound  +Multiple ulcerations  - c/w vancomycin  - c/w zosyn  - f/u pending cx  - appreciate wound care recs  - appreciate vascular recs  - pain control and supportive care per primary team    Infectious Diseases will continue to follow. Please call with any questions.   Suzy Real M.D.  [facility] Division of Infectious Diseases [phone]  For after 5 P.M. and weekends, please call [phone]

## 2023-11-29 NOTE — DIETITIAN INITIAL EVALUATION ADULT - ADD RECOMMEND
1. Continue with current diet order  2. Provide ONS: Ensure High Protein plus 8oz (350 kcal, 20g protein) qd  3. Encourage po intake as needed   4. Diet order updated by MD

## 2023-11-29 NOTE — PROGRESS NOTE ADULT - ASSESSMENT
89F with afib (was on eliquis but no longer), hx of CHF on home O2 (3L), depression, HTN/HLD, here for LLE wound.    LLE wound - unclear if it is cellulitis (no fever or leukocytosis).  Also consider PAD ulcer.    - admitted to medicine  - cont with zosyn  - was already given vancomycin -> if needed, can dose by level given her poor eGFR  - ID consult recs appreciated  - f/u cultures  - wound consult, recs appreciated  - may need vascular consult  - pain control as needed, allergic to codiene  tylenol or ibuprofen PRN  arterial dopplers reviewed with Dr. Artem Betancourt from vascular, recommending b/l lower ext venous dopplers, will see patient tommorow    Afib   - no longer on Eliquis  - cont with metoprolol  - cardiology consulted, recs appreciated   Resume torsemide 60 mg daily  - Resume spironolactone 25 mg bid  - On discharge, resume Jardiance  - Continue metoprolol tartrate 25 mg bid  - Not on anticoagulation due to multiple prior GI bleeds      CHF - no signs of exacerbation, clinically stable as per patient (eGFR was 43 last year, now it's 27)  - however, given poor eGFR -> hold torsemide and spironolactone for now  - cardiology recs appreciated  - cont with O2 supplementation    CAD  - cont with atorvastatin    Polyneuropathy  - cont with gabapentin    Depression  - ramelteon non-formulary here  - cont with sertraline    Preventive measures  - although she has afib, patient also has an open wound -> for now, will not give full anticoagulation and give heparin subcut for DVT ppx

## 2023-11-29 NOTE — PROGRESS NOTE ADULT - SUBJECTIVE AND OBJECTIVE BOX
Patient is a 89y old  Female who presents with a chief complaint of LLE non-healing wound, possibly cellulitis (28 Nov 2023 13:34)      INTERVAL HPI/OVERNIGHT EVENTS:    no overnight events    MEDICATIONS  (STANDING):  atorvastatin 10 milliGRAM(s) Oral at bedtime  ferrous    sulfate 325 milliGRAM(s) Oral daily  fluticasone propionate 50 MICROgram(s)/spray Nasal Spray 1 Spray(s) Both Nostrils two times a day  gabapentin 100 milliGRAM(s) Oral at bedtime  heparin   Injectable 5000 Unit(s) SubCutaneous every 8 hours  metoprolol tartrate 25 milliGRAM(s) Oral two times a day  mirtazapine 22.5 milliGRAM(s) Oral at bedtime  piperacillin/tazobactam IVPB.. 3.375 Gram(s) IV Intermittent every 8 hours  senna 2 Tablet(s) Oral at bedtime  sertraline 25 milliGRAM(s) Oral daily  spironolactone 25 milliGRAM(s) Oral two times a day  torsemide 20 milliGRAM(s) Oral daily  vancomycin  IVPB 750 milliGRAM(s) IV Intermittent every 24 hours    MEDICATIONS  (PRN):  acetaminophen     Tablet .. 650 milliGRAM(s) Oral every 6 hours PRN Temp greater or equal to 38C (100.4F), Mild Pain (1 - 3)  albuterol    0.083%. 2.5 milliGRAM(s) Nebulizer once PRN Shortness of Breath and/or Wheezing  cyclobenzaprine 5 milliGRAM(s) Oral three times a day PRN Muscle Spasm  ketorolac   Injectable 15 milliGRAM(s) IV Push every 6 hours PRN Moderate Pain (4 - 6)  melatonin 3 milliGRAM(s) Oral at bedtime PRN Insomnia  polyethylene glycol 3350 17 Gram(s) Oral daily PRN Constipation  sodium chloride 0.65% Nasal 1 Spray(s) Both Nostrils two times a day PRN Nasal Congestion      Allergies    codeine (Other)    Intolerances        REVIEW OF SYSTEMS:  as above    Vital Signs Last 24 Hrs  T(C): 36.7 (29 Nov 2023 05:11), Max: 36.8 (28 Nov 2023 17:25)  T(F): 98 (29 Nov 2023 05:11), Max: 98.3 (28 Nov 2023 17:25)  HR: 77 (29 Nov 2023 05:11) (75 - 82)  BP: 128/82 (29 Nov 2023 05:11) (91/50 - 128/82)  BP(mean): --  RR: 16 (29 Nov 2023 05:11) (16 - 20)  SpO2: 96% (29 Nov 2023 05:11) (95% - 96%)    Parameters below as of 29 Nov 2023 05:11  Patient On (Oxygen Delivery Method): nasal cannula  O2 Flow (L/min): 3      PHYSICAL EXAM:  GENERAL:     elderly female in NAD  HEAD:     atraumatic, normocephalic  EYES:     EOMI, conjunctiva and sclera clear  RESPIRATORY:     diminished on right but no gross crackles, has difficulty with full complete sentences but patient reports she is at baseline  CARDIOVASCULAR:     irregular irregular with harsh systolic murmur  GASTROINTESTINAL:     soft, nontender, nondistended, bowel sounds present  EXTREMITIES:     LLE with slight edema  MUSCULOSKELETAL:     no joint pain or swelling or deformities  NERVOUS SYSTEM:     moves all 4s  SKIN:     large superficial foul smelling wound of medial side of LLE with surrounding erythema, tender to palpation  PSYCH:     appropriate, alert and orientated x3, good concentration    LABS:                        10.6   6.34  )-----------( 175      ( 29 Nov 2023 07:56 )             35.4     29 Nov 2023 07:56    137    |  99     |  49     ----------------------------<  131    4.9     |  27     |  1.74     Ca    8.8        29 Nov 2023 07:56    TPro  6.8    /  Alb  2.8    /  TBili  0.5    /  DBili  x      /  AST  18     /  ALT  15     /  AlkPhos  44     29 Nov 2023 07:56    PT/INR - ( 27 Nov 2023 17:01 )   PT: 14.2 sec;   INR: 1.31 ratio         PTT - ( 27 Nov 2023 17:01 )  PTT:30.2 sec  Urinalysis Basic - ( 29 Nov 2023 07:56 )    Color: x / Appearance: x / SG: x / pH: x  Gluc: 131 mg/dL / Ketone: x  / Bili: x / Urobili: x   Blood: x / Protein: x / Nitrite: x   Leuk Esterase: x / RBC: x / WBC x   Sq Epi: x / Non Sq Epi: x / Bacteria: x      CAPILLARY BLOOD GLUCOSE          RADIOLOGY & ADDITIONAL TESTS:

## 2023-11-29 NOTE — DIETITIAN INITIAL EVALUATION ADULT - PERTINENT LABORATORY DATA
11-29    137  |  99  |  49<H>  ----------------------------<  131<H>  4.9   |  27  |  1.74<H>    Ca    8.8      29 Nov 2023 07:56  Phos  4.8     11-28  Mg     2.4     11-28    TPro  6.8  /  Alb  2.8<L>  /  TBili  0.5  /  DBili  x   /  AST  18  /  ALT  15  /  AlkPhos  44  11-29

## 2023-11-29 NOTE — CASE MANAGEMENT PROGRESS NOTE - NSCMPROGRESSNOTE_GEN_ALL_CORE
Conts on vanco/zosyn- Has O2 and RW at [facility]- [place] form on chart for completion

## 2023-11-29 NOTE — PROGRESS NOTE ADULT - ASSESSMENT
The patient is a 89 year old female with a history of HTN, HL, atrial fibrillation, MitraClip, chronic diastolic heart failure, GI bleed who presents with non-healing left leg ulcer/cellulitis.    Plan:  - ECG with known AF and otherwise nonspecific findings  - CXR with no PNA or pulm edema  - BNP 6876  - Echo 11/22 with normal LV systolic function, normal MitraClip function, mild pulm HTN  - Renal function stable although slightly worsened from prior  - Continue torsemide 60 mg daily  - Continue spironolactone 25 mg bid  - On discharge, resume Jardiance  - Continue metoprolol tartrate 25 mg bid  - Not on anticoagulation due to multiple prior GI bleeds

## 2023-11-29 NOTE — DIETITIAN INITIAL EVALUATION ADULT - OTHER INFO
Visited patient in room, presents with good appetite/po intake, consuming >75% of meals. Denies n/v/d but constipation, last BM 11/27, offered prunes/prune jiuce. No reported difficulty chewing or swallowing. NKFA. Reported UBW #, current adm weight #, weight appears to be stable, will continue to monitor weight trends as able.  Visited patient in room, presents with good appetite/po intake, consuming >75% of meals. Denies n/v/d but constipation, last BM 11/27, bowel regimen in place, offered prunes/prune jiuce. No reported difficulty chewing or swallowing. NKFA. Reported -152#, current adm weight 153#, weight appears to be stable, will continue to monitor weight trends as able.     Pertinent medications/nutrition labs reviewed; elevated BUN, Cr, glucose 131; receiving antibiotic, iron in house.     Educated on adequate protein/energy intake to prevent weight loss, prioritize protein at each meal. Educated on low sodium diet, wt self daily at the same time. Will add Ensure High Protein plus 8oz (350 kcal, 20g protein) qd to optimize intake. Pt receptive, no nutrition related questions at this time. RD to continue to monitor nutrition status per protocol.

## 2023-11-29 NOTE — DIETITIAN INITIAL EVALUATION ADULT - ORAL INTAKE PTA/DIET HISTORY
Reported following a low salt diet at home, appetite/po intake was good. Was taking vitamin D, multivitamin at home, drinking 1 ensure per day pta.

## 2023-11-30 LAB
ALBUMIN SERPL ELPH-MCNC: 2.8 G/DL — LOW (ref 3.3–5)
ALBUMIN SERPL ELPH-MCNC: 2.8 G/DL — LOW (ref 3.3–5)
ALP SERPL-CCNC: 46 U/L — SIGNIFICANT CHANGE UP (ref 30–120)
ALP SERPL-CCNC: 46 U/L — SIGNIFICANT CHANGE UP (ref 30–120)
ALT FLD-CCNC: 12 U/L — SIGNIFICANT CHANGE UP (ref 10–60)
ALT FLD-CCNC: 12 U/L — SIGNIFICANT CHANGE UP (ref 10–60)
ANION GAP SERPL CALC-SCNC: 8 MMOL/L — SIGNIFICANT CHANGE UP (ref 5–17)
ANION GAP SERPL CALC-SCNC: 8 MMOL/L — SIGNIFICANT CHANGE UP (ref 5–17)
AST SERPL-CCNC: 16 U/L — SIGNIFICANT CHANGE UP (ref 10–40)
AST SERPL-CCNC: 16 U/L — SIGNIFICANT CHANGE UP (ref 10–40)
BILIRUB SERPL-MCNC: 0.5 MG/DL — SIGNIFICANT CHANGE UP (ref 0.2–1.2)
BILIRUB SERPL-MCNC: 0.5 MG/DL — SIGNIFICANT CHANGE UP (ref 0.2–1.2)
BUN SERPL-MCNC: 41 MG/DL — HIGH (ref 7–23)
BUN SERPL-MCNC: 41 MG/DL — HIGH (ref 7–23)
CALCIUM SERPL-MCNC: 8.8 MG/DL — SIGNIFICANT CHANGE UP (ref 8.4–10.5)
CALCIUM SERPL-MCNC: 8.8 MG/DL — SIGNIFICANT CHANGE UP (ref 8.4–10.5)
CHLORIDE SERPL-SCNC: 100 MMOL/L — SIGNIFICANT CHANGE UP (ref 96–108)
CHLORIDE SERPL-SCNC: 100 MMOL/L — SIGNIFICANT CHANGE UP (ref 96–108)
CO2 SERPL-SCNC: 27 MMOL/L — SIGNIFICANT CHANGE UP (ref 22–31)
CO2 SERPL-SCNC: 27 MMOL/L — SIGNIFICANT CHANGE UP (ref 22–31)
CREAT SERPL-MCNC: 1.5 MG/DL — HIGH (ref 0.5–1.3)
CREAT SERPL-MCNC: 1.5 MG/DL — HIGH (ref 0.5–1.3)
EGFR: 33 ML/MIN/1.73M2 — LOW
EGFR: 33 ML/MIN/1.73M2 — LOW
GLUCOSE SERPL-MCNC: 118 MG/DL — HIGH (ref 70–99)
GLUCOSE SERPL-MCNC: 118 MG/DL — HIGH (ref 70–99)
HCT VFR BLD CALC: 32.2 % — LOW (ref 34.5–45)
HCT VFR BLD CALC: 32.2 % — LOW (ref 34.5–45)
HGB BLD-MCNC: 9.8 G/DL — LOW (ref 11.5–15.5)
HGB BLD-MCNC: 9.8 G/DL — LOW (ref 11.5–15.5)
MCHC RBC-ENTMCNC: 27.9 PG — SIGNIFICANT CHANGE UP (ref 27–34)
MCHC RBC-ENTMCNC: 27.9 PG — SIGNIFICANT CHANGE UP (ref 27–34)
MCHC RBC-ENTMCNC: 30.4 GM/DL — LOW (ref 32–36)
MCHC RBC-ENTMCNC: 30.4 GM/DL — LOW (ref 32–36)
MCV RBC AUTO: 91.7 FL — SIGNIFICANT CHANGE UP (ref 80–100)
MCV RBC AUTO: 91.7 FL — SIGNIFICANT CHANGE UP (ref 80–100)
NRBC # BLD: 0 /100 WBCS — SIGNIFICANT CHANGE UP (ref 0–0)
NRBC # BLD: 0 /100 WBCS — SIGNIFICANT CHANGE UP (ref 0–0)
PLATELET # BLD AUTO: 177 K/UL — SIGNIFICANT CHANGE UP (ref 150–400)
PLATELET # BLD AUTO: 177 K/UL — SIGNIFICANT CHANGE UP (ref 150–400)
POTASSIUM SERPL-MCNC: 5.2 MMOL/L — SIGNIFICANT CHANGE UP (ref 3.5–5.3)
POTASSIUM SERPL-MCNC: 5.2 MMOL/L — SIGNIFICANT CHANGE UP (ref 3.5–5.3)
POTASSIUM SERPL-SCNC: 5.2 MMOL/L — SIGNIFICANT CHANGE UP (ref 3.5–5.3)
POTASSIUM SERPL-SCNC: 5.2 MMOL/L — SIGNIFICANT CHANGE UP (ref 3.5–5.3)
PROT SERPL-MCNC: 6.9 G/DL — SIGNIFICANT CHANGE UP (ref 6–8.3)
PROT SERPL-MCNC: 6.9 G/DL — SIGNIFICANT CHANGE UP (ref 6–8.3)
RBC # BLD: 3.51 M/UL — LOW (ref 3.8–5.2)
RBC # BLD: 3.51 M/UL — LOW (ref 3.8–5.2)
RBC # FLD: 16.7 % — HIGH (ref 10.3–14.5)
RBC # FLD: 16.7 % — HIGH (ref 10.3–14.5)
SODIUM SERPL-SCNC: 135 MMOL/L — SIGNIFICANT CHANGE UP (ref 135–145)
SODIUM SERPL-SCNC: 135 MMOL/L — SIGNIFICANT CHANGE UP (ref 135–145)
VANCOMYCIN TROUGH SERPL-MCNC: 13.9 UG/ML — SIGNIFICANT CHANGE UP (ref 10–20)
VANCOMYCIN TROUGH SERPL-MCNC: 13.9 UG/ML — SIGNIFICANT CHANGE UP (ref 10–20)
WBC # BLD: 8 K/UL — SIGNIFICANT CHANGE UP (ref 3.8–10.5)
WBC # BLD: 8 K/UL — SIGNIFICANT CHANGE UP (ref 3.8–10.5)
WBC # FLD AUTO: 8 K/UL — SIGNIFICANT CHANGE UP (ref 3.8–10.5)
WBC # FLD AUTO: 8 K/UL — SIGNIFICANT CHANGE UP (ref 3.8–10.5)

## 2023-11-30 PROCEDURE — 99233 SBSQ HOSP IP/OBS HIGH 50: CPT

## 2023-11-30 RX ORDER — MUPIROCIN 20 MG/G
1 OINTMENT TOPICAL EVERY 12 HOURS
Refills: 0 | Status: DISCONTINUED | OUTPATIENT
Start: 2023-11-30 | End: 2023-12-02

## 2023-11-30 RX ADMIN — ATORVASTATIN CALCIUM 10 MILLIGRAM(S): 80 TABLET, FILM COATED ORAL at 21:18

## 2023-11-30 RX ADMIN — SENNA PLUS 2 TABLET(S): 8.6 TABLET ORAL at 21:17

## 2023-11-30 RX ADMIN — Medication 3 MILLIGRAM(S): at 21:18

## 2023-11-30 RX ADMIN — HEPARIN SODIUM 5000 UNIT(S): 5000 INJECTION INTRAVENOUS; SUBCUTANEOUS at 05:27

## 2023-11-30 RX ADMIN — GABAPENTIN 100 MILLIGRAM(S): 400 CAPSULE ORAL at 21:18

## 2023-11-30 RX ADMIN — MIRTAZAPINE 22.5 MILLIGRAM(S): 45 TABLET, ORALLY DISINTEGRATING ORAL at 21:20

## 2023-11-30 RX ADMIN — HEPARIN SODIUM 5000 UNIT(S): 5000 INJECTION INTRAVENOUS; SUBCUTANEOUS at 21:18

## 2023-11-30 RX ADMIN — PIPERACILLIN AND TAZOBACTAM 25 GRAM(S): 4; .5 INJECTION, POWDER, LYOPHILIZED, FOR SOLUTION INTRAVENOUS at 11:23

## 2023-11-30 RX ADMIN — HEPARIN SODIUM 5000 UNIT(S): 5000 INJECTION INTRAVENOUS; SUBCUTANEOUS at 13:54

## 2023-11-30 RX ADMIN — Medication 250 MILLIGRAM(S): at 18:40

## 2023-11-30 RX ADMIN — Medication 325 MILLIGRAM(S): at 11:23

## 2023-11-30 RX ADMIN — Medication 1 SPRAY(S): at 18:00

## 2023-11-30 RX ADMIN — SERTRALINE 25 MILLIGRAM(S): 25 TABLET, FILM COATED ORAL at 11:23

## 2023-11-30 RX ADMIN — SPIRONOLACTONE 25 MILLIGRAM(S): 25 TABLET, FILM COATED ORAL at 18:01

## 2023-11-30 RX ADMIN — Medication 20 MILLIGRAM(S): at 05:27

## 2023-11-30 RX ADMIN — Medication 1 SPRAY(S): at 05:27

## 2023-11-30 RX ADMIN — MUPIROCIN 1 APPLICATION(S): 20 OINTMENT TOPICAL at 18:00

## 2023-11-30 RX ADMIN — PIPERACILLIN AND TAZOBACTAM 25 GRAM(S): 4; .5 INJECTION, POWDER, LYOPHILIZED, FOR SOLUTION INTRAVENOUS at 20:14

## 2023-11-30 RX ADMIN — SPIRONOLACTONE 25 MILLIGRAM(S): 25 TABLET, FILM COATED ORAL at 05:27

## 2023-11-30 RX ADMIN — PIPERACILLIN AND TAZOBACTAM 25 GRAM(S): 4; .5 INJECTION, POWDER, LYOPHILIZED, FOR SOLUTION INTRAVENOUS at 04:59

## 2023-11-30 NOTE — PROGRESS NOTE ADULT - ASSESSMENT
89F with afib (was on eliquis but no longer), hx of CHF on home O2 (3L), depression, HTN/HLD, here for LLE wound.    LLE Cellulitis w/ wound  +Multiple ulcerations  - c/w vancomycin  - c/w zosyn  - cx NGTD  - appreciate wound care recs  - appreciate vascular recs  - pain control and supportive care per primary team    Infectious Diseases will continue to follow. Please call with any questions.   Suzy Real M.D.  [facility] Division of Infectious Diseases [phone]  For after 5 P.M. and weekends, please call [phone]

## 2023-11-30 NOTE — PROGRESS NOTE ADULT - ASSESSMENT
89F with afib (was on eliquis but no longer), hx of CHF on home O2 (3L), depression, HTN/HLD, here for LLE wound.    LLE wound - unclear if it is cellulitis (no fever or leukocytosis).  Also consider PAD ulcer.    - admitted to medicine  - cont with zosyn and vanco  - was already given vancomycin -> if needed, can dose by level given her poor eGFR  - ID consult recs appreciated  - f/u cultures  - wound consult, recs appreciated  - may need vascular consult  - pain control as needed, allergic to codiene  tylenol or ibuprofen PRN  arterial dopplers reviewed with Dr. Artem Betancourt from vascular, recommending b/l lower ext venous dopplers, reviewed results with her, will see at outpatient, no acute findings for now    Afib   - no longer on Eliquis  - cont with metoprolol  - cardiology consulted, recs appreciated   - Resume torsemide 60 mg daily  - Resume spironolactone 25 mg bid  - On discharge, resume Jardiance  - Continue metoprolol tartrate 25 mg bid  - Not on anticoagulation due to multiple prior GI bleeds      CHF - no signs of exacerbation, clinically stable as per patient (eGFR was 43 last year, now it's 27)  - however, given poor eGFR -> hold torsemide and spironolactone for now  - cardiology recs appreciated  - cont with O2 supplementation    CAD  - cont with atorvastatin    Polyneuropathy  - cont with gabapentin    Depression  - ramelteon non-formulary here  - cont with sertraline    Preventive measures  - although she has afib, patient also has an open wound -> for now, will not give full anticoagulation and give heparin subcut for DVT ppx

## 2023-11-30 NOTE — PROGRESS NOTE ADULT - SUBJECTIVE AND OBJECTIVE BOX
Patient is a 89y old  Female who presents with a chief complaint of LLE non-healing wound, possibly cellulitis (30 Nov 2023 11:49)      INTERVAL HPI/OVERNIGHT EVENTS:    no overnight events    MEDICATIONS  (STANDING):  atorvastatin 10 milliGRAM(s) Oral at bedtime  ferrous    sulfate 325 milliGRAM(s) Oral daily  fluticasone propionate 50 MICROgram(s)/spray Nasal Spray 1 Spray(s) Both Nostrils two times a day  gabapentin 100 milliGRAM(s) Oral at bedtime  heparin   Injectable 5000 Unit(s) SubCutaneous every 8 hours  metoprolol tartrate 25 milliGRAM(s) Oral two times a day  mirtazapine 22.5 milliGRAM(s) Oral at bedtime  mupirocin 2% Nasal 1 Application(s) Both Nostrils every 12 hours  piperacillin/tazobactam IVPB.. 3.375 Gram(s) IV Intermittent every 8 hours  senna 2 Tablet(s) Oral at bedtime  sertraline 25 milliGRAM(s) Oral daily  spironolactone 25 milliGRAM(s) Oral two times a day  torsemide 20 milliGRAM(s) Oral daily  vancomycin  IVPB 750 milliGRAM(s) IV Intermittent every 24 hours    MEDICATIONS  (PRN):  acetaminophen     Tablet .. 650 milliGRAM(s) Oral every 6 hours PRN Temp greater or equal to 38C (100.4F), Mild Pain (1 - 3)  albuterol    0.083%. 2.5 milliGRAM(s) Nebulizer once PRN Shortness of Breath and/or Wheezing  cyclobenzaprine 5 milliGRAM(s) Oral three times a day PRN Muscle Spasm  ketorolac   Injectable 15 milliGRAM(s) IV Push every 6 hours PRN Moderate Pain (4 - 6)  melatonin 3 milliGRAM(s) Oral at bedtime PRN Insomnia  polyethylene glycol 3350 17 Gram(s) Oral daily PRN Constipation  sodium chloride 0.65% Nasal 1 Spray(s) Both Nostrils two times a day PRN Nasal Congestion      Allergies    codeine (Other)    Intolerances        REVIEW OF SYSTEMS:  as above    Vital Signs Last 24 Hrs  T(C): 36.4 (30 Nov 2023 15:03), Max: 36.8 (30 Nov 2023 05:13)  T(F): 97.6 (30 Nov 2023 15:03), Max: 98.2 (30 Nov 2023 05:13)  HR: 92 (30 Nov 2023 15:03) (68 - 92)  BP: 112/65 (30 Nov 2023 15:03) (105/50 - 112/65)  BP(mean): --  RR: 18 (30 Nov 2023 15:03) (18 - 18)  SpO2: 97% (30 Nov 2023 15:03) (97% - 98%)    Parameters below as of 30 Nov 2023 15:03  Patient On (Oxygen Delivery Method): nasal cannula  O2 Flow (L/min): 3      PHYSICAL EXAM:  GENERAL:     elderly female in NAD  HEAD:     atraumatic, normocephalic  EYES:     EOMI, conjunctiva and sclera clear  RESPIRATORY:     diminished on right but no gross crackles, has difficulty with full complete sentences but patient reports she is at baseline  CARDIOVASCULAR:     irregular irregular with harsh systolic murmur  GASTROINTESTINAL:     soft, nontender, nondistended, bowel sounds present  EXTREMITIES:     LLE with slight edema  MUSCULOSKELETAL:     no joint pain or swelling or deformities  NERVOUS SYSTEM:     moves all 4s  SKIN:     large superficial wound of medial side of LLE with surrounding erythema, tender to palpation  PSYCH:     appropriate, alert and orientated x3, good concentration    LABS:                        9.8    8.00  )-----------( 177      ( 30 Nov 2023 07:56 )             32.2     30 Nov 2023 07:56    135    |  100    |  41     ----------------------------<  118    5.2     |  27     |  1.50     Ca    8.8        30 Nov 2023 07:56    TPro  6.9    /  Alb  2.8    /  TBili  0.5    /  DBili  x      /  AST  16     /  ALT  12     /  AlkPhos  46     30 Nov 2023 07:56      Urinalysis Basic - ( 30 Nov 2023 07:56 )    Color: x / Appearance: x / SG: x / pH: x  Gluc: 118 mg/dL / Ketone: x  / Bili: x / Urobili: x   Blood: x / Protein: x / Nitrite: x   Leuk Esterase: x / RBC: x / WBC x   Sq Epi: x / Non Sq Epi: x / Bacteria: x      CAPILLARY BLOOD GLUCOSE          RADIOLOGY & ADDITIONAL TESTS:

## 2023-11-30 NOTE — CASE MANAGEMENT PROGRESS NOTE - NSCMPROGRESSNOTE_GEN_ALL_CORE
Update Communication Note: As per morning rounds on [address] [place],  Patient has a Hx: of CHF IV Vanco, IV Zosyn, Xeroform every other day dressing.  Has O2 and RW at [facility] form on chart for completion. Patient is not ready for Discharge. Will continue to follow patient.

## 2023-11-30 NOTE — PROGRESS NOTE ADULT - SUBJECTIVE AND OBJECTIVE BOX
[facility], Division of Infectious Diseases  VANE Shine Y. Patel, S. Shah, [facility]  [phone]    Name: ARNULFO ALEX  Age: 89y  Gender: Female  MRN: [id number]    Interval History:  Patient seen and examined at bedside   No acute overnight events. Afebrile  Still having leg pain  Notes reviewed    Antibiotics:  piperacillin/tazobactam IVPB.. 3.375 Gram(s) IV Intermittent every 8 hours  vancomycin  IVPB 750 milliGRAM(s) IV Intermittent every 24 hours      Medications:  acetaminophen     Tablet .. 650 milliGRAM(s) Oral every 6 hours PRN  albuterol    0.083%. 2.5 milliGRAM(s) Nebulizer once PRN  atorvastatin 10 milliGRAM(s) Oral at bedtime  cyclobenzaprine 5 milliGRAM(s) Oral three times a day PRN  ferrous    sulfate 325 milliGRAM(s) Oral daily  fluticasone propionate 50 MICROgram(s)/spray Nasal Spray 1 Spray(s) Both Nostrils two times a day  gabapentin 100 milliGRAM(s) Oral at bedtime  heparin   Injectable 5000 Unit(s) SubCutaneous every 8 hours  ketorolac   Injectable 15 milliGRAM(s) IV Push every 6 hours PRN  melatonin 3 milliGRAM(s) Oral at bedtime PRN  metoprolol tartrate 25 milliGRAM(s) Oral two times a day  mirtazapine 22.5 milliGRAM(s) Oral at bedtime  mupirocin 2% Nasal 1 Application(s) Both Nostrils every 12 hours  piperacillin/tazobactam IVPB.. 3.375 Gram(s) IV Intermittent every 8 hours  polyethylene glycol 3350 17 Gram(s) Oral daily PRN  senna 2 Tablet(s) Oral at bedtime  sertraline 25 milliGRAM(s) Oral daily  sodium chloride 0.65% Nasal 1 Spray(s) Both Nostrils two times a day PRN  spironolactone 25 milliGRAM(s) Oral two times a day  torsemide 20 milliGRAM(s) Oral daily  vancomycin  IVPB 750 milliGRAM(s) IV Intermittent every 24 hours      Review of Systems:  Review of systems otherwise negative except as previously noted.    Allergies: codeine (Other)    For details regarding the patient's past medical history, social history, family history, and other miscellaneous elements, please refer the initial infectious diseases consultation and/or the admitting history and physical examination for this admission.    Objective:  Vitals:   T(C): 36.8 (11-30-23 @ 05:13), Max: 36.8 (11-30-23 @ 05:13)  HR: 76 (11-30-23 @ 05:13) (68 - 76)  BP: 109/63 (11-30-23 @ 05:13) (105/50 - 109/63)  RR: 18 (11-30-23 @ 05:13) (18 - 18)  SpO2: 98% (11-30-23 @ 05:13) (98% - 98%)    Physical Examination:  General: no acute distress  HEENT: NC/AT, EOMI,   Cardio: S1, S2 heard, RRR, no murmurs  Resp: symmetric chest rise  Abd: soft, NT, ND  Neuro: RLE swelling redness, improving  Ext: no edema or cyanosis  Skin: warm, dry, no visible rash      Laboratory Studies:  CBC:                       9.8    8.00  )-----------( 177      ( 30 Nov 2023 07:56 )             32.2     CMP: 11-30    135  |  100  |  41<H>  ----------------------------<  118<H>  5.2   |  27  |  1.50<H>    Ca    8.8      30 Nov 2023 07:56    TPro  6.9  /  Alb  2.8<L>  /  TBili  0.5  /  DBili  x   /  AST  16  /  ALT  12  /  AlkPhos  46  11-30    LIVER FUNCTIONS - ( 30 Nov 2023 07:56 )  Alb: 2.8 g/dL / Pro: 6.9 g/dL / ALK PHOS: 46 U/L / ALT: 12 U/L / AST: 16 U/L / GGT: x           Urinalysis Basic - ( 30 Nov 2023 07:56 )    Color: x / Appearance: x / SG: x / pH: x  Gluc: 118 mg/dL / Ketone: x  / Bili: x / Urobili: x   Blood: x / Protein: x / Nitrite: x   Leuk Esterase: x / RBC: x / WBC x   Sq Epi: x / Non Sq Epi: x / Bacteria: x        Microbiology: reviewed    Culture - Blood (collected 11-27-23 @ 17:01)  Source: .Blood Blood-Peripheral  Preliminary Report (11-29-23 @ 22:02):    No growth at 48 Hours    Culture - Blood (collected 11-27-23 @ 17:01)  Source: .Blood Blood-Peripheral  Preliminary Report (11-29-23 @ 22:02):    No growth at 48 Hours    Culture - Urine (collected 11-27-23 @ 16:48)  Source: Clean Catch Clean Catch (Midstream)  Final Report (11-28-23 @ 16:12):    <10,000 CFU/mL Normal Urogenital Tika          Radiology: reviewed

## 2023-11-30 NOTE — PROGRESS NOTE ADULT - SUBJECTIVE AND OBJECTIVE BOX
Chief Complaint: Leg ulcer    Interval Events: No events overnight.    Review of Systems:  General: No fevers, chills, weight gain  Skin: No rashes, color changes  Cardiovascular: No chest pain, orthopnea  Respiratory: No shortness of breath, cough  Gastrointestinal: No nausea, abdominal pain  Genitourinary: No incontinence, pain with urination  Musculoskeletal: No pain, swelling, decreased range of motion  Neurological: No headache, weakness  Psychiatric: No depression, anxiety  Endocrine: No weight gain, increased thirst  All other systems are comprehensively negative.    Physical Exam:  Vital Signs Last 24 Hrs  T(C): 36.8 (30 Nov 2023 05:13), Max: 36.8 (30 Nov 2023 05:13)  T(F): 98.2 (30 Nov 2023 05:13), Max: 98.2 (30 Nov 2023 05:13)  HR: 76 (30 Nov 2023 05:13) (68 - 76)  BP: 109/63 (30 Nov 2023 05:13) (105/50 - 109/63)  BP(mean): --  RR: 18 (30 Nov 2023 05:13) (18 - 18)  SpO2: 98% (30 Nov 2023 05:13) (98% - 98%)  Parameters below as of 30 Nov 2023 05:13  Patient On (Oxygen Delivery Method): nasal cannula  O2 Flow (L/min): 3  General: NAD  HEENT: MMM  Neck: No JVD, no carotid bruit  Lungs: CTAB  CV: RRR, nl S1/S2, no M/R/G  Abdomen: S/NT/ND, +BS  Extremities: No LE edema, no cyanosis  Neuro: AAOx3, non-focal  Skin: No rash    Labs:             11-30    135  |  100  |  41<H>  ----------------------------<  118<H>  5.2   |  27  |  1.50<H>    Ca    8.8      30 Nov 2023 07:56    TPro  6.9  /  Alb  2.8<L>  /  TBili  0.5  /  DBili  x   /  AST  16  /  ALT  12  /  AlkPhos  46  11-30                        9.8    8.00  )-----------( 177      ( 30 Nov 2023 07:56 )             32.2       ECG/Telemetry: Atrial fibrillation

## 2023-12-01 LAB
ALBUMIN SERPL ELPH-MCNC: 2.8 G/DL — LOW (ref 3.3–5)
ALBUMIN SERPL ELPH-MCNC: 2.8 G/DL — LOW (ref 3.3–5)
ALP SERPL-CCNC: 47 U/L — SIGNIFICANT CHANGE UP (ref 30–120)
ALP SERPL-CCNC: 47 U/L — SIGNIFICANT CHANGE UP (ref 30–120)
ALT FLD-CCNC: 14 U/L — SIGNIFICANT CHANGE UP (ref 10–60)
ALT FLD-CCNC: 14 U/L — SIGNIFICANT CHANGE UP (ref 10–60)
ANION GAP SERPL CALC-SCNC: 8 MMOL/L — SIGNIFICANT CHANGE UP (ref 5–17)
ANION GAP SERPL CALC-SCNC: 8 MMOL/L — SIGNIFICANT CHANGE UP (ref 5–17)
AST SERPL-CCNC: 15 U/L — SIGNIFICANT CHANGE UP (ref 10–40)
AST SERPL-CCNC: 15 U/L — SIGNIFICANT CHANGE UP (ref 10–40)
BILIRUB SERPL-MCNC: 0.5 MG/DL — SIGNIFICANT CHANGE UP (ref 0.2–1.2)
BILIRUB SERPL-MCNC: 0.5 MG/DL — SIGNIFICANT CHANGE UP (ref 0.2–1.2)
BUN SERPL-MCNC: 35 MG/DL — HIGH (ref 7–23)
BUN SERPL-MCNC: 35 MG/DL — HIGH (ref 7–23)
CALCIUM SERPL-MCNC: 8.7 MG/DL — SIGNIFICANT CHANGE UP (ref 8.4–10.5)
CALCIUM SERPL-MCNC: 8.7 MG/DL — SIGNIFICANT CHANGE UP (ref 8.4–10.5)
CHLORIDE SERPL-SCNC: 101 MMOL/L — SIGNIFICANT CHANGE UP (ref 96–108)
CHLORIDE SERPL-SCNC: 101 MMOL/L — SIGNIFICANT CHANGE UP (ref 96–108)
CO2 SERPL-SCNC: 28 MMOL/L — SIGNIFICANT CHANGE UP (ref 22–31)
CO2 SERPL-SCNC: 28 MMOL/L — SIGNIFICANT CHANGE UP (ref 22–31)
CREAT SERPL-MCNC: 1.36 MG/DL — HIGH (ref 0.5–1.3)
CREAT SERPL-MCNC: 1.36 MG/DL — HIGH (ref 0.5–1.3)
EGFR: 37 ML/MIN/1.73M2 — LOW
EGFR: 37 ML/MIN/1.73M2 — LOW
GLUCOSE SERPL-MCNC: 126 MG/DL — HIGH (ref 70–99)
GLUCOSE SERPL-MCNC: 126 MG/DL — HIGH (ref 70–99)
HCT VFR BLD CALC: 33.7 % — LOW (ref 34.5–45)
HCT VFR BLD CALC: 33.7 % — LOW (ref 34.5–45)
HGB BLD-MCNC: 10 G/DL — LOW (ref 11.5–15.5)
HGB BLD-MCNC: 10 G/DL — LOW (ref 11.5–15.5)
MCHC RBC-ENTMCNC: 27.6 PG — SIGNIFICANT CHANGE UP (ref 27–34)
MCHC RBC-ENTMCNC: 27.6 PG — SIGNIFICANT CHANGE UP (ref 27–34)
MCHC RBC-ENTMCNC: 29.7 GM/DL — LOW (ref 32–36)
MCHC RBC-ENTMCNC: 29.7 GM/DL — LOW (ref 32–36)
MCV RBC AUTO: 93.1 FL — SIGNIFICANT CHANGE UP (ref 80–100)
MCV RBC AUTO: 93.1 FL — SIGNIFICANT CHANGE UP (ref 80–100)
NRBC # BLD: 0 /100 WBCS — SIGNIFICANT CHANGE UP (ref 0–0)
NRBC # BLD: 0 /100 WBCS — SIGNIFICANT CHANGE UP (ref 0–0)
PLATELET # BLD AUTO: 162 K/UL — SIGNIFICANT CHANGE UP (ref 150–400)
PLATELET # BLD AUTO: 162 K/UL — SIGNIFICANT CHANGE UP (ref 150–400)
POTASSIUM SERPL-MCNC: 4.9 MMOL/L — SIGNIFICANT CHANGE UP (ref 3.5–5.3)
POTASSIUM SERPL-MCNC: 4.9 MMOL/L — SIGNIFICANT CHANGE UP (ref 3.5–5.3)
POTASSIUM SERPL-SCNC: 4.9 MMOL/L — SIGNIFICANT CHANGE UP (ref 3.5–5.3)
POTASSIUM SERPL-SCNC: 4.9 MMOL/L — SIGNIFICANT CHANGE UP (ref 3.5–5.3)
PROT SERPL-MCNC: 6.9 G/DL — SIGNIFICANT CHANGE UP (ref 6–8.3)
PROT SERPL-MCNC: 6.9 G/DL — SIGNIFICANT CHANGE UP (ref 6–8.3)
RBC # BLD: 3.62 M/UL — LOW (ref 3.8–5.2)
RBC # BLD: 3.62 M/UL — LOW (ref 3.8–5.2)
RBC # FLD: 16.4 % — HIGH (ref 10.3–14.5)
RBC # FLD: 16.4 % — HIGH (ref 10.3–14.5)
SODIUM SERPL-SCNC: 137 MMOL/L — SIGNIFICANT CHANGE UP (ref 135–145)
SODIUM SERPL-SCNC: 137 MMOL/L — SIGNIFICANT CHANGE UP (ref 135–145)
WBC # BLD: 6.78 K/UL — SIGNIFICANT CHANGE UP (ref 3.8–10.5)
WBC # BLD: 6.78 K/UL — SIGNIFICANT CHANGE UP (ref 3.8–10.5)
WBC # FLD AUTO: 6.78 K/UL — SIGNIFICANT CHANGE UP (ref 3.8–10.5)
WBC # FLD AUTO: 6.78 K/UL — SIGNIFICANT CHANGE UP (ref 3.8–10.5)

## 2023-12-01 PROCEDURE — 99233 SBSQ HOSP IP/OBS HIGH 50: CPT

## 2023-12-01 RX ORDER — ALPRAZOLAM 0.25 MG
0.25 TABLET ORAL ONCE
Refills: 0 | Status: DISCONTINUED | OUTPATIENT
Start: 2023-12-01 | End: 2023-12-01

## 2023-12-01 RX ADMIN — Medication 25 MILLIGRAM(S): at 17:19

## 2023-12-01 RX ADMIN — PIPERACILLIN AND TAZOBACTAM 25 GRAM(S): 4; .5 INJECTION, POWDER, LYOPHILIZED, FOR SOLUTION INTRAVENOUS at 04:58

## 2023-12-01 RX ADMIN — Medication 1 SPRAY(S): at 20:15

## 2023-12-01 RX ADMIN — Medication 650 MILLIGRAM(S): at 09:44

## 2023-12-01 RX ADMIN — SERTRALINE 25 MILLIGRAM(S): 25 TABLET, FILM COATED ORAL at 12:17

## 2023-12-01 RX ADMIN — Medication 1 SPRAY(S): at 17:19

## 2023-12-01 RX ADMIN — Medication 3 MILLIGRAM(S): at 20:15

## 2023-12-01 RX ADMIN — HEPARIN SODIUM 5000 UNIT(S): 5000 INJECTION INTRAVENOUS; SUBCUTANEOUS at 14:44

## 2023-12-01 RX ADMIN — HEPARIN SODIUM 5000 UNIT(S): 5000 INJECTION INTRAVENOUS; SUBCUTANEOUS at 21:29

## 2023-12-01 RX ADMIN — Medication 250 MILLIGRAM(S): at 17:27

## 2023-12-01 RX ADMIN — Medication 25 MILLIGRAM(S): at 06:13

## 2023-12-01 RX ADMIN — Medication 650 MILLIGRAM(S): at 10:15

## 2023-12-01 RX ADMIN — Medication 1 SPRAY(S): at 06:14

## 2023-12-01 RX ADMIN — Medication 20 MILLIGRAM(S): at 06:14

## 2023-12-01 RX ADMIN — SPIRONOLACTONE 25 MILLIGRAM(S): 25 TABLET, FILM COATED ORAL at 06:13

## 2023-12-01 RX ADMIN — PIPERACILLIN AND TAZOBACTAM 25 GRAM(S): 4; .5 INJECTION, POWDER, LYOPHILIZED, FOR SOLUTION INTRAVENOUS at 21:29

## 2023-12-01 RX ADMIN — GABAPENTIN 100 MILLIGRAM(S): 400 CAPSULE ORAL at 21:28

## 2023-12-01 RX ADMIN — ATORVASTATIN CALCIUM 10 MILLIGRAM(S): 80 TABLET, FILM COATED ORAL at 21:28

## 2023-12-01 RX ADMIN — Medication 0.25 MILLIGRAM(S): at 21:28

## 2023-12-01 RX ADMIN — PIPERACILLIN AND TAZOBACTAM 25 GRAM(S): 4; .5 INJECTION, POWDER, LYOPHILIZED, FOR SOLUTION INTRAVENOUS at 12:21

## 2023-12-01 RX ADMIN — MUPIROCIN 1 APPLICATION(S): 20 OINTMENT TOPICAL at 06:15

## 2023-12-01 RX ADMIN — MUPIROCIN 1 APPLICATION(S): 20 OINTMENT TOPICAL at 17:20

## 2023-12-01 RX ADMIN — HEPARIN SODIUM 5000 UNIT(S): 5000 INJECTION INTRAVENOUS; SUBCUTANEOUS at 06:14

## 2023-12-01 RX ADMIN — Medication 325 MILLIGRAM(S): at 12:18

## 2023-12-01 RX ADMIN — SPIRONOLACTONE 25 MILLIGRAM(S): 25 TABLET, FILM COATED ORAL at 17:19

## 2023-12-01 NOTE — CASE MANAGEMENT PROGRESS NOTE - NSCMPROGRESSNOTE_GEN_ALL_CORE
STEFANY faxed everything to Jaleesa at the [facility] phone number [phone] and Fax Number [phone].  Pending the Infectious Disease Note regarding MRSA  protocol for the [facility]  in order for patient to return back to [facility] tomorrow.  Best number to reach daughter is  [phone], and family member Sp will be transporting patient back to the [facility] in [place]  tomorrow Saturday Sp number [phone]. Will continue to follow patient.

## 2023-12-01 NOTE — CASE MANAGEMENT PROGRESS NOTE - NSCMPROGRESSNOTE_GEN_ALL_CORE
Update Communication Note: STEFANY  asked Jaleesa [phone] from the [facility] if willing to accept patient back , she stated  she has to show the   the I&D note to makesure it  satisfied the [facility] Policy on MRSA . Jaleesa stated she will call me back. Will continue to follow patient.

## 2023-12-01 NOTE — PROGRESS NOTE ADULT - SUBJECTIVE AND OBJECTIVE BOX
[facility], Division of Infectious Diseases  VANE Shine Y. Patel, S. Shah, G. [facility]  [phone]    Name: ARNULFO ALEX  Age: 89y  Gender: Female  MRN: [id number]    Interval History:  Patient seen and examined at bedside this morning  No acute overnight events. Afebrile  No complaints  Feeling much better  Notes reviewed    Antibiotics:  piperacillin/tazobactam IVPB.. 3.375 Gram(s) IV Intermittent every 8 hours  vancomycin  IVPB 750 milliGRAM(s) IV Intermittent every 24 hours      Medications:  acetaminophen     Tablet .. 650 milliGRAM(s) Oral every 6 hours PRN  albuterol    0.083%. 2.5 milliGRAM(s) Nebulizer once PRN  atorvastatin 10 milliGRAM(s) Oral at bedtime  cyclobenzaprine 5 milliGRAM(s) Oral three times a day PRN  ferrous    sulfate 325 milliGRAM(s) Oral daily  fluticasone propionate 50 MICROgram(s)/spray Nasal Spray 1 Spray(s) Both Nostrils two times a day  gabapentin 100 milliGRAM(s) Oral at bedtime  heparin   Injectable 5000 Unit(s) SubCutaneous every 8 hours  ketorolac   Injectable 15 milliGRAM(s) IV Push every 6 hours PRN  melatonin 3 milliGRAM(s) Oral at bedtime PRN  metoprolol tartrate 25 milliGRAM(s) Oral two times a day  mirtazapine 22.5 milliGRAM(s) Oral at bedtime  mupirocin 2% Nasal 1 Application(s) Both Nostrils every 12 hours  piperacillin/tazobactam IVPB.. 3.375 Gram(s) IV Intermittent every 8 hours  polyethylene glycol 3350 17 Gram(s) Oral daily PRN  senna 2 Tablet(s) Oral at bedtime  sertraline 25 milliGRAM(s) Oral daily  sodium chloride 0.65% Nasal 1 Spray(s) Both Nostrils two times a day PRN  spironolactone 25 milliGRAM(s) Oral two times a day  torsemide 20 milliGRAM(s) Oral daily  vancomycin  IVPB 750 milliGRAM(s) IV Intermittent every 24 hours      Review of Systems:  Review of systems otherwise negative except as previously noted.    Allergies: codeine (Other)    For details regarding the patient's past medical history, social history, family history, and other miscellaneous elements, please refer the initial infectious diseases consultation and/or the admitting history and physical examination for this admission.    Objective:  Vitals:   T(C): 36.9 (12-01-23 @ 05:24), Max: 36.9 (12-01-23 @ 05:24)  HR: 99 (12-01-23 @ 05:24) (92 - 105)  BP: 135/74 (12-01-23 @ 05:24) (101/58 - 135/74)  RR: 18 (12-01-23 @ 05:24) (16 - 18)  SpO2: 98% (12-01-23 @ 05:24) (97% - 98%)    Physical Examination:  General: no acute distress  HEENT: NC/AT, EOMI,  Cardio: S1, S2 heard, RRR, no murmurs  Resp: decreased breath sounds  Abd: soft, NT, ND,  Ext: no edema or cyanosis  Skin: warm, dry, no visible rash      Laboratory Studies:  CBC:                       10.0   6.78  )-----------( 162      ( 01 Dec 2023 07:49 )             33.7     CMP: 12-01    137  |  101  |  35<H>  ----------------------------<  126<H>  4.9   |  28  |  1.36<H>    Ca    8.7      01 Dec 2023 07:49    TPro  6.9  /  Alb  2.8<L>  /  TBili  0.5  /  DBili  x   /  AST  15  /  ALT  14  /  AlkPhos  47  12-01    LIVER FUNCTIONS - ( 01 Dec 2023 07:49 )  Alb: 2.8 g/dL / Pro: 6.9 g/dL / ALK PHOS: 47 U/L / ALT: 14 U/L / AST: 15 U/L / GGT: x           Urinalysis Basic - ( 01 Dec 2023 07:49 )    Color: x / Appearance: x / SG: x / pH: x  Gluc: 126 mg/dL / Ketone: x  / Bili: x / Urobili: x   Blood: x / Protein: x / Nitrite: x   Leuk Esterase: x / RBC: x / WBC x   Sq Epi: x / Non Sq Epi: x / Bacteria: x        Microbiology: reviewed    Culture - Blood (collected 11-27-23 @ 17:01)  Source: .Blood Blood-Peripheral  Preliminary Report (11-30-23 @ 22:01):    No growth at 72 Hours    Culture - Blood (collected 11-27-23 @ 17:01)  Source: .Blood Blood-Peripheral  Preliminary Report (11-30-23 @ 22:01):    No growth at 72 Hours    Culture - Urine (collected 11-27-23 @ 16:48)  Source: Clean Catch Clean Catch (Midstream)  Final Report (11-28-23 @ 16:12):    <10,000 CFU/mL Normal Urogenital Tika          Radiology: reviewed         [facility], Division of Infectious Diseases  VANE Shine Y. Patel, S. Shah, G. [facility]  [phone]    Name: ARNULFO ALEX  Age: 89y  Gender: Female  MRN: [id number]    Interval History:  Patient seen and examined at bedside this morning  No acute overnight events. Afebrile  No complaints  Feeling much better  Notes reviewed    Antibiotics:  piperacillin/tazobactam IVPB.. 3.375 Gram(s) IV Intermittent every 8 hours  vancomycin  IVPB 750 milliGRAM(s) IV Intermittent every 24 hours      Medications:  acetaminophen     Tablet .. 650 milliGRAM(s) Oral every 6 hours PRN  albuterol    0.083%. 2.5 milliGRAM(s) Nebulizer once PRN  atorvastatin 10 milliGRAM(s) Oral at bedtime  cyclobenzaprine 5 milliGRAM(s) Oral three times a day PRN  ferrous    sulfate 325 milliGRAM(s) Oral daily  fluticasone propionate 50 MICROgram(s)/spray Nasal Spray 1 Spray(s) Both Nostrils two times a day  gabapentin 100 milliGRAM(s) Oral at bedtime  heparin   Injectable 5000 Unit(s) SubCutaneous every 8 hours  ketorolac   Injectable 15 milliGRAM(s) IV Push every 6 hours PRN  melatonin 3 milliGRAM(s) Oral at bedtime PRN  metoprolol tartrate 25 milliGRAM(s) Oral two times a day  mirtazapine 22.5 milliGRAM(s) Oral at bedtime  mupirocin 2% Nasal 1 Application(s) Both Nostrils every 12 hours  piperacillin/tazobactam IVPB.. 3.375 Gram(s) IV Intermittent every 8 hours  polyethylene glycol 3350 17 Gram(s) Oral daily PRN  senna 2 Tablet(s) Oral at bedtime  sertraline 25 milliGRAM(s) Oral daily  sodium chloride 0.65% Nasal 1 Spray(s) Both Nostrils two times a day PRN  spironolactone 25 milliGRAM(s) Oral two times a day  torsemide 20 milliGRAM(s) Oral daily  vancomycin  IVPB 750 milliGRAM(s) IV Intermittent every 24 hours      Review of Systems:  Review of systems otherwise negative except as previously noted.    Allergies: codeine (Other)    For details regarding the patient's past medical history, social history, family history, and other miscellaneous elements, please refer the initial infectious diseases consultation and/or the admitting history and physical examination for this admission.    Objective:  Vitals:   T(C): 36.9 (12-01-23 @ 05:24), Max: 36.9 (12-01-23 @ 05:24)  HR: 99 (12-01-23 @ 05:24) (92 - 105)  BP: 135/74 (12-01-23 @ 05:24) (101/58 - 135/74)  RR: 18 (12-01-23 @ 05:24) (16 - 18)  SpO2: 98% (12-01-23 @ 05:24) (97% - 98%)    Physical Examination:  General: no acute distress  HEENT: NC/AT, EOMI,  Cardio: S1, S2 heard, RRR, no murmurs  Resp: decreased breath sounds  Abd: soft, NT, ND,  Ext: RLE cellulitis, improved  Skin: warm, dry, no visible rash      Laboratory Studies:  CBC:                       10.0   6.78  )-----------( 162      ( 01 Dec 2023 07:49 )             33.7     CMP: 12-01    137  |  101  |  35<H>  ----------------------------<  126<H>  4.9   |  28  |  1.36<H>    Ca    8.7      01 Dec 2023 07:49    TPro  6.9  /  Alb  2.8<L>  /  TBili  0.5  /  DBili  x   /  AST  15  /  ALT  14  /  AlkPhos  47  12-01    LIVER FUNCTIONS - ( 01 Dec 2023 07:49 )  Alb: 2.8 g/dL / Pro: 6.9 g/dL / ALK PHOS: 47 U/L / ALT: 14 U/L / AST: 15 U/L / GGT: x           Urinalysis Basic - ( 01 Dec 2023 07:49 )    Color: x / Appearance: x / SG: x / pH: x  Gluc: 126 mg/dL / Ketone: x  / Bili: x / Urobili: x   Blood: x / Protein: x / Nitrite: x   Leuk Esterase: x / RBC: x / WBC x   Sq Epi: x / Non Sq Epi: x / Bacteria: x        Microbiology: reviewed    Culture - Blood (collected 11-27-23 @ 17:01)  Source: .Blood Blood-Peripheral  Preliminary Report (11-30-23 @ 22:01):    No growth at 72 Hours    Culture - Blood (collected 11-27-23 @ 17:01)  Source: .Blood Blood-Peripheral  Preliminary Report (11-30-23 @ 22:01):    No growth at 72 Hours    Culture - Urine (collected 11-27-23 @ 16:48)  Source: Clean Catch Clean Catch (Midstream)  Final Report (11-28-23 @ 16:12):    <10,000 CFU/mL Normal Urogenital Tika          Radiology: reviewed

## 2023-12-01 NOTE — CASE MANAGEMENT PROGRESS NOTE - NSCMPROGRESSNOTE_GEN_ALL_CORE
Update Communication Note: As per morning rounds on [address], Plan for Discharge back to [facility] in [place] tomorrow Saturday CM put attestation form in the chart and made DR. Archuleta aware needs to be filled out in order for patient to return.

## 2023-12-01 NOTE — CASE MANAGEMENT PROGRESS NOTE - NSCMPROGRESSNOTE_GEN_ALL_CORE
If [facility] or [facility] [phone] homecare services can not provide a nurse to go and do dressing changes every other day then the patient can not be discharge. The patient can not do the dressing changes. Will continue to follow.

## 2023-12-01 NOTE — PROGRESS NOTE ADULT - SUBJECTIVE AND OBJECTIVE BOX
Chief Complaint: Leg ulcer    Interval Events: No events overnight.    Review of Systems:  General: No fevers, chills, weight gain  Skin: No rashes, color changes  Cardiovascular: No chest pain, orthopnea  Respiratory: No shortness of breath, cough  Gastrointestinal: No nausea, abdominal pain  Genitourinary: No incontinence, pain with urination  Musculoskeletal: No pain, swelling, decreased range of motion  Neurological: No headache, weakness  Psychiatric: No depression, anxiety  Endocrine: No weight gain, increased thirst  All other systems are comprehensively negative.    Physical Exam:  Vital Signs Last 24 Hrs  T(C): 36.9 (01 Dec 2023 05:24), Max: 36.9 (01 Dec 2023 05:24)  T(F): 98.4 (01 Dec 2023 05:24), Max: 98.4 (01 Dec 2023 05:24)  HR: 99 (01 Dec 2023 05:24) (92 - 105)  BP: 135/74 (01 Dec 2023 05:24) (101/58 - 135/74)  BP(mean): --  RR: 18 (01 Dec 2023 05:24) (16 - 18)  SpO2: 98% (01 Dec 2023 05:24) (97% - 98%)  Parameters below as of 01 Dec 2023 05:24  Patient On (Oxygen Delivery Method): nasal cannula  O2 Flow (L/min): 3  General: NAD  HEENT: MMM  Neck: No JVD, no carotid bruit  Lungs: CTAB  CV: RRR, nl S1/S2, no M/R/G  Abdomen: S/NT/ND, +BS  Extremities: No LE edema, no cyanosis  Neuro: AAOx3, non-focal  Skin: No rash    Labs:             12-01    137  |  101  |  35<H>  ----------------------------<  126<H>  4.9   |  28  |  1.36<H>    Ca    8.7      01 Dec 2023 07:49    TPro  6.9  /  Alb  2.8<L>  /  TBili  0.5  /  DBili  x   /  AST  15  /  ALT  14  /  AlkPhos  47  12-01                        10.0   6.78  )-----------( 162      ( 01 Dec 2023 07:49 )             33.7     ECG/Telemetry: Atrial fibrillation

## 2023-12-01 NOTE — CASE MANAGEMENT PROGRESS NOTE - NSCMPROGRESSNOTE_GEN_ALL_CORE
CM called the daughter  Mar to make her  aware [phone] if [facility] / or [facility] [phone] can not provide a nurse to do the dressings would she be able to go and do them for her mother. Daughter stated her and her brother can be a back up but can not commit to doing them 3x times a week.  Will continue to follow patient.

## 2023-12-01 NOTE — PROGRESS NOTE ADULT - ASSESSMENT
89F with afib (was on eliquis but no longer), hx of CHF on home O2 (3L), depression, HTN/HLD, here for LLE wound.    LLE Cellulitis w/ wound  +Multiple ulcerations  - c/w vancomycin, zosyn through today  - can switch to PO doxycycline 100mg BID and augmentin 875/125mg BID to complete x10 day course until 12/7/23  - appreciate wound care recs  - appreciate vascular recs  - pain control and supportive care per primary team    Stable from ID standpoint  D/c planning per primary     D/w daughter and son  D/w Dr. Archuleta    Infectious Diseases will continue to follow. Please call with any questions.   Suzy Real M.D.  [facility] Division of Infectious Diseases [phone]  For after 5 P.M. and weekends, please call [phone]   89F with afib (was on eliquis but no longer), hx of CHF on home O2 (3L), depression, HTN/HLD, here for LLE wound.    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 Archuleta    Infectious Diseases will continue to follow. Please call with any questions.   Suzy Real M.D.  [facility] Division of Infectious Diseases [phone]  For after 5 P.M. and weekends, please call [phone]   89F with afib (was on eliquis but no longer), hx of CHF on home O2 (3L), depression, HTN/HLD, here for LLE wound.    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 Archuleta    Infectious Diseases will continue to follow. Please call with any questions.   Suzy Real M.D.  [facility] Division of Infectious Diseases [phone]  For after 5 P.M. and weekends, please call [phone]   89F with afib (was on eliquis but no longer), hx of CHF on home O2 (3L), depression, HTN/HLD, here for LLE wound.    LLE Cellulitis w/ wound  +Multiple ulcerations  - c/w vancomycin, zosyn through today  - can switch to PO doxycycline 100mg BID and augmentin 875/125mg BID to complete x10 day course until 12/7/23  - appreciate wound care recs  - appreciate vascular recs  - pain control and supportive care per primary team    Stable from ID standpoint  D/c planning per primary     D/w daughter and son  D/w Dr. Archuleta    Infectious Diseases will continue to follow. Please call with any questions.   Suzy Real M.D.  [facility] Division of Infectious Diseases [phone]  For after 5 P.M. and weekends, please call [phone]   89F with afib (was on eliquis but no longer), hx of CHF on home O2 (3L), depression, HTN/HLD, here for LLE wound.    LLE Cellulitis w/ wound  +Multiple ulcerations  - c/w vancomycin, zosyn through today  - can switch to PO doxycycline 100mg BID and augmentin 875/125mg BID to complete x10 day course until 12/7/23  - appreciate wound care recs  - appreciate vascular recs  - pain control and supportive care per primary team    MRSA nares positive, has also been positive in the past  S/p IV Abx since 11/27, clinically improved and asymptomatic; completed treatment for colonization  Stable from ID standpoint  D/c planning per primary     D/w daughter and son  D/w Dr. Archuleta    Infectious Diseases will continue to follow. Please call with any questions.   Suzy Real M.D.  [facility] Division of Infectious Diseases [phone]  For after 5 P.M. and weekends, please call [phone]

## 2023-12-01 NOTE — PROGRESS NOTE ADULT - SUBJECTIVE AND OBJECTIVE BOX
Patient is a 89y old  Female who presents with a chief complaint of LLE non-healing wound, possibly cellulitis (01 Dec 2023 11:45)      INTERVAL HPI/OVERNIGHT EVENTS:    no overnight events, feeling well  cellulitis looking better    Spoke with ID, will switch to PO antibiotics tommorrow  spoke with HCP son and daughter at bedside.     MEDICATIONS  (STANDING):  atorvastatin 10 milliGRAM(s) Oral at bedtime  ferrous    sulfate 325 milliGRAM(s) Oral daily  fluticasone propionate 50 MICROgram(s)/spray Nasal Spray 1 Spray(s) Both Nostrils two times a day  gabapentin 100 milliGRAM(s) Oral at bedtime  heparin   Injectable 5000 Unit(s) SubCutaneous every 8 hours  metoprolol tartrate 25 milliGRAM(s) Oral two times a day  mirtazapine 22.5 milliGRAM(s) Oral at bedtime  mupirocin 2% Nasal 1 Application(s) Both Nostrils every 12 hours  piperacillin/tazobactam IVPB.. 3.375 Gram(s) IV Intermittent every 8 hours  senna 2 Tablet(s) Oral at bedtime  sertraline 25 milliGRAM(s) Oral daily  spironolactone 25 milliGRAM(s) Oral two times a day  torsemide 20 milliGRAM(s) Oral daily  vancomycin  IVPB 750 milliGRAM(s) IV Intermittent every 24 hours    MEDICATIONS  (PRN):  acetaminophen     Tablet .. 650 milliGRAM(s) Oral every 6 hours PRN Temp greater or equal to 38C (100.4F), Mild Pain (1 - 3)  albuterol    0.083%. 2.5 milliGRAM(s) Nebulizer once PRN Shortness of Breath and/or Wheezing  cyclobenzaprine 5 milliGRAM(s) Oral three times a day PRN Muscle Spasm  ketorolac   Injectable 15 milliGRAM(s) IV Push every 6 hours PRN Moderate Pain (4 - 6)  melatonin 3 milliGRAM(s) Oral at bedtime PRN Insomnia  polyethylene glycol 3350 17 Gram(s) Oral daily PRN Constipation  sodium chloride 0.65% Nasal 1 Spray(s) Both Nostrils two times a day PRN Nasal Congestion      Allergies    codeine (Other)    Intolerances        REVIEW OF SYSTEMS:  as above    Vital Signs Last 24 Hrs  T(C): 36.9 (01 Dec 2023 05:24), Max: 36.9 (01 Dec 2023 05:24)  T(F): 98.4 (01 Dec 2023 05:24), Max: 98.4 (01 Dec 2023 05:24)  HR: 99 (01 Dec 2023 05:24) (92 - 105)  BP: 135/74 (01 Dec 2023 05:24) (101/58 - 135/74)  BP(mean): --  RR: 18 (01 Dec 2023 05:24) (16 - 18)  SpO2: 98% (01 Dec 2023 05:24) (97% - 98%)    Parameters below as of 01 Dec 2023 05:24  Patient On (Oxygen Delivery Method): nasal cannula  O2 Flow (L/min): 3      PHYSICAL EXAM:  GENERAL:     elderly female in NAD  HEAD:     atraumatic, normocephalic  EYES:     EOMI, conjunctiva and sclera clear  RESPIRATORY:     diminished on right but no gross crackles, has difficulty with full complete sentences but patient reports she is at baseline  CARDIOVASCULAR:     irregular irregular with harsh systolic murmur  GASTROINTESTINAL:     soft, nontender, nondistended, bowel sounds present  EXTREMITIES:     LLE with slight edema  MUSCULOSKELETAL:     no joint pain or swelling or deformities  NERVOUS SYSTEM:     moves all 4s  SKIN:     large superficial wound of medial side of LLE with surrounding erythema, tender to palpation  PSYCH:     appropriate, alert and orientated x3, good concentration    LABS:                        10.0   6.78  )-----------( 162      ( 01 Dec 2023 07:49 )             33.7     01 Dec 2023 07:49    137    |  101    |  35     ----------------------------<  126    4.9     |  28     |  1.36     Ca    8.7        01 Dec 2023 07:49    TPro  6.9    /  Alb  2.8    /  TBili  0.5    /  DBili  x      /  AST  15     /  ALT  14     /  AlkPhos  47     01 Dec 2023 07:49      Urinalysis Basic - ( 01 Dec 2023 07:49 )    Color: x / Appearance: x / SG: x / pH: x  Gluc: 126 mg/dL / Ketone: x  / Bili: x / Urobili: x   Blood: x / Protein: x / Nitrite: x   Leuk Esterase: x / RBC: x / WBC x   Sq Epi: x / Non Sq Epi: x / Bacteria: x      CAPILLARY BLOOD GLUCOSE          RADIOLOGY & ADDITIONAL TESTS:

## 2023-12-02 ENCOUNTER — TRANSCRIPTION ENCOUNTER (OUTPATIENT)
Age: 88
End: 2023-12-02

## 2023-12-02 VITALS
DIASTOLIC BLOOD PRESSURE: 65 MMHG | HEART RATE: 92 BPM | TEMPERATURE: 98 F | SYSTOLIC BLOOD PRESSURE: 116 MMHG | RESPIRATION RATE: 20 BRPM | OXYGEN SATURATION: 94 %

## 2023-12-02 LAB
CULTURE RESULTS: SIGNIFICANT CHANGE UP
SPECIMEN SOURCE: SIGNIFICANT CHANGE UP

## 2023-12-02 PROCEDURE — 99239 HOSP IP/OBS DSCHRG MGMT >30: CPT

## 2023-12-02 RX ADMIN — HEPARIN SODIUM 5000 UNIT(S): 5000 INJECTION INTRAVENOUS; SUBCUTANEOUS at 06:14

## 2023-12-02 RX ADMIN — MIRTAZAPINE 22.5 MILLIGRAM(S): 45 TABLET, ORALLY DISINTEGRATING ORAL at 03:03

## 2023-12-02 RX ADMIN — Medication 25 MILLIGRAM(S): at 06:13

## 2023-12-02 RX ADMIN — MUPIROCIN 1 APPLICATION(S): 20 OINTMENT TOPICAL at 06:56

## 2023-12-02 RX ADMIN — SPIRONOLACTONE 25 MILLIGRAM(S): 25 TABLET, FILM COATED ORAL at 06:14

## 2023-12-02 RX ADMIN — SENNA PLUS 2 TABLET(S): 8.6 TABLET ORAL at 03:05

## 2023-12-02 RX ADMIN — PIPERACILLIN AND TAZOBACTAM 25 GRAM(S): 4; .5 INJECTION, POWDER, LYOPHILIZED, FOR SOLUTION INTRAVENOUS at 11:40

## 2023-12-02 RX ADMIN — Medication 1 TABLET(S): at 06:14

## 2023-12-02 RX ADMIN — Medication 325 MILLIGRAM(S): at 11:40

## 2023-12-02 RX ADMIN — Medication 20 MILLIGRAM(S): at 06:57

## 2023-12-02 RX ADMIN — Medication 100 MILLIGRAM(S): at 06:56

## 2023-12-02 RX ADMIN — PIPERACILLIN AND TAZOBACTAM 25 GRAM(S): 4; .5 INJECTION, POWDER, LYOPHILIZED, FOR SOLUTION INTRAVENOUS at 03:23

## 2023-12-02 RX ADMIN — Medication 1 SPRAY(S): at 06:56

## 2023-12-02 RX ADMIN — SERTRALINE 25 MILLIGRAM(S): 25 TABLET, FILM COATED ORAL at 11:40

## 2023-12-02 NOTE — DISCHARGE NOTE NURSING/CASE MANAGEMENT/SOCIAL WORK - PATIENT PORTAL LINK FT
You can access the FollowMyHealth Patient Portal offered by [facility] by registering at the following website: [email or web address]. By joining "[employer]’s FollowMyHealth portal, you will also be able to view your health information using other applications (apps) compatible with our system.

## 2023-12-02 NOTE — PROGRESS NOTE ADULT - SUBJECTIVE AND OBJECTIVE BOX
Patient is a 89y Female with a known history of :    HPI:  89F with afib (was on eliquis but no longer), hx of CHF on home O2 (3L), depression, HTN/HLD, here for LLE wound.  About 3-4 months ago, patient was trying to change her stockings when she accidentally scraped her leg.  Since then, her wound healed after 2 weeks but then her leg developed a non-healing superficial ulcer on the medial side of her lower left leg.  Has been seeing Ange Almeida and a VNS that has been changing it 3x/week (but reports that sometimes it would not get changed over a weekend and by Monday the dressings would be soaked).  No reports of any fevers.  Today the visiting nurse came and checked the wound and became concerned and told the patient to come here for further evaluation.  In the ED, patient was afebrile and had no leukocytosis.  However, since there was still concern for cellulitis, patient was started on vancomycin and zosyn.  Patient is being admitted for possible cellulitis.      (27 Nov 2023 21:26)      REVIEW OF SYSTEMS:    CONSTITUTIONAL: No fever, weight loss, or fatigue  EYES: No eye pain, visual disturbances, or discharge  ENMT:  No difficulty hearing, tinnitus, vertigo; No sinus or throat pain  NECK: No pain or stiffness  BREASTS: No pain, masses, or nipple discharge  RESPIRATORY: No cough, wheezing, chills or hemoptysis; No shortness of breath  CARDIOVASCULAR: No chest pain, palpitations, dizziness, or leg swelling  GASTROINTESTINAL: No abdominal or epigastric pain. No nausea, vomiting, or hematemesis; No diarrhea or constipation. No melena or hematochezia.  GENITOURINARY: No dysuria, frequency, hematuria, or incontinence  NEUROLOGICAL: No headaches, memory loss, loss of strength, numbness, or tremors  SKIN: No itching, burning, rashes, or lesions   LYMPH NODES: No enlarged glands  ENDOCRINE: No heat or cold intolerance; No hair loss  MUSCULOSKELETAL: No joint pain or swelling; No muscle, back, or extremity pain  PSYCHIATRIC: No depression, anxiety, mood swings, or difficulty sleeping  HEME/LYMPH: No easy bruising, or bleeding gums  ALLERGY AND IMMUNOLOGIC: No hives or eczema    MEDICATIONS  (STANDING):  amoxicillin  875 milliGRAM(s)/clavulanate 1 Tablet(s) Oral two times a day  atorvastatin 10 milliGRAM(s) Oral at bedtime  doxycycline monohydrate Capsule 100 milliGRAM(s) Oral every 12 hours  ferrous    sulfate 325 milliGRAM(s) Oral daily  fluticasone propionate 50 MICROgram(s)/spray Nasal Spray 1 Spray(s) Both Nostrils two times a day  gabapentin 100 milliGRAM(s) Oral at bedtime  heparin   Injectable 5000 Unit(s) SubCutaneous every 8 hours  metoprolol tartrate 25 milliGRAM(s) Oral two times a day  mirtazapine 22.5 milliGRAM(s) Oral at bedtime  mupirocin 2% Nasal 1 Application(s) Both Nostrils every 12 hours  piperacillin/tazobactam IVPB.. 3.375 Gram(s) IV Intermittent every 8 hours  senna 2 Tablet(s) Oral at bedtime  sertraline 25 milliGRAM(s) Oral daily  spironolactone 25 milliGRAM(s) Oral two times a day  torsemide 20 milliGRAM(s) Oral daily  vancomycin  IVPB 750 milliGRAM(s) IV Intermittent every 24 hours    MEDICATIONS  (PRN):  acetaminophen     Tablet .. 650 milliGRAM(s) Oral every 6 hours PRN Temp greater or equal to 38C (100.4F), Mild Pain (1 - 3)  albuterol    0.083%. 2.5 milliGRAM(s) Nebulizer once PRN Shortness of Breath and/or Wheezing  cyclobenzaprine 5 milliGRAM(s) Oral three times a day PRN Muscle Spasm  ketorolac   Injectable 15 milliGRAM(s) IV Push every 6 hours PRN Moderate Pain (4 - 6)  melatonin 3 milliGRAM(s) Oral at bedtime PRN Insomnia  polyethylene glycol 3350 17 Gram(s) Oral daily PRN Constipation  sodium chloride 0.65% Nasal 1 Spray(s) Both Nostrils two times a day PRN Nasal Congestion      ALLERGIES: codeine (Other)      FAMILY HISTORY:  Family history of blood disorder (Mother)        Social history:  Alochol:   Smoking:   Drug Use:   Marital Status:     PHYSICAL EXAMINATION:  -----------------------------  T(C): 37.4 (12-02-23 @ 05:09), Max: 37.4 (12-02-23 @ 05:09)  HR: 86 (12-02-23 @ 09:09) (80 - 95)  BP: 97/64 (12-02-23 @ 09:09) (97/64 - 150/83)  RR: 20 (12-02-23 @ 09:09) (17 - 26)  SpO2: 97% (12-02-23 @ 09:09) (80% - 97%)  Wt(kg): --    12-01 @ 07:01  -  12-02 @ 07:00  --------------------------------------------------------  IN:  Total IN: 0 mL    OUT:    Voided (mL): 400 mL  Total OUT: 400 mL    Total NET: -400 mL            Constitutional: well developed, normal appearance, well groomed, well nourished, no deformities and no acute distress.   Eyes: the conjunctiva exhibited no abnormalities and the eyelids demonstrated no xanthelasmas.   HEENT: normal oral mucosa, no oral pallor and no oral cyanosis.   Neck: normal jugular venous A waves present, normal jugular venous V waves present and no jugular venous camargo A waves.   Pulmonary: no respiratory distress, normal respiratory rhythm and effort, no accessory muscle use and lungs were clear to auscultation bilaterally. Anteriorly  Cardiovascular: heart rate and rhythm were irreg/irreg, decreased S1 and S2 and no rub, heave or thrill are present.  with III/VI systolic murmur loudest at mitral area  Musculoskeletal: the gait could not be assessed.   Extremities: no clubbing of the fingernails, no localized cyanosis, no petechial hemorrhages and no ischemic changes. Left with bandage  Skin: normal skin color and pigmentation, no rash, no venous stasis, no skin lesions, no skin ulcer and no xanthoma was observed.       LABS:   --------  12-01    137  |  101  |  35<H>  ----------------------------<  126<H>  4.9   |  28  |  1.36<H>    Ca    8.7      01 Dec 2023 07:49    TPro  6.9  /  Alb  2.8<L>  /  TBili  0.5  /  DBili  x   /  AST  15  /  ALT  14  /  AlkPhos  47  12-01                         10.0   6.78  )-----------( 162      ( 01 Dec 2023 07:49 )             33.7                   Radiology:    < from: US Transthoracic Echocardiogram w/Doppler Complete (11.05.22 @ 12:27) >    ACC: [id number] EXAM:  US TTE W DOPPLER COMPLETE                          PROCEDURE DATE:  11/05/2022          INTERPRETATION:  Ordering Physician: BIJAN YIP [id number]    Indication: Congestive heart failure    Technician: GHISLAINE Ernst    Study Quality: Suboptimal. The patient was described as uncooperative   during imaging.    Height: 5 feet 3 inches  Weight: 160 pounds  Blood Pressure: 111/92    MEASUREMENTS  IVS: 1.3 cm  PWT: 1.4 cm  LA: 4.9 cm  Aortic Root: 2.3 cm  LVIDd: 4.1 cm  LVIDs: 1.6 cm    LVEF: Approximately 60-65%    FINDINGS  Left Ventricle: Normal left ventricular size and systolic function with   estimated ejection fraction 60-65%. Moderate left ventricular hypertrophy.  Right Ventricle: Right ventricular enlargement with decreased function.  Left Atrium: Left atrial enlargement.  Right Atrium: Right atrial enlargement. The IVC is dilated (2.7 cm) (,   suggesting elevated right atrial pressure.  Mitral Valve: Mitral annular calcification. Bushra-clip present. Mild to   moderate mitral regurgitation.  Aortic Valve: Normal aortic valve.  Tricuspid Valve: Normal tricuspid valve. Mild to moderate tricuspid   regurgitation. Pulmonary artery systolic pressure estimated at 47 mmHg,   assuming a right atrial pressure of 8 mmHg, consistent with mild   pulmonary hypertension (note: Suboptimal Doppler; reported measurement   may underestimate severity of pulmonary hypertension).  Pulmonic Valve: Grossly normal pulmonic valve. Minimal pulmonic   insufficiency.  Pericardium/Pleura: Minimal pericardial effusion.    IMPRESSION:  1. Suboptimal technical quality due to reported patient uncooperativeness   during scanning.  2. Normal left ventricular size and systolic function with estimated   ejection fraction 60-65%. Moderate left ventricular hypertrophy.  3. Right ventricular enlargement with decreased function.  4. Biatrial enlargement.  5. Bushra-clip present. Mild to moderate mitral regurgitation.  6. Mild to moderate tricuspid regurgitation.  7. Pulmonary artery systolic pressure estimated at 47 mmHg, assuming a   right atrial pressure of 8 mmHg, consistent with mild pulmonary   hypertension (note: suboptimal Doppler; reported measurement may   underestimate severity of pulmonary hypertension).    --- End of Report ---            YARELY COLUNGA MD; Attending Cardiologist  This document has been electronically signed. Nov 5 2022  2:41PM    < end of copied text >

## 2023-12-02 NOTE — DISCHARGE NOTE PROVIDER - NSDCCPCAREPLAN_GEN_ALL_CORE_FT
PRINCIPAL DISCHARGE DIAGNOSIS  Diagnosis: Left leg cellulitis  Assessment and Plan of Treatment: seen by wound care and ID specialist.  Placed on IV antibiotics, discharged on oral antibiotics till 12/7/23. Xeroform with ABD or gauze, Theo, and ace wraps. Ace wraps can be removed at bedtime.  Dressing changes 3 times a week.

## 2023-12-02 NOTE — PROGRESS NOTE ADULT - ASSESSMENT
The patient is a 89 year old female with a history of HTN, HL, atrial fibrillation, MitraClip, chronic diastolic heart failure, GI bleed who presents with non-healing left leg ulcer/cellulitis.    12/2/23  Seen at [facility]  Son at bedside  Lying flat, sleeping comfortably    Plan:  - ECG with known AF and otherwise nonspecific findings  - CXR with no PNA or pulm edema  - BNP 6876  - Echo 11/22 with normal LV systolic function, normal MitraClip function, mild pulm HTN-see above  - Renal function stable although slightly worsened from prior  - Continue torsemide 60 mg daily  - Continue spironolactone 25 mg bid  - On discharge, resume Jardiance  - Continue metoprolol tartrate 25 mg bid  - Not on anticoagulation due to multiple prior GI bleeds  - Discharge planning

## 2023-12-02 NOTE — DISCHARGE NOTE PROVIDER - CARE PROVIDER_API CALL
Marjorie Jones  Internal Medicine  [address]  [place] [address]  Phone: [phone]  Fax: [phone]  Follow Up Time:

## 2023-12-02 NOTE — PROGRESS NOTE ADULT - REASON FOR ADMISSION
LLE non-healing wound, possibly cellulitis

## 2023-12-02 NOTE — DISCHARGE NOTE NURSING/CASE MANAGEMENT/SOCIAL WORK - NSSCNAMETXT_GEN_ALL_CORE
[facility] - [phone] or [phone]  Nurse to visit the day after hospital discharge; physical therapist to follow. Please contact the home care agency if you have not heard from them by 12 noon on the day after your hospital discharge.

## 2023-12-02 NOTE — CASE MANAGEMENT PROGRESS NOTE - NSCMPROGRESSNOTE_GEN_ALL_CORE
Spoke with the daughter Mar this morning to go over the transition back to the [facility] plan. Pt is returning this afternoon between 1-2PM by the son Mahendra who will come with the portable oxygen and transport the pt back to the [facility]. [facility] has accepted this case as a VALERIA on 12/3/23. The bedside nurse is aware of the discharge plan and will send the pt back to the [facility] with wound care supplies. CM team to remain available for post acute needs. The pt is aware of the discharge plan as well and in agreement.  Spoke with the daughter Mar this morning to go over the transition back to the [facility] plan. Pt is returning this afternoon between 1-2PM by the son Mahendra who will come with the portable oxygen and transport the pt back to the [facility]. [facility] has accepted this case as a VALERIA on 12/3/23. The bedside nurse is aware of the discharge plan and will send the pt back to the [facility] with wound care supplies. CM team to remain available for post acute needs. The pt is aware of the discharge plan as well and in agreement. Call to Jaleesa at the [facility] to inform her that the pt is returning to the facility between 1-2PM.

## 2023-12-02 NOTE — DISCHARGE NOTE PROVIDER - NSDCFUADDINST_GEN_ALL_CORE_FT
Xeroform with ABD or gauze, Theo, and ace wraps. Ace wraps can be removed at bedtime, dressing changes 3 times a week

## 2023-12-02 NOTE — DISCHARGE NOTE PROVIDER - HOSPITAL COURSE
HPI:  89F with afib (was on eliquis but no longer), hx of CHF on home O2 (3L), depression, HTN/HLD, here for LLE wound.  About 3-4 months ago, patient was trying to change her stockings when she accidentally scraped her leg.  Since then, her wound healed after 2 weeks but then her leg developed a non-healing superficial ulcer on the medial side of her lower left leg.  Has been seeing Ange Almeida and a VNS that has been changing it 3x/week (but reports that sometimes it would not get changed over a weekend and by Monday the dressings would be soaked).  No reports of any fevers.  Today the visiting nurse came and checked the wound and became concerned and told the patient to come here for further evaluation.  In the ED, patient was afebrile and had no leukocytosis.  However, since there was still concern for cellulitis, patient was started on vancomycin and zosyn.  Patient is being admitted for possible cellulitis.      (27 Nov 2023 21:26)    89F with afib (was on eliquis but no longer), hx of CHF on home O2 (3L), depression, HTN/HLD, here for LLE wound.    LLE wound - unclear if it is cellulitis (no fever or leukocytosis).  Also consider PAD ulcer.    - admitted to medicine  - was on zosyn and vanco  - ID consult recs appreciated  - cultures reviewed  - wound consult, recs appreciated  tylenol or ibuprofen PRN pain  arterial dopplers reviewed with Dr. Artem Betancourt from vascular, recommending b/l lower ext venous dopplers, reviewed results with her, will see at outpatient, no acute findings for now  per ID, can switch to PO doxycycline 100mg BID and augmentin 875/125mg BID to complete x10 day course until 12/7/23    for wound Xeroform with ABD or gauze, Theo, and ace wraps. Ace wraps can be removed at bedtime    Afib   - no longer on Eliquis  - cont with metoprolol  - cardiology consulted, recs appreciated   - Resume torsemide 60 mg daily  - Resume spironolactone 25 mg bid  - On discharge, resume Jardiance  - Continue metoprolol tartrate 25 mg bid  - Not on anticoagulation due to multiple prior GI bleeds      CHF - no signs of exacerbation, clinically stable as per patient (eGFR was 43 last year, now it's 27)  - however, given poor eGFR -> hold torsemide and spironolactone for now  - cardiology recs appreciated  - cont with O2 supplementation    CAD  - cont with atorvastatin    Polyneuropathy  - cont with gabapentin    Depression  - ramelteon non-formulary here  - cont with sertraline

## 2023-12-02 NOTE — PROGRESS NOTE ADULT - PROVIDER SPECIALTY LIST ADULT
Hospitalist
Infectious Disease
Cardiology
Hospitalist
Cardiology
Hospitalist
Infectious Disease
Infectious Disease
Hospitalist

## 2023-12-02 NOTE — DISCHARGE NOTE NURSING/CASE MANAGEMENT/SOCIAL WORK - NSDCDMENAME_GEN_ALL_CORE_FT
Prior to this admission, you have home oxygen portable and concentrator at the [facility] facility.

## 2023-12-02 NOTE — DISCHARGE NOTE PROVIDER - INSTRUCTIONS
Diet, DASH/TLC:   Sodium & Cholesterol Restricted  Supplement Feeding Modality:  Oral  Ensure Plus High Protein Cans or Servings Per Day:  1       Frequency:  Daily (11-29-23 @ 14:29) [Active]

## 2023-12-02 NOTE — DISCHARGE NOTE PROVIDER - NSDCMRMEDTOKEN_GEN_ALL_CORE_FT
acetaminophen 325 mg oral tablet: 2 tab(s) orally every 6 hours, As Needed -Temp greater or equal to 38C (100.4F),  Pain mod  albuterol 2.5 mg/3 mL (0.083%) inhalation solution: 3 milliliter(s) by nebulizer every 4 hours as needed for  shortness of breath and/or wheezing  atorvastatin 10 mg oral tablet: 1 tab(s) orally once a day (at bedtime)  ferrous sulfate 325 mg (65 mg elemental iron) oral tablet: 1 tab(s) orally once a day  fluticasone propionate 55 mcg/inh inhalation powder: 1 puff(s) inhaled every 12 hours  gabapentin 100 mg oral tablet: orally once a day (at bedtime)  Jardiance 10 mg oral tablet: 1 tab(s) orally  melatonin 2.5 mg oral capsule: 1 cap(s) orally once a day (at bedtime) as needed for  insomnia  metoprolol tartrate 25 mg oral tablet: 1 tab(s) orally 2 times a day  MiraLax oral powder for reconstitution: orally once a day (at bedtime) if needed for constipation  Nasal Saline 0.65% nasal spray: 1 spray(s) nasal 2 times a day both nostrils   PT OT:   ramelteon 8 mg oral tablet: 1 tab(s) orally once a day (at bedtime)  Remeron 15 mg oral tablet: 1.5 tab(s) orally once a day (at bedtime)  senna (sennosides) 17.2 mg oral tablet: 1 tab(s) orally once a day (at bedtime)  sertraline 25 mg oral tablet: 3 tab(s) orally once a day  spironolactone 25 mg oral tablet: 1 tab(s) orally 2 times a day  torsemide 20 mg oral tablet: 3 tab(s) orally once a day (at bedtime)   acetaminophen 325 mg oral tablet: 2 tab(s) orally every 6 hours, As Needed -Temp greater or equal to 38C (100.4F),  Pain mod  albuterol 2.5 mg/3 mL (0.083%) inhalation solution: 3 milliliter(s) by nebulizer every 4 hours as needed for  shortness of breath and/or wheezing  amoxicillin-clavulanate 875 mg-125 mg oral tablet: 1 tab(s) orally 2 times a day  atorvastatin 10 mg oral tablet: 1 tab(s) orally once a day (at bedtime)  doxycycline hyclate 100 mg oral tablet: 1 tab(s) orally 2 times a day  ferrous sulfate 325 mg (65 mg elemental iron) oral tablet: 1 tab(s) orally once a day  fluticasone propionate 55 mcg/inh inhalation powder: 1 puff(s) inhaled every 12 hours  gabapentin 100 mg oral tablet: orally once a day (at bedtime)  Jardiance 10 mg oral tablet: 1 tab(s) orally  melatonin 2.5 mg oral capsule: 1 cap(s) orally once a day (at bedtime) as needed for  insomnia  metoprolol tartrate 25 mg oral tablet: 1 tab(s) orally 2 times a day  MiraLax oral powder for reconstitution: orally once a day (at bedtime) if needed for constipation  Nasal Saline 0.65% nasal spray: 1 spray(s) nasal 2 times a day both nostrils   ramelteon 8 mg oral tablet: 1 tab(s) orally once a day (at bedtime)  Remeron 15 mg oral tablet: 1.5 tab(s) orally once a day (at bedtime)  senna (sennosides) 17.2 mg oral tablet: 1 tab(s) orally once a day (at bedtime)  sertraline 25 mg oral tablet: 3 tab(s) orally once a day  spironolactone 25 mg oral tablet: 1 tab(s) orally 2 times a day  torsemide 20 mg oral tablet: 3 tab(s) orally once a day (at bedtime)

## 2023-12-02 NOTE — DISCHARGE NOTE PROVIDER - NSDCFUSCHEDAPPT_GEN_ALL_CORE_FT
[facility]  DISPENSARY [address]  Scheduled Appointment: 12/07/2023    [facility]  WOUNDCARE [address]  Scheduled Appointment: 12/08/2023    Marjorie Jones  [facility]  GERIATRICS [address] [place] [address]   Scheduled Appointment: 12/12/2023

## 2023-12-05 ENCOUNTER — NON-APPOINTMENT (OUTPATIENT)
Age: 88
End: 2023-12-05

## 2023-12-06 ENCOUNTER — APPOINTMENT (OUTPATIENT)
Dept: HEART FAILURE | Facility: CLINIC | Age: 88
End: 2023-12-06

## 2023-12-08 ENCOUNTER — APPOINTMENT (OUTPATIENT)
Dept: WOUND CARE | Facility: HOSPITAL | Age: 88
End: 2023-12-08

## 2023-12-08 NOTE — ED PROVIDER NOTE - MUSCULOSKELETAL NECK EXAM
[facility] HOSPITALIST discharge summary.  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  Patient underwent a Right Frontal GBM s/p R craniotomy for resection (12/1/23). Post procedure, patient was transitioned to neuro ICU and later transferred out of ICU to neuro floor unit for further medical management and treatment.  On 12/4/2023, patient had concern for seizure and Keppra was increased.  Patient currently on Decadron.  Patient reported left arm weakness which is acute on chronic but improved.  Patient worked with physical therapy.    Patient cleared for discharge by all services.  I communicated with neurology and gross surgery.  Patient has been discharged to 6 weeks.      Last Recorded Vitals  Blood pressure 125/79, pulse 88, temperature 97.2 °F (36.2 °C), temperature source Oral, resp. rate 16, height 5' 3\" (1.6 m), weight 66.5 kg (146 lb 9.7 oz), SpO2 91 %.  Body mass index is 25.97 kg/m².    Physical exam  General:  Alert, cooperative, conversive.  Skin:  Warm and dry without rash.    Head:  staples noted over the top of her head.  No oozing or discharge from surgical site.    Neck:  No adenopathy.    Eyes:  Normal conjunctivae and sclerae.  Extraocular movements intact.  ENT:  Mucous membranes are moist.   No facial tenderness.   Cardiovascular:    Regular, rate and rhythm without murmur.  Respiratory:  Normal respiratory effort.  Clear to auscultation.  No wheezes, rales or rhonchi.   Gastrointestinal:  Soft and nontender.  Normal bowel sounds.  No hepatomegaly or splenomegaly.   Musculoskeletal:  No deformity or edema.    Neurologic:    Left-sided weakness noted.  Moving left upper extremity strength about 3+/5 and left lower extremity  about 2 out of 5.  Surgical incision on the head  Psychiatric:   Cooperative.  Appropriate mood and affect.    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 There is no significant mass effect. There is no large  intracranial hemorrhage.           Electronically Signed by: CARLEEN WAN M.D.   Signed on: 12/3/2023 7:32 AM   Workstation ID: [id number]    Impression/plan  80 yo female with a hx of RSD, Lyme disease with diagnosis of GBM made in Oct 2023 presenting for elective resection.    #s/p R craniotomy and GBM resection 12/1/2023  Breakthrough seizures  Left-sided weakness following seizure likely postictal versus brain pathology  RRT called due to seizure.   Sbp goal <140   Neurochecks    Continue Keppra 1 g twice daily  -Ativan as needed for seizure  EEG with no epileptiform activity.  .  Neurology following    Decadron 4mg q 6, taper per neurosurgery.   No AP/FDAC for two weeks post-op (12/15/2023)    Left sided weakness slowly improving.              PT OT.  I communicated with patient daughter at bedside   radiotherapy simulation per radiation oncology on 12/7/2023      #HTN  Home meds are labetolol and hctz    #HL  Cont statin    #Pulmonary  Extubated post op   Continues to require oxygen   Cxr ordered, bnp    #insomnia  Clonazapam at home 2 years ago, and no longer taking it as per  patient.  Confirm with patient and daughter at bedside  Melatonin for now first line, trazodone 2nd line    #hx of lyme disease June 2023  S/p 2 rounds of doxycycline    DVT proph  Lovenox 40mg hs  Teds and scds    GI proph: ppi while on high dose steroids    #Disposition:  Discharge to rehab today       CODE STATUS  Code Status Information       Code Status    Full Resuscitation        No longer takes it.   no deformity, pain or tenderness. no restriction of movement

## 2023-12-12 ENCOUNTER — APPOINTMENT (OUTPATIENT)
Dept: GERIATRICS | Facility: CLINIC | Age: 88
End: 2023-12-12
Payer: MEDICARE

## 2023-12-12 VITALS — SYSTOLIC BLOOD PRESSURE: 89 MMHG | HEART RATE: 73 BPM | DIASTOLIC BLOOD PRESSURE: 49 MMHG

## 2023-12-12 VITALS
TEMPERATURE: 98 F | HEART RATE: 65 BPM | BODY MASS INDEX: 27.46 KG/M2 | OXYGEN SATURATION: 89 % | WEIGHT: 155 LBS | DIASTOLIC BLOOD PRESSURE: 44 MMHG | SYSTOLIC BLOOD PRESSURE: 89 MMHG | RESPIRATION RATE: 18 BRPM

## 2023-12-12 DIAGNOSIS — G62.9 POLYNEUROPATHY, UNSPECIFIED: ICD-10-CM

## 2023-12-12 DIAGNOSIS — R21 RASH AND OTHER NONSPECIFIC SKIN ERUPTION: ICD-10-CM

## 2023-12-12 DIAGNOSIS — R41.89 OTHER SYMPTOMS AND SIGNS INVOLVING COGNITIVE FUNCTIONS AND AWARENESS: ICD-10-CM

## 2023-12-12 PROCEDURE — 99495 TRANSJ CARE MGMT MOD F2F 14D: CPT

## 2023-12-12 RX ORDER — COLLAGENASE SANTYL 250 [ARB'U]/G
250 OINTMENT TOPICAL
Qty: 1 | Refills: 3 | Status: DISCONTINUED | COMMUNITY
Start: 2023-06-02 | End: 2023-12-12

## 2023-12-12 RX ORDER — HYDROCOLLOID DRESSING 4"X4 3/4"
BANDAGE TOPICAL
Qty: 30 | Refills: 1 | Status: DISCONTINUED | COMMUNITY
Start: 2023-06-02 | End: 2023-12-12

## 2023-12-12 RX ORDER — CLINDAMYCIN HYDROCHLORIDE 300 MG/1
300 CAPSULE ORAL
Qty: 2 | Refills: 0 | Status: DISCONTINUED | COMMUNITY
Start: 2023-11-27 | End: 2023-12-12

## 2023-12-12 RX ORDER — DOXYCYCLINE HYCLATE 100 MG/1
100 TABLET ORAL
Qty: 10 | Refills: 0 | Status: DISCONTINUED | COMMUNITY
Start: 2023-12-02 | End: 2023-12-12

## 2023-12-12 RX ORDER — GABAPENTIN 100 MG/1
100 CAPSULE ORAL AT BEDTIME
Qty: 60 | Refills: 0 | Status: DISCONTINUED | COMMUNITY
Start: 2023-10-31 | End: 2023-12-12

## 2023-12-12 RX ORDER — FLUTICASONE PROPIONATE 50 UG/1
50 SPRAY, METERED NASAL
Qty: 1 | Refills: 12 | Status: DISCONTINUED | COMMUNITY
Start: 2022-10-21 | End: 2023-12-12

## 2023-12-13 PROCEDURE — 73620 X-RAY EXAM OF FOOT: CPT

## 2023-12-13 PROCEDURE — 97161 PT EVAL LOW COMPLEX 20 MIN: CPT

## 2023-12-13 PROCEDURE — 93970 EXTREMITY STUDY: CPT

## 2023-12-13 PROCEDURE — 71045 X-RAY EXAM CHEST 1 VIEW: CPT

## 2023-12-13 PROCEDURE — 85730 THROMBOPLASTIN TIME PARTIAL: CPT

## 2023-12-13 PROCEDURE — 96375 TX/PRO/DX INJ NEW DRUG ADDON: CPT

## 2023-12-13 PROCEDURE — 83605 ASSAY OF LACTIC ACID: CPT

## 2023-12-13 PROCEDURE — 99285 EMERGENCY DEPT VISIT HI MDM: CPT

## 2023-12-13 PROCEDURE — 87040 BLOOD CULTURE FOR BACTERIA: CPT

## 2023-12-13 PROCEDURE — 97110 THERAPEUTIC EXERCISES: CPT

## 2023-12-13 PROCEDURE — 87641 MR-STAPH DNA AMP PROBE: CPT

## 2023-12-13 PROCEDURE — 87086 URINE CULTURE/COLONY COUNT: CPT

## 2023-12-13 PROCEDURE — 80202 ASSAY OF VANCOMYCIN: CPT

## 2023-12-13 PROCEDURE — 83880 ASSAY OF NATRIURETIC PEPTIDE: CPT

## 2023-12-13 PROCEDURE — 87640 STAPH A DNA AMP PROBE: CPT

## 2023-12-13 PROCEDURE — 84100 ASSAY OF PHOSPHORUS: CPT

## 2023-12-13 PROCEDURE — 93925 LOWER EXTREMITY STUDY: CPT

## 2023-12-13 PROCEDURE — 81003 URINALYSIS AUTO W/O SCOPE: CPT

## 2023-12-13 PROCEDURE — 93005 ELECTROCARDIOGRAM TRACING: CPT

## 2023-12-13 PROCEDURE — 85610 PROTHROMBIN TIME: CPT

## 2023-12-13 PROCEDURE — 94640 AIRWAY INHALATION TREATMENT: CPT

## 2023-12-13 PROCEDURE — 97116 GAIT TRAINING THERAPY: CPT

## 2023-12-13 PROCEDURE — 73590 X-RAY EXAM OF LOWER LEG: CPT

## 2023-12-13 PROCEDURE — 85027 COMPLETE CBC AUTOMATED: CPT

## 2023-12-13 PROCEDURE — 36415 COLL VENOUS BLD VENIPUNCTURE: CPT

## 2023-12-13 PROCEDURE — 96374 THER/PROPH/DIAG INJ IV PUSH: CPT

## 2023-12-13 PROCEDURE — 85025 COMPLETE CBC W/AUTO DIFF WBC: CPT

## 2023-12-13 PROCEDURE — 83735 ASSAY OF MAGNESIUM: CPT

## 2023-12-13 PROCEDURE — 80053 COMPREHEN METABOLIC PANEL: CPT

## 2023-12-22 ENCOUNTER — APPOINTMENT (OUTPATIENT)
Dept: WOUND CARE | Facility: CLINIC | Age: 88
End: 2023-12-22
Payer: MEDICARE

## 2023-12-22 ENCOUNTER — OUTPATIENT (OUTPATIENT)
Dept: OUTPATIENT SERVICES | Facility: HOSPITAL | Age: 88
LOS: 1 days | End: 2023-12-22
Payer: MEDICARE

## 2023-12-22 ENCOUNTER — INPATIENT (INPATIENT)
Facility: HOSPITAL | Age: 88
LOS: 5 days | Discharge: NURSING, REHAB OR LONG-TERM CARE | DRG: 603 | End: 2023-12-28
Attending: HOSPITALIST | Admitting: HOSPITALIST
Payer: MEDICARE

## 2023-12-22 ENCOUNTER — APPOINTMENT (OUTPATIENT)
Dept: PHARMACY | Facility: CLINIC | Age: 88
End: 2023-12-22

## 2023-12-22 VITALS
HEART RATE: 100 BPM | WEIGHT: 160.06 LBS | HEIGHT: 62 IN | TEMPERATURE: 99 F | SYSTOLIC BLOOD PRESSURE: 133 MMHG | RESPIRATION RATE: 20 BRPM | OXYGEN SATURATION: 99 % | DIASTOLIC BLOOD PRESSURE: 67 MMHG

## 2023-12-22 VITALS
DIASTOLIC BLOOD PRESSURE: 62 MMHG | SYSTOLIC BLOOD PRESSURE: 101 MMHG | HEART RATE: 96 BPM | TEMPERATURE: 98.2 F | OXYGEN SATURATION: 97 %

## 2023-12-22 DIAGNOSIS — Z98.89 OTHER SPECIFIED POSTPROCEDURAL STATES: Chronic | ICD-10-CM

## 2023-12-22 DIAGNOSIS — I48.20 CHRONIC ATRIAL FIBRILLATION, UNSPECIFIED: ICD-10-CM

## 2023-12-22 DIAGNOSIS — Z29.9 ENCOUNTER FOR PROPHYLACTIC MEASURES, UNSPECIFIED: ICD-10-CM

## 2023-12-22 DIAGNOSIS — E78.5 HYPERLIPIDEMIA, UNSPECIFIED: ICD-10-CM

## 2023-12-22 DIAGNOSIS — I50.22 CHRONIC SYSTOLIC (CONGESTIVE) HEART FAILURE: ICD-10-CM

## 2023-12-22 DIAGNOSIS — L03.90 CELLULITIS, UNSPECIFIED: ICD-10-CM

## 2023-12-22 DIAGNOSIS — F41.9 ANXIETY DISORDER, UNSPECIFIED: ICD-10-CM

## 2023-12-22 DIAGNOSIS — S81.802D UNSPECIFIED OPEN WOUND, LEFT LOWER LEG, SUBSEQUENT ENCOUNTER: ICD-10-CM

## 2023-12-22 DIAGNOSIS — H26.9 UNSPECIFIED CATARACT: Chronic | ICD-10-CM

## 2023-12-22 DIAGNOSIS — I10 ESSENTIAL (PRIMARY) HYPERTENSION: ICD-10-CM

## 2023-12-22 LAB
ALBUMIN SERPL ELPH-MCNC: 3.1 G/DL — LOW (ref 3.3–5)
ALBUMIN SERPL ELPH-MCNC: 3.1 G/DL — LOW (ref 3.3–5)
ALBUMIN SERPL ELPH-MCNC: 3.6 G/DL — SIGNIFICANT CHANGE UP (ref 3.3–5)
ALBUMIN SERPL ELPH-MCNC: 3.6 G/DL — SIGNIFICANT CHANGE UP (ref 3.3–5)
ALP SERPL-CCNC: 45 U/L — SIGNIFICANT CHANGE UP (ref 40–120)
ALP SERPL-CCNC: 45 U/L — SIGNIFICANT CHANGE UP (ref 40–120)
ALP SERPL-CCNC: 46 U/L — SIGNIFICANT CHANGE UP (ref 40–120)
ALP SERPL-CCNC: 46 U/L — SIGNIFICANT CHANGE UP (ref 40–120)
ALT FLD-CCNC: 11 U/L — SIGNIFICANT CHANGE UP (ref 10–45)
ALT FLD-CCNC: 11 U/L — SIGNIFICANT CHANGE UP (ref 10–45)
ALT FLD-CCNC: 12 U/L — SIGNIFICANT CHANGE UP (ref 10–45)
ALT FLD-CCNC: 12 U/L — SIGNIFICANT CHANGE UP (ref 10–45)
ANION GAP SERPL CALC-SCNC: 10 MMOL/L — SIGNIFICANT CHANGE UP (ref 5–17)
ANION GAP SERPL CALC-SCNC: 10 MMOL/L — SIGNIFICANT CHANGE UP (ref 5–17)
ANION GAP SERPL CALC-SCNC: 11 MMOL/L — SIGNIFICANT CHANGE UP (ref 5–17)
ANION GAP SERPL CALC-SCNC: 11 MMOL/L — SIGNIFICANT CHANGE UP (ref 5–17)
APPEARANCE UR: CLEAR — SIGNIFICANT CHANGE UP
APPEARANCE UR: CLEAR — SIGNIFICANT CHANGE UP
APTT BLD: 30.1 SEC — SIGNIFICANT CHANGE UP (ref 24.5–35.6)
APTT BLD: 30.1 SEC — SIGNIFICANT CHANGE UP (ref 24.5–35.6)
AST SERPL-CCNC: 41 U/L — HIGH (ref 10–40)
AST SERPL-CCNC: 41 U/L — HIGH (ref 10–40)
AST SERPL-CCNC: 77 U/L — HIGH (ref 10–40)
AST SERPL-CCNC: 77 U/L — HIGH (ref 10–40)
BASE EXCESS BLDV CALC-SCNC: 7.9 MMOL/L — HIGH (ref -2–3)
BASE EXCESS BLDV CALC-SCNC: 7.9 MMOL/L — HIGH (ref -2–3)
BASOPHILS # BLD AUTO: 0.02 K/UL — SIGNIFICANT CHANGE UP (ref 0–0.2)
BASOPHILS # BLD AUTO: 0.02 K/UL — SIGNIFICANT CHANGE UP (ref 0–0.2)
BASOPHILS NFR BLD AUTO: 0.2 % — SIGNIFICANT CHANGE UP (ref 0–2)
BASOPHILS NFR BLD AUTO: 0.2 % — SIGNIFICANT CHANGE UP (ref 0–2)
BILIRUB SERPL-MCNC: 0.6 MG/DL — SIGNIFICANT CHANGE UP (ref 0.2–1.2)
BILIRUB SERPL-MCNC: 0.6 MG/DL — SIGNIFICANT CHANGE UP (ref 0.2–1.2)
BILIRUB SERPL-MCNC: 0.7 MG/DL — SIGNIFICANT CHANGE UP (ref 0.2–1.2)
BILIRUB SERPL-MCNC: 0.7 MG/DL — SIGNIFICANT CHANGE UP (ref 0.2–1.2)
BILIRUB UR-MCNC: NEGATIVE — SIGNIFICANT CHANGE UP
BILIRUB UR-MCNC: NEGATIVE — SIGNIFICANT CHANGE UP
BUN SERPL-MCNC: 36 MG/DL — HIGH (ref 7–23)
CA-I SERPL-SCNC: 1.11 MMOL/L — LOW (ref 1.15–1.33)
CA-I SERPL-SCNC: 1.11 MMOL/L — LOW (ref 1.15–1.33)
CALCIUM SERPL-MCNC: 8.7 MG/DL — SIGNIFICANT CHANGE UP (ref 8.4–10.5)
CALCIUM SERPL-MCNC: 8.7 MG/DL — SIGNIFICANT CHANGE UP (ref 8.4–10.5)
CALCIUM SERPL-MCNC: 8.9 MG/DL — SIGNIFICANT CHANGE UP (ref 8.4–10.5)
CALCIUM SERPL-MCNC: 8.9 MG/DL — SIGNIFICANT CHANGE UP (ref 8.4–10.5)
CHLORIDE BLDV-SCNC: 99 MMOL/L — SIGNIFICANT CHANGE UP (ref 96–108)
CHLORIDE BLDV-SCNC: 99 MMOL/L — SIGNIFICANT CHANGE UP (ref 96–108)
CHLORIDE SERPL-SCNC: 96 MMOL/L — SIGNIFICANT CHANGE UP (ref 96–108)
CHLORIDE SERPL-SCNC: 96 MMOL/L — SIGNIFICANT CHANGE UP (ref 96–108)
CHLORIDE SERPL-SCNC: 98 MMOL/L — SIGNIFICANT CHANGE UP (ref 96–108)
CHLORIDE SERPL-SCNC: 98 MMOL/L — SIGNIFICANT CHANGE UP (ref 96–108)
CO2 BLDV-SCNC: 35 MMOL/L — HIGH (ref 22–26)
CO2 BLDV-SCNC: 35 MMOL/L — HIGH (ref 22–26)
CO2 SERPL-SCNC: 28 MMOL/L — SIGNIFICANT CHANGE UP (ref 22–31)
CO2 SERPL-SCNC: 28 MMOL/L — SIGNIFICANT CHANGE UP (ref 22–31)
CO2 SERPL-SCNC: 29 MMOL/L — SIGNIFICANT CHANGE UP (ref 22–31)
CO2 SERPL-SCNC: 29 MMOL/L — SIGNIFICANT CHANGE UP (ref 22–31)
COLOR SPEC: YELLOW — SIGNIFICANT CHANGE UP
COLOR SPEC: YELLOW — SIGNIFICANT CHANGE UP
CREAT SERPL-MCNC: 1.52 MG/DL — HIGH (ref 0.5–1.3)
CREAT SERPL-MCNC: 1.52 MG/DL — HIGH (ref 0.5–1.3)
CREAT SERPL-MCNC: 1.53 MG/DL — HIGH (ref 0.5–1.3)
CREAT SERPL-MCNC: 1.53 MG/DL — HIGH (ref 0.5–1.3)
CRP SERPL-MCNC: 28 MG/L — HIGH (ref 0–4)
CRP SERPL-MCNC: 28 MG/L — HIGH (ref 0–4)
DIFF PNL FLD: NEGATIVE — SIGNIFICANT CHANGE UP
DIFF PNL FLD: NEGATIVE — SIGNIFICANT CHANGE UP
EGFR: 32 ML/MIN/1.73M2 — LOW
EOSINOPHIL # BLD AUTO: 0.14 K/UL — SIGNIFICANT CHANGE UP (ref 0–0.5)
EOSINOPHIL # BLD AUTO: 0.14 K/UL — SIGNIFICANT CHANGE UP (ref 0–0.5)
EOSINOPHIL NFR BLD AUTO: 1.5 % — SIGNIFICANT CHANGE UP (ref 0–6)
EOSINOPHIL NFR BLD AUTO: 1.5 % — SIGNIFICANT CHANGE UP (ref 0–6)
ERYTHROCYTE [SEDIMENTATION RATE] IN BLOOD: 115 MM/HR — HIGH (ref 0–20)
ERYTHROCYTE [SEDIMENTATION RATE] IN BLOOD: 115 MM/HR — HIGH (ref 0–20)
GAS PNL BLDV: 137 MMOL/L — SIGNIFICANT CHANGE UP (ref 136–145)
GAS PNL BLDV: 137 MMOL/L — SIGNIFICANT CHANGE UP (ref 136–145)
GAS PNL BLDV: SIGNIFICANT CHANGE UP
GLUCOSE BLDV-MCNC: 156 MG/DL — HIGH (ref 70–99)
GLUCOSE BLDV-MCNC: 156 MG/DL — HIGH (ref 70–99)
GLUCOSE SERPL-MCNC: 154 MG/DL — HIGH (ref 70–99)
GLUCOSE SERPL-MCNC: 154 MG/DL — HIGH (ref 70–99)
GLUCOSE SERPL-MCNC: 157 MG/DL — HIGH (ref 70–99)
GLUCOSE SERPL-MCNC: 157 MG/DL — HIGH (ref 70–99)
GLUCOSE UR QL: 250 MG/DL
GLUCOSE UR QL: 250 MG/DL
HCO3 BLDV-SCNC: 34 MMOL/L — HIGH (ref 22–29)
HCO3 BLDV-SCNC: 34 MMOL/L — HIGH (ref 22–29)
HCT VFR BLD CALC: 35.8 % — SIGNIFICANT CHANGE UP (ref 34.5–45)
HCT VFR BLD CALC: 35.8 % — SIGNIFICANT CHANGE UP (ref 34.5–45)
HCT VFR BLDA CALC: 34 % — LOW (ref 34.5–46.5)
HCT VFR BLDA CALC: 34 % — LOW (ref 34.5–46.5)
HGB BLD CALC-MCNC: 11.4 G/DL — LOW (ref 11.7–16.1)
HGB BLD CALC-MCNC: 11.4 G/DL — LOW (ref 11.7–16.1)
HGB BLD-MCNC: 10.8 G/DL — LOW (ref 11.5–15.5)
HGB BLD-MCNC: 10.8 G/DL — LOW (ref 11.5–15.5)
IMM GRANULOCYTES NFR BLD AUTO: 0.5 % — SIGNIFICANT CHANGE UP (ref 0–0.9)
IMM GRANULOCYTES NFR BLD AUTO: 0.5 % — SIGNIFICANT CHANGE UP (ref 0–0.9)
INR BLD: 1.3 RATIO — HIGH (ref 0.85–1.18)
INR BLD: 1.3 RATIO — HIGH (ref 0.85–1.18)
KETONES UR-MCNC: NEGATIVE MG/DL — SIGNIFICANT CHANGE UP
KETONES UR-MCNC: NEGATIVE MG/DL — SIGNIFICANT CHANGE UP
LACTATE BLDV-MCNC: 1.7 MMOL/L — SIGNIFICANT CHANGE UP (ref 0.5–2)
LACTATE BLDV-MCNC: 1.7 MMOL/L — SIGNIFICANT CHANGE UP (ref 0.5–2)
LEUKOCYTE ESTERASE UR-ACNC: NEGATIVE — SIGNIFICANT CHANGE UP
LEUKOCYTE ESTERASE UR-ACNC: NEGATIVE — SIGNIFICANT CHANGE UP
LYMPHOCYTES # BLD AUTO: 0.92 K/UL — LOW (ref 1–3.3)
LYMPHOCYTES # BLD AUTO: 0.92 K/UL — LOW (ref 1–3.3)
LYMPHOCYTES # BLD AUTO: 10.1 % — LOW (ref 13–44)
LYMPHOCYTES # BLD AUTO: 10.1 % — LOW (ref 13–44)
MCHC RBC-ENTMCNC: 28.4 PG — SIGNIFICANT CHANGE UP (ref 27–34)
MCHC RBC-ENTMCNC: 28.4 PG — SIGNIFICANT CHANGE UP (ref 27–34)
MCHC RBC-ENTMCNC: 30.2 GM/DL — LOW (ref 32–36)
MCHC RBC-ENTMCNC: 30.2 GM/DL — LOW (ref 32–36)
MCV RBC AUTO: 94.2 FL — SIGNIFICANT CHANGE UP (ref 80–100)
MCV RBC AUTO: 94.2 FL — SIGNIFICANT CHANGE UP (ref 80–100)
MONOCYTES # BLD AUTO: 1.14 K/UL — HIGH (ref 0–0.9)
MONOCYTES # BLD AUTO: 1.14 K/UL — HIGH (ref 0–0.9)
MONOCYTES NFR BLD AUTO: 12.5 % — SIGNIFICANT CHANGE UP (ref 2–14)
MONOCYTES NFR BLD AUTO: 12.5 % — SIGNIFICANT CHANGE UP (ref 2–14)
NEUTROPHILS # BLD AUTO: 6.84 K/UL — SIGNIFICANT CHANGE UP (ref 1.8–7.4)
NEUTROPHILS # BLD AUTO: 6.84 K/UL — SIGNIFICANT CHANGE UP (ref 1.8–7.4)
NEUTROPHILS NFR BLD AUTO: 75.2 % — SIGNIFICANT CHANGE UP (ref 43–77)
NEUTROPHILS NFR BLD AUTO: 75.2 % — SIGNIFICANT CHANGE UP (ref 43–77)
NITRITE UR-MCNC: NEGATIVE — SIGNIFICANT CHANGE UP
NITRITE UR-MCNC: NEGATIVE — SIGNIFICANT CHANGE UP
NRBC # BLD: 0 /100 WBCS — SIGNIFICANT CHANGE UP (ref 0–0)
NRBC # BLD: 0 /100 WBCS — SIGNIFICANT CHANGE UP (ref 0–0)
NT-PROBNP SERPL-SCNC: 3574 PG/ML — HIGH (ref 0–300)
NT-PROBNP SERPL-SCNC: 3574 PG/ML — HIGH (ref 0–300)
PCO2 BLDV: 52 MMHG — HIGH (ref 39–42)
PCO2 BLDV: 52 MMHG — HIGH (ref 39–42)
PH BLDV: 7.42 — SIGNIFICANT CHANGE UP (ref 7.32–7.43)
PH BLDV: 7.42 — SIGNIFICANT CHANGE UP (ref 7.32–7.43)
PH UR: 6.5 — SIGNIFICANT CHANGE UP (ref 5–8)
PH UR: 6.5 — SIGNIFICANT CHANGE UP (ref 5–8)
PLATELET # BLD AUTO: 199 K/UL — SIGNIFICANT CHANGE UP (ref 150–400)
PLATELET # BLD AUTO: 199 K/UL — SIGNIFICANT CHANGE UP (ref 150–400)
PO2 BLDV: 25 MMHG — SIGNIFICANT CHANGE UP (ref 25–45)
PO2 BLDV: 25 MMHG — SIGNIFICANT CHANGE UP (ref 25–45)
POTASSIUM BLDV-SCNC: 3.6 MMOL/L — SIGNIFICANT CHANGE UP (ref 3.5–5.1)
POTASSIUM BLDV-SCNC: 3.6 MMOL/L — SIGNIFICANT CHANGE UP (ref 3.5–5.1)
POTASSIUM SERPL-MCNC: 4.6 MMOL/L — SIGNIFICANT CHANGE UP (ref 3.5–5.3)
POTASSIUM SERPL-MCNC: 4.6 MMOL/L — SIGNIFICANT CHANGE UP (ref 3.5–5.3)
POTASSIUM SERPL-MCNC: 6.4 MMOL/L — CRITICAL HIGH (ref 3.5–5.3)
POTASSIUM SERPL-MCNC: 6.4 MMOL/L — CRITICAL HIGH (ref 3.5–5.3)
POTASSIUM SERPL-SCNC: 4.6 MMOL/L — SIGNIFICANT CHANGE UP (ref 3.5–5.3)
POTASSIUM SERPL-SCNC: 4.6 MMOL/L — SIGNIFICANT CHANGE UP (ref 3.5–5.3)
POTASSIUM SERPL-SCNC: 6.4 MMOL/L — CRITICAL HIGH (ref 3.5–5.3)
POTASSIUM SERPL-SCNC: 6.4 MMOL/L — CRITICAL HIGH (ref 3.5–5.3)
PROT SERPL-MCNC: 6.9 G/DL — SIGNIFICANT CHANGE UP (ref 6–8.3)
PROT SERPL-MCNC: 6.9 G/DL — SIGNIFICANT CHANGE UP (ref 6–8.3)
PROT SERPL-MCNC: 7.8 G/DL — SIGNIFICANT CHANGE UP (ref 6–8.3)
PROT SERPL-MCNC: 7.8 G/DL — SIGNIFICANT CHANGE UP (ref 6–8.3)
PROT UR-MCNC: NEGATIVE MG/DL — SIGNIFICANT CHANGE UP
PROT UR-MCNC: NEGATIVE MG/DL — SIGNIFICANT CHANGE UP
PROTHROM AB SERPL-ACNC: 13.5 SEC — HIGH (ref 9.5–13)
PROTHROM AB SERPL-ACNC: 13.5 SEC — HIGH (ref 9.5–13)
RBC # BLD: 3.8 M/UL — SIGNIFICANT CHANGE UP (ref 3.8–5.2)
RBC # BLD: 3.8 M/UL — SIGNIFICANT CHANGE UP (ref 3.8–5.2)
RBC # FLD: 17.5 % — HIGH (ref 10.3–14.5)
RBC # FLD: 17.5 % — HIGH (ref 10.3–14.5)
SAO2 % BLDV: 33.9 % — LOW (ref 67–88)
SAO2 % BLDV: 33.9 % — LOW (ref 67–88)
SODIUM SERPL-SCNC: 135 MMOL/L — SIGNIFICANT CHANGE UP (ref 135–145)
SODIUM SERPL-SCNC: 135 MMOL/L — SIGNIFICANT CHANGE UP (ref 135–145)
SODIUM SERPL-SCNC: 137 MMOL/L — SIGNIFICANT CHANGE UP (ref 135–145)
SODIUM SERPL-SCNC: 137 MMOL/L — SIGNIFICANT CHANGE UP (ref 135–145)
SP GR SPEC: 1.01 — SIGNIFICANT CHANGE UP (ref 1–1.03)
SP GR SPEC: 1.01 — SIGNIFICANT CHANGE UP (ref 1–1.03)
TROPONIN T, HIGH SENSITIVITY RESULT: 52 NG/L — HIGH (ref 0–51)
TROPONIN T, HIGH SENSITIVITY RESULT: 52 NG/L — HIGH (ref 0–51)
UROBILINOGEN FLD QL: 0.2 MG/DL — SIGNIFICANT CHANGE UP (ref 0.2–1)
UROBILINOGEN FLD QL: 0.2 MG/DL — SIGNIFICANT CHANGE UP (ref 0.2–1)
WBC # BLD: 9.11 K/UL — SIGNIFICANT CHANGE UP (ref 3.8–10.5)
WBC # BLD: 9.11 K/UL — SIGNIFICANT CHANGE UP (ref 3.8–10.5)
WBC # FLD AUTO: 9.11 K/UL — SIGNIFICANT CHANGE UP (ref 3.8–10.5)
WBC # FLD AUTO: 9.11 K/UL — SIGNIFICANT CHANGE UP (ref 3.8–10.5)

## 2023-12-22 PROCEDURE — 99223 1ST HOSP IP/OBS HIGH 75: CPT

## 2023-12-22 PROCEDURE — 99285 EMERGENCY DEPT VISIT HI MDM: CPT

## 2023-12-22 PROCEDURE — 73590 X-RAY EXAM OF LOWER LEG: CPT | Mod: 26,LT

## 2023-12-22 PROCEDURE — G0463: CPT

## 2023-12-22 PROCEDURE — 99214 OFFICE O/P EST MOD 30 MIN: CPT

## 2023-12-22 PROCEDURE — 71045 X-RAY EXAM CHEST 1 VIEW: CPT | Mod: 26

## 2023-12-22 RX ORDER — MIRTAZAPINE 45 MG/1
22.5 TABLET, ORALLY DISINTEGRATING ORAL AT BEDTIME
Refills: 0 | Status: DISCONTINUED | OUTPATIENT
Start: 2023-12-22 | End: 2023-12-28

## 2023-12-22 RX ORDER — ALBUTEROL 90 UG/1
2 AEROSOL, METERED ORAL EVERY 6 HOURS
Refills: 0 | Status: DISCONTINUED | OUTPATIENT
Start: 2023-12-22 | End: 2023-12-28

## 2023-12-22 RX ORDER — MOMETASONE FUROATE 220 UG/1
2 INHALANT RESPIRATORY (INHALATION) DAILY
Refills: 0 | Status: DISCONTINUED | OUTPATIENT
Start: 2023-12-22 | End: 2023-12-28

## 2023-12-22 RX ORDER — LANOLIN ALCOHOL/MO/W.PET/CERES
3 CREAM (GRAM) TOPICAL AT BEDTIME
Refills: 0 | Status: DISCONTINUED | OUTPATIENT
Start: 2023-12-22 | End: 2023-12-28

## 2023-12-22 RX ORDER — ATORVASTATIN CALCIUM 80 MG/1
10 TABLET, FILM COATED ORAL AT BEDTIME
Refills: 0 | Status: DISCONTINUED | OUTPATIENT
Start: 2023-12-22 | End: 2023-12-28

## 2023-12-22 RX ORDER — PIPERACILLIN AND TAZOBACTAM 4; .5 G/20ML; G/20ML
3.38 INJECTION, POWDER, LYOPHILIZED, FOR SOLUTION INTRAVENOUS EVERY 8 HOURS
Refills: 0 | Status: DISCONTINUED | OUTPATIENT
Start: 2023-12-22 | End: 2023-12-23

## 2023-12-22 RX ORDER — VANCOMYCIN HCL 1 G
1000 VIAL (EA) INTRAVENOUS EVERY 24 HOURS
Refills: 0 | Status: DISCONTINUED | OUTPATIENT
Start: 2023-12-22 | End: 2023-12-23

## 2023-12-22 RX ORDER — SERTRALINE 25 MG/1
75 TABLET, FILM COATED ORAL DAILY
Refills: 0 | Status: DISCONTINUED | OUTPATIENT
Start: 2023-12-22 | End: 2023-12-28

## 2023-12-22 RX ORDER — SENNA PLUS 8.6 MG/1
2 TABLET ORAL AT BEDTIME
Refills: 0 | Status: DISCONTINUED | OUTPATIENT
Start: 2023-12-22 | End: 2023-12-28

## 2023-12-22 RX ORDER — PIPERACILLIN AND TAZOBACTAM 4; .5 G/20ML; G/20ML
3.38 INJECTION, POWDER, LYOPHILIZED, FOR SOLUTION INTRAVENOUS ONCE
Refills: 0 | Status: COMPLETED | OUTPATIENT
Start: 2023-12-22 | End: 2023-12-22

## 2023-12-22 RX ORDER — ONDANSETRON 8 MG/1
4 TABLET, FILM COATED ORAL EVERY 8 HOURS
Refills: 0 | Status: DISCONTINUED | OUTPATIENT
Start: 2023-12-22 | End: 2023-12-28

## 2023-12-22 RX ORDER — POLYETHYLENE GLYCOL 3350 17 G/17G
17 POWDER, FOR SOLUTION ORAL DAILY
Refills: 0 | Status: DISCONTINUED | OUTPATIENT
Start: 2023-12-22 | End: 2023-12-28

## 2023-12-22 RX ORDER — SPIRONOLACTONE 25 MG/1
25 TABLET, FILM COATED ORAL
Refills: 0 | Status: DISCONTINUED | OUTPATIENT
Start: 2023-12-22 | End: 2023-12-22

## 2023-12-22 RX ORDER — ACETAMINOPHEN 500 MG
650 TABLET ORAL EVERY 6 HOURS
Refills: 0 | Status: DISCONTINUED | OUTPATIENT
Start: 2023-12-22 | End: 2023-12-28

## 2023-12-22 RX ORDER — VANCOMYCIN HCL 1 G
1000 VIAL (EA) INTRAVENOUS ONCE
Refills: 0 | Status: COMPLETED | OUTPATIENT
Start: 2023-12-22 | End: 2023-12-22

## 2023-12-22 RX ORDER — METOPROLOL TARTRATE 50 MG
25 TABLET ORAL
Refills: 0 | Status: DISCONTINUED | OUTPATIENT
Start: 2023-12-22 | End: 2023-12-28

## 2023-12-22 RX ORDER — LANOLIN ALCOHOL/MO/W.PET/CERES
1 CREAM (GRAM) TOPICAL
Refills: 0 | DISCHARGE

## 2023-12-22 RX ORDER — FERROUS SULFATE 325(65) MG
325 TABLET ORAL DAILY
Refills: 0 | Status: DISCONTINUED | OUTPATIENT
Start: 2023-12-22 | End: 2023-12-28

## 2023-12-22 RX ORDER — GABAPENTIN 400 MG/1
100 CAPSULE ORAL AT BEDTIME
Refills: 0 | Status: DISCONTINUED | OUTPATIENT
Start: 2023-12-22 | End: 2023-12-24

## 2023-12-22 RX ADMIN — MIRTAZAPINE 22.5 MILLIGRAM(S): 45 TABLET, ORALLY DISINTEGRATING ORAL at 22:49

## 2023-12-22 RX ADMIN — ATORVASTATIN CALCIUM 10 MILLIGRAM(S): 80 TABLET, FILM COATED ORAL at 22:49

## 2023-12-22 RX ADMIN — PIPERACILLIN AND TAZOBACTAM 25 GRAM(S): 4; .5 INJECTION, POWDER, LYOPHILIZED, FOR SOLUTION INTRAVENOUS at 22:49

## 2023-12-22 RX ADMIN — SENNA PLUS 2 TABLET(S): 8.6 TABLET ORAL at 22:49

## 2023-12-22 RX ADMIN — PIPERACILLIN AND TAZOBACTAM 200 GRAM(S): 4; .5 INJECTION, POWDER, LYOPHILIZED, FOR SOLUTION INTRAVENOUS at 12:52

## 2023-12-22 RX ADMIN — GABAPENTIN 100 MILLIGRAM(S): 400 CAPSULE ORAL at 22:49

## 2023-12-22 RX ADMIN — Medication 250 MILLIGRAM(S): at 14:25

## 2023-12-22 NOTE — ED ADULT TRIAGE NOTE - CHIEF COMPLAINT QUOTE
lethargy, chills, wound to LLE, redness to b/l lower extremities. sent from wound care for r/o cellulitis. hx CHF on 3.5L NC at baseline

## 2023-12-22 NOTE — H&P ADULT - ASSESSMENT
89F pmh Afib (was on eliquis but no longer), CHF on home O2 (3L), depression, HTN, HLD, recently hospitalized for LLE cellulitis treated w/Vancjelani Ramirez while admitted and  s/p O/p abx course of doxycycline and augmentin x10 D, now sent in by outpatient wound care doctor for evaluation for suspected cellulitis and IV abx, being admitted

## 2023-12-22 NOTE — PHYSICAL EXAM
[Ankle Swelling (On Exam)] : present [Ankle Swelling Bilaterally] : bilaterally  [Ankle Swelling On The Right] : mild [Varicose Veins Of Lower Extremities] : bilaterally [Skin Ulcer] : ulcer [Ankle Swelling On The Left] : moderate [Alert] : alert [Oriented to Person] : oriented to person [Oriented to Place] : oriented to place [Oriented to Time] : oriented to time [Calm] : calm [Please See PDF for Tissue Analytics] : Please See PDF for [employer] Analytics. [1+] : right 1+ [de-identified] : nad, lethargic [de-identified] : non traumatic [de-identified] : supple [de-identified] : Oxygen 24 hrs/day [de-identified] : ambulates with assist device

## 2023-12-22 NOTE — ED PROVIDER NOTE - NS ED ROS FT
Constitutional:  (-) fever, (-) chills, (-) lethargy  Eyes:  (-) eye pain (-) visual changes  ENMT: (-) nasal discharge, (-) sore throat. (-) neck pain or stiffness  Cardiac: (-) chest pain (-) palpitations  Respiratory:  (-) cough (-) respiratory distress.   GI:  (-) nausea (-) vomiting (-) diarrhea (-) abdominal pain.  :  (-) dysuria (-) frequency (-) burning.  MS:  (-) back pain (-) joint pain (+) leg swelling   Neuro:  (-) headache (-) numbness (-) tingling (-) focal weakness  Skin:  (-) rash  Except as documented in the HPI,  all other systems are negative

## 2023-12-22 NOTE — ED PROVIDER NOTE - PHYSICAL EXAMINATION
Gen: Patient is  NAD, AAOx3, able to follow commands   HEENT: NCAT,  normal conjunctiva, tongue midline, oral mucosa moist  Lung: CTAB, no respiratory distress, no wheezes/rhonchi/rales B/L, speaking in full sentences  CV: RRR, no murmurs, rubs or gallops, distal pulses 2+ b/l  Abd: soft, NT, ND, no guarding, no rigidity, no rebound tenderness  MSK: no visible deformities, ROM normal in UE/LE  Neuro: No focal sensory or motor deficits  Skin: Warm, well perfused, L lower leg erythematous warm to touch, mild leg swelling bilaterally   Psych: normal affect, calm

## 2023-12-22 NOTE — ED PROVIDER NOTE - CLINICAL SUMMARY MEDICAL DECISION MAKING FREE TEXT BOX
89 y F, hx of afib not on AC, recently hospitalized for LLE cellulitis (medial lower leg wound), completed o/p antibiotics doxycycline and augmentin, now sent in by outpatient wound care doctor for evaluation for possible cellulitis and IV antibiotic treatment. Patient's family members at bedside. Reports that her legs have been more swollen, red than usual. Denies fever, shortness of breath, chest pain. VSWNL on arrival. PE as noted above. DDX include but not limited to cellulitis vs CHF vs fluid overload. will get labs, infectious workup, IV antibiotics, admit.

## 2023-12-22 NOTE — H&P ADULT - PROBLEM SELECTOR PLAN 1
- Recently hospitalized for LLE cellulitis treated w/Vanco Zosyn while admitted  then O/p abx course of doxycycline and augmentin x10 D  - Now w/suspected cellulitis sent in by O/P wound care for further eval   - Afebrile, VSS, clinically nontoxic   - Labs: no elevated wbc, ESR & CRP elevated  - XR left  Tib/Fib:  No gas or osseous destruction   - ED: Vanco 1g, Zosyn 3.375g  - C/w Vanco 1g, Zosyn 3.375g  - Hx/o MRSA+, f/u MRSA screen  - Dose Vanco based on level iso low gFR  - F/u Bcx, Ucx, MRSA swab   - Wound care consult (ordered)  - ID consult - Recently hospitalized for LLE cellulitis treated w/Vanco Zosyn while admitted  then O/p abx course of doxycycline and augmentin x10 D  - Now w/suspected cellulitis sent in by O/P wound care for further eval   - Afebrile, VSS, clinically nontoxic   - Labs: no elevated wbc, ESR & CRP elevated  - XR left  Tib/Fib:  No gas or osseous destruction   - ED: Vanco 1g, Zosyn 3.375g  - C/w Vanco 1g, Zosyn 3.375g  - Hx/o MRSA+, f/u MRSA screen  - Dose Vanco based on level iso low gFR  - F/u Bcx, Ucx, MRSA swab   - Wound care consult (ordered)  - ID consult to be called in AM ( Saw [facility] during last admission)

## 2023-12-22 NOTE — ASSESSMENT
[FreeTextEntry1] : Assessment     7-19-23: Wound Assessment and Plan:  The patient presents with a wound to the LLE. Swelling noted to the bilateral extremities L>R. Pt has home care nurse. Pt with CHF. Pt has been advised by Cards to use compression garments but has been unable to put them on On exam: LLE: Wound bed is red with macerated wound edges. Large amount of serous drainage. scant slough No clinical sign of infection s/p mechanical debridement today wound was covered with Sorbion Sachet XL. Theo/ACE wrap Change dressing 3 times per week. Leg elevation as tolerated Encouraged ambulation or exercise. Optimization of nutrition. Home care orders in place Follow up appointment scheduled for 4 weeks  8-11-23: Pt here for f/u Accompanied by son No new complaints Has nursing 3x/week On exam: LLE: red wound bed, no maceration. periwound with dry skin. No s/s of infection. s/p mechanical debridement NEW RLE: wound on knee, scab s/p excisional debridement No s/s of infection.  9/8/23 Patient here for follow up of LLE wound. She reports significant pain in her ankle. She had moved dressing up above the ankle due to itching. Appears to have been too tight around the ankle. Accompanied by son Has visiting nurse 3x/wk denies fever or chills or pain at wound On exam: RLE knee wound - healed LLE - wound is clean and pink. Surrounding skin dry and flaky. Mechanical debridement performed, all the dry flaky skin was removed. Skin below is healthy. Skin is somewhat hyperemic around the ankle where the bandage had been on tight and tender at this site only to touch No purulence or evidence of infection.  9-29-23: Pt here for f/u Accompanied by son Pt complains of pain at wound and drainage Nurse 3x/week On exam: LLE: no odor, superficial wound. new epithelialization seen distally No s/s of infection. s/p mechanical debridement.   10/27/93 Patient presents for follow up of LLE wound accompanied by her daughter-n-law. Patient complaint of pain and drainage. Dressing with strikethrough. Patient also has a wound on her right forearm. She is being treated for cellulitis and was given Keflex by Geriatric Nurse Practitioner x 1 week and advised to use aquaphor (seen 10-24-23) On exam: LLE: Wound is covered in dried exudate and scaly skin. Periwound with blanchable erythema Large exudate, no odor. s/p mechanical debridement Adaptic/Xtrasorb/Theo/ACE wrap Right forearm: +radial pulse Wound covered in new epithelial tissue--HEALED., dorsal  forearm erythema is present w/o warmth tenderness in duration fluctuance or crepitus. periwound skin dry and flaky. Patient applying Aquaphor. Recommend patient stop Aquaphor as it can cause irritation in some and use Eucerin cream.  12-22-23: Pt here for f/u Accompanied by son and DIL Pt with chills this morning and lethargic.  Family also says pt is up 10 lbs in weight.  On exam:  LLE:: erythema and warmth with superficial wounds. s/p mechanical debridement .  RLE:  with erythema and warmth.  no wounds.  No tenderness, induration, fluctuance or crepitus

## 2023-12-22 NOTE — H&P ADULT - NSHPPHYSICALEXAM_GEN_ALL_CORE
T(C): 37.1 (12-22-23 @ 17:00), Max: 37.9 (12-22-23 @ 12:56)  HR: 84 (12-22-23 @ 17:00) (84 - 100)  BP: 122/78 (12-22-23 @ 17:00) (120/50 - 133/67)  RR: 18 (12-22-23 @ 17:00) (18 - 20)  SpO2: 100% (12-22-23 @ 17:00) (99% - 100%)    CONSTITUTIONAL: Well groomed, no apparent distress  EYES: PERRLA and symmetric, EOMI  ENMT: MMM. Normal dentition  RESP: No respiratory distress, no use of accessory muscles; CTA b/l, no WRR  CV: +S1S2, RRR, no MRG; no peripheral edema  GI: Soft, NTND, no RGR; no palpable masses  MSK: Normal gait; No digital clubbing or cyanosis; normal pain free ROM x4 extremities   SKIN: B/l LE mildly swollen, LLE red and warm to touch   NEURO: CN II-XII grossly intact; normal reflexes, sensation intact throughout   PSYCH: Appropriate insight/judgment; A+O x 3, mood and affect appropriate T(C): 37.1 (12-22-23 @ 17:00), Max: 37.9 (12-22-23 @ 12:56)  HR: 84 (12-22-23 @ 17:00) (84 - 100)  BP: 122/78 (12-22-23 @ 17:00) (120/50 - 133/67)  RR: 18 (12-22-23 @ 17:00) (18 - 20)  SpO2: 100% (12-22-23 @ 17:00) (99% - 100%)    CONSTITUTIONAL: Well groomed, no apparent distress  EYES: PERRLA and symmetric, EOMI  ENMT: MMM  RESP: No respiratory distress, no use of accessory muscles; CTA b/l  CV: +S1S2, RRR  GI: Soft, NTND, no RGR; no palpable masses  MSK: Normal gait; No digital clubbing or cyanosis; normal pain free ROM x4 extremities   SKIN: B/l LE edema 2+, LLE red and warm to touch   NEURO: CN II-XII grossly intact; normal reflexes, sensation intact throughout   PSYCH: A+O x 3

## 2023-12-22 NOTE — H&P ADULT - NSHPLABSRESULTS_GEN_ALL_CORE
10.8   9.11  )-----------( 199      ( 22 Dec 2023 11:30 )             35.8       12-22    137  |  98  |  36<H>  ----------------------------<  157<H>  4.6   |  28  |  1.53<H>    Ca    8.7      22 Dec 2023 14:35    TPro  6.9  /  Alb  3.1<L>  /  TBili  0.7  /  DBili  x   /  AST  41<H>  /  ALT  11  /  AlkPhos  46  12-22      Troponin T, High Sensitivity Result: 52 (12.22.23 @ 11:30)    Pro-Brain Natriuretic Peptide: 3574 pg/mL (12.22.23 @ 11:30)    Sedimentation Rate, Erythrocyte: 115 mm/hr (12.22.23 @ 11:30)    C-Reactive Protein, Serum: 28 mg/L (12.22.23 @ 11:30)    Urinalysis (12.22.23 @ 12:42)    pH Urine: 6.5    Glucose Qualitative, Urine: 250 mg/dL    Blood, Urine: Negative    Color: Yellow    Urine Appearance: Clear    Bilirubin: Negative    Ketone - Urine: Negative mg/dL    Specific Gravity: 1.011    Protein, Urine: Negative mg/dL    Urobilinogen: 0.2 mg/dL    Nitrite: Negative    Leukocyte Esterase Concentration: Negative      - - - - - - - - - - - - - - - - - - - - - - - - - - - - - - - - - - - - - - - - - - - - - - - - - - - -       EKG PERSONALLY REVIEWED:  Afib 95bpm     IMAGES PERSONALLY REVIEWED:     < from: Xray Chest 1 View- PORTABLE-Urgent (12.22.23 @ 12:48) >  IMPRESSION: Clear lungs.    < from: Xray Tibia + Fibula 2 Views, Left (12.22.23 @ 12:48) >  IMPRESSION:  1.  No gas or osseous destruction is seen.

## 2023-12-22 NOTE — ED ADULT NURSE NOTE - OBJECTIVE STATEMENT
89y F BIB self accompanied by son and daughter p/w Lower left leg wound. Pt is axo4, ambulates with walker at baseline. hx of afib not on AC. Recently hospitalized for LLE cellulitis, completed o/p antibiotics doxycycline and augmentin, now sent in by outpatient wound care doctor for evaluation for possible cellulitis of the same region. Reports that her legs have been more swollen, red. Denies headache, dizziness, vision changes, chest pain, shortness of breath, abdominal pain, nausea, vomiting, diarrhea, fevers, chills, dysuria, hematuria, recent illness travel or fall. Patient undressed and placed into gown, call bell in hand and side rails up with bed in lowest position for safety. blanket provided. Comfort and safety provided. Placed on cardiac monitor, nsr.

## 2023-12-22 NOTE — H&P ADULT - HISTORY OF PRESENT ILLNESS
89F pmh Afib (was on eliquis but no longer), CHF on home O2 (3L), depression, HTN, HLD, recently hospitalized for LLE cellulitis treated w/Vanco Nicolasasyn while admitted and  s/p O/p abx course of doxycycline and augmentin x10 D, now sent in by outpatient wound care doctor for evaluation of possible cellulitis and IV antibiotic treatment. Patient endorsing that her legs are more swollen and  red than usual.       ROS: Denies CP, SOB, palpitation, N/V/D, fever, cough, chills, dizziness, abm pain, recent travel, sick contact, change in bowel and urinary habits     A 10-system ROS was performed and is negative except as noted above and/or in the HPI.

## 2023-12-22 NOTE — PATIENT PROFILE ADULT - FALL HARM RISK - HARM RISK INTERVENTIONS
Assistance with ambulation/Assistance OOB with selected safe patient handling equipment/Communicate Risk of Fall with Harm to all staff/Reinforce activity limits and safety measures with patient and family/Tailored Fall Risk Interventions/Visual Cue: Yellow wristband and red socks/Bed in lowest position, wheels locked, appropriate side rails in place/Call bell, personal items and telephone in reach/Instruct patient to call for assistance before getting out of bed or chair/Non-slip footwear when patient is out of bed/[place] to call system/Physically safe environment - no spills, clutter or unnecessary equipment/Purposeful Proactive Rounding/Room/bathroom lighting operational, light cord in reach Assistance with ambulation/Assistance OOB with selected safe patient handling equipment/Communicate Risk of Fall with Harm to all staff/Reinforce activity limits and safety measures with patient and family/Tailored Fall Risk Interventions/Visual Cue: Yellow wristband and red socks/Bed in lowest position, wheels locked, appropriate side rails in place/Call bell, personal items and telephone in reach/Instruct patient to call for assistance before getting out of bed or chair/Non-slip footwear when patient is out of bed/[place] to call system/Physically safe environment - no spills, clutter or unnecessary equipment/Purposeful Proactive Rounding/Room/bathroom lighting operational, light cord in reach

## 2023-12-22 NOTE — ED ADULT NURSE NOTE - NSFALLHARMRISKINTERV_ED_ALL_ED
Assistance OOB with selected safe patient handling equipment if applicable/Assistance with ambulation/Communicate risk of Fall with Harm to all staff, patient, and family/Monitor gait and stability/Provide patient with walking aids/Provide visual cue: red socks, yellow wristband, yellow gown, etc/Reinforce activity limits and safety measures with patient and family/Bed in lowest position, wheels locked, appropriate side rails in place/Call bell, personal items and telephone in reach/Instruct patient to call for assistance before getting out of bed/chair/stretcher/Non-slip footwear applied when patient is off stretcher/[place] to call system/Physically safe environment - no spills, clutter or unnecessary equipment/Purposeful Proactive Rounding/Room/bathroom lighting operational, light cord in reach Assistance OOB with selected safe patient handling equipment if applicable/Assistance with ambulation/Communicate risk of Fall with Harm to all staff, patient, and family/Monitor gait and stability/Provide patient with walking aids/Provide visual cue: red socks, yellow wristband, yellow gown, etc/Reinforce activity limits and safety measures with patient and family/Bed in lowest position, wheels locked, appropriate side rails in place/Call bell, personal items and telephone in reach/Instruct patient to call for assistance before getting out of bed/chair/stretcher/Non-slip footwear applied when patient is off stretcher/[place] to call system/Physically safe environment - no spills, clutter or unnecessary equipment/Purposeful Proactive Rounding/Room/bathroom lighting operational, light cord in reach

## 2023-12-22 NOTE — REVIEW OF SYSTEMS
[Loss Of Hearing] : hearing loss [Lower Ext Edema] : lower extremity edema [Shortness Of Breath] : shortness of breath [Constipation] : constipation [Skin Wound] : skin wound [Difficulty Walking] : difficulty walking [Negative] : Psychiatric [FreeTextEntry3] : cataracts [FreeTextEntry5] : Afib, CHF [FreeTextEntry6] : oxygen 24 hrs  [FreeTextEntry9] : walker to ambulate

## 2023-12-22 NOTE — ED PROVIDER NOTE - OBJECTIVE STATEMENT
89 y F, hx of afib not on AC, recently hospitalized for LLE cellulitis (medial lower leg wound), completed o/p antibiotics doxycycline and augmentin, now sent in by outpatient wound care doctor for evaluation for possible cellulitis. Patient's family members at bedside. Reports that her legs have been more swollen, red, 89 y F, hx of afib not on AC, recently hospitalized for LLE cellulitis (medial lower leg wound), completed o/p antibiotics doxycycline and augmentin, now sent in by outpatient wound care doctor for evaluation for possible cellulitis and IV antibiotic treatment. Patient's family members at bedside. Reports that her legs have been more swollen, red than usual. Denies fever, shortness of breath, chest pain.

## 2023-12-22 NOTE — ED PROVIDER NOTE - ATTENDING CONTRIBUTION TO CARE
History and physical as documented above.  Concerning for recurrent cellulitis. Low concern nec fasc. Will check labs, cultures, antibiotics, TBA.

## 2023-12-22 NOTE — PLAN
[FreeTextEntry1] : 7/19/23 Plan: LLE Wash wound with soap and water Cover with Sorbion Sachet XL. Theo/ACE wrap Wrap bilateral lower extremities with Theo/ACE Change dressing 3 times per week. Leg elevation as tolerated Encouraged ambulation or exercise. Optimization of nutrition. Home care orders in place The patient was  counseled as to the signs and symptoms of infection, and instructed  in the event they suspect new or worsening infection or if the patient is significantly ill (fever, altered mental status, etc.), to present to the nearest ED.  Follow up appointment scheduled for 4 weeks  8-11-23: BLE: Wash wound with soap and water LLE: Cover with Sorbion Sachet XL. Theo/ACE wrap RLE: medihoney/adaptic theo. Pt doesnt want ace on this leg Change dressing 3 times per week. Leg elevation as tolerated Encouraged ambulation or exercise. Optimization of nutrition. Nursing orders given The patient was counseled as to the signs and symptoms of infection, and instructed in the event they suspect new or worsening infection or if the patient is significantly ill (fever, altered mental status, etc.), to present to the nearest ED.  f/u 4 weeks   9/8/23 Plan: Wash soap with soap and water Cover with Vashe soaked gauze, leave in place for 5 min. Do not rinse. Apply extrasorb, Theo, ACE Change 3x/wk Nursing orders given pt instructed not to move dressing f/u 3-4 weeks  9-29-23: Plan: Wash soap with soap and water Cover with Vashe soaked gauze, leave in place for 5 min. Do not rinse. Apply adaptic/drawtex/ Theo, ACE Change 3x/wk Nursing orders given pt instructed not to move dressing may remove ACE bandage at night f/u 3-4 weeks  10/27/23 Plan: LLE Wash leg with soap and water Pat dry Apply Adaptic/Xtrasorb/Theo/ACE wrap from the toes. Elevate LLE as tolerated. Encourage ambulation Right forearm Stop Aquaphor Apply Eucerin moisturizer daily Continue Keflex until finished. Follow up with Geriatric Nurse Practitioner for forearm wound. The patient was  counseled as to the signs and symptoms of infection, and instructed  in the event they suspect new or worsening infection or if the patient is significantly ill (fever, altered mental status, etc.), to present to the nearest ED.  Nursing orders given  RTO to office in three to 4 weeks.  12-22-23: Plan: BLE cellulitis with LLE wound.  Rec pt go to hospital for admit, IV abx, cards consult and vascular consult.  Family in agreement and will take to [facility] today.  Rx given with instructions for ED.  (copied for chart) LLE wrapped in adaptic/abd/theo/ACE.  f/u after hospitalization.  Will plan on venous reflux studies.

## 2023-12-22 NOTE — H&P ADULT - PROBLEM SELECTOR PLAN 2
- No longer on Eliquis, no AC given hx/o multiple GIB   - C/w  metoprolol  25 mg bid  - Spironolactone 25 mg bid &Torsemide 60mg qd held given poor renal function   - Resume Jardiance on d/c   - Cardio consult to be called in AM

## 2023-12-22 NOTE — H&P ADULT - PROBLEM SELECTOR PLAN 3
- Asymptomatic   - Torsemide and spironolactone held iso poor renal function   - C/w O2 supplementation   - Cardio consult to be called in AM

## 2023-12-22 NOTE — PATIENT PROFILE ADULT - FUNCTIONAL ASSESSMENT - BASIC MOBILITY 6.
3-calculated by average/Not able to assess (calculate score using [facility] averaging method)  3-calculated by average/Not able to assess (calculate score using [facility] averaging method)

## 2023-12-23 DIAGNOSIS — I50.32 CHRONIC DIASTOLIC (CONGESTIVE) HEART FAILURE: ICD-10-CM

## 2023-12-23 LAB
ALBUMIN SERPL ELPH-MCNC: 3.3 G/DL — SIGNIFICANT CHANGE UP (ref 3.3–5)
ALBUMIN SERPL ELPH-MCNC: 3.3 G/DL — SIGNIFICANT CHANGE UP (ref 3.3–5)
ALP SERPL-CCNC: 47 U/L — SIGNIFICANT CHANGE UP (ref 40–120)
ALP SERPL-CCNC: 47 U/L — SIGNIFICANT CHANGE UP (ref 40–120)
ALT FLD-CCNC: 7 U/L — LOW (ref 10–45)
ALT FLD-CCNC: 7 U/L — LOW (ref 10–45)
ANION GAP SERPL CALC-SCNC: 12 MMOL/L — SIGNIFICANT CHANGE UP (ref 5–17)
ANION GAP SERPL CALC-SCNC: 12 MMOL/L — SIGNIFICANT CHANGE UP (ref 5–17)
AST SERPL-CCNC: 12 U/L — SIGNIFICANT CHANGE UP (ref 10–40)
AST SERPL-CCNC: 12 U/L — SIGNIFICANT CHANGE UP (ref 10–40)
BILIRUB SERPL-MCNC: 0.9 MG/DL — SIGNIFICANT CHANGE UP (ref 0.2–1.2)
BILIRUB SERPL-MCNC: 0.9 MG/DL — SIGNIFICANT CHANGE UP (ref 0.2–1.2)
BUN SERPL-MCNC: 32 MG/DL — HIGH (ref 7–23)
BUN SERPL-MCNC: 32 MG/DL — HIGH (ref 7–23)
CALCIUM SERPL-MCNC: 8.8 MG/DL — SIGNIFICANT CHANGE UP (ref 8.4–10.5)
CALCIUM SERPL-MCNC: 8.8 MG/DL — SIGNIFICANT CHANGE UP (ref 8.4–10.5)
CHLORIDE SERPL-SCNC: 100 MMOL/L — SIGNIFICANT CHANGE UP (ref 96–108)
CHLORIDE SERPL-SCNC: 100 MMOL/L — SIGNIFICANT CHANGE UP (ref 96–108)
CO2 SERPL-SCNC: 28 MMOL/L — SIGNIFICANT CHANGE UP (ref 22–31)
CO2 SERPL-SCNC: 28 MMOL/L — SIGNIFICANT CHANGE UP (ref 22–31)
CREAT SERPL-MCNC: 1.47 MG/DL — HIGH (ref 0.5–1.3)
CREAT SERPL-MCNC: 1.47 MG/DL — HIGH (ref 0.5–1.3)
CULTURE RESULTS: SIGNIFICANT CHANGE UP
CULTURE RESULTS: SIGNIFICANT CHANGE UP
EGFR: 34 ML/MIN/1.73M2 — LOW
EGFR: 34 ML/MIN/1.73M2 — LOW
GLUCOSE SERPL-MCNC: 116 MG/DL — HIGH (ref 70–99)
GLUCOSE SERPL-MCNC: 116 MG/DL — HIGH (ref 70–99)
HCT VFR BLD CALC: 35.2 % — SIGNIFICANT CHANGE UP (ref 34.5–45)
HCT VFR BLD CALC: 35.2 % — SIGNIFICANT CHANGE UP (ref 34.5–45)
HGB BLD-MCNC: 10.5 G/DL — LOW (ref 11.5–15.5)
HGB BLD-MCNC: 10.5 G/DL — LOW (ref 11.5–15.5)
INR BLD: 1.3 RATIO — HIGH (ref 0.85–1.18)
INR BLD: 1.3 RATIO — HIGH (ref 0.85–1.18)
MCHC RBC-ENTMCNC: 28.2 PG — SIGNIFICANT CHANGE UP (ref 27–34)
MCHC RBC-ENTMCNC: 28.2 PG — SIGNIFICANT CHANGE UP (ref 27–34)
MCHC RBC-ENTMCNC: 29.8 GM/DL — LOW (ref 32–36)
MCHC RBC-ENTMCNC: 29.8 GM/DL — LOW (ref 32–36)
MCV RBC AUTO: 94.4 FL — SIGNIFICANT CHANGE UP (ref 80–100)
MCV RBC AUTO: 94.4 FL — SIGNIFICANT CHANGE UP (ref 80–100)
NRBC # BLD: 0 /100 WBCS — SIGNIFICANT CHANGE UP (ref 0–0)
NRBC # BLD: 0 /100 WBCS — SIGNIFICANT CHANGE UP (ref 0–0)
PLATELET # BLD AUTO: 169 K/UL — SIGNIFICANT CHANGE UP (ref 150–400)
PLATELET # BLD AUTO: 169 K/UL — SIGNIFICANT CHANGE UP (ref 150–400)
POTASSIUM SERPL-MCNC: 3.5 MMOL/L — SIGNIFICANT CHANGE UP (ref 3.5–5.3)
POTASSIUM SERPL-MCNC: 3.5 MMOL/L — SIGNIFICANT CHANGE UP (ref 3.5–5.3)
POTASSIUM SERPL-SCNC: 3.5 MMOL/L — SIGNIFICANT CHANGE UP (ref 3.5–5.3)
POTASSIUM SERPL-SCNC: 3.5 MMOL/L — SIGNIFICANT CHANGE UP (ref 3.5–5.3)
PROT SERPL-MCNC: 6.8 G/DL — SIGNIFICANT CHANGE UP (ref 6–8.3)
PROT SERPL-MCNC: 6.8 G/DL — SIGNIFICANT CHANGE UP (ref 6–8.3)
PROTHROM AB SERPL-ACNC: 14.2 SEC — HIGH (ref 9.5–13)
PROTHROM AB SERPL-ACNC: 14.2 SEC — HIGH (ref 9.5–13)
RBC # BLD: 3.73 M/UL — LOW (ref 3.8–5.2)
RBC # BLD: 3.73 M/UL — LOW (ref 3.8–5.2)
RBC # FLD: 17.7 % — HIGH (ref 10.3–14.5)
RBC # FLD: 17.7 % — HIGH (ref 10.3–14.5)
SODIUM SERPL-SCNC: 140 MMOL/L — SIGNIFICANT CHANGE UP (ref 135–145)
SODIUM SERPL-SCNC: 140 MMOL/L — SIGNIFICANT CHANGE UP (ref 135–145)
SPECIMEN SOURCE: SIGNIFICANT CHANGE UP
SPECIMEN SOURCE: SIGNIFICANT CHANGE UP
WBC # BLD: 9.19 K/UL — SIGNIFICANT CHANGE UP (ref 3.8–10.5)
WBC # BLD: 9.19 K/UL — SIGNIFICANT CHANGE UP (ref 3.8–10.5)
WBC # FLD AUTO: 9.19 K/UL — SIGNIFICANT CHANGE UP (ref 3.8–10.5)
WBC # FLD AUTO: 9.19 K/UL — SIGNIFICANT CHANGE UP (ref 3.8–10.5)

## 2023-12-23 PROCEDURE — 99233 SBSQ HOSP IP/OBS HIGH 50: CPT

## 2023-12-23 RX ORDER — CEFTRIAXONE 500 MG/1
2000 INJECTION, POWDER, FOR SOLUTION INTRAMUSCULAR; INTRAVENOUS EVERY 24 HOURS
Refills: 0 | Status: DISCONTINUED | OUTPATIENT
Start: 2023-12-23 | End: 2023-12-26

## 2023-12-23 RX ORDER — SPIRONOLACTONE 25 MG/1
25 TABLET, FILM COATED ORAL
Refills: 0 | Status: DISCONTINUED | OUTPATIENT
Start: 2023-12-23 | End: 2023-12-28

## 2023-12-23 RX ADMIN — SENNA PLUS 2 TABLET(S): 8.6 TABLET ORAL at 22:34

## 2023-12-23 RX ADMIN — Medication 325 MILLIGRAM(S): at 11:42

## 2023-12-23 RX ADMIN — Medication 60 MILLIGRAM(S): at 12:58

## 2023-12-23 RX ADMIN — GABAPENTIN 100 MILLIGRAM(S): 400 CAPSULE ORAL at 22:34

## 2023-12-23 RX ADMIN — SPIRONOLACTONE 25 MILLIGRAM(S): 25 TABLET, FILM COATED ORAL at 17:36

## 2023-12-23 RX ADMIN — PIPERACILLIN AND TAZOBACTAM 25 GRAM(S): 4; .5 INJECTION, POWDER, LYOPHILIZED, FOR SOLUTION INTRAVENOUS at 06:25

## 2023-12-23 RX ADMIN — CEFTRIAXONE 100 MILLIGRAM(S): 500 INJECTION, POWDER, FOR SOLUTION INTRAMUSCULAR; INTRAVENOUS at 17:36

## 2023-12-23 RX ADMIN — Medication 25 MILLIGRAM(S): at 17:36

## 2023-12-23 RX ADMIN — MIRTAZAPINE 22.5 MILLIGRAM(S): 45 TABLET, ORALLY DISINTEGRATING ORAL at 22:34

## 2023-12-23 RX ADMIN — SERTRALINE 75 MILLIGRAM(S): 25 TABLET, FILM COATED ORAL at 11:42

## 2023-12-23 RX ADMIN — Medication 25 MILLIGRAM(S): at 06:25

## 2023-12-23 RX ADMIN — ATORVASTATIN CALCIUM 10 MILLIGRAM(S): 80 TABLET, FILM COATED ORAL at 22:34

## 2023-12-23 RX ADMIN — Medication 250 MILLIGRAM(S): at 12:58

## 2023-12-23 RX ADMIN — MOMETASONE FUROATE 2 PUFF(S): 220 INHALANT RESPIRATORY (INHALATION) at 11:42

## 2023-12-23 RX ADMIN — PIPERACILLIN AND TAZOBACTAM 25 GRAM(S): 4; .5 INJECTION, POWDER, LYOPHILIZED, FOR SOLUTION INTRAVENOUS at 14:27

## 2023-12-23 NOTE — PHYSICAL THERAPY INITIAL EVALUATION ADULT - GAIT DEVIATIONS NOTED, PT EVAL
decreased natali/increased time in double stance/decreased step length/decreased stride length/decreased weight-shifting ability

## 2023-12-23 NOTE — PHYSICAL THERAPY INITIAL EVALUATION ADULT - LEVEL OF INDEPENDENCE: SIT/SUPINE, REHAB EVAL
unable to perform minimum assist (75% patients effort)/moderate assist (50% patients effort)/unable to perform

## 2023-12-23 NOTE — PROGRESS NOTE ADULT - SUBJECTIVE AND OBJECTIVE BOX
[facility] Division of Hospital Medicine  Clayton Guardado  MS Teams      SUBJECTIVE / OVERNIGHT EVENTS:  No events overnight  denies cp/sob  denies LE pain  No drainage  denies f/chills    ADDITIONAL REVIEW OF SYSTEMS:    MEDICATIONS  (STANDING):  atorvastatin 10 milliGRAM(s) Oral at bedtime  ferrous    sulfate 325 milliGRAM(s) Oral daily  gabapentin 100 milliGRAM(s) Oral at bedtime  metoprolol tartrate 25 milliGRAM(s) Oral two times a day  mirtazapine 22.5 milliGRAM(s) Oral at bedtime  mometasone 220 MICROgram(s) Inhaler 2 Puff(s) Inhalation daily  piperacillin/tazobactam IVPB.. 3.375 Gram(s) IV Intermittent every 8 hours  senna 2 Tablet(s) Oral at bedtime  sertraline 75 milliGRAM(s) Oral daily  spironolactone 25 milliGRAM(s) Oral two times a day  torsemide 60 milliGRAM(s) Oral daily  vancomycin  IVPB 1000 milliGRAM(s) IV Intermittent every 24 hours    MEDICATIONS  (PRN):  acetaminophen     Tablet .. 650 milliGRAM(s) Oral every 6 hours PRN Temp greater or equal to 38C (100.4F), Mild Pain (1 - 3)  albuterol    90 MICROgram(s) HFA Inhaler 2 Puff(s) Inhalation every 6 hours PRN Shortness of Breath and/or Wheezing  aluminum hydroxide/magnesium hydroxide/simethicone Suspension 30 milliLiter(s) Oral every 4 hours PRN Dyspepsia  melatonin 3 milliGRAM(s) Oral at bedtime PRN Insomnia  ondansetron Injectable 4 milliGRAM(s) IV Push every 8 hours PRN Nausea and/or Vomiting  polyethylene glycol 3350 17 Gram(s) Oral daily PRN Constipation      I&O's Summary      PHYSICAL EXAM:  Vital Signs Last 24 Hrs  T(C): 36.7 (23 Dec 2023 09:40), Max: 37.4 (23 Dec 2023 00:20)  T(F): 98 (23 Dec 2023 09:40), Max: 99.3 (23 Dec 2023 00:20)  HR: 77 (23 Dec 2023 09:40) (77 - 84)  BP: 110/74 (23 Dec 2023 09:40) (104/72 - 122/78)  BP(mean): --  RR: 18 (23 Dec 2023 09:40) (16 - 18)  SpO2: 95% (23 Dec 2023 09:40) (95% - 100%)    Parameters below as of 23 Dec 2023 09:40  Patient On (Oxygen Delivery Method): nasal cannula  O2 Flow (L/min): 4    CONSTITUTIONAL: NAD, well-developed  EYES: PERRLA; conjunctiva and sclera clear  RESPIRATORY: Normal respiratory effort; mild crackles at the bases  CARDIOVASCULAR: Regular rate and rhythm, normal S1 and S2, no murmur; 2+ LE edema L>R Peripheral pulses are 2+ bilaterally  ABDOMEN: Nontender to palpation, normoactive bowel sounds, no rebound/guarding  MUSCULOSKELETAL: no clubbing or cyanosis of digits; no joint swelling or tenderness to palpation  PSYCH: A+O to person, place, and time; affect appropriate  NEUROLOGY: CN 2-12 are intact and symmetric; no gross sensory deficits   SKIN: erythema b/l LE with open wound R shin, wrapped in gauze    LABS:                        10.5   9.19  )-----------( 169      ( 23 Dec 2023 07:11 )             35.2     12-23    140  |  100  |  32<H>  ----------------------------<  116<H>  3.5   |  28  |  1.47<H>    Ca    8.8      23 Dec 2023 07:09    TPro  6.8  /  Alb  3.3  /  TBili  0.9  /  DBili  x   /  AST  12  /  ALT  7<L>  /  AlkPhos  47  12-23    PT/INR - ( 23 Dec 2023 07:11 )   PT: 14.2 sec;   INR: 1.30 ratio         PTT - ( 22 Dec 2023 11:30 )  PTT:30.1 sec      Urinalysis Basic - ( 23 Dec 2023 07:09 )    Color: x / Appearance: x / SG: x / pH: x  Gluc: 116 mg/dL / Ketone: x  / Bili: x / Urobili: x   Blood: x / Protein: x / Nitrite: x   Leuk Esterase: x / RBC: x / WBC x   Sq Epi: x / Non Sq Epi: x / Bacteria: x       [facility] Division of Hospital Medicine  Clayton Guardado  MS Teams      SUBJECTIVE / OVERNIGHT EVENTS:  No events overnight  denies cp/sob  denies LE pain  No drainage  denies f/chills    ADDITIONAL REVIEW OF SYSTEMS:    MEDICATIONS  (STANDING):  atorvastatin 10 milliGRAM(s) Oral at bedtime  ferrous    sulfate 325 milliGRAM(s) Oral daily  gabapentin 100 milliGRAM(s) Oral at bedtime  metoprolol tartrate 25 milliGRAM(s) Oral two times a day  mirtazapine 22.5 milliGRAM(s) Oral at bedtime  mometasone 220 MICROgram(s) Inhaler 2 Puff(s) Inhalation daily  piperacillin/tazobactam IVPB.. 3.375 Gram(s) IV Intermittent every 8 hours  senna 2 Tablet(s) Oral at bedtime  sertraline 75 milliGRAM(s) Oral daily  spironolactone 25 milliGRAM(s) Oral two times a day  torsemide 60 milliGRAM(s) Oral daily  vancomycin  IVPB 1000 milliGRAM(s) IV Intermittent every 24 hours    MEDICATIONS  (PRN):  acetaminophen     Tablet .. 650 milliGRAM(s) Oral every 6 hours PRN Temp greater or equal to 38C (100.4F), Mild Pain (1 - 3)  albuterol    90 MICROgram(s) HFA Inhaler 2 Puff(s) Inhalation every 6 hours PRN Shortness of Breath and/or Wheezing  aluminum hydroxide/magnesium hydroxide/simethicone Suspension 30 milliLiter(s) Oral every 4 hours PRN Dyspepsia  melatonin 3 milliGRAM(s) Oral at bedtime PRN Insomnia  ondansetron Injectable 4 milliGRAM(s) IV Push every 8 hours PRN Nausea and/or Vomiting  polyethylene glycol 3350 17 Gram(s) Oral daily PRN Constipation      I&O's Summary      PHYSICAL EXAM:  Vital Signs Last 24 Hrs  T(C): 36.7 (23 Dec 2023 09:40), Max: 37.4 (23 Dec 2023 00:20)  T(F): 98 (23 Dec 2023 09:40), Max: 99.3 (23 Dec 2023 00:20)  HR: 77 (23 Dec 2023 09:40) (77 - 84)  BP: 110/74 (23 Dec 2023 09:40) (104/72 - 122/78)  BP(mean): --  RR: 18 (23 Dec 2023 09:40) (16 - 18)  SpO2: 95% (23 Dec 2023 09:40) (95% - 100%)    Parameters below as of 23 Dec 2023 09:40  Patient On (Oxygen Delivery Method): nasal cannula  O2 Flow (L/min): 4    CONSTITUTIONAL: NAD, well-developed  EYES: PERRLA; conjunctiva and sclera clear  RESPIRATORY: Normal respiratory effort; mild crackles at the bases  CARDIOVASCULAR: Regular rate and rhythm, normal S1 and S2, no murmur; 2+ LE edema L>R Peripheral pulses are 2+ bilaterally  ABDOMEN: Nontender to palpation, normoactive bowel sounds, no rebound/guarding  MUSCULOSKELETAL: no clubbing or cyanosis of digits; no joint swelling or tenderness to palpation  PSYCH: A+O to person, place, and time; affect appropriate  NEUROLOGY: CN 2-12 are intact and symmetric; no gross sensory deficits   SKIN: erythema b/l LE with open wound R shin, wrapped in gauze    LABS:                        10.5   9.19  )-----------( 169      ( 23 Dec 2023 07:11 )             35.2     12-23    140  |  100  |  32<H>  ----------------------------<  116<H>  3.5   |  28  |  1.47<H>    Ca    8.8      23 Dec 2023 07:09    TPro  6.8  /  Alb  3.3  /  TBili  0.9  /  DBili  x   /  AST  12  /  ALT  7<L>  /  AlkPhos  47  12-23    PT/INR - ( 23 Dec 2023 07:11 )   PT: 14.2 sec;   INR: 1.30 ratio         PTT - ( 22 Dec 2023 11:30 )  PTT:30.1 sec      Urinalysis Basic - ( 23 Dec 2023 07:09 )    Color: x / Appearance: x / SG: x / pH: x  Gluc: 116 mg/dL / Ketone: x  / Bili: x / Urobili: x   Blood: x / Protein: x / Nitrite: x   Leuk Esterase: x / RBC: x / WBC x   Sq Epi: x / Non Sq Epi: x / Bacteria: x

## 2023-12-23 NOTE — CONSULT NOTE ADULT - ASSESSMENT
89F pmh Afib (was on eliquis but no longer), CHF on home O2 (3L), depression, HTN, HLD, recently hospitalized for LLE cellulitis at [place] treated w/Vanco Zosyn while admitted and  s/p O/p abx course of doxycycline and augmentin x10 D, now sent in by outpatient wound care with open wound, fever, chills and surrounding cellulitis    RECOMMENDATIONS  Consistent with wound with surrounding nonpurulent cellulitis. Despite nares MRSA screens low suspicion for MRSA or other pathogen that would warrant renal exposure to Vanco. Also noted that on prior admission there was improvement but now worsening with doxy/augmentin Rx suggesting possible concerns for resistant organism or just failure with low tissue levels of selected ab. Will change abx to  -Ceftriaxone 2 grams IV daily  -wound care  -rec sending wound culture    Recs to follow    Thank you for consulting us and involving us in the management of this most interesting and challenging case.  We will follow along in the care of this patient. Please call us at [phone] or text me directly on my cell# at [phone] with any concerns.   89F pmh Afib (was on eliquis but no longer), CHF on home O2 (3L), depression, HTN, HLD, recently hospitalized for LLE cellulitis at [place] treated w/Vanco Zosyn while admitted and  s/p O/p abx course of doxycycline and augmentin x10 D, now sent in by outpatient wound care with open wound, fever, chills and surrounding cellulitis    RECOMMENDATIONS  Consistent with wound with surrounding nonpurulent cellulitis. Despite nares MRSA screens low suspicion for MRSA or other pathogen that would warrant renal exposure to Vanco. Also noted that on prior admission there was improvement but now worsening with doxy/augmentin Rx suggesting possible concerns for resistant organism or just failure with low tissue levels of selected ab. Will change abx to  -Ceftriaxone 2 grams IV daily  -wound care  -rec sending wound culture    Recs to follow    Thank you for consulting us and involving us in the management of this most interesting and challenging case.  We will follow along in the care of this patient. Please call us at [phone] or text me directly on my cell# at [phone] with any concerns.   89F pmh Afib (was on eliquis but no longer), CHF on home O2 (3L), depression, HTN, HLD, recently hospitalized for LLE cellulitis at [place] treated w/Vanco Zosyn while admitted and  s/p O/p abx course of doxycycline and augmentin x10 D, now sent in by outpatient wound care with open wound, fever, chills and surrounding cellulitis    RECOMMENDATIONS  Consistent with wound with surrounding nonpurulent cellulitis. Despite nares MRSA screens low suspicion for MRSA or other pathogen that would warrant renal exposure to Vanco. Also noted that on prior admission there was improvement but now worsening with doxy/augmentin Rx suggesting possible concerns for resistant organism or just failure with low tissue levels of selected ab. Will change abx to  -Ceftriaxone 2 grams IV daily  -wound care  -rec sending wound culture  -family wants to make sure she is DNR as per her and their wishes    Recs to follow    Thank you for consulting us and involving us in the management of this most interesting and challenging case.  We will follow along in the care of this patient. Please call us at [phone] or text me directly on my cell# at [phone] with any concerns.   89F pmh Afib (was on eliquis but no longer), CHF on home O2 (3L), depression, HTN, HLD, recently hospitalized for LLE cellulitis at [place] treated w/Vanco Zosyn while admitted and  s/p O/p abx course of doxycycline and augmentin x10 D, now sent in by outpatient wound care with open wound, fever, chills and surrounding cellulitis    RECOMMENDATIONS  Consistent with wound with surrounding nonpurulent cellulitis. Despite nares MRSA screens low suspicion for MRSA or other pathogen that would warrant renal exposure to Vanco. Also noted that on prior admission there was improvement but now worsening with doxy/augmentin Rx suggesting possible concerns for resistant organism or just failure with low tissue levels of selected ab. Will change abx to  -Ceftriaxone 2 grams IV daily  -wound care  -rec sending wound culture  -family wants to make sure she is DNR as per her and their wishes    Recs to follow    Thank you for consulting us and involving us in the management of this most interesting and challenging case.  We will follow along in the care of this patient. Please call us at [phone] or text me directly on my cell# at [phone] with any concerns.

## 2023-12-23 NOTE — PHYSICAL THERAPY INITIAL EVALUATION ADULT - LEVEL OF INDEPENDENCE, REHAB EVAL
pt refuse , son/ PT  educ pt re pressure relief as well pt still refuse/unable to perform pt refuse , son/ PT  educ pt re pressure relief as well pt still refuse/minimum assist (75% patients effort)/unable to perform

## 2023-12-23 NOTE — PHYSICAL THERAPY INITIAL EVALUATION ADULT - IMPAIRED TRANSFERS: SIT/STAND, REHAB EVAL
decrease endurance , mod of 1 to transfer at rolling walker x 3 trials with 3 sitting rest periods x 2 min each/impaired balance/pain/impaired postural control/decreased strength

## 2023-12-23 NOTE — CONSULT NOTE ADULT - ATTENDING COMMENTS
Patient was discussed with the fellow.  I independently examined patient on 12/24/23.  I agree with the above except for the following:    No overt HF on exam.  JVP low.  Has localized swelling on her left leg fernanda-cellulitis.  She remains on stable home dose O2 and lower pro BNP compared to prior (lowest since 11/2022). Would continue on home dose of torsemide as above.    Heart failure team will sign off for now, please call with any further questions.

## 2023-12-23 NOTE — CONSULT NOTE ADULT - SUBJECTIVE AND OBJECTIVE BOX
Patient seen and evaluated at bedside      HPI:  Ms. Mix 89 year old woman with history of HFpEF (LVEF 68% 7/2022), MR s/p Mitraclip (8/30/16), mixed pre- and post-capillary pulmonary hypertension, permanent AFib off AC since 11/2022 due to GIB requiring transfusions, hypertension, hyperlipidemia, CKD (b/l Cr 1.2-1.3), SHERYL and hypoxia on home O2 3-4L, resident of [facility] who presents for c/f lower extremity cellulitis. She had a recent hospitalization earlier this month for the same problem, now presenting again with worsening lower extremity erythema, discomfort, and swelling  She otherwise feels well, denying any SOB, CP, light-headedness, or palpitations. She is on her baseline 3-4L NC, reporting SOB only with exertion which is also at her baseline. She usually receives assistance with her meds at the [facility], however, she states she is unsure if she was given her home torsemide over the past few days. She received torsemide in the hospital and reports good UOP.    CXR was obtained that was stable, and pro-BNP is below her baseline. While she reproted worsening lower extremity edema over the past few days, she describes improvement after receiving IV antibiotics over the past day.       PMHx:   HTN (hypertension)  HLD (hyperlipidemia)  Atrial fibrillation  Syncope  Depression  Leg edema  Mitral valve stenosis, unspecified etiology  Hearing loss of left ear        PSHx:   H/O knee surgery  H/O external ear surgery  Cataracta        Allergies:  codeine (Other)      Home Meds:  ?? Albuterol Sulfate (2.5 MG/3ML) 0.083% Inhalation Nebulization Solution; INHALE 1 VIAL  BY NEBULIZER EVERY 4-6 HOURS AS NEEDED FOR WHEEZING  ?? Atorvastatin Calcium 10 MG Oral Tablet; 'TAKE 1 TABLET AT BEDTIME  ?? Ferrous Sulfate 325 (65 Fe) MG Oral Tablet; TAKE 1 TABLET DAILY AS DIRECTED  ?? Fluticasone Propionate 50 MCG/ACT Nasal Suspension; USE 1 SPRAY IN EACH  NOSTRIL ONCE DAILY (after nasal saline use)  ?? Jardiance 10 MG Oral Tablet; Take 1 tablet daily  ?? Loperamide HCl - 2 MG Oral Tablet; TAKE AS NEEDED  ?? Melatonin 3 MG Oral Tablet; TAKE 2 TABLETS (6mg) 2-3 HOURS BEFORE BEDTIME  ?? Metoprolol Tartrate 25 MG Oral Tablet; TAKE 1 TABLET TWICE DAILY  ?? Mirtazapine 15 MG Oral Tablet; TAKE 1.5 TABLET AT BEDTIME  ?? Nebulizer System All-In-One; USE AS DIRECTED  ?? Polyethylene Glycol 3350 17 GM/SCOOP Oral Powder; MIX 17 GM IN 8 OUNCES OF  LIQUID AND DRINK ONCE DAILY AS NEEDED if no BM x 1 day, and increase to twice  daily if no BM x 2 days or more until BM achieved  ?? Ramelteon 8 MG Oral Tablet; TAKE 1 TABLET AT BEDTIME  ?? Saline Nasal Spray 0.65 % Nasal Solution; USE 1 SPRAY IN EACH NOSTRIL TWICE  DAILY  ?? Santyl 250 UNIT/GM External Ointment; APPLY TO LEFT LEG ULCER ONCE DAILY (or  more frequently if the dressing becomes soiled) AS DIRECTED until granulation tissue  is well established  ?? Senna 8.6 MG Oral Tablet; take 2 tablet at bedtime  ?? Sertraline HCl - 50 MG Oral Tablet; TAKE 1 TABLET DAILY  ?? Spironolactone 25 MG Oral Tablet; TAKE 1 TABLET BY MOUTH 2 TIMES A DAY  ?? Tegaderm Hydrocolloid Thin External; USE TOPICALLY AS DIRECTED  ?? Torsemide 20 MG Oral Tablet; TAKE 3 TABLETS (60MG) BY MOUTH EVERY DAY  ?? Triamcinolone Acetonide 0.1 % External Ointment; apply twice/day to legs  ?? Tylenol 325 MG Oral Tablet; TAKE 2 TABLET Every 6 hours PRN pain      Current Medications:   acetaminophen     Tablet .. 650 milliGRAM(s) Oral every 6 hours PRN  albuterol    90 MICROgram(s) HFA Inhaler 2 Puff(s) Inhalation every 6 hours PRN  aluminum hydroxide/magnesium hydroxide/simethicone Suspension 30 milliLiter(s) Oral every 4 hours PRN  atorvastatin 10 milliGRAM(s) Oral at bedtime  ferrous    sulfate 325 milliGRAM(s) Oral daily  gabapentin 100 milliGRAM(s) Oral at bedtime  melatonin 3 milliGRAM(s) Oral at bedtime PRN  metoprolol tartrate 25 milliGRAM(s) Oral two times a day  mirtazapine 22.5 milliGRAM(s) Oral at bedtime  mometasone 220 MICROgram(s) Inhaler 2 Puff(s) Inhalation daily  ondansetron Injectable 4 milliGRAM(s) IV Push every 8 hours PRN  piperacillin/tazobactam IVPB.. 3.375 Gram(s) IV Intermittent every 8 hours  polyethylene glycol 3350 17 Gram(s) Oral daily PRN  senna 2 Tablet(s) Oral at bedtime  sertraline 75 milliGRAM(s) Oral daily  spironolactone 25 milliGRAM(s) Oral two times a day  torsemide 60 milliGRAM(s) Oral daily  vancomycin  IVPB 1000 milliGRAM(s) IV Intermittent every 24 hours      REVIEW OF SYSTEMS:  All other review of systems is negative unless indicated above.    Physical Exam:  T(F): 98 (12-23), Max: 99.3 (12-23)  HR: 77 (12-23) (77 - 84)  BP: 110/74 (12-23) (104/72 - 122/78)  RR: 18 (12-23)  SpO2: 95% (12-23)  Appearance: No acute distress; well appearing  Eyes:  EOMI  HEENT: Normal oral mucosa  Cardiovascular: RRR, S1, S2, + systolic murmurs, rubs, or gallops;   Respiratory: Clear to auscultation bilaterally  Gastrointestinal: soft, non-tender, non-distended  Extremities: b/l lower ext pitting edema, warmth and erythema left leg in bandage   Neurologic: Non-focal  Psychiatry: AAOx3, mood & affect appropriate      Echo:   11/2022  IMPRESSION:  1. Suboptimal technical quality due to reported patient uncooperativeness   during scanning.  2. Normal left ventricular size and systolic function with estimated   ejection fraction 60-65%. Moderate left ventricular hypertrophy.  3. Right ventricular enlargement with decreased function.  4. Biatrial enlargement.  5. Bushra-clip present. Mild to moderate mitral regurgitation.  6. Mild to moderate tricuspid regurgitation.  7. Pulmonary artery systolic pressure estimated at 47 mmHg, assuming a   right atrial pressure of 8 mmHg, consistent with mild pulmonary   hypertension (note: suboptimal Doppler; reported measurement may   underestimate severity of pulmonary hypertension).      CXR:   Clear lungs    Labs: Personally reviewed                        10.5   9.19  )-----------( 169      ( 23 Dec 2023 07:11 )             35.2     12-23    140  |  100  |  32<H>  ----------------------------<  116<H>  3.5   |  28  |  1.47<H>    Ca    8.8      23 Dec 2023 07:09    TPro  6.8  /  Alb  3.3  /  TBili  0.9  /  DBili  x   /  AST  12  /  ALT  7<L>  /  AlkPhos  47  12-23    PT/INR - ( 23 Dec 2023 07:11 )   PT: 14.2 sec;   INR: 1.30 ratio         PTT - ( 22 Dec 2023 11:30 )  PTT:30.1 sec    CARDIAC MARKERS ( 22 Dec 2023 11:30 )  52 ng/L / x     / x     / x     / x     / x                       Patient seen and evaluated at bedside      HPI:  Ms. Mix 89 year old woman with history of HFpEF (LVEF 68% 7/2022), MR s/p Mitraclip (8/30/16), mixed pre- and post-capillary pulmonary hypertension, permanent AFib off AC since 11/2022 due to GIB requiring transfusions, hypertension, hyperlipidemia, CKD (b/l Cr 1.2-1.3), SHERYL and hypoxia on home O2 3-4L, resident of [facility] who presents for c/f lower extremity cellulitis. She had a recent hospitalization earlier this month for the same problem, now presenting again with worsening lower extremity erythema, discomfort, and swelling  She otherwise feels well, denying any SOB, CP, light-headedness, or palpitations. She is on her baseline 3-4L NC, reporting SOB only with exertion which is also at her baseline. She usually receives assistance with her meds at the [facility], however, she states she is unsure if she was given her home torsemide over the past few days. She received torsemide in the hospital and reports good UOP.    CXR was obtained that was stable, and pro-BNP is below her baseline. While she reproted worsening lower extremity edema over the past few days, she describes improvement after receiving IV antibiotics over the past day.       PMHx:   HTN (hypertension)  HLD (hyperlipidemia)  Atrial fibrillation  Syncope  Depression  Leg edema  Mitral valve stenosis, unspecified etiology  Hearing loss of left ear        PSHx:   H/O knee surgery  H/O external ear surgery  Cataracta        Allergies:  codeine (Other)      Home Meds:  ?? Albuterol Sulfate (2.5 MG/3ML) 0.083% Inhalation Nebulization Solution; INHALE 1 VIAL  BY NEBULIZER EVERY 4-6 HOURS AS NEEDED FOR WHEEZING  ?? Atorvastatin Calcium 10 MG Oral Tablet; 'TAKE 1 TABLET AT BEDTIME  ?? Ferrous Sulfate 325 (65 Fe) MG Oral Tablet; TAKE 1 TABLET DAILY AS DIRECTED  ?? Fluticasone Propionate 50 MCG/ACT Nasal Suspension; USE 1 SPRAY IN EACH  NOSTRIL ONCE DAILY (after nasal saline use)  ?? Jardiance 10 MG Oral Tablet; Take 1 tablet daily  ?? Loperamide HCl - 2 MG Oral Tablet; TAKE AS NEEDED  ?? Melatonin 3 MG Oral Tablet; TAKE 2 TABLETS (6mg) 2-3 HOURS BEFORE BEDTIME  ?? Metoprolol Tartrate 25 MG Oral Tablet; TAKE 1 TABLET TWICE DAILY  ?? Mirtazapine 15 MG Oral Tablet; TAKE 1.5 TABLET AT BEDTIME  ?? Nebulizer System All-In-One; USE AS DIRECTED  ?? Polyethylene Glycol 3350 17 GM/SCOOP Oral Powder; MIX 17 GM IN 8 OUNCES OF  LIQUID AND DRINK ONCE DAILY AS NEEDED if no BM x 1 day, and increase to twice  daily if no BM x 2 days or more until BM achieved  ?? Ramelteon 8 MG Oral Tablet; TAKE 1 TABLET AT BEDTIME  ?? Saline Nasal Spray 0.65 % Nasal Solution; USE 1 SPRAY IN EACH NOSTRIL TWICE  DAILY  ?? Santyl 250 UNIT/GM External Ointment; APPLY TO LEFT LEG ULCER ONCE DAILY (or  more frequently if the dressing becomes soiled) AS DIRECTED until granulation tissue  is well established  ?? Senna 8.6 MG Oral Tablet; take 2 tablet at bedtime  ?? Sertraline HCl - 50 MG Oral Tablet; TAKE 1 TABLET DAILY  ?? Spironolactone 25 MG Oral Tablet; TAKE 1 TABLET BY MOUTH 2 TIMES A DAY  ?? Tegaderm Hydrocolloid Thin External; USE TOPICALLY AS DIRECTED  ?? Torsemide 20 MG Oral Tablet; TAKE 3 TABLETS (60MG) BY MOUTH EVERY DAY  ?? Triamcinolone Acetonide 0.1 % External Ointment; apply twice/day to legs  ?? Tylenol 325 MG Oral Tablet; TAKE 2 TABLET Every 6 hours PRN pain      Current Medications:   acetaminophen     Tablet .. 650 milliGRAM(s) Oral every 6 hours PRN  albuterol    90 MICROgram(s) HFA Inhaler 2 Puff(s) Inhalation every 6 hours PRN  aluminum hydroxide/magnesium hydroxide/simethicone Suspension 30 milliLiter(s) Oral every 4 hours PRN  atorvastatin 10 milliGRAM(s) Oral at bedtime  ferrous    sulfate 325 milliGRAM(s) Oral daily  gabapentin 100 milliGRAM(s) Oral at bedtime  melatonin 3 milliGRAM(s) Oral at bedtime PRN  metoprolol tartrate 25 milliGRAM(s) Oral two times a day  mirtazapine 22.5 milliGRAM(s) Oral at bedtime  mometasone 220 MICROgram(s) Inhaler 2 Puff(s) Inhalation daily  ondansetron Injectable 4 milliGRAM(s) IV Push every 8 hours PRN  piperacillin/tazobactam IVPB.. 3.375 Gram(s) IV Intermittent every 8 hours  polyethylene glycol 3350 17 Gram(s) Oral daily PRN  senna 2 Tablet(s) Oral at bedtime  sertraline 75 milliGRAM(s) Oral daily  spironolactone 25 milliGRAM(s) Oral two times a day  torsemide 60 milliGRAM(s) Oral daily  vancomycin  IVPB 1000 milliGRAM(s) IV Intermittent every 24 hours      REVIEW OF SYSTEMS:  All other review of systems is negative unless indicated above.    Physical Exam:  T(F): 98 (12-23), Max: 99.3 (12-23)  HR: 77 (12-23) (77 - 84)  BP: 110/74 (12-23) (104/72 - 122/78)  RR: 18 (12-23)  SpO2: 95% (12-23)  Appearance: No acute distress; well appearing  Eyes:  EOMI  HEENT: Normal oral mucosa  Cardiovascular: RRR, S1, S2, + systolic murmurs, rubs, or gallops;   Respiratory: Clear to auscultation bilaterally  Gastrointestinal: soft, non-tender, non-distended  Extremities: b/l lower ext pitting edema, warmth and erythema left leg in bandage   Neurologic: Non-focal  Psychiatry: AAOx3, mood & affect appropriate      Echo:   11/2022  IMPRESSION:  1. Suboptimal technical quality due to reported patient uncooperativeness   during scanning.  2. Normal left ventricular size and systolic function with estimated   ejection fraction 60-65%. Moderate left ventricular hypertrophy.  3. Right ventricular enlargement with decreased function.  4. Biatrial enlargement.  5. Bushra-clip present. Mild to moderate mitral regurgitation.  6. Mild to moderate tricuspid regurgitation.  7. Pulmonary artery systolic pressure estimated at 47 mmHg, assuming a   right atrial pressure of 8 mmHg, consistent with mild pulmonary   hypertension (note: suboptimal Doppler; reported measurement may   underestimate severity of pulmonary hypertension).      CXR:   Clear lungs    Labs: Personally reviewed                        10.5   9.19  )-----------( 169      ( 23 Dec 2023 07:11 )             35.2     12-23    140  |  100  |  32<H>  ----------------------------<  116<H>  3.5   |  28  |  1.47<H>    Ca    8.8      23 Dec 2023 07:09    TPro  6.8  /  Alb  3.3  /  TBili  0.9  /  DBili  x   /  AST  12  /  ALT  7<L>  /  AlkPhos  47  12-23    PT/INR - ( 23 Dec 2023 07:11 )   PT: 14.2 sec;   INR: 1.30 ratio         PTT - ( 22 Dec 2023 11:30 )  PTT:30.1 sec    CARDIAC MARKERS ( 22 Dec 2023 11:30 )  52 ng/L / x     / x     / x     / x     / x

## 2023-12-23 NOTE — PROGRESS NOTE ADULT - PROBLEM SELECTOR PLAN 7
- DVT ppx: No AC given hx/o GIB   - Diet: DASH  - PT consult    Dispo: Expected back to [facility] in 2-3 days - DVT ppx: No AC given hx/o GIB   - Diet: DASH  - PT consult    Dispo: Expected back to [facility] in 2-3 days

## 2023-12-23 NOTE — PHYSICAL THERAPY INITIAL EVALUATION ADULT - GENERAL OBSERVATIONS, REHAB EVAL
pt received in bed nad top rails up and  siderial HOB 35 degrees on 4 L nc O2 , son Ronald present at b/s ( son report in early Dec pt had Gout ) , pt painful B feet to light touch refuse oob , rn Sayid present to give tylenol , pt report not getting up today ; PT spoke with NP Usha Sellers pt to get xray foot , NP inform re B foot pain , fxl assess held

## 2023-12-23 NOTE — PHYSICAL THERAPY INITIAL EVALUATION ADULT - NAME OF DISCHARGE PLANNER
Sarahy Mendez CM [id number] Sarahy Mendez CM [id number] Sarahy Mendez CM [id number] ;12/27/23 spoke with STEFANY as well Sarahy

## 2023-12-23 NOTE — PHYSICAL THERAPY INITIAL EVALUATION ADULT - REFERRING PHYSICIAN, REHAB EVAL
MD ORDER PT EVAL and TREAT Benjamin Fenton MD ORDER PT EVAL and TREAT Benjamin Fenton MD ORDER PT EVAL and TREAT; PT spoke with Nancy Pastrana 12/27/23 no further xrays being done , no foot xrays being done can see for PT

## 2023-12-23 NOTE — CONSULT NOTE ADULT - SUBJECTIVE AND OBJECTIVE BOX
[facility] DIVISION of INFECTIOUS DISEASE  Pasquale Long MD PhD, Corrine Dawkins MD, Suzy Real MD, Amanda Vera MD, Neno Luna MD  and providing coverage with Diana Zapata MD  Providing Infectious Disease Consultations at [facility], [facility], [facility], [facility], [facility]'s    Office# [phone] to schedule follow up appointments  Answering Service for urgent calls or New Consults [phone]  Cell# to text for urgent issues Pasquale Long [phone]     HPI:  89F pmh Afib (was on eliquis but no longer), CHF on home O2 (3L), depression, HTN, HLD, recently hospitalized for LLE cellulitis at [facility] treated w/Vanco Zosyn while admitted and  s/p O/p abx course of doxycycline and augmentin x10 D, now sent in by outpatient wound care doctor for evaluation of possible cellulitis and IV antibiotic treatment. Patient endorsing that her legs are more swollen and  red than usual. Outpatient doc also reporting fever and chills      PAST MEDICAL & SURGICAL HISTORY:  HTN (hypertension)  HLD (hyperlipidemia)  Atrial fibrillation On Pradaxa  Syncope  Depression  Leg edema  Mitral valve stenosis, unspecified etiology  Hearing loss of left ear      H/O knee surgery      H/O external ear surgery      Cataract      Antimicrobials      Immunological      Other  acetaminophen     Tablet .. 650 milliGRAM(s) Oral every 6 hours PRN  albuterol    90 MICROgram(s) HFA Inhaler 2 Puff(s) Inhalation every 6 hours PRN  aluminum hydroxide/magnesium hydroxide/simethicone Suspension 30 milliLiter(s) Oral every 4 hours PRN  atorvastatin 10 milliGRAM(s) Oral at bedtime  ferrous    sulfate 325 milliGRAM(s) Oral daily  gabapentin 100 milliGRAM(s) Oral at bedtime  melatonin 3 milliGRAM(s) Oral at bedtime PRN  metoprolol tartrate 25 milliGRAM(s) Oral two times a day  mirtazapine 22.5 milliGRAM(s) Oral at bedtime  mometasone 220 MICROgram(s) Inhaler 2 Puff(s) Inhalation daily  ondansetron Injectable 4 milliGRAM(s) IV Push every 8 hours PRN  polyethylene glycol 3350 17 Gram(s) Oral daily PRN  senna 2 Tablet(s) Oral at bedtime  sertraline 75 milliGRAM(s) Oral daily  spironolactone 25 milliGRAM(s) Oral two times a day  torsemide 60 milliGRAM(s) Oral daily      Allergies    codeine (Other)    Intolerances        SOCIAL HISTORY:  no toxic habits reported      FAMILY HISTORY:  Family history of blood disorder (Mother)        ROS:    EYES:  Negative  blurry vision or double vision  GASTROINTESTINAL:  Negative for nausea, vomiting, diarrhea  -otherwise negative except for subjective    Vital Signs Last 24 Hrs  T(C): 36.7 (23 Dec 2023 09:40), Max: 37.4 (23 Dec 2023 00:20)  T(F): 98 (23 Dec 2023 09:40), Max: 99.3 (23 Dec 2023 00:20)  HR: 77 (23 Dec 2023 09:40) (77 - 84)  BP: 110/74 (23 Dec 2023 09:40) (104/72 - 122/78)  BP(mean): --  RR: 18 (23 Dec 2023 09:40) (16 - 18)  SpO2: 95% (23 Dec 2023 09:40) (95% - 100%)    Parameters below as of 23 Dec 2023 09:40  Patient On (Oxygen Delivery Method): nasal cannula  O2 Flow (L/min): 4      PE:  In no distress  HEENT:  NC, PERRL, sclerae anicteric, conjunctivae clear, EOMI.  Sinuses nontender, no nasal exudate.  No buccal or pharyngeal lesions, erythema or exudate  Neck:  Supple, no adenopathy  Lungs:  No accessory muscle use, bilaterally clear to auscultation  Cor:  distant  Abd:  Symmetric, normoactive BS.  Soft, nontender, no masses, guarding or rebound.  Liver and spleen not enlarged  Extrem:  LEs with sig edema now decreased from prior, left leg with large open well granulated wound but with surrounding erythema and warmth several centimeters around open area, no purulent drainage  Neuro: grossly intact  Musc: moving all limbs freely, no focal deficits        LABS:                        10.5   9.19  )-----------( 169      ( 23 Dec 2023 07:11 )             35.2       WBC Count: 9.19 K/uL (12-23-23 @ 07:11)  WBC Count: 9.11 K/uL (12-22-23 @ 11:30)      12-23    140  |  100  |  32<H>  ----------------------------<  116<H>  3.5   |  28  |  1.47<H>    Ca    8.8      23 Dec 2023 07:09    TPro  6.8  /  Alb  3.3  /  TBili  0.9  /  DBili  x   /  AST  12  /  ALT  7<L>  /  AlkPhos  47  12-23      Creatinine: 1.47 mg/dL (12-23-23 @ 07:09)  Creatinine: 1.53 mg/dL (12-22-23 @ 14:35)  Creatinine: 1.52 mg/dL (12-22-23 @ 11:30)      Urinalysis Basic - ( 23 Dec 2023 07:09 )    Color: x / Appearance: x / SG: x / pH: x  Gluc: 116 mg/dL / Ketone: x  / Bili: x / Urobili: x   Blood: x / Protein: x / Nitrite: x   Leuk Esterase: x / RBC: x / WBC x   Sq Epi: x / Non Sq Epi: x / Bacteria: x      MICROBIOLOGY:      RADIOLOGY & ADDITIONAL STUDIES:    --< from: Xray Tibia + Fibula 2 Views, Left (12.22.23 @ 12:48) >  ACC: [id number] EXAM:  XR TIB FIB AP LAT 2 VIEWS LT   ORDERED BY: ANALI COURTNEY     PROCEDURE DATE:  12/22/2023          INTERPRETATION:  XR TIBIA FIBULA AP AND LATERAL 2 VIEWS LEFT    HISTORY: Infection. Evaluate for gas.    VIEWS: 2  IMAGES: 3    COMPARISON: Left tibia and fibula x-rays dated 11/27/2023.    FINDINGS:    OSSEOUS STRUCTURES    Fractures:  None.    LEFT KNEE JOINTS    Joint Space(s):  There is moderate medial compartment joint space   narrowing. Tiny osteophytes are present.    LEFT ANKLE JOINTS    Joint Space(s):  Maintained.    SOFT TISSUES:  Generalized subcutaneous edema appears to be present. No   gas is seen. Moderate to severe atherosclerotic calcifications are   present.    IMPRESSION:  1.  No gas or osseous destruction isseen.     [facility] DIVISION of INFECTIOUS DISEASE  Pasquale Long MD PhD, Corrine Dawkins MD, Suzy Real MD, Amanda Vera MD, Neno Luna MD  and providing coverage with Diana Zapata MD  Providing Infectious Disease Consultations at [facility], [facility], [place], [facility], [facility]'s    Office# [phone] to schedule follow up appointments  Answering Service for urgent calls or New Consults [phone]  Cell# to text for urgent issues Pasquale Long [phone]     HPI:  89F pmh Afib (was on eliquis but no longer), CHF on home O2 (3L), depression, HTN, HLD, recently hospitalized for LLE cellulitis at [facility] treated w/Vanco Zosyn while admitted and  s/p O/p abx course of doxycycline and augmentin x10 D, now sent in by outpatient wound care doctor for evaluation of possible cellulitis and IV antibiotic treatment. Patient endorsing that her legs are more swollen and  red than usual. Outpatient doc also reporting fever and chills      PAST MEDICAL & SURGICAL HISTORY:  HTN (hypertension)  HLD (hyperlipidemia)  Atrial fibrillation On Pradaxa  Syncope  Depression  Leg edema  Mitral valve stenosis, unspecified etiology  Hearing loss of left ear      H/O knee surgery      H/O external ear surgery      Cataract      Antimicrobials      Immunological      Other  acetaminophen     Tablet .. 650 milliGRAM(s) Oral every 6 hours PRN  albuterol    90 MICROgram(s) HFA Inhaler 2 Puff(s) Inhalation every 6 hours PRN  aluminum hydroxide/magnesium hydroxide/simethicone Suspension 30 milliLiter(s) Oral every 4 hours PRN  atorvastatin 10 milliGRAM(s) Oral at bedtime  ferrous    sulfate 325 milliGRAM(s) Oral daily  gabapentin 100 milliGRAM(s) Oral at bedtime  melatonin 3 milliGRAM(s) Oral at bedtime PRN  metoprolol tartrate 25 milliGRAM(s) Oral two times a day  mirtazapine 22.5 milliGRAM(s) Oral at bedtime  mometasone 220 MICROgram(s) Inhaler 2 Puff(s) Inhalation daily  ondansetron Injectable 4 milliGRAM(s) IV Push every 8 hours PRN  polyethylene glycol 3350 17 Gram(s) Oral daily PRN  senna 2 Tablet(s) Oral at bedtime  sertraline 75 milliGRAM(s) Oral daily  spironolactone 25 milliGRAM(s) Oral two times a day  torsemide 60 milliGRAM(s) Oral daily      Allergies    codeine (Other)    Intolerances        SOCIAL HISTORY:  no toxic habits reported      FAMILY HISTORY:  Family history of blood disorder (Mother)        ROS:    EYES:  Negative  blurry vision or double vision  GASTROINTESTINAL:  Negative for nausea, vomiting, diarrhea  -otherwise negative except for subjective    Vital Signs Last 24 Hrs  T(C): 36.7 (23 Dec 2023 09:40), Max: 37.4 (23 Dec 2023 00:20)  T(F): 98 (23 Dec 2023 09:40), Max: 99.3 (23 Dec 2023 00:20)  HR: 77 (23 Dec 2023 09:40) (77 - 84)  BP: 110/74 (23 Dec 2023 09:40) (104/72 - 122/78)  BP(mean): --  RR: 18 (23 Dec 2023 09:40) (16 - 18)  SpO2: 95% (23 Dec 2023 09:40) (95% - 100%)    Parameters below as of 23 Dec 2023 09:40  Patient On (Oxygen Delivery Method): nasal cannula  O2 Flow (L/min): 4      PE:  In no distress  HEENT:  NC, PERRL, sclerae anicteric, conjunctivae clear, EOMI.  Sinuses nontender, no nasal exudate.  No buccal or pharyngeal lesions, erythema or exudate  Neck:  Supple, no adenopathy  Lungs:  No accessory muscle use, bilaterally clear to auscultation  Cor:  distant  Abd:  Symmetric, normoactive BS.  Soft, nontender, no masses, guarding or rebound.  Liver and spleen not enlarged  Extrem:  LEs with sig edema now decreased from prior, left leg with large open well granulated wound but with surrounding erythema and warmth several centimeters around open area, no purulent drainage  Neuro: grossly intact  Musc: moving all limbs freely, no focal deficits        LABS:                        10.5   9.19  )-----------( 169      ( 23 Dec 2023 07:11 )             35.2       WBC Count: 9.19 K/uL (12-23-23 @ 07:11)  WBC Count: 9.11 K/uL (12-22-23 @ 11:30)      12-23    140  |  100  |  32<H>  ----------------------------<  116<H>  3.5   |  28  |  1.47<H>    Ca    8.8      23 Dec 2023 07:09    TPro  6.8  /  Alb  3.3  /  TBili  0.9  /  DBili  x   /  AST  12  /  ALT  7<L>  /  AlkPhos  47  12-23      Creatinine: 1.47 mg/dL (12-23-23 @ 07:09)  Creatinine: 1.53 mg/dL (12-22-23 @ 14:35)  Creatinine: 1.52 mg/dL (12-22-23 @ 11:30)      Urinalysis Basic - ( 23 Dec 2023 07:09 )    Color: x / Appearance: x / SG: x / pH: x  Gluc: 116 mg/dL / Ketone: x  / Bili: x / Urobili: x   Blood: x / Protein: x / Nitrite: x   Leuk Esterase: x / RBC: x / WBC x   Sq Epi: x / Non Sq Epi: x / Bacteria: x      MICROBIOLOGY:      RADIOLOGY & ADDITIONAL STUDIES:    --< from: Xray Tibia + Fibula 2 Views, Left (12.22.23 @ 12:48) >  ACC: [id number] EXAM:  XR TIB FIB AP LAT 2 VIEWS LT   ORDERED BY: ANALI COURTNEY     PROCEDURE DATE:  12/22/2023          INTERPRETATION:  XR TIBIA FIBULA AP AND LATERAL 2 VIEWS LEFT    HISTORY: Infection. Evaluate for gas.    VIEWS: 2  IMAGES: 3    COMPARISON: Left tibia and fibula x-rays dated 11/27/2023.    FINDINGS:    OSSEOUS STRUCTURES    Fractures:  None.    LEFT KNEE JOINTS    Joint Space(s):  There is moderate medial compartment joint space   narrowing. Tiny osteophytes are present.    LEFT ANKLE JOINTS    Joint Space(s):  Maintained.    SOFT TISSUES:  Generalized subcutaneous edema appears to be present. No   gas is seen. Moderate to severe atherosclerotic calcifications are   present.    IMPRESSION:  1.  No gas or osseous destruction isseen.

## 2023-12-23 NOTE — CONSULT NOTE ADULT - PROBLEM SELECTOR RECOMMENDATION 4
Continued antibiotic tx per primary team. Suspect continued improvement of lower extremity edema with antibiotics

## 2023-12-23 NOTE — PROGRESS NOTE ADULT - ASSESSMENT
89F pmh Afib (was on eliquis but no longer), CHF on home O2 (3L), depression, HTN, HLD, recently hospitalized for LLE cellulitis treated w/Vancjelani Ramirez while admitted and  s/p O/p abx course of doxycycline and augmentin x10 D, now sent in by outpatient wound care doctor for evaluation for suspected cellulitis and IV abx, being admitted  89F pmh Afib (was on eliquis but no longer), CHF on home O2 (3L), depression, HTN, HLD, recently hospitalized for LLE cellulitis treated w/Vancjealni Ramirez while admitted and  s/p O/p abx course of doxycycline and augmentin x10 D, now sent in by outpatient wound care doctor for evaluation for suspected cellulitis and IV abx, being admitted

## 2023-12-23 NOTE — CONSULT NOTE ADULT - ASSESSMENT
Ms. Mix 89 year old woman with history of HFpEF (LVEF 68% 7/2022), MR s/p Mitraclip (8/30/16), mixed pre- and post-capillary pulmonary hypertension, permanent AFib off AC since 11/2022 due to GIB requiring transfusions, hypertension, hyperlipidemia, CKD (b/l Cr 1.2-1.3), SHERYL and hypoxia on home O2 3-4L, resident of [facility] who presents for c/f lower extremity cellulitis with worsening lower extremity edema. Her lower extremity edema has reportedly started to improve after initiation of IV antibiotics, and her cellulitis is likely largely contributing to the worsening edema, though pt/family report she may not have received her diuretic at home over the past few days. Her resp status is stable without changes in baseline O2 requirement, normal CXR, and stable pro-BNP from baseline, suggesting etiology other than decompensation of HF as cause for her lower extremity edema. She is warm, well perfused, with stable vitals.      Ms. Mix 89 year old woman with history of HFpEF (LVEF 68% 7/2022), MR s/p Mitraclip (8/30/16), mixed pre- and post-capillary pulmonary hypertension, permanent AFib off AC since 11/2022 due to GIB requiring transfusions, hypertension, hyperlipidemia, CKD (b/l Cr 1.2-1.3), SHERYL and hypoxia on home O2 3-4L, resident of [facility] who presents for c/f lower extremity cellulitis with worsening lower extremity edema. Her lower extremity edema has reportedly started to improve after initiation of IV antibiotics, and her cellulitis is likely largely contributing to the worsening edema, though pt/family report she may not have received her diuretic at home over the past few days. Her resp status is stable without changes in baseline O2 requirement, normal CXR, and stable pro-BNP from baseline, suggesting etiology other than decompensation of HF as cause for her lower extremity edema. She is warm, well perfused, with stable vitals.

## 2023-12-23 NOTE — PHYSICAL THERAPY INITIAL EVALUATION ADULT - ADDITIONAL COMMENTS
pt came from [facility] [place] HEATHER had been there x 4 yrs ; pt uses std cane to amb PTA and has Home O2 uses 3 L per intake CM note ; pt was getting wound care TIW ; pt has a son Ronald Okonzahida ID as emergency contact [phone] pt came from [facility] [place] HEATHER had been there x 4 yrs ; pt uses std cane to amb PTA and has Home O2 uses 3 L per intake CM note ; pt was getting wound care TIW ; pt has a son Ronald Okonzahida ID as emergency contact [phone] pt came from [facility] [place] HEATHER had been there x 4 yrs ; pt uses std cane to amb PTA and has Home O2 uses 3 L per intake CM note ; pt was getting wound care TIW ; pt has a son Ronald Okonzahida ID as emergency contact [phone]; pt has a shower chair and HHA assist 3 days a wk with showering pt came from [facility] [place] HEATHER had been there x 4 yrs ; pt uses std cane to amb PTA and has Home O2 uses 3 L per intake CM note ; pt was getting wound care TIW ; pt has a son Ronald Okonzahida ID as emergency contact [phone]; pt has a shower chair and HHA assist 3 days a wk with showering

## 2023-12-23 NOTE — CONSULT NOTE ADULT - PROBLEM SELECTOR RECOMMENDATION 9
#HFpEF  #pHTN  Well compensated from a HF/cardiovascular standpoint.  - Continue home torsemide 60 mg daily  - Cont home spironolactone 25 mg   - Resume farxiga on d/c  - Strict I&O, daily weights - to monitor to help determine need for additional diuresis

## 2023-12-23 NOTE — PHYSICAL THERAPY INITIAL EVALUATION ADULT - NSPTDMEREC_GEN_A_CORE
pt owns a rollator , Home O2 , std ane , shower chair , HHA assist 3 days a wk with bathing and dressing pt owns a rollator , Home O2 , std cane , shower chair , HHA assist 3 days a wk with bathing and dressing

## 2023-12-23 NOTE — PHYSICAL THERAPY INITIAL EVALUATION ADULT - IMPAIRMENTS CONTRIBUTING IMPAIRED BED MOBILITY, REHAB EVAL
decrease endurance , sat several min min of 1 then cg of 1/impaired balance/impaired postural control/decreased strength

## 2023-12-23 NOTE — PHYSICAL THERAPY INITIAL EVALUATION ADULT - IMPAIRMENTS CONTRIBUTING TO GAIT DEVIATIONS, PT EVAL
decrease endurance , min/mod  unsteady/impaired balance/pain/impaired postural control/decreased strength

## 2023-12-23 NOTE — PHYSICAL THERAPY INITIAL EVALUATION ADULT - ACTIVE RANGE OF MOTION EXAMINATION, REHAB EVAL
pt refuse R foot/ankle rom painful and L foot ankle painful but not as much as right/bilateral upper extremity Active ROM was WFL (within functional limits)/bilateral  lower extremity Active ROM was WFL (within functional limits)

## 2023-12-23 NOTE — PHYSICAL THERAPY INITIAL EVALUATION ADULT - PERTINENT HX OF CURRENT PROBLEM, REHAB EVAL
pt is a 89 y F, hx of afib not on AC, recently hospitalized for LLE cellulitis (medial lower leg wound), completed o/p antibiotics doxycycline and augmentin, now sent in by outpatient wound care doctor for evaluation for possible cellulitis and IV antibiotic treatment. Patient's family members at bedside. Reports that her legs have been more swollen, red than usual.  CXR: Clear lungs.  Xray Tibia + Fibula L: No gas or osseous destruction is seen. pt is a 89 y F, hx of afib not on AC, recently hospitalized for LLE cellulitis (medial lower leg wound), completed o/p antibiotics doxycycline and augmentin, now sent in by outpatient wound care doctor for evaluation for possible cellulitis and IV antibiotic treatment. Patient's family members at bedside. Reports that her legs have been more swollen, red than usual.  CXR: Clear lungs.  Xray Tibia + Fibula L 12/22/23 :  No gas or osseous destruction is seen .Moderate to severe atherosclerotic calcifications are present.  CXR 12/22/23 : clear lungs pt is a 89 y F, hx of afib not on AC, recently hospitalized for LLE cellulitis (medial lower leg wound), completed o/p antibiotics doxycycline and augmentin, now sent in by outpatient wound care doctor for evaluation for possible cellulitis and IV antibiotic treatment. Patient's family members at bedside. Reports that her legs have been more swollen, red than usual.  CXR: Clear lungs.  Xray Tibia + Fibula L 12/22/23 :  No gas or osseous destruction is seen .Moderate to severe atherosclerotic calcifications are present.  CXR 12/22/23 : clear lungs  12/27/23 R LE tibia/fibula (-) for fx or dislocation

## 2023-12-23 NOTE — PHYSICAL THERAPY INITIAL EVALUATION ADULT - NSPTDISCHREC_GEN_A_CORE
TBD once fxl eval can be completed TBD once fxl eval can be completed; PT recommend SUbacute rehab if home pt need assist all fxl activity and adls and Home PT has equipment including Home O2 , rollator , std cane , shower chair/[facility] Rehab

## 2023-12-24 LAB
ANION GAP SERPL CALC-SCNC: 12 MMOL/L — SIGNIFICANT CHANGE UP (ref 5–17)
ANION GAP SERPL CALC-SCNC: 12 MMOL/L — SIGNIFICANT CHANGE UP (ref 5–17)
BUN SERPL-MCNC: 28 MG/DL — HIGH (ref 7–23)
BUN SERPL-MCNC: 28 MG/DL — HIGH (ref 7–23)
CALCIUM SERPL-MCNC: 8.7 MG/DL — SIGNIFICANT CHANGE UP (ref 8.4–10.5)
CALCIUM SERPL-MCNC: 8.7 MG/DL — SIGNIFICANT CHANGE UP (ref 8.4–10.5)
CHLORIDE SERPL-SCNC: 97 MMOL/L — SIGNIFICANT CHANGE UP (ref 96–108)
CHLORIDE SERPL-SCNC: 97 MMOL/L — SIGNIFICANT CHANGE UP (ref 96–108)
CO2 SERPL-SCNC: 28 MMOL/L — SIGNIFICANT CHANGE UP (ref 22–31)
CO2 SERPL-SCNC: 28 MMOL/L — SIGNIFICANT CHANGE UP (ref 22–31)
CREAT SERPL-MCNC: 1.47 MG/DL — HIGH (ref 0.5–1.3)
CREAT SERPL-MCNC: 1.47 MG/DL — HIGH (ref 0.5–1.3)
EGFR: 34 ML/MIN/1.73M2 — LOW
EGFR: 34 ML/MIN/1.73M2 — LOW
GLUCOSE SERPL-MCNC: 138 MG/DL — HIGH (ref 70–99)
GLUCOSE SERPL-MCNC: 138 MG/DL — HIGH (ref 70–99)
POTASSIUM SERPL-MCNC: 3.7 MMOL/L — SIGNIFICANT CHANGE UP (ref 3.5–5.3)
POTASSIUM SERPL-MCNC: 3.7 MMOL/L — SIGNIFICANT CHANGE UP (ref 3.5–5.3)
POTASSIUM SERPL-SCNC: 3.7 MMOL/L — SIGNIFICANT CHANGE UP (ref 3.5–5.3)
POTASSIUM SERPL-SCNC: 3.7 MMOL/L — SIGNIFICANT CHANGE UP (ref 3.5–5.3)
SODIUM SERPL-SCNC: 137 MMOL/L — SIGNIFICANT CHANGE UP (ref 135–145)
SODIUM SERPL-SCNC: 137 MMOL/L — SIGNIFICANT CHANGE UP (ref 135–145)

## 2023-12-24 PROCEDURE — 99232 SBSQ HOSP IP/OBS MODERATE 35: CPT

## 2023-12-24 PROCEDURE — 99223 1ST HOSP IP/OBS HIGH 75: CPT | Mod: GC

## 2023-12-24 RX ORDER — SODIUM CHLORIDE 0.65 %
1 AEROSOL, SPRAY (ML) NASAL
Refills: 0 | Status: DISCONTINUED | OUTPATIENT
Start: 2023-12-24 | End: 2023-12-28

## 2023-12-24 RX ADMIN — SENNA PLUS 2 TABLET(S): 8.6 TABLET ORAL at 22:01

## 2023-12-24 RX ADMIN — Medication 25 MILLIGRAM(S): at 17:25

## 2023-12-24 RX ADMIN — MIRTAZAPINE 22.5 MILLIGRAM(S): 45 TABLET, ORALLY DISINTEGRATING ORAL at 22:01

## 2023-12-24 RX ADMIN — SPIRONOLACTONE 25 MILLIGRAM(S): 25 TABLET, FILM COATED ORAL at 17:25

## 2023-12-24 RX ADMIN — ATORVASTATIN CALCIUM 10 MILLIGRAM(S): 80 TABLET, FILM COATED ORAL at 22:01

## 2023-12-24 RX ADMIN — Medication 60 MILLIGRAM(S): at 05:53

## 2023-12-24 RX ADMIN — SPIRONOLACTONE 25 MILLIGRAM(S): 25 TABLET, FILM COATED ORAL at 05:50

## 2023-12-24 RX ADMIN — MOMETASONE FUROATE 2 PUFF(S): 220 INHALANT RESPIRATORY (INHALATION) at 11:14

## 2023-12-24 RX ADMIN — Medication 1 SPRAY(S): at 17:25

## 2023-12-24 RX ADMIN — Medication 325 MILLIGRAM(S): at 11:14

## 2023-12-24 RX ADMIN — Medication 25 MILLIGRAM(S): at 05:51

## 2023-12-24 RX ADMIN — CEFTRIAXONE 100 MILLIGRAM(S): 500 INJECTION, POWDER, FOR SOLUTION INTRAMUSCULAR; INTRAVENOUS at 17:26

## 2023-12-24 RX ADMIN — SERTRALINE 75 MILLIGRAM(S): 25 TABLET, FILM COATED ORAL at 11:12

## 2023-12-24 NOTE — DIETITIAN INITIAL EVALUATION ADULT - ENERGY INTAKE
Daughter reports pt with fair/good PO intake ~50% to >75% of foods provided since admission. Menu at bedside, daughter has been ordering preferred foods as needed since admission. Daughter is aware to choose softer foods from the menu for pt, does not want mechanical altered diet when offered. Requesting for Ensure 1x daily while in house to ensure adequate PO intake.

## 2023-12-24 NOTE — DIETITIAN INITIAL EVALUATION ADULT - OTHER INFO
-- ordered for Spironolactone, Torsemide   -- On Remeron (off brand appetite stimulant) might stimulate PO intake  -- GI: Simethicone, Zofran, Miralax and Senna   -- Micronutrient/Other supplementation: Feso4  -- Lab on 12/24 revealed abnormal renal indices: BUN 28H, Cr 1.47H, GFR 34L. Will continue to monitor labs.

## 2023-12-24 NOTE — DIETITIAN INITIAL EVALUATION ADULT - PHYSCIAL ASSESSMENT
Drug Dosing Weight  Height (cm): 157.5 (22 Dec 2023 10:21)  Weight (kg): 72.6 (22 Dec 2023 10:21)- stated wt from admission   BMI (kg/m2): 29.3 (22 Dec 2023 10:21)    Daily wt (standing or bed scale): none documented thus far   UBW: daughter states pt usually weight ~150lb. States pt with wt gain of ~10lb recently due to swelling to lower extremity. Pt also with Congestive Heart Failure on diuretics (Torsemide, Spironolactone) might cause wt fluctuation  Wt history from previous RD notes: 69.4kg (11/29/23), 79.4kg (12/23/22), 73kg (11/6/22), 69.4kg (9/29/22)     ** Noted with wt fluctuation from previous RD notes might due to fluid shift with Congestive Heart Failure. RD will continue to monitor wt trends as available/able.     IBW: 121lb, 132% IBW

## 2023-12-24 NOTE — DIETITIAN INITIAL EVALUATION ADULT - NS FNS DIET ORDER
Diet, DASH/TLC:   Sodium & Cholesterol Restricted  1500mL Fluid Restriction ([id number])     Special Instructions for Nursin milliLiter(s) to 2000 milliLiter(s) fluid restriction (23 @ 21:15) [Active]       Diet, DASH/TLC:   Sodium & Cholesterol Restricted  1500mL Fluid Restriction ([id number])     Special Instructions for Nursin milliLiter(s) to 2000 milliLiter(s) fluid restriction (23 @ 21:15) [Active]

## 2023-12-24 NOTE — DIETITIAN INITIAL EVALUATION ADULT - CONTINUE CURRENT NUTRITION CARE PLAN
Continue therapeutic diet Rx as tolerated: DASH, fluids per team. RD remains available to adjust diet as needed./yes

## 2023-12-24 NOTE — DIETITIAN INITIAL EVALUATION ADULT - REASON INDICATOR FOR ASSESSMENT
Pt seen for consult for Congestive Heart Failure. Pt is sleeping upon visit. Information obtained from RN, electronic medical record, daughter (Shirlene) at bedside. Chart reviewed, events noted.

## 2023-12-24 NOTE — PROGRESS NOTE ADULT - SUBJECTIVE AND OBJECTIVE BOX
[facility] INFECTIOUS DISEASE  Pasquale Long MD PhD, Corrine Dawkins MD, Suzy Real MD, Amanda Vera MD, Neno Luna MD  and providing coverage with Diana Zapata MD  Providing Infectious Disease Consultations at [facility], [facility], [facility]'s    Office# [phone] to schedule follow up appointments  Answering Service for urgent calls or New Consults [phone]  Cell# to text for urgent issues Pasquale Long [phone]     infectious diseases progress note:    ARNULFO ALEX is a 89y y. o. Female patient    Overnight and events of the last 24hrs reviewed    Allergies    codeine (Other)    Intolerances        ANTIBIOTICS/RELEVANT:  antimicrobials  cefTRIAXone   IVPB 2000 milliGRAM(s) IV Intermittent every 24 hours    immunologic:    OTHER:  acetaminophen     Tablet .. 650 milliGRAM(s) Oral every 6 hours PRN  albuterol    90 MICROgram(s) HFA Inhaler 2 Puff(s) Inhalation every 6 hours PRN  aluminum hydroxide/magnesium hydroxide/simethicone Suspension 30 milliLiter(s) Oral every 4 hours PRN  atorvastatin 10 milliGRAM(s) Oral at bedtime  ferrous    sulfate 325 milliGRAM(s) Oral daily  melatonin 3 milliGRAM(s) Oral at bedtime PRN  metoprolol tartrate 25 milliGRAM(s) Oral two times a day  mirtazapine 22.5 milliGRAM(s) Oral at bedtime  mometasone 220 MICROgram(s) Inhaler 2 Puff(s) Inhalation daily  ondansetron Injectable 4 milliGRAM(s) IV Push every 8 hours PRN  polyethylene glycol 3350 17 Gram(s) Oral daily PRN  senna 2 Tablet(s) Oral at bedtime  sertraline 75 milliGRAM(s) Oral daily  sodium chloride 0.65% Nasal 1 Spray(s) Both Nostrils two times a day  spironolactone 25 milliGRAM(s) Oral two times a day  torsemide 60 milliGRAM(s) Oral daily      Objective:  Vital Signs Last 24 Hrs  T(C): 36.6 (24 Dec 2023 00:26), Max: 36.7 (23 Dec 2023 22:06)  T(F): 97.8 (24 Dec 2023 00:26), Max: 98.1 (23 Dec 2023 22:06)  HR: 76 (24 Dec 2023 00:26) (76 - 79)  BP: 102/60 (24 Dec 2023 00:26) (100/60 - 102/60)  BP(mean): --  RR: 18 (24 Dec 2023 00:26) (18 - 18)  SpO2: 95% (24 Dec 2023 00:26) (95% - 95%)    Parameters below as of 24 Dec 2023 00:26  Patient On (Oxygen Delivery Method): nasal cannula  O2 Flow (L/min): 4      T(C): 36.6 (12-24-23 @ 00:26), Max: 37.4 (12-23-23 @ 00:20)  T(C): 36.6 (12-24-23 @ 00:26), Max: 37.9 (12-22-23 @ 12:56)  T(C): 36.6 (12-24-23 @ 00:26), Max: 37.9 (12-22-23 @ 12:56)    PHYSICAL EXAM:  HEENT: NC atraumatic  Neck: supple  Respiratory: no accessory muscle use, breathing comfortably  Cardiovascular: distant  Gastrointestinal: normal appearing, nondistended  Extremities: no clubbing, no cyanosis, LLE improved with decreased erythema, less swelling, less warmth        LABS:                          10.5   9.19  )-----------( 169      ( 23 Dec 2023 07:11 )             35.2       WBC  9.19 12-23 @ 07:11  9.11 12-22 @ 11:30      12-24    137  |  97  |  28<H>  ----------------------------<  138<H>  3.7   |  28  |  1.47<H>    Ca    8.7      24 Dec 2023 07:00    TPro  6.8  /  Alb  3.3  /  TBili  0.9  /  DBili  x   /  AST  12  /  ALT  7<L>  /  AlkPhos  47  12-23      Creatinine: 1.47 mg/dL (12-24-23 @ 07:00)  Creatinine: 1.47 mg/dL (12-23-23 @ 07:09)  Creatinine: 1.53 mg/dL (12-22-23 @ 14:35)  Creatinine: 1.52 mg/dL (12-22-23 @ 11:30)      PT/INR - ( 23 Dec 2023 07:11 )   PT: 14.2 sec;   INR: 1.30 ratio           Urinalysis Basic - ( 24 Dec 2023 07:00 )    Color: x / Appearance: x / SG: x / pH: x  Gluc: 138 mg/dL / Ketone: x  / Bili: x / Urobili: x   Blood: x / Protein: x / Nitrite: x   Leuk Esterase: x / RBC: x / WBC x   Sq Epi: x / Non Sq Epi: x / Bacteria: x            INFLAMMATORY MARKERS      MICROBIOLOGY:              RADIOLOGY & ADDITIONAL STUDIES:   [facility] INFECTIOUS DISEASE  Pasquale Long MD PhD, Corrine Dawkins MD, Suzy Real MD, Amanda Vera MD, Neno Luna MD  and providing coverage with Diana Zapata MD  Providing Infectious Disease Consultations at [facility], [facility], [facility]'s    Office# [phone] to schedule follow up appointments  Answering Service for urgent calls or New Consults [phone]  Cell# to text for urgent issues Pasquale Long [phone]     infectious diseases progress note:    ARNULFO ALEX is a 89y y. o. Female patient    Overnight and events of the last 24hrs reviewed    Allergies    codeine (Other)    Intolerances        ANTIBIOTICS/RELEVANT:  antimicrobials  cefTRIAXone   IVPB 2000 milliGRAM(s) IV Intermittent every 24 hours    immunologic:    OTHER:  acetaminophen     Tablet .. 650 milliGRAM(s) Oral every 6 hours PRN  albuterol    90 MICROgram(s) HFA Inhaler 2 Puff(s) Inhalation every 6 hours PRN  aluminum hydroxide/magnesium hydroxide/simethicone Suspension 30 milliLiter(s) Oral every 4 hours PRN  atorvastatin 10 milliGRAM(s) Oral at bedtime  ferrous    sulfate 325 milliGRAM(s) Oral daily  melatonin 3 milliGRAM(s) Oral at bedtime PRN  metoprolol tartrate 25 milliGRAM(s) Oral two times a day  mirtazapine 22.5 milliGRAM(s) Oral at bedtime  mometasone 220 MICROgram(s) Inhaler 2 Puff(s) Inhalation daily  ondansetron Injectable 4 milliGRAM(s) IV Push every 8 hours PRN  polyethylene glycol 3350 17 Gram(s) Oral daily PRN  senna 2 Tablet(s) Oral at bedtime  sertraline 75 milliGRAM(s) Oral daily  sodium chloride 0.65% Nasal 1 Spray(s) Both Nostrils two times a day  spironolactone 25 milliGRAM(s) Oral two times a day  torsemide 60 milliGRAM(s) Oral daily      Objective:  Vital Signs Last 24 Hrs  T(C): 36.6 (24 Dec 2023 00:26), Max: 36.7 (23 Dec 2023 22:06)  T(F): 97.8 (24 Dec 2023 00:26), Max: 98.1 (23 Dec 2023 22:06)  HR: 76 (24 Dec 2023 00:26) (76 - 79)  BP: 102/60 (24 Dec 2023 00:26) (100/60 - 102/60)  BP(mean): --  RR: 18 (24 Dec 2023 00:26) (18 - 18)  SpO2: 95% (24 Dec 2023 00:26) (95% - 95%)    Parameters below as of 24 Dec 2023 00:26  Patient On (Oxygen Delivery Method): nasal cannula  O2 Flow (L/min): 4      T(C): 36.6 (12-24-23 @ 00:26), Max: 37.4 (12-23-23 @ 00:20)  T(C): 36.6 (12-24-23 @ 00:26), Max: 37.9 (12-22-23 @ 12:56)  T(C): 36.6 (12-24-23 @ 00:26), Max: 37.9 (12-22-23 @ 12:56)    PHYSICAL EXAM:  HEENT: NC atraumatic  Neck: supple  Respiratory: no accessory muscle use, breathing comfortably  Cardiovascular: distant  Gastrointestinal: normal appearing, nondistended  Extremities: no clubbing, no cyanosis, LLE improved with decreased erythema, less swelling, less warmth        LABS:                          10.5   9.19  )-----------( 169      ( 23 Dec 2023 07:11 )             35.2       WBC  9.19 12-23 @ 07:11  9.11 12-22 @ 11:30      12-24    137  |  97  |  28<H>  ----------------------------<  138<H>  3.7   |  28  |  1.47<H>    Ca    8.7      24 Dec 2023 07:00    TPro  6.8  /  Alb  3.3  /  TBili  0.9  /  DBili  x   /  AST  12  /  ALT  7<L>  /  AlkPhos  47  12-23      Creatinine: 1.47 mg/dL (12-24-23 @ 07:00)  Creatinine: 1.47 mg/dL (12-23-23 @ 07:09)  Creatinine: 1.53 mg/dL (12-22-23 @ 14:35)  Creatinine: 1.52 mg/dL (12-22-23 @ 11:30)      PT/INR - ( 23 Dec 2023 07:11 )   PT: 14.2 sec;   INR: 1.30 ratio           Urinalysis Basic - ( 24 Dec 2023 07:00 )    Color: x / Appearance: x / SG: x / pH: x  Gluc: 138 mg/dL / Ketone: x  / Bili: x / Urobili: x   Blood: x / Protein: x / Nitrite: x   Leuk Esterase: x / RBC: x / WBC x   Sq Epi: x / Non Sq Epi: x / Bacteria: x            INFLAMMATORY MARKERS      MICROBIOLOGY:              RADIOLOGY & ADDITIONAL STUDIES:

## 2023-12-24 NOTE — PROGRESS NOTE ADULT - ASSESSMENT
89F pmh Afib (was on eliquis but no longer), CHF on home O2 (3L), depression, HTN, HLD, recently hospitalized for LLE cellulitis at [place] treated w/Vanco Zosyn while admitted and  s/p O/p abx course of doxycycline and augmentin x10 D, now sent in by outpatient wound care with open wound, fever, chills and surrounding cellulitis    RECOMMENDATIONS  Consistent with wound with surrounding nonpurulent cellulitis. Despite nares MRSA screens low suspicion for MRSA or other pathogen that would warrant renal exposure to Vanco. Also noted that on prior admission there was improvement but now worsening with doxy/augmentin Rx suggesting possible concerns for resistant organism or just failure with low tissue levels of selected ab. changed abx to  -Ceftriaxone 2 grams IV daily 12/23  -wound care  -wound culture collected  already significantly improved but with prior history will not want to cut IV course too short so continue for now     Thank you for consulting us and involving us in the management of this most interesting and challenging case.  We will follow along in the care of this patient. Please call us at [phone] or text me directly on my cell# at [phone] with any concerns.   89F pmh Afib (was on eliquis but no longer), CHF on home O2 (3L), depression, HTN, HLD, recently hospitalized for LLE cellulitis at [place] treated w/Vanco Zosyn while admitted and  s/p O/p abx course of doxycycline and augmentin x10 D, now sent in by outpatient wound care with open wound, fever, chills and surrounding cellulitis    RECOMMENDATIONS  Consistent with wound with surrounding nonpurulent cellulitis. Despite nares MRSA screens low suspicion for MRSA or other pathogen that would warrant renal exposure to Vanco. Also noted that on prior admission there was improvement but now worsening with doxy/augmentin Rx suggesting possible concerns for resistant organism or just failure with low tissue levels of selected ab. changed abx to  -Ceftriaxone 2 grams IV daily 12/23  -wound care  -wound culture collected  already significantly improved but with prior history will not want to cut IV course too short so continue for now     Thank you for consulting us and involving us in the management of this most interesting and challenging case.  We will follow along in the care of this patient. Please call us at [phone] or text me directly on my cell# at [phone] with any concerns.

## 2023-12-24 NOTE — PROGRESS NOTE ADULT - PROBLEM SELECTOR PLAN 7
- DVT ppx: No AC given hx/o GIB   - Diet: DASH  - PT consult    Dispo: pending improvement, [place] - DVT ppx: No AC given hx/o GIB   - Diet: DASH  - PT consult    Dispo: pending improvement, [place]

## 2023-12-24 NOTE — PROGRESS NOTE ADULT - SUBJECTIVE AND OBJECTIVE BOX
[facility] Division of Hospital Medicine  Clayton Guardado  MS Teams      SUBJECTIVE / OVERNIGHT EVENTS:  No events overnight  denies f/chills this am  Reports Le redness improving  denies cough/sob    ADDITIONAL REVIEW OF SYSTEMS:    MEDICATIONS  (STANDING):  atorvastatin 10 milliGRAM(s) Oral at bedtime  cefTRIAXone   IVPB 2000 milliGRAM(s) IV Intermittent every 24 hours  ferrous    sulfate 325 milliGRAM(s) Oral daily  gabapentin 100 milliGRAM(s) Oral at bedtime  metoprolol tartrate 25 milliGRAM(s) Oral two times a day  mirtazapine 22.5 milliGRAM(s) Oral at bedtime  mometasone 220 MICROgram(s) Inhaler 2 Puff(s) Inhalation daily  senna 2 Tablet(s) Oral at bedtime  sertraline 75 milliGRAM(s) Oral daily  sodium chloride 0.65% Nasal 1 Spray(s) Both Nostrils two times a day  spironolactone 25 milliGRAM(s) Oral two times a day  torsemide 60 milliGRAM(s) Oral daily    MEDICATIONS  (PRN):  acetaminophen     Tablet .. 650 milliGRAM(s) Oral every 6 hours PRN Temp greater or equal to 38C (100.4F), Mild Pain (1 - 3)  albuterol    90 MICROgram(s) HFA Inhaler 2 Puff(s) Inhalation every 6 hours PRN Shortness of Breath and/or Wheezing  aluminum hydroxide/magnesium hydroxide/simethicone Suspension 30 milliLiter(s) Oral every 4 hours PRN Dyspepsia  melatonin 3 milliGRAM(s) Oral at bedtime PRN Insomnia  ondansetron Injectable 4 milliGRAM(s) IV Push every 8 hours PRN Nausea and/or Vomiting  polyethylene glycol 3350 17 Gram(s) Oral daily PRN Constipation      I&O's Summary    23 Dec 2023 07:01  -  24 Dec 2023 07:00  --------------------------------------------------------  IN: 50 mL / OUT: 300 mL / NET: -250 mL        PHYSICAL EXAM:  Vital Signs Last 24 Hrs  T(C): 36.6 (24 Dec 2023 00:26), Max: 36.7 (23 Dec 2023 22:06)  T(F): 97.8 (24 Dec 2023 00:26), Max: 98.1 (23 Dec 2023 22:06)  HR: 76 (24 Dec 2023 00:26) (76 - 79)  BP: 102/60 (24 Dec 2023 00:26) (100/60 - 102/60)  BP(mean): --  RR: 18 (24 Dec 2023 00:26) (18 - 18)  SpO2: 95% (24 Dec 2023 00:26) (95% - 95%)    Parameters below as of 24 Dec 2023 00:26  Patient On (Oxygen Delivery Method): nasal cannula  O2 Flow (L/min): 4    CONSTITUTIONAL: NAD, well-developed  EYES: PERRLA; conjunctiva and sclera clear  RESPIRATORY: Normal respiratory effort; mild crackles at the bases  CARDIOVASCULAR: Regular rate and rhythm, normal S1 and S2, no murmur; 1+ LE edema L>R Peripheral pulses are 2+ bilaterally  ABDOMEN: Nontender to palpation, normoactive bowel sounds, no rebound/guarding  MUSCULOSKELETAL: no clubbing or cyanosis of digits; no joint swelling or tenderness to palpation  PSYCH: A+O to person, place, and time; affect appropriate  NEUROLOGY: CN 2-12 are intact and symmetric; no gross sensory deficits   SKIN: erythema b/l LE with open wound R shin, wrapped in gauze    LABS:                        10.5   9.19  )-----------( 169      ( 23 Dec 2023 07:11 )             35.2     12-24    137  |  97  |  28<H>  ----------------------------<  138<H>  3.7   |  28  |  1.47<H>    Ca    8.7      24 Dec 2023 07:00    TPro  6.8  /  Alb  3.3  /  TBili  0.9  /  DBili  x   /  AST  12  /  ALT  7<L>  /  AlkPhos  47  12-23    PT/INR - ( 23 Dec 2023 07:11 )   PT: 14.2 sec;   INR: 1.30 ratio               Urinalysis Basic - ( 24 Dec 2023 07:00 )    Color: x / Appearance: x / SG: x / pH: x  Gluc: 138 mg/dL / Ketone: x  / Bili: x / Urobili: x   Blood: x / Protein: x / Nitrite: x   Leuk Esterase: x / RBC: x / WBC x   Sq Epi: x / Non Sq Epi: x / Bacteria: x        Culture - Urine (collected 22 Dec 2023 12:42)  Source: Clean Catch Clean Catch (Midstream)  Final Report (23 Dec 2023 22:35):    <10,000 CFU/mL Normal Urogenital Tika    Culture - Blood (collected 22 Dec 2023 10:55)  Source: .Blood Blood-Peripheral  Preliminary Report (23 Dec 2023 18:02):    No growth at 24 hours    Culture - Blood (collected 22 Dec 2023 10:40)  Source: .Blood Blood-Peripheral  Preliminary Report (23 Dec 2023 18:02):    No growth at 24 hours       [facility] Division of Hospital Medicine  Clayton Guardado  MS Teams      SUBJECTIVE / OVERNIGHT EVENTS:  No events overnight  denies f/chills this am  Reports Le redness improving  denies cough/sob    ADDITIONAL REVIEW OF SYSTEMS:    MEDICATIONS  (STANDING):  atorvastatin 10 milliGRAM(s) Oral at bedtime  cefTRIAXone   IVPB 2000 milliGRAM(s) IV Intermittent every 24 hours  ferrous    sulfate 325 milliGRAM(s) Oral daily  gabapentin 100 milliGRAM(s) Oral at bedtime  metoprolol tartrate 25 milliGRAM(s) Oral two times a day  mirtazapine 22.5 milliGRAM(s) Oral at bedtime  mometasone 220 MICROgram(s) Inhaler 2 Puff(s) Inhalation daily  senna 2 Tablet(s) Oral at bedtime  sertraline 75 milliGRAM(s) Oral daily  sodium chloride 0.65% Nasal 1 Spray(s) Both Nostrils two times a day  spironolactone 25 milliGRAM(s) Oral two times a day  torsemide 60 milliGRAM(s) Oral daily    MEDICATIONS  (PRN):  acetaminophen     Tablet .. 650 milliGRAM(s) Oral every 6 hours PRN Temp greater or equal to 38C (100.4F), Mild Pain (1 - 3)  albuterol    90 MICROgram(s) HFA Inhaler 2 Puff(s) Inhalation every 6 hours PRN Shortness of Breath and/or Wheezing  aluminum hydroxide/magnesium hydroxide/simethicone Suspension 30 milliLiter(s) Oral every 4 hours PRN Dyspepsia  melatonin 3 milliGRAM(s) Oral at bedtime PRN Insomnia  ondansetron Injectable 4 milliGRAM(s) IV Push every 8 hours PRN Nausea and/or Vomiting  polyethylene glycol 3350 17 Gram(s) Oral daily PRN Constipation      I&O's Summary    23 Dec 2023 07:01  -  24 Dec 2023 07:00  --------------------------------------------------------  IN: 50 mL / OUT: 300 mL / NET: -250 mL        PHYSICAL EXAM:  Vital Signs Last 24 Hrs  T(C): 36.6 (24 Dec 2023 00:26), Max: 36.7 (23 Dec 2023 22:06)  T(F): 97.8 (24 Dec 2023 00:26), Max: 98.1 (23 Dec 2023 22:06)  HR: 76 (24 Dec 2023 00:26) (76 - 79)  BP: 102/60 (24 Dec 2023 00:26) (100/60 - 102/60)  BP(mean): --  RR: 18 (24 Dec 2023 00:26) (18 - 18)  SpO2: 95% (24 Dec 2023 00:26) (95% - 95%)    Parameters below as of 24 Dec 2023 00:26  Patient On (Oxygen Delivery Method): nasal cannula  O2 Flow (L/min): 4    CONSTITUTIONAL: NAD, well-developed  EYES: PERRLA; conjunctiva and sclera clear  RESPIRATORY: Normal respiratory effort; mild crackles at the bases  CARDIOVASCULAR: Regular rate and rhythm, normal S1 and S2, no murmur; 1+ LE edema L>R Peripheral pulses are 2+ bilaterally  ABDOMEN: Nontender to palpation, normoactive bowel sounds, no rebound/guarding  MUSCULOSKELETAL: no clubbing or cyanosis of digits; no joint swelling or tenderness to palpation  PSYCH: A+O to person, place, and time; affect appropriate  NEUROLOGY: CN 2-12 are intact and symmetric; no gross sensory deficits   SKIN: erythema b/l LE with open wound R shin, wrapped in gauze    LABS:                        10.5   9.19  )-----------( 169      ( 23 Dec 2023 07:11 )             35.2     12-24    137  |  97  |  28<H>  ----------------------------<  138<H>  3.7   |  28  |  1.47<H>    Ca    8.7      24 Dec 2023 07:00    TPro  6.8  /  Alb  3.3  /  TBili  0.9  /  DBili  x   /  AST  12  /  ALT  7<L>  /  AlkPhos  47  12-23    PT/INR - ( 23 Dec 2023 07:11 )   PT: 14.2 sec;   INR: 1.30 ratio               Urinalysis Basic - ( 24 Dec 2023 07:00 )    Color: x / Appearance: x / SG: x / pH: x  Gluc: 138 mg/dL / Ketone: x  / Bili: x / Urobili: x   Blood: x / Protein: x / Nitrite: x   Leuk Esterase: x / RBC: x / WBC x   Sq Epi: x / Non Sq Epi: x / Bacteria: x        Culture - Urine (collected 22 Dec 2023 12:42)  Source: Clean Catch Clean Catch (Midstream)  Final Report (23 Dec 2023 22:35):    <10,000 CFU/mL Normal Urogenital Tika    Culture - Blood (collected 22 Dec 2023 10:55)  Source: .Blood Blood-Peripheral  Preliminary Report (23 Dec 2023 18:02):    No growth at 24 hours    Culture - Blood (collected 22 Dec 2023 10:40)  Source: .Blood Blood-Peripheral  Preliminary Report (23 Dec 2023 18:02):    No growth at 24 hours

## 2023-12-24 NOTE — DIETITIAN INITIAL EVALUATION ADULT - PROBLEM SELECTOR PLAN 1
- Recently hospitalized for LLE cellulitis treated w/Vanco Zosyn while admitted  then O/p abx course of doxycycline and augmentin x10 D  - Now w/suspected cellulitis sent in by O/P wound care for further eval   - Afebrile, VSS, clinically nontoxic   - Labs: no elevated wbc, ESR & CRP elevated  - XR left  Tib/Fib:  No gas or osseous destruction   - ED: Vanco 1g, Zosyn 3.375g  - C/w Vanco 1g, Zosyn 3.375g  - Hx/o MRSA+, f/u MRSA screen  - Dose Vanco based on level iso low gFR  - F/u Bcx, Ucx, MRSA swab   - Wound care consult (ordered)  - ID consult to be called in AM ( Saw [facility] during last admission)

## 2023-12-24 NOTE — DIETITIAN INITIAL EVALUATION ADULT - PERTINENT LABORATORY DATA
12-24    137  |  97  |  28<H>  ----------------------------<  138<H>  3.7   |  28  |  1.47<H>    Ca    8.7      24 Dec 2023 07:00    TPro  6.8  /  Alb  3.3  /  TBili  0.9  /  DBili  x   /  AST  12  /  ALT  7<L>  /  AlkPhos  47  12-23

## 2023-12-24 NOTE — PROGRESS NOTE ADULT - ASSESSMENT
89F pmh Afib (was on eliquis but no longer), CHF on home O2 (3L), depression, HTN, HLD, recently hospitalized for LLE cellulitis treated w/Fady Ramirez while admitted and  s/p O/p abx course of doxycycline and augmentin x10 D, now sent in by outpatient wound care doctor for evaluation for suspected cellulitis and IV abx,

## 2023-12-24 NOTE — DIETITIAN INITIAL EVALUATION ADULT - ADD RECOMMEND
-- Monitor PO intake, GI tolerance, skin integrity, labs, weight, and bowel movement regularity.   -- Will continue to honor food and beverage preferences within diet restriction of patient in an effort to maximize level of nutrient intake.  -- Assist with meals PRN and encourage PO intake.  -- Malnutrition alert placed in chart.

## 2023-12-24 NOTE — DIETITIAN INITIAL EVALUATION ADULT - OTHER CALCULATIONS
Fluid needs deferred to team. Energy and protein needs based on adjusted IBW of 121lb in consideration of BMI >25 and Increased nutrient needs for dx Congestive Heart Failure.

## 2023-12-24 NOTE — DIETITIAN INITIAL EVALUATION ADULT - ORAL NUTRITION SUPPLEMENTS
recommend Ensure plus high protein 1x daily (350kcal, 20g proteins) to provide additional calories and nutrients to ensure adequate PO intake while in house.

## 2023-12-24 NOTE — DIETITIAN INITIAL EVALUATION ADULT - EDUCATION DIETARY MODIFICATIONS
daughter does not want nutrition education on Congestive Heart Failure at present, states pt is fully aware of diet restriction. RD remains available upon request and will follow-up per protocol./(0) unable to meet; needs instruction

## 2023-12-24 NOTE — DIETITIAN INITIAL EVALUATION ADULT - ORAL INTAKE PTA/DIET HISTORY
Daughter reports pt with normal appetite/PO intake PTA. Follows low Na diet at home.   Daughter states pt with some missing teeth, eating softer foods at home, able to feed self with tray set up.   Denies nausea/vomiting/constipation/diarrhea PTA.  Confirms NKFA/intolerance  Micronutrient/Other supplementation: Feso4 per H&P   Protein-energy supplementation: Ensure 1x daily

## 2023-12-24 NOTE — DIETITIAN INITIAL EVALUATION ADULT - PERTINENT MEDS FT
MEDICATIONS  (STANDING):  atorvastatin 10 milliGRAM(s) Oral at bedtime  cefTRIAXone   IVPB 2000 milliGRAM(s) IV Intermittent every 24 hours  ferrous    sulfate 325 milliGRAM(s) Oral daily  gabapentin 100 milliGRAM(s) Oral at bedtime  metoprolol tartrate 25 milliGRAM(s) Oral two times a day  mirtazapine 22.5 milliGRAM(s) Oral at bedtime  mometasone 220 MICROgram(s) Inhaler 2 Puff(s) Inhalation daily  senna 2 Tablet(s) Oral at bedtime  sertraline 75 milliGRAM(s) Oral daily  sodium chloride 0.65% Nasal 1 Spray(s) Both Nostrils two times a day  spironolactone 25 milliGRAM(s) Oral two times a day  torsemide 60 milliGRAM(s) Oral daily    MEDICATIONS  (PRN):  acetaminophen     Tablet .. 650 milliGRAM(s) Oral every 6 hours PRN Temp greater or equal to 38C (100.4F), Mild Pain (1 - 3)  albuterol    90 MICROgram(s) HFA Inhaler 2 Puff(s) Inhalation every 6 hours PRN Shortness of Breath and/or Wheezing  aluminum hydroxide/magnesium hydroxide/simethicone Suspension 30 milliLiter(s) Oral every 4 hours PRN Dyspepsia  melatonin 3 milliGRAM(s) Oral at bedtime PRN Insomnia  ondansetron Injectable 4 milliGRAM(s) IV Push every 8 hours PRN Nausea and/or Vomiting  polyethylene glycol 3350 17 Gram(s) Oral daily PRN Constipation

## 2023-12-25 LAB
ANION GAP SERPL CALC-SCNC: 11 MMOL/L — SIGNIFICANT CHANGE UP (ref 5–17)
ANION GAP SERPL CALC-SCNC: 11 MMOL/L — SIGNIFICANT CHANGE UP (ref 5–17)
BUN SERPL-MCNC: 30 MG/DL — HIGH (ref 7–23)
BUN SERPL-MCNC: 30 MG/DL — HIGH (ref 7–23)
CALCIUM SERPL-MCNC: 9.1 MG/DL — SIGNIFICANT CHANGE UP (ref 8.4–10.5)
CALCIUM SERPL-MCNC: 9.1 MG/DL — SIGNIFICANT CHANGE UP (ref 8.4–10.5)
CHLORIDE SERPL-SCNC: 98 MMOL/L — SIGNIFICANT CHANGE UP (ref 96–108)
CHLORIDE SERPL-SCNC: 98 MMOL/L — SIGNIFICANT CHANGE UP (ref 96–108)
CO2 SERPL-SCNC: 28 MMOL/L — SIGNIFICANT CHANGE UP (ref 22–31)
CO2 SERPL-SCNC: 28 MMOL/L — SIGNIFICANT CHANGE UP (ref 22–31)
CREAT SERPL-MCNC: 1.41 MG/DL — HIGH (ref 0.5–1.3)
CREAT SERPL-MCNC: 1.41 MG/DL — HIGH (ref 0.5–1.3)
EGFR: 35 ML/MIN/1.73M2 — LOW
EGFR: 35 ML/MIN/1.73M2 — LOW
GLUCOSE SERPL-MCNC: 121 MG/DL — HIGH (ref 70–99)
GLUCOSE SERPL-MCNC: 121 MG/DL — HIGH (ref 70–99)
POTASSIUM SERPL-MCNC: 3.7 MMOL/L — SIGNIFICANT CHANGE UP (ref 3.5–5.3)
POTASSIUM SERPL-MCNC: 3.7 MMOL/L — SIGNIFICANT CHANGE UP (ref 3.5–5.3)
POTASSIUM SERPL-SCNC: 3.7 MMOL/L — SIGNIFICANT CHANGE UP (ref 3.5–5.3)
POTASSIUM SERPL-SCNC: 3.7 MMOL/L — SIGNIFICANT CHANGE UP (ref 3.5–5.3)
SODIUM SERPL-SCNC: 137 MMOL/L — SIGNIFICANT CHANGE UP (ref 135–145)
SODIUM SERPL-SCNC: 137 MMOL/L — SIGNIFICANT CHANGE UP (ref 135–145)

## 2023-12-25 PROCEDURE — 99233 SBSQ HOSP IP/OBS HIGH 50: CPT

## 2023-12-25 RX ORDER — HEPARIN SODIUM 5000 [USP'U]/ML
5000 INJECTION INTRAVENOUS; SUBCUTANEOUS EVERY 8 HOURS
Refills: 0 | Status: DISCONTINUED | OUTPATIENT
Start: 2023-12-25 | End: 2023-12-25

## 2023-12-25 RX ORDER — HEPARIN SODIUM 5000 [USP'U]/ML
5000 INJECTION INTRAVENOUS; SUBCUTANEOUS EVERY 8 HOURS
Refills: 0 | Status: DISCONTINUED | OUTPATIENT
Start: 2023-12-25 | End: 2023-12-28

## 2023-12-25 RX ADMIN — Medication 1 SPRAY(S): at 06:01

## 2023-12-25 RX ADMIN — Medication 60 MILLIGRAM(S): at 06:00

## 2023-12-25 RX ADMIN — SERTRALINE 75 MILLIGRAM(S): 25 TABLET, FILM COATED ORAL at 13:34

## 2023-12-25 RX ADMIN — SPIRONOLACTONE 25 MILLIGRAM(S): 25 TABLET, FILM COATED ORAL at 06:00

## 2023-12-25 RX ADMIN — Medication 3 MILLIGRAM(S): at 20:47

## 2023-12-25 RX ADMIN — SENNA PLUS 2 TABLET(S): 8.6 TABLET ORAL at 20:47

## 2023-12-25 RX ADMIN — Medication 325 MILLIGRAM(S): at 13:34

## 2023-12-25 RX ADMIN — Medication 1 SPRAY(S): at 17:38

## 2023-12-25 RX ADMIN — HEPARIN SODIUM 5000 UNIT(S): 5000 INJECTION INTRAVENOUS; SUBCUTANEOUS at 20:47

## 2023-12-25 RX ADMIN — Medication 25 MILLIGRAM(S): at 17:38

## 2023-12-25 RX ADMIN — CEFTRIAXONE 100 MILLIGRAM(S): 500 INJECTION, POWDER, FOR SOLUTION INTRAMUSCULAR; INTRAVENOUS at 17:38

## 2023-12-25 RX ADMIN — MOMETASONE FUROATE 2 PUFF(S): 220 INHALANT RESPIRATORY (INHALATION) at 13:35

## 2023-12-25 RX ADMIN — Medication 25 MILLIGRAM(S): at 06:00

## 2023-12-25 RX ADMIN — HEPARIN SODIUM 5000 UNIT(S): 5000 INJECTION INTRAVENOUS; SUBCUTANEOUS at 13:34

## 2023-12-25 RX ADMIN — MIRTAZAPINE 22.5 MILLIGRAM(S): 45 TABLET, ORALLY DISINTEGRATING ORAL at 20:47

## 2023-12-25 RX ADMIN — ATORVASTATIN CALCIUM 10 MILLIGRAM(S): 80 TABLET, FILM COATED ORAL at 20:47

## 2023-12-25 NOTE — PROGRESS NOTE ADULT - SUBJECTIVE AND OBJECTIVE BOX
[facility] Division of Hospital Medicine  Clayton Guardado  MS Teams      SUBJECTIVE / OVERNIGHT EVENTS:  No events overnight  tired this am, awakens to voice  denies f/chills/LE pain  denies drainage    ADDITIONAL REVIEW OF SYSTEMS:    MEDICATIONS  (STANDING):  atorvastatin 10 milliGRAM(s) Oral at bedtime  cefTRIAXone   IVPB 2000 milliGRAM(s) IV Intermittent every 24 hours  ferrous    sulfate 325 milliGRAM(s) Oral daily  heparin   Injectable 5000 Unit(s) SubCutaneous every 8 hours  metoprolol tartrate 25 milliGRAM(s) Oral two times a day  mirtazapine 22.5 milliGRAM(s) Oral at bedtime  mometasone 220 MICROgram(s) Inhaler 2 Puff(s) Inhalation daily  senna 2 Tablet(s) Oral at bedtime  sertraline 75 milliGRAM(s) Oral daily  sodium chloride 0.65% Nasal 1 Spray(s) Both Nostrils two times a day  spironolactone 25 milliGRAM(s) Oral two times a day  torsemide 60 milliGRAM(s) Oral daily    MEDICATIONS  (PRN):  acetaminophen     Tablet .. 650 milliGRAM(s) Oral every 6 hours PRN Temp greater or equal to 38C (100.4F), Mild Pain (1 - 3)  albuterol    90 MICROgram(s) HFA Inhaler 2 Puff(s) Inhalation every 6 hours PRN Shortness of Breath and/or Wheezing  aluminum hydroxide/magnesium hydroxide/simethicone Suspension 30 milliLiter(s) Oral every 4 hours PRN Dyspepsia  melatonin 3 milliGRAM(s) Oral at bedtime PRN Insomnia  ondansetron Injectable 4 milliGRAM(s) IV Push every 8 hours PRN Nausea and/or Vomiting  polyethylene glycol 3350 17 Gram(s) Oral daily PRN Constipation      I&O's Summary    24 Dec 2023 07:01  -  25 Dec 2023 07:00  --------------------------------------------------------  IN: 550 mL / OUT: 700 mL / NET: -150 mL        PHYSICAL EXAM:  Vital Signs Last 24 Hrs  T(C): 37.4 (25 Dec 2023 00:26), Max: 37.4 (25 Dec 2023 00:26)  T(F): 99.4 (25 Dec 2023 00:26), Max: 99.4 (25 Dec 2023 00:26)  HR: 96 (25 Dec 2023 00:26) (93 - 96)  BP: 107/62 (25 Dec 2023 00:26) (107/61 - 124/66)  BP(mean): --  RR: 18 (25 Dec 2023 00:26) (17 - 18)  SpO2: 95% (25 Dec 2023 00:26) (95% - 95%)    Parameters below as of 25 Dec 2023 00:26  Patient On (Oxygen Delivery Method): nasal cannula  O2 Flow (L/min): 4    CONSTITUTIONAL: NAD, well-developed  EYES: PERRLA; conjunctiva and sclera clear  RESPIRATORY: Normal respiratory effort; mild crackles at the bases  CARDIOVASCULAR: Regular rate and rhythm, normal S1 and S2, no murmur; trace LE edema L>R Peripheral pulses are 2+ bilaterally  ABDOMEN: Nontender to palpation, normoactive bowel sounds, no rebound/guarding  MUSCULOSKELETAL: no clubbing or cyanosis of digits; no joint swelling or tenderness to palpation  PSYCH: A+O to person, place, and time; affect appropriate  NEUROLOGY: CN 2-12 are intact and symmetric; no gross sensory deficits   SKIN: erythema b/l LE with open wound R shin, wrapped in gauze no drainage  LABS:    12-25    137  |  98  |  30<H>  ----------------------------<  121<H>  3.7   |  28  |  1.41<H>    Ca    9.1      25 Dec 2023 05:26            Urinalysis Basic - ( 25 Dec 2023 05:26 )    Color: x / Appearance: x / SG: x / pH: x  Gluc: 121 mg/dL / Ketone: x  / Bili: x / Urobili: x   Blood: x / Protein: x / Nitrite: x   Leuk Esterase: x / RBC: x / WBC x   Sq Epi: x / Non Sq Epi: x / Bacteria: x       [facility] Division of Hospital Medicine  Clayton Guardado  MS Teams      SUBJECTIVE / OVERNIGHT EVENTS:  No events overnight  tired this am, awakens to voice  denies f/chills/LE pain  denies drainage    ADDITIONAL REVIEW OF SYSTEMS:    MEDICATIONS  (STANDING):  atorvastatin 10 milliGRAM(s) Oral at bedtime  cefTRIAXone   IVPB 2000 milliGRAM(s) IV Intermittent every 24 hours  ferrous    sulfate 325 milliGRAM(s) Oral daily  heparin   Injectable 5000 Unit(s) SubCutaneous every 8 hours  metoprolol tartrate 25 milliGRAM(s) Oral two times a day  mirtazapine 22.5 milliGRAM(s) Oral at bedtime  mometasone 220 MICROgram(s) Inhaler 2 Puff(s) Inhalation daily  senna 2 Tablet(s) Oral at bedtime  sertraline 75 milliGRAM(s) Oral daily  sodium chloride 0.65% Nasal 1 Spray(s) Both Nostrils two times a day  spironolactone 25 milliGRAM(s) Oral two times a day  torsemide 60 milliGRAM(s) Oral daily    MEDICATIONS  (PRN):  acetaminophen     Tablet .. 650 milliGRAM(s) Oral every 6 hours PRN Temp greater or equal to 38C (100.4F), Mild Pain (1 - 3)  albuterol    90 MICROgram(s) HFA Inhaler 2 Puff(s) Inhalation every 6 hours PRN Shortness of Breath and/or Wheezing  aluminum hydroxide/magnesium hydroxide/simethicone Suspension 30 milliLiter(s) Oral every 4 hours PRN Dyspepsia  melatonin 3 milliGRAM(s) Oral at bedtime PRN Insomnia  ondansetron Injectable 4 milliGRAM(s) IV Push every 8 hours PRN Nausea and/or Vomiting  polyethylene glycol 3350 17 Gram(s) Oral daily PRN Constipation      I&O's Summary    24 Dec 2023 07:01  -  25 Dec 2023 07:00  --------------------------------------------------------  IN: 550 mL / OUT: 700 mL / NET: -150 mL        PHYSICAL EXAM:  Vital Signs Last 24 Hrs  T(C): 37.4 (25 Dec 2023 00:26), Max: 37.4 (25 Dec 2023 00:26)  T(F): 99.4 (25 Dec 2023 00:26), Max: 99.4 (25 Dec 2023 00:26)  HR: 96 (25 Dec 2023 00:26) (93 - 96)  BP: 107/62 (25 Dec 2023 00:26) (107/61 - 124/66)  BP(mean): --  RR: 18 (25 Dec 2023 00:26) (17 - 18)  SpO2: 95% (25 Dec 2023 00:26) (95% - 95%)    Parameters below as of 25 Dec 2023 00:26  Patient On (Oxygen Delivery Method): nasal cannula  O2 Flow (L/min): 4    CONSTITUTIONAL: NAD, well-developed  EYES: PERRLA; conjunctiva and sclera clear  RESPIRATORY: Normal respiratory effort; mild crackles at the bases  CARDIOVASCULAR: Regular rate and rhythm, normal S1 and S2, no murmur; trace LE edema L>R Peripheral pulses are 2+ bilaterally  ABDOMEN: Nontender to palpation, normoactive bowel sounds, no rebound/guarding  MUSCULOSKELETAL: no clubbing or cyanosis of digits; no joint swelling or tenderness to palpation  PSYCH: A+O to person, place, and time; affect appropriate  NEUROLOGY: CN 2-12 are intact and symmetric; no gross sensory deficits   SKIN: erythema b/l LE with open wound R shin, wrapped in gauze no drainage  LABS:    12-25    137  |  98  |  30<H>  ----------------------------<  121<H>  3.7   |  28  |  1.41<H>    Ca    9.1      25 Dec 2023 05:26            Urinalysis Basic - ( 25 Dec 2023 05:26 )    Color: x / Appearance: x / SG: x / pH: x  Gluc: 121 mg/dL / Ketone: x  / Bili: x / Urobili: x   Blood: x / Protein: x / Nitrite: x   Leuk Esterase: x / RBC: x / WBC x   Sq Epi: x / Non Sq Epi: x / Bacteria: x

## 2023-12-25 NOTE — PROGRESS NOTE ADULT - PROBLEM SELECTOR PLAN 7
- DVT ppx: sqh  - Diet: DASH    Dispo: pending improvement, [place] - DVT ppx: sqh  - Diet: DASH    Dispo: pending improvement, [place]

## 2023-12-25 NOTE — PROGRESS NOTE ADULT - TIME BILLING
reviewing emr, labs, coordination of care, discussion with patient, family bedside, documentation
reviewing emr, labs, coordination of care, discussion with patient, documentation
reviewing emr, labs, coordination of care, discussion with patient, family bedside, documentation

## 2023-12-25 NOTE — PROVIDER CONTACT NOTE (OTHER) - REASON
Ok to give spironolactone with 100/65 BP, per NP Aashish Mcginnis Ok to give metoprolol with 100/65 BP, push spironolactone dose to 8pm, per RON Mcginnis

## 2023-12-26 LAB
-  AMPICILLIN/SULBACTAM: SIGNIFICANT CHANGE UP
-  AMPICILLIN/SULBACTAM: SIGNIFICANT CHANGE UP
-  AZTREONAM: SIGNIFICANT CHANGE UP
-  AZTREONAM: SIGNIFICANT CHANGE UP
-  CEFAZOLIN: SIGNIFICANT CHANGE UP
-  CEFAZOLIN: SIGNIFICANT CHANGE UP
-  CEFEPIME: SIGNIFICANT CHANGE UP
-  CEFEPIME: SIGNIFICANT CHANGE UP
-  CEFTAZIDIME: SIGNIFICANT CHANGE UP
-  CEFTAZIDIME: SIGNIFICANT CHANGE UP
-  CIPROFLOXACIN: SIGNIFICANT CHANGE UP
-  CIPROFLOXACIN: SIGNIFICANT CHANGE UP
-  CLINDAMYCIN: SIGNIFICANT CHANGE UP
-  CLINDAMYCIN: SIGNIFICANT CHANGE UP
-  DAPTOMYCIN: SIGNIFICANT CHANGE UP
-  DAPTOMYCIN: SIGNIFICANT CHANGE UP
-  ERYTHROMYCIN: SIGNIFICANT CHANGE UP
-  ERYTHROMYCIN: SIGNIFICANT CHANGE UP
-  GENTAMICIN: SIGNIFICANT CHANGE UP
-  GENTAMICIN: SIGNIFICANT CHANGE UP
-  IMIPENEM: SIGNIFICANT CHANGE UP
-  IMIPENEM: SIGNIFICANT CHANGE UP
-  LEVOFLOXACIN: SIGNIFICANT CHANGE UP
-  LEVOFLOXACIN: SIGNIFICANT CHANGE UP
-  LINEZOLID: SIGNIFICANT CHANGE UP
-  LINEZOLID: SIGNIFICANT CHANGE UP
-  MEROPENEM: SIGNIFICANT CHANGE UP
-  MEROPENEM: SIGNIFICANT CHANGE UP
-  OXACILLIN: SIGNIFICANT CHANGE UP
-  OXACILLIN: SIGNIFICANT CHANGE UP
-  PENICILLIN: SIGNIFICANT CHANGE UP
-  PENICILLIN: SIGNIFICANT CHANGE UP
-  PIPERACILLIN/TAZOBACTAM: SIGNIFICANT CHANGE UP
-  PIPERACILLIN/TAZOBACTAM: SIGNIFICANT CHANGE UP
-  RIFAMPIN: SIGNIFICANT CHANGE UP
-  RIFAMPIN: SIGNIFICANT CHANGE UP
-  TETRACYCLINE: SIGNIFICANT CHANGE UP
-  TETRACYCLINE: SIGNIFICANT CHANGE UP
-  TRIMETHOPRIM/SULFAMETHOXAZOLE: SIGNIFICANT CHANGE UP
-  TRIMETHOPRIM/SULFAMETHOXAZOLE: SIGNIFICANT CHANGE UP
-  VANCOMYCIN: SIGNIFICANT CHANGE UP
-  VANCOMYCIN: SIGNIFICANT CHANGE UP
ANION GAP SERPL CALC-SCNC: 15 MMOL/L — SIGNIFICANT CHANGE UP (ref 5–17)
ANION GAP SERPL CALC-SCNC: 15 MMOL/L — SIGNIFICANT CHANGE UP (ref 5–17)
BUN SERPL-MCNC: 35 MG/DL — HIGH (ref 7–23)
BUN SERPL-MCNC: 35 MG/DL — HIGH (ref 7–23)
CALCIUM SERPL-MCNC: 9.1 MG/DL — SIGNIFICANT CHANGE UP (ref 8.4–10.5)
CALCIUM SERPL-MCNC: 9.1 MG/DL — SIGNIFICANT CHANGE UP (ref 8.4–10.5)
CHLORIDE SERPL-SCNC: 96 MMOL/L — SIGNIFICANT CHANGE UP (ref 96–108)
CHLORIDE SERPL-SCNC: 96 MMOL/L — SIGNIFICANT CHANGE UP (ref 96–108)
CO2 SERPL-SCNC: 29 MMOL/L — SIGNIFICANT CHANGE UP (ref 22–31)
CO2 SERPL-SCNC: 29 MMOL/L — SIGNIFICANT CHANGE UP (ref 22–31)
CREAT SERPL-MCNC: 1.25 MG/DL — SIGNIFICANT CHANGE UP (ref 0.5–1.3)
CREAT SERPL-MCNC: 1.25 MG/DL — SIGNIFICANT CHANGE UP (ref 0.5–1.3)
CULTURE RESULTS: ABNORMAL
CULTURE RESULTS: ABNORMAL
EGFR: 41 ML/MIN/1.73M2 — LOW
EGFR: 41 ML/MIN/1.73M2 — LOW
GLUCOSE SERPL-MCNC: 130 MG/DL — HIGH (ref 70–99)
GLUCOSE SERPL-MCNC: 130 MG/DL — HIGH (ref 70–99)
MAGNESIUM SERPL-MCNC: 2 MG/DL — SIGNIFICANT CHANGE UP (ref 1.6–2.6)
MAGNESIUM SERPL-MCNC: 2 MG/DL — SIGNIFICANT CHANGE UP (ref 1.6–2.6)
METHOD TYPE: SIGNIFICANT CHANGE UP
ORGANISM # SPEC MICROSCOPIC CNT: ABNORMAL
POTASSIUM SERPL-MCNC: 3.6 MMOL/L — SIGNIFICANT CHANGE UP (ref 3.5–5.3)
POTASSIUM SERPL-MCNC: 3.6 MMOL/L — SIGNIFICANT CHANGE UP (ref 3.5–5.3)
POTASSIUM SERPL-SCNC: 3.6 MMOL/L — SIGNIFICANT CHANGE UP (ref 3.5–5.3)
POTASSIUM SERPL-SCNC: 3.6 MMOL/L — SIGNIFICANT CHANGE UP (ref 3.5–5.3)
SODIUM SERPL-SCNC: 140 MMOL/L — SIGNIFICANT CHANGE UP (ref 135–145)
SODIUM SERPL-SCNC: 140 MMOL/L — SIGNIFICANT CHANGE UP (ref 135–145)
SPECIMEN SOURCE: SIGNIFICANT CHANGE UP
SPECIMEN SOURCE: SIGNIFICANT CHANGE UP
URATE SERPL-MCNC: 10.3 MG/DL — HIGH (ref 2.5–7)
URATE SERPL-MCNC: 10.3 MG/DL — HIGH (ref 2.5–7)

## 2023-12-26 PROCEDURE — 99232 SBSQ HOSP IP/OBS MODERATE 35: CPT

## 2023-12-26 PROCEDURE — 99222 1ST HOSP IP/OBS MODERATE 55: CPT

## 2023-12-26 RX ORDER — CEFEPIME 1 G/1
2000 INJECTION, POWDER, FOR SOLUTION INTRAMUSCULAR; INTRAVENOUS EVERY 12 HOURS
Refills: 0 | Status: DISCONTINUED | OUTPATIENT
Start: 2023-12-26 | End: 2023-12-28

## 2023-12-26 RX ORDER — VANCOMYCIN HCL 1 G
750 VIAL (EA) INTRAVENOUS EVERY 24 HOURS
Refills: 0 | Status: DISCONTINUED | OUTPATIENT
Start: 2023-12-26 | End: 2023-12-28

## 2023-12-26 RX ADMIN — MIRTAZAPINE 22.5 MILLIGRAM(S): 45 TABLET, ORALLY DISINTEGRATING ORAL at 21:04

## 2023-12-26 RX ADMIN — SPIRONOLACTONE 25 MILLIGRAM(S): 25 TABLET, FILM COATED ORAL at 05:17

## 2023-12-26 RX ADMIN — SERTRALINE 75 MILLIGRAM(S): 25 TABLET, FILM COATED ORAL at 11:49

## 2023-12-26 RX ADMIN — Medication 650 MILLIGRAM(S): at 15:11

## 2023-12-26 RX ADMIN — Medication 1 SPRAY(S): at 05:17

## 2023-12-26 RX ADMIN — Medication 650 MILLIGRAM(S): at 14:36

## 2023-12-26 RX ADMIN — Medication 3 MILLIGRAM(S): at 21:05

## 2023-12-26 RX ADMIN — Medication 25 MILLIGRAM(S): at 17:34

## 2023-12-26 RX ADMIN — Medication 60 MILLIGRAM(S): at 14:24

## 2023-12-26 RX ADMIN — HEPARIN SODIUM 5000 UNIT(S): 5000 INJECTION INTRAVENOUS; SUBCUTANEOUS at 05:17

## 2023-12-26 RX ADMIN — Medication 250 MILLIGRAM(S): at 16:15

## 2023-12-26 RX ADMIN — HEPARIN SODIUM 5000 UNIT(S): 5000 INJECTION INTRAVENOUS; SUBCUTANEOUS at 21:05

## 2023-12-26 RX ADMIN — Medication 1 SPRAY(S): at 17:35

## 2023-12-26 RX ADMIN — ATORVASTATIN CALCIUM 10 MILLIGRAM(S): 80 TABLET, FILM COATED ORAL at 21:05

## 2023-12-26 RX ADMIN — CEFEPIME 100 MILLIGRAM(S): 1 INJECTION, POWDER, FOR SOLUTION INTRAMUSCULAR; INTRAVENOUS at 17:32

## 2023-12-26 RX ADMIN — SPIRONOLACTONE 25 MILLIGRAM(S): 25 TABLET, FILM COATED ORAL at 17:34

## 2023-12-26 RX ADMIN — SENNA PLUS 2 TABLET(S): 8.6 TABLET ORAL at 21:05

## 2023-12-26 RX ADMIN — Medication 325 MILLIGRAM(S): at 11:49

## 2023-12-26 RX ADMIN — HEPARIN SODIUM 5000 UNIT(S): 5000 INJECTION INTRAVENOUS; SUBCUTANEOUS at 14:23

## 2023-12-26 RX ADMIN — MOMETASONE FUROATE 2 PUFF(S): 220 INHALANT RESPIRATORY (INHALATION) at 11:49

## 2023-12-26 RX ADMIN — Medication 25 MILLIGRAM(S): at 05:17

## 2023-12-26 NOTE — PROGRESS NOTE ADULT - SUBJECTIVE AND OBJECTIVE BOX
[facility] DIVISION OF INFECTIOUS DISEASES  VANE Shine Y. Patel, S. Shah, G. Casimir  [phone]  ([phone] - weekdays after 5pm and weekends)    Name: ARNULFO ALEX  Age/Gender: 89y Female  MRN: [id number]    Interval History:  Patient seen and examined this morning.   States she feels fine, denies fever or any pain.   No new complaints noted.  Notes reviewed  No concerning overnight events  Afebrile   Allergies: codeine (Other)      Objective:  Vitals:   T(F): 97.8 (12-26-23 @ 09:22), Max: 99.6 (12-25-23 @ 16:32)  HR: 85 (12-26-23 @ 12:02) (82 - 105)  BP: 92/53 (12-26-23 @ 12:02) (92/53 - 109/57)  RR: 18 (12-26-23 @ 09:22) (16 - 18)  SpO2: 95% (12-26-23 @ 09:22) (93% - 96%)  Physical Examination:  General: no acute distress  HEENT: NC/AT, anicteric, neck supple  Respiratory: no acc muscle use, breathing comfortably  Cardiovascular: S1 and S2 present  Gastrointestinal: normal appearing, nondistended  Extremities: LLE dressing, no edema, no cyanosis    Laboratory Studies:  CBC:   WBC Trend:  9.19 12-23-23 @ 07:11  9.11 12-22-23 @ 11:30    CMP: 12-26    140  |  96  |  35<H>  ----------------------------<  130<H>  3.6   |  29  |  1.25    Ca    9.1      26 Dec 2023 07:25  Mg     2.0     12-26      Creatinine: 1.25 mg/dL (12-26-23 @ 07:25)  Creatinine: 1.41 mg/dL (12-25-23 @ 05:26)  Creatinine: 1.47 mg/dL (12-24-23 @ 07:00)  Creatinine: 1.47 mg/dL (12-23-23 @ 07:09)  Creatinine: 1.53 mg/dL (12-22-23 @ 14:35)  Creatinine: 1.52 mg/dL (12-22-23 @ 11:30)    Microbiology: reviewed   Culture - Other (collected 12-24-23 @ 09:01)  Source: Ulcer Ulcer  Preliminary Report (12-25-23 @ 17:25):    Culture yields growth of greater than 3 colony types of    bacteria,  which may indicate contamination and normal adeline    Call client services within 7 days if further workup is clinically    indicated.    Culture includes    Few Staphylococcus aureus    Numerous Pseudomonas aeruginosa    Culture - Urine (collected 12-22-23 @ 12:42)  Source: Clean Catch Clean Catch (Midstream)  Final Report (12-23-23 @ 22:35):    <10,000 CFU/mL Normal Urogenital Adeline    Culture - Blood (collected 12-22-23 @ 10:55)  Source: .Blood Blood-Peripheral  Preliminary Report (12-25-23 @ 18:01):    No growth at 72 Hours    Culture - Blood (collected 12-22-23 @ 10:40)  Source: .Blood Blood-Peripheral  Preliminary Report (12-25-23 @ 18:01):    No growth at 72 Hours    Radiology: reviewed     Medications:  acetaminophen     Tablet .. 650 milliGRAM(s) Oral every 6 hours PRN  albuterol    90 MICROgram(s) HFA Inhaler 2 Puff(s) Inhalation every 6 hours PRN  aluminum hydroxide/magnesium hydroxide/simethicone Suspension 30 milliLiter(s) Oral every 4 hours PRN  atorvastatin 10 milliGRAM(s) Oral at bedtime  cefTRIAXone   IVPB 2000 milliGRAM(s) IV Intermittent every 24 hours  ferrous    sulfate 325 milliGRAM(s) Oral daily  heparin   Injectable 5000 Unit(s) SubCutaneous every 8 hours  melatonin 3 milliGRAM(s) Oral at bedtime PRN  metoprolol tartrate 25 milliGRAM(s) Oral two times a day  mirtazapine 22.5 milliGRAM(s) Oral at bedtime  mometasone 220 MICROgram(s) Inhaler 2 Puff(s) Inhalation daily  ondansetron Injectable 4 milliGRAM(s) IV Push every 8 hours PRN  polyethylene glycol 3350 17 Gram(s) Oral daily PRN  senna 2 Tablet(s) Oral at bedtime  sertraline 75 milliGRAM(s) Oral daily  sodium chloride 0.65% Nasal 1 Spray(s) Both Nostrils two times a day  spironolactone 25 milliGRAM(s) Oral two times a day  torsemide 60 milliGRAM(s) Oral daily    Current Antimicrobials:  cefTRIAXone   IVPB 2000 milliGRAM(s) IV Intermittent every 24 hours    Prior/Completed Antimicrobials:  piperacillin/tazobactam IVPB...  vancomycin  IVPB.   [facility] DIVISION OF INFECTIOUS DISEASES  VANE Shine Y. Patel, S. Shah, G. Casimir  [phone]  ([phone] - weekdays after 5pm and weekends)    Name: ARNULFO ALEX  Age/Gender: 89y Female  MRN: [id number]    Interval History:  Patient seen and examined this morning.   States she feels fine, denies fever or any pain.   No new complaints noted.  Notes reviewed  No concerning overnight events  Afebrile   Allergies: codeine (Other)      Objective:  Vitals:   T(F): 97.8 (12-26-23 @ 09:22), Max: 99.6 (12-25-23 @ 16:32)  HR: 85 (12-26-23 @ 12:02) (82 - 105)  BP: 92/53 (12-26-23 @ 12:02) (92/53 - 109/57)  RR: 18 (12-26-23 @ 09:22) (16 - 18)  SpO2: 95% (12-26-23 @ 09:22) (93% - 96%)  Physical Examination:  General: no acute distress  HEENT: NC/AT, anicteric, neck supple  Respiratory: no acc muscle use, breathing comfortably  Cardiovascular: S1 and S2 present  Gastrointestinal: normal appearing, nondistended  Extremities: LLE dressing, no edema, no cyanosis    Laboratory Studies:  CBC:   WBC Trend:  9.19 12-23-23 @ 07:11  9.11 12-22-23 @ 11:30    CMP: 12-26    140  |  96  |  35<H>  ----------------------------<  130<H>  3.6   |  29  |  1.25    Ca    9.1      26 Dec 2023 07:25  Mg     2.0     12-26      Creatinine: 1.25 mg/dL (12-26-23 @ 07:25)  Creatinine: 1.41 mg/dL (12-25-23 @ 05:26)  Creatinine: 1.47 mg/dL (12-24-23 @ 07:00)  Creatinine: 1.47 mg/dL (12-23-23 @ 07:09)  Creatinine: 1.53 mg/dL (12-22-23 @ 14:35)  Creatinine: 1.52 mg/dL (12-22-23 @ 11:30)    Microbiology: reviewed   Culture - Other (collected 12-24-23 @ 09:01)  Source: Ulcer Ulcer  Preliminary Report (12-25-23 @ 17:25):    Culture yields growth of greater than 3 colony types of    bacteria,  which may indicate contamination and normal adeline    Call client services within 7 days if further workup is clinically    indicated.    Culture includes    Few Staphylococcus aureus    Numerous Pseudomonas aeruginosa    Culture - Urine (collected 12-22-23 @ 12:42)  Source: Clean Catch Clean Catch (Midstream)  Final Report (12-23-23 @ 22:35):    <10,000 CFU/mL Normal Urogenital Adeline    Culture - Blood (collected 12-22-23 @ 10:55)  Source: .Blood Blood-Peripheral  Preliminary Report (12-25-23 @ 18:01):    No growth at 72 Hours    Culture - Blood (collected 12-22-23 @ 10:40)  Source: .Blood Blood-Peripheral  Preliminary Report (12-25-23 @ 18:01):    No growth at 72 Hours    Radiology: reviewed     Medications:  acetaminophen     Tablet .. 650 milliGRAM(s) Oral every 6 hours PRN  albuterol    90 MICROgram(s) HFA Inhaler 2 Puff(s) Inhalation every 6 hours PRN  aluminum hydroxide/magnesium hydroxide/simethicone Suspension 30 milliLiter(s) Oral every 4 hours PRN  atorvastatin 10 milliGRAM(s) Oral at bedtime  cefTRIAXone   IVPB 2000 milliGRAM(s) IV Intermittent every 24 hours  ferrous    sulfate 325 milliGRAM(s) Oral daily  heparin   Injectable 5000 Unit(s) SubCutaneous every 8 hours  melatonin 3 milliGRAM(s) Oral at bedtime PRN  metoprolol tartrate 25 milliGRAM(s) Oral two times a day  mirtazapine 22.5 milliGRAM(s) Oral at bedtime  mometasone 220 MICROgram(s) Inhaler 2 Puff(s) Inhalation daily  ondansetron Injectable 4 milliGRAM(s) IV Push every 8 hours PRN  polyethylene glycol 3350 17 Gram(s) Oral daily PRN  senna 2 Tablet(s) Oral at bedtime  sertraline 75 milliGRAM(s) Oral daily  sodium chloride 0.65% Nasal 1 Spray(s) Both Nostrils two times a day  spironolactone 25 milliGRAM(s) Oral two times a day  torsemide 60 milliGRAM(s) Oral daily    Current Antimicrobials:  cefTRIAXone   IVPB 2000 milliGRAM(s) IV Intermittent every 24 hours    Prior/Completed Antimicrobials:  piperacillin/tazobactam IVPB...  vancomycin  IVPB.

## 2023-12-26 NOTE — CONSULT NOTE ADULT - SUBJECTIVE AND OBJECTIVE BOX
Wound SURGERY CONSULT NOTE    HPI:  89F pmh Afib (was on eliquis but no longer), CHF on home O2 (3L), depression, HTN, HLD, recently hospitalized for LLE cellulitis treated w/Lolio Nicolasasyn while admitted and  s/p O/p abx course of doxycycline and augmentin x10 D, now sent in by outpatient wound care doctor for evaluation of possible cellulitis and IV antibiotic treatment. Patient endorsing that her legs are more swollen and  red than usual.     Denies CP, SOB, palpitation, N/V/D, fever, cough, chills, dizziness, abm pain, recent travel, sick contact, change in bowel and urinary habits       N/V/D,  BM/ Flatus,   NGT,     palp/ sob/dyspnea/ cp,       F/C/S  Wound consult requested by team to assist w/ management of      wound/ pressure injury.   Pt (unable to)  c/o pain, drainage, odor, color change,  or worsening swelling. Offloading and pericare initiated upon admission as pt Increasingly sedentary 2/2 to illness. Pt is Incontinent of urine & stool. (+)corbett/ ostomy.   No h/o bites, scratches, falls, trauma.  Pt seen by Wound RN  [facility] Advance/  Anirudh,TRIAD/ Alejandroell/ medihoney/ Allevyn foam/ dakins/ Adaptic/ DSD recommended used at home/ while awaiting consult.  Appetite good/ decreased.  weight loss.  S&S / RD consult appreciated All questions asked and answered to pt's and family's expressed understanding and satisfaction.    Current Diet: Diet, DASH/TLC:   Sodium & Cholesterol Restricted  1500mL Fluid Restriction ([id number])     Special Instructions for Nursin milliLiter(s) to 2000 milliLiter(s) fluid restriction (23 @ 21:15)      PAST MEDICAL & SURGICAL HISTORY:  HTN (hypertension)    HLD (hyperlipidemia)    Atrial fibrillation    Syncope    Depression    Leg edema    Mitral valve stenosis, unspecified etiology    Hearing loss of left ear    s/p knee surgery    s/p external ear surgery        REVIEW OF SYSTEMS General/ / Skin/Vasc/  MSK: see HPI  All other systems negative    MEDICATIONS  (STANDING):  atorvastatin 10 milliGRAM(s) Oral at bedtime  cefepime  IVPB 2000 milliGRAM(s) IV Intermittent every 12 hours  ferrous sulfate 325 milliGRAM(s) Oral daily  heparin  Injectable 5000 Unit(s) SubCutaneous every 8 hours  metoprolol tartrate 25 milliGRAM(s) Oral two times a day  mirtazapine 22.5 milliGRAM(s) Oral at bedtime  mometasone 220 MICROgram(s) Inhaler 2 Puff(s) Inhalation daily  senna 2 Tablet(s) Oral at bedtime  sertraline 75 milliGRAM(s) Oral daily  sodium chloride 0.65% Nasal 1 Spray(s) Both Nostrils two times a day  spironolactone 25 milliGRAM(s) Oral two times a day  torsemide 60 milliGRAM(s) Oral daily  vancomycin  IVPB 750 milliGRAM(s) IV Intermittent every 24 hours    MEDICATIONS  (PRN):  acetaminophen  ablet 650 milliGRAM(s) Oral every 6 hours PRN Temp greater or equal to 38C (100.4F), Mild Pain (1 - 3)  albuterol  90 MICROgram(s) HFA Inhaler 2 Puff(s) Inhalation every 6 hours PRN Shortness of Breath and/or Wheezing  aluminum hydroxide/magnesium hydroxide/simethicone Suspension 30 milliLiter(s) Oral every 4 hours PRN Dyspepsia  melatonin 3 milliGRAM(s) Oral at bedtime PRN Insomnia  ondansetron Injectable 4 milliGRAM(s) IV Push every 8 hours PRN Nausea and/or Vomiting  polyethylene glycol 3350 17 Gram(s) Oral daily PRN Constipation      Allergies  codeine (Other)      SOCIAL HISTORY:  / /single/ ;  lives in Assisted living; Former smoker, No current/ Denies smoking, ETOH, drugs    FAMILY HISTORY: blood disorder (Mother)   no h/o PVD or wound healing or skin problems    PHYSICAL EXAM:  Vital Signs Last 24 Hrs  T(C): 36.6 (26 Dec 2023 09:22), Max: 37.6 (25 Dec 2023 16:32)  T(F): 97.8 (26 Dec 2023 09:22), Max: 99.6 (25 Dec 2023 16:32)  HR: 99 (26 Dec 2023 14:44) (82 - 105)  BP: 115/58 (26 Dec 2023 14:44) (92/53 - 115/58)  BP(mean): --  RR: 18 (26 Dec 2023 14:44) (16 - 18)  SpO2: 95% (26 Dec 2023 14:44) (93% - 96%)    Parameters below as of 26 Dec 2023 14:44  Patient On (Oxygen Delivery Method): nasal cannula  O2 Flow (L/min): 4    NAD, Guarded but stable,  A&Ox3/ Alert/ Confused  cachectic/ thin, MO/ Obese, frail,  WD/ WN/ WG,  Disheveled  Total Care Sport/ Versa Care P500 / Envella Progressa bed     HEENT:  NC/AT, PERRL, EOMI, sclera clear, mucosa moist, throat clear, trachea midline, neck supple, trach  Respiratory: nonlabored w/ equal chest rise  Gastrointestinal: soft NT/ND (+)BS  (+)PEG (+)ostomy (+)NGT  : (+)corbett/ purewick/ condom cath  Neurology:  weakened strength & sensation grossly intact, paraesthesia  nonverbal, no follow commands, paraplegic  Psych: calm/ appropriate/ flat affect/ easily agitated/ restless/ anxious/ difficult to assess  Musculoskeletal:  limited stiff / p/FROM, no deformities/ contractures  Vascular: BLE equally warm/ cool,  no cyanosis, clubbing, edema nor acute ischemia           >LE //BLE edema equal           BLE DP/PT pulses palpable          BLE hemosiderin staining/ varicose veins  Skin:  moist w/ good turgor  thin, dry, pale, frail,  ecchymosis w/o hematoma  blistering  or serosanguinous drainage  No odor, erythema, increased warmth, tenderness, induration, fluctuance, nor crepitus    LABS/ CULTURES/ RADIOLOGY:      140  |  96  |  35  ----------------------------<  130      [23 @ 07:25]  3.6   |  29  |  1.25        Ca     9.1     [23 @ 07:25]      Mg     2.0     [23 @ 07:25]      Culture - Blood (collected 23 @ 10:55)  Source: .Blood Blood-Peripheral  Preliminary Report (23 @ 18:01):    No growth at 72 Hours    Culture - Blood (collected 23 @ 10:40)  Source: .Blood Blood-Peripheral  Preliminary Report (23 @ 18:01):    No growth at 72 Hours      Culture - Other (collected 23 @ 09:01)  Source: Ulcer Ulcer  Final Report (23 @ 15:58):    Culture yields growth of greater than 3 colony types of    bacteria,  which may indicate contamination and normal adeline    Call client services within 7 days if further workup is clinically    indicated.    Culture includes    Few Methicillin Resistant Staphylococcus aureus    Numerous Pseudomonas aeruginosa  Organism: Methicillin resistant Staphylococcus aureus  Pseudomonas aeruginosa (23 @ 15:58)  Organism: Pseudomonas aeruginosa (23 @ 15:58)      Method Type: BRYN      -  Aztreonam: S <=4      -  Cefepime: S <=2      -  Ceftazidime: S 4      -  Ciprofloxacin: S <=0.25      -  Imipenem: S <=1      -  Levofloxacin: S <=0.5      -  Meropenem: S <=1      -  Piperacillin/Tazobactam: S <=8  Organism: Methicillin resistant Staphylococcus aureus (23 @ 15:58)      Method Type: BRYN      -  Ampicillin/Sulbactam: R 16/8      -  Cefazolin: R 16      -  Clindamycin: S <=0.25      -  Daptomycin: S 0.5      -  Erythromycin: R >4      -  Gentamicin: S <=1 Should not be used as monotherapy      -  Linezolid: S 2      -  Oxacillin: R >2      -  Penicillin: R >8      -  Rifampin: S <=1 Should not be used as monotherapy      -  Tetracycline: S <=1      -  Trimethoprim/Sulfamethoxazole: S <=0.5/9.5      -  Vancomycin: S 2      ACC: [id number] EXAM:  XR TIB FIB AP LAT 2 VIEWS LT   ORDERED BY: ANALI COURTNEY     PROCEDURE DATE:  2023      INTERPRETATION:  XR TIBIA FIBULA AP AND LATERAL 2 VIEWS LEFT    HISTORY: Infection. Evaluate for gas.    VIEWS: 2  IMAGES: 3    COMPARISON: Left tibia and fibula x-rays dated 2023.    FINDINGS:    OSSEOUS STRUCTURES    Fractures:  None.    LEFT KNEE JOINTS    Joint Space(s):  There is moderate medial compartment joint space   narrowing. Tiny osteophytes are present.    LEFT ANKLE JOINTS    Joint Space(s):  Maintained.    SOFT TISSUES:  Generalized subcutaneous edema appears to be present. No   gas is seen. Moderate to severe atherosclerotic calcifications are   present.    IMPRESSION:  1.  No gas or osseous destruction isseen.           Wound SURGERY CONSULT NOTE    HPI:  89F pmh Afib (was on eliquis but no longer), CHF on home O2 (3L), depression, HTN, HLD, recently hospitalized for LLE cellulitis treated w/Lolio Nicolasasyn while admitted and  s/p O/p abx course of doxycycline and augmentin x10 D, now sent in by outpatient wound care doctor for evaluation of possible cellulitis and IV antibiotic treatment. Patient endorsing that her legs are more swollen and  red than usual.     Denies CP, SOB, palpitation, N/V/D, fever, cough, chills, dizziness, abm pain, recent travel, sick contact, change in bowel and urinary habits       N/V/D,  BM/ Flatus,   NGT,     palp/ sob/dyspnea/ cp,       F/C/S  Wound consult requested by team to assist w/ management of      wound/ pressure injury.   Pt (unable to)  c/o pain, drainage, odor, color change,  or worsening swelling. Offloading and pericare initiated upon admission as pt Increasingly sedentary 2/2 to illness. Pt is Incontinent of urine & stool. (+)corbett/ ostomy.   No h/o bites, scratches, falls, trauma.  Pt seen by Wound RN  [facility]/  Anirudh,TRIAD/ Alejandroell/ medihoney/ Allevyn foam/ dakins/ Adaptic/ DSD recommended used at home/ while awaiting consult.  Appetite good/ decreased.  weight loss.  S&S / RD consult appreciated All questions asked and answered to pt's and family's expressed understanding and satisfaction.    Current Diet: Diet, DASH/TLC:   Sodium & Cholesterol Restricted  1500mL Fluid Restriction ([id number])     Special Instructions for Nursin milliLiter(s) to 2000 milliLiter(s) fluid restriction (23 @ 21:15)      PAST MEDICAL & SURGICAL HISTORY:  HTN (hypertension)    HLD (hyperlipidemia)    Atrial fibrillation    Syncope    Depression    Leg edema    Mitral valve stenosis, unspecified etiology    Hearing loss of left ear    s/p knee surgery    s/p external ear surgery        REVIEW OF SYSTEMS General/ / Skin/Vasc/  MSK: see HPI  All other systems negative    MEDICATIONS  (STANDING):  atorvastatin 10 milliGRAM(s) Oral at bedtime  cefepime  IVPB 2000 milliGRAM(s) IV Intermittent every 12 hours  ferrous sulfate 325 milliGRAM(s) Oral daily  heparin  Injectable 5000 Unit(s) SubCutaneous every 8 hours  metoprolol tartrate 25 milliGRAM(s) Oral two times a day  mirtazapine 22.5 milliGRAM(s) Oral at bedtime  mometasone 220 MICROgram(s) Inhaler 2 Puff(s) Inhalation daily  senna 2 Tablet(s) Oral at bedtime  sertraline 75 milliGRAM(s) Oral daily  sodium chloride 0.65% Nasal 1 Spray(s) Both Nostrils two times a day  spironolactone 25 milliGRAM(s) Oral two times a day  torsemide 60 milliGRAM(s) Oral daily  vancomycin  IVPB 750 milliGRAM(s) IV Intermittent every 24 hours    MEDICATIONS  (PRN):  acetaminophen  ablet 650 milliGRAM(s) Oral every 6 hours PRN Temp greater or equal to 38C (100.4F), Mild Pain (1 - 3)  albuterol  90 MICROgram(s) HFA Inhaler 2 Puff(s) Inhalation every 6 hours PRN Shortness of Breath and/or Wheezing  aluminum hydroxide/magnesium hydroxide/simethicone Suspension 30 milliLiter(s) Oral every 4 hours PRN Dyspepsia  melatonin 3 milliGRAM(s) Oral at bedtime PRN Insomnia  ondansetron Injectable 4 milliGRAM(s) IV Push every 8 hours PRN Nausea and/or Vomiting  polyethylene glycol 3350 17 Gram(s) Oral daily PRN Constipation      Allergies  codeine (Other)      SOCIAL HISTORY:  / /single/ ;  lives in Assisted living; Former smoker, No current/ Denies smoking, ETOH, drugs    FAMILY HISTORY: blood disorder (Mother)   no h/o PVD or wound healing or skin problems    PHYSICAL EXAM:  Vital Signs Last 24 Hrs  T(C): 36.6 (26 Dec 2023 09:22), Max: 37.6 (25 Dec 2023 16:32)  T(F): 97.8 (26 Dec 2023 09:22), Max: 99.6 (25 Dec 2023 16:32)  HR: 99 (26 Dec 2023 14:44) (82 - 105)  BP: 115/58 (26 Dec 2023 14:44) (92/53 - 115/58)  BP(mean): --  RR: 18 (26 Dec 2023 14:44) (16 - 18)  SpO2: 95% (26 Dec 2023 14:44) (93% - 96%)    Parameters below as of 26 Dec 2023 14:44  Patient On (Oxygen Delivery Method): nasal cannula  O2 Flow (L/min): 4    NAD, Guarded but stable,  A&Ox3/ Alert/ Confused  cachectic/ thin, MO/ Obese, frail,  WD/ WN/ WG,  Disheveled  Total Care Sport/ Versa Care P500 / Envella Progressa bed     HEENT:  NC/AT, PERRL, EOMI, sclera clear, mucosa moist, throat clear, trachea midline, neck supple, trach  Respiratory: nonlabored w/ equal chest rise  Gastrointestinal: soft NT/ND (+)BS  (+)PEG (+)ostomy (+)NGT  : (+)corbett/ purewick/ condom cath  Neurology:  weakened strength & sensation grossly intact, paraesthesia  nonverbal, no follow commands, paraplegic  Psych: calm/ appropriate/ flat affect/ easily agitated/ restless/ anxious/ difficult to assess  Musculoskeletal:  limited stiff / p/FROM, no deformities/ contractures  Vascular: BLE equally warm/ cool,  no cyanosis, clubbing, edema nor acute ischemia           >LE //BLE edema equal           BLE DP/PT pulses palpable          BLE hemosiderin staining/ varicose veins  Skin:  moist w/ good turgor  thin, dry, pale, frail,  ecchymosis w/o hematoma  blistering  or serosanguinous drainage  No odor, erythema, increased warmth, tenderness, induration, fluctuance, nor crepitus    LABS/ CULTURES/ RADIOLOGY:      140  |  96  |  35  ----------------------------<  130      [23 @ 07:25]  3.6   |  29  |  1.25        Ca     9.1     [23 @ 07:25]      Mg     2.0     [23 @ 07:25]      Culture - Blood (collected 23 @ 10:55)  Source: .Blood Blood-Peripheral  Preliminary Report (23 @ 18:01):    No growth at 72 Hours    Culture - Blood (collected 23 @ 10:40)  Source: .Blood Blood-Peripheral  Preliminary Report (23 @ 18:01):    No growth at 72 Hours      Culture - Other (collected 23 @ 09:01)  Source: Ulcer Ulcer  Final Report (23 @ 15:58):    Culture yields growth of greater than 3 colony types of    bacteria,  which may indicate contamination and normal adeline    Call client services within 7 days if further workup is clinically    indicated.    Culture includes    Few Methicillin Resistant Staphylococcus aureus    Numerous Pseudomonas aeruginosa  Organism: Methicillin resistant Staphylococcus aureus  Pseudomonas aeruginosa (23 @ 15:58)  Organism: Pseudomonas aeruginosa (23 @ 15:58)      Method Type: BRYN      -  Aztreonam: S <=4      -  Cefepime: S <=2      -  Ceftazidime: S 4      -  Ciprofloxacin: S <=0.25      -  Imipenem: S <=1      -  Levofloxacin: S <=0.5      -  Meropenem: S <=1      -  Piperacillin/Tazobactam: S <=8  Organism: Methicillin resistant Staphylococcus aureus (23 @ 15:58)      Method Type: BRYN      -  Ampicillin/Sulbactam: R 16/8      -  Cefazolin: R 16      -  Clindamycin: S <=0.25      -  Daptomycin: S 0.5      -  Erythromycin: R >4      -  Gentamicin: S <=1 Should not be used as monotherapy      -  Linezolid: S 2      -  Oxacillin: R >2      -  Penicillin: R >8      -  Rifampin: S <=1 Should not be used as monotherapy      -  Tetracycline: S <=1      -  Trimethoprim/Sulfamethoxazole: S <=0.5/9.5      -  Vancomycin: S 2      ACC: [id number] EXAM:  XR TIB FIB AP LAT 2 VIEWS LT   ORDERED BY: ANALI COURTNEY     PROCEDURE DATE:  2023      INTERPRETATION:  XR TIBIA FIBULA AP AND LATERAL 2 VIEWS LEFT    HISTORY: Infection. Evaluate for gas.    VIEWS: 2  IMAGES: 3    COMPARISON: Left tibia and fibula x-rays dated 2023.    FINDINGS:    OSSEOUS STRUCTURES    Fractures:  None.    LEFT KNEE JOINTS    Joint Space(s):  There is moderate medial compartment joint space   narrowing. Tiny osteophytes are present.    LEFT ANKLE JOINTS    Joint Space(s):  Maintained.    SOFT TISSUES:  Generalized subcutaneous edema appears to be present. No   gas is seen. Moderate to severe atherosclerotic calcifications are   present.    IMPRESSION:  1.  No gas or osseous destruction isseen.           Wound SURGERY CONSULT NOTE    HPI:  89F pmh Afib (was on eliquis but no longer), CHF on home O2 (3L), depression, HTN, HLD, recently hospitalized for LLE cellulitis treated w/Vanco Zosyn while admitted and  s/p O/p abx course of doxycycline and augmentin x10 D, now sent in by outpatient wound care doctor for evaluation of possible cellulitis and IV antibiotic treatment. Patient endorsing that her legs are more swollen and  red than usual.     Denies CP, SOB, palpitation, N/V/D, fever, cough, chills, dizziness, abm pain, recent travel, sick contact, change in bowel and urinary habits     Wound consult requested by team to assist w/ management of LLE wounds and resolving cellulitis.  Pt w/o c/o pain, drainage, odor with improving redness or swelling.  Offloading and pericare initiated upon admission. Pt is mostly continent of urine & stool.  Denies h/o bites, scratches, falls, trauma.  Appetite good w/o weight loss.  All questions asked and answered to pt's expressed understanding and satisfaction.    Current Diet: Diet, DASH/TLC:   Sodium & Cholesterol Restricted  1500mL Fluid Restriction ([id number])     Special Instructions for Nursin milliLiter(s) to 2000 milliLiter(s) fluid restriction (23 @ 21:15)      PAST MEDICAL & SURGICAL HISTORY:  HTN (hypertension)    HLD (hyperlipidemia)    Atrial fibrillation    Syncope    Depression    Leg edema    Mitral valve stenosis, unspecified etiology    Hearing loss of left ear    s/p knee surgery    s/p external ear surgery        REVIEW OF SYSTEMS General/ / Skin/Vasc/  MSK: see HPI  All other systems negative    MEDICATIONS  (STANDING):  atorvastatin 10 milliGRAM(s) Oral at bedtime  cefepime  IVPB 2000 milliGRAM(s) IV Intermittent every 12 hours  ferrous sulfate 325 milliGRAM(s) Oral daily  heparin  Injectable 5000 Unit(s) SubCutaneous every 8 hours  metoprolol tartrate 25 milliGRAM(s) Oral two times a day  mirtazapine 22.5 milliGRAM(s) Oral at bedtime  mometasone 220 MICROgram(s) Inhaler 2 Puff(s) Inhalation daily  senna 2 Tablet(s) Oral at bedtime  sertraline 75 milliGRAM(s) Oral daily  sodium chloride 0.65% Nasal 1 Spray(s) Both Nostrils two times a day  spironolactone 25 milliGRAM(s) Oral two times a day  torsemide 60 milliGRAM(s) Oral daily  vancomycin  IVPB 750 milliGRAM(s) IV Intermittent every 24 hours    MEDICATIONS  (PRN):  acetaminophen  ablet 650 milliGRAM(s) Oral every 6 hours PRN Temp greater or equal to 38C (100.4F), Mild Pain (1 - 3)  albuterol  90 MICROgram(s) HFA Inhaler 2 Puff(s) Inhalation every 6 hours PRN Shortness of Breath and/or Wheezing  aluminum hydroxide/magnesium hydroxide/simethicone Suspension 30 milliLiter(s) Oral every 4 hours PRN Dyspepsia  melatonin 3 milliGRAM(s) Oral at bedtime PRN Insomnia  ondansetron Injectable 4 milliGRAM(s) IV Push every 8 hours PRN Nausea and/or Vomiting  polyethylene glycol 3350 17 Gram(s) Oral daily PRN Constipation      Allergies  codeine (Other)      SOCIAL HISTORY:  lives in Assisted living;  Denies smoking, ETOH, drugs    FAMILY HISTORY: blood disorder (Mother)   no h/o PVD or wound healing or skin problems    PHYSICAL EXAM:  Vital Signs Last 24 Hrs  T(C): 36.6 (26 Dec 2023 09:22), Max: 37.6 (25 Dec 2023 16:32)  T(F): 97.8 (26 Dec 2023 09:22), Max: 99.6 (25 Dec 2023 16:32)  HR: 99 (26 Dec 2023 14:44) (82 - 105)  BP: 115/58 (26 Dec 2023 14:44) (92/53 - 115/58)  BP(mean): --  RR: 18 (26 Dec 2023 14:44) (16 - 18)  SpO2: 95% (26 Dec 2023 14:44) (93% - 96%)    Parameters below as of 26 Dec 2023 14:44  Patient On (Oxygen Delivery Method): nasal cannula  O2 Flow (L/min): 4    NAD, A&Ox3, WD/ WN/ WG  Versa Care P500 bed  HEENT:  NC/AT, EOMI, sclera clear, mucosa moist, throat clear, trachea midline, neck supple  Respiratory: nonlabored w/ equal chest rise  Gastrointestinal: soft NT/ND   Neurology:   strength & sensation grossly intact  Psych: calm/ appropriate  Musculoskeletal:FROM, no deformities/ contractures  Vascular: BLE equally warm,  no cyanosis, clubbing,  nor acute ischemia           L>RLE edema and fading erythema           LLE dry flakey skin and denuded skin           BLE DP pulses palpable           BLE hemosiderin staining       no blistering  or  drainage  No odor, erythema, increased warmth, tenderness, induration, fluctuance, nor crepitus  Skin:  moist w/ good turgor  buttocks/ gluteal cleft w/ hyperpigmented skin  no blistering  or  drainage  No odor, erythema, increased warmth, tenderness, induration, fluctuance, nor crepitus    LABS/ CULTURES/ RADIOLOGY:      140  |  96  |  35  ----------------------------<  130      [23 @ 07:25]  3.6   |  29  |  1.25        Ca     9.1     [23 @ 07:25]      Mg     2.0     [23 @ 07:25]      Culture - Blood (collected 23 @ 10:55)  Source: .Blood Blood-Peripheral  Preliminary Report (23 @ 18:01):    No growth at 72 Hours    Culture - Blood (collected 23 @ 10:40)  Source: .Blood Blood-Peripheral  Preliminary Report (23 @ 18:01):    No growth at 72 Hours      Culture - Other (collected 23 @ 09:01)  Source: Ulcer Ulcer  Final Report (23 @ 15:58):    Culture yields growth of greater than 3 colony types of    bacteria,  which may indicate contamination and normal adeline    Call client services within 7 days if further workup is clinically    indicated.    Culture includes    Few Methicillin Resistant Staphylococcus aureus    Numerous Pseudomonas aeruginosa  Organism: Methicillin resistant Staphylococcus aureus  Pseudomonas aeruginosa (23 @ 15:58)  Organism: Pseudomonas aeruginosa (23 @ 15:58)      Method Type: BRYN      -  Aztreonam: S <=4      -  Cefepime: S <=2      -  Ceftazidime: S 4      -  Ciprofloxacin: S <=0.25      -  Imipenem: S <=1      -  Levofloxacin: S <=0.5      -  Meropenem: S <=1      -  Piperacillin/Tazobactam: S <=8  Organism: Methicillin resistant Staphylococcus aureus (23 @ 15:58)      Method Type: BRYN      -  Ampicillin/Sulbactam: R 16/8      -  Cefazolin: R 16      -  Clindamycin: S <=0.25      -  Daptomycin: S 0.5      -  Erythromycin: R >4      -  Gentamicin: S <=1 Should not be used as monotherapy      -  Linezolid: S 2      -  Oxacillin: R >2      -  Penicillin: R >8      -  Rifampin: S <=1 Should not be used as monotherapy      -  Tetracycline: S <=1      -  Trimethoprim/Sulfamethoxazole: S <=0.5/9.5      -  Vancomycin: S 2      ACC: [id number] EXAM:  XR TIB FIB AP LAT 2 VIEWS LT   ORDERED BY: ANALI COURTNEY     PROCEDURE DATE:  2023      INTERPRETATION:  XR TIBIA FIBULA AP AND LATERAL 2 VIEWS LEFT    HISTORY: Infection. Evaluate for gas.    VIEWS: 2  IMAGES: 3    COMPARISON: Left tibia and fibula x-rays dated 2023.    FINDINGS:    OSSEOUS STRUCTURES    Fractures:  None.    LEFT KNEE JOINTS    Joint Space(s):  There is moderate medial compartment joint space   narrowing. Tiny osteophytes are present.    LEFT ANKLE JOINTS    Joint Space(s):  Maintained.    SOFT TISSUES:  Generalized subcutaneous edema appears to be present. No   gas is seen. Moderate to severe atherosclerotic calcifications are   present.    IMPRESSION:  1.  No gas or osseous destruction isseen.           Wound SURGERY CONSULT NOTE    HPI:  89F pmh Afib (was on eliquis but no longer), CHF on home O2 (3L), depression, HTN, HLD, recently hospitalized for LLE cellulitis treated w/Vanco Zosyn while admitted and  s/p O/p abx course of doxycycline and augmentin x10 D, now sent in by outpatient wound care doctor for evaluation of possible cellulitis and IV antibiotic treatment. Patient endorsing that her legs are more swollen and  red than usual.     Denies CP, SOB, palpitation, N/V/D, fever, cough, chills, dizziness, abm pain, recent travel, sick contact, change in bowel and urinary habits     Wound consult requested by team to assist w/ management of LLE wounds and resolving cellulitis.  Pt w/o c/o pain, drainage, odor with improving redness or swelling.  Offloading and pericare initiated upon admission. Pt is mostly continent of urine & stool.  Denies h/o bites, scratches, falls, trauma.  Appetite good w/o weight loss.  All questions asked and answered to pt's expressed understanding and satisfaction.    Current Diet: Diet, DASH/TLC:   Sodium & Cholesterol Restricted  1500mL Fluid Restriction ([id number])     Special Instructions for Nursin milliLiter(s) to 2000 milliLiter(s) fluid restriction (23 @ 21:15)      PAST MEDICAL & SURGICAL HISTORY:  HTN (hypertension)    HLD (hyperlipidemia)    Atrial fibrillation    Syncope    Depression    Leg edema    Mitral valve stenosis, unspecified etiology    Hearing loss of left ear    s/p knee surgery    s/p external ear surgery        REVIEW OF SYSTEMS General/ / Skin/Vasc/  MSK: see HPI  All other systems negative    MEDICATIONS  (STANDING):  atorvastatin 10 milliGRAM(s) Oral at bedtime  cefepime  IVPB 2000 milliGRAM(s) IV Intermittent every 12 hours  ferrous sulfate 325 milliGRAM(s) Oral daily  heparin  Injectable 5000 Unit(s) SubCutaneous every 8 hours  metoprolol tartrate 25 milliGRAM(s) Oral two times a day  mirtazapine 22.5 milliGRAM(s) Oral at bedtime  mometasone 220 MICROgram(s) Inhaler 2 Puff(s) Inhalation daily  senna 2 Tablet(s) Oral at bedtime  sertraline 75 milliGRAM(s) Oral daily  sodium chloride 0.65% Nasal 1 Spray(s) Both Nostrils two times a day  spironolactone 25 milliGRAM(s) Oral two times a day  torsemide 60 milliGRAM(s) Oral daily  vancomycin  IVPB 750 milliGRAM(s) IV Intermittent every 24 hours    MEDICATIONS  (PRN):  acetaminophen  ablet 650 milliGRAM(s) Oral every 6 hours PRN Temp greater or equal to 38C (100.4F), Mild Pain (1 - 3)  albuterol  90 MICROgram(s) HFA Inhaler 2 Puff(s) Inhalation every 6 hours PRN Shortness of Breath and/or Wheezing  aluminum hydroxide/magnesium hydroxide/simethicone Suspension 30 milliLiter(s) Oral every 4 hours PRN Dyspepsia  melatonin 3 milliGRAM(s) Oral at bedtime PRN Insomnia  ondansetron Injectable 4 milliGRAM(s) IV Push every 8 hours PRN Nausea and/or Vomiting  polyethylene glycol 3350 17 Gram(s) Oral daily PRN Constipation      Allergies  codeine (Other)      SOCIAL HISTORY:  lives in Assisted living;  Denies smoking, ETOH, drugs    FAMILY HISTORY: blood disorder (Mother)   no h/o PVD or wound healing or skin problems    PHYSICAL EXAM:  Vital Signs Last 24 Hrs  T(C): 36.6 (26 Dec 2023 09:22), Max: 37.6 (25 Dec 2023 16:32)  T(F): 97.8 (26 Dec 2023 09:22), Max: 99.6 (25 Dec 2023 16:32)  HR: 99 (26 Dec 2023 14:44) (82 - 105)  BP: 115/58 (26 Dec 2023 14:44) (92/53 - 115/58)  BP(mean): --  RR: 18 (26 Dec 2023 14:44) (16 - 18)  SpO2: 95% (26 Dec 2023 14:44) (93% - 96%)    Parameters below as of 26 Dec 2023 14:44  Patient On (Oxygen Delivery Method): nasal cannula  O2 Flow (L/min): 4    NAD, A&Ox3, WD/ WN/ WG  [facility] P500 bed  HEENT:  NC/AT, EOMI, sclera clear, mucosa moist, throat clear, trachea midline, neck supple  Respiratory: nonlabored w/ equal chest rise  Gastrointestinal: soft NT/ND   Neurology:   strength & sensation grossly intact  Psych: calm/ appropriate  Musculoskeletal:FROM, no deformities/ contractures  Vascular: BLE equally warm,  no cyanosis, clubbing,  nor acute ischemia           L>RLE edema and fading erythema           LLE dry flakey skin and denuded skin           BLE DP pulses palpable           BLE hemosiderin staining       no blistering  or  drainage  No odor, erythema, increased warmth, tenderness, induration, fluctuance, nor crepitus  Skin:  moist w/ good turgor  buttocks/ gluteal cleft w/ hyperpigmented skin  no blistering  or  drainage  No odor, erythema, increased warmth, tenderness, induration, fluctuance, nor crepitus    LABS/ CULTURES/ RADIOLOGY:      140  |  96  |  35  ----------------------------<  130      [23 @ 07:25]  3.6   |  29  |  1.25        Ca     9.1     [23 @ 07:25]      Mg     2.0     [23 @ 07:25]      Culture - Blood (collected 23 @ 10:55)  Source: .Blood Blood-Peripheral  Preliminary Report (23 @ 18:01):    No growth at 72 Hours    Culture - Blood (collected 23 @ 10:40)  Source: .Blood Blood-Peripheral  Preliminary Report (23 @ 18:01):    No growth at 72 Hours      Culture - Other (collected 23 @ 09:01)  Source: Ulcer Ulcer  Final Report (23 @ 15:58):    Culture yields growth of greater than 3 colony types of    bacteria,  which may indicate contamination and normal adeline    Call client services within 7 days if further workup is clinically    indicated.    Culture includes    Few Methicillin Resistant Staphylococcus aureus    Numerous Pseudomonas aeruginosa  Organism: Methicillin resistant Staphylococcus aureus  Pseudomonas aeruginosa (23 @ 15:58)  Organism: Pseudomonas aeruginosa (23 @ 15:58)      Method Type: BRYN      -  Aztreonam: S <=4      -  Cefepime: S <=2      -  Ceftazidime: S 4      -  Ciprofloxacin: S <=0.25      -  Imipenem: S <=1      -  Levofloxacin: S <=0.5      -  Meropenem: S <=1      -  Piperacillin/Tazobactam: S <=8  Organism: Methicillin resistant Staphylococcus aureus (23 @ 15:58)      Method Type: BRYN      -  Ampicillin/Sulbactam: R 16/8      -  Cefazolin: R 16      -  Clindamycin: S <=0.25      -  Daptomycin: S 0.5      -  Erythromycin: R >4      -  Gentamicin: S <=1 Should not be used as monotherapy      -  Linezolid: S 2      -  Oxacillin: R >2      -  Penicillin: R >8      -  Rifampin: S <=1 Should not be used as monotherapy      -  Tetracycline: S <=1      -  Trimethoprim/Sulfamethoxazole: S <=0.5/9.5      -  Vancomycin: S 2      ACC: [id number] EXAM:  XR TIB FIB AP LAT 2 VIEWS LT   ORDERED BY: ANALI COURTNEY     PROCEDURE DATE:  2023      INTERPRETATION:  XR TIBIA FIBULA AP AND LATERAL 2 VIEWS LEFT    HISTORY: Infection. Evaluate for gas.    VIEWS: 2  IMAGES: 3    COMPARISON: Left tibia and fibula x-rays dated 2023.    FINDINGS:    OSSEOUS STRUCTURES    Fractures:  None.    LEFT KNEE JOINTS    Joint Space(s):  There is moderate medial compartment joint space   narrowing. Tiny osteophytes are present.    LEFT ANKLE JOINTS    Joint Space(s):  Maintained.    SOFT TISSUES:  Generalized subcutaneous edema appears to be present. No   gas is seen. Moderate to severe atherosclerotic calcifications are   present.    IMPRESSION:  1.  No gas or osseous destruction isseen.

## 2023-12-26 NOTE — CONSULT NOTE ADULT - ASSESSMENT
89F pmh Afib (was on eliquis but no longer), CHF on home O2 (3L), depression, HTN, HLD, recently hospitalized for LLE cellulitis treated w/Vanco Zosyn while admitted and  s/p O/p abx course of doxycycline and augmentin x10 D, now sent in by outpatient wound care doctor for evaluation for suspected cellulitis and IV abx,    Wound Consult requested to assist w/ management of Moisture Associated Dermatitis  LLE cellulitis  w/ wound    LLE Adaptic Dressing QD  BLE elevation & Compression  Consider ERASTO/PVR, Duplex, Xray, A/P BLE CT or MRI  Abx per Medicine/ ID  Moisturize intact skin w/ SWEEN cream BID  Nutrition Consult for optimization         encourage high quality protein, romulo/ prosource, MVI & Vit C to promote wound healing  Continue turning and positioning w/ offloading assistive devices as per protocol  Buttocks/ Sacrum Anirudh BID and prn soiling        Continue w/ attends under pads and Pericare as per protocol  Waffle Cushion to chair when oob to chair  Continue w/ low air loss pressure redistribution bed surface   Care as per medicine, will follow w/ you  Upon discharge f/u as outpatient at [facility] [address] [place], [place] [phone]  D/w team & RN & attmg  Thank you for this consult  Ambreen Balderas PA-C CWS [id number]  Nights/ Weekends/ Holidays please call:  General Surgery Consult pager ([phone]) for emergencies  Wound PT for multilayer leg wrapping or VAC issues (x [phone])   I spent 55minutes face to face w/ this pt of which more than 50% of the time was spent counseling & coordinating care of this pt.  89F pmh Afib (was on eliquis but no longer), CHF on home O2 (3L), depression, HTN, HLD, recently hospitalized for LLE cellulitis treated w/Vanco Zosyn while admitted and  s/p O/p abx course of doxycycline and augmentin x10 D, now sent in by outpatient wound care doctor for evaluation for suspected cellulitis and IV abx,    Wound Consult requested to assist w/ management of Moisture Associated Dermatitis  LLE cellulitis  w/ wound    LLE Adaptic Dressing QD  BLE elevation & Compression  Consider ERASTO/PVR, Duplex, Xray, A/P BLE CT or MRI  Abx per Medicine/ ID  Moisturize intact skin w/ SWEEN cream BID  Nutrition Consult for optimization         encourage high quality protein, romulo/ prosource, MVI & Vit C to promote wound healing  Continue turning and positioning w/ offloading assistive devices as per protocol  Buttocks/ Sacrum Anirudh BID and prn soiling        Continue w/ attends under pads and Pericare as per protocol  Waffle Cushion to chair when oob to chair  Continue w/ low air loss pressure redistribution bed surface   Care as per medicine, will follow w/ you  Upon discharge f/u as outpatient at [facility] [address] [place], [place] [phone]  D/w team & RN & attmg  Thank you for this consult  Ambreen Balderas PA-C CWS [id number]  Nights/ Weekends/ Holidays please call:  General Surgery Consult pager ([phone]) for emergencies  Wound PT for multilayer leg wrapping or VAC issues ([phone])   I spent 55minutes face to face w/ this pt of which more than 50% of the time was spent counseling & coordinating care of this pt.  89F pmh Afib (was on eliquis but no longer), CHF on home O2 (3L), depression, HTN, HLD recently hospitalized for LLE cellulitis treated w/ Vanco Zosyn while admitted and  s/p O/p abx course of doxycycline and Augmentin x10 D, now sent in by outpatient wound care doctor for evaluation for suspected cellulitis and IV abx,    Wound Consult requested to assist w/ management of Moisture Associated Dermatitis  LLE cellulitis  w/ wound    LLE Adaptic Dressing QD  BLE elevation & Compression  Xray - no fx disloc fb or OM  Abx per Medicine/ ID  Moisturize intact skin w/ SWEEN cream BID  Nutrition Consult for optimization         encourage high quality protein, romulo/ prosource, MVI & Vit C to promote wound healing  Continue turning and positioning w/ offloading assistive devices as per protocol  Buttocks/ Sacrum Anirudh BID and prn soiling        Continue w/ attends under pads and Pericare as per protocol  Waffle Cushion to chair when oob to chair  Continue w/ low air loss pressure redistribution bed surface   Care as per medicine, will follow w/ you  Upon discharge f/u as outpatient at [facility] [address] [place], [place] [phone]  D/w team & RN & attmg  Thank you for this consult  Ambreen Balderas PA-C CWS [id number]  Nights/ Weekends/ Holidays please call:  General Surgery Consult pager ([phone]) for emergencies  Wound PT for multilayer leg wrapping or VAC issues (x [phone])   I spent 55minutes face to face w/ this pt of which more than 50% of the time was spent counseling & coordinating care of this pt.  89F pmh Afib (was on eliquis but no longer), CHF on home O2 (3L), depression, HTN, HLD recently hospitalized for LLE cellulitis treated w/ Vanco Zosyn while admitted and  s/p O/p abx course of doxycycline and Augmentin x10 D, now sent in by outpatient wound care doctor for evaluation for suspected cellulitis and IV abx,    Wound Consult requested to assist w/ management of Moisture Associated Dermatitis  LLE cellulitis  w/ wound    LLE Adaptic Dressing QD  BLE elevation & Compression  Xray - no fx disloc fb or OM  Abx per Medicine/ ID  Moisturize intact skin w/ SWEEN cream BID  Nutrition Consult for optimization         encourage high quality protein, romulo/ prosource, MVI & Vit C to promote wound healing  Continue turning and positioning w/ offloading assistive devices as per protocol  Buttocks/ Sacrum Anirudh BID and prn soiling        Continue w/ attends under pads and Pericare as per protocol  Waffle Cushion to chair when oob to chair  Continue w/ low air loss pressure redistribution bed surface   Care as per medicine, will follow w/ you  Upon discharge f/u as outpatient at [facility] [address] [place], [place] [phone]  D/w team & RN & attmg  Thank you for this consult  Ambreen Balderas PA-C CWS [id number]  Nights/ Weekends/ Holidays please call:  General Surgery Consult pager ([phone]) for emergencies  Wound PT for multilayer leg wrapping or VAC issues ([phone])   I spent 55minutes face to face w/ this pt of which more than 50% of the time was spent counseling & coordinating care of this pt.

## 2023-12-26 NOTE — PROGRESS NOTE ADULT - SUBJECTIVE AND OBJECTIVE BOX
[facility] Division of Hospital Medicine  Nancy Roman MD  Available via MS Teams    SUBJECTIVE / OVERNIGHT EVENTS:  no acute events overnight   endorsing dry nose, states LLE is slowly getting better, less tender  denies other complaints     ADDITIONAL REVIEW OF SYSTEMS:  14 point ROS negative except as noted above     MEDICATIONS  (STANDING):  atorvastatin 10 milliGRAM(s) Oral at bedtime  cefTRIAXone   IVPB 2000 milliGRAM(s) IV Intermittent every 24 hours  ferrous    sulfate 325 milliGRAM(s) Oral daily  heparin   Injectable 5000 Unit(s) SubCutaneous every 8 hours  metoprolol tartrate 25 milliGRAM(s) Oral two times a day  mirtazapine 22.5 milliGRAM(s) Oral at bedtime  mometasone 220 MICROgram(s) Inhaler 2 Puff(s) Inhalation daily  senna 2 Tablet(s) Oral at bedtime  sertraline 75 milliGRAM(s) Oral daily  sodium chloride 0.65% Nasal 1 Spray(s) Both Nostrils two times a day  spironolactone 25 milliGRAM(s) Oral two times a day  torsemide 60 milliGRAM(s) Oral daily    MEDICATIONS  (PRN):  acetaminophen     Tablet .. 650 milliGRAM(s) Oral every 6 hours PRN Temp greater or equal to 38C (100.4F), Mild Pain (1 - 3)  albuterol    90 MICROgram(s) HFA Inhaler 2 Puff(s) Inhalation every 6 hours PRN Shortness of Breath and/or Wheezing  aluminum hydroxide/magnesium hydroxide/simethicone Suspension 30 milliLiter(s) Oral every 4 hours PRN Dyspepsia  melatonin 3 milliGRAM(s) Oral at bedtime PRN Insomnia  ondansetron Injectable 4 milliGRAM(s) IV Push every 8 hours PRN Nausea and/or Vomiting  polyethylene glycol 3350 17 Gram(s) Oral daily PRN Constipation      I&O's Summary    25 Dec 2023 07:01  -  26 Dec 2023 07:00  --------------------------------------------------------  IN: 240 mL / OUT: 1200 mL / NET: -960 mL        PHYSICAL EXAM:  Vital Signs Last 24 Hrs  T(C): 36.6 (26 Dec 2023 09:22), Max: 37.6 (25 Dec 2023 16:32)  T(F): 97.8 (26 Dec 2023 09:22), Max: 99.6 (25 Dec 2023 16:32)  HR: 85 (26 Dec 2023 12:02) (82 - 105)  BP: 92/53 (26 Dec 2023 12:02) (92/53 - 109/57)  BP(mean): --  RR: 18 (26 Dec 2023 09:22) (16 - 18)  SpO2: 95% (26 Dec 2023 09:22) (93% - 96%)    Parameters below as of 26 Dec 2023 09:22  Patient On (Oxygen Delivery Method): nasal cannula  O2 Flow (L/min): 4    PHYSICAL EXAM:  GENERAL: NAD, elderly, chronically ill appearing   HEAD:  Atraumatic, normocephalic  EYES: EOMI, conjunctiva and sclera clear  NECK: Supple, no JVD  CHEST/LUNG: Clear to auscultation bilaterally; no wheezing or rales  HEART: Regular rate and rhythm; no murmurs, LE edema L>>R  ABDOMEN: Soft, nontender, nondistended; bowel sounds present  EXTREMITIES:  2+ Peripheral Pulses, no clubbing, cyanosis  PSYCH: AAOx3, calm affect, not anxious  SKIN: b/l LE venous stasis changes, LLE with increased erythema, no significant warmth, dry skin       LABS:    12-26    140  |  96  |  35<H>  ----------------------------<  130<H>  3.6   |  29  |  1.25    Ca    9.1      26 Dec 2023 07:25  Mg     2.0     12-26            Urinalysis Basic - ( 26 Dec 2023 07:25 )    Color: x / Appearance: x / SG: x / pH: x  Gluc: 130 mg/dL / Ketone: x  / Bili: x / Urobili: x   Blood: x / Protein: x / Nitrite: x   Leuk Esterase: x / RBC: x / WBC x   Sq Epi: x / Non Sq Epi: x / Bacteria: x        Culture - Other (collected 24 Dec 2023 09:01)  Source: Ulcer Ulcer  Preliminary Report (25 Dec 2023 17:25):    Culture yields growth of greater than 3 colony types of    bacteria,  which may indicate contamination and normal adeline    Call client services within 7 days if further workup is clinically    indicated.    Culture includes    Few Staphylococcus aureus    Numerous Pseudomonas aeruginosa          RADIOLOGY & ADDITIONAL TESTS:  New Imaging Personally Reviewed Today:  New Electrocardiogram Personally Reviewed Today:  Other Results Reviewed Today:   Prior or Outpatient Records Reviewed Today with Summary:    COORDINATION OF CARE:  Consultant Communication and Details of Discussion (where applicable):     [facility] Division of Hospital Medicine  Nancy Roman MD  Available via MS Teams    SUBJECTIVE / OVERNIGHT EVENTS:  no acute events overnight   endorsing dry nose, states LLE is slowly getting better, less tender  denies other complaints     ADDITIONAL REVIEW OF SYSTEMS:  14 point ROS negative except as noted above     MEDICATIONS  (STANDING):  atorvastatin 10 milliGRAM(s) Oral at bedtime  cefTRIAXone   IVPB 2000 milliGRAM(s) IV Intermittent every 24 hours  ferrous    sulfate 325 milliGRAM(s) Oral daily  heparin   Injectable 5000 Unit(s) SubCutaneous every 8 hours  metoprolol tartrate 25 milliGRAM(s) Oral two times a day  mirtazapine 22.5 milliGRAM(s) Oral at bedtime  mometasone 220 MICROgram(s) Inhaler 2 Puff(s) Inhalation daily  senna 2 Tablet(s) Oral at bedtime  sertraline 75 milliGRAM(s) Oral daily  sodium chloride 0.65% Nasal 1 Spray(s) Both Nostrils two times a day  spironolactone 25 milliGRAM(s) Oral two times a day  torsemide 60 milliGRAM(s) Oral daily    MEDICATIONS  (PRN):  acetaminophen     Tablet .. 650 milliGRAM(s) Oral every 6 hours PRN Temp greater or equal to 38C (100.4F), Mild Pain (1 - 3)  albuterol    90 MICROgram(s) HFA Inhaler 2 Puff(s) Inhalation every 6 hours PRN Shortness of Breath and/or Wheezing  aluminum hydroxide/magnesium hydroxide/simethicone Suspension 30 milliLiter(s) Oral every 4 hours PRN Dyspepsia  melatonin 3 milliGRAM(s) Oral at bedtime PRN Insomnia  ondansetron Injectable 4 milliGRAM(s) IV Push every 8 hours PRN Nausea and/or Vomiting  polyethylene glycol 3350 17 Gram(s) Oral daily PRN Constipation      I&O's Summary    25 Dec 2023 07:01  -  26 Dec 2023 07:00  --------------------------------------------------------  IN: 240 mL / OUT: 1200 mL / NET: -960 mL        PHYSICAL EXAM:  Vital Signs Last 24 Hrs  T(C): 36.6 (26 Dec 2023 09:22), Max: 37.6 (25 Dec 2023 16:32)  T(F): 97.8 (26 Dec 2023 09:22), Max: 99.6 (25 Dec 2023 16:32)  HR: 85 (26 Dec 2023 12:02) (82 - 105)  BP: 92/53 (26 Dec 2023 12:02) (92/53 - 109/57)  BP(mean): --  RR: 18 (26 Dec 2023 09:22) (16 - 18)  SpO2: 95% (26 Dec 2023 09:22) (93% - 96%)    Parameters below as of 26 Dec 2023 09:22  Patient On (Oxygen Delivery Method): nasal cannula  O2 Flow (L/min): 4    PHYSICAL EXAM:  GENERAL: NAD, elderly, chronically ill appearing   HEAD:  Atraumatic, normocephalic  EYES: EOMI, conjunctiva and sclera clear  NECK: Supple, no JVD  CHEST/LUNG: Clear to auscultation bilaterally; no wheezing or rales  HEART: Regular rate and rhythm; no murmurs, LE edema L>>R  ABDOMEN: Soft, nontender, nondistended; bowel sounds present  EXTREMITIES:  2+ Peripheral Pulses, no clubbing, cyanosis  PSYCH: AAOx3, calm affect, not anxious  SKIN: b/l LE venous stasis changes, LLE with increased erythema, no significant warmth, dry skin       LABS:    12-26    140  |  96  |  35<H>  ----------------------------<  130<H>  3.6   |  29  |  1.25    Ca    9.1      26 Dec 2023 07:25  Mg     2.0     12-26            Urinalysis Basic - ( 26 Dec 2023 07:25 )    Color: x / Appearance: x / SG: x / pH: x  Gluc: 130 mg/dL / Ketone: x  / Bili: x / Urobili: x   Blood: x / Protein: x / Nitrite: x   Leuk Esterase: x / RBC: x / WBC x   Sq Epi: x / Non Sq Epi: x / Bacteria: x        Culture - Other (collected 24 Dec 2023 09:01)  Source: Ulcer Ulcer  Preliminary Report (25 Dec 2023 17:25):    Culture yields growth of greater than 3 colony types of    bacteria,  which may indicate contamination and normal adeline    Call client services within 7 days if further workup is clinically    indicated.    Culture includes    Few Staphylococcus aureus    Numerous Pseudomonas aeruginosa          RADIOLOGY & ADDITIONAL TESTS:  New Imaging Personally Reviewed Today:  New Electrocardiogram Personally Reviewed Today:  Other Results Reviewed Today:   Prior or Outpatient Records Reviewed Today with Summary:    COORDINATION OF CARE:  Consultant Communication and Details of Discussion (where applicable):

## 2023-12-26 NOTE — PROGRESS NOTE ADULT - ASSESSMENT
89F pmh Afib (was on eliquis but no longer), CHF on home O2 (3L), depression, HTN, HLD, recently hospitalized for LLE cellulitis at [place] treated w/Vanco Zosyn while admitted and  s/p O/p abx course of doxycycline and augmentin x10 D, now sent in by outpatient wound care with open wound, fever, chills and surrounding cellulitis    RECOMMENDATIONS  Consistent with wound with surrounding nonpurulent LLE cellulitis.   Also noted that on prior admission there was improvement but now worsening with doxy/augmentin Rx suggesting possible concerns for resistant organism or just failure with low tissue levels of selected antibiotics  MRSA PCR screen positive   LLE wound culture with Staph aureus and Pseudomonas -- follow up sensitivities   Discontinued ceftriaxone   Started on cefepime 2g IV Q12h (renally adjusted)  Started on vancomycin 750mg IV Q24h   - monitor vancomycin trough, goal -600   Continue local wound care 89F pmh Afib (was on eliquis but no longer), CHF on home O2 (3L), depression, HTN, HLD, recently hospitalized for LLE cellulitis at [place] treated w/Vanco Zosyn while admitted and  s/p O/p abx course of doxycycline and augmentin x10 D, now sent in by outpatient wound care with open wound, fever, chills and surrounding cellulitis    RECOMMENDATIONS  Consistent with wound with surrounding nonpurulent LLE cellulitis.   Also noted that on prior admission there was improvement but now worsening with doxy/augmentin Rx suggesting possible concerns for resistant organism or just failure with low tissue levels of selected antibiotics  MRSA PCR screen positive   LLE wound culture with Staph aureus and Pseudomonas -- follow up sensitivities   Discontinued ceftriaxone   Started on cefepime 2g IV Q12h (renally adjusted)  Started on vancomycin 750mg IV Q24h   - monitor vancomycin trough, goal -600   Continue local wound care

## 2023-12-26 NOTE — PROGRESS NOTE ADULT - PROBLEM SELECTOR PLAN 7
- DVT ppx: sqh  - Diet: DASH    Dispo: pending improvement, [place] - DVT ppx: sqh  - Diet: DASH    Dispo: pending improvement, [place]

## 2023-12-27 LAB
ANION GAP SERPL CALC-SCNC: 11 MMOL/L — SIGNIFICANT CHANGE UP (ref 5–17)
ANION GAP SERPL CALC-SCNC: 11 MMOL/L — SIGNIFICANT CHANGE UP (ref 5–17)
BUN SERPL-MCNC: 38 MG/DL — HIGH (ref 7–23)
BUN SERPL-MCNC: 38 MG/DL — HIGH (ref 7–23)
CALCIUM SERPL-MCNC: 8.9 MG/DL — SIGNIFICANT CHANGE UP (ref 8.4–10.5)
CALCIUM SERPL-MCNC: 8.9 MG/DL — SIGNIFICANT CHANGE UP (ref 8.4–10.5)
CHLORIDE SERPL-SCNC: 97 MMOL/L — SIGNIFICANT CHANGE UP (ref 96–108)
CHLORIDE SERPL-SCNC: 97 MMOL/L — SIGNIFICANT CHANGE UP (ref 96–108)
CO2 SERPL-SCNC: 31 MMOL/L — SIGNIFICANT CHANGE UP (ref 22–31)
CO2 SERPL-SCNC: 31 MMOL/L — SIGNIFICANT CHANGE UP (ref 22–31)
CREAT SERPL-MCNC: 1.27 MG/DL — SIGNIFICANT CHANGE UP (ref 0.5–1.3)
CREAT SERPL-MCNC: 1.27 MG/DL — SIGNIFICANT CHANGE UP (ref 0.5–1.3)
CULTURE RESULTS: SIGNIFICANT CHANGE UP
EGFR: 40 ML/MIN/1.73M2 — LOW
EGFR: 40 ML/MIN/1.73M2 — LOW
GLUCOSE SERPL-MCNC: 147 MG/DL — HIGH (ref 70–99)
GLUCOSE SERPL-MCNC: 147 MG/DL — HIGH (ref 70–99)
HCT VFR BLD CALC: 38 % — SIGNIFICANT CHANGE UP (ref 34.5–45)
HCT VFR BLD CALC: 38 % — SIGNIFICANT CHANGE UP (ref 34.5–45)
HGB BLD-MCNC: 11.4 G/DL — LOW (ref 11.5–15.5)
HGB BLD-MCNC: 11.4 G/DL — LOW (ref 11.5–15.5)
MCHC RBC-ENTMCNC: 28.4 PG — SIGNIFICANT CHANGE UP (ref 27–34)
MCHC RBC-ENTMCNC: 28.4 PG — SIGNIFICANT CHANGE UP (ref 27–34)
MCHC RBC-ENTMCNC: 30 GM/DL — LOW (ref 32–36)
MCHC RBC-ENTMCNC: 30 GM/DL — LOW (ref 32–36)
MCV RBC AUTO: 94.5 FL — SIGNIFICANT CHANGE UP (ref 80–100)
MCV RBC AUTO: 94.5 FL — SIGNIFICANT CHANGE UP (ref 80–100)
NRBC # BLD: 0 /100 WBCS — SIGNIFICANT CHANGE UP (ref 0–0)
NRBC # BLD: 0 /100 WBCS — SIGNIFICANT CHANGE UP (ref 0–0)
PLATELET # BLD AUTO: 159 K/UL — SIGNIFICANT CHANGE UP (ref 150–400)
PLATELET # BLD AUTO: 159 K/UL — SIGNIFICANT CHANGE UP (ref 150–400)
POTASSIUM SERPL-MCNC: 3.7 MMOL/L — SIGNIFICANT CHANGE UP (ref 3.5–5.3)
POTASSIUM SERPL-MCNC: 3.7 MMOL/L — SIGNIFICANT CHANGE UP (ref 3.5–5.3)
POTASSIUM SERPL-SCNC: 3.7 MMOL/L — SIGNIFICANT CHANGE UP (ref 3.5–5.3)
POTASSIUM SERPL-SCNC: 3.7 MMOL/L — SIGNIFICANT CHANGE UP (ref 3.5–5.3)
RBC # BLD: 4.02 M/UL — SIGNIFICANT CHANGE UP (ref 3.8–5.2)
RBC # BLD: 4.02 M/UL — SIGNIFICANT CHANGE UP (ref 3.8–5.2)
RBC # FLD: 16.4 % — HIGH (ref 10.3–14.5)
RBC # FLD: 16.4 % — HIGH (ref 10.3–14.5)
SODIUM SERPL-SCNC: 139 MMOL/L — SIGNIFICANT CHANGE UP (ref 135–145)
SODIUM SERPL-SCNC: 139 MMOL/L — SIGNIFICANT CHANGE UP (ref 135–145)
SPECIMEN SOURCE: SIGNIFICANT CHANGE UP
VANCOMYCIN TROUGH SERPL-MCNC: 6.8 UG/ML — LOW (ref 10–20)
VANCOMYCIN TROUGH SERPL-MCNC: 6.8 UG/ML — LOW (ref 10–20)
WBC # BLD: 8.99 K/UL — SIGNIFICANT CHANGE UP (ref 3.8–10.5)
WBC # BLD: 8.99 K/UL — SIGNIFICANT CHANGE UP (ref 3.8–10.5)
WBC # FLD AUTO: 8.99 K/UL — SIGNIFICANT CHANGE UP (ref 3.8–10.5)
WBC # FLD AUTO: 8.99 K/UL — SIGNIFICANT CHANGE UP (ref 3.8–10.5)

## 2023-12-27 PROCEDURE — 99232 SBSQ HOSP IP/OBS MODERATE 35: CPT

## 2023-12-27 PROCEDURE — 73590 X-RAY EXAM OF LOWER LEG: CPT | Mod: 26,RT

## 2023-12-27 RX ORDER — CHLORHEXIDINE GLUCONATE 213 G/1000ML
1 SOLUTION TOPICAL DAILY
Refills: 0 | Status: DISCONTINUED | OUTPATIENT
Start: 2023-12-27 | End: 2023-12-28

## 2023-12-27 RX ADMIN — MOMETASONE FUROATE 2 PUFF(S): 220 INHALANT RESPIRATORY (INHALATION) at 11:47

## 2023-12-27 RX ADMIN — HEPARIN SODIUM 5000 UNIT(S): 5000 INJECTION INTRAVENOUS; SUBCUTANEOUS at 05:21

## 2023-12-27 RX ADMIN — Medication 1 SPRAY(S): at 05:21

## 2023-12-27 RX ADMIN — HEPARIN SODIUM 5000 UNIT(S): 5000 INJECTION INTRAVENOUS; SUBCUTANEOUS at 21:37

## 2023-12-27 RX ADMIN — Medication 325 MILLIGRAM(S): at 11:46

## 2023-12-27 RX ADMIN — Medication 1 SPRAY(S): at 18:14

## 2023-12-27 RX ADMIN — Medication 60 MILLIGRAM(S): at 11:46

## 2023-12-27 RX ADMIN — SPIRONOLACTONE 25 MILLIGRAM(S): 25 TABLET, FILM COATED ORAL at 05:21

## 2023-12-27 RX ADMIN — CEFEPIME 100 MILLIGRAM(S): 1 INJECTION, POWDER, FOR SOLUTION INTRAMUSCULAR; INTRAVENOUS at 05:21

## 2023-12-27 RX ADMIN — CHLORHEXIDINE GLUCONATE 1 APPLICATION(S): 213 SOLUTION TOPICAL at 11:57

## 2023-12-27 RX ADMIN — SPIRONOLACTONE 25 MILLIGRAM(S): 25 TABLET, FILM COATED ORAL at 18:15

## 2023-12-27 RX ADMIN — Medication 25 MILLIGRAM(S): at 05:20

## 2023-12-27 RX ADMIN — ATORVASTATIN CALCIUM 10 MILLIGRAM(S): 80 TABLET, FILM COATED ORAL at 21:37

## 2023-12-27 RX ADMIN — HEPARIN SODIUM 5000 UNIT(S): 5000 INJECTION INTRAVENOUS; SUBCUTANEOUS at 14:24

## 2023-12-27 RX ADMIN — MIRTAZAPINE 22.5 MILLIGRAM(S): 45 TABLET, ORALLY DISINTEGRATING ORAL at 21:37

## 2023-12-27 RX ADMIN — Medication 250 MILLIGRAM(S): at 16:46

## 2023-12-27 RX ADMIN — SERTRALINE 75 MILLIGRAM(S): 25 TABLET, FILM COATED ORAL at 11:46

## 2023-12-27 RX ADMIN — Medication 25 MILLIGRAM(S): at 18:15

## 2023-12-27 RX ADMIN — SENNA PLUS 2 TABLET(S): 8.6 TABLET ORAL at 21:37

## 2023-12-27 RX ADMIN — CEFEPIME 100 MILLIGRAM(S): 1 INJECTION, POWDER, FOR SOLUTION INTRAMUSCULAR; INTRAVENOUS at 18:14

## 2023-12-27 NOTE — PROGRESS NOTE ADULT - SUBJECTIVE AND OBJECTIVE BOX
[facility] INFECTIOUS DISEASES  VANE Shine Y. Patel, S. Shah, G. Casimir  [phone]  ([phone] - weekdays after 5pm and weekends)    Name: ARNULFO ALEX  Age/Gender: 89y Female  MRN: [id number]    Interval History:  Patient seen and examined this morning.   States she feels better, no new complaints noted.  Notes reviewed  No concerning overnight events  Afebrile   Allergies: codeine (Other)      Objective:  Vitals:   T(F): 98.1 (12-27-23 @ 09:02), Max: 99.2 (12-27-23 @ 00:43)  HR: 76 (12-27-23 @ 11:39) (76 - 99)  BP: 117/65 (12-27-23 @ 11:39) (96/52 - 125/71)  RR: 18 (12-27-23 @ 09:02) (18 - 18)  SpO2: 94% (12-27-23 @ 09:02) (94% - 100%)  Physical Examination:  General: no acute distress  HEENT: NC/AT, anicteric, neck supple  Respiratory: no acc muscle use, breathing comfortably  Cardiovascular: S1 and S2 present  Gastrointestinal: normal appearing, nondistended  Extremities: LLE dressing    Laboratory Studies:  CBC:                       11.4   8.99  )-----------( 159      ( 27 Dec 2023 05:40 )             38.0     WBC Trend:  8.99 12-27-23 @ 05:40  9.19 12-23-23 @ 07:11  9.11 12-22-23 @ 11:30    CMP: 12-27    139  |  97  |  38<H>  ----------------------------<  147<H>  3.7   |  31  |  1.27    Ca    8.9      27 Dec 2023 05:40  Mg     2.0     12-26      Creatinine: 1.27 mg/dL (12-27-23 @ 05:40)  Creatinine: 1.25 mg/dL (12-26-23 @ 07:25)  Creatinine: 1.41 mg/dL (12-25-23 @ 05:26)  Creatinine: 1.47 mg/dL (12-24-23 @ 07:00)  Creatinine: 1.47 mg/dL (12-23-23 @ 07:09)  Creatinine: 1.53 mg/dL (12-22-23 @ 14:35)  Creatinine: 1.52 mg/dL (12-22-23 @ 11:30)          Urinalysis Basic - ( 27 Dec 2023 05:40 )    Color: x / Appearance: x / SG: x / pH: x  Gluc: 147 mg/dL / Ketone: x  / Bili: x / Urobili: x   Blood: x / Protein: x / Nitrite: x   Leuk Esterase: x / RBC: x / WBC x   Sq Epi: x / Non Sq Epi: x / Bacteria: x      Microbiology: reviewed     Culture - Other (collected 12-24-23 @ 09:01)  Source: Ulcer Ulcer  Final Report (12-26-23 @ 15:58):    Culture yields growth of greater than 3 colony types of    bacteria,  which may indicate contamination and normal adeline    Call client services within 7 days if further workup is clinically    indicated.    Culture includes    Few Methicillin Resistant Staphylococcus aureus    Numerous Pseudomonas aeruginosa  Organism: Methicillin resistant Staphylococcus aureus  Pseudomonas aeruginosa (12-26-23 @ 15:58)  Organism: Pseudomonas aeruginosa (12-26-23 @ 15:58)      -  Levofloxacin: S <=0.5      -  Aztreonam: S <=4      -  Cefepime: S <=2      -  Piperacillin/Tazobactam: S <=8      -  Ciprofloxacin: S <=0.25      -  Imipenem: S <=1      Method Type: BRYN      -  Meropenem: S <=1      -  Ceftazidime: S 4  Organism: Methicillin resistant Staphylococcus aureus (12-26-23 @ 15:58)      -  Clindamycin: S <=0.25      -  Oxacillin: R >2      -  Gentamicin: S <=1 Should not be used as monotherapy      -  Daptomycin: S 0.5      -  Linezolid: S 2      -  Cefazolin: R 16      -  Vancomycin: S 2      -  Tetracycline: S <=1      Method Type: BRYN      -  Ampicillin/Sulbactam: R 16/8      -  Penicillin: R >8      -  Rifampin: S <=1 Should not be used as monotherapy      -  Erythromycin: R >4      -  Trimethoprim/Sulfamethoxazole: S <=0.5/9.5    Culture - Urine (collected 12-22-23 @ 12:42)  Source: Clean Catch Clean Catch (Midstream)  Final Report (12-23-23 @ 22:35):    <10,000 CFU/mL Normal Urogenital Adeline    Culture - Blood (collected 12-22-23 @ 10:55)  Source: .Blood Blood-Peripheral  Preliminary Report (12-26-23 @ 18:01):    No growth at 4 days    Culture - Blood (collected 12-22-23 @ 10:40)  Source: .Blood Blood-Peripheral  Preliminary Report (12-26-23 @ 18:01):    No growth at 4 days          Radiology: reviewed     Medications:  acetaminophen     Tablet .. 650 milliGRAM(s) Oral every 6 hours PRN  albuterol    90 MICROgram(s) HFA Inhaler 2 Puff(s) Inhalation every 6 hours PRN  aluminum hydroxide/magnesium hydroxide/simethicone Suspension 30 milliLiter(s) Oral every 4 hours PRN  atorvastatin 10 milliGRAM(s) Oral at bedtime  cefepime   IVPB 2000 milliGRAM(s) IV Intermittent every 12 hours  chlorhexidine 2% Cloths 1 Application(s) Topical daily  ferrous    sulfate 325 milliGRAM(s) Oral daily  heparin   Injectable 5000 Unit(s) SubCutaneous every 8 hours  melatonin 3 milliGRAM(s) Oral at bedtime PRN  metoprolol tartrate 25 milliGRAM(s) Oral two times a day  mirtazapine 22.5 milliGRAM(s) Oral at bedtime  mometasone 220 MICROgram(s) Inhaler 2 Puff(s) Inhalation daily  ondansetron Injectable 4 milliGRAM(s) IV Push every 8 hours PRN  polyethylene glycol 3350 17 Gram(s) Oral daily PRN  senna 2 Tablet(s) Oral at bedtime  sertraline 75 milliGRAM(s) Oral daily  sodium chloride 0.65% Nasal 1 Spray(s) Both Nostrils two times a day  spironolactone 25 milliGRAM(s) Oral two times a day  torsemide 60 milliGRAM(s) Oral daily  vancomycin  IVPB 750 milliGRAM(s) IV Intermittent every 24 hours    Current Antimicrobials:  cefepime   IVPB 2000 milliGRAM(s) IV Intermittent every 12 hours  vancomycin  IVPB 750 milliGRAM(s) IV Intermittent every 24 hours    Prior/Completed Antimicrobials:  piperacillin/tazobactam IVPB...  vancomycin  IVPB.   [facility]  [facility] Shine Y. Patel, S. Shah, G. Casimir  [phone]  ([phone] - weekdays after 5pm and weekends)    Name: ARNULFO ALEX  Age/Gender: 89y Female  MRN: [id number]    Interval History:  Patient seen and examined this morning.   States she feels better, no new complaints noted.  Notes reviewed  No concerning overnight events  Afebrile   Allergies: codeine (Other)      Objective:  Vitals:   T(F): 98.1 (12-27-23 @ 09:02), Max: 99.2 (12-27-23 @ 00:43)  HR: 76 (12-27-23 @ 11:39) (76 - 99)  BP: 117/65 (12-27-23 @ 11:39) (96/52 - 125/71)  RR: 18 (12-27-23 @ 09:02) (18 - 18)  SpO2: 94% (12-27-23 @ 09:02) (94% - 100%)  Physical Examination:  General: no acute distress  HEENT: NC/AT, anicteric, neck supple  Respiratory: no acc muscle use, breathing comfortably  Cardiovascular: S1 and S2 present  Gastrointestinal: normal appearing, nondistended  Extremities: LLE dressing    Laboratory Studies:  CBC:                       11.4   8.99  )-----------( 159      ( 27 Dec 2023 05:40 )             38.0     WBC Trend:  8.99 12-27-23 @ 05:40  9.19 12-23-23 @ 07:11  9.11 12-22-23 @ 11:30    CMP: 12-27    139  |  97  |  38<H>  ----------------------------<  147<H>  3.7   |  31  |  1.27    Ca    8.9      27 Dec 2023 05:40  Mg     2.0     12-26      Creatinine: 1.27 mg/dL (12-27-23 @ 05:40)  Creatinine: 1.25 mg/dL (12-26-23 @ 07:25)  Creatinine: 1.41 mg/dL (12-25-23 @ 05:26)  Creatinine: 1.47 mg/dL (12-24-23 @ 07:00)  Creatinine: 1.47 mg/dL (12-23-23 @ 07:09)  Creatinine: 1.53 mg/dL (12-22-23 @ 14:35)  Creatinine: 1.52 mg/dL (12-22-23 @ 11:30)          Urinalysis Basic - ( 27 Dec 2023 05:40 )    Color: x / Appearance: x / SG: x / pH: x  Gluc: 147 mg/dL / Ketone: x  / Bili: x / Urobili: x   Blood: x / Protein: x / Nitrite: x   Leuk Esterase: x / RBC: x / WBC x   Sq Epi: x / Non Sq Epi: x / Bacteria: x      Microbiology: reviewed     Culture - Other (collected 12-24-23 @ 09:01)  Source: Ulcer Ulcer  Final Report (12-26-23 @ 15:58):    Culture yields growth of greater than 3 colony types of    bacteria,  which may indicate contamination and normal adeline    Call client services within 7 days if further workup is clinically    indicated.    Culture includes    Few Methicillin Resistant Staphylococcus aureus    Numerous Pseudomonas aeruginosa  Organism: Methicillin resistant Staphylococcus aureus  Pseudomonas aeruginosa (12-26-23 @ 15:58)  Organism: Pseudomonas aeruginosa (12-26-23 @ 15:58)      -  Levofloxacin: S <=0.5      -  Aztreonam: S <=4      -  Cefepime: S <=2      -  Piperacillin/Tazobactam: S <=8      -  Ciprofloxacin: S <=0.25      -  Imipenem: S <=1      Method Type: BRYN      -  Meropenem: S <=1      -  Ceftazidime: S 4  Organism: Methicillin resistant Staphylococcus aureus (12-26-23 @ 15:58)      -  Clindamycin: S <=0.25      -  Oxacillin: R >2      -  Gentamicin: S <=1 Should not be used as monotherapy      -  Daptomycin: S 0.5      -  Linezolid: S 2      -  Cefazolin: R 16      -  Vancomycin: S 2      -  Tetracycline: S <=1      Method Type: BRYN      -  Ampicillin/Sulbactam: R 16/8      -  Penicillin: R >8      -  Rifampin: S <=1 Should not be used as monotherapy      -  Erythromycin: R >4      -  Trimethoprim/Sulfamethoxazole: S <=0.5/9.5    Culture - Urine (collected 12-22-23 @ 12:42)  Source: Clean Catch Clean Catch (Midstream)  Final Report (12-23-23 @ 22:35):    <10,000 CFU/mL Normal Urogenital Adeline    Culture - Blood (collected 12-22-23 @ 10:55)  Source: .Blood Blood-Peripheral  Preliminary Report (12-26-23 @ 18:01):    No growth at 4 days    Culture - Blood (collected 12-22-23 @ 10:40)  Source: .Blood Blood-Peripheral  Preliminary Report (12-26-23 @ 18:01):    No growth at 4 days          Radiology: reviewed     Medications:  acetaminophen     Tablet .. 650 milliGRAM(s) Oral every 6 hours PRN  albuterol    90 MICROgram(s) HFA Inhaler 2 Puff(s) Inhalation every 6 hours PRN  aluminum hydroxide/magnesium hydroxide/simethicone Suspension 30 milliLiter(s) Oral every 4 hours PRN  atorvastatin 10 milliGRAM(s) Oral at bedtime  cefepime   IVPB 2000 milliGRAM(s) IV Intermittent every 12 hours  chlorhexidine 2% Cloths 1 Application(s) Topical daily  ferrous    sulfate 325 milliGRAM(s) Oral daily  heparin   Injectable 5000 Unit(s) SubCutaneous every 8 hours  melatonin 3 milliGRAM(s) Oral at bedtime PRN  metoprolol tartrate 25 milliGRAM(s) Oral two times a day  mirtazapine 22.5 milliGRAM(s) Oral at bedtime  mometasone 220 MICROgram(s) Inhaler 2 Puff(s) Inhalation daily  ondansetron Injectable 4 milliGRAM(s) IV Push every 8 hours PRN  polyethylene glycol 3350 17 Gram(s) Oral daily PRN  senna 2 Tablet(s) Oral at bedtime  sertraline 75 milliGRAM(s) Oral daily  sodium chloride 0.65% Nasal 1 Spray(s) Both Nostrils two times a day  spironolactone 25 milliGRAM(s) Oral two times a day  torsemide 60 milliGRAM(s) Oral daily  vancomycin  IVPB 750 milliGRAM(s) IV Intermittent every 24 hours    Current Antimicrobials:  cefepime   IVPB 2000 milliGRAM(s) IV Intermittent every 12 hours  vancomycin  IVPB 750 milliGRAM(s) IV Intermittent every 24 hours    Prior/Completed Antimicrobials:  piperacillin/tazobactam IVPB...  vancomycin  IVPB.

## 2023-12-27 NOTE — PROGRESS NOTE ADULT - PROBLEM SELECTOR PLAN 7
- DVT ppx: sqh  - Diet: DASH    Dispo: pending improvement, return to [facility] when ready - DVT ppx: sqh  - Diet: DASH    Dispo: pending improvement, return to [facility] when ready

## 2023-12-27 NOTE — PROGRESS NOTE ADULT - SUBJECTIVE AND OBJECTIVE BOX
[facility] Division of Hospital Medicine  Nancy Roman MD  Available via MS Teams    SUBJECTIVE / OVERNIGHT EVENTS:  no acute events overnight   LLE continues to improve; having some R foot pain with weight bearing only   no swelling, no warmth, no fevers, chills, denies any currently SOB or CP     ADDITIONAL REVIEW OF SYSTEMS:  14 point ROS negative except as noted above     MEDICATIONS  (STANDING):  atorvastatin 10 milliGRAM(s) Oral at bedtime  cefepime   IVPB 2000 milliGRAM(s) IV Intermittent every 12 hours  chlorhexidine 2% Cloths 1 Application(s) Topical daily  ferrous    sulfate 325 milliGRAM(s) Oral daily  heparin   Injectable 5000 Unit(s) SubCutaneous every 8 hours  metoprolol tartrate 25 milliGRAM(s) Oral two times a day  mirtazapine 22.5 milliGRAM(s) Oral at bedtime  mometasone 220 MICROgram(s) Inhaler 2 Puff(s) Inhalation daily  senna 2 Tablet(s) Oral at bedtime  sertraline 75 milliGRAM(s) Oral daily  sodium chloride 0.65% Nasal 1 Spray(s) Both Nostrils two times a day  spironolactone 25 milliGRAM(s) Oral two times a day  torsemide 60 milliGRAM(s) Oral daily  vancomycin  IVPB 750 milliGRAM(s) IV Intermittent every 24 hours    MEDICATIONS  (PRN):  acetaminophen     Tablet .. 650 milliGRAM(s) Oral every 6 hours PRN Temp greater or equal to 38C (100.4F), Mild Pain (1 - 3)  albuterol    90 MICROgram(s) HFA Inhaler 2 Puff(s) Inhalation every 6 hours PRN Shortness of Breath and/or Wheezing  aluminum hydroxide/magnesium hydroxide/simethicone Suspension 30 milliLiter(s) Oral every 4 hours PRN Dyspepsia  melatonin 3 milliGRAM(s) Oral at bedtime PRN Insomnia  ondansetron Injectable 4 milliGRAM(s) IV Push every 8 hours PRN Nausea and/or Vomiting  polyethylene glycol 3350 17 Gram(s) Oral daily PRN Constipation      I&O's Summary    26 Dec 2023 07:01  -  27 Dec 2023 07:00  --------------------------------------------------------  IN: 0 mL / OUT: 800 mL / NET: -800 mL        PHYSICAL EXAM:  Vital Signs Last 24 Hrs  T(C): 36.7 (27 Dec 2023 09:02), Max: 37.3 (27 Dec 2023 00:43)  T(F): 98.1 (27 Dec 2023 09:02), Max: 99.2 (27 Dec 2023 00:43)  HR: 76 (27 Dec 2023 11:39) (76 - 99)  BP: 117/65 (27 Dec 2023 11:39) (96/52 - 125/71)  BP(mean): --  RR: 18 (27 Dec 2023 09:02) (18 - 18)  SpO2: 94% (27 Dec 2023 09:02) (94% - 100%)    Parameters below as of 27 Dec 2023 09:02  Patient On (Oxygen Delivery Method): nasal cannula  O2 Flow (L/min): 4    PHYSICAL EXAM:  GENERAL: NAD, elderly, chronically ill appearing   HEAD:  Atraumatic, normocephalic  EYES: EOMI, conjunctiva and sclera clear  NECK: Supple, no JVD  CHEST/LUNG: Clear to auscultation bilaterally; no wheezing or rales  HEART: Regular rate and rhythm; no murmurs, no LE edema   ABDOMEN: Soft, nontender, nondistended; bowel sounds present  EXTREMITIES:  2+ Peripheral Pulses, no clubbing, cyanosis; R plantar pain on palpation, no swelling/warmth, erythema   PSYCH: alert, awake, calm affect, not anxious  SKIN: LLE erythema and swelling, improved. RLE venous stasis changes   MUSCULOSKELETAL: no back pain, moving all extremities    LABS:                        11.4   8.99  )-----------( 159      ( 27 Dec 2023 05:40 )             38.0     12-27    139  |  97  |  38<H>  ----------------------------<  147<H>  3.7   |  31  |  1.27    Ca    8.9      27 Dec 2023 05:40  Mg     2.0     12-26            Urinalysis Basic - ( 27 Dec 2023 05:40 )    Color: x / Appearance: x / SG: x / pH: x  Gluc: 147 mg/dL / Ketone: x  / Bili: x / Urobili: x   Blood: x / Protein: x / Nitrite: x   Leuk Esterase: x / RBC: x / WBC x   Sq Epi: x / Non Sq Epi: x / Bacteria: x            RADIOLOGY & ADDITIONAL TESTS:  New Imaging Personally Reviewed Today:  New Electrocardiogram Personally Reviewed Today:  Other Results Reviewed Today:   Prior or Outpatient Records Reviewed Today with Summary:    COORDINATION OF CARE:  Consultant Communication and Details of Discussion (where applicable):     [facility] Division of Hospital Medicine  Nancy Roman MD  Available via MS Teams    SUBJECTIVE / OVERNIGHT EVENTS:  no acute events overnight   LLE continues to improve; having some R foot pain with weight bearing only   no swelling, no warmth, no fevers, chills, denies any currently SOB or CP     ADDITIONAL REVIEW OF SYSTEMS:  14 point ROS negative except as noted above     MEDICATIONS  (STANDING):  atorvastatin 10 milliGRAM(s) Oral at bedtime  cefepime   IVPB 2000 milliGRAM(s) IV Intermittent every 12 hours  chlorhexidine 2% Cloths 1 Application(s) Topical daily  ferrous    sulfate 325 milliGRAM(s) Oral daily  heparin   Injectable 5000 Unit(s) SubCutaneous every 8 hours  metoprolol tartrate 25 milliGRAM(s) Oral two times a day  mirtazapine 22.5 milliGRAM(s) Oral at bedtime  mometasone 220 MICROgram(s) Inhaler 2 Puff(s) Inhalation daily  senna 2 Tablet(s) Oral at bedtime  sertraline 75 milliGRAM(s) Oral daily  sodium chloride 0.65% Nasal 1 Spray(s) Both Nostrils two times a day  spironolactone 25 milliGRAM(s) Oral two times a day  torsemide 60 milliGRAM(s) Oral daily  vancomycin  IVPB 750 milliGRAM(s) IV Intermittent every 24 hours    MEDICATIONS  (PRN):  acetaminophen     Tablet .. 650 milliGRAM(s) Oral every 6 hours PRN Temp greater or equal to 38C (100.4F), Mild Pain (1 - 3)  albuterol    90 MICROgram(s) HFA Inhaler 2 Puff(s) Inhalation every 6 hours PRN Shortness of Breath and/or Wheezing  aluminum hydroxide/magnesium hydroxide/simethicone Suspension 30 milliLiter(s) Oral every 4 hours PRN Dyspepsia  melatonin 3 milliGRAM(s) Oral at bedtime PRN Insomnia  ondansetron Injectable 4 milliGRAM(s) IV Push every 8 hours PRN Nausea and/or Vomiting  polyethylene glycol 3350 17 Gram(s) Oral daily PRN Constipation      I&O's Summary    26 Dec 2023 07:01  -  27 Dec 2023 07:00  --------------------------------------------------------  IN: 0 mL / OUT: 800 mL / NET: -800 mL        PHYSICAL EXAM:  Vital Signs Last 24 Hrs  T(C): 36.7 (27 Dec 2023 09:02), Max: 37.3 (27 Dec 2023 00:43)  T(F): 98.1 (27 Dec 2023 09:02), Max: 99.2 (27 Dec 2023 00:43)  HR: 76 (27 Dec 2023 11:39) (76 - 99)  BP: 117/65 (27 Dec 2023 11:39) (96/52 - 125/71)  BP(mean): --  RR: 18 (27 Dec 2023 09:02) (18 - 18)  SpO2: 94% (27 Dec 2023 09:02) (94% - 100%)    Parameters below as of 27 Dec 2023 09:02  Patient On (Oxygen Delivery Method): nasal cannula  O2 Flow (L/min): 4    PHYSICAL EXAM:  GENERAL: NAD, elderly, chronically ill appearing   HEAD:  Atraumatic, normocephalic  EYES: EOMI, conjunctiva and sclera clear  NECK: Supple, no JVD  CHEST/LUNG: Clear to auscultation bilaterally; no wheezing or rales  HEART: Regular rate and rhythm; no murmurs, no LE edema   ABDOMEN: Soft, nontender, nondistended; bowel sounds present  EXTREMITIES:  2+ Peripheral Pulses, no clubbing, cyanosis; R plantar pain on palpation, no swelling/warmth, erythema   PSYCH: alert, awake, calm affect, not anxious  SKIN: LLE erythema and swelling, improved. RLE venous stasis changes   MUSCULOSKELETAL: no back pain, moving all extremities    LABS:                        11.4   8.99  )-----------( 159      ( 27 Dec 2023 05:40 )             38.0     12-27    139  |  97  |  38<H>  ----------------------------<  147<H>  3.7   |  31  |  1.27    Ca    8.9      27 Dec 2023 05:40  Mg     2.0     12-26            Urinalysis Basic - ( 27 Dec 2023 05:40 )    Color: x / Appearance: x / SG: x / pH: x  Gluc: 147 mg/dL / Ketone: x  / Bili: x / Urobili: x   Blood: x / Protein: x / Nitrite: x   Leuk Esterase: x / RBC: x / WBC x   Sq Epi: x / Non Sq Epi: x / Bacteria: x            RADIOLOGY & ADDITIONAL TESTS:  New Imaging Personally Reviewed Today:  New Electrocardiogram Personally Reviewed Today:  Other Results Reviewed Today:   Prior or Outpatient Records Reviewed Today with Summary:    COORDINATION OF CARE:  Consultant Communication and Details of Discussion (where applicable):

## 2023-12-27 NOTE — PROGRESS NOTE ADULT - ASSESSMENT
89F pmh Afib (was on eliquis but no longer), CHF on home O2 (3L), depression, HTN, HLD, recently hospitalized for LLE cellulitis at [place] treated w/Vanco Zosyn while admitted and  s/p O/p abx course of doxycycline and augmentin x10 D, now sent in by outpatient wound care with open wound, fever, chills and surrounding cellulitis    RECOMMENDATIONS  Consistent with wound with surrounding nonpurulent LLE cellulitis.   Also noted that on prior admission there was improvement but now worsening with doxy/augmentin Rx suggesting possible concerns for resistant organism or just failure with low tissue levels of selected antibiotics  MRSA PCR screen positive   LLE wound culture with MRSA and Pseudomonas - sensitivities reviewed   S/p ceftriaxone    Continue cefepime 2g IV Q12h (renally adjusted)  Continue on vancomycin 750mg IV Q24h   - monitor vancomycin trough, goal -600   On discharge -- can transition to bactrim DS 1 tablet PO BID and ciprofloxacin 250mg PO BID (renally dosed) until 12/31  Continue local wound care, wound care team following     D/w Dr. Roman 89F pmh Afib (was on eliquis but no longer), CHF on home O2 (3L), depression, HTN, HLD, recently hospitalized for LLE cellulitis at [place] treated w/Vanco Zosyn while admitted and  s/p O/p abx course of doxycycline and augmentin x10 D, now sent in by outpatient wound care with open wound, fever, chills and surrounding cellulitis    RECOMMENDATIONS  Consistent with wound with surrounding nonpurulent LLE cellulitis.   Also noted that on prior admission there was improvement but now worsening with doxy/augmentin Rx suggesting possible concerns for resistant organism or just failure with low tissue levels of selected antibiotics  MRSA PCR screen positive   LLE wound culture with MRSA and Pseudomonas - sensitivities reviewed   S/p ceftriaxone    Continue cefepime 2g IV Q12h (renally adjusted)  Continue on vancomycin 750mg IV Q24h   - monitor vancomycin trough, goal -600   On discharge -- can transition to bactrim DS 1 tablet PO BID and ciprofloxacin 250mg PO BID (renally dosed) until 12/31  Continue local wound care, wound care team following     D/w Dr. Roman

## 2023-12-28 ENCOUNTER — TRANSCRIPTION ENCOUNTER (OUTPATIENT)
Age: 88
End: 2023-12-28

## 2023-12-28 VITALS
RESPIRATION RATE: 18 BRPM | SYSTOLIC BLOOD PRESSURE: 126 MMHG | HEART RATE: 106 BPM | TEMPERATURE: 98 F | OXYGEN SATURATION: 94 % | DIASTOLIC BLOOD PRESSURE: 71 MMHG

## 2023-12-28 DIAGNOSIS — S81.802D UNSPECIFIED OPEN WOUND, LEFT LOWER LEG, SUBSEQUENT ENCOUNTER: ICD-10-CM

## 2023-12-28 DIAGNOSIS — L03.90 CELLULITIS, UNSPECIFIED: ICD-10-CM

## 2023-12-28 LAB
ANION GAP SERPL CALC-SCNC: 12 MMOL/L — SIGNIFICANT CHANGE UP (ref 5–17)
ANION GAP SERPL CALC-SCNC: 12 MMOL/L — SIGNIFICANT CHANGE UP (ref 5–17)
ANION GAP SERPL CALC-SCNC: 5 MMOL/L — SIGNIFICANT CHANGE UP (ref 5–17)
ANION GAP SERPL CALC-SCNC: 5 MMOL/L — SIGNIFICANT CHANGE UP (ref 5–17)
BUN SERPL-MCNC: 37 MG/DL — HIGH (ref 7–23)
BUN SERPL-MCNC: 40 MG/DL — HIGH (ref 7–23)
BUN SERPL-MCNC: 40 MG/DL — HIGH (ref 7–23)
CALCIUM SERPL-MCNC: 8.4 MG/DL — SIGNIFICANT CHANGE UP (ref 8.4–10.5)
CALCIUM SERPL-MCNC: 8.4 MG/DL — SIGNIFICANT CHANGE UP (ref 8.4–10.5)
CALCIUM SERPL-MCNC: 8.5 MG/DL — SIGNIFICANT CHANGE UP (ref 8.4–10.5)
CALCIUM SERPL-MCNC: 8.5 MG/DL — SIGNIFICANT CHANGE UP (ref 8.4–10.5)
CALCIUM SERPL-MCNC: 9.2 MG/DL — SIGNIFICANT CHANGE UP (ref 8.4–10.5)
CALCIUM SERPL-MCNC: 9.2 MG/DL — SIGNIFICANT CHANGE UP (ref 8.4–10.5)
CHLORIDE SERPL-SCNC: 97 MMOL/L — SIGNIFICANT CHANGE UP (ref 96–108)
CHLORIDE SERPL-SCNC: 97 MMOL/L — SIGNIFICANT CHANGE UP (ref 96–108)
CHLORIDE SERPL-SCNC: 98 MMOL/L — SIGNIFICANT CHANGE UP (ref 96–108)
CHLORIDE SERPL-SCNC: 98 MMOL/L — SIGNIFICANT CHANGE UP (ref 96–108)
CHLORIDE SERPL-SCNC: 99 MMOL/L — SIGNIFICANT CHANGE UP (ref 96–108)
CHLORIDE SERPL-SCNC: 99 MMOL/L — SIGNIFICANT CHANGE UP (ref 96–108)
CO2 SERPL-SCNC: 28 MMOL/L — SIGNIFICANT CHANGE UP (ref 22–31)
CO2 SERPL-SCNC: 28 MMOL/L — SIGNIFICANT CHANGE UP (ref 22–31)
CO2 SERPL-SCNC: 29 MMOL/L — SIGNIFICANT CHANGE UP (ref 22–31)
CREAT ?TM UR-MCNC: 46 MG/DL — SIGNIFICANT CHANGE UP
CREAT ?TM UR-MCNC: 46 MG/DL — SIGNIFICANT CHANGE UP
CREAT SERPL-MCNC: 1.07 MG/DL — SIGNIFICANT CHANGE UP (ref 0.5–1.3)
CREAT SERPL-MCNC: 1.08 MG/DL — SIGNIFICANT CHANGE UP (ref 0.5–1.3)
CREAT SERPL-MCNC: 1.08 MG/DL — SIGNIFICANT CHANGE UP (ref 0.5–1.3)
EGFR: 48 ML/MIN/1.73M2 — LOW
EGFR: 48 ML/MIN/1.73M2 — LOW
EGFR: 49 ML/MIN/1.73M2 — LOW
GLUCOSE SERPL-MCNC: 159 MG/DL — HIGH (ref 70–99)
GLUCOSE SERPL-MCNC: 159 MG/DL — HIGH (ref 70–99)
GLUCOSE SERPL-MCNC: 396 MG/DL — HIGH (ref 70–99)
GLUCOSE SERPL-MCNC: 396 MG/DL — HIGH (ref 70–99)
GLUCOSE SERPL-MCNC: 427 MG/DL — HIGH (ref 70–99)
GLUCOSE SERPL-MCNC: 427 MG/DL — HIGH (ref 70–99)
HCT VFR BLD CALC: 33.2 % — LOW (ref 34.5–45)
HCT VFR BLD CALC: 33.2 % — LOW (ref 34.5–45)
HGB BLD-MCNC: 10.2 G/DL — LOW (ref 11.5–15.5)
HGB BLD-MCNC: 10.2 G/DL — LOW (ref 11.5–15.5)
MAGNESIUM SERPL-MCNC: 1.9 MG/DL — SIGNIFICANT CHANGE UP (ref 1.6–2.6)
MAGNESIUM SERPL-MCNC: 1.9 MG/DL — SIGNIFICANT CHANGE UP (ref 1.6–2.6)
MCHC RBC-ENTMCNC: 28.7 PG — SIGNIFICANT CHANGE UP (ref 27–34)
MCHC RBC-ENTMCNC: 28.7 PG — SIGNIFICANT CHANGE UP (ref 27–34)
MCHC RBC-ENTMCNC: 30.7 GM/DL — LOW (ref 32–36)
MCHC RBC-ENTMCNC: 30.7 GM/DL — LOW (ref 32–36)
MCV RBC AUTO: 93.3 FL — SIGNIFICANT CHANGE UP (ref 80–100)
MCV RBC AUTO: 93.3 FL — SIGNIFICANT CHANGE UP (ref 80–100)
NRBC # BLD: 0 /100 WBCS — SIGNIFICANT CHANGE UP (ref 0–0)
NRBC # BLD: 0 /100 WBCS — SIGNIFICANT CHANGE UP (ref 0–0)
OSMOLALITY SERPL: 304 MOSMOL/KG — HIGH (ref 280–301)
OSMOLALITY SERPL: 304 MOSMOL/KG — HIGH (ref 280–301)
OSMOLALITY UR: 419 MOS/KG — SIGNIFICANT CHANGE UP (ref 300–900)
OSMOLALITY UR: 419 MOS/KG — SIGNIFICANT CHANGE UP (ref 300–900)
PLATELET # BLD AUTO: 140 K/UL — LOW (ref 150–400)
PLATELET # BLD AUTO: 140 K/UL — LOW (ref 150–400)
POTASSIUM SERPL-MCNC: 3 MMOL/L — LOW (ref 3.5–5.3)
POTASSIUM SERPL-MCNC: 3 MMOL/L — LOW (ref 3.5–5.3)
POTASSIUM SERPL-MCNC: 3.1 MMOL/L — LOW (ref 3.5–5.3)
POTASSIUM SERPL-MCNC: 3.1 MMOL/L — LOW (ref 3.5–5.3)
POTASSIUM SERPL-MCNC: 3.9 MMOL/L — SIGNIFICANT CHANGE UP (ref 3.5–5.3)
POTASSIUM SERPL-MCNC: 3.9 MMOL/L — SIGNIFICANT CHANGE UP (ref 3.5–5.3)
POTASSIUM SERPL-SCNC: 3 MMOL/L — LOW (ref 3.5–5.3)
POTASSIUM SERPL-SCNC: 3 MMOL/L — LOW (ref 3.5–5.3)
POTASSIUM SERPL-SCNC: 3.1 MMOL/L — LOW (ref 3.5–5.3)
POTASSIUM SERPL-SCNC: 3.1 MMOL/L — LOW (ref 3.5–5.3)
POTASSIUM SERPL-SCNC: 3.9 MMOL/L — SIGNIFICANT CHANGE UP (ref 3.5–5.3)
POTASSIUM SERPL-SCNC: 3.9 MMOL/L — SIGNIFICANT CHANGE UP (ref 3.5–5.3)
RBC # BLD: 3.56 M/UL — LOW (ref 3.8–5.2)
RBC # BLD: 3.56 M/UL — LOW (ref 3.8–5.2)
RBC # FLD: 16.4 % — HIGH (ref 10.3–14.5)
RBC # FLD: 16.4 % — HIGH (ref 10.3–14.5)
SARS-COV-2 RNA SPEC QL NAA+PROBE: SIGNIFICANT CHANGE UP
SARS-COV-2 RNA SPEC QL NAA+PROBE: SIGNIFICANT CHANGE UP
SODIUM SERPL-SCNC: 127 MMOL/L — LOW (ref 135–145)
SODIUM SERPL-SCNC: 127 MMOL/L — LOW (ref 135–145)
SODIUM SERPL-SCNC: 130 MMOL/L — LOW (ref 135–145)
SODIUM SERPL-SCNC: 130 MMOL/L — LOW (ref 135–145)
SODIUM SERPL-SCNC: 140 MMOL/L — SIGNIFICANT CHANGE UP (ref 135–145)
SODIUM SERPL-SCNC: 140 MMOL/L — SIGNIFICANT CHANGE UP (ref 135–145)
SODIUM UR-SCNC: 90 MMOL/L — SIGNIFICANT CHANGE UP
SODIUM UR-SCNC: 90 MMOL/L — SIGNIFICANT CHANGE UP
WBC # BLD: 6.97 K/UL — SIGNIFICANT CHANGE UP (ref 3.8–10.5)
WBC # BLD: 6.97 K/UL — SIGNIFICANT CHANGE UP (ref 3.8–10.5)
WBC # FLD AUTO: 6.97 K/UL — SIGNIFICANT CHANGE UP (ref 3.8–10.5)
WBC # FLD AUTO: 6.97 K/UL — SIGNIFICANT CHANGE UP (ref 3.8–10.5)

## 2023-12-28 PROCEDURE — 94640 AIRWAY INHALATION TREATMENT: CPT

## 2023-12-28 PROCEDURE — 84295 ASSAY OF SERUM SODIUM: CPT

## 2023-12-28 PROCEDURE — 85027 COMPLETE CBC AUTOMATED: CPT

## 2023-12-28 PROCEDURE — 83735 ASSAY OF MAGNESIUM: CPT

## 2023-12-28 PROCEDURE — 87635 SARS-COV-2 COVID-19 AMP PRB: CPT

## 2023-12-28 PROCEDURE — 71045 X-RAY EXAM CHEST 1 VIEW: CPT

## 2023-12-28 PROCEDURE — 87086 URINE CULTURE/COLONY COUNT: CPT

## 2023-12-28 PROCEDURE — 97162 PT EVAL MOD COMPLEX 30 MIN: CPT

## 2023-12-28 PROCEDURE — 82803 BLOOD GASES ANY COMBINATION: CPT

## 2023-12-28 PROCEDURE — 80053 COMPREHEN METABOLIC PANEL: CPT

## 2023-12-28 PROCEDURE — 82435 ASSAY OF BLOOD CHLORIDE: CPT

## 2023-12-28 PROCEDURE — 82570 ASSAY OF URINE CREATININE: CPT

## 2023-12-28 PROCEDURE — 86140 C-REACTIVE PROTEIN: CPT

## 2023-12-28 PROCEDURE — 83880 ASSAY OF NATRIURETIC PEPTIDE: CPT

## 2023-12-28 PROCEDURE — 84300 ASSAY OF URINE SODIUM: CPT

## 2023-12-28 PROCEDURE — 97116 GAIT TRAINING THERAPY: CPT

## 2023-12-28 PROCEDURE — 85610 PROTHROMBIN TIME: CPT

## 2023-12-28 PROCEDURE — 83930 ASSAY OF BLOOD OSMOLALITY: CPT

## 2023-12-28 PROCEDURE — 73590 X-RAY EXAM OF LOWER LEG: CPT

## 2023-12-28 PROCEDURE — 87070 CULTURE OTHR SPECIMN AEROBIC: CPT

## 2023-12-28 PROCEDURE — 99285 EMERGENCY DEPT VISIT HI MDM: CPT | Mod: 25

## 2023-12-28 PROCEDURE — 87077 CULTURE AEROBIC IDENTIFY: CPT

## 2023-12-28 PROCEDURE — 80048 BASIC METABOLIC PNL TOTAL CA: CPT

## 2023-12-28 PROCEDURE — 85652 RBC SED RATE AUTOMATED: CPT

## 2023-12-28 PROCEDURE — G0463: CPT

## 2023-12-28 PROCEDURE — 87040 BLOOD CULTURE FOR BACTERIA: CPT

## 2023-12-28 PROCEDURE — 85014 HEMATOCRIT: CPT

## 2023-12-28 PROCEDURE — 36415 COLL VENOUS BLD VENIPUNCTURE: CPT

## 2023-12-28 PROCEDURE — 84132 ASSAY OF SERUM POTASSIUM: CPT

## 2023-12-28 PROCEDURE — 80202 ASSAY OF VANCOMYCIN: CPT

## 2023-12-28 PROCEDURE — 85025 COMPLETE CBC W/AUTO DIFF WBC: CPT

## 2023-12-28 PROCEDURE — 82947 ASSAY GLUCOSE BLOOD QUANT: CPT

## 2023-12-28 PROCEDURE — 84550 ASSAY OF BLOOD/URIC ACID: CPT

## 2023-12-28 PROCEDURE — 81003 URINALYSIS AUTO W/O SCOPE: CPT

## 2023-12-28 PROCEDURE — 97530 THERAPEUTIC ACTIVITIES: CPT

## 2023-12-28 PROCEDURE — 82330 ASSAY OF CALCIUM: CPT

## 2023-12-28 PROCEDURE — 96375 TX/PRO/DX INJ NEW DRUG ADDON: CPT

## 2023-12-28 PROCEDURE — 87186 SC STD MICRODIL/AGAR DIL: CPT

## 2023-12-28 PROCEDURE — 85730 THROMBOPLASTIN TIME PARTIAL: CPT

## 2023-12-28 PROCEDURE — 93005 ELECTROCARDIOGRAM TRACING: CPT

## 2023-12-28 PROCEDURE — 99239 HOSP IP/OBS DSCHRG MGMT >30: CPT

## 2023-12-28 PROCEDURE — 83935 ASSAY OF URINE OSMOLALITY: CPT

## 2023-12-28 PROCEDURE — 85018 HEMOGLOBIN: CPT

## 2023-12-28 PROCEDURE — 83605 ASSAY OF LACTIC ACID: CPT

## 2023-12-28 PROCEDURE — 97166 OT EVAL MOD COMPLEX 45 MIN: CPT

## 2023-12-28 PROCEDURE — 96374 THER/PROPH/DIAG INJ IV PUSH: CPT

## 2023-12-28 PROCEDURE — 84484 ASSAY OF TROPONIN QUANT: CPT

## 2023-12-28 RX ORDER — SENNA PLUS 8.6 MG/1
2 TABLET ORAL
Qty: 0 | Refills: 0 | DISCHARGE
Start: 2023-12-28

## 2023-12-28 RX ORDER — SODIUM CHLORIDE 9 MG/ML
250 INJECTION INTRAMUSCULAR; INTRAVENOUS; SUBCUTANEOUS ONCE
Refills: 0 | Status: COMPLETED | OUTPATIENT
Start: 2023-12-28 | End: 2023-12-28

## 2023-12-28 RX ORDER — SENNA PLUS 8.6 MG/1
1 TABLET ORAL
Refills: 0 | DISCHARGE

## 2023-12-28 RX ORDER — LANOLIN ALCOHOL/MO/W.PET/CERES
1 CREAM (GRAM) TOPICAL
Qty: 0 | Refills: 0 | DISCHARGE
Start: 2023-12-28

## 2023-12-28 RX ORDER — POTASSIUM CHLORIDE 20 MEQ
40 PACKET (EA) ORAL EVERY 4 HOURS
Refills: 0 | Status: DISCONTINUED | OUTPATIENT
Start: 2023-12-28 | End: 2023-12-28

## 2023-12-28 RX ADMIN — MOMETASONE FUROATE 2 PUFF(S): 220 INHALANT RESPIRATORY (INHALATION) at 11:15

## 2023-12-28 RX ADMIN — Medication 1 TABLET(S): at 16:57

## 2023-12-28 RX ADMIN — CHLORHEXIDINE GLUCONATE 1 APPLICATION(S): 213 SOLUTION TOPICAL at 11:15

## 2023-12-28 RX ADMIN — Medication 1 SPRAY(S): at 05:37

## 2023-12-28 RX ADMIN — Medication 40 MILLIEQUIVALENT(S): at 09:01

## 2023-12-28 RX ADMIN — SPIRONOLACTONE 25 MILLIGRAM(S): 25 TABLET, FILM COATED ORAL at 16:57

## 2023-12-28 RX ADMIN — Medication 60 MILLIGRAM(S): at 11:11

## 2023-12-28 RX ADMIN — SERTRALINE 75 MILLIGRAM(S): 25 TABLET, FILM COATED ORAL at 11:10

## 2023-12-28 RX ADMIN — CEFEPIME 100 MILLIGRAM(S): 1 INJECTION, POWDER, FOR SOLUTION INTRAMUSCULAR; INTRAVENOUS at 16:58

## 2023-12-28 RX ADMIN — Medication 325 MILLIGRAM(S): at 11:10

## 2023-12-28 RX ADMIN — Medication 1 TABLET(S): at 15:03

## 2023-12-28 RX ADMIN — Medication 1 SPRAY(S): at 16:58

## 2023-12-28 RX ADMIN — HEPARIN SODIUM 5000 UNIT(S): 5000 INJECTION INTRAVENOUS; SUBCUTANEOUS at 05:36

## 2023-12-28 RX ADMIN — Medication 25 MILLIGRAM(S): at 16:58

## 2023-12-28 RX ADMIN — CEFEPIME 100 MILLIGRAM(S): 1 INJECTION, POWDER, FOR SOLUTION INTRAMUSCULAR; INTRAVENOUS at 05:36

## 2023-12-28 RX ADMIN — SODIUM CHLORIDE 250 MILLILITER(S): 9 INJECTION INTRAMUSCULAR; INTRAVENOUS; SUBCUTANEOUS at 08:49

## 2023-12-28 NOTE — PROGRESS NOTE ADULT - PROBLEM SELECTOR PLAN 2
- No longer on Eliquis, no AC given hx/o multiple GIB   - C/w  metoprolol  25 mg bid
- No longer on Eliquis, no AC given hx/o multiple GIB   - C/w  metoprolol  25 mg bid
- No longer on Eliquis, no AC given hx/o multiple GIB   - C/w  metoprolol  25 mg bid  - CHF consulted
- No longer on Eliquis, no AC given hx/o multiple GIB   - C/w  metoprolol  25 mg bid

## 2023-12-28 NOTE — PROGRESS NOTE ADULT - PROBLEM SELECTOR PLAN 1
- Recently hospitalized for LLE cellulitis treated w/Fady Baldwinsyn while admitted  then O/p abx course of doxycycline and augmentin x10 D  - Now w/suspected cellulitis sent in by O/P wound care for further eval   - CTX 2g, ID following appreciate recs   - Wound care following
- Recently hospitalized for LLE cellulitis treated w/Lolio Zosyn while admitted  then O/p abx course of doxycycline and augmentin x10 D  - Now w/suspected cellulitis sent in by O/P wound care for further eval   - CTX 2g, ID following appreciate recs   - Wound care consult (ordered)
- Recently hospitalized for LLE cellulitis treated w/Fady Baldwinsyn while admitted  then O/p abx course of doxycycline and augmentin x10 D  - Now w/suspected cellulitis sent in by O/P wound care for further eval   - CTX 2g, ID following appreciate recs   - Wound care following
- Recently hospitalized for LLE cellulitis treated w/Fady Ramirez while admitted then O/p abx course of doxycycline and augmentin x10 D  - Now w/suspected cellulitis sent in by O/P wound care for further eval   - initially started on CTX 2g, with wound cx returning showing MRSA and pseudomonas   - changed to IV cefepime and vancomycin per ID 12/26  - ID follow up appreciated - c/w above regimen, plan to switch to PO bactrim and cipro to 12/31  - Wound care following
- Recently hospitalized for LLE cellulitis treated w/Vanco Zosyn while admitted  then O/p abx course of doxycycline and augmentin x10 D  - Now w/suspected cellulitis sent in by O/P wound care for further eval   - Afebrile, VSS, clinically nontoxic   - Labs: no elevated wbc, ESR & CRP elevated  - XR left  Tib/Fib:  No gas or osseous destruction   - ED: Vanco 1g, Zosyn 3.375g  - C/w Vanco 1g, Zosyn 3.375g  - Hx/o MRSA+, f/u MRSA screen  - Dose Vanco based on level iso low gFR  - F/u Bcx, Ucx, MRSA swab   - Wound care consult (ordered)  - ID consult to be called ([facility], saw last admission)
- Recently hospitalized for LLE cellulitis treated w/Fady Ramirez while admitted then O/p abx course of doxycycline and augmentin x10 D  - Now w/suspected cellulitis sent in by O/P wound care for further eval   - initially started on CTX 2g, with wound cx returning showing MRSA and pseudomonas   - changed to IV cefepime and vancomycin per ID 12/26  - ID follow up appreciated - c/w above regimen, plan to switch to PO bactrim and cipro to 12/31  - Wound care following

## 2023-12-28 NOTE — DISCHARGE NOTE PROVIDER - HOSPITAL COURSE
HPI:  89F pmh Afib (was on eliquis but no longer), CHF on home O2 (3L), depression, HTN, HLD, recently hospitalized for LLE cellulitis treated w/Vanco Zosyn while admitted and  s/p O/p abx course of doxycycline and augmentin x10 D, now sent in by outpatient wound care doctor for evaluation of possible cellulitis and IV antibiotic treatment. Patient endorsing that her legs are more swollen and  red than usual.       ROS: Denies CP, SOB, palpitation, N/V/D, fever, cough, chills, dizziness, abm pain, recent travel, sick contact, change in bowel and urinary habits     A 10-system ROS was performed and is negative except as noted above and/or in the HPI.   (22 Dec 2023 20:42)    Hospital Course:  Pt admitted to medicine for LLE cellulitis refractory to oral antibiotics. Pt started on Ceftrixone 2g daily; had wound cultures obtained, which eventually showed MRSA and pseudomonas, and pt switched to vancomycin and cefepime. Pt had improved LLE swelling, pain and would care evaluated pts ulcer. Pt also had RLE pain on the plantar region - XRs were negative and there was no evidence of gout on exam. Pt was eventually able to work with PT, who recommended CARIN.   Oral antibiotics were recommended by ID - pt to take bactrim twice daily and cipro 250mg BID until 12/31.  Pt stable for discharge.       Important Medication Changes and Reason:  START Bactrim 1DS tablet BID to 12/31  START Cipro 250mg BID to 12/31    Active or Pending Issues Requiring Follow-up:  f/u wound care  f/u PMD  f/u ID     Advanced Directives:   [X] Full code  [ ] DNR  [ ] Hospice    Discharge Diagnoses:  LLE cellulitis   Chronic Afib  Chronic systolic HF  HTN  anxiety, depression

## 2023-12-28 NOTE — DISCHARGE NOTE NURSING/CASE MANAGEMENT/SOCIAL WORK - PATIENT PORTAL LINK FT
You can access the FollowMyHealth Patient Portal offered by [facility] by registering at the following website: [email or web address]. By joining [employer]’s FollowMyHealth portal, you will also be able to view your health information using other applications (apps) compatible with our system. You can access the FollowMyHealth Patient Portal offered by [facility] by registering at the following website: [email or web address]. By joining [employer]’s FollowMyHealth portal, you will also be able to view your health information using other applications (apps) compatible with our system.

## 2023-12-28 NOTE — PROGRESS NOTE ADULT - PROBLEM SELECTOR PLAN 6
- Ramelteon non-formulary   - C/w Sertraline

## 2023-12-28 NOTE — PROGRESS NOTE ADULT - SUBJECTIVE AND OBJECTIVE BOX
[facility] DIVISION OF INFECTIOUS DISEASES  VANE Shine Y. Patel, S. Shah, G. Casimir  [phone]  ([phone] - weekdays after 5pm and weekends)    Name: ARNULFO ALEX  Age/Gender: 89y Female  MRN: [id number]    Interval History:  Patient seen and examined this morning.   No new complaints noted.  Notes reviewed  No concerning overnight events  Afebrile   Allergies: codeine (Other)      Objective:  Vitals:   T(F): 97.7 (12-28-23 @ 12:43), Max: 98.9 (12-27-23 @ 16:19)  HR: 106 (12-28-23 @ 12:43) (84 - 106)  BP: 126/71 (12-28-23 @ 12:43) (104/64 - 126/71)  RR: 18 (12-28-23 @ 12:43) (18 - 18)  SpO2: 94% (12-28-23 @ 12:43) (94% - 100%)  Physical Examination:  General: no acute distress  HEENT: NC/AT, anicteric, neck supple  Respiratory: no acc muscle use, breathing comfortably  Cardiovascular: S1 and S2 present  Gastrointestinal: normal appearing, nondistended  Extremities: LLE dressing, chronic stasis changes     Laboratory Studies:  CBC:                       10.2   6.97  )-----------( 140      ( 28 Dec 2023 07:53 )             33.2     WBC Trend:  6.97 12-28-23 @ 07:53  8.99 12-27-23 @ 05:40  9.19 12-23-23 @ 07:11  9.11 12-22-23 @ 11:30    CMP: 12-28    140  |  99  |  40<H>  ----------------------------<  159<H>  3.9   |  29  |  1.07    Ca    9.2      28 Dec 2023 13:07  Mg     1.9     12-28      Creatinine: 1.07 mg/dL (12-28-23 @ 13:07)  Creatinine: 1.08 mg/dL (12-28-23 @ 07:54)  Creatinine: 1.07 mg/dL (12-28-23 @ 07:54)  Creatinine: 1.27 mg/dL (12-27-23 @ 05:40)  Creatinine: 1.25 mg/dL (12-26-23 @ 07:25)  Creatinine: 1.41 mg/dL (12-25-23 @ 05:26)  Creatinine: 1.47 mg/dL (12-24-23 @ 07:00)  Creatinine: 1.47 mg/dL (12-23-23 @ 07:09)  Creatinine: 1.53 mg/dL (12-22-23 @ 14:35)  Creatinine: 1.52 mg/dL (12-22-23 @ 11:30)    Vancomycin Level, Trough: 6.8 ug/mL (12-27-23 @ 17:10)    Microbiology: reviewed   Culture - Other (collected 12-24-23 @ 09:01)  Source: Ulcer Ulcer  Final Report (12-26-23 @ 15:58):    Culture yields growth of greater than 3 colony types of    bacteria,  which may indicate contamination and normal adeline    Call client services within 7 days if further workup is clinically    indicated.    Culture includes    Few Methicillin Resistant Staphylococcus aureus    Numerous Pseudomonas aeruginosa  Organism: Methicillin resistant Staphylococcus aureus  Pseudomonas aeruginosa (12-26-23 @ 15:58)  Organism: Pseudomonas aeruginosa (12-26-23 @ 15:58)      Method Type: BRYN      -  Aztreonam: S <=4      -  Cefepime: S <=2      -  Ceftazidime: S 4      -  Ciprofloxacin: S <=0.25      -  Imipenem: S <=1      -  Levofloxacin: S <=0.5      -  Meropenem: S <=1      -  Piperacillin/Tazobactam: S <=8  Organism: Methicillin resistant Staphylococcus aureus (12-26-23 @ 15:58)      Method Type: BRYN      -  Ampicillin/Sulbactam: R 16/8      -  Cefazolin: R 16      -  Clindamycin: S <=0.25      -  Daptomycin: S 0.5      -  Erythromycin: R >4      -  Gentamicin: S <=1 Should not be used as monotherapy      -  Linezolid: S 2      -  Oxacillin: R >2      -  Penicillin: R >8      -  Rifampin: S <=1 Should not be used as monotherapy      -  Tetracycline: S <=1      -  Trimethoprim/Sulfamethoxazole: S <=0.5/9.5      -  Vancomycin: S 2    Culture - Urine (collected 12-22-23 @ 12:42)  Source: Clean Catch Clean Catch (Midstream)  Final Report (12-23-23 @ 22:35):    <10,000 CFU/mL Normal Urogenital Adeline    Culture - Blood (collected 12-22-23 @ 10:55)  Source: .Blood Blood-Peripheral  Final Report (12-27-23 @ 18:01):    No growth at 5 days    Culture - Blood (collected 12-22-23 @ 10:40)  Source: .Blood Blood-Peripheral  Final Report (12-27-23 @ 18:01):    No growth at 5 days    Radiology: reviewed     Medications:  acetaminophen     Tablet .. 650 milliGRAM(s) Oral every 6 hours PRN  albuterol    90 MICROgram(s) HFA Inhaler 2 Puff(s) Inhalation every 6 hours PRN  aluminum hydroxide/magnesium hydroxide/simethicone Suspension 30 milliLiter(s) Oral every 4 hours PRN  atorvastatin 10 milliGRAM(s) Oral at bedtime  cefepime   IVPB 2000 milliGRAM(s) IV Intermittent every 12 hours  chlorhexidine 2% Cloths 1 Application(s) Topical daily  ferrous    sulfate 325 milliGRAM(s) Oral daily  heparin   Injectable 5000 Unit(s) SubCutaneous every 8 hours  melatonin 3 milliGRAM(s) Oral at bedtime PRN  metoprolol tartrate 25 milliGRAM(s) Oral two times a day  mirtazapine 22.5 milliGRAM(s) Oral at bedtime  mometasone 220 MICROgram(s) Inhaler 2 Puff(s) Inhalation daily  ondansetron Injectable 4 milliGRAM(s) IV Push every 8 hours PRN  polyethylene glycol 3350 17 Gram(s) Oral daily PRN  senna 2 Tablet(s) Oral at bedtime  sertraline 75 milliGRAM(s) Oral daily  sodium chloride 0.65% Nasal 1 Spray(s) Both Nostrils two times a day  spironolactone 25 milliGRAM(s) Oral two times a day  torsemide 60 milliGRAM(s) Oral daily  vancomycin  IVPB 750 milliGRAM(s) IV Intermittent every 24 hours    Current Antimicrobials:  cefepime   IVPB 2000 milliGRAM(s) IV Intermittent every 12 hours  vancomycin  IVPB 750 milliGRAM(s) IV Intermittent every 24 hours    Prior/Completed Antimicrobials:  piperacillin/tazobactam IVPB...  vancomycin  IVPB.   [facility] DIVISION OF INFECTIOUS DISEASES  VANE Shine Y. Patel, S. Shah, G. Casimir  [phone]  ([phone] - weekdays after 5pm and weekends)    Name: ARNULFO ALEX  Age/Gender: 89y Female  MRN: [id number]    Interval History:  Patient seen and examined this morning.   No new complaints noted.  Notes reviewed  No concerning overnight events  Afebrile   Allergies: codeine (Other)      Objective:  Vitals:   T(F): 97.7 (12-28-23 @ 12:43), Max: 98.9 (12-27-23 @ 16:19)  HR: 106 (12-28-23 @ 12:43) (84 - 106)  BP: 126/71 (12-28-23 @ 12:43) (104/64 - 126/71)  RR: 18 (12-28-23 @ 12:43) (18 - 18)  SpO2: 94% (12-28-23 @ 12:43) (94% - 100%)  Physical Examination:  General: no acute distress  HEENT: NC/AT, anicteric, neck supple  Respiratory: no acc muscle use, breathing comfortably  Cardiovascular: S1 and S2 present  Gastrointestinal: normal appearing, nondistended  Extremities: LLE dressing, chronic stasis changes     Laboratory Studies:  CBC:                       10.2   6.97  )-----------( 140      ( 28 Dec 2023 07:53 )             33.2     WBC Trend:  6.97 12-28-23 @ 07:53  8.99 12-27-23 @ 05:40  9.19 12-23-23 @ 07:11  9.11 12-22-23 @ 11:30    CMP: 12-28    140  |  99  |  40<H>  ----------------------------<  159<H>  3.9   |  29  |  1.07    Ca    9.2      28 Dec 2023 13:07  Mg     1.9     12-28      Creatinine: 1.07 mg/dL (12-28-23 @ 13:07)  Creatinine: 1.08 mg/dL (12-28-23 @ 07:54)  Creatinine: 1.07 mg/dL (12-28-23 @ 07:54)  Creatinine: 1.27 mg/dL (12-27-23 @ 05:40)  Creatinine: 1.25 mg/dL (12-26-23 @ 07:25)  Creatinine: 1.41 mg/dL (12-25-23 @ 05:26)  Creatinine: 1.47 mg/dL (12-24-23 @ 07:00)  Creatinine: 1.47 mg/dL (12-23-23 @ 07:09)  Creatinine: 1.53 mg/dL (12-22-23 @ 14:35)  Creatinine: 1.52 mg/dL (12-22-23 @ 11:30)    Vancomycin Level, Trough: 6.8 ug/mL (12-27-23 @ 17:10)    Microbiology: reviewed   Culture - Other (collected 12-24-23 @ 09:01)  Source: Ulcer Ulcer  Final Report (12-26-23 @ 15:58):    Culture yields growth of greater than 3 colony types of    bacteria,  which may indicate contamination and normal adeline    Call client services within 7 days if further workup is clinically    indicated.    Culture includes    Few Methicillin Resistant Staphylococcus aureus    Numerous Pseudomonas aeruginosa  Organism: Methicillin resistant Staphylococcus aureus  Pseudomonas aeruginosa (12-26-23 @ 15:58)  Organism: Pseudomonas aeruginosa (12-26-23 @ 15:58)      Method Type: BRYN      -  Aztreonam: S <=4      -  Cefepime: S <=2      -  Ceftazidime: S 4      -  Ciprofloxacin: S <=0.25      -  Imipenem: S <=1      -  Levofloxacin: S <=0.5      -  Meropenem: S <=1      -  Piperacillin/Tazobactam: S <=8  Organism: Methicillin resistant Staphylococcus aureus (12-26-23 @ 15:58)      Method Type: BRYN      -  Ampicillin/Sulbactam: R 16/8      -  Cefazolin: R 16      -  Clindamycin: S <=0.25      -  Daptomycin: S 0.5      -  Erythromycin: R >4      -  Gentamicin: S <=1 Should not be used as monotherapy      -  Linezolid: S 2      -  Oxacillin: R >2      -  Penicillin: R >8      -  Rifampin: S <=1 Should not be used as monotherapy      -  Tetracycline: S <=1      -  Trimethoprim/Sulfamethoxazole: S <=0.5/9.5      -  Vancomycin: S 2    Culture - Urine (collected 12-22-23 @ 12:42)  Source: Clean Catch Clean Catch (Midstream)  Final Report (12-23-23 @ 22:35):    <10,000 CFU/mL Normal Urogenital Adeline    Culture - Blood (collected 12-22-23 @ 10:55)  Source: .Blood Blood-Peripheral  Final Report (12-27-23 @ 18:01):    No growth at 5 days    Culture - Blood (collected 12-22-23 @ 10:40)  Source: .Blood Blood-Peripheral  Final Report (12-27-23 @ 18:01):    No growth at 5 days    Radiology: reviewed     Medications:  acetaminophen     Tablet .. 650 milliGRAM(s) Oral every 6 hours PRN  albuterol    90 MICROgram(s) HFA Inhaler 2 Puff(s) Inhalation every 6 hours PRN  aluminum hydroxide/magnesium hydroxide/simethicone Suspension 30 milliLiter(s) Oral every 4 hours PRN  atorvastatin 10 milliGRAM(s) Oral at bedtime  cefepime   IVPB 2000 milliGRAM(s) IV Intermittent every 12 hours  chlorhexidine 2% Cloths 1 Application(s) Topical daily  ferrous    sulfate 325 milliGRAM(s) Oral daily  heparin   Injectable 5000 Unit(s) SubCutaneous every 8 hours  melatonin 3 milliGRAM(s) Oral at bedtime PRN  metoprolol tartrate 25 milliGRAM(s) Oral two times a day  mirtazapine 22.5 milliGRAM(s) Oral at bedtime  mometasone 220 MICROgram(s) Inhaler 2 Puff(s) Inhalation daily  ondansetron Injectable 4 milliGRAM(s) IV Push every 8 hours PRN  polyethylene glycol 3350 17 Gram(s) Oral daily PRN  senna 2 Tablet(s) Oral at bedtime  sertraline 75 milliGRAM(s) Oral daily  sodium chloride 0.65% Nasal 1 Spray(s) Both Nostrils two times a day  spironolactone 25 milliGRAM(s) Oral two times a day  torsemide 60 milliGRAM(s) Oral daily  vancomycin  IVPB 750 milliGRAM(s) IV Intermittent every 24 hours    Current Antimicrobials:  cefepime   IVPB 2000 milliGRAM(s) IV Intermittent every 12 hours  vancomycin  IVPB 750 milliGRAM(s) IV Intermittent every 24 hours    Prior/Completed Antimicrobials:  piperacillin/tazobactam IVPB...  vancomycin  IVPB.

## 2023-12-28 NOTE — PROGRESS NOTE ADULT - SUBJECTIVE AND OBJECTIVE BOX
[facility] Division of Hospital Medicine  Nancy Roman MD  Available via MS Teams    SUBJECTIVE / OVERNIGHT EVENTS:  no acute events overnight   pt feeling well, endorsing ongoing but improved pain in LEs   no SOB, no CP     ADDITIONAL REVIEW OF SYSTEMS:  14 point ROS negative except as noted above     MEDICATIONS  (STANDING):  atorvastatin 10 milliGRAM(s) Oral at bedtime  cefepime   IVPB 2000 milliGRAM(s) IV Intermittent every 12 hours  chlorhexidine 2% Cloths 1 Application(s) Topical daily  ferrous    sulfate 325 milliGRAM(s) Oral daily  heparin   Injectable 5000 Unit(s) SubCutaneous every 8 hours  metoprolol tartrate 25 milliGRAM(s) Oral two times a day  mirtazapine 22.5 milliGRAM(s) Oral at bedtime  mometasone 220 MICROgram(s) Inhaler 2 Puff(s) Inhalation daily  senna 2 Tablet(s) Oral at bedtime  sertraline 75 milliGRAM(s) Oral daily  sodium chloride 0.65% Nasal 1 Spray(s) Both Nostrils two times a day  spironolactone 25 milliGRAM(s) Oral two times a day  torsemide 60 milliGRAM(s) Oral daily  vancomycin  IVPB 750 milliGRAM(s) IV Intermittent every 24 hours    MEDICATIONS  (PRN):  acetaminophen     Tablet .. 650 milliGRAM(s) Oral every 6 hours PRN Temp greater or equal to 38C (100.4F), Mild Pain (1 - 3)  albuterol    90 MICROgram(s) HFA Inhaler 2 Puff(s) Inhalation every 6 hours PRN Shortness of Breath and/or Wheezing  aluminum hydroxide/magnesium hydroxide/simethicone Suspension 30 milliLiter(s) Oral every 4 hours PRN Dyspepsia  melatonin 3 milliGRAM(s) Oral at bedtime PRN Insomnia  ondansetron Injectable 4 milliGRAM(s) IV Push every 8 hours PRN Nausea and/or Vomiting  polyethylene glycol 3350 17 Gram(s) Oral daily PRN Constipation      I&O's Summary    27 Dec 2023 07:01  -  28 Dec 2023 07:00  --------------------------------------------------------  IN: 150 mL / OUT: 800 mL / NET: -650 mL        PHYSICAL EXAM:  Vital Signs Last 24 Hrs  T(C): 37.2 (27 Dec 2023 16:19), Max: 37.2 (27 Dec 2023 16:19)  T(F): 98.9 (27 Dec 2023 16:19), Max: 98.9 (27 Dec 2023 16:19)  HR: 98 (27 Dec 2023 18:25) (76 - 98)  BP: 108/65 (28 Dec 2023 11:04) (104/64 - 120/65)  BP(mean): --  RR: 18 (27 Dec 2023 16:19) (18 - 18)  SpO2: 100% (27 Dec 2023 16:19) (94% - 100%)    Parameters below as of 27 Dec 2023 16:19  Patient On (Oxygen Delivery Method): nasal cannula  O2 Flow (L/min): 4    PHYSICAL EXAM:  GENERAL: NAD, elderly, chronically ill appearing   HEAD: Atraumatic, normocephalic  EYES: EOMI, conjunctiva and sclera clear  NECK: Supple, no JVD  CHEST/LUNG: Clear to auscultation bilaterally; no wheezing or rales  HEART: Regular rate and rhythm; no murmurs, +LLE edema   ABDOMEN: Soft, nontender, nondistended; bowel sounds present  EXTREMITIES:  2+ Peripheral Pulses, no clubbing, cyanosis; R plantar pain on palpation, no swelling/warmth, erythema   PSYCH: alert, awake, calm affect, not anxious  SKIN: LLE erythema and swelling, improved. RLE venous stasis changes       LABS:                        10.2   6.97  )-----------( 140      ( 28 Dec 2023 07:53 )             33.2     12-28    130<L>  |  97  |  37<H>  ----------------------------<  396<H>  3.1<L>   |  28  |  1.08    Ca    8.5      28 Dec 2023 07:54  Mg     1.9     12-28            Urinalysis Basic - ( 28 Dec 2023 07:54 )    Color: x / Appearance: x / SG: x / pH: x  Gluc: 396 mg/dL / Ketone: x  / Bili: x / Urobili: x   Blood: x / Protein: x / Nitrite: x   Leuk Esterase: x / RBC: x / WBC x   Sq Epi: x / Non Sq Epi: x / Bacteria: x            RADIOLOGY & ADDITIONAL TESTS:  New Imaging Personally Reviewed Today:  New Electrocardiogram Personally Reviewed Today:  Other Results Reviewed Today:   Prior or Outpatient Records Reviewed Today with Summary:    COORDINATION OF CARE:  Consultant Communication and Details of Discussion (where applicable):     [facility] Division of Hospital Medicine  Nancy Roman MD  Available via MS Teams    SUBJECTIVE / OVERNIGHT EVENTS:  no acute events overnight   pt feeling well, endorsing ongoing but improved pain in LEs   no SOB, no CP     ADDITIONAL REVIEW OF SYSTEMS:  14 point ROS negative except as noted above     MEDICATIONS  (STANDING):  atorvastatin 10 milliGRAM(s) Oral at bedtime  cefepime   IVPB 2000 milliGRAM(s) IV Intermittent every 12 hours  chlorhexidine 2% Cloths 1 Application(s) Topical daily  ferrous    sulfate 325 milliGRAM(s) Oral daily  heparin   Injectable 5000 Unit(s) SubCutaneous every 8 hours  metoprolol tartrate 25 milliGRAM(s) Oral two times a day  mirtazapine 22.5 milliGRAM(s) Oral at bedtime  mometasone 220 MICROgram(s) Inhaler 2 Puff(s) Inhalation daily  senna 2 Tablet(s) Oral at bedtime  sertraline 75 milliGRAM(s) Oral daily  sodium chloride 0.65% Nasal 1 Spray(s) Both Nostrils two times a day  spironolactone 25 milliGRAM(s) Oral two times a day  torsemide 60 milliGRAM(s) Oral daily  vancomycin  IVPB 750 milliGRAM(s) IV Intermittent every 24 hours    MEDICATIONS  (PRN):  acetaminophen     Tablet .. 650 milliGRAM(s) Oral every 6 hours PRN Temp greater or equal to 38C (100.4F), Mild Pain (1 - 3)  albuterol    90 MICROgram(s) HFA Inhaler 2 Puff(s) Inhalation every 6 hours PRN Shortness of Breath and/or Wheezing  aluminum hydroxide/magnesium hydroxide/simethicone Suspension 30 milliLiter(s) Oral every 4 hours PRN Dyspepsia  melatonin 3 milliGRAM(s) Oral at bedtime PRN Insomnia  ondansetron Injectable 4 milliGRAM(s) IV Push every 8 hours PRN Nausea and/or Vomiting  polyethylene glycol 3350 17 Gram(s) Oral daily PRN Constipation      I&O's Summary    27 Dec 2023 07:01  -  28 Dec 2023 07:00  --------------------------------------------------------  IN: 150 mL / OUT: 800 mL / NET: -650 mL        PHYSICAL EXAM:  Vital Signs Last 24 Hrs  T(C): 37.2 (27 Dec 2023 16:19), Max: 37.2 (27 Dec 2023 16:19)  T(F): 98.9 (27 Dec 2023 16:19), Max: 98.9 (27 Dec 2023 16:19)  HR: 98 (27 Dec 2023 18:25) (76 - 98)  BP: 108/65 (28 Dec 2023 11:04) (104/64 - 120/65)  BP(mean): --  RR: 18 (27 Dec 2023 16:19) (18 - 18)  SpO2: 100% (27 Dec 2023 16:19) (94% - 100%)    Parameters below as of 27 Dec 2023 16:19  Patient On (Oxygen Delivery Method): nasal cannula  O2 Flow (L/min): 4    PHYSICAL EXAM:  GENERAL: NAD, elderly, chronically ill appearing   HEAD: Atraumatic, normocephalic  EYES: EOMI, conjunctiva and sclera clear  NECK: Supple, no JVD  CHEST/LUNG: Clear to auscultation bilaterally; no wheezing or rales  HEART: Regular rate and rhythm; no murmurs, +LLE edema   ABDOMEN: Soft, nontender, nondistended; bowel sounds present  EXTREMITIES:  2+ Peripheral Pulses, no clubbing, cyanosis; R plantar pain on palpation, no swelling/warmth, erythema   PSYCH: alert, awake, calm affect, not anxious  SKIN: LLE erythema and swelling, improved. RLE venous stasis changes       LABS:                        10.2   6.97  )-----------( 140      ( 28 Dec 2023 07:53 )             33.2     12-28    130<L>  |  97  |  37<H>  ----------------------------<  396<H>  3.1<L>   |  28  |  1.08    Ca    8.5      28 Dec 2023 07:54  Mg     1.9     12-28            Urinalysis Basic - ( 28 Dec 2023 07:54 )    Color: x / Appearance: x / SG: x / pH: x  Gluc: 396 mg/dL / Ketone: x  / Bili: x / Urobili: x   Blood: x / Protein: x / Nitrite: x   Leuk Esterase: x / RBC: x / WBC x   Sq Epi: x / Non Sq Epi: x / Bacteria: x            RADIOLOGY & ADDITIONAL TESTS:  New Imaging Personally Reviewed Today:  New Electrocardiogram Personally Reviewed Today:  Other Results Reviewed Today:   Prior or Outpatient Records Reviewed Today with Summary:    COORDINATION OF CARE:  Consultant Communication and Details of Discussion (where applicable):

## 2023-12-28 NOTE — PROGRESS NOTE ADULT - ASSESSMENT
89F pmh Afib (was on eliquis but no longer), CHF on home O2 (3L), depression, HTN, HLD, recently hospitalized for LLE cellulitis at [place] treated w/Vanco Zosyn while admitted and  s/p O/p abx course of doxycycline and augmentin x10 D, now sent in by outpatient wound care with open wound, fever, chills and surrounding cellulitis    RECOMMENDATIONS  Consistent with wound with surrounding nonpurulent LLE cellulitis.   Also noted that on prior admission there was improvement but now worsening with doxy/augmentin Rx suggesting possible concerns for resistant organism or just failure with low tissue levels of selected antibiotics  MRSA PCR screen positive   LLE wound culture with MRSA and Pseudomonas - sensitivities reviewed   S/p ceftriaxone    Continue cefepime 2g IV Q12h (renally adjusted) while inpt  Vancomycin level subtherapeutic 12/27 pm  Discontinued vancomycin, started Bactrim DS 1 tablet PO BID  Can discharge on Bactrim DS 1 tablet PO BID and Ciprofloxacin 250mg PO BID (renally dosed) until 12/31  Continue local wound care, wound care team following     Stable from ID standpoint at this time.  Discharge planning per primary team     D/w Dr. Jessie Vera M.D.  [facility], Division of Infectious Diseases  [phone]  After 5pm on weekdays and all day on weekends - please call [phone] 89F pmh Afib (was on eliquis but no longer), CHF on home O2 (3L), depression, HTN, HLD, recently hospitalized for LLE cellulitis at [place] treated w/Vanco Zosyn while admitted and  s/p O/p abx course of doxycycline and augmentin x10 D, now sent in by outpatient wound care with open wound, fever, chills and surrounding cellulitis    RECOMMENDATIONS  Consistent with wound with surrounding nonpurulent LLE cellulitis.   Also noted that on prior admission there was improvement but now worsening with doxy/augmentin Rx suggesting possible concerns for resistant organism or just failure with low tissue levels of selected antibiotics  MRSA PCR screen positive   LLE wound culture with MRSA and Pseudomonas - sensitivities reviewed   S/p ceftriaxone    Continue cefepime 2g IV Q12h (renally adjusted) while inpt  Vancomycin level subtherapeutic 12/27 pm  Discontinued vancomycin, started Bactrim DS 1 tablet PO BID  Can discharge on Bactrim DS 1 tablet PO BID and Ciprofloxacin 250mg PO BID (renally dosed) until 12/31  Continue local wound care, wound care team following     Stable from ID standpoint at this time.  Discharge planning per primary team     D/w Dr. Jessie Vera M.D.  [facility], Division of Infectious Diseases  [phone]  After 5pm on weekdays and all day on weekends - please call [phone]

## 2023-12-28 NOTE — PROGRESS NOTE ADULT - PROBLEM SELECTOR PLAN 4
- Bp stable, CTM  - C/w antihypertensives

## 2023-12-28 NOTE — PROGRESS NOTE ADULT - PROBLEM SELECTOR PLAN 3
- Asymptomatic   - Torsemide and spironolactone resumed  - Resume Jardiance on d/c   - C/w O2 supplementation (on at baseline)  - HF following appreciate recs

## 2023-12-28 NOTE — DISCHARGE NOTE NURSING/CASE MANAGEMENT/SOCIAL WORK - NSDCFUADDAPPT_GEN_ALL_CORE_FT
Please follow up with the Wound Center at [address] [facility], Phone [phone] Please follow up with the Wound Center at [address] [facility], Phone [phone]

## 2023-12-28 NOTE — PROGRESS NOTE ADULT - PROVIDER SPECIALTY LIST ADULT
Infectious Disease
Internal Medicine
Hospitalist
Internal Medicine
Internal Medicine

## 2023-12-28 NOTE — DISCHARGE NOTE PROVIDER - NSDCMRMEDTOKEN_GEN_ALL_CORE_FT
acetaminophen 325 mg oral tablet: 2 tab(s) orally every 6 hours, As Needed -Temp greater or equal to 38C (100.4F),  Pain mod  albuterol 2.5 mg/3 mL (0.083%) inhalation solution: 3 milliliter(s) by nebulizer every 4 hours as needed for  shortness of breath and/or wheezing  atorvastatin 10 mg oral tablet: 1 tab(s) orally once a day (at bedtime)  ciprofloxacin 250 mg oral tablet: 1 tab(s) orally 2 times a day until 12/31  ferrous sulfate 325 mg (65 mg elemental iron) oral tablet: 1 tab(s) orally once a day  fluticasone propionate 55 mcg/inh inhalation powder: 1 puff(s) inhaled every 12 hours  gabapentin 100 mg oral tablet: orally once a day (at bedtime)  Jardiance 10 mg oral tablet: 1 tab(s) orally  melatonin 3 mg oral tablet: 1 tab(s) orally once a day (at bedtime) As needed Insomnia  metoprolol tartrate 25 mg oral tablet: 1 tab(s) orally 2 times a day  MiraLax oral powder for reconstitution: orally once a day (at bedtime) if needed for constipation  Nasal Saline 0.65% nasal spray: 1 spray(s) nasal 2 times a day both nostrils   ramelteon 8 mg oral tablet: 1 tab(s) orally once a day (at bedtime)  Remeron 15 mg oral tablet: 1.5 tab(s) orally once a day (at bedtime)  senna leaf extract oral tablet: 2 tab(s) orally once a day (at bedtime)  sertraline 25 mg oral tablet: 3 tab(s) orally once a day  spironolactone 25 mg oral tablet: 1 tab(s) orally 2 times a day  sulfamethoxazole-trimethoprim 800 mg-160 mg oral tablet: 1 tab(s) orally 2 times a day until 12/31  torsemide 60 mg oral tablet: 1 tab(s) orally once a day

## 2023-12-28 NOTE — DISCHARGE NOTE PROVIDER - NSDCFUADDAPPT_GEN_ALL_CORE_FT
Please follow up with the Wound Center at [address] [facility], Phone [phone] Please follow up with the Wound Center at [address] [facility], Phone [phone]

## 2023-12-28 NOTE — DISCHARGE NOTE PROVIDER - NSDCFUSCHEDAPPT_GEN_ALL_CORE_FT
Marjorie Jones  [facility] Physician [facility]  GERIATRICS [address] [place] [address]   Scheduled Appointment: 01/12/2024     Marjorie Jones  [facility] Physician [facility], [facility]  GERIATRICS [address] [place] [address]   Scheduled Appointment: 01/12/2024

## 2023-12-28 NOTE — DISCHARGE NOTE PROVIDER - NSDCCPCAREPLAN_GEN_ALL_CORE_FT
PRINCIPAL DISCHARGE DIAGNOSIS  Diagnosis: Cellulitis  Assessment and Plan of Treatment: You had recurrent left leg cellulitis from around your ulcer - you were started on IV antibiotics with improvement in your symptoms. Your antibitoics were adjusted to oral medications based on wound cultures. Please continue to take Bactrim and cipro twice daily until 12/31. Please follow-up with wound care facility and PMD after discharge.   For worsening LLE swelling/pain, redness or for new fever, please be assess by a medical provider.      SECONDARY DISCHARGE DIAGNOSES  Diagnosis: Chronic systolic heart failure  Assessment and Plan of Treatment: You were continued on your home medications and deemed to have stable volume status. Please continue with home oxygen. Please continue taking your medicine as prior.   For new/increased shortness of breaht, worsening swelling or increasing oxygen requirements, please be evaluated by a medical provider.    Diagnosis: Chronic atrial fibrillation  Assessment and Plan of Treatment: Please continue to take your home medications.

## 2023-12-28 NOTE — DISCHARGE NOTE NURSING/CASE MANAGEMENT/SOCIAL WORK - NSDCPEFALRISK_GEN_ALL_CORE
For information on Fall & Injury Prevention, visit: [email or web address] OR  [email or web address] OR  https://www.cdc.gov/steadi/patient.html For information on Fall & Injury Prevention, visit: [email or web address]/[email or web address]-[email or web address] OR  [email or web address]/[email or web address]-[email or web address]-[email or web address]-injury OR  https://www.cdc.gov/steadi/patient.html

## 2023-12-28 NOTE — OCCUPATIONAL THERAPY INITIAL EVALUATION ADULT - PERTINENT HX OF CURRENT PROBLEM, REHAB EVAL
pt is a 89 y F, hx of afib not on AC, recently hospitalized for LLE cellulitis (medial lower leg wound), completed o/p antibiotics doxycycline and augmentin, now sent in by outpatient wound care doctor for evaluation for possible cellulitis and IV antibiotic treatment. Patient's family members at bedside. Reports that her legs have been more swollen, red than usual.  CXR: Clear lungs.  Xray Tibia + Fibula L 12/22/23 :  No gas or osseous destruction is seen .Moderate to severe atherosclerotic calcifications are present.  CXR 12/22/23 : clear lungs  12/27/23 R LE tibia/fibula (-) for fx or dislocation

## 2023-12-28 NOTE — DISCHARGE NOTE PROVIDER - CARE PROVIDER_API CALL
Marjorie Jones  Internal Medicine  [address] [facility], [facility] [address]  [place] [address]  Phone: [phone]  Fax: [phone]  Follow Up Time:    Marjorie Jones  Internal Medicine  [address] [facility], [facility] [address]  [place] [address]  Phone: [phone]  Fax: [phone]  Follow Up Time:

## 2023-12-28 NOTE — PROGRESS NOTE ADULT - PROBLEM SELECTOR PROBLEM 7
Prophylactic measure
Prophylactic measure
Dr Kendra Vela 
Prophylactic measure

## 2023-12-28 NOTE — PROGRESS NOTE ADULT - PROBLEM SELECTOR PROBLEM 3
Chronic systolic heart failure

## 2023-12-29 ENCOUNTER — APPOINTMENT (OUTPATIENT)
Dept: HEART FAILURE | Facility: CLINIC | Age: 88
End: 2023-12-29

## 2023-12-29 ENCOUNTER — NON-APPOINTMENT (OUTPATIENT)
Age: 88
End: 2023-12-29

## 2024-01-18 ENCOUNTER — APPOINTMENT (OUTPATIENT)
Dept: GERIATRICS | Facility: CLINIC | Age: 89
End: 2024-01-18

## 2024-01-25 NOTE — PHYSICAL EXAM
Short Stay Endoscopy History and Physical    PCP - Chi Jefferson III, MD    Procedure - Colonoscopy  ASA - per anesthesia  Mallampati - per anesthesia  History of Anesthesia problems - no  Family history Anesthesia problems - no   Plan of anesthesia - General    HPI:  This is a 68 y.o. female here for evaluation of : asymptomatic screening exam      ROS:  Constitutional: No fevers, chills, No weight loss  CV: No chest pain  Pulm: No cough, No shortness of breath  GI: see HPI  Derm: No rash    Medical History:  has a past medical history of Hyperthyroidism.    Surgical History:  has a past surgical history that includes  section.    Family History: family history includes Diabetes in her mother; Hypertension in her father.. Otherwise no colon cancer, inflammatory bowel disease, or GI malignancies.    Social History:  reports that she has never smoked. She does not have any smokeless tobacco history on file. She reports that she does not drink alcohol and does not use drugs.    Review of patient's allergies indicates:  No Known Allergies    Medications:   Medications Prior to Admission   Medication Sig Dispense Refill Last Dose    FARXIGA 5 mg Tab tablet TAKE ONE TABLET BY MOUTH EVERY DAY 30 tablet 0 Past Week    sodium,potassium,mag sulfates (SUPREP BOWEL PREP KIT) 17.5-3.13-1.6 gram SolR Take 177 mLs by mouth once daily. for 2 days 1 kit 0 2024    lisinopril (PRINIVIL,ZESTRIL) 5 MG tablet Take 1 tablet (5 mg total) by mouth once daily. 90 tablet 3     metformin (GLUCOPHAGE) 1000 MG tablet Take 1 tablet (1,000 mg total) by mouth 2 (two) times daily with meals. 180 tablet 3     pravastatin (PRAVACHOL) 20 MG tablet Take 1 tablet (20 mg total) by mouth once daily. 90 tablet 3     triamcinolone acetonide 0.1% (KENALOG) 0.1 % cream Apply topically 2 (two) times daily. 80 g 1          Physical Exam:    Vital Signs: There were no vitals filed for this visit.    General Appearance: Well appearing in no  acute distress  Eyes:    No scleral icterus  ENT: Neck supple, Lips, mucosa, and tongue normal; teeth and gums normal  Abdomen: Soft, non tender, non distended with positive bowel sounds. No hepatosplenomegaly, ascites, or mass.  Extremities: 2+ pulses, no clubbing, cyanosis or edema  Skin: No rash      Labs:  Lab Results   Component Value Date    WBC 5.67 04/18/2016    HGB 14.3 04/18/2016    HCT 42.1 04/18/2016     04/18/2016    CHOL 295 (H) 04/18/2016    TRIG 170 (H) 04/18/2016    HDL 60 04/18/2016    ALT 20 04/18/2016    AST 15 04/18/2016     04/18/2016    K 4.7 04/18/2016     04/18/2016    CREATININE 0.8 04/18/2016    BUN 14 04/18/2016    CO2 29 04/18/2016    HGBA1C 12.4 (H) 04/18/2016       I have explained the risks and benefits of endoscopy procedures to the patient including but not limited to bleeding, perforation, infection, and death.  The patient was asked if they understand and allowed to ask any further questions to their satisfaction.    Sukhjinder Salcido MD     [Normal] : no masses and lesions seen, face is symmetric [Hearing Loss Right Only] : normal [Hearing Loss Left Only] : normal [FreeTextEntry6] : cerumen removed via curettage  [FreeTextEntry7] : dried blood and cerumen removed via curettage and suction [FreeTextEntry9] : mass removed [FreeTextEntry1] : no teeth. cyst on right cheek.  [FreeTextEntry2] : sinuses nontender to percussion.

## 2024-02-05 ENCOUNTER — APPOINTMENT (OUTPATIENT)
Dept: GERIATRICS | Facility: CLINIC | Age: 89
End: 2024-02-05
Payer: MEDICARE

## 2024-02-05 DIAGNOSIS — F41.9 ANXIETY DISORDER, UNSPECIFIED: ICD-10-CM

## 2024-02-05 DIAGNOSIS — R52 PAIN, UNSPECIFIED: ICD-10-CM

## 2024-02-05 DIAGNOSIS — G47.00 INSOMNIA, UNSPECIFIED: ICD-10-CM

## 2024-02-05 DIAGNOSIS — Z87.2 PERSONAL HISTORY OF DISEASES OF THE SKIN AND SUBCUTANEOUS TISSUE: ICD-10-CM
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
## 2024-02-05 DIAGNOSIS — F32.A ANXIETY DISORDER, UNSPECIFIED: ICD-10-CM

## 2024-02-05 DIAGNOSIS — R29.6 REPEATED FALLS: ICD-10-CM
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
## 2024-02-05 DIAGNOSIS — R26.89 OTHER ABNORMALITIES OF GAIT AND MOBILITY: ICD-10-CM

## 2024-02-05 PROCEDURE — 99214 OFFICE O/P EST MOD 30 MIN: CPT

## 2024-02-06 PROBLEM — R52 PAIN, ACUTE: Status: RESOLVED | Noted: 2023-04-04 | Resolved: 2024-02-06

## 2024-02-06 PROBLEM — Z87.2 HISTORY OF CELLULITIS: Status: RESOLVED | Noted: 2023-12-22 | Resolved: 2024-02-06

## 2024-02-06 PROBLEM — R29.6 RECURRENT FALLS: Status: RESOLVED | Noted: 2022-09-16 | Resolved: 2022-10-11

## 2024-02-06 PROBLEM — F41.9 ANXIETY AND DEPRESSION: Status: ACTIVE | Noted: 2019-03-05
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
## 2024-02-06 PROBLEM — G47.00 INSOMNIA: Status: ACTIVE | Noted: 2022-03-15

## 2024-02-06 NOTE — PHYSICAL EXAM
[Normal] : the sclera and conjunctiva were normal, extraocular movements were intact, pupils were equal in size, round, and reactive to light [Normal Outer Ear/Nose] : the ears and nose were normal in appearance [Normal Appearance] : the appearance of the neck was normal [Supple] : the neck was supple [No Respiratory Distress] : no respiratory distress [Respiration, Rhythm And Depth] : normal respiratory rhythm and effort [Bowel Sounds] : normal bowel sounds [Abdomen Soft] : soft [Normal Color / Pigmentation] : normal skin color and pigmentation [Normal Hearing] : hearing was not normal [Normal Gait] : abnormal gait [Normal Insight/Judgment] : insight and judgment were not intact [de-identified] : b/l LE edema RLE>LLE [de-identified] : gait insability, impaired mobility [de-identified] : b/l LE erythema up to knees, Rt knee healing laceration, otherwise skin intact, limited exam via video  [de-identified] : irritable

## 2024-02-06 NOTE — HISTORY OF PRESENT ILLNESS
[Home] : at home, [unfilled] , at the time of the visit. [Medical Office: ([facility])___] : at the medical office located in  [Family Member] : family member [FreeTextEntry3] : tyree Garber  [FreeTextEntry1] : Hospitalized recently at [facility] for LE cellulitis. Followed by [facility] @ [facility].  Per d/c summary:  Hospital Course: Discharge Date	28-Dec-2023 Admission Date	22-Dec-2023 17:31 Reason for Admission	cellulitis Hospital Course	 HPI: 89F pmh Afib (was on eliquis but no longer), CHF on home O2 (3L), depression, HTN, HLD, recently hospitalized for LLE cellulitis treated w/Vanco Zosyn while admitted and  s/p O/p abx course of doxycycline and augmentin x10 D, now sent in by outpatient wound care doctor for evaluation of possible cellulitis and IV antibiotic treatment. Patient endorsing that her legs are more swollen and  red than usual.    Hospital Course: Pt admitted to medicine for LLE cellulitis refractory to oral antibiotics. Pt started on Ceftrixone 2g daily; had wound cultures obtained, which eventually showed MRSA and pseudomonas, and pt switched to vancomycin and cefepime. Pt had improved LLE swelling, pain and would care evaluated pts ulcer. Pt also had RLE pain on the plantar region - XRs were negative and there was no evidence of gout on exam. Pt was eventually able to work with PT, who recommended CARIN. Oral antibiotics were recommended by ID - pt to take bactrim twice daily and cipro 250mg BID until 12/31.   Important Medication Changes and Reason: START Bactrim 1DS tablet BID to 12/31 START Cipro 250mg BID to 12/31 Active or Pending Issues Requiring Follow-up: f/u wound care f/u PMD f/u ID  Discharge Diagnoses: LLE cellulitis Chronic Afib Chronic systolic HF HTN anxiety, depression   Discharge Medications	 acetaminophen 325 mg oral tablet: 2 tab(s) orally every 6hours, As Needed -Temp greater or equal to 38C (100.4F),  Pain mod albuterol 2.5 mg/3 mL (0.083%) inhalation solution: 3 milliliter(s) by nebulizer every 4 hours as needed for  shortness of breath and/or wheezing atorvastatin 10 mg oral tablet: 1 tab(s) orally once a day (at bedtime) ciprofloxacin 250 mg oral tablet: 1 tab(s) orally 2 times a day until 12/31 ferrous sulfate 325 mg (65 mg elemental iron) oral tablet: 1 tab(s) orally once a day fluticasone propionate 55 mcg/inh inhalation powder: 1 puff(s) inhaled every 12 hours gabapentin 100 mg oral tablet: orally once a day (at bedtime) Jardiance 10 mg oral tablet: 1 tab(s) orally melatonin 3 mg oral tablet: 1 tab(s) orally once a day (at bedtime) As needed Insomnia metoprolol tartrate 25 mg oral tablet: 1 tab(s) orally 2 times a day MiraLax oral powder for reconstitution: orally once a day (at bedtime) if needed for constipation Nasal Saline 0.65% nasal spray: 1 spray(s) nasal 2 times a day both nostrils ramelteon 8 mg oral tablet: 1 tab(s) orally once a day (at bedtime) Remeron 15 mg oral tablet: 1.5 tab(s) orally once a day (at bedtime) senna leaf extract oral tablet: 2 tab(s) orally once a day (at bedtime) sertraline 25 mg oral tablet: 3 tab(s) orally once a day spironolactone 25 mg oral tablet: 1 tab(s) orally 2 times a day sulfamethoxazole-trimethoprim 800 mg-160 mg oral tablet: 1 tab(s) orally 2 times a day until 12/31 torsemide 60 mg oral tablet: 1 tab(s) orally once a day  TODAY pt denies complaints.   States returned home from [facility] last Wednesday.  Completed course of Abx for b/l LE and reports resolution of b/l LE cellulitis.   Sleeping well. Dtr states possibly better than before hospitalization.   Mood has been good.  Appetite: good  Request referral for home PT.

## 2024-02-24 ENCOUNTER — NON-APPOINTMENT (OUTPATIENT)
Age: 89
End: 2024-02-24

## 2024-02-27 ENCOUNTER — APPOINTMENT (OUTPATIENT)
Dept: PHARMACY | Facility: CLINIC | Age: 89
End: 2024-02-27
Payer: SELF-PAY

## 2024-02-27 ENCOUNTER — LABORATORY RESULT (OUTPATIENT)
Age: 89
End: 2024-02-27

## 2024-02-27 ENCOUNTER — APPOINTMENT (OUTPATIENT)
Dept: GERIATRICS | Facility: CLINIC | Age: 89
End: 2024-02-27
Payer: MEDICARE

## 2024-02-27 VITALS
WEIGHT: 154 LBS | BODY MASS INDEX: 27.29 KG/M2 | HEART RATE: 80 BPM | OXYGEN SATURATION: 97 % | RESPIRATION RATE: 17 BRPM | DIASTOLIC BLOOD PRESSURE: 46 MMHG | SYSTOLIC BLOOD PRESSURE: 133 MMHG | HEIGHT: 63 IN | TEMPERATURE: 97.7 F

## 2024-02-27 DIAGNOSIS — J30.9 ALLERGIC RHINITIS, UNSPECIFIED: ICD-10-CM

## 2024-02-27 DIAGNOSIS — E55.9 VITAMIN D DEFICIENCY, UNSPECIFIED: ICD-10-CM

## 2024-02-27 PROCEDURE — 99214 OFFICE O/P EST MOD 30 MIN: CPT

## 2024-02-27 PROCEDURE — V5256H: CUSTOM | Mod: RT

## 2024-02-27 RX ORDER — ACETAMINOPHEN 500 MG/1
500 TABLET ORAL EVERY 8 HOURS
Qty: 540 | Refills: 1 | Status: ACTIVE | COMMUNITY
Start: 2023-04-04 | End: 1900-01-01

## 2024-02-27 NOTE — REASON FOR VISIT
[Acute] : an acute visit [Family Member] : family member [FreeTextEntry1] : s/p fall w/ knee pain  [FreeTextEntry3] : tyree Garber and alisha Cardenas [FreeTextEntry2] : who assist with history due to patient with baseline cognitive impairment

## 2024-02-27 NOTE — REVIEW OF SYSTEMS
[As Noted in HPI] : as noted in HPI [Easy Bleeding] : a tendency for easy bleeding [Easy Bruising] : a tendency for easy bruising [Negative] : Endocrine [de-identified] : cognitive decline per dtr

## 2024-02-27 NOTE — ASSESSMENT
[FreeTextEntry1] : Mechanical fall w/ knee injury  Pain control w/ tylenol  Wound care instructions given - family to provide wound care daily Monitor wound for improvement/worsening Will start Keflex course for cellulitis Wishes to avoid ED visit/hospitalization Knee Xray ordered to be done at [facility] portable  Lab work ordered - f/u results  Vit d refilled per request c/w Nasal saline spray - refilled  Leg elevation advised  f/u in 1 wk, unless earlier PRN  If any new/worsening symptoms advised to call us asap or go to emergency room.

## 2024-02-27 NOTE — PHYSICAL EXAM
[Respiration, Rhythm And Depth] : normal respiratory rhythm and effort [No Respiratory Distress] : no respiratory distress [Normal] : the sclera and conjunctiva were normal, extraocular movements were intact, pupils were equal in size, round, and reactive to light [Normal Outer Ear/Nose] : the ears and nose were normal in appearance [Normal Appearance] : the appearance of the neck was normal [Supple] : the neck was supple [Normal Hearing] : hearing was not normal [Normal Gait] : abnormal gait [de-identified] : b/l LE edema  [de-identified] : b/l knee swelling R>L  [de-identified] : Rt anterior knee 1cm diameter skin tear with clean base. b/l chronic LE skin changes, Rt knee erythema and ecchymosis, no discharge, Rt lateral knee warm to touch  [de-identified] : gait instability [de-identified] : calm, cooperative

## 2024-02-27 NOTE — HISTORY OF PRESENT ILLNESS
[FreeTextEntry1] : Arrives late to visit today.   Report fall last saturday - tripped over shirt she was carrying.  Braced fall with her hands and fell onto b/l knees. Reports small Rt knee pain since falls. Able to bare weight on leg when walking. Ambulates with walker at baseline.   Family also requests refill for nasal spray, vit d, and tylenol.

## 2024-02-28 LAB
ANION GAP SERPL CALC-SCNC: 13 MMOL/L
BASOPHILS # BLD AUTO: 0.03 K/UL
BASOPHILS NFR BLD AUTO: 0.5 %
BUN SERPL-MCNC: 36 MG/DL
CALCIUM SERPL-MCNC: 9.1 MG/DL
CHLORIDE SERPL-SCNC: 100 MMOL/L
CO2 SERPL-SCNC: 26 MMOL/L
CREAT SERPL-MCNC: 1.69 MG/DL
EGFR: 28 ML/MIN/1.73M2
EOSINOPHIL # BLD AUTO: 0.29 K/UL
EOSINOPHIL NFR BLD AUTO: 5.1 %
ESTIMATED AVERAGE GLUCOSE: 151 MG/DL
GLUCOSE SERPL-MCNC: 153 MG/DL
HBA1C MFR BLD HPLC: 6.9 %
HCT VFR BLD CALC: 35.5 %
HGB BLD-MCNC: 11.2 G/DL
IMM GRANULOCYTES NFR BLD AUTO: 1 %
LYMPHOCYTES # BLD AUTO: 1.21 K/UL
LYMPHOCYTES NFR BLD AUTO: 21.1 %
MAN DIFF?: NORMAL
MCHC RBC-ENTMCNC: 29.1 PG
MCHC RBC-ENTMCNC: 31.5 GM/DL
MCV RBC AUTO: 92.2 FL
MONOCYTES # BLD AUTO: 0.99 K/UL
MONOCYTES NFR BLD AUTO: 17.2 %
NEUTROPHILS # BLD AUTO: 3.16 K/UL
NEUTROPHILS NFR BLD AUTO: 55.1 %
NT-PROBNP SERPL-MCNC: 7837 PG/ML
PLATELET # BLD AUTO: 166 K/UL
POTASSIUM SERPL-SCNC: 4.1 MMOL/L
RBC # BLD: 3.85 M/UL
RBC # FLD: 18.4 %
SODIUM SERPL-SCNC: 138 MMOL/L
WBC # FLD AUTO: 5.74 K/UL

## 2024-02-29 NOTE — CONSULT NOTE ADULT - SUBJECTIVE AND OBJECTIVE BOX
[facility], Division of Infectious Diseases  DAVIS White A. Lee  [phone]  ARNULFO ALEX  85y, Female  [id number]      HPI:85F with Afib, HTN MV stenosis with HFpEF, brought in by her family for encephalopathy.  She was in normal state of health 3 days ago.  She was cognitively intact.  Alert and oriented.  A few weeks ago, pt fell and hurt her left shoulder was given pain meds but not admitted.  She has been falling frequently.  2 days ago, family said pt didnt look well.  no sick contacts.  lives with her .  Didnt notice cough, diarrhea or fevers.  Pt did not complain of anything.  In ED, she is confused, lethargic, oriented times 2.  BUt not able to give a good history.    She was given lasix.  and empiric broad spectrum antibx    PMH/PSH--  Hearing loss of left ear  Mitral valve stenosis, unspecified etiology  Leg edema  Depression  Syncope  Atrial fibrillation  HLD (hyperlipidemia)  HTN (hypertension)  Cataracta  H/O external ear surgery  H/O knee surgery      Allergies-- codeine      Medications--  Antibiotics:   Immunologic:   Other:     Social History--  EtOH: denies ***  Tobacco: denies ***  Drug Use: denies ***    Family/Marital History--      Remainder not relevant to clinical concern.    Travel/Environmental/Occupational History:      Review of Systems:  A >=10-point review of systems was obtained.     unable to obtain pt with encephalopathy  Review of systems otherwise negative except as previously noted.    Physical Exam--  Vital Signs: T(F): 102.7 (02-21-18 @ 14:20), Max: 102.7 (02-21-18 @ 14:20)  HR: 136 (02-21-18 @ 14:20)  BP: 137/90 (02-21-18 @ 14:20)  RR: 20 (02-21-18 @ 14:20)  SpO2: 95% (02-21-18 @ 14:20)  Wt(kg): --  General: Nontoxic-appearing Female in mild resp distress  HEENT: AT/NC. Anicteric. Conjunctiva pink and moist.   Neck: Not rigid. No sense of mass.  Nodes: None palpable.  Lungs: b/l rhonchi  Heart: Regular rate and rhythm. No Murmur. No rub. No gallop. No palpable thrill.  Abdomen: Bowel sounds present and normoactive. Soft. Nondistended. Back: No spinal tenderness. No costovertebral angle tenderness.   Extremities: No cyanosis or clubbing. + edema.   Skin: Warm. Dry. Good turgor. No rash. No vasculitic stigmata.          Laboratory & Imaging Data--  CBC                        12.3   8.9   )-----------( 105      ( 21 Feb 2018 15:42 )             35.8       Chemistries  02-21    136  |  97  |  19  ----------------------------<  158<H>  3.5   |  25  |  0.74    Ca    8.6      21 Feb 2018 15:42    TPro  6.6  /  Alb  3.5  /  TBili  1.4<H>  /  DBili  x   /  AST  29  /  ALT  13  /  AlkPhos  65  02-21      Culture Data      < from: Xray Chest 1 View AP/PA (02.21.18 @ 15:34) >      PROCEDURE DATE:  02/21/2018            INTERPRETATION:  A single chest x-ray was obtained on February 21, 2018.    Indication: Fever.    Impression:    The heart is enlarged. The lungs are clear. Calcified aortic knob.   Degenerative changes of the thoracic spine.      < end of copied text > [facility], Division of Infectious Diseases  DAVIS [facility] A. Lee  [phone]  ARNULFO ALEX  85y, Female  [id number]      HPI:85F with Afib, HTN MR with HFpEF, brought in by her family for encephalopathy.  She was in normal state of health 3 days ago.  She was cognitively intact.  Alert and oriented.  A few weeks ago, pt fell and hurt her left shoulder was given pain meds but not admitted.  She has been falling frequently.  2 days ago, family said pt didnt look well.  no sick contacts.  lives with her .  Didnt notice cough, diarrhea or fevers.  Pt did not complain of anything.  In ED, she is confused, lethargic, oriented times 2.  BUt not able to give a good history.    She was given lasix.  and empiric broad spectrum antibx    PMH/PSH--  Hearing loss of left ear  Mitral valve stenosis, unspecified etiology  Leg edema  Depression  Syncope  Atrial fibrillation  HLD (hyperlipidemia)  HTN (hypertension)  Cataracta  H/O external ear surgery  H/O knee surgery      Allergies-- codeine      Medications--  Antibiotics:   Immunologic:   Other:     Social History--  EtOH: denies ***  Tobacco: denies ***  Drug Use: denies ***    Family/Marital History--      Remainder not relevant to clinical concern.    Travel/Environmental/Occupational History:      Review of Systems:  A >=10-point review of systems was obtained.     unable to obtain pt with encephalopathy  Review of systems otherwise negative except as previously noted.    Physical Exam--  Vital Signs: T(F): 102.7 (02-21-18 @ 14:20), Max: 102.7 (02-21-18 @ 14:20)  HR: 136 (02-21-18 @ 14:20)  BP: 137/90 (02-21-18 @ 14:20)  RR: 20 (02-21-18 @ 14:20)  SpO2: 95% (02-21-18 @ 14:20)  Wt(kg): --  General: Nontoxic-appearing Female in mild resp distress  HEENT: AT/NC. Anicteric. Conjunctiva pink and moist.   Neck: Not rigid. No sense of mass.  Nodes: None palpable.  Lungs: b/l rhonchi  Heart: Regular rate and rhythm. No Murmur. No rub. No gallop. No palpable thrill.  Abdomen: Bowel sounds present and normoactive. Soft. Nondistended. Back: No spinal tenderness. No costovertebral angle tenderness.   Extremities: No cyanosis or clubbing. + edema.   Skin: Warm. Dry. Good turgor. No rash. No vasculitic stigmata.          Laboratory & Imaging Data--  CBC                        12.3   8.9   )-----------( 105      ( 21 Feb 2018 15:42 )             35.8       Chemistries  02-21    136  |  97  |  19  ----------------------------<  158<H>  3.5   |  25  |  0.74    Ca    8.6      21 Feb 2018 15:42    TPro  6.6  /  Alb  3.5  /  TBili  1.4<H>  /  DBili  x   /  AST  29  /  ALT  13  /  AlkPhos  65  02-21      Culture Data      < from: Xray Chest 1 View AP/PA (02.21.18 @ 15:34) >      PROCEDURE DATE:  02/21/2018            INTERPRETATION:  A single chest x-ray was obtained on February 21, 2018.    Indication: Fever.    Impression:    The heart is enlarged. The lungs are clear. Calcified aortic knob.   Degenerative changes of the thoracic spine.      < end of copied text > DISCHARGE

## 2024-03-06 ENCOUNTER — APPOINTMENT (OUTPATIENT)
Dept: GERIATRICS | Facility: CLINIC | Age: 89
End: 2024-03-06
Payer: MEDICARE

## 2024-03-06 VITALS
OXYGEN SATURATION: 95 % | TEMPERATURE: 97.5 F | HEART RATE: 55 BPM | BODY MASS INDEX: 27.63 KG/M2 | RESPIRATION RATE: 16 BRPM | DIASTOLIC BLOOD PRESSURE: 63 MMHG | WEIGHT: 156 LBS | SYSTOLIC BLOOD PRESSURE: 129 MMHG

## 2024-03-06 DIAGNOSIS — L03.211 CELLULITIS OF FACE: ICD-10-CM

## 2024-03-06 DIAGNOSIS — L03.113 CELLULITIS OF RIGHT UPPER LIMB: ICD-10-CM

## 2024-03-06 DIAGNOSIS — D69.2 OTHER NONTHROMBOCYTOPENIC PURPURA: ICD-10-CM

## 2024-03-06 DIAGNOSIS — I10 ESSENTIAL (PRIMARY) HYPERTENSION: ICD-10-CM

## 2024-03-06 DIAGNOSIS — S51.801A UNSPECIFIED OPEN WOUND OF RIGHT FOREARM, INITIAL ENCOUNTER: ICD-10-CM

## 2024-03-06 DIAGNOSIS — L03.115 CELLULITIS OF RIGHT LOWER LIMB: ICD-10-CM

## 2024-03-06 PROCEDURE — 99215 OFFICE O/P EST HI 40 MIN: CPT

## 2024-03-06 RX ORDER — SERTRALINE 25 MG/1
25 TABLET, FILM COATED ORAL
Qty: 3 | Refills: 1 | Status: ACTIVE | COMMUNITY
Start: 2022-08-01 | End: 1900-01-01

## 2024-03-06 RX ORDER — MIRTAZAPINE 15 MG/1
15 TABLET, FILM COATED ORAL
Qty: 135 | Refills: 1 | Status: ACTIVE | COMMUNITY
Start: 2022-08-24 | End: 1900-01-01

## 2024-03-06 RX ORDER — EMPAGLIFLOZIN 10 MG/1
10 TABLET, FILM COATED ORAL
Qty: 90 | Refills: 1 | Status: ACTIVE | COMMUNITY
Start: 2023-06-07 | End: 1900-01-01

## 2024-03-06 RX ORDER — SENNOSIDES 8.6 MG TABLETS 8.6 MG/1
8.6 TABLET ORAL
Qty: 180 | Refills: 1 | Status: ACTIVE | COMMUNITY
Start: 2022-10-21 | End: 1900-01-01

## 2024-03-06 NOTE — HISTORY OF PRESENT ILLNESS
[FreeTextEntry1] : No acute events since last visit including falls, hospitalizations, ED visits, urgent care visits.  Today denies having any complaints.   Son reports 2 episodes of pt reportedly "turning gray" since last visit - and oxygen was low (unclear how low - pt states 93%) and BP was noted on second episode to be 90s/50s.  Episodes lasted approx "15 mins" and improved with inc oxygen via NC and water intake back to baseline. No prior similar episodes per pt/son.   2/27/24:  Report fall last saturday - tripped over shirt she was carrying.  Braced fall with her hands and fell onto b/l knees. Reports small Rt knee pain since falls. Able to bare weight on leg when walking. Ambulates with walker at baseline.   Family also requests refill for nasal spray, vit d, and tylenol.

## 2024-03-06 NOTE — ASSESSMENT
[FreeTextEntry1] : Recent mechanical fall w/ knee injury Knee Xray without acute findings   Cellulitis resolving Discussed to take 3 more days of Keflex to complete 10 days course - script sent to pharmacy   Leg swelling improved c/w prior exam  Leg elevation advised Adv to consider LIGHT ACE wraps to b/l LE during daytime only Will consider vascular eval for custom compression devices for LE edema   BP wnl today - c/w Metoprolol, Spirinolactone, and Torsemide as ordered  Refills sent for meds per pt/family/HEATHER request  Pain control w/ tylenol  - Monitor LE for improvement/worsening  Repeat Lab work ordered - f/u results  Script for HEATHER and written instructions provided  f/u in 1 wk, unless earlier PRN  If any new/worsening symptoms advised to call us asap or go to emergency room.

## 2024-03-06 NOTE — PHYSICAL EXAM
[Normal Outer Ear/Nose] : the ears and nose were normal in appearance [Supple] : the neck was supple [Normal Appearance] : the appearance of the neck was normal [Respiration, Rhythm And Depth] : normal respiratory rhythm and effort [No Respiratory Distress] : no respiratory distress [No Acc Muscle Use] : no accessory muscle use [Auscultation Breath Sounds / Voice Sounds] : lungs were clear to auscultation bilaterally [Normal S1, S2] : normal S1 and S2 [Heart Rate And Rhythm] : heart rate was normal and rhythm regular [Normal] : no spinal tenderness [Normal Hearing] : hearing was not normal [Normal Gait] : abnormal gait [de-identified] : b/l LE edema R>L [de-identified] : b/l knee swelling R>L , b/l chronic skin changes LE, Rt knee skin tear healed - skin intact, Rt knee erythema improved  c/w prior exam, b/l LE warmth R>L [de-identified] : scattered senile purpura, ecchymosis Lt lateral eyebrow, b/l chronic LE skin changes [de-identified] : gait instability [de-identified] : calm, cooperative

## 2024-03-06 NOTE — REASON FOR VISIT
[Acute] : an acute visit [Family Member] : family member [FreeTextEntry3] :  alisha Cardenas [FreeTextEntry1] : Cellulitis, recent fall, Acute on Chronic HF [FreeTextEntry2] : who assist with history due to patient with baseline cognitive impairment

## 2024-03-06 NOTE — REVIEW OF SYSTEMS
[As Noted in HPI] : as noted in HPI [Easy Bleeding] : a tendency for easy bleeding [Easy Bruising] : a tendency for easy bruising [Negative] : Endocrine [de-identified] : cognitive decline per dtr

## 2024-03-07 LAB
ANION GAP SERPL CALC-SCNC: 17 MMOL/L
BASOPHILS # BLD AUTO: 0.04 K/UL
BASOPHILS NFR BLD AUTO: 0.6 %
BUN SERPL-MCNC: 45 MG/DL
CALCIUM SERPL-MCNC: 8.8 MG/DL
CHLORIDE SERPL-SCNC: 102 MMOL/L
CO2 SERPL-SCNC: 21 MMOL/L
CREAT SERPL-MCNC: 1.95 MG/DL
EGFR: 24 ML/MIN/1.73M2
EOSINOPHIL # BLD AUTO: 0.25 K/UL
EOSINOPHIL NFR BLD AUTO: 3.7 %
GLUCOSE SERPL-MCNC: 114 MG/DL
HCT VFR BLD CALC: 37.2 %
HGB BLD-MCNC: 11.1 G/DL
IMM GRANULOCYTES NFR BLD AUTO: 0.7 %
LYMPHOCYTES # BLD AUTO: 1.28 K/UL
LYMPHOCYTES NFR BLD AUTO: 18.9 %
MAN DIFF?: NORMAL
MCHC RBC-ENTMCNC: 27.8 PG
MCHC RBC-ENTMCNC: 29.8 GM/DL
MCV RBC AUTO: 93.2 FL
MONOCYTES # BLD AUTO: 0.99 K/UL
MONOCYTES NFR BLD AUTO: 14.6 %
NEUTROPHILS # BLD AUTO: 4.15 K/UL
NEUTROPHILS NFR BLD AUTO: 61.5 %
NT-PROBNP SERPL-MCNC: 5819 PG/ML
PLATELET # BLD AUTO: 175 K/UL
POTASSIUM SERPL-SCNC: 4.3 MMOL/L
RBC # BLD: 3.99 M/UL
RBC # FLD: 18.9 %
SODIUM SERPL-SCNC: 140 MMOL/L
WBC # FLD AUTO: 6.76 K/UL

## 2024-03-12 ENCOUNTER — APPOINTMENT (OUTPATIENT)
Dept: PHARMACY | Facility: CLINIC | Age: 89
End: 2024-03-12

## 2024-03-13 ENCOUNTER — INPATIENT (INPATIENT)
Facility: HOSPITAL | Age: 89
LOS: 4 days | Discharge: NURSING, REHAB OR LONG-TERM CARE | DRG: 315 | End: 2024-03-18
Attending: STUDENT IN AN ORGANIZED HEALTH CARE EDUCATION/TRAINING PROGRAM | Admitting: STUDENT IN AN ORGANIZED HEALTH CARE EDUCATION/TRAINING PROGRAM
Payer: MEDICARE

## 2024-03-13 VITALS
RESPIRATION RATE: 14 BRPM | HEART RATE: 75 BPM | TEMPERATURE: 98 F | OXYGEN SATURATION: 99 % | DIASTOLIC BLOOD PRESSURE: 70 MMHG | WEIGHT: 145.06 LBS | SYSTOLIC BLOOD PRESSURE: 125 MMHG | HEIGHT: 62 IN

## 2024-03-13 DIAGNOSIS — Z98.89 OTHER SPECIFIED POSTPROCEDURAL STATES: Chronic | ICD-10-CM

## 2024-03-13 DIAGNOSIS — H26.9 UNSPECIFIED CATARACT: Chronic | ICD-10-CM

## 2024-03-13 DIAGNOSIS — I50.9 HEART FAILURE, UNSPECIFIED: ICD-10-CM

## 2024-03-13 LAB
ALBUMIN SERPL ELPH-MCNC: 3.2 G/DL — LOW (ref 3.3–5)
ALP SERPL-CCNC: 62 U/L — SIGNIFICANT CHANGE UP (ref 30–120)
ALT FLD-CCNC: 12 U/L — SIGNIFICANT CHANGE UP (ref 10–60)
ANION GAP SERPL CALC-SCNC: 6 MMOL/L — SIGNIFICANT CHANGE UP (ref 5–17)
APTT BLD: 31.6 SEC — SIGNIFICANT CHANGE UP (ref 24.5–35.6)
AST SERPL-CCNC: 20 U/L — SIGNIFICANT CHANGE UP (ref 10–40)
BASE EXCESS BLDV CALC-SCNC: 5.2 MMOL/L — HIGH (ref -2–3)
BASOPHILS # BLD AUTO: 0.04 K/UL — SIGNIFICANT CHANGE UP (ref 0–0.2)
BASOPHILS NFR BLD AUTO: 0.5 % — SIGNIFICANT CHANGE UP (ref 0–2)
BILIRUB SERPL-MCNC: 0.8 MG/DL — SIGNIFICANT CHANGE UP (ref 0.2–1.2)
BUN SERPL-MCNC: 51 MG/DL — HIGH (ref 7–23)
CALCIUM SERPL-MCNC: 9.4 MG/DL — SIGNIFICANT CHANGE UP (ref 8.4–10.5)
CHLORIDE SERPL-SCNC: 99 MMOL/L — SIGNIFICANT CHANGE UP (ref 96–108)
CO2 SERPL-SCNC: 24 MMOL/L — SIGNIFICANT CHANGE UP (ref 22–31)
CREAT SERPL-MCNC: 1.96 MG/DL — HIGH (ref 0.5–1.3)
EGFR: 24 ML/MIN/1.73M2 — LOW
EOSINOPHIL # BLD AUTO: 0.19 K/UL — SIGNIFICANT CHANGE UP (ref 0–0.5)
EOSINOPHIL NFR BLD AUTO: 2.6 % — SIGNIFICANT CHANGE UP (ref 0–6)
GLUCOSE SERPL-MCNC: 114 MG/DL — HIGH (ref 70–99)
HCO3 BLDV-SCNC: 29 MMOL/L — SIGNIFICANT CHANGE UP (ref 22–29)
HCT VFR BLD CALC: 38.6 % — SIGNIFICANT CHANGE UP (ref 34.5–45)
HGB BLD-MCNC: 12.1 G/DL — SIGNIFICANT CHANGE UP (ref 11.5–15.5)
IMM GRANULOCYTES NFR BLD AUTO: 0.8 % — SIGNIFICANT CHANGE UP (ref 0–0.9)
INR BLD: 1.32 RATIO — HIGH (ref 0.85–1.18)
LACTATE SERPL-SCNC: 1.2 MMOL/L — SIGNIFICANT CHANGE UP (ref 0.7–2)
LYMPHOCYTES # BLD AUTO: 1.17 K/UL — SIGNIFICANT CHANGE UP (ref 1–3.3)
LYMPHOCYTES # BLD AUTO: 15.9 % — SIGNIFICANT CHANGE UP (ref 13–44)
MCHC RBC-ENTMCNC: 28.9 PG — SIGNIFICANT CHANGE UP (ref 27–34)
MCHC RBC-ENTMCNC: 31.3 GM/DL — LOW (ref 32–36)
MCV RBC AUTO: 92.3 FL — SIGNIFICANT CHANGE UP (ref 80–100)
MONOCYTES # BLD AUTO: 1.11 K/UL — HIGH (ref 0–0.9)
MONOCYTES NFR BLD AUTO: 15.1 % — HIGH (ref 2–14)
NEUTROPHILS # BLD AUTO: 4.77 K/UL — SIGNIFICANT CHANGE UP (ref 1.8–7.4)
NEUTROPHILS NFR BLD AUTO: 65.1 % — SIGNIFICANT CHANGE UP (ref 43–77)
NRBC # BLD: 0 /100 WBCS — SIGNIFICANT CHANGE UP (ref 0–0)
NT-PROBNP SERPL-SCNC: 5414 PG/ML — HIGH (ref 0–450)
PCO2 BLDV: 42 MMHG — SIGNIFICANT CHANGE UP (ref 39–42)
PH BLDV: 7.45 — HIGH (ref 7.32–7.43)
PLATELET # BLD AUTO: 191 K/UL — SIGNIFICANT CHANGE UP (ref 150–400)
PO2 BLDV: <35 MMHG — SIGNIFICANT CHANGE UP (ref 25–45)
POTASSIUM SERPL-MCNC: 4.4 MMOL/L — SIGNIFICANT CHANGE UP (ref 3.5–5.3)
POTASSIUM SERPL-SCNC: 4.4 MMOL/L — SIGNIFICANT CHANGE UP (ref 3.5–5.3)
PROT SERPL-MCNC: 7.8 G/DL — SIGNIFICANT CHANGE UP (ref 6–8.3)
PROTHROM AB SERPL-ACNC: 14.3 SEC — HIGH (ref 9.5–13)
RAPID RVP RESULT: SIGNIFICANT CHANGE UP
RBC # BLD: 4.18 M/UL — SIGNIFICANT CHANGE UP (ref 3.8–5.2)
RBC # FLD: 19.3 % — HIGH (ref 10.3–14.5)
SAO2 % BLDV: 50.1 % — LOW (ref 67–88)
SARS-COV-2 RNA SPEC QL NAA+PROBE: SIGNIFICANT CHANGE UP
SODIUM SERPL-SCNC: 129 MMOL/L — LOW (ref 135–145)
TROPONIN I, HIGH SENSITIVITY RESULT: 31 NG/L — SIGNIFICANT CHANGE UP
WBC # BLD: 7.34 K/UL — SIGNIFICANT CHANGE UP (ref 3.8–10.5)
WBC # FLD AUTO: 7.34 K/UL — SIGNIFICANT CHANGE UP (ref 3.8–10.5)

## 2024-03-13 PROCEDURE — 99223 1ST HOSP IP/OBS HIGH 75: CPT

## 2024-03-13 PROCEDURE — 71045 X-RAY EXAM CHEST 1 VIEW: CPT | Mod: 26

## 2024-03-13 PROCEDURE — 99285 EMERGENCY DEPT VISIT HI MDM: CPT | Mod: FS

## 2024-03-13 PROCEDURE — 71250 CT THORAX DX C-: CPT | Mod: 26,MC

## 2024-03-13 RX ORDER — CIPROFLOXACIN HCL 0.3 %
1 DROPS OPHTHALMIC (EYE) EVERY 6 HOURS
Refills: 0 | Status: COMPLETED | OUTPATIENT
Start: 2024-03-13 | End: 2024-03-16

## 2024-03-13 RX ORDER — FUROSEMIDE 40 MG
40 TABLET ORAL DAILY
Refills: 0 | Status: DISCONTINUED | OUTPATIENT
Start: 2024-03-13 | End: 2024-03-15

## 2024-03-13 RX ORDER — CIPROFLOXACIN LACTATE 400MG/40ML
1 VIAL (ML) INTRAVENOUS
Qty: 0 | Refills: 0 | DISCHARGE

## 2024-03-13 RX ORDER — MIRTAZAPINE 45 MG/1
22.5 TABLET, ORALLY DISINTEGRATING ORAL AT BEDTIME
Refills: 0 | Status: DISCONTINUED | OUTPATIENT
Start: 2024-03-13 | End: 2024-03-18

## 2024-03-13 RX ORDER — LANOLIN ALCOHOL/MO/W.PET/CERES
3 CREAM (GRAM) TOPICAL AT BEDTIME
Refills: 0 | Status: DISCONTINUED | OUTPATIENT
Start: 2024-03-13 | End: 2024-03-18

## 2024-03-13 RX ORDER — FERROUS SULFATE 325(65) MG
325 TABLET ORAL DAILY
Refills: 0 | Status: DISCONTINUED | OUTPATIENT
Start: 2024-03-13 | End: 2024-03-18

## 2024-03-13 RX ORDER — SPIRONOLACTONE 25 MG/1
1 TABLET, FILM COATED ORAL
Refills: 0 | DISCHARGE

## 2024-03-13 RX ORDER — ALBUTEROL 90 UG/1
2.5 AEROSOL, METERED ORAL
Refills: 0 | Status: DISCONTINUED | OUTPATIENT
Start: 2024-03-13 | End: 2024-03-18

## 2024-03-13 RX ORDER — POLYETHYLENE GLYCOL 3350 17 G/17G
17 POWDER, FOR SOLUTION ORAL AT BEDTIME
Refills: 0 | Status: DISCONTINUED | OUTPATIENT
Start: 2024-03-13 | End: 2024-03-18

## 2024-03-13 RX ORDER — GABAPENTIN 400 MG/1
100 CAPSULE ORAL AT BEDTIME
Refills: 0 | Status: DISCONTINUED | OUTPATIENT
Start: 2024-03-13 | End: 2024-03-18

## 2024-03-13 RX ORDER — BUDESONIDE AND FORMOTEROL FUMARATE DIHYDRATE 160; 4.5 UG/1; UG/1
2 AEROSOL RESPIRATORY (INHALATION)
Refills: 0 | Status: DISCONTINUED | OUTPATIENT
Start: 2024-03-13 | End: 2024-03-18

## 2024-03-13 RX ORDER — RAMELTEON 8 MG
1 TABLET ORAL
Qty: 0 | Refills: 0 | DISCHARGE

## 2024-03-13 RX ORDER — SPIRONOLACTONE 25 MG/1
25 TABLET, FILM COATED ORAL
Refills: 0 | Status: DISCONTINUED | OUTPATIENT
Start: 2024-03-13 | End: 2024-03-18

## 2024-03-13 RX ORDER — SERTRALINE 25 MG/1
75 TABLET, FILM COATED ORAL DAILY
Refills: 0 | Status: DISCONTINUED | OUTPATIENT
Start: 2024-03-13 | End: 2024-03-18

## 2024-03-13 RX ORDER — IPRATROPIUM/ALBUTEROL SULFATE 18-103MCG
3 AEROSOL WITH ADAPTER (GRAM) INHALATION ONCE
Refills: 0 | Status: COMPLETED | OUTPATIENT
Start: 2024-03-13 | End: 2024-03-13

## 2024-03-13 RX ORDER — TIOTROPIUM BROMIDE 18 UG/1
2 CAPSULE ORAL; RESPIRATORY (INHALATION) DAILY
Refills: 0 | Status: DISCONTINUED | OUTPATIENT
Start: 2024-03-13 | End: 2024-03-18

## 2024-03-13 RX ORDER — POLYETHYLENE GLYCOL 3350 17 G/17G
0 POWDER, FOR SOLUTION ORAL
Qty: 0 | Refills: 0 | DISCHARGE

## 2024-03-13 RX ORDER — ALBUTEROL 90 UG/1
3 AEROSOL, METERED ORAL
Refills: 0 | DISCHARGE

## 2024-03-13 RX ORDER — ACETAMINOPHEN 500 MG
650 TABLET ORAL EVERY 6 HOURS
Refills: 0 | Status: DISCONTINUED | OUTPATIENT
Start: 2024-03-13 | End: 2024-03-18

## 2024-03-13 RX ORDER — METOPROLOL TARTRATE 50 MG
25 TABLET ORAL
Refills: 0 | Status: DISCONTINUED | OUTPATIENT
Start: 2024-03-13 | End: 2024-03-18

## 2024-03-13 RX ORDER — ATORVASTATIN CALCIUM 80 MG/1
10 TABLET, FILM COATED ORAL AT BEDTIME
Refills: 0 | Status: DISCONTINUED | OUTPATIENT
Start: 2024-03-13 | End: 2024-03-18

## 2024-03-13 RX ORDER — SERTRALINE 25 MG/1
3 TABLET, FILM COATED ORAL
Qty: 0 | Refills: 0 | DISCHARGE

## 2024-03-13 RX ORDER — SENNA PLUS 8.6 MG/1
2 TABLET ORAL AT BEDTIME
Refills: 0 | Status: DISCONTINUED | OUTPATIENT
Start: 2024-03-13 | End: 2024-03-18

## 2024-03-13 RX ORDER — GABAPENTIN 400 MG/1
0 CAPSULE ORAL
Refills: 0 | DISCHARGE

## 2024-03-13 RX ORDER — MIRTAZAPINE 45 MG/1
1.5 TABLET, ORALLY DISINTEGRATING ORAL
Refills: 0 | DISCHARGE

## 2024-03-13 RX ORDER — SODIUM CHLORIDE 0.65 %
1 AEROSOL, SPRAY (ML) NASAL THREE TIMES A DAY
Refills: 0 | Status: DISCONTINUED | OUTPATIENT
Start: 2024-03-13 | End: 2024-03-18

## 2024-03-13 RX ORDER — FUROSEMIDE 40 MG
20 TABLET ORAL ONCE
Refills: 0 | Status: COMPLETED | OUTPATIENT
Start: 2024-03-13 | End: 2024-03-13

## 2024-03-13 RX ORDER — EMPAGLIFLOZIN 10 MG/1
1 TABLET, FILM COATED ORAL
Refills: 0 | DISCHARGE

## 2024-03-13 RX ORDER — SODIUM CHLORIDE 0.65 %
1 AEROSOL, SPRAY (ML) NASAL
Qty: 0 | Refills: 0 | DISCHARGE

## 2024-03-13 RX ORDER — HEPARIN SODIUM 5000 [USP'U]/ML
5000 INJECTION INTRAVENOUS; SUBCUTANEOUS EVERY 8 HOURS
Refills: 0 | Status: DISCONTINUED | OUTPATIENT
Start: 2024-03-13 | End: 2024-03-18

## 2024-03-13 RX ADMIN — Medication 3 MILLIGRAM(S): at 21:43

## 2024-03-13 RX ADMIN — Medication 1 DROP(S): at 21:44

## 2024-03-13 RX ADMIN — MIRTAZAPINE 22.5 MILLIGRAM(S): 45 TABLET, ORALLY DISINTEGRATING ORAL at 21:44

## 2024-03-13 RX ADMIN — Medication 3 MILLILITER(S): at 15:22

## 2024-03-13 RX ADMIN — Medication 200 MILLIGRAM(S): at 21:43

## 2024-03-13 RX ADMIN — BUDESONIDE AND FORMOTEROL FUMARATE DIHYDRATE 2 PUFF(S): 160; 4.5 AEROSOL RESPIRATORY (INHALATION) at 21:44

## 2024-03-13 RX ADMIN — Medication 25 MILLIGRAM(S): at 21:43

## 2024-03-13 RX ADMIN — SENNA PLUS 2 TABLET(S): 8.6 TABLET ORAL at 21:43

## 2024-03-13 RX ADMIN — GABAPENTIN 100 MILLIGRAM(S): 400 CAPSULE ORAL at 21:43

## 2024-03-13 RX ADMIN — Medication 20 MILLIGRAM(S): at 15:21

## 2024-03-13 RX ADMIN — ATORVASTATIN CALCIUM 10 MILLIGRAM(S): 80 TABLET, FILM COATED ORAL at 21:43

## 2024-03-13 RX ADMIN — ALBUTEROL 2.5 MILLIGRAM(S): 90 AEROSOL, METERED ORAL at 23:03

## 2024-03-13 RX ADMIN — TIOTROPIUM BROMIDE 2 PUFF(S): 18 CAPSULE ORAL; RESPIRATORY (INHALATION) at 21:45

## 2024-03-13 RX ADMIN — HEPARIN SODIUM 5000 UNIT(S): 5000 INJECTION INTRAVENOUS; SUBCUTANEOUS at 21:43

## 2024-03-13 RX ADMIN — Medication 125 MILLIGRAM(S): at 16:52

## 2024-03-13 RX ADMIN — Medication 1 SPRAY(S): at 21:45

## 2024-03-13 NOTE — CONSULT NOTE ADULT - ASSESSMENT
89-year-old female with history of A-fib, CHF, anemia, COPD, diabetes, depression, anxiety, hypertension, hyperlipidemia, on 3 L O2 24/7 brought in by ambulance from [facility] for evaluation of shortness of breath    dyspnea  COPD  AF  CHF  OP  OA  DM  Depression  Anxiety  HTN  HLD  Mitral valve disease with Mitral clipping and Pulm HTN  CKD MEME    copd rx regimen with Inhaler and NEBS prn regimen and short course of Prednisone  cvs rx regimen optimization  I and O  diuresis as per Medicine and Cardio teams  monitor VS and HD and Sat  serial renal indices  CT chest - cardiomegaly  consideration for LE dopplers  GOC discussion  goal sat > 88 pct  VBG  cont home o2 support - 3 L n/c  work up in progress     89-year-old female with history of A-fib, CHF, anemia, COPD, diabetes, depression, anxiety, hypertension, hyperlipidemia, on 3 L O2 24/7 brought in by ambulance from [facility] for evaluation of shortness of breath    dyspnea  COPD  AF  CHF  OP  OA  DM  Depression  Anxiety  HTN  HLD  Mitral valve disease with Mitral clipping and Pulm HTN  CKD MEME    check RVP - viral panel screen  copd rx regimen with Inhaler and NEBS prn regimen and short course of Prednisone  cvs rx regimen optimization  I and O  diuresis as per Medicine and Cardio teams  monitor VS and HD and Sat  serial renal indices  CT chest - cardiomegaly  consideration for LE dopplers  GOC discussion  goal sat > 88 pct  VBG  cont home o2 support - 3 L n/c  nasal saline for congestion upper airway   work up in progress

## 2024-03-13 NOTE — H&P ADULT - NSHPLABSRESULTS_GEN_ALL_CORE
-                          12.1   7.34  )-----------( 191      ( 13 Mar 2024 14:10 )             38.6       13 Mar 2024 14:10    129    |  99     |  51     ----------------------------<  114    4.4     |  24     |  1.96     Ca    9.4        13 Mar 2024 14:10    TPro  7.8    /  Alb  3.2    /  TBili  0.8    /  DBili  x      /  AST  20     /  ALT  12     /  AlkPhos  62     13 Mar 2024 14:10    LIVER FUNCTIONS - ( 13 Mar 2024 14:10 )  Alb: 3.2 g/dL / Pro: 7.8 g/dL / ALK PHOS: 62 U/L / ALT: 12 U/L / AST: 20 U/L / GGT: x           PT/INR - ( 13 Mar 2024 14:10 )   PT: 14.3 sec;   INR: 1.32 ratio    PTT - ( 13 Mar 2024 14:10 )  PTT:31.6 sec      Urinalysis Basic - ( 13 Mar 2024 14:10 )  Color: x / Appearance: x / SG: x / pH: x  Gluc: 114 mg/dL / Ketone: x  / Bili: x / Urobili: x   Blood: x / Protein: x / Nitrite: x   Leuk Esterase: x / RBC: x / WBC x   Sq Epi: x / Non Sq Epi: x / Bacteria: x -                          12.1   7.34  )-----------( 191      ( 13 Mar 2024 14:10 )             38.6       13 Mar 2024 14:10    129    |  99     |  51     ----------------------------<  114    4.4     |  24     |  1.96     Ca    9.4        13 Mar 2024 14:10    TPro  7.8    /  Alb  3.2    /  TBili  0.8    /  DBili  x      /  AST  20     /  ALT  12     /  AlkPhos  62     13 Mar 2024 14:10    LIVER FUNCTIONS - ( 13 Mar 2024 14:10 )  Alb: 3.2 g/dL / Pro: 7.8 g/dL / ALK PHOS: 62 U/L / ALT: 12 U/L / AST: 20 U/L / GGT: x           PT/INR - ( 13 Mar 2024 14:10 )   PT: 14.3 sec;   INR: 1.32 ratio    PTT - ( 13 Mar 2024 14:10 )  PTT:31.6 sec      Urinalysis Basic - ( 13 Mar 2024 14:10 )  Color: x / Appearance: x / SG: x / pH: x  Gluc: 114 mg/dL / Ketone: x  / Bili: x / Urobili: x   Blood: x / Protein: x / Nitrite: x   Leuk Esterase: x / RBC: x / WBC x   Sq Epi: x / Non Sq Epi: x / Bacteria: x      Lactate, Blood (03.13.24 @ 14:10)   Lactate, Blood: 1.2 mmol/L      Troponin I, High Sensitivity (03.13.24 @ 14:10)   Troponin I, High Sensitivity Result: 31.0  Pro-Brain Natriuretic Peptide (03.13.24 @ 14:10)   Pro-Brain Natriuretic Peptide: 5414 pg/mL      Blood Gas Profile - Venous (03.13.24 @ 18:49)   pH, Venous: 7.45  pCO2, Venous: 42 mmHg  pO2, Venous: <35 mmHg  HCO3, Venous: 29 mmol/L  Base Excess, Venous: 5.2 mmol/L  Oxygen Saturation, Venous: 50.1 %      Respiratory Viral Panel with COVID-19 by CAROLYN (03.13.24 @ 14:35)   Rapid RVP Result: Not Detected.  SARS-CoV-2: Not Detected.         XR CHEST PORTABLE URGENT 1V   PROCEDURE DATE:  03/13/2024    INTERPRETATION:  AP chest on March 13, 2024 at 2:21 PM. Patient has chest pain.  Heart enlargement and mitral clips again noted. Old fracture deformity   left upper humerus again seen.  On December 22, 2023 there is mild central CHF.  Presently lungs are clear.  IMPRESSION: Clear lungs at this time. Other findings stable.  Personally reviewed by me.        CT CHEST    PROCEDURE DATE:  03/13/2024    COMPARISON: November 05, 2022  IV Contrast: NONE  Oral Contrast: NONE  IMPRESSION:  No acute findings.  Marked cardiomegaly.          EKG:    As per my review shows A. Fib with CVR at 75/min, LAD ( -120), RV IVC, RVH, normal QRS voltage & duration, with late transition, no ST-T abnormality.      -

## 2024-03-13 NOTE — H&P ADULT - NSHPPOASURGSITEINCISION_GEN_ALL_CORE
-- Message is from the [facility]--    Reason for Call: caller needs verbal confirmation for plan of care for physical and occupational therapy.    Caller Information       Type Contact Phone    06/24/2019 12:43 PM Phone (Incoming) Patt (Provider) [phone]     [facility]          Alternative phone number: n/a    Turnaround time given to caller:   \"This message will be sent to [state Provider's name]. The clinical team will fulfill your request as soon as they review your message.\"     no

## 2024-03-13 NOTE — ED ADULT NURSE NOTE - OBJECTIVE STATEMENT
patient is from [facility] assisted living, c/o low O2 sat since this morning, patient has o2 at home, hx of atrial fibrillation and chf, IV accessed and tolerating. Labs drawn & sent results pending. ekg done, comfort measure provided, callbell within reach, reinforced surrounding and plan of care, plan of care ongoing

## 2024-03-13 NOTE — H&P ADULT - PROBLEM SELECTOR PLAN 1
negative initial troponin, admitted to telemetry, started her on Furosemide 40 mg IVP daily, hold Torsemide for now given her MEME, continue on Spironolactone at same dose, in addition to Metoprolol tartrate, TTE in am, cardiology consult with Dr. Meeks was called.

## 2024-03-13 NOTE — CONSULT NOTE ADULT - SUBJECTIVE AND OBJECTIVE BOX
Date/Time Patient Seen:  		  Referring MD:   Data Reviewed	       Patient is a 89y old  Female who presents with a chief complaint of     Subjective/HPI   · Chief Complaint: The patient is a 89y Female complaining of shortness of breath.  · HPI Objective Statement: 89-year-old female with history of A-fib, CHF, anemia, COPD, diabetes, depression, anxiety, hypertension, hyperlipidemia, on 3 L O2 24/7 brought in by ambulance from [facility] for evaluation of shortness of breath.  Patient states that she has had shortness of breath since yesterday and does not feel better despite use of oxygen. Pt is on torsemide and albuterol inhaler. Denies fever, cough, chest pain, abdominal pain, N/V.   pcp: Marjorie Scott    PAST MEDICAL & SURGICAL HISTORY:  HTN (hypertension)    HLD (hyperlipidemia)    Atrial fibrillation  On Pradaxa    Syncope    Depression    Leg edema    Mitral valve stenosis, unspecified etiology    Hearing loss of left ear    H/O knee surgery    H/O external ear surgery    Cataracta    FAMILY HISTORY:  Mother  Still living? Unknown  Family history of blood disorder, Age at diagnosis: Age Unknown.     Social History:  · Substance use	No     Tobacco Screening:  · Core Measure Site	No    Risk Assessment:    Present on Admission:  Deep Venous Thrombosis	no  Pulmonary Embolus	no     HIV Screening:  · In accordance with [place] State law, we offer every patient who comes to our ED an HIV test. Would you like to be tested today?	Opt out        Medication list         MEDICATIONS  (STANDING):    MEDICATIONS  (PRN):         Vitals log        ICU Vital Signs Last 24 Hrs  T(C): 36.4 (13 Mar 2024 13:39), Max: 36.4 (13 Mar 2024 13:39)  T(F): 97.5 (13 Mar 2024 13:39), Max: 97.5 (13 Mar 2024 13:39)  HR: 89 (13 Mar 2024 16:54) (75 - 89)  BP: 110/60 (13 Mar 2024 16:54) (110/60 - 125/70)  BP(mean): --  ABP: --  ABP(mean): --  RR: 15 (13 Mar 2024 16:54) (14 - 15)  SpO2: 97% (13 Mar 2024 16:54) (95% - 99%)    O2 Parameters below as of 13 Mar 2024 16:54  Patient On (Oxygen Delivery Method): nasal cannula  O2 Flow (L/min): 4               Input and Output:  I&O's Detail      Lab Data                        12.1   7.34  )-----------( 191      ( 13 Mar 2024 14:10 )             38.6     03-13    129<L>  |  99  |  51<H>  ----------------------------<  114<H>  4.4   |  24  |  1.96<H>    Ca    9.4      13 Mar 2024 14:10    TPro  7.8  /  Alb  3.2<L>  /  TBili  0.8  /  DBili  x   /  AST  20  /  ALT  12  /  AlkPhos  62  03-13            Review of Systems	  andrade  weakness  on o2 support      Objective     Physical Examination      heart s1s2  lung dc BS  head nc  verbal  on o2 support  cn grossly int  no gross wheeze      Pertinent Lab findings & Imaging      Hernández:  NO   Adequate UO     I&O's Detail           Discussed with:     Cultures:	        Radiology      ACC: [id number] EXAM:  CT CHEST   ORDERED BY: BABATUNDE DIXON     PROCEDURE DATE:  03/13/2024          INTERPRETATION:  CLINICAL INFORMATION: Shortness of breath    COMPARISON: November 05, 2022    CONTRAST/COMPLICATIONS:  IV Contrast: NONE  Oral Contrast: NONE  Complications: None reported at time of study completion    PROCEDURE:  CT of the Chest was performed.  Sagittal and coronal reformats were performed.    FINDINGS:    LUNGS AND AIRWAYS: Patent central airways.  Stable 3 mm left upper lobe   pulmonary nodule and small area of scar in the lingula.  PLEURA: No pleural effusion.  MEDIASTINUM AND DUDLEY: No lymphadenopathy.  VESSELS: Atherosclerotic arterial calcifications, including coronary   artery calcification.  HEART: Marked cardiomegaly. Mitral valve clips noted. No pericardial   effusion.  CHEST WALL AND LOWER NECK: Within normal limits.  VISUALIZED UPPER ABDOMEN: Cholelithiasis. Hepatic and bilateral renal   cysts.  BONES: Old left rib fractures. Spinal degenerative changes. Stable mild   T10 compression deformity.    IMPRESSION:  No acute findings.  Marked cardiomegaly.        --- End of Report ---            ZACK ALFRED MD; Attending Radiologist  This document has been electronically signed. Mar 13 2024  5:09PM

## 2024-03-13 NOTE — H&P ADULT - PROBLEM SELECTOR PLAN 2
ML 2ry to the above, improved with 20 mg IVP furosemide by ED team, was seen at the ED by pulmonary & was started on systemic steroids in addition to inhaled steroids, Tiotropium, and Albuterol HFA PRN, pulmonary Dr. Nowak.

## 2024-03-13 NOTE — H&P ADULT - NSHPREVIEWOFSYSTEMS_GEN_ALL_CORE
-    CONSTITUTIONAL: No fever or chills.  EYES: No eye pain or visual disturbances, (+) sticky discharge particularly left eye, itchy sometimes.  ENMT:  (+) longstanding difficulty hearing, no vertigo, sinus or throat pain.  NECK: No pain or stiffness.	  RESPIRATORY:(+) dry cough, no wheezing, or hemoptysis; (+) shortness of breath.  CARDIOVASCULAR: No chest pain, palpitations, dizziness, or worsening leg swelling.  GASTROINTESTINAL: No abdominal pain, no nausea, vomiting, or hematemesis; No diarrhea or Change in bowel habits. No melena or hematochezia.  GENITOURINARY: No dysuria, frequency, hematuria, or incontinence.  NEUROLOGICAL: No headaches, focal muscle weakness, numbness, or tremors.  SKIN: No itching, burning or rashes.  MUSCULOSKELETAL: No joint swelling or pain.  PSYCHIATRIC: No depression, anxiety, or agitation.  HEME/LYMPH: (+) easy bruising, no bleeding gums, or nose bleed.  ALLERGY AND IMMUNOLOGIC: No hives or eczema.

## 2024-03-13 NOTE — H&P ADULT - PROBLEM SELECTOR PLAN 4
ML 2ry to diuretic use, moderate, asymptomatic, rend serum sodium level on the above management, nephrology consulted as stated above. ML 2ry to diuretic use, moderate, asymptomatic, trend serum sodium level on the above management, nephrology consulted as stated above.

## 2024-03-13 NOTE — ED PROVIDER NOTE - DIFFERENTIAL DIAGNOSIS
Differential including but not limited to MI CHF pneumonia bronchitis flu COVID RSV upper respiratory infection effusion Differential Diagnosis

## 2024-03-13 NOTE — PATIENT PROFILE ADULT - FALL HARM RISK - HARM RISK INTERVENTIONS
Assistance with ambulation/Assistance OOB with selected safe patient handling equipment/Communicate Risk of Fall with Harm to all staff/Discuss with provider need for PT consult/Monitor for mental status changes/Monitor gait and stability/Provide patient with walking aids - walker, cane, crutches/Reinforce activity limits and safety measures with patient and family/Reorient to person, place and time as needed/Review medications for side effects contributing to fall risk/Sit up slowly, dangle for a short time, stand at bedside before walking/Tailored Fall Risk Interventions/Toileting schedule using arm’s reach rule for commode and bathroom/Visual Cue: Yellow wristband and red socks/Bed in lowest position, wheels locked, appropriate side rails in place/Call bell, personal items and telephone in reach/Instruct patient to call for assistance before getting out of bed or chair/Non-slip footwear when patient is out of bed/[place] to call system/Physically safe environment - no spills, clutter or unnecessary equipment/Purposeful Proactive Rounding/Room/bathroom lighting operational, light cord in reach

## 2024-03-13 NOTE — H&P ADULT - ASSESSMENT
89 y/o F with PMH of HTN, Dyslipidemia, MR s/p MitraClip, A. Fib not on anticoagulants, Chronic  Diastolic CHF, Chronic Hypoxic Respiratory Failure (on 3L NC), COVID-19, CKD 3, lower GI Bleeding, Anemia, Anxiety, and Depression presented with worsening SOB. 89 y/o F with PMH of HTN, Dyslipidemia, MR s/p Bushra- Clip, A. Fib not on anticoagulants, Chronic  Diastolic CHF, Chronic Hypoxic Respiratory Failure (on 3L NC), COVID-19, CKD 3, lower GI Bleeding, Anemia, Anxiety, and Depression presented with worsening SOB.

## 2024-03-13 NOTE — ED PROVIDER NOTE - PROGRESS NOTE DETAILS
Spoke to attending hospitalist, Dr. Landrum who accepted admission.  SPoke to pulm/critical care attending, Dr. Nowak who will consult pt.

## 2024-03-13 NOTE — H&P ADULT - HISTORY OF PRESENT ILLNESS
This is a 89 y/o F with PMH of HTN, Dyslipidemia, MR s/p MitraClip, A. Fib not on anticoagulants, Chronic  Diastolic CHF, Chronic Hypoxic Respiratory Failure (on 3L NC), COVID-19, CKD 3, Anemia, Anxiety, and Depression who was sent from [facility] assisted living [facility] for worsening SOB even with minimal effort. Patient states that she feels her nose dry & stuffy with difficulty to breath, denies cough but her son at the bedside reports dry cough. Patient also reports itchy eyes, no chest pain or palpitations, no dizziness, nausea, vomiting, fever, or chills.   This is a 89 y/o F with PMH of HTN, Dyslipidemia, MR s/p MitraClip, A. Fib not on anticoagulants, Chronic  Diastolic CHF, Chronic Hypoxic Respiratory Failure (on 3L NC), COVID-19, CKD 3, lower GI Bleeding, Anemia, Anxiety, and Depression who was sent from [facility] living [facility] for worsening SOB over the past 2 days even with minimal effort with dropping of her SPO2 while on supplemental oxygen. Patient states that she feels her nose dry & stuffy with difficulty to breath, denies cough but her son at the bedside reports dry cough. Patient also reports itchy eyes, no chest pain or palpitations, no dizziness, nausea, vomiting, fever, or chills.   This is a 89 y/o F with PMH of HTN, Dyslipidemia, MR s/p Bushra- Stephanie, A. Fib not on anticoagulants, Chronic  Diastolic CHF, Chronic Hypoxic Respiratory Failure (on 3L NC), COVID-19, CKD 3, lower GI Bleeding, Anemia, Anxiety, and Depression who was sent from [facility] living [facility] for worsening SOB over the past 2 days even with minimal effort with dropping of her SPO2 while on supplemental oxygen. Patient states that she feels her nose dry & stuffy with difficulty to breath, denies cough but her son at the bedside reports dry cough. Patient also reports itchy eyes, no chest pain or palpitations, no dizziness, nausea, vomiting, fever, or chills.

## 2024-03-13 NOTE — H&P ADULT - NSHPSOCIALHISTORY_GEN_ALL_CORE
-    , resides in an assisted liveing facility, non smoker, no ETOH or drug abuse. -    , resides in an assisted living facility, non smoker, no ETOH or drug abuse.

## 2024-03-13 NOTE — ED ADULT TRIAGE NOTE - NS ED NURSE BANDS TYPE
Name band;
Number Of Freeze-Thaw Cycles: 2 freeze-thaw cycles
Render Note In Bullet Format When Appropriate: No
Show Aperture Variable?: Yes
Consent: The patient's consent was obtained including but not limited to risks of crusting, scabbing, blistering, scarring, darker or lighter pigmentary change, recurrence, incomplete removal and infection.
Detail Level: Detailed
Post-Care Instructions: I reviewed with the patient in detail post-care instructions. Patient is to wear sun-protection, and avoid picking at any of the treated lesions. Pt may apply Vaseline to crusted or scabbing areas.
Duration Of Freeze Thaw-Cycle (Seconds): 4

## 2024-03-13 NOTE — ED PROVIDER NOTE - CLINICAL SUMMARY MEDICAL DECISION MAKING FREE TEXT BOX
Patient brought in by EMS from assisted living for shortness of breath for the past 2 days.  Patient reports increase in her chronic lower extremity edema.  Patient relates on oxygen 24 hours a day.  Patient denies fever chest pain cough abdominal pain nausea vomiting.    Plan EKG chest x-ray labs Lasix DuoNeb Solu-Medrol    Differential including but not limited to MI CHF pneumonia bronchitis flu COVID RSV upper respiratory infection effusion

## 2024-03-13 NOTE — H&P ADULT - PROBLEM SELECTOR PLAN 3
acute on CKD ML 2ry to aldosterone antagonist & high dose loop diuretic use, hold Torsemide for now as stated above, started on Furosemide 40 mg IVP daily, in addition to the above management, avoid nephrotoxins & renally dose medications, trend BUN/Cr, nephrology consult with Dr. Rodriguez was called. acute on CKD ML 2ry to aldosterone antagonist & high dose loop diuretic use, hold Torsemide for now as stated above and started on Furosemide 40 mg IVP daily, in addition to the above management, avoid nephrotoxins & renally dose medications, trend BUN/Cr, nephrology consult with Dr. Rodriguez was called.

## 2024-03-13 NOTE — ED ADULT NURSE NOTE - NSFALLHARMRISKINTERV_ED_ALL_ED
Assistance OOB with selected safe patient handling equipment if applicable/Communicate risk of Fall with Harm to all staff, patient, and family/Monitor gait and stability/Provide patient with walking aids/Provide visual cue: red socks, yellow wristband, yellow gown, etc/Reinforce activity limits and safety measures with patient and family/Bed in lowest position, wheels locked, appropriate side rails in place/Call bell, personal items and telephone in reach/Instruct patient to call for assistance before getting out of bed/chair/stretcher/Non-slip footwear applied when patient is off stretcher/[place] to call system/Physically safe environment - no spills, clutter or unnecessary equipment/Purposeful Proactive Rounding/Room/bathroom lighting operational, light cord in reach

## 2024-03-13 NOTE — ED PROVIDER NOTE - OBJECTIVE STATEMENT
89-year-old female with history of A-fib, CHF, anemia, COPD, diabetes, depression, anxiety, hypertension, hyperlipidemia, on 3 L O2 24/7 brought in by ambulance from [facility] for evaluation of shortness of breath.  Patient states that she has had shortness of breath since yesterday and does not feel better despite use of oxygen. Pt is on torsemide 20mg and albuterol inhaler. Denies fever, cough, chest pain, abdominal pain, N/V.   pcp: Marjorie Scott 89-year-old female with history of A-fib, CHF, anemia, COPD, diabetes, depression, anxiety, hypertension, hyperlipidemia, on 3 L O2 24/7 brought in by ambulance from [facility] for evaluation of shortness of breath.  Patient states that she has had shortness of breath since yesterday and does not feel better despite use of oxygen. Pt is on torsemide and albuterol inhaler. Denies fever, cough, chest pain, abdominal pain, N/V.   pcp: Marjorie Scott

## 2024-03-13 NOTE — ED ADULT NURSE REASSESSMENT NOTE - NS ED NURSE REASSESS COMMENT FT1
Pt is very sob on exertion and she is talking, sat went down to the high 80s on NC 4lpm, but goes up to 97% when she is resting. son at bedside, plan of care explained.

## 2024-03-13 NOTE — H&P ADULT - NSHPPHYSICALEXAM_GEN_ALL_CORE
-    Vital Signs Last 24 Hrs  T(C): 37 (13 Mar 2024 19:14), Max: 37 (13 Mar 2024 19:14)  T(F): 98.6 (13 Mar 2024 19:14), Max: 98.6 (13 Mar 2024 19:14)  HR: 94 (13 Mar 2024 19:14) (75 - 94)  BP: 126/64 (13 Mar 2024 19:14) (110/60 - 126/64)  BP(mean): --  RR: 20 (13 Mar 2024 19:14) (14 - 20)  SpO2: 97% (13 Mar 2024 19:14) (95% - 99%)    Parameters below as of 13 Mar 2024 19:14  Patient On (Oxygen Delivery Method): nasal cannula  O2 Flow (L/min): 4        PHYSICAL EXAM:  		  GENERAL: NAD, well-groomed, well-developed, 30 degrees supine in mild respiratory distress..  HEAD:  Atraumatic, Norm cephalic, dilated veins over etta..  EYES: PERRLA, conjunctiva B/L congested with MP discharge L > R  ENMT: no nasal discharge, MMM.   NECK: Supple, No JVD.  NERVOUS SYSTEM:  Alert & oriented X3, neurologically intact grossly.  CHEST/LUNG: Fair air entry B/L, Bibasilar coarse insp rales, no rhonchi, or wheezing.  HEART: Normal S1 & acc S2, grade III/VI HSM max over the apex, grade II/VI ESM max over left SB, no extra sounds.  ABDOMEN: Soft, non-tender, non-distended; bowel sounds present, (+) large reducible umbilical hernia, no palpable organomegaly.  EXTREMITIES:  No clubbing, cyanosis, or lower extremity edema.  VASCULAR: 2+ radial, DPA / PTA pulses B/L.  SKIN: B/L dark erythema lower extremities, old bruises over B/L knees, B/L stasis dermatitis, (+) dilated veins over the face & chest/abdomen.  PSYCH: normal affect & behavior. -    Vital Signs Last 24 Hrs  T(C): 37 (13 Mar 2024 19:14), Max: 37 (13 Mar 2024 19:14)  T(F): 98.6 (13 Mar 2024 19:14), Max: 98.6 (13 Mar 2024 19:14)  HR: 94 (13 Mar 2024 19:14) (75 - 94)  BP: 126/64 (13 Mar 2024 19:14) (110/60 - 126/64)  BP(mean): --  RR: 20 (13 Mar 2024 19:14) (14 - 20)  SpO2: 97% (13 Mar 2024 19:14) (95% - 99%)    Parameters below as of 13 Mar 2024 19:14  Patient On (Oxygen Delivery Method): nasal cannula  O2 Flow (L/min): 4        PHYSICAL EXAM:  		  GENERAL: NAD, well-groomed, well-developed, 30 degrees supine in mild respiratory distress..  HEAD:  Atraumatic, Norm cephalic, dilated veins over etta..  EYES: PERRLA, conjunctiva B/L congested with MP discharge L > R  ENMT: no nasal discharge, MMM.   NECK: Supple, No JVD.  NERVOUS SYSTEM:  Alert & oriented X3, neurologically intact grossly.  CHEST/LUNG: Fair air entry B/L, Bibasilar coarse insp rales, no rhonchi, or wheezing.  HEART: Normal S1 & acc S2, grade III/VI HSM max over the apex, grade II/VI ESM max over left SB, no extra sounds.  ABDOMEN: Soft, non-tender, non-distended; bowel sounds present, (+) large reducible umbilical hernia, no palpable organomegaly.  EXTREMITIES:  No clubbing, cyanosis, (++) soft pitting B/L lower extremity edema.  VASCULAR: 2+ radial, DPA / PTA pulses B/L.  SKIN: B/L dark erythema lower extremities, old bruises over B/L knees, B/L stasis dermatitis, (+) dilated veins over the face & chest/abdomen.  PSYCH: normal affect & behavior.

## 2024-03-13 NOTE — ED ADULT TRIAGE NOTE - WEIGHT IN LBS
MEDICAL ELIGIBILITY FORM    Name: Pamela Arana YOB: 2007     Pamela is Medically eligible for all sports without restriction    Recommendations: N/A    I have examined the student named on this form and completed the preparticipation physical evaluation. The athlete does not have apparent clinical contraindications to practice and can participate in the sport(s) as outlined on this form. A copy of the physical examination findings are on record in my office and can be made available to the school at the request of the parents. If conditions arise after the athlete has been cleared for participation, the physician may rescind the medical eligibility until the problem is resolved and the potential consequences are completely explained to the athlete (and parents or guardians).    Name of health care professional (print or type): Chema Ramirez MD Date: 8/11/2022     Address: [facility] [place] [address] [place] [address]  Dept: [phone]     Signature of health care professional: _______________________________________      SHARED EMERGENCY INFORMATION    No Known Allergies    No current outpatient medications    Other information:_____________________________________________________________________  _____________________________________________________________________________________  _____________________________________________________________________________________    Emergency contacts:___________________________________________________________________  _____________________________________________________________________________________  _____________________________________________________________________________________     © 2019 [place] [employer], American Academy of Pediatrics, American College of Sport Medicine, American Medical Society for Sports Medicine, American Orthopaedics Society for Sport Medicine, and American Osteopathic  Academy of Sports Medicine.  Permission is granted to reprint for noncommercial, educational purposes with acknowledgement.      PHYSICAL EXAMINATION FORM     Name: Pamela Arana YOB: 2007   PHYSICIAN REMINDERS  1. Consider additional questions on more-sensitive issues.  · Do you feel stressed out or under a lot of pressure?  · Do you feel sad, hopeless, depressed or anxious?  · Do you feel safe at your home or residence?  · During the past 30 days, did you use chewing tobacco, snuff or dip?  · Do you drink alcohol or user any other drugs?  · Have you even taken anabolic steroids or used any other performance-enhancing supplement?  · Have you even take any supplements to help you gain or lose weight or improve your performance?  · Do you wear a seat belt, use a helmet, and use condoms?  2.  Consider reviewing questions on cardiovascular symptoms (Q4-Q13 of History Form).    EXAMINATION     Vitals: /62   Pulse 80   Temp 97.2 °F (36.2 °C) (Temporal)   Resp 18   Ht 5' 2\" (1.575 m)   Wt 57.2 kg (126 lb)   LMP 07/27/2022   BMI 23.05 kg/m²   BSA 1.57 m²    Vision: R 20/               L 20/                      Corrected  Y         N     MEDICAL NORMAL ABNORMAL FINDINGS   Appearance  · Marfan stigmata (kyphoscoliosis, high-arched palate, pectus excavatum, arachnodactyly, arm span > height, hyperlaxity, myopia, MVP, aortic insufficiency) Yes    Eyes, ears, nose, throat  · Pupils equal  · Hearing Yes    Lymph nodes Yes    Heart*  · Murmurs (auscultation standing, auscultation supine, +/- Valsalva maneuver) Yes    Lungs Yes    Abdomen Yes    Skin  · Herpes simplex virus (HSV), lesions suggestive of methicillin-resistant Staphylococcus aureus MRSA, or tinea corporis Yes    MUSCULOSKELETAL NORMAL ABNORMAL FINDINGS   Neck Yes    Back Yes    Shoulder and arm Yes    Elbow and forearm Yes    Wrist, hand, and fingers Yes    Hip and thigh Yes    Knee Yes    Leg and ankle Yes    Foot and toes Yes     Functional  · Double-leg squat test, single-leg squat test, and box drop or step drop test Yes    * Consider electrocardiography ECG, echocardiogram, referral to cardiology for abnormal cardiac history or examination finding, or a combination of those.  Name of health care professional (print or type): Chema Ramirez MD  Date: 8/11/2022  Address: [facility] [place] [address] [place] [address]  Dept: [phone]   Signature of health care professional: _______________________________________    © 2019 American Academy of Family Physicians, American Academy of Pediatrics, American College of Sport Medicine, American Medical Society for Sports Medicine, American Orthopaedics Society for Sport Medicine, and American Osteopathic Academy of Sports Medicine.  Permission is granted to reprint for noncommercial, educational purposes with acknowledgement.         Name: Pamela Arana YOB: 2007   Supplemental COVID-19 questions     1.  Have you had any of the following symptoms in the past 14 days?           a) Fever or chills Yes  /  No          b) Cough Yes  /  No          c) Shortness of breath or difficulty breathing Yes  /  No          d) Fatigue Yes  /  No          e) Muscle or body aches Yes  /  No          f) Headache Yes  /  No          g) New loss of taste or smell Yes  /  No          h) Sore throat Yes  /  No          i) Congestion or runny nose Yes  /  No          j) Nausea or vomiting Yes  /  No          k) Diarrhea Yes  /  No          l) Date symptoms started _______          m) Date symptoms resolved _______   2.  Have you ever had a positive test for COVID-19? Yes  /  No          If yes _______                 i.  Date of test Yes  /  No                 ii. Were you tested because you had symptoms? Yes  /  No                 If yes:                         a) Date symptoms started _______                        b) Date symptoms resolved _______                   145       c) Were you hospitalized? Yes  /  No                        d) Did you have fever > 100.4 F.? Yes  /  No                               If yes, how many days did your fever last? _______                        e) Did you have muscle aches, chills, or lethargy? Yes  /  No                               If yes, how many days did these symptoms last? _______                        f) Have you had the vaccine? Yes  /  No                 iii. Were you tested because you were exposed to someone with COVID-19, but you did not have                     any symptoms? Yes  /  No   3. Has anyone living in your household had any of the following symptoms or tested positive for COVID-19 in the past 14 days? Yes  /  No          If Yes, [place] the applicable symptoms.     • Fever or chills • Shortness of breath or difficulty breathing    • Muscle or body aches • New loss of taste or smell    • Nausea or vomiting • Congestion or runny nose    • Sore throat           • Headache           • Cough • Fatigue           • Diarrhea       4. Have you been within 6 feet for more than 15 minutes of someone with COVID-19 in the past 14 days? Yes  /  No          If yes: date(s) of exposure _______   5. Are you currently waiting on results from a recent COVID test? Yes  /  No     Sources:  • Interim Guidance on the Preparticipation Physical Examination... : Clinical Journal of Sport Medicine (lww.com)  • Supplemental COVID19 Questions (lww.com)  • COVID19 Interim Guidance: Return to Sports and Physical Activity (aap.org)      HISTORY FORM  Note: Complete and sign this form (with your parents if younger than 18) before your appointment.  Name: Pamela Arana YOB: 2007     Date of examination: 8/11/2022  Sport(s):_________________________________________   Sex assigned at birth: (F, M, or other): female How do you identify your gender?(F, M, or other):_________     List past and current medical  conditions:____________________________________________________________________  _______________________________________________________________________________________________________________    Have you ever had surgery? If yes, list all past surgical procedures: ______________________________________________  _______________________________________________________________________________________________________________    Medicines and supplements: List all current prescriptions, over-the-counter medicines, and supplements (herbal and nutritional):   ______________________________________________________________________________________________________________  ______________________________________________________________________________________________________________    Do you have any allergies? If yes, please list all your allergies (ie, medicines, pollens, food, stinging insects).   ______________________________________________________________________________________________________________  ______________________________________________________________________________________________________________       Patient Health Questionnaire Version 4 (PHQ-4)  Over the last 2 weeks, how often have you been bothered by any of the following problems? ([place] response.)   Not at all Several days Over half the days Nearly every day   Feeling nervous, anxious, or on edge 0 1 2 3   Not being able to stop or control worrying 0 1 2 3   Little interest or pleasure in doing things 0 1 2 3   Feeling down, depressed, or hopeless 0 1 2 3   (A sum of ?3 is considered positive on either subscale [questions 1 and 2, or questions 3 and 4] for screening purposes.)     GENERAL QUESTIONS  (Explain “Yes” answers at the end of this form.  [place] questions if you don’t know the answer.)     Yes     No   1. Do you have any concerns that you would like to discuss with your provider?       2. Has a provider ever denied or restricted your  participation in sports for any reason?       3. Do you have any ongoing medical issues or recent illness?       HEART HEALTH QUESTIONS ABOUT YOU Yes No   4. Have you ever passed out or nearly passed out during or after exercise?       5. Have you ever had discomfort, pain, tightness, or pressure in your chest during exercise?       6. Does your heart ever race, flutter in your chest, or skip beats (irregular beats) during exercise?       7. Has a doctor ever told you that you have any heart problems?       8. Has a doctor ever requested a test for your heart? For example, electrocardiography (ECG) or echocardiography.      HEART HEALTH QUESTIONS ABOUT YOU (CONTINUED ) Yes No   9. Do you get light-headed or feel shorter of breath than your friends during exercise?       10. Have you ever had a seizure?       HEART HEALTH QUESTIONS ABOUT YOUR FAMILY Yes No   11. Has any family member or relative  of heart problems or had an unexpected or unexplained sudden death before age 35 years (including drowning or unexplained car crash)?       12. Does anyone in your family have a genetic heart problem such as hypertrophic cardiomyopathy (HCM), Marfan syndrome, arrhythmogenic right ventricular cardiomyopathy (ARVC), long QT syndrome (LQTS), short QT syndrome (SQTS), Brugada syndrome, or catecholaminergic polymorphic ventricular tachycardia (CPVT)?       13. Has anyone in your family had a pacemaker or an implanted defibrillator before age 35?                Name: Pamela Arana YOB: 2007     BONE AND JOINT QUESTIONS Yes No   14. Have you ever had a stress fracture or an injury to a bone, muscle, ligament, joint, or tendon that caused you to miss a practice or game?       15. Do you have a bone, muscle, ligament, or joint injury that bothers you?       MEDICAL QUESTIONS Yes No   16. Do you cough, wheeze, or have difficulty breathing during or after exercise?       17. Are you missing a kidney, an eye, a  testicle (males), your spleen, or any other organ?       18. Do you have groin or testicle pain or a painful bulge or hernia in the groin area?       19. Do you have any recurring skin rashes or rashes that come and go, including herpes or methicillin-resistant Staphylococcus aureus  (MRSA)?       20. Have you had a concussion or head injury that caused confusion, a prolonged headache, or memory problems?       21. Have you ever had numbness, had tingling, had weakness in your arms or legs, or been unable to move your arms or legs after being hit or falling?       22. Have you ever become ill while exercising in the heat?       23. Do you or does someone in your family have sickle cell trait or disease?       24. Have you ever had or do you have any problems with your eyes or vision?        MEDICAL QUESTIONS (CONTINUED ) Yes No   25. Do you worry about your weight?         26. Are you trying to or has anyone recommended that you gain or lose weight?       27. Are you on a special diet or do you avoid certain types of foods or food groups?       28. Have you ever had an eating disorder?       FEMALES ONLY     29. Have you ever had a menstrual period?       30. How old were you when you had your first menstrual period?       31. When was your most recent menstrual period?       32. How many periods have you had in the past 12 months?         Explain \"Yes\" answers here.  ______________________________________________    ______________________________________________    ______________________________________________    ______________________________________________    ______________________________________________    ______________________________________________    ______________________________________________    ______________________________________________     I hereby state that, to the best of my knowledge, my answers to the questions on this form are complete and correct.    Signature of athlete:  ____________________________________________________________________________________    Signature of parent or guardian: ___________________________________________________________________________  Date: ________________________________________________  _____________________________________________________________________________________________________  © 2019 American Academy of Family Physicians, American Academy of Pediatrics, American College of Sports Medicine, American Medical Society for Sports Medicine, American Orthopaedic Society for Sports Medicine, and American Osteopathic Academy of Sports Medicine. Permission is granted to reprint for noncommercial, educational purposes with acknowledgment.

## 2024-03-13 NOTE — H&P ADULT - PROBLEM SELECTOR PLAN 5
rate controlled, Eliquis was stopped 2ry to lower GI bleeding, continue on Metoprolol tartrate with hold parameters.

## 2024-03-13 NOTE — PATIENT PROFILE ADULT - FALL HARM RISK - TYPE OF ASSESSMENT
ED Provider Note    CHIEF COMPLAINT  Chief Complaint   Patient presents with   • Abdominal Pain   • Hernia       HPI  Yoshi Herrera is a 20 m.o. male who presents right inguinal hernia.  Mother states that the child has had a right inguinal hernia and has been referred to Dr. Hernandez.  They have an appointment in early November.  She states that tonight the patient was playing around on a chair when he fell landing into the table.  The father stated that the hernia became increasingly bulging and slightly blue.  Therefore they brought the patient here to the emergency department.  At this point time the patient child is acting normally per the mom.  They deny any vomiting, hitting his head, or loss of consciousness.    REVIEW OF SYSTEMS  Pertinent positives include hernia. Pertinent negatives include loss of consciousness, increasing pain, vomiting.     PAST MEDICAL HISTORY   Known right inguinal hernia    SOCIAL HISTORY     Lives with parents  SURGICAL HISTORY  patient denies any surgical history    CURRENT MEDICATIONS  Home Medications     Reviewed by Noelle Mathews R.N. (Registered Nurse) on 10/15/18 at 1914  Med List Status: <None>   Medication Last Dose Status        Patient Paul Taking any Medications                       ALLERGIES  No Known Allergies    PHYSICAL EXAM  VITAL SIGNS: Pulse 110   Temp 36.7 °C (98 °F)   Resp 30   Wt 16.4 kg (36 lb 2.5 oz)   Constitutional: Alert in no apparent distress. Happy, Playful.  Sitting in bed watching movie bouncing around  HENT: Normocephalic, Atraumatic,   Eyes: Pupils are equal and reactive, Conjunctiva normal, Non-icteric.   Cardiovascular: Regular rate and rhythm, no murmurs.   Thorax & Lungs: Normal breath sounds, No respiratory distress, No wheezing.    Abdomen: Bowel sounds normal, Soft, No tenderness, No masses.  Patient has a soft right inguinal canal hernia.  This is easily reducible with gentle palpation.  There is a small area of contusion in that area.   The abdomen is otherwise soft.  He has no testicular tenderness with normal cremasteric reflex.  Skin: Warm, Dry, No erythema, No rash, No Petechiae.   Musculoskeletal: Good range of motion in all major joints. No tenderness to palpation or major deformities noted.   Neurologic: Alert, Normal motor function, No focal deficits noted.   Psychiatric: Playful, non-toxic in appearance and behavior.     COURSE & MEDICAL DECISION MAKING  Pertinent Labs & Imaging studies reviewed. (See chart for details)  Differential includes hernia, incarceration    Discussed with the mother at length that this hernia is reducible.  There is no testicular tenderness.  I think at this point time he most likely had a tear with increase in the size.  This is easily reducible and not incarcerated.  Discussed the fact the patient will need to follow-up with pediatric surgery but at this point time does not have signs for emergent surgical referral.  I discussed with the mother how to try to reduce the hernia herself if it pops out at home.  The patient's mother felt comfortable with this.         The patient will return for new or worsening symptoms and is stable at the time of discharge.    The patient is referred to a primary physician for blood pressure management, diabetic screening, and for all other preventative health concerns.        DISPOSITION:  Patient will be discharged home in stable condition.    FOLLOW UP:  Mini Gonzalez M.D.  [address] [place] [address] [id number]  [id number]  [facility] [address] [phone]      as scheduled    [address] [address]    As needed      OUTPATIENT MEDICATIONS:  There are no discharge medications for this patient.          FINAL IMPRESSION  1. Non-recurrent unilateral inguinal hernia without obstruction or gangrene               Electronically signed by: Parth Hernandez, 10/15/2018 7:20 PM  This record was made with a voice recognition software. The software is  not perfect. I have tried to correct any grammar, spelling or context errors to the best of my ability, but errors may still remain. Interpretation of this chart should be taken in this context.        Admission

## 2024-03-14 ENCOUNTER — APPOINTMENT (OUTPATIENT)
Dept: GERIATRICS | Facility: CLINIC | Age: 89
End: 2024-03-14

## 2024-03-14 ENCOUNTER — TRANSCRIPTION ENCOUNTER (OUTPATIENT)
Age: 89
End: 2024-03-14

## 2024-03-14 DIAGNOSIS — J96.21 ACUTE AND CHRONIC RESPIRATORY FAILURE WITH HYPOXIA: ICD-10-CM

## 2024-03-14 DIAGNOSIS — E87.1 HYPO-OSMOLALITY AND HYPONATREMIA: ICD-10-CM

## 2024-03-14 DIAGNOSIS — Z29.9 ENCOUNTER FOR PROPHYLACTIC MEASURES, UNSPECIFIED: ICD-10-CM

## 2024-03-14 DIAGNOSIS — N17.9 ACUTE KIDNEY FAILURE, UNSPECIFIED: ICD-10-CM

## 2024-03-14 DIAGNOSIS — I50.33 ACUTE ON CHRONIC DIASTOLIC (CONGESTIVE) HEART FAILURE: ICD-10-CM

## 2024-03-14 DIAGNOSIS — I48.20 CHRONIC ATRIAL FIBRILLATION, UNSPECIFIED: ICD-10-CM

## 2024-03-14 LAB
ANION GAP SERPL CALC-SCNC: 12 MMOL/L — SIGNIFICANT CHANGE UP (ref 5–17)
ANION GAP SERPL CALC-SCNC: 12 MMOL/L — SIGNIFICANT CHANGE UP (ref 5–17)
BASOPHILS # BLD AUTO: 0.01 K/UL — SIGNIFICANT CHANGE UP (ref 0–0.2)
BASOPHILS NFR BLD AUTO: 0.2 % — SIGNIFICANT CHANGE UP (ref 0–2)
BUN SERPL-MCNC: 54 MG/DL — HIGH (ref 7–23)
BUN SERPL-MCNC: 56 MG/DL — HIGH (ref 7–23)
CALCIUM SERPL-MCNC: 9.3 MG/DL — SIGNIFICANT CHANGE UP (ref 8.4–10.5)
CALCIUM SERPL-MCNC: 9.3 MG/DL — SIGNIFICANT CHANGE UP (ref 8.4–10.5)
CHLORIDE SERPL-SCNC: 100 MMOL/L — SIGNIFICANT CHANGE UP (ref 96–108)
CHLORIDE SERPL-SCNC: 103 MMOL/L — SIGNIFICANT CHANGE UP (ref 96–108)
CO2 SERPL-SCNC: 26 MMOL/L — SIGNIFICANT CHANGE UP (ref 22–31)
CO2 SERPL-SCNC: 26 MMOL/L — SIGNIFICANT CHANGE UP (ref 22–31)
CREAT SERPL-MCNC: 1.92 MG/DL — HIGH (ref 0.5–1.3)
CREAT SERPL-MCNC: 1.98 MG/DL — HIGH (ref 0.5–1.3)
CRP SERPL-MCNC: 18 MG/L — HIGH
EGFR: 23 ML/MIN/1.73M2 — LOW
EGFR: 24 ML/MIN/1.73M2 — LOW
EOSINOPHIL # BLD AUTO: 0 K/UL — SIGNIFICANT CHANGE UP (ref 0–0.5)
EOSINOPHIL NFR BLD AUTO: 0 % — SIGNIFICANT CHANGE UP (ref 0–6)
GLUCOSE SERPL-MCNC: 198 MG/DL — HIGH (ref 70–99)
GLUCOSE SERPL-MCNC: 213 MG/DL — HIGH (ref 70–99)
HCT VFR BLD CALC: 35.8 % — SIGNIFICANT CHANGE UP (ref 34.5–45)
HGB BLD-MCNC: 11.2 G/DL — LOW (ref 11.5–15.5)
IMM GRANULOCYTES NFR BLD AUTO: 1.1 % — HIGH (ref 0–0.9)
LYMPHOCYTES # BLD AUTO: 0.59 K/UL — LOW (ref 1–3.3)
LYMPHOCYTES # BLD AUTO: 12.6 % — LOW (ref 13–44)
MAGNESIUM SERPL-MCNC: 2.8 MG/DL — HIGH (ref 1.6–2.6)
MCHC RBC-ENTMCNC: 29 PG — SIGNIFICANT CHANGE UP (ref 27–34)
MCHC RBC-ENTMCNC: 31.3 GM/DL — LOW (ref 32–36)
MCV RBC AUTO: 92.7 FL — SIGNIFICANT CHANGE UP (ref 80–100)
MONOCYTES # BLD AUTO: 0.33 K/UL — SIGNIFICANT CHANGE UP (ref 0–0.9)
MONOCYTES NFR BLD AUTO: 7.1 % — SIGNIFICANT CHANGE UP (ref 2–14)
NEUTROPHILS # BLD AUTO: 3.69 K/UL — SIGNIFICANT CHANGE UP (ref 1.8–7.4)
NEUTROPHILS NFR BLD AUTO: 79 % — HIGH (ref 43–77)
NRBC # BLD: 0 /100 WBCS — SIGNIFICANT CHANGE UP (ref 0–0)
PHOSPHATE SERPL-MCNC: 6.3 MG/DL — HIGH (ref 2.5–4.5)
PLATELET # BLD AUTO: 167 K/UL — SIGNIFICANT CHANGE UP (ref 150–400)
POTASSIUM SERPL-MCNC: 5.1 MMOL/L — SIGNIFICANT CHANGE UP (ref 3.5–5.3)
POTASSIUM SERPL-MCNC: 5.2 MMOL/L — SIGNIFICANT CHANGE UP (ref 3.5–5.3)
POTASSIUM SERPL-SCNC: 5.1 MMOL/L — SIGNIFICANT CHANGE UP (ref 3.5–5.3)
POTASSIUM SERPL-SCNC: 5.2 MMOL/L — SIGNIFICANT CHANGE UP (ref 3.5–5.3)
RBC # BLD: 3.86 M/UL — SIGNIFICANT CHANGE UP (ref 3.8–5.2)
RBC # FLD: 19.5 % — HIGH (ref 10.3–14.5)
SODIUM SERPL-SCNC: 138 MMOL/L — SIGNIFICANT CHANGE UP (ref 135–145)
SODIUM SERPL-SCNC: 141 MMOL/L — SIGNIFICANT CHANGE UP (ref 135–145)
TSH SERPL-MCNC: 1.48 UIU/ML — SIGNIFICANT CHANGE UP (ref 0.27–4.2)
WBC # BLD: 4.67 K/UL — SIGNIFICANT CHANGE UP (ref 3.8–10.5)
WBC # FLD AUTO: 4.67 K/UL — SIGNIFICANT CHANGE UP (ref 3.8–10.5)

## 2024-03-14 PROCEDURE — 76775 US EXAM ABDO BACK WALL LIM: CPT | Mod: 26

## 2024-03-14 PROCEDURE — 99233 SBSQ HOSP IP/OBS HIGH 50: CPT

## 2024-03-14 PROCEDURE — 93306 TTE W/DOPPLER COMPLETE: CPT | Mod: 26

## 2024-03-14 RX ORDER — SODIUM CHLORIDE 9 MG/ML
1000 INJECTION, SOLUTION INTRAVENOUS
Refills: 0 | Status: DISCONTINUED | OUTPATIENT
Start: 2024-03-14 | End: 2024-03-15

## 2024-03-14 RX ADMIN — Medication 3 MILLIGRAM(S): at 22:19

## 2024-03-14 RX ADMIN — SPIRONOLACTONE 25 MILLIGRAM(S): 25 TABLET, FILM COATED ORAL at 17:18

## 2024-03-14 RX ADMIN — SODIUM CHLORIDE 50 MILLILITER(S): 9 INJECTION, SOLUTION INTRAVENOUS at 09:53

## 2024-03-14 RX ADMIN — Medication 25 MILLIGRAM(S): at 17:18

## 2024-03-14 RX ADMIN — SERTRALINE 75 MILLIGRAM(S): 25 TABLET, FILM COATED ORAL at 12:12

## 2024-03-14 RX ADMIN — HEPARIN SODIUM 5000 UNIT(S): 5000 INJECTION INTRAVENOUS; SUBCUTANEOUS at 13:15

## 2024-03-14 RX ADMIN — BUDESONIDE AND FORMOTEROL FUMARATE DIHYDRATE 2 PUFF(S): 160; 4.5 AEROSOL RESPIRATORY (INHALATION) at 05:39

## 2024-03-14 RX ADMIN — Medication 1 DROP(S): at 05:46

## 2024-03-14 RX ADMIN — ALBUTEROL 2.5 MILLIGRAM(S): 90 AEROSOL, METERED ORAL at 20:19

## 2024-03-14 RX ADMIN — MIRTAZAPINE 22.5 MILLIGRAM(S): 45 TABLET, ORALLY DISINTEGRATING ORAL at 22:20

## 2024-03-14 RX ADMIN — SENNA PLUS 2 TABLET(S): 8.6 TABLET ORAL at 22:19

## 2024-03-14 RX ADMIN — Medication 1 SPRAY(S): at 22:18

## 2024-03-14 RX ADMIN — Medication 1 DROP(S): at 12:11

## 2024-03-14 RX ADMIN — HEPARIN SODIUM 5000 UNIT(S): 5000 INJECTION INTRAVENOUS; SUBCUTANEOUS at 05:44

## 2024-03-14 RX ADMIN — Medication 1 SPRAY(S): at 05:41

## 2024-03-14 RX ADMIN — Medication 1 DROP(S): at 22:20

## 2024-03-14 RX ADMIN — BUDESONIDE AND FORMOTEROL FUMARATE DIHYDRATE 2 PUFF(S): 160; 4.5 AEROSOL RESPIRATORY (INHALATION) at 22:19

## 2024-03-14 RX ADMIN — TIOTROPIUM BROMIDE 2 PUFF(S): 18 CAPSULE ORAL; RESPIRATORY (INHALATION) at 05:39

## 2024-03-14 RX ADMIN — Medication 325 MILLIGRAM(S): at 12:13

## 2024-03-14 RX ADMIN — ALBUTEROL 2.5 MILLIGRAM(S): 90 AEROSOL, METERED ORAL at 07:03

## 2024-03-14 RX ADMIN — GABAPENTIN 100 MILLIGRAM(S): 400 CAPSULE ORAL at 22:19

## 2024-03-14 RX ADMIN — ATORVASTATIN CALCIUM 10 MILLIGRAM(S): 80 TABLET, FILM COATED ORAL at 22:19

## 2024-03-14 RX ADMIN — Medication 1 DROP(S): at 17:18

## 2024-03-14 RX ADMIN — Medication 20 MILLIGRAM(S): at 05:44

## 2024-03-14 RX ADMIN — Medication 1 DROP(S): at 00:09

## 2024-03-14 RX ADMIN — Medication 25 MILLIGRAM(S): at 08:24

## 2024-03-14 RX ADMIN — HEPARIN SODIUM 5000 UNIT(S): 5000 INJECTION INTRAVENOUS; SUBCUTANEOUS at 22:19

## 2024-03-14 RX ADMIN — Medication 40 MILLIGRAM(S): at 05:42

## 2024-03-14 RX ADMIN — SPIRONOLACTONE 25 MILLIGRAM(S): 25 TABLET, FILM COATED ORAL at 08:23

## 2024-03-14 RX ADMIN — Medication 1 SPRAY(S): at 13:14

## 2024-03-14 RX ADMIN — ALBUTEROL 2.5 MILLIGRAM(S): 90 AEROSOL, METERED ORAL at 13:39

## 2024-03-14 NOTE — DIETITIAN INITIAL EVALUATION ADULT - OTHER INFO
Visited patient in room, pt able to answer basic questions. presents with good appetite/po intake, consuming % of meals per flowsheet. GI WNL, no difficulty stooling . No reported difficulty chewing or swallowing. Current adm weight 65.8kg, weight appears to be decreased likely due to fluid shifts d/t CHF, ** Noted with wt fluctuation from previous RD notes might due to fluid shift with Congestive Heart Failure. RD will continue to monitor wt trends as available/able.     Pertinent labs/meds reviewed: iron, dulcolax, remeron, miralax, senna, lasix, IV dex 5%/NaCL @50ml/hr; elevated phos, low mg, elevated BUN/Creat    Education not appropriate at this time. Continue DASH soft and bite sized diet. Can provide Ensure Plus HP (each bottle contains 350kcal, 20g pro) if PO intake decreases. Recommend 1500ml fluid restriction given decompensated CHF. RD to remain available per protocol.

## 2024-03-14 NOTE — DIETITIAN INITIAL EVALUATION ADULT - ORAL INTAKE PTA/DIET HISTORY
Unable to obtain diet/weight hx from pt due to lethargy and sleeping. [facility] No family at bedside currently. Pt last seen by RD 12/2023. Per RD assessment from Dec 2023, "Daughter reports pt with normal appetite/PO intake PTA. Follows low Na diet at home.   Daughter states pt with some missing teeth, eating softer foods at home, able to feed self with tray set up. Denies nausea/vomiting/constipation/diarrhea PTA.  Confirms NKFA/intolerance; Micronutrient/Other supplementation: Feso4 per H&P   Protein-energy supplementation: Ensure Plus (each bottle contains 350kcal, 20g pro) 1x daily  Wts per HIE: 72.6kg Dec 2023, 69.4kg (11/29/23), 79.4kg (12/23/22), 73kg (11/6/22), 69.4kg (9/29/22)

## 2024-03-14 NOTE — DIETITIAN INITIAL EVALUATION ADULT - PERTINENT MEDS FT
MEDICATIONS  (STANDING):  atorvastatin 10 milliGRAM(s) Oral at bedtime  budesonide  80 MICROgram(s)/formoterol 4.5 MICROgram(s) Inhaler 2 Puff(s) Inhalation two times a day  ciprofloxacin  0.3% Ophthalmic Solution 1 Drop(s) Both EYES every 6 hours  dextrose 5%. 1000 milliLiter(s) (50 mL/Hr) IV Continuous <Continuous>  ferrous    sulfate 325 milliGRAM(s) Oral daily  furosemide   Injectable 40 milliGRAM(s) IV Push daily  gabapentin 100 milliGRAM(s) Oral at bedtime  heparin   Injectable 5000 Unit(s) SubCutaneous every 8 hours  melatonin 3 milliGRAM(s) Oral at bedtime  metoprolol tartrate 25 milliGRAM(s) Oral two times a day  mirtazapine 22.5 milliGRAM(s) Oral at bedtime  predniSONE   Tablet 20 milliGRAM(s) Oral daily  senna 2 Tablet(s) Oral at bedtime  sertraline 75 milliGRAM(s) Oral daily  sodium chloride 0.65% Nasal 1 Spray(s) Both Nostrils three times a day  spironolactone 25 milliGRAM(s) Oral two times a day  tiotropium 2.5 MICROgram(s) Inhaler 2 Puff(s) Inhalation daily    MEDICATIONS  (PRN):  acetaminophen     Tablet .. 650 milliGRAM(s) Oral every 6 hours PRN Temp greater or equal to 38C (100.4F), Mild Pain (1 - 3)  albuterol    0.083% 2.5 milliGRAM(s) Nebulizer every 3 hours PRN Shortness of Breath and/or Wheezing  bisacodyl 5 milliGRAM(s) Oral every 12 hours PRN Constipation  guaiFENesin Oral Liquid (Sugar-Free) 200 milliGRAM(s) Oral every 6 hours PRN Cough  polyethylene glycol 3350 17 Gram(s) Oral at bedtime PRN Constipation

## 2024-03-14 NOTE — PATIENT CHOICE NOTE. - NSPTCHOICESTATE_GEN_ALL_CORE

## 2024-03-14 NOTE — CARE COORDINATION ASSESSMENT. - FACILITY OF RESIDENCE YN
[facility] on [address] [phone]/ fax [phone]. Utilizing [facility] pharmacy [phone]/Yes

## 2024-03-14 NOTE — PROGRESS NOTE ADULT - ASSESSMENT
89 y/o F with PMH of HTN, Dyslipidemia, MR s/p Bushra- Stephanie, KIMBER. Fib not on anticoagulants, Chronic  Diastolic CHF, Chronic Hypoxic Respiratory Failure (on 3L NC), COVID-19, CKD 3, lower GI Bleeding, Anemia, Anxiety, and Depression presented with worsening SOB.    Shortness of breath - thought initially to be acute on chronic CHF but no significant findings for CHF (TTE pending), likely pulmonary etiology such as COPD exacerbation, improving today  - cont with telemetry  - cardiology consulted and following -> TTE, cont with torsemide and spironolactone  - pulmonary consulted -> nebs PRN, short course of prednisone  - on symbicort    Acute on chronic kidney disease - likely secondary to diuretic use  - nephrology consulted  - avoid nephrotoxic meds  - monitor electrolytes (noted to have hyperphosphotemia and hypermagnesmia)    Hyponatremia -   - normal levels now, wou     Problem/Plan - 4:  ·  Problem: Hyponatremia.   ·  Plan: ML 2ry to diuretic use, moderate, asymptomatic, trend serum sodium level on the above management, nephrology consulted as stated above.     Problem/Plan - 5:  ·  Problem: Chronic atrial fibrillation.   ·  Plan: rate controlled, Eliquis was stopped 2ry to lower GI bleeding, continue on Metoprolol tartrate with hold parameters.     Problem/Plan - 6:  ·  Problem: Need for prophylactic measure.   ·  Plan: Started her on UFH 5000 units sub Q every 8h for DVT prophylaxis.   89 y/o F with PMH of HTN, Dyslipidemia, MR s/p Bushra- Stephanie, A. Fib not on anticoagulants, Chronic  Diastolic CHF, Chronic Hypoxic Respiratory Failure (on 3L NC), COVID-19, CKD 3, lower GI Bleeding, Anemia, Anxiety, and Depression presented with worsening SOB.    Shortness of breath - thought initially to be acute on chronic CHF but no significant findings for CHF (TTE pending), likely pulmonary etiology such as COPD exacerbation, improving today  - cont with telemetry  - cardiology consulted and following -> TTE, cont with torsemide and spironolactone  - pulmonary consulted -> nebs PRN, short course of prednisone  - on symbicort    Acute on chronic kidney disease - likely secondary to diuretic use  - nephrology consulted  - avoid nephrotoxic meds  - monitor electrolytes (noted to have hyperphosphotemia and hypermagnesmia)    Hyponatremia - changed from 129 to 141  - start D5 secondary to rapid change  - monitor BMP     Preventive measures  - on heparin for DVT ppx 89 y/o F with PMH of HTN, Dyslipidemia, MR s/p Bushra- Stephanie, A. Fib not on anticoagulants, Chronic  Diastolic CHF, Chronic Hypoxic Respiratory Failure (on 3L NC), COVID-19, CKD 3, lower GI Bleeding, Anemia, Anxiety, and Depression presented with worsening SOB.    Shortness of breath - thought initially to be acute on chronic CHF but no significant findings for CHF (TTE pending), likely pulmonary etiology such as COPD exacerbation, improving today  - cont with telemetry  - cardiology consulted and following -> TTE, cont with torsemide and spironolactone  - pulmonary consulted -> nebs PRN, short course of prednisone  - on symbicort    Acute on chronic kidney disease - likely secondary to diuretic use  - nephrology consulted  - avoid nephrotoxic meds  - monitor electrolytes (noted to have hyperphosphotemia and hypermagnesmia)    Hyponatremia - changed from 129 to 141  - start D5 secondary to rapid change  - monitor BMP     Atrial fibrillation   - cont with metoprolol for rate control    HLD  - cont with atorvastatin    Anxiety/depression  - cont with sertraline    Preventive measures  - on heparin for DVT ppx

## 2024-03-14 NOTE — DISCHARGE NOTE NURSING/CASE MANAGEMENT/SOCIAL WORK - NSSCNAMETXT_GEN_ALL_CORE
You are opting to resume services with [facility] [place] [phone]/ [phone]. Home care agency will reach out to you within 24-72 hours of your discharge to schedule home care visit/eval appointment with you. Please call agency for any queries regarding home care services

## 2024-03-14 NOTE — DIETITIAN INITIAL EVALUATION ADULT - PROBLEM SELECTOR PLAN 4
ML 2ry to diuretic use, moderate, asymptomatic, trend serum sodium level on the above management, nephrology consulted as stated above.

## 2024-03-14 NOTE — DISCHARGE NOTE NURSING/CASE MANAGEMENT/SOCIAL WORK - NSDCPEFALRISK_GEN_ALL_CORE
For information on Fall & Injury Prevention, visit: [email or web address] OR  [email or web address] OR  https://www.cdc.gov/steadi/patient.html

## 2024-03-14 NOTE — CARE COORDINATION ASSESSMENT. - NSDCPLANSERVICES_GEN_ALL_CORE
RN/CM met with pt, introduced self and explained role of CM. Pt is alert and oriented and gave permission that both her daughter, Shirlene [phone] and son, Ronald [phone] be privy to all info. DaughterShirlene @ bedside and stated that pt's son, Ronald is currently out-of-state in [place]. Daughter has recently returned herself from [place] and is overseeing pt's care. Confirmed with pt and family that pt has rolling walker, wheelchair, home oxygen (both portable and stationary thru [facility]) @ assisted living facility. Pt's community MD is Marjorie Mishra [phone]/fax [phone]; pulmo MD is Khari Cardona [phone]; recently switched to NEW cardio Viktoriya Contreras in [place] (pt has yet to see) [phone]. CM initiated DC planning discussion- home care services/expectations, insurance provisions, choices of agencies. Both pt and daughterShirlene opted for resumption of services thru [facility] [phone]/ [phone]. CM will refer case accordingly. Pt currently remains acute- on 5 LPM of O2 supplement and IV Lasix. DC pending pt's hospital course. CM remains available and continues to follow case. RN/CM met with pt, introduced self and explained role of CM. Pt is alert and oriented and gave permission that both her daughter, Shirlene [phone] and son, Ronald [phone] be privy to all info. DaughterShirlene @ bedside and stated that pt's son, Ronald is currently out-of-state in [place]. Daughter has recently returned herself from [place] and is overseeing pt's care. Confirmed with pt and family that pt has rolling walker, wheelchair, home oxygen (both portable and stationary thru [facility]) @ assisted living facility. Pt's community MD is Marjorie Mishra [phone]/fax [phone]; pulmo MD is Khari Cardona [phone]; recently switched to NEW cardio Viktoriya Contreras in [place] (pt has yet to see) [phone]. CM initiated DC planning discussion- home care services/expectations, insurance provisions, choices of agencies. Both pt and daughterShirlene opted for resumption of services thru [facility] [phone]/ [phone]. CM will refer case accordingly. Pt currently remains acute- on 5 LPM of O2 supplement and IV Lasix. DC pending pt's hospital course. CM has spoken to Jaleesa of [facility] on [address] [phone]/ fax [phone]; confirmed that pt utilizes [facility] [phone] and that once pt is cleared for DC, would need the following DC paperwork to be faxed over to [phone]; 1 page attestation form, updated PT note; any NEW/CHANGED meds to [facility] pharmacy. CM remains available and continues to follow case. RN/CM met with pt, introduced self and explained role of CM. Pt is alert and oriented and gave permission that both her daughter, Shirlene [phone] and son, Ronald [phone] be privy to all info. DaughterShirlene @ bedside and stated that pt's son, Ronald is currently out-of-state in [place]. Daughter has recently returned herself from [place] and is overseeing pt's care. Confirmed with pt and family that pt has rolling walker, wheelchair, home oxygen (both portable and stationary thru [facility]) @ assisted living facility. Pt's community MD is Marjorie Mishra [phone]/fax [phone]; pulmo MD is Khari Cardona [phone]; recently switched to NEW cardio Viktoriya Contreras in [place] (pt has yet to see) [phone]. CM initiated DC planning discussion- home care services/expectations, insurance provisions, choices of agencies. Both pt and daughterShirlene opted for resumption of services thru [facility] [phone]/ [phone]. CM will refer case accordingly. Pt currently remains acute- on 5 LPM of O2 supplement and IV Lasix. DC pending pt's hospital course. CM has spoken to Jaleesa of [facility] on [address] [phone]/ fax [phone]; confirmed that pt utilizes [facility] [phone] and that once pt is cleared for DC, would need the following DC paperwork to be faxed over to [phone]; 1 page attestation form, updated PT note; any NEW/CHANGED meds to [facility] pharmacy. During Interdisciplinary rounds, pt has been identifies as a CMS STAR case. CM remains available and continues to follow case. RN/CM met with pt, introduced self and explained role of CM. Pt is alert and oriented and gave permission that both her daughter, Shirlene [phone] and son, Ronald [phone] be privy to all info. DaughterShirlene @ bedside and stated that pt's son, Ronald is currently out-of-state in [place]. Daughter has recently returned herself from [place] and is overseeing pt's care. Confirmed with pt and family that pt has rolling walker, wheelchair, home oxygen (both portable and stationary thru [facility]) @ assisted living facility. Pt's community MD is Marjorie Mishra [phone]/fax [phone]; pulmo MD is Khari Cardona [phone]; recently switched to NEW cardio Viktoriya Contreras in [place] (pt has yet to see) [phone]. CM initiated DC planning discussion- home care services/expectations, insurance provisions, choices of agencies. Both pt and daughterShirlene opted for resumption of services thru [facility] [phone]/ [phone]. CM will refer case accordingly. Pt currently remains acute- on 5 LPM of O2 supplement and IV Lasix. DC pending pt's hospital course. CM has spoken to Jaleesa of [facility] on [address] [phone]/ fax [phone]; confirmed that pt utilizes [facility] [phone] and that once pt is cleared for DC, would need the following DC paperwork to be faxed over to [phone]; 1 page attestation form, updated PT note; any NEW/CHANGED meds to [facility] pharmacy. During Interdisciplinary rounds, pt has been identified as a CMS STAR case. CM remains available and continues to follow case.

## 2024-03-14 NOTE — CARE COORDINATION ASSESSMENT. - LIVES WITH, PROFILE
Telephone Encounter by Angelique Martinez RN at 01/02/18 04:12 PM     Author:  Angelique Martinez RN Service:  (none) Author Type:  Registered Nurse     Filed:  01/02/18 04:12 PM Encounter Date:  1/2/2018 Status:  Signed     :  Angelique Martinez RN (Registered Nurse)            done[CO1.1M]      Revision History        User Key Date/Time User Provider Type Action    > CO1.1 01/02/18 04:12 PM Angelique Martinez RN Registered Nurse Sign    M - Manual             pt resides @ assisted living facility

## 2024-03-14 NOTE — DISCHARGE NOTE NURSING/CASE MANAGEMENT/SOCIAL WORK - NSDCDMENAME_GEN_ALL_CORE_FT
prior to admission you have a rolling walker, wheelchair and home oxygen (both portable and stationary concentrator thru [facility])

## 2024-03-14 NOTE — CAREGIVER ENGAGEMENT NOTE - CAREGIVER EDUCATION HOME CARE SERVICES - FREE TEXT
resumption of home care services thru [facility] [place] [phone]/ [phone]

## 2024-03-14 NOTE — DISCHARGE NOTE NURSING/CASE MANAGEMENT/SOCIAL WORK - NSDCCRTYPESERV_GEN_ALL_CORE_FT
assisted living facility staff provides you with intermittent/needed assistance assisted living facility staff provides you with intermittent/needed assistance-family will also be arranging 12/hr day pvt hire

## 2024-03-14 NOTE — PROGRESS NOTE ADULT - ASSESSMENT
89-year-old female with history of A-fib, CHF, anemia, COPD, diabetes, depression, anxiety, hypertension, hyperlipidemia, on 3 L O2 24/7 brought in by ambulance from [facility] for evaluation of shortness of breath    dyspnea  COPD  AF  CHF  OP  OA  DM  Depression  Anxiety  HTN  HLD  Mitral valve disease with Mitral clipping and Pulm HTN  CKD MEME    check RVP - viral panel screen  copd rx regimen with Inhaler and NEBS prn regimen and short course of Prednisone  cvs rx regimen optimization  I and O  diuresis as per Medicine and Cardio teams  monitor VS and HD and Sat  serial renal indices  CT chest - cardiomegaly  consideration for LE dopplers  GOC discussion  goal sat > 88 pct  VBG - noted  cont home o2 support - 3 L n/c  nasal saline for congestion upper airway   work up in progress

## 2024-03-14 NOTE — PATIENT CHOICE NOTE. - NSPTCHOICENOTES_GEN_ALL_CORE
Pt and daughter opted to resume services with [facility] [place] [phone]/ [phone] upon DC. CM will refer case accordingly.

## 2024-03-14 NOTE — DIETITIAN INITIAL EVALUATION ADULT - PERTINENT LABORATORY DATA
03-14    138  |  100  |  56<H>  ----------------------------<  198<H>  5.2   |  26  |  1.98<H>    Ca    9.3      14 Mar 2024 12:00  Phos  6.3     03-14  Mg     2.8     03-14    TPro  7.8  /  Alb  3.2<L>  /  TBili  0.8  /  DBili  x   /  AST  20  /  ALT  12  /  AlkPhos  62  03-13

## 2024-03-14 NOTE — CONSULT NOTE ADULT - SUBJECTIVE AND OBJECTIVE BOX
History of Present Illness: The patient is a 89 year old female with a history of HTN, HL, atrial fibrillation, MitraClip, chronic diastolic heart failure, GI bleed who presents with shortness of breath. She noted worsening dyspnea on exertion for the past 2-3 days. No wheezing, cough, chest pain. She thinks her leg swelling has improved.    Past Medical/Surgical History:  HTN, HL, atrial fibrillation, MitraClip, chronic diastolic heart failure, GI bleed    Medications:  Home Medications:  albuterol 2.5 mg/3 mL (0.083%) inhalation solution: 3 milliliter(s) by nebulizer every 4 hours as needed for  shortness of breath and/or wheezing (13 Mar 2024 18:00)  ferrous sulfate 325 mg (65 mg elemental iron) oral tablet: 1 tab(s) orally once a day (13 Mar 2024 18:00)  fluticasone propionate 55 mcg/inh inhalation powder: 1 puff(s) inhaled every 12 hours (13 Mar 2024 18:00)  gabapentin 100 mg oral tablet: orally once a day (at bedtime) (13 Mar 2024 18:00)  Jardiance 10 mg oral tablet: 1 tab(s) orally (13 Mar 2024 18:00)  melatonin 3 mg oral tablet: 1 tab(s) orally once a day (at bedtime) As needed Insomnia (13 Mar 2024 13:46)  MiraLax oral powder for reconstitution: orally once a day (at bedtime) if needed for constipation (13 Mar 2024 13:46)  Nasal Saline 0.65% nasal spray: 1 spray(s) nasal 2 times a day both nostrils  (13 Mar 2024 13:46)  ramelteon 8 mg oral tablet: 1 tab(s) orally once a day (at bedtime) (13 Mar 2024 18:00)  Remeron 15 mg oral tablet: 1.5 tab(s) orally once a day (at bedtime) (13 Mar 2024 18:00)  senna leaf extract oral tablet: 2 tab(s) orally once a day (at bedtime) (13 Mar 2024 18:00)  sertraline 25 mg oral tablet: 3 tab(s) orally once a day (13 Mar 2024 18:00)  spironolactone 25 mg oral tablet: 1 tab(s) orally 2 times a day (13 Mar 2024 18:00)  sulfamethoxazole-trimethoprim 800 mg-160 mg oral tablet: 1 tab(s) orally 2 times a day until 12/31 (13 Mar 2024 18:00)  torsemide 60 mg oral tablet: 1 tab(s) orally once a day (13 Mar 2024 18:00)      Family History: Non-contributory family history of premature cardiovascular atherosclerotic disease    Social History: No tobacco, alcohol or drug use    Review of Systems:  General: No fevers, chills, weight gain  Skin: No rashes, color changes  Cardiovascular: No chest pain, orthopnea  Respiratory: +shortness of breath, cough  Gastrointestinal: No nausea, abdominal pain  Genitourinary: No incontinence, pain with urination  Musculoskeletal: No pain, swelling, decreased range of motion  Neurological: No headache, weakness  Psychiatric: No depression, anxiety  Endocrine: No weight gain, increased thirst  All other systems are comprehensively negative.    Physical Exam:  Vitals:        Vital Signs Last 24 Hrs  T(C): 36.8 (14 Mar 2024 05:01), Max: 37 (13 Mar 2024 19:14)  T(F): 98.2 (14 Mar 2024 05:01), Max: 98.6 (13 Mar 2024 19:14)  HR: 85 (14 Mar 2024 07:09) (75 - 100)  BP: 113/60 (14 Mar 2024 05:01) (105/63 - 126/64)  BP(mean): --  RR: 19 (14 Mar 2024 05:01) (14 - 20)  SpO2: 94% (14 Mar 2024 07:09) (93% - 99%)  Parameters below as of 14 Mar 2024 07:09  Patient On (Oxygen Delivery Method): nasal cannula, 5 LPM  General: NAD  HEENT: MMM  Neck: No JVD, no carotid bruit  Lungs: CTAB  CV: RRR, nl S1/S2, no M/R/G  Abdomen: S/NT/ND, +BS  Extremities: No LE edema, no cyanosis  Neuro: AAOx3, non-focal  Skin: No rash    Labs:                        11.2   4.67  )-----------( 167      ( 14 Mar 2024 07:49 )             35.8     03-13    129<L>  |  99  |  51<H>  ----------------------------<  114<H>  4.4   |  24  |  1.96<H>    Ca    9.4      13 Mar 2024 14:10    TPro  7.8  /  Alb  3.2<L>  /  TBili  0.8  /  DBili  x   /  AST  20  /  ALT  12  /  AlkPhos  62  03-13        PT/INR - ( 13 Mar 2024 14:10 )   PT: 14.3 sec;   INR: 1.32 ratio         PTT - ( 13 Mar 2024 14:10 )  PTT:31.6 sec    ECG/Telemetry: Atrial fibrillation, LAD, incomplete RBBB, poor R wave progression

## 2024-03-14 NOTE — CONSULT NOTE ADULT - SUBJECTIVE AND OBJECTIVE BOX
HPI:  Patient is a 89y old  Female who presents with a chief complaint of SOB (14 Mar 2024 09:10)      HPI:   This is a 89 y/o F with PMH of HTN, Dyslipidemia, MR s/p KIMBER Benitez. Fib not on anticoagulants, Chronic  Diastolic CHF, Chronic Hypoxic Respiratory Failure (on 3L NC), COVID-19, CKD 3, lower GI Bleeding, Anemia, Anxiety, and Depression who was sent from [facility] living [facility] for worsening SOB over the past 2 days even with minimal effort with dropping of her SPO2 while on supplemental oxygen. Patient states that she feels her nose dry & stuffy with difficulty to breath, denies cough but her son at the bedside reports dry cough. Patient also reports itchy eyes, no chest pain or palpitations, no dizziness, nausea, vomiting, fever, or chills.  (13 Mar 2024 21:19)      PAST MEDICAL & SURGICAL HISTORY:  HTN (hypertension)  HLD (hyperlipidemia)  Atrial fibrillation  On Pradaxa  Syncope  Depression  Leg edema  Mitral valve stenosis, unspecified etiology  Hearing loss of left ear  H/O knee surgery  H/O external ear surgery  Cataracta      FAMILY HISTORY:  Family history of blood disorder (Mother)        Allergies    codeine (Other)    Intolerances        MEDICATIONS  (STANDING):  atorvastatin 10 milliGRAM(s) Oral at bedtime  budesonide  80 MICROgram(s)/formoterol 4.5 MICROgram(s) Inhaler 2 Puff(s) Inhalation two times a day  ciprofloxacin  0.3% Ophthalmic Solution 1 Drop(s) Both EYES every 6 hours  dextrose 5%. 1000 milliLiter(s) (50 mL/Hr) IV Continuous <Continuous>  ferrous    sulfate 325 milliGRAM(s) Oral daily  furosemide   Injectable 40 milliGRAM(s) IV Push daily  gabapentin 100 milliGRAM(s) Oral at bedtime  heparin   Injectable 5000 Unit(s) SubCutaneous every 8 hours  melatonin 3 milliGRAM(s) Oral at bedtime  metoprolol tartrate 25 milliGRAM(s) Oral two times a day  mirtazapine 22.5 milliGRAM(s) Oral at bedtime  predniSONE   Tablet 20 milliGRAM(s) Oral daily  senna 2 Tablet(s) Oral at bedtime  sertraline 75 milliGRAM(s) Oral daily  sodium chloride 0.65% Nasal 1 Spray(s) Both Nostrils three times a day  spironolactone 25 milliGRAM(s) Oral two times a day  tiotropium 2.5 MICROgram(s) Inhaler 2 Puff(s) Inhalation daily    MEDICATIONS  (PRN):  acetaminophen     Tablet .. 650 milliGRAM(s) Oral every 6 hours PRN Temp greater or equal to 38C (100.4F), Mild Pain (1 - 3)  albuterol    0.083% 2.5 milliGRAM(s) Nebulizer every 3 hours PRN Shortness of Breath and/or Wheezing  bisacodyl 5 milliGRAM(s) Oral every 12 hours PRN Constipation  guaiFENesin Oral Liquid (Sugar-Free) 200 milliGRAM(s) Oral every 6 hours PRN Cough  polyethylene glycol 3350 17 Gram(s) Oral at bedtime PRN Constipation      Daily Height in cm: 157.5 (13 Mar 2024 22:18)    Daily     Drug Dosing Weight  Height (cm): 157.5 (13 Mar 2024 22:18)  Weight (kg): 65.8 (13 Mar 2024 22:18)  BMI (kg/m2): 26.5 (13 Mar 2024 22:18)  BSA (m2): 1.67 (13 Mar 2024 22:18)      REVIEW OF SYSTEMS:    Per HPI            I&O's Detail    13 Mar 2024 07:01  -  14 Mar 2024 07:00  --------------------------------------------------------  IN:  Total IN: 0 mL    OUT:    Voided (mL): 300 mL  Total OUT: 300 mL    Total NET: -300 mL          03-13 @ 07:01  -  03-14 @ 07:00  --------------------------------------------------------  IN: 0 mL / OUT: 300 mL / NET: -300 mL        PHYSICAL EXAM:    GENERAL: NAD  HEAD:  Atraumatic, normocephalic  EYES: EOMI, PERRLA. Conjunctiva and sclera clear  ENMT: moist mucous membranes.   NECK: Supple. No increase in JVP  NERVOUS SYSTEM:  Alert & Oriented X3. Motor Strength 5/5 B/L upper and lower extremities; DTRs 2+ intact and symmetric  CHEST/LUNG: Clear to auscultation bilaterally  HEART: Regular rate and rhythm. No murmurs, rubs, or gallops  ABDOMEN: Soft, Nontender, Nondistended. POS BS  EXTREMITIES:  no edema  LYMPH: No lymphadenopathy noted  SKIN: No rashes or lesions    LABS:  CBC Full  -  ( 14 Mar 2024 07:49 )  WBC Count : 4.67 K/uL  RBC Count : 3.86 M/uL  Hemoglobin : 11.2 g/dL  Hematocrit : 35.8 %  Platelet Count - Automated : 167 K/uL  Mean Cell Volume : 92.7 fl  Mean Cell Hemoglobin : 29.0 pg  Mean Cell Hemoglobin Concentration : 31.3 gm/dL  Auto Neutrophil # : 3.69 K/uL  Auto Lymphocyte # : 0.59 K/uL  Auto Monocyte # : 0.33 K/uL  Auto Eosinophil # : 0.00 K/uL  Auto Basophil # : 0.01 K/uL  Auto Neutrophil % : 79.0 %  Auto Lymphocyte % : 12.6 %  Auto Monocyte % : 7.1 %  Auto Eosinophil % : 0.0 %  Auto Basophil % : 0.2 %    03-14    141  |  103  |  54<H>  ----------------------------<  213<H>  5.1   |  26  |  1.92<H>    Ca    9.3      14 Mar 2024 07:49  Phos  6.3     03-14  Mg     2.8     03-14    TPro  7.8  /  Alb  3.2<L>  /  TBili  0.8  /  DBili  x   /  AST  20  /  ALT  12  /  AlkPhos  62  03-13        Impression:    Recommendations:    HPI:  Patient is a 89y old  Female with a hx of CKD 3-4, baseline Cr 1-1.5, diastolic CM who was admitted yest for management of decompensated HF.   Serum Na 129, Cr 1.96 on presentation. HypoNa resolved, Cr stabilized on IV lasix. She is subjectively less SOB this morning.   Denies difficulty voiding.       PAST MEDICAL & SURGICAL HISTORY:  HTN (hypertension)  HLD (hyperlipidemia)  Atrial fibrillation  On Pradaxa  Syncope  Depression  Leg edema  Mitral valve stenosis, unspecified etiology  Hearing loss of left ear  H/O knee surgery  H/O external ear surgery  Cataracta      FAMILY HISTORY:  Family history of blood disorder (Mother)        Allergies    codeine (Other)    Intolerances        MEDICATIONS  (STANDING):  atorvastatin 10 milliGRAM(s) Oral at bedtime  budesonide  80 MICROgram(s)/formoterol 4.5 MICROgram(s) Inhaler 2 Puff(s) Inhalation two times a day  ciprofloxacin  0.3% Ophthalmic Solution 1 Drop(s) Both EYES every 6 hours  dextrose 5%. 1000 milliLiter(s) (50 mL/Hr) IV Continuous <Continuous>  ferrous    sulfate 325 milliGRAM(s) Oral daily  furosemide   Injectable 40 milliGRAM(s) IV Push daily  gabapentin 100 milliGRAM(s) Oral at bedtime  heparin   Injectable 5000 Unit(s) SubCutaneous every 8 hours  melatonin 3 milliGRAM(s) Oral at bedtime  metoprolol tartrate 25 milliGRAM(s) Oral two times a day  mirtazapine 22.5 milliGRAM(s) Oral at bedtime  predniSONE   Tablet 20 milliGRAM(s) Oral daily  senna 2 Tablet(s) Oral at bedtime  sertraline 75 milliGRAM(s) Oral daily  sodium chloride 0.65% Nasal 1 Spray(s) Both Nostrils three times a day  spironolactone 25 milliGRAM(s) Oral two times a day  tiotropium 2.5 MICROgram(s) Inhaler 2 Puff(s) Inhalation daily    MEDICATIONS  (PRN):  acetaminophen     Tablet .. 650 milliGRAM(s) Oral every 6 hours PRN Temp greater or equal to 38C (100.4F), Mild Pain (1 - 3)  albuterol    0.083% 2.5 milliGRAM(s) Nebulizer every 3 hours PRN Shortness of Breath and/or Wheezing  bisacodyl 5 milliGRAM(s) Oral every 12 hours PRN Constipation  guaiFENesin Oral Liquid (Sugar-Free) 200 milliGRAM(s) Oral every 6 hours PRN Cough  polyethylene glycol 3350 17 Gram(s) Oral at bedtime PRN Constipation      Daily Height in cm: 157.5 (13 Mar 2024 22:18)    Daily     Drug Dosing Weight  Height (cm): 157.5 (13 Mar 2024 22:18)  Weight (kg): 65.8 (13 Mar 2024 22:18)  BMI (kg/m2): 26.5 (13 Mar 2024 22:18)  BSA (m2): 1.67 (13 Mar 2024 22:18)      REVIEW OF SYSTEMS:    Per HPI            I&O's Detail    13 Mar 2024 07:01  -  14 Mar 2024 07:00  --------------------------------------------------------  IN:  Total IN: 0 mL    OUT:    Voided (mL): 300 mL  Total OUT: 300 mL    Total NET: -300 mL          03-13 @ 07:01  -  03-14 @ 07:00  --------------------------------------------------------  IN: 0 mL / OUT: 300 mL / NET: -300 mL        PHYSICAL EXAM:    GENERAL: NAD  HEAD:  Atraumatic, normocephalic  Pos incr JVP  CHEST/LUNG: crackles  HEART: Regular rate and rhythm. No murmurs, rubs, or gallops  ABDOMEN: Soft, Nontender, Nondistended. POS BS  EXTREMITIES:  pos edema.     LABS:  CBC Full  -  ( 14 Mar 2024 07:49 )  WBC Count : 4.67 K/uL  RBC Count : 3.86 M/uL  Hemoglobin : 11.2 g/dL  Hematocrit : 35.8 %  Platelet Count - Automated : 167 K/uL  Mean Cell Volume : 92.7 fl  Mean Cell Hemoglobin : 29.0 pg  Mean Cell Hemoglobin Concentration : 31.3 gm/dL  Auto Neutrophil # : 3.69 K/uL  Auto Lymphocyte # : 0.59 K/uL  Auto Monocyte # : 0.33 K/uL  Auto Eosinophil # : 0.00 K/uL  Auto Basophil # : 0.01 K/uL  Auto Neutrophil % : 79.0 %  Auto Lymphocyte % : 12.6 %  Auto Monocyte % : 7.1 %  Auto Eosinophil % : 0.0 %  Auto Basophil % : 0.2 %    03-14    141  |  103  |  54<H>  ----------------------------<  213<H>  5.1   |  26  |  1.92<H>    Ca    9.3      14 Mar 2024 07:49  Phos  6.3     03-14  Mg     2.8     03-14    TPro  7.8  /  Alb  3.2<L>  /  TBili  0.8  /  DBili  x   /  AST  20  /  ALT  12  /  AlkPhos  62  03-13        Impression:  * MEME -- either cardio-renal vs post-renal  * 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 Non-proteinuric  * Decompensated diastolic HF  * Chronic a.fib    Recommendations:   * Obtain Renal US to exclude retention   * Follow Cr, K on IV diuretic, aldactone.

## 2024-03-14 NOTE — CAREGIVER ENGAGEMENT NOTE - CAREGIVER OUTREACH NOTES - FREE TEXT
RN/CM obtained permission from pt that both daughterShirlene and sonRonald be privy to all info. Son Ronald is currently out-of-state so daughterShirlene is overseeing pt's overall care.

## 2024-03-14 NOTE — CARE COORDINATION ASSESSMENT. - CURRENT MENTAL STATUS/COGNITIVE FUNCTIONING
alert/oriented to person/oriented to place/oriented to time/oriented to situation/recall memory is intact/behavior seems appropriate to situation

## 2024-03-14 NOTE — CONSULT NOTE ADULT - ASSESSMENT
The patient is a 89 year old female with a history of HTN, HL, atrial fibrillation, MitraClip, chronic diastolic heart failure, GI bleed who presents with shortness of breath.    Plan:  - ECG with known atrial fibrillation  - CT chest with no acute finding  - BNP 5414  - Echo 11/22 with normal LV systolic function, normal MitraClip function, mild pulm HTN  - Repeat echo  - No evidence of decompensated heart failure on exam or imaging  - On furosemide 40 mg IV daily - lower to home torsemide 60 mg daily on discharge  - Continue torsemide 10 mg daily  - Continue spironolactone 25 mg daily  - Pulm follow-up The patient is a 89 year old female with a history of HTN, HL, atrial fibrillation, MitraClip, chronic diastolic heart failure, GI bleed who presents with shortness of breath.    Plan:  - ECG with known atrial fibrillation  - CT chest with no acute finding  - BNP 5414  - Echo 11/22 with normal LV systolic function, normal MitraClip function, mild pulm HTN  - Repeat echo  - No evidence of decompensated heart failure on exam or imaging  - On furosemide 40 mg IV daily - lower to home torsemide 60 mg daily on discharge  - Continue spironolactone 25 mg daily  - Pulm follow-up

## 2024-03-14 NOTE — DISCHARGE NOTE NURSING/CASE MANAGEMENT/SOCIAL WORK - PATIENT PORTAL LINK FT
You can access the FollowMyHealth Patient Portal offered by [facility] by registering at the following website: [email or web address]. By joining [facility]’s FollowMyHealth portal, you will also be able to view your health information using other applications (apps) compatible with our system.

## 2024-03-14 NOTE — DISCHARGE NOTE NURSING/CASE MANAGEMENT/SOCIAL WORK - NSDPFAC_GEN_ALL_CORE
The [facility] Living [facility] on [address] [phone]/ fax [phone].

## 2024-03-14 NOTE — DIETITIAN INITIAL EVALUATION ADULT - ADD RECOMMEND
1) Continue DASH/Soft and bite size diet and recommend 1500ml fluid restriction  2) Can provide ensure plus HP pending %intake   3) Continue to monitor PO intake, tolerance to diet prescription, weights, labs, GI tolerance, and skin integrity. MD notified via teams regarding change in diet order.

## 2024-03-14 NOTE — CARE COORDINATION ASSESSMENT. - NSCAREPROVIDERS_GEN_ALL_CORE_FT
CARE PROVIDERS:  Accepting Physician: Ellie Landrum  Access Services: Cruzito Ghosh  Admitting: Ellie Landrum  Attending: Ellie Landrum  Case Management: Jacki Hines  Consultant: Beka De Jesus  Consultant: Brian Caro  Consultant: Lucien Nowak  Covering Team: Hao Segundo ED ACP: Corin Guzman ED Attending: Edy Levine ED Nurse: Lou Palm  Infection Control: Leah Jose  Nurse: Bonita Alejandro  Nurse: Nicolas Bills  Outpatient Provider: Connor Walker  Outpatient Provider: Avila Hunt  Outpatient Provider: Mak Lantigua  Outpatient Provider: Celestina Wang  Override: Kavita Larson  PCA/Nursing Assistant: Bebe Frye  Primary Team: Carlos A Rolon  Registered Dietitian: Sadia Jones  Registered Dietitian: Lilliana Aleman  Respiratory Therapy: Maria Eugenia Cotto  Respiratory Therapy: Anaid Collado  : Catalina Liang  : Neda Wagner  Team: [facility], Team  UR// Supp. Assoc.: Stephy Pettit  UR// Supp. Assoc.: Zan Flores   CARE PROVIDERS:  Accepting Physician: Ellie Landrum  Access Services: Cruzito Ghosh  Admitting: Ellie Landrum  Attending: Ellie Landrum  Case Management: Jacki Hines  Consultant: Beka De Jesus  Consultant: Brian Caro  Consultant: Lucien Nowak  Covering Team: Hao Segundo ED ACP: Corin Guzman ED Attending: Edy Levine ED Nurse: Lou Palm  Infection Control: Leah Jose  Nurse: Nicolas Bills  Nurse: Bonita Alejandro  Outpatient Provider: Connor Walker  Outpatient Provider: Avila Hunt  Outpatient Provider: Mak Lantigua  Outpatient Provider: Celestina Wang  Override: Kavita Larson  PCA/Nursing Assistant: Bebe Frye  Primary Team: Carlos A Rolon  Radiology Technician: Romy Garcia  Registered Dietitian: Sadia Jones  Registered Dietitian: Lilliana Aleman  Respiratory Therapy: Maria Eugenia Cotto  Respiratory Therapy: Anaid Collado  : Neda Wagner  : Catalina Liang  Team: [facility], Team  UR// Supp. Assoc.: Stephy Pettit  UR// Supp. Assoc.: Zan Flores

## 2024-03-14 NOTE — CAREGIVER ENGAGEMENT NOTE - CAREGIVER EDUCATION NOTES - FREE TEXT
RN/CM initiated DC planning discussion and discussed all above with both pt and daughter, Shirlene.  RN/CM initiated DC planning discussion and discussed all above with both pt and daughter, Shirlene. RN/CM met with pt, introduced self and explained role of CM. Pt is alert and oriented and gave permission that both her daughter, Shirlene [phone] and son, Ronald [phone] be privy to all info. DaughterShirlene @ bedside and stated that pt's son, Ronald is currently out-of-state in [place]. Daughter has recently returned herself from [place] and is overseeing pt's care. Confirmed with pt and family that pt has rolling walker, wheelchair, home oxygen (both portable and stationary thru [facility]) @ assisted living facility. Pt's community MD is Marjorie Mishra [phone]/fax [phone]; pulmo MD is Khari Cardona [phone]; recently switched to NEW cardio Viktoriya Contreras in [place] (pt has yet to see) [phone]. CM initiated DC planning discussion- home care services/expectations, insurance provisions, choices of agencies. Both pt and daughterShirlene opted for resumption of services thru [facility] [phone]/ [phone]. CM will refer case accordingly. Pt currently remains acute- on 5 LPM of O2 supplement and IV Lasix. DC pending pt's hospital course. CM has spoken to Jaleesa of [facility] on [address] [phone]/ fax [phone]; confirmed that pt utilizes [facility] [phone] and that once pt is cleared for DC, would need the following DC paperwork to be faxed over to [phone]; 1 page attestation form, updated PT note; any NEW/CHANGED meds to [facility]. During Interdisciplinary rounds, pt has been identified as a CMS STAR case- CM reviewed this with both pt and daughter.

## 2024-03-14 NOTE — CARE COORDINATION ASSESSMENT. - LIVING ARRANGEMENTS, PROFILE
[facility] on [address] [phone]/ fax [phone]./assisted living

## 2024-03-14 NOTE — DISCHARGE NOTE NURSING/CASE MANAGEMENT/SOCIAL WORK - NSDCCRNAME_GEN_ALL_CORE_FT
You are a resident of [facility] on [address] [phone]/ fax [phone]. Utilizing [facility] pharmacy [phone]

## 2024-03-14 NOTE — DIETITIAN INITIAL EVALUATION ADULT - REASON FOR ADMISSION
Heart failure    Per HPI:  This is a 89 y/o F with PMH of HTN, Dyslipidemia, MR s/p Bushra- Stephanie, A. Fib not on anticoagulants, Chronic  Diastolic CHF, Chronic Hypoxic Respiratory Failure (on 3L NC), COVID-19, CKD 3, lower GI Bleeding, Anemia, Anxiety, and Depression who was sent from [facility] for worsening SOB over the past 2 days even with minimal effort with dropping of her SPO2 while on supplemental oxygen. Patient states that she feels her nose dry & stuffy with difficulty to breath, denies cough but her son at the bedside reports dry cough. Patient also reports itchy eyes, no chest pain or palpitations, no dizziness, nausea, vomiting, fever, or chills.  (13 Mar 2024 21:19)

## 2024-03-15 LAB
A1C WITH ESTIMATED AVERAGE GLUCOSE RESULT: 6.9 % — HIGH (ref 4–5.6)
ALBUMIN SERPL ELPH-MCNC: 3.2 G/DL — LOW (ref 3.3–5)
ALP SERPL-CCNC: 53 U/L — SIGNIFICANT CHANGE UP (ref 30–120)
ALT FLD-CCNC: 14 U/L — SIGNIFICANT CHANGE UP (ref 10–60)
ANION GAP SERPL CALC-SCNC: 10 MMOL/L — SIGNIFICANT CHANGE UP (ref 5–17)
AST SERPL-CCNC: 16 U/L — SIGNIFICANT CHANGE UP (ref 10–40)
BILIRUB SERPL-MCNC: 0.7 MG/DL — SIGNIFICANT CHANGE UP (ref 0.2–1.2)
BUN SERPL-MCNC: 62 MG/DL — HIGH (ref 7–23)
CALCIUM SERPL-MCNC: 9.1 MG/DL — SIGNIFICANT CHANGE UP (ref 8.4–10.5)
CHLORIDE SERPL-SCNC: 103 MMOL/L — SIGNIFICANT CHANGE UP (ref 96–108)
CO2 SERPL-SCNC: 25 MMOL/L — SIGNIFICANT CHANGE UP (ref 22–31)
CREAT SERPL-MCNC: 1.79 MG/DL — HIGH (ref 0.5–1.3)
EGFR: 26 ML/MIN/1.73M2 — LOW
ESTIMATED AVERAGE GLUCOSE: 151 MG/DL — HIGH (ref 68–114)
GLUCOSE SERPL-MCNC: 136 MG/DL — HIGH (ref 70–99)
HCT VFR BLD CALC: 37.9 % — SIGNIFICANT CHANGE UP (ref 34.5–45)
HGB BLD-MCNC: 11.7 G/DL — SIGNIFICANT CHANGE UP (ref 11.5–15.5)
MAGNESIUM SERPL-MCNC: 2.9 MG/DL — HIGH (ref 1.6–2.6)
MCHC RBC-ENTMCNC: 28.7 PG — SIGNIFICANT CHANGE UP (ref 27–34)
MCHC RBC-ENTMCNC: 30.9 GM/DL — LOW (ref 32–36)
MCV RBC AUTO: 93.1 FL — SIGNIFICANT CHANGE UP (ref 80–100)
NRBC # BLD: 0 /100 WBCS — SIGNIFICANT CHANGE UP (ref 0–0)
PHOSPHATE SERPL-MCNC: 4.7 MG/DL — HIGH (ref 2.5–4.5)
PLATELET # BLD AUTO: 169 K/UL — SIGNIFICANT CHANGE UP (ref 150–400)
POTASSIUM SERPL-MCNC: 4.7 MMOL/L — SIGNIFICANT CHANGE UP (ref 3.5–5.3)
POTASSIUM SERPL-SCNC: 4.7 MMOL/L — SIGNIFICANT CHANGE UP (ref 3.5–5.3)
PROT SERPL-MCNC: 7.8 G/DL — SIGNIFICANT CHANGE UP (ref 6–8.3)
RBC # BLD: 4.07 M/UL — SIGNIFICANT CHANGE UP (ref 3.8–5.2)
RBC # FLD: 19.4 % — HIGH (ref 10.3–14.5)
SODIUM SERPL-SCNC: 138 MMOL/L — SIGNIFICANT CHANGE UP (ref 135–145)
WBC # BLD: 9.39 K/UL — SIGNIFICANT CHANGE UP (ref 3.8–10.5)
WBC # FLD AUTO: 9.39 K/UL — SIGNIFICANT CHANGE UP (ref 3.8–10.5)

## 2024-03-15 PROCEDURE — 99233 SBSQ HOSP IP/OBS HIGH 50: CPT

## 2024-03-15 RX ADMIN — SPIRONOLACTONE 25 MILLIGRAM(S): 25 TABLET, FILM COATED ORAL at 17:41

## 2024-03-15 RX ADMIN — TIOTROPIUM BROMIDE 2 PUFF(S): 18 CAPSULE ORAL; RESPIRATORY (INHALATION) at 07:45

## 2024-03-15 RX ADMIN — Medication 40 MILLIGRAM(S): at 06:15

## 2024-03-15 RX ADMIN — SPIRONOLACTONE 25 MILLIGRAM(S): 25 TABLET, FILM COATED ORAL at 06:15

## 2024-03-15 RX ADMIN — Medication 25 MILLIGRAM(S): at 06:15

## 2024-03-15 RX ADMIN — Medication 1 DROP(S): at 17:41

## 2024-03-15 RX ADMIN — HEPARIN SODIUM 5000 UNIT(S): 5000 INJECTION INTRAVENOUS; SUBCUTANEOUS at 06:14

## 2024-03-15 RX ADMIN — BUDESONIDE AND FORMOTEROL FUMARATE DIHYDRATE 2 PUFF(S): 160; 4.5 AEROSOL RESPIRATORY (INHALATION) at 07:42

## 2024-03-15 RX ADMIN — Medication 1 SPRAY(S): at 06:14

## 2024-03-15 RX ADMIN — ALBUTEROL 2.5 MILLIGRAM(S): 90 AEROSOL, METERED ORAL at 09:06

## 2024-03-15 RX ADMIN — HEPARIN SODIUM 5000 UNIT(S): 5000 INJECTION INTRAVENOUS; SUBCUTANEOUS at 14:50

## 2024-03-15 RX ADMIN — BUDESONIDE AND FORMOTEROL FUMARATE DIHYDRATE 2 PUFF(S): 160; 4.5 AEROSOL RESPIRATORY (INHALATION) at 21:53

## 2024-03-15 RX ADMIN — SENNA PLUS 2 TABLET(S): 8.6 TABLET ORAL at 21:56

## 2024-03-15 RX ADMIN — Medication 25 MILLIGRAM(S): at 17:41

## 2024-03-15 RX ADMIN — Medication 325 MILLIGRAM(S): at 11:30

## 2024-03-15 RX ADMIN — Medication 20 MILLIGRAM(S): at 06:14

## 2024-03-15 RX ADMIN — Medication 40 MILLIGRAM(S): at 14:51

## 2024-03-15 RX ADMIN — Medication 1 DROP(S): at 06:14

## 2024-03-15 RX ADMIN — GABAPENTIN 100 MILLIGRAM(S): 400 CAPSULE ORAL at 21:56

## 2024-03-15 RX ADMIN — Medication 3 MILLIGRAM(S): at 21:56

## 2024-03-15 RX ADMIN — ALBUTEROL 2.5 MILLIGRAM(S): 90 AEROSOL, METERED ORAL at 14:50

## 2024-03-15 RX ADMIN — ATORVASTATIN CALCIUM 10 MILLIGRAM(S): 80 TABLET, FILM COATED ORAL at 21:56

## 2024-03-15 RX ADMIN — MIRTAZAPINE 22.5 MILLIGRAM(S): 45 TABLET, ORALLY DISINTEGRATING ORAL at 21:57

## 2024-03-15 RX ADMIN — SERTRALINE 75 MILLIGRAM(S): 25 TABLET, FILM COATED ORAL at 11:30

## 2024-03-15 RX ADMIN — ALBUTEROL 2.5 MILLIGRAM(S): 90 AEROSOL, METERED ORAL at 20:55

## 2024-03-15 RX ADMIN — Medication 1 SPRAY(S): at 21:57

## 2024-03-15 RX ADMIN — Medication 1 SPRAY(S): at 14:49

## 2024-03-15 RX ADMIN — Medication 1 DROP(S): at 11:28

## 2024-03-15 RX ADMIN — HEPARIN SODIUM 5000 UNIT(S): 5000 INJECTION INTRAVENOUS; SUBCUTANEOUS at 21:56

## 2024-03-15 NOTE — CASE MANAGEMENT PROGRESS NOTE - NSCMPROGRESSNOTE_GEN_ALL_CORE
RN/CM noted that as per staff and MD, pt remains acute, was on venturi mask O2 supplement and now weaned down to 6 LPM via NC. STEFANY has spoken to Jaleesa of [facility] on [address] [phone]/ fax [phone]; confirmed that pt utilizes [facility] [phone] and that once pt is cleared for DC, would need the following DC paperwork to be faxed over to [phone]; 1 page attestation form, updated PT note; any NEW/CHANGED meds to [facility]. During Interdisciplinary rounds, pt has been identified as a CMS STAR case. DC pending pt's hospital course. CM remains available and continues to follow case.  RN/CM noted that as per staff and MD, pt remains acute, was on venturi mask O2 supplement and now weaned down to 6 LPM via NC. Confirmed with pt and daughterShirlene that pt has rolling walker, wheelchair, home oxygen (both portable and stationary thru [facility]) @ assisted living facility. Pt's community MD is Marjorie Mishra [phone]/fax [phone]; pulmo MD is Khari Cardona [phone]; recently switched to NEW cardio Viktoriya Contreras in [place] (pt has yet to see) [phone]. CM initiated DC planning discussion- home care services/expectations, insurance provisions, choices of agencies. Both pt and daughterShirlene opted for resumption of services thru [facility] [phone]/ [phone]. CM will refer case accordingly. CM has spoken to Jaleesa of [facility] on [address] [phone]/ fax [phone]; confirmed that pt utilizes [facility] [phone] and that once pt is cleared for DC, would need the following DC paperwork to be faxed over to [phone]; 1 page attestation form, updated PT note; any NEW/CHANGED meds to [facility]. During Interdisciplinary rounds, pt has been identified as a CMS STAR case. DC pending pt's hospital course. CM remains available and continues to follow case.

## 2024-03-15 NOTE — PHYSICAL THERAPY INITIAL EVALUATION ADULT - PERTINENT HX OF CURRENT PROBLEM, REHAB EVAL
as per chart - This is a 89 y/o F with PMH of HTN, Dyslipidemia, MR s/p Bushra- Stephanie, A. Fib not on anticoagulants, Chronic  Diastolic CHF, Chronic Hypoxic Respiratory Failure (on 3L NC), COVID-19, CKD 3, lower GI Bleeding, Anemia, Anxiety, and Depression who was sent from [facility] living [facility] for worsening SOB over the past 2 days even with minimal effort with dropping of her SPO2 while on supplemental oxygen. Patient states that she feels her nose dry & stuffy with difficulty to breath, denies cough but her son at the bedside reports dry cough. Patient also reports itchy eyes, no chest pain or palpitations, no dizziness, nausea, vomiting, fever, or chills.

## 2024-03-15 NOTE — PHYSICAL THERAPY INITIAL EVALUATION ADULT - ADDITIONAL COMMENTS
Pt reports resides in [facility]. Pt states has RW, W/C and portable O2. Pt reports was independent prior to admission with use of RW.

## 2024-03-15 NOTE — PROGRESS NOTE ADULT - ASSESSMENT
89-year-old female with history of A-fib, CHF, anemia, COPD, diabetes, depression, anxiety, hypertension, hyperlipidemia, on 3 L O2 24/7 brought in by ambulance from [facility] for evaluation of shortness of breath    dyspnea  COPD  AF  CHF  OP  OA  DM  Depression  Anxiety  HTN  HLD  Mitral valve disease with Mitral clipping and Pulm HTN  CKD MEME    TTE shows worsening pulm HTN -   on diuresis  on IVF  may need to dc IVF  Cardio eval noted    check RVP - COVID neg on Admission  copd rx regimen with Inhaler and NEBS prn regimen and short course of Prednisone  cvs rx regimen optimization  I and O  diuresis as per Medicine and Cardio teams  monitor VS and HD and Sat  serial renal indices  CT chest - cardiomegaly  consideration for LE dopplers  GOC discussion  goal sat > 88 pct  VBG - noted  cont home o2 support - 3 L n/c  nasal saline for congestion upper airway   work up in progress

## 2024-03-15 NOTE — PROGRESS NOTE ADULT - ASSESSMENT
The patient is a 89 year old female with a history of HTN, HL, atrial fibrillation, MitraClip, chronic diastolic heart failure, GI bleed who presents with shortness of breath.    Plan:  - ECG with known atrial fibrillation  - CT chest with no acute finding  - BNP 5414  - Echo with normal LV systolic function, normal MitraClip function, severe pulm HTN  - Pulmonary hypertension - fluctuating in the past but has been severe on prior echos at times  - No evidence of decompensated heart failure on exam or imaging  - Lower to home torsemide 60 mg daily  - Continue spironolactone 25 mg daily  - Pulm follow-up

## 2024-03-15 NOTE — GOALS OF CARE CONVERSATION - ADVANCED CARE PLANNING - CONVERSATION DETAILS
STARS-Patient with a MOLST on chart with DNR/DNI orders and a HCP with Shirlene Ware as health care agent and Ronald Mix as alternate agent.

## 2024-03-15 NOTE — PROGRESS NOTE ADULT - ASSESSMENT
89 y/o F with PMH of HTN, Dyslipidemia, MR s/p Bushra- Stephanie, A. Fib not on anticoagulants, Chronic  Diastolic CHF, Chronic Hypoxic Respiratory Failure (on 3L NC), COVID-19, CKD 3, lower GI Bleeding, Anemia, Anxiety, and Depression presented with worsening SOB.    Shortness of breath - thought initially to be acute on chronic CHF but no significant findings for CHF (TTE pending), likely pulmonary etiology such as COPD exacerbation  - may need another day or so of treatment  - cont with telemetry  - cardiology consulted and following -> TTE, cont with torsemide and spironolactone  - pulmonary consulted -> nebs PRN, short course of prednisone  - on symbicort    Acute on chronic kidney disease - likely secondary to diuretic use, slightly improved today  - nephrology consulted  - avoid nephrotoxic meds  - monitor electrolytes, hyperphosphatemia improved    Hyponatremia - changed from 129 to 141  - sodium today stable at 138  - dc'e D5  - monitor BMP     Atrial fibrillation   - cont with metoprolol for rate control    HLD  - cont with atorvastatin    Anxiety/depression  - cont with sertraline    Preventive measures  - on heparin for DVT ppx

## 2024-03-16 LAB
ALBUMIN SERPL ELPH-MCNC: 3.2 G/DL — LOW (ref 3.3–5)
ALP SERPL-CCNC: 54 U/L — SIGNIFICANT CHANGE UP (ref 30–120)
ALT FLD-CCNC: 10 U/L — SIGNIFICANT CHANGE UP (ref 10–60)
ANION GAP SERPL CALC-SCNC: 11 MMOL/L — SIGNIFICANT CHANGE UP (ref 5–17)
AST SERPL-CCNC: 15 U/L — SIGNIFICANT CHANGE UP (ref 10–40)
BASOPHILS # BLD AUTO: 0.02 K/UL — SIGNIFICANT CHANGE UP (ref 0–0.2)
BASOPHILS NFR BLD AUTO: 0.2 % — SIGNIFICANT CHANGE UP (ref 0–2)
BILIRUB SERPL-MCNC: 0.7 MG/DL — SIGNIFICANT CHANGE UP (ref 0.2–1.2)
BUN SERPL-MCNC: 59 MG/DL — HIGH (ref 7–23)
CALCIUM SERPL-MCNC: 9.3 MG/DL — SIGNIFICANT CHANGE UP (ref 8.4–10.5)
CHLORIDE SERPL-SCNC: 104 MMOL/L — SIGNIFICANT CHANGE UP (ref 96–108)
CO2 SERPL-SCNC: 28 MMOL/L — SIGNIFICANT CHANGE UP (ref 22–31)
CREAT SERPL-MCNC: 1.75 MG/DL — HIGH (ref 0.5–1.3)
EGFR: 27 ML/MIN/1.73M2 — LOW
EOSINOPHIL # BLD AUTO: 0.08 K/UL — SIGNIFICANT CHANGE UP (ref 0–0.5)
EOSINOPHIL NFR BLD AUTO: 0.9 % — SIGNIFICANT CHANGE UP (ref 0–6)
GLUCOSE SERPL-MCNC: 149 MG/DL — HIGH (ref 70–99)
HCT VFR BLD CALC: 37.6 % — SIGNIFICANT CHANGE UP (ref 34.5–45)
HGB BLD-MCNC: 11.9 G/DL — SIGNIFICANT CHANGE UP (ref 11.5–15.5)
IMM GRANULOCYTES NFR BLD AUTO: 0.9 % — SIGNIFICANT CHANGE UP (ref 0–0.9)
LYMPHOCYTES # BLD AUTO: 1 K/UL — SIGNIFICANT CHANGE UP (ref 1–3.3)
LYMPHOCYTES # BLD AUTO: 11.3 % — LOW (ref 13–44)
MAGNESIUM SERPL-MCNC: 2.8 MG/DL — HIGH (ref 1.6–2.6)
MCHC RBC-ENTMCNC: 29.4 PG — SIGNIFICANT CHANGE UP (ref 27–34)
MCHC RBC-ENTMCNC: 31.6 GM/DL — LOW (ref 32–36)
MCV RBC AUTO: 92.8 FL — SIGNIFICANT CHANGE UP (ref 80–100)
MONOCYTES # BLD AUTO: 1.41 K/UL — HIGH (ref 0–0.9)
MONOCYTES NFR BLD AUTO: 15.9 % — HIGH (ref 2–14)
NEUTROPHILS # BLD AUTO: 6.29 K/UL — SIGNIFICANT CHANGE UP (ref 1.8–7.4)
NEUTROPHILS NFR BLD AUTO: 70.8 % — SIGNIFICANT CHANGE UP (ref 43–77)
NRBC # BLD: 0 /100 WBCS — SIGNIFICANT CHANGE UP (ref 0–0)
PHOSPHATE SERPL-MCNC: 3.8 MG/DL — SIGNIFICANT CHANGE UP (ref 2.5–4.5)
PLATELET # BLD AUTO: 185 K/UL — SIGNIFICANT CHANGE UP (ref 150–400)
POTASSIUM SERPL-MCNC: 4.7 MMOL/L — SIGNIFICANT CHANGE UP (ref 3.5–5.3)
POTASSIUM SERPL-SCNC: 4.7 MMOL/L — SIGNIFICANT CHANGE UP (ref 3.5–5.3)
PROT SERPL-MCNC: 7.6 G/DL — SIGNIFICANT CHANGE UP (ref 6–8.3)
RBC # BLD: 4.05 M/UL — SIGNIFICANT CHANGE UP (ref 3.8–5.2)
RBC # FLD: 19.8 % — HIGH (ref 10.3–14.5)
SODIUM SERPL-SCNC: 143 MMOL/L — SIGNIFICANT CHANGE UP (ref 135–145)
WBC # BLD: 8.88 K/UL — SIGNIFICANT CHANGE UP (ref 3.8–10.5)
WBC # FLD AUTO: 8.88 K/UL — SIGNIFICANT CHANGE UP (ref 3.8–10.5)

## 2024-03-16 PROCEDURE — 99232 SBSQ HOSP IP/OBS MODERATE 35: CPT

## 2024-03-16 RX ORDER — ALPRAZOLAM 0.25 MG
0.25 TABLET ORAL ONCE
Refills: 0 | Status: DISCONTINUED | OUTPATIENT
Start: 2024-03-16 | End: 2024-03-16

## 2024-03-16 RX ADMIN — Medication 20 MILLIGRAM(S): at 05:57

## 2024-03-16 RX ADMIN — Medication 25 MILLIGRAM(S): at 17:27

## 2024-03-16 RX ADMIN — Medication 1 DROP(S): at 05:57

## 2024-03-16 RX ADMIN — SPIRONOLACTONE 25 MILLIGRAM(S): 25 TABLET, FILM COATED ORAL at 06:29

## 2024-03-16 RX ADMIN — HEPARIN SODIUM 5000 UNIT(S): 5000 INJECTION INTRAVENOUS; SUBCUTANEOUS at 13:28

## 2024-03-16 RX ADMIN — Medication 3 MILLIGRAM(S): at 21:45

## 2024-03-16 RX ADMIN — ALBUTEROL 2.5 MILLIGRAM(S): 90 AEROSOL, METERED ORAL at 13:51

## 2024-03-16 RX ADMIN — Medication 1 DROP(S): at 00:21

## 2024-03-16 RX ADMIN — Medication 325 MILLIGRAM(S): at 11:15

## 2024-03-16 RX ADMIN — Medication 1 SPRAY(S): at 21:47

## 2024-03-16 RX ADMIN — Medication 1 SPRAY(S): at 05:58

## 2024-03-16 RX ADMIN — Medication 1 DROP(S): at 17:27

## 2024-03-16 RX ADMIN — BUDESONIDE AND FORMOTEROL FUMARATE DIHYDRATE 2 PUFF(S): 160; 4.5 AEROSOL RESPIRATORY (INHALATION) at 08:51

## 2024-03-16 RX ADMIN — HEPARIN SODIUM 5000 UNIT(S): 5000 INJECTION INTRAVENOUS; SUBCUTANEOUS at 05:58

## 2024-03-16 RX ADMIN — ALBUTEROL 2.5 MILLIGRAM(S): 90 AEROSOL, METERED ORAL at 06:34

## 2024-03-16 RX ADMIN — HEPARIN SODIUM 5000 UNIT(S): 5000 INJECTION INTRAVENOUS; SUBCUTANEOUS at 21:46

## 2024-03-16 RX ADMIN — Medication 40 MILLIGRAM(S): at 05:58

## 2024-03-16 RX ADMIN — Medication 1 SPRAY(S): at 13:28

## 2024-03-16 RX ADMIN — Medication 1 DROP(S): at 11:15

## 2024-03-16 RX ADMIN — SENNA PLUS 2 TABLET(S): 8.6 TABLET ORAL at 21:45

## 2024-03-16 RX ADMIN — SPIRONOLACTONE 25 MILLIGRAM(S): 25 TABLET, FILM COATED ORAL at 17:27

## 2024-03-16 RX ADMIN — MIRTAZAPINE 22.5 MILLIGRAM(S): 45 TABLET, ORALLY DISINTEGRATING ORAL at 21:46

## 2024-03-16 RX ADMIN — TIOTROPIUM BROMIDE 2 PUFF(S): 18 CAPSULE ORAL; RESPIRATORY (INHALATION) at 05:59

## 2024-03-16 RX ADMIN — ATORVASTATIN CALCIUM 10 MILLIGRAM(S): 80 TABLET, FILM COATED ORAL at 21:46

## 2024-03-16 RX ADMIN — GABAPENTIN 100 MILLIGRAM(S): 400 CAPSULE ORAL at 21:45

## 2024-03-16 RX ADMIN — ALBUTEROL 2.5 MILLIGRAM(S): 90 AEROSOL, METERED ORAL at 19:54

## 2024-03-16 RX ADMIN — Medication 40 MILLIGRAM(S): at 13:28

## 2024-03-16 RX ADMIN — Medication 25 MILLIGRAM(S): at 05:58

## 2024-03-16 RX ADMIN — Medication 0.25 MILLIGRAM(S): at 01:10

## 2024-03-16 RX ADMIN — SERTRALINE 75 MILLIGRAM(S): 25 TABLET, FILM COATED ORAL at 11:14

## 2024-03-16 RX ADMIN — BUDESONIDE AND FORMOTEROL FUMARATE DIHYDRATE 2 PUFF(S): 160; 4.5 AEROSOL RESPIRATORY (INHALATION) at 18:31

## 2024-03-16 NOTE — PROGRESS NOTE ADULT - ASSESSMENT
89 y/o F with PMH of HTN, Dyslipidemia, MR s/p Bushra- Clip, A. Fib not on anticoagulants, Chronic  Diastolic CHF, Chronic Hypoxic Respiratory Failure (on 3L NC), COVID-19, CKD 3, lower GI Bleeding, Anemia, Anxiety, and Depression presented with worsening SOB.    Shortness of breath - thought initially to be acute on chronic CHF but no significant findings for CHF (TTE pending), likely pulmonary etiology such as COPD exacerbation  - was on NRB/high flow overnight though this AM stable on 6L  - still need to be monitored  - cont with telemetry  - cardiology consulted and following -> TTE, cont with torsemide and spironolactone  - pulmonary consulted -> nebs PRN, short course of prednisone  - on symbicort    Acute on chronic kidney disease - likely secondary to diuretic use, slightly improved today  - nephrology consulted  - avoid nephrotoxic meds  - monitor electrolytes, hyperphosphatemia improved    Hyponatremia - changed from 129 to 141  - sodium stable  - dc'ed D5  - monitor BMP     Atrial fibrillation   - cont with metoprolol for rate control    HLD  - cont with atorvastatin    Anxiety/depression  - cont with sertraline    Preventive measures  - on heparin for DVT ppx

## 2024-03-16 NOTE — PROGRESS NOTE ADULT - ASSESSMENT
* MEME on CKD 3, CRS, Smaller left kidney  * Chronic diastolic heart failure, severe pulmonary hypertension, chronic afib    Improving renal indices. Diuretic dose adjusted by cardiology. To see heart failure team as outpt.  Renal sonogram results noted. Avoid nephrotoxic meds as possible. D/w pt's daughter at bedside.

## 2024-03-16 NOTE — PROGRESS NOTE ADULT - ASSESSMENT
The patient is a 89 year old female with a history of HTN, HL, atrial fibrillation, MitraClip, chronic diastolic heart failure, GI bleed who presents with shortness of breath.    3/16/24  Seen at [facility] telemetry  Sleeping in chair, comfortably    Plan:  - ECG with known atrial fibrillation  - CT chest with no acute finding  - BNP 5414  - Echo with normal LV systolic function, normal MitraClip function, severe pulm HTN-see above  - Pulmonary hypertension - fluctuating in the past but has been severe on prior echos at times  - No evidence of decompensated heart failure on exam or imaging  - Torsemide 60 mg daily  - Continue spironolactone 25 mg daily  - Pulm follow-up

## 2024-03-16 NOTE — PROGRESS NOTE ADULT - ASSESSMENT
89-year-old female with history of A-fib, CHF, anemia, COPD, diabetes, depression, anxiety, hypertension, hyperlipidemia, on 3 L O2 24/7 brought in by ambulance from [facility] for evaluation of shortness of breath    dyspnea  COPD  AF  CHF  OP  OA  DM  Depression  Anxiety  HTN  HLD  Mitral valve disease with Mitral clipping and Pulm HTN  CKD MEME    TTE shows worsening pulm HTN -   on diuresis  renal and cardio f/u  fio2 demand increased  prognosis guarded  pt is DNR DNI  consideration for HF NC    check RVP - COVID neg on Admission  copd rx regimen with Inhaler and NEBS prn regimen and short course of Prednisone  cvs rx regimen optimization  I and O  diuresis as per Medicine and Cardio teams  monitor VS and HD and Sat  serial renal indices  CT chest - cardiomegaly  consideration for LE dopplers  GOC discussion  goal sat > 88 pct  VBG - noted  cont home o2 support - 3 L n/c  nasal saline for congestion upper airway   work up in progress

## 2024-03-17 LAB
ALBUMIN SERPL ELPH-MCNC: 3.2 G/DL — LOW (ref 3.3–5)
ALP SERPL-CCNC: 47 U/L — SIGNIFICANT CHANGE UP (ref 30–120)
ALT FLD-CCNC: 15 U/L — SIGNIFICANT CHANGE UP (ref 10–60)
ANION GAP SERPL CALC-SCNC: 5 MMOL/L — SIGNIFICANT CHANGE UP (ref 5–17)
AST SERPL-CCNC: 15 U/L — SIGNIFICANT CHANGE UP (ref 10–40)
BASOPHILS # BLD AUTO: 0.02 K/UL — SIGNIFICANT CHANGE UP (ref 0–0.2)
BASOPHILS NFR BLD AUTO: 0.3 % — SIGNIFICANT CHANGE UP (ref 0–2)
BILIRUB SERPL-MCNC: 0.9 MG/DL — SIGNIFICANT CHANGE UP (ref 0.2–1.2)
BUN SERPL-MCNC: 58 MG/DL — HIGH (ref 7–23)
CALCIUM SERPL-MCNC: 9 MG/DL — SIGNIFICANT CHANGE UP (ref 8.4–10.5)
CHLORIDE SERPL-SCNC: 106 MMOL/L — SIGNIFICANT CHANGE UP (ref 96–108)
CO2 SERPL-SCNC: 31 MMOL/L — SIGNIFICANT CHANGE UP (ref 22–31)
CREAT SERPL-MCNC: 1.47 MG/DL — HIGH (ref 0.5–1.3)
EGFR: 34 ML/MIN/1.73M2 — LOW
EOSINOPHIL # BLD AUTO: 0.2 K/UL — SIGNIFICANT CHANGE UP (ref 0–0.5)
EOSINOPHIL NFR BLD AUTO: 2.6 % — SIGNIFICANT CHANGE UP (ref 0–6)
GLUCOSE SERPL-MCNC: 126 MG/DL — HIGH (ref 70–99)
HCT VFR BLD CALC: 37.3 % — SIGNIFICANT CHANGE UP (ref 34.5–45)
HGB BLD-MCNC: 11.4 G/DL — LOW (ref 11.5–15.5)
IMM GRANULOCYTES NFR BLD AUTO: 1.1 % — HIGH (ref 0–0.9)
LYMPHOCYTES # BLD AUTO: 1.24 K/UL — SIGNIFICANT CHANGE UP (ref 1–3.3)
LYMPHOCYTES # BLD AUTO: 16.4 % — SIGNIFICANT CHANGE UP (ref 13–44)
MAGNESIUM SERPL-MCNC: 2.3 MG/DL — SIGNIFICANT CHANGE UP (ref 1.6–2.6)
MCHC RBC-ENTMCNC: 28.9 PG — SIGNIFICANT CHANGE UP (ref 27–34)
MCHC RBC-ENTMCNC: 30.6 GM/DL — LOW (ref 32–36)
MCV RBC AUTO: 94.4 FL — SIGNIFICANT CHANGE UP (ref 80–100)
MONOCYTES # BLD AUTO: 1.1 K/UL — HIGH (ref 0–0.9)
MONOCYTES NFR BLD AUTO: 14.5 % — HIGH (ref 2–14)
NEUTROPHILS # BLD AUTO: 4.94 K/UL — SIGNIFICANT CHANGE UP (ref 1.8–7.4)
NEUTROPHILS NFR BLD AUTO: 65.1 % — SIGNIFICANT CHANGE UP (ref 43–77)
NRBC # BLD: 0 /100 WBCS — SIGNIFICANT CHANGE UP (ref 0–0)
PHOSPHATE SERPL-MCNC: 3.7 MG/DL — SIGNIFICANT CHANGE UP (ref 2.5–4.5)
PLATELET # BLD AUTO: 156 K/UL — SIGNIFICANT CHANGE UP (ref 150–400)
POTASSIUM SERPL-MCNC: 4.4 MMOL/L — SIGNIFICANT CHANGE UP (ref 3.5–5.3)
POTASSIUM SERPL-SCNC: 4.4 MMOL/L — SIGNIFICANT CHANGE UP (ref 3.5–5.3)
PROT SERPL-MCNC: 7.5 G/DL — SIGNIFICANT CHANGE UP (ref 6–8.3)
RBC # BLD: 3.95 M/UL — SIGNIFICANT CHANGE UP (ref 3.8–5.2)
RBC # FLD: 19.3 % — HIGH (ref 10.3–14.5)
SODIUM SERPL-SCNC: 142 MMOL/L — SIGNIFICANT CHANGE UP (ref 135–145)
WBC # BLD: 7.58 K/UL — SIGNIFICANT CHANGE UP (ref 3.8–10.5)
WBC # FLD AUTO: 7.58 K/UL — SIGNIFICANT CHANGE UP (ref 3.8–10.5)

## 2024-03-17 PROCEDURE — 99232 SBSQ HOSP IP/OBS MODERATE 35: CPT

## 2024-03-17 RX ADMIN — GABAPENTIN 100 MILLIGRAM(S): 400 CAPSULE ORAL at 21:10

## 2024-03-17 RX ADMIN — BUDESONIDE AND FORMOTEROL FUMARATE DIHYDRATE 2 PUFF(S): 160; 4.5 AEROSOL RESPIRATORY (INHALATION) at 21:11

## 2024-03-17 RX ADMIN — Medication 650 MILLIGRAM(S): at 08:24

## 2024-03-17 RX ADMIN — SPIRONOLACTONE 25 MILLIGRAM(S): 25 TABLET, FILM COATED ORAL at 06:11

## 2024-03-17 RX ADMIN — HEPARIN SODIUM 5000 UNIT(S): 5000 INJECTION INTRAVENOUS; SUBCUTANEOUS at 06:12

## 2024-03-17 RX ADMIN — Medication 3 MILLIGRAM(S): at 21:10

## 2024-03-17 RX ADMIN — SPIRONOLACTONE 25 MILLIGRAM(S): 25 TABLET, FILM COATED ORAL at 17:40

## 2024-03-17 RX ADMIN — Medication 325 MILLIGRAM(S): at 11:21

## 2024-03-17 RX ADMIN — SENNA PLUS 2 TABLET(S): 8.6 TABLET ORAL at 21:10

## 2024-03-17 RX ADMIN — ALBUTEROL 2.5 MILLIGRAM(S): 90 AEROSOL, METERED ORAL at 07:38

## 2024-03-17 RX ADMIN — Medication 40 MILLIGRAM(S): at 06:12

## 2024-03-17 RX ADMIN — Medication 40 MILLIGRAM(S): at 13:12

## 2024-03-17 RX ADMIN — Medication 650 MILLIGRAM(S): at 08:02

## 2024-03-17 RX ADMIN — Medication 1 SPRAY(S): at 06:12

## 2024-03-17 RX ADMIN — Medication 20 MILLIGRAM(S): at 06:11

## 2024-03-17 RX ADMIN — Medication 1 SPRAY(S): at 21:10

## 2024-03-17 RX ADMIN — BUDESONIDE AND FORMOTEROL FUMARATE DIHYDRATE 2 PUFF(S): 160; 4.5 AEROSOL RESPIRATORY (INHALATION) at 08:07

## 2024-03-17 RX ADMIN — MIRTAZAPINE 22.5 MILLIGRAM(S): 45 TABLET, ORALLY DISINTEGRATING ORAL at 21:11

## 2024-03-17 RX ADMIN — HEPARIN SODIUM 5000 UNIT(S): 5000 INJECTION INTRAVENOUS; SUBCUTANEOUS at 13:13

## 2024-03-17 RX ADMIN — Medication 25 MILLIGRAM(S): at 06:11

## 2024-03-17 RX ADMIN — Medication 25 MILLIGRAM(S): at 17:40

## 2024-03-17 RX ADMIN — HEPARIN SODIUM 5000 UNIT(S): 5000 INJECTION INTRAVENOUS; SUBCUTANEOUS at 21:10

## 2024-03-17 RX ADMIN — SERTRALINE 75 MILLIGRAM(S): 25 TABLET, FILM COATED ORAL at 11:21

## 2024-03-17 RX ADMIN — TIOTROPIUM BROMIDE 2 PUFF(S): 18 CAPSULE ORAL; RESPIRATORY (INHALATION) at 08:07

## 2024-03-17 RX ADMIN — Medication 1 SPRAY(S): at 13:12

## 2024-03-17 RX ADMIN — ATORVASTATIN CALCIUM 10 MILLIGRAM(S): 80 TABLET, FILM COATED ORAL at 21:10

## 2024-03-17 NOTE — PROGRESS NOTE ADULT - ASSESSMENT
89 y/o F with PMH of HTN, Dyslipidemia, MR s/p Bushra- Clip, A. Fib not on anticoagulants, Chronic  Diastolic CHF, Chronic Hypoxic Respiratory Failure (on 3L NC), COVID-19, CKD 3, lower GI Bleeding, Anemia, Anxiety, and Depression presented with worsening SOB.    Shortness of breath - thought initially to be acute on chronic CHF but no significant findings for CHF (TTE pending), likely pulmonary etiology such as COPD exacerbation  - was on NRB/high flow between 3/15-3/16, stable on 6L today  - still need to be monitored  - cont with telemetry  - cardiology consulted and following -> TTE, cont with torsemide and spironolactone  - pulmonary consulted -> nebs PRN, short course of prednisone  - on symbicort    Acute on chronic kidney disease - likely secondary to diuretic use, improved today  - nephrology consulted  - avoid nephrotoxic meds  - monitor electrolytes, hyperphosphatemia improved    Hyponatremia -   - sodium stable  - dc'ed D5  - monitor BMP     Atrial fibrillation   - cont with metoprolol for rate control    HLD  - cont with atorvastatin    Anxiety/depression  - cont with sertraline    Preventive measures  - on heparin for DVT ppx

## 2024-03-17 NOTE — CHART NOTE - NSCHARTNOTEFT_GEN_A_CORE
Assessment: Per HPI:  This is a 89 y/o F with PMH of HTN, Dyslipidemia, MR s/p KIMBER Benitez. Fib not on anticoagulants, Chronic  Diastolic CHF, Chronic Hypoxic Respiratory Failure (on 3L NC), COVID-19, CKD 3, lower GI Bleeding, Anemia, Anxiety, and Depression who was sent from [facility] for worsening SOB over the past 2 days even with minimal effort with dropping of her SPO2 while on supplemental oxygen. Patient states that she feels her nose dry & stuffy with difficulty to breath, denies cough but her son at the bedside reports dry cough. Patient also reports itchy eyes, no chest pain or palpitations, no dizziness, nausea, vomiting, fever, or chills.    3/17  Pt seen for nutrition follow-up. Visited patient in room, presents with good appetite/po intake, consuming >75% of meals. Denies n/v/d/c, last BM 3/15, bowel regimen in place. Pertinent medications/nutrition labs reviewed; A1c 6.9% with no hx of DM, receiving prednisone; also receiving antibiotic, iron in house. Plan to c/w current diet ordered, Food preferences obtained, will honor as able to encourage intake. RD to continue to monitor nutrition status per protocol.     Factors impacting intake: [x ] none [ ] nausea  [ ] vomiting [ ] diarrhea [ ] constipation  [ ]chewing problems [ ] swallowing issues  [ ] other:     Diet Prescription: Diet, Regular:   DASH/TLC {Sodium & Cholesterol Restricted}  Soft and Bite Sized (SOFTBTSZ) (24 @ 21:15)    Intake: good    Daily Weight in k.9 (17 Mar 2024 05:09)  Weight in k.4 (16 Mar 2024 05:00)  weight fluctuated likely secondary to fluid shift, noted edema in house. will continue to monitor weight trends as able   % Weight Change    Pertinent Medications: MEDICATIONS  (STANDING):  atorvastatin 10 milliGRAM(s) Oral at bedtime  budesonide  80 MICROgram(s)/formoterol 4.5 MICROgram(s) Inhaler 2 Puff(s) Inhalation two times a day  ferrous    sulfate 325 milliGRAM(s) Oral daily  gabapentin 100 milliGRAM(s) Oral at bedtime  heparin   Injectable 5000 Unit(s) SubCutaneous every 8 hours  melatonin 3 milliGRAM(s) Oral at bedtime  metoprolol tartrate 25 milliGRAM(s) Oral two times a day  mirtazapine 22.5 milliGRAM(s) Oral at bedtime  predniSONE   Tablet 20 milliGRAM(s) Oral daily  senna 2 Tablet(s) Oral at bedtime  sertraline 75 milliGRAM(s) Oral daily  sodium chloride 0.65% Nasal 1 Spray(s) Both Nostrils three times a day  spironolactone 25 milliGRAM(s) Oral two times a day  tiotropium 2.5 MICROgram(s) Inhaler 2 Puff(s) Inhalation daily  torsemide 40 milliGRAM(s) Oral two times a day    MEDICATIONS  (PRN):  acetaminophen     Tablet .. 650 milliGRAM(s) Oral every 6 hours PRN Temp greater or equal to 38C (100.4F), Mild Pain (1 - 3)  albuterol    0.083% 2.5 milliGRAM(s) Nebulizer every 3 hours PRN Shortness of Breath and/or Wheezing  bisacodyl 5 milliGRAM(s) Oral every 12 hours PRN Constipation  guaiFENesin Oral Liquid (Sugar-Free) 200 milliGRAM(s) Oral every 6 hours PRN Cough  polyethylene glycol 3350 17 Gram(s) Oral at bedtime PRN Constipation    Pertinent Labs:  Na142 mmol/L Glu 126 mg/dL<H> K+ 4.4 mmol/L Cr  1.47 mg/dL<H> BUN 58 mg/dL<H>  Phos 3.7 mg/dL  Alb 3.2 g/dL<L>     CAPILLARY BLOOD GLUCOSE          Edema per flowsheets: +1 bl leg  Skin: free of pressure injury       Estimated Needs:   [x ] no change since previous assessment  [ ] recalculated:     Previous Nutrition Diagnosis:   [ ] Inadequate Energy Intake [ ]Inadequate Oral Intake [ ] Excessive Energy Intake   [ ] Underweight [ ] Increased Nutrient Needs [ ] Overweight/Obesity [ ] Altered Nutrition related lab values  [ ] Altered GI Function [ ] Unintended Weight Loss [ ] Food & Nutrition Related Knowledge Deficit [ ] Malnutrition [x] Decreased nutrient needs (sodium, H20)    Nutrition Diagnosis is [ x] ongoing  [ ] resolved [ ] not applicable     New Nutrition Diagnosis: [ x] not applicable       Interventions: Plan to c/w current diet ordered, Food preferences obtained, will honor as able to encourage intake.  Recommend  [ ] Change Diet To:  [ ] Nutrition Supplement  [ ] Nutrition Support  [ ] Other:     Monitoring and Evaluation:   [X ] Intake [ x ] Tolerance to diet prescription [ x ] weights [ x ] labs[ x ] follow up per protocol  [ ] other:
Daughter informed me that the patient was taking torsemide 20mg 4x/day.  Will change dose to 40mg BID.

## 2024-03-17 NOTE — PROGRESS NOTE ADULT - ASSESSMENT
89-year-old female with history of A-fib, CHF, anemia, COPD, diabetes, depression, anxiety, hypertension, hyperlipidemia, on 3 L O2 24/7 brought in by ambulance from [facility] for evaluation of shortness of breath    dyspnea  COPD  AF  CHF  OP  OA  DM  Depression  Anxiety  HTN  HLD  Mitral valve disease with Mitral clipping and Pulm HTN  CKD MEME    TTE shows worsening pulm HTN -   on diuresis  fio2 titration  on nebs - steroids -   renal and cardio f/u  prognosis guarded  pt is DNR DNI    check RVP - COVID neg on Admission  copd rx regimen with Inhaler and NEBS prn regimen and short course of Prednisone  cvs rx regimen optimization  I and O  diuresis as per Medicine and Cardio teams  monitor VS and HD and Sat  serial renal indices  CT chest - cardiomegaly  consideration for LE dopplers  GOC discussion  goal sat > 88 pct  VBG - noted  cont home o2 support - 3 L n/c  nasal saline for congestion upper airway   work up in progress

## 2024-03-17 NOTE — PROGRESS NOTE ADULT - ASSESSMENT
The patient is a 89 year old female with a history of HTN, HL, atrial fibrillation, MitraClip, chronic diastolic heart failure, GI bleed who presents with shortness of breath.    3/17/24  Seen at [facility] telemetry  Family at bedside  Sitting in chair, comfortably  No complaints offered    Plan:  - ECG with known atrial fibrillation  - CT chest with no acute finding  - BNP 5414  - Echo with normal LV systolic function, normal MitraClip function, severe pulm HTN  - Pulmonary hypertension - fluctuating in the past but has been severe on prior echos at times  - No evidence of decompensated heart failure on exam or imaging  - Torsemide 40 mg BID  - Continue spironolactone 25 mg daily  - Pulm follow-up

## 2024-03-18 ENCOUNTER — TRANSCRIPTION ENCOUNTER (OUTPATIENT)
Age: 89
End: 2024-03-18

## 2024-03-18 VITALS
RESPIRATION RATE: 18 BRPM | TEMPERATURE: 98 F | DIASTOLIC BLOOD PRESSURE: 53 MMHG | OXYGEN SATURATION: 97 % | SYSTOLIC BLOOD PRESSURE: 100 MMHG | HEART RATE: 84 BPM

## 2024-03-18 LAB
ALBUMIN SERPL ELPH-MCNC: 3 G/DL — LOW (ref 3.3–5)
ALP SERPL-CCNC: 46 U/L — SIGNIFICANT CHANGE UP (ref 30–120)
ALT FLD-CCNC: 21 U/L — SIGNIFICANT CHANGE UP (ref 10–60)
ANION GAP SERPL CALC-SCNC: 6 MMOL/L — SIGNIFICANT CHANGE UP (ref 5–17)
AST SERPL-CCNC: 16 U/L — SIGNIFICANT CHANGE UP (ref 10–40)
BASOPHILS # BLD AUTO: 0.04 K/UL — SIGNIFICANT CHANGE UP (ref 0–0.2)
BASOPHILS NFR BLD AUTO: 0.6 % — SIGNIFICANT CHANGE UP (ref 0–2)
BILIRUB SERPL-MCNC: 0.8 MG/DL — SIGNIFICANT CHANGE UP (ref 0.2–1.2)
BUN SERPL-MCNC: 60 MG/DL — HIGH (ref 7–23)
CALCIUM SERPL-MCNC: 8.7 MG/DL — SIGNIFICANT CHANGE UP (ref 8.4–10.5)
CHLORIDE SERPL-SCNC: 105 MMOL/L — SIGNIFICANT CHANGE UP (ref 96–108)
CO2 SERPL-SCNC: 31 MMOL/L — SIGNIFICANT CHANGE UP (ref 22–31)
CREAT SERPL-MCNC: 1.56 MG/DL — HIGH (ref 0.5–1.3)
CULTURE RESULTS: SIGNIFICANT CHANGE UP
CULTURE RESULTS: SIGNIFICANT CHANGE UP
EGFR: 31 ML/MIN/1.73M2 — LOW
EOSINOPHIL # BLD AUTO: 0.24 K/UL — SIGNIFICANT CHANGE UP (ref 0–0.5)
EOSINOPHIL NFR BLD AUTO: 3.4 % — SIGNIFICANT CHANGE UP (ref 0–6)
GLUCOSE SERPL-MCNC: 120 MG/DL — HIGH (ref 70–99)
HCT VFR BLD CALC: 36.5 % — SIGNIFICANT CHANGE UP (ref 34.5–45)
HGB BLD-MCNC: 11.1 G/DL — LOW (ref 11.5–15.5)
IMM GRANULOCYTES NFR BLD AUTO: 1 % — HIGH (ref 0–0.9)
LYMPHOCYTES # BLD AUTO: 1.26 K/UL — SIGNIFICANT CHANGE UP (ref 1–3.3)
LYMPHOCYTES # BLD AUTO: 18.1 % — SIGNIFICANT CHANGE UP (ref 13–44)
MAGNESIUM SERPL-MCNC: 2.3 MG/DL — SIGNIFICANT CHANGE UP (ref 1.6–2.6)
MCHC RBC-ENTMCNC: 28.7 PG — SIGNIFICANT CHANGE UP (ref 27–34)
MCHC RBC-ENTMCNC: 30.4 GM/DL — LOW (ref 32–36)
MCV RBC AUTO: 94.3 FL — SIGNIFICANT CHANGE UP (ref 80–100)
MONOCYTES # BLD AUTO: 0.95 K/UL — HIGH (ref 0–0.9)
MONOCYTES NFR BLD AUTO: 13.6 % — SIGNIFICANT CHANGE UP (ref 2–14)
NEUTROPHILS # BLD AUTO: 4.4 K/UL — SIGNIFICANT CHANGE UP (ref 1.8–7.4)
NEUTROPHILS NFR BLD AUTO: 63.3 % — SIGNIFICANT CHANGE UP (ref 43–77)
NRBC # BLD: 0 /100 WBCS — SIGNIFICANT CHANGE UP (ref 0–0)
PHOSPHATE SERPL-MCNC: 3.3 MG/DL — SIGNIFICANT CHANGE UP (ref 2.5–4.5)
PLATELET # BLD AUTO: 145 K/UL — LOW (ref 150–400)
POTASSIUM SERPL-MCNC: 4.2 MMOL/L — SIGNIFICANT CHANGE UP (ref 3.5–5.3)
POTASSIUM SERPL-SCNC: 4.2 MMOL/L — SIGNIFICANT CHANGE UP (ref 3.5–5.3)
PROT SERPL-MCNC: 6.5 G/DL — SIGNIFICANT CHANGE UP (ref 6–8.3)
RBC # BLD: 3.87 M/UL — SIGNIFICANT CHANGE UP (ref 3.8–5.2)
RBC # FLD: 19.3 % — HIGH (ref 10.3–14.5)
SODIUM SERPL-SCNC: 142 MMOL/L — SIGNIFICANT CHANGE UP (ref 135–145)
SPECIMEN SOURCE: SIGNIFICANT CHANGE UP
SPECIMEN SOURCE: SIGNIFICANT CHANGE UP
WBC # BLD: 6.96 K/UL — SIGNIFICANT CHANGE UP (ref 3.8–10.5)
WBC # FLD AUTO: 6.96 K/UL — SIGNIFICANT CHANGE UP (ref 3.8–10.5)

## 2024-03-18 PROCEDURE — 71250 CT THORAX DX C-: CPT | Mod: MC

## 2024-03-18 PROCEDURE — 94664 DEMO&/EVAL PT USE INHALER: CPT

## 2024-03-18 PROCEDURE — 84100 ASSAY OF PHOSPHORUS: CPT

## 2024-03-18 PROCEDURE — 85730 THROMBOPLASTIN TIME PARTIAL: CPT

## 2024-03-18 PROCEDURE — 71045 X-RAY EXAM CHEST 1 VIEW: CPT

## 2024-03-18 PROCEDURE — 99285 EMERGENCY DEPT VISIT HI MDM: CPT | Mod: 25

## 2024-03-18 PROCEDURE — 84443 ASSAY THYROID STIM HORMONE: CPT

## 2024-03-18 PROCEDURE — 85610 PROTHROMBIN TIME: CPT

## 2024-03-18 PROCEDURE — 96374 THER/PROPH/DIAG INJ IV PUSH: CPT

## 2024-03-18 PROCEDURE — 84484 ASSAY OF TROPONIN QUANT: CPT

## 2024-03-18 PROCEDURE — 76775 US EXAM ABDO BACK WALL LIM: CPT

## 2024-03-18 PROCEDURE — 99239 HOSP IP/OBS DSCHRG MGMT >30: CPT

## 2024-03-18 PROCEDURE — 80048 BASIC METABOLIC PNL TOTAL CA: CPT

## 2024-03-18 PROCEDURE — 96375 TX/PRO/DX INJ NEW DRUG ADDON: CPT

## 2024-03-18 PROCEDURE — 83880 ASSAY OF NATRIURETIC PEPTIDE: CPT

## 2024-03-18 PROCEDURE — 85027 COMPLETE CBC AUTOMATED: CPT

## 2024-03-18 PROCEDURE — 80053 COMPREHEN METABOLIC PANEL: CPT

## 2024-03-18 PROCEDURE — 94640 AIRWAY INHALATION TREATMENT: CPT

## 2024-03-18 PROCEDURE — 87040 BLOOD CULTURE FOR BACTERIA: CPT

## 2024-03-18 PROCEDURE — 83605 ASSAY OF LACTIC ACID: CPT

## 2024-03-18 PROCEDURE — 36415 COLL VENOUS BLD VENIPUNCTURE: CPT

## 2024-03-18 PROCEDURE — 94760 N-INVAS EAR/PLS OXIMETRY 1: CPT

## 2024-03-18 PROCEDURE — 93005 ELECTROCARDIOGRAM TRACING: CPT

## 2024-03-18 PROCEDURE — 0225U NFCT DS DNA&RNA 21 SARSCOV2: CPT

## 2024-03-18 PROCEDURE — 85025 COMPLETE CBC W/AUTO DIFF WBC: CPT

## 2024-03-18 PROCEDURE — 86140 C-REACTIVE PROTEIN: CPT

## 2024-03-18 PROCEDURE — 93306 TTE W/DOPPLER COMPLETE: CPT

## 2024-03-18 PROCEDURE — 82803 BLOOD GASES ANY COMBINATION: CPT

## 2024-03-18 PROCEDURE — 83036 HEMOGLOBIN GLYCOSYLATED A1C: CPT

## 2024-03-18 PROCEDURE — 83735 ASSAY OF MAGNESIUM: CPT

## 2024-03-18 PROCEDURE — 97161 PT EVAL LOW COMPLEX 20 MIN: CPT

## 2024-03-18 RX ORDER — TIOTROPIUM BROMIDE 18 UG/1
2 CAPSULE ORAL; RESPIRATORY (INHALATION)
Qty: 1 | Refills: 0
Start: 2024-03-18 | End: 2024-04-16

## 2024-03-18 RX ORDER — FLUTICASONE PROPIONATE 220 MCG
1 AEROSOL WITH ADAPTER (GRAM) INHALATION
Qty: 0 | Refills: 0 | DISCHARGE

## 2024-03-18 RX ORDER — BUDESONIDE AND FORMOTEROL FUMARATE DIHYDRATE 160; 4.5 UG/1; UG/1
2 AEROSOL RESPIRATORY (INHALATION)
Qty: 1 | Refills: 0
Start: 2024-03-18 | End: 2024-04-16

## 2024-03-18 RX ADMIN — HEPARIN SODIUM 5000 UNIT(S): 5000 INJECTION INTRAVENOUS; SUBCUTANEOUS at 05:38

## 2024-03-18 RX ADMIN — SPIRONOLACTONE 25 MILLIGRAM(S): 25 TABLET, FILM COATED ORAL at 05:37

## 2024-03-18 RX ADMIN — Medication 650 MILLIGRAM(S): at 11:32

## 2024-03-18 RX ADMIN — TIOTROPIUM BROMIDE 2 PUFF(S): 18 CAPSULE ORAL; RESPIRATORY (INHALATION) at 05:38

## 2024-03-18 RX ADMIN — Medication 650 MILLIGRAM(S): at 01:47

## 2024-03-18 RX ADMIN — Medication 1 SPRAY(S): at 05:37

## 2024-03-18 RX ADMIN — Medication 1 SPRAY(S): at 14:35

## 2024-03-18 RX ADMIN — ALBUTEROL 2.5 MILLIGRAM(S): 90 AEROSOL, METERED ORAL at 10:11

## 2024-03-18 RX ADMIN — BUDESONIDE AND FORMOTEROL FUMARATE DIHYDRATE 2 PUFF(S): 160; 4.5 AEROSOL RESPIRATORY (INHALATION) at 05:38

## 2024-03-18 RX ADMIN — Medication 650 MILLIGRAM(S): at 12:30

## 2024-03-18 RX ADMIN — Medication 650 MILLIGRAM(S): at 02:17

## 2024-03-18 RX ADMIN — Medication 25 MILLIGRAM(S): at 05:38

## 2024-03-18 RX ADMIN — Medication 40 MILLIGRAM(S): at 05:37

## 2024-03-18 RX ADMIN — Medication 20 MILLIGRAM(S): at 05:37

## 2024-03-18 RX ADMIN — ALBUTEROL 2.5 MILLIGRAM(S): 90 AEROSOL, METERED ORAL at 06:56

## 2024-03-18 NOTE — PROGRESS NOTE ADULT - ASSESSMENT
89-year-old female with history of A-fib, CHF, anemia, COPD, diabetes, depression, anxiety, hypertension, hyperlipidemia, on 3 L O2 24/7 brought in by ambulance from [facility] for evaluation of shortness of breath    dyspnea  COPD  AF  CHF  OP  OA  DM  Depression  Anxiety  HTN  HLD  Mitral valve disease with Mitral clipping and Pulm HTN  CKD MEME    TTE shows worsening pulm HTN -   on diuresis  fio2 titration  on nebs - steroids -   renal and cardio f/u  prognosis guarded  pt is DNR DNI    check RVP - COVID neg on Admission  copd rx regimen with Inhaler and NEBS prn regimen and short course of Prednisone  cvs rx regimen optimization  I and O  diuresis as per Medicine and Cardio teams  monitor VS and HD and Sat  serial renal indices  CT chest - cardiomegaly  consideration for LE dopplers  GOC discussion  goal sat > 88 pct  VBG - noted  cont home o2 support - 3 L n/c  nasal saline for congestion upper airway   work up in progress

## 2024-03-18 NOTE — DISCHARGE NOTE PROVIDER - NSDCFUSCHEDAPPT_GEN_ALL_CORE_FT
Viktoriya Champagne  [facility]  HEART[address]  Scheduled Appointment: 04/01/2024    Khari Henley  [facility]  PUL[address]  Scheduled Appointment: 05/22/2024

## 2024-03-18 NOTE — PROGRESS NOTE ADULT - PROVIDER SPECIALTY LIST ADULT
Cardiology
Hospitalist
Nephrology
Pulmonology
Hospitalist
Pulmonology
Cardiology
Hospitalist
Nephrology
Pulmonology

## 2024-03-18 NOTE — DISCHARGE NOTE PROVIDER - DISCHARGE SERVICE FOR PATIENT
[facility]  Discharge Final Note    Primary Care Provider: Marquez Huff MD    Expected Discharge Date: 6/29/2022    Final Discharge Note (most recent)     Final Note - 06/29/22 1351        Final Note    Assessment Type Final Discharge Note     Anticipated Discharge Disposition Home-Health Care Svc        Post-Acute Status    Post-Acute Authorization Home Health     Home Health Status Set-up Complete/Auth obtained     Patient choice form signed by patient/caregiver List with quality metrics by geographic area provided     Discharge Delays None known at this time                 Important Message from Medicare  Important Message from Medicare regarding Discharge Appeal Rights: Given to patient/caregiver, Explained to patient/caregiver, Signed/date by patient/caregiver     Date IMM was signed: 06/29/22  Time IMM was signed: 1225    Contact Info     [facility]   Specialty: Wound Care    [address] [place] [address]   Phone: [phone]       Next Steps: Follow up in 2 week(s)    Instructions: Appt: July 13, 2022 @ 10:00    Marquez Huff MD   Specialty: Family Medicine   Relationship: PCP - General    [address] [place] [address].  [facility] [address]   Phone: [phone]       Next Steps: Follow up in 1 week(s)    Instructions: Appt: July 06,2022 @ 0945      Pt discharged home today with [facility] health.    on the discharge service for the patient. I have reviewed and made amendments to the documentation where necessary.

## 2024-03-18 NOTE — DISCHARGE NOTE PROVIDER - CARE PROVIDER_API CALL
Viktoriya Champagne  [facility]  [address] [place] [address]  Phone: [phone]  Fax: [phone]  Follow Up Time:     Yen Vera  Cardiology  [address] [place] [address]  Phone: [phone]  Fax: [phone]  Follow Up Time:

## 2024-03-18 NOTE — DISCHARGE NOTE PROVIDER - NSDCMRMEDTOKEN_GEN_ALL_CORE_FT
acetaminophen 325 mg oral tablet: 2 tab(s) orally every 6 hours, As Needed -Temp greater or equal to 38C (100.4F),  Pain mod  albuterol 2.5 mg/3 mL (0.083%) inhalation solution: 3 milliliter(s) by nebulizer every 4 hours as needed for  shortness of breath and/or wheezing  atorvastatin 10 mg oral tablet: 1 tab(s) orally once a day (at bedtime)  budesonide-formoterol 80 mcg-4.5 mcg/inh inhalation aerosol: 2 puff(s) inhaled 2 times a day  ferrous sulfate 325 mg (65 mg elemental iron) oral tablet: 1 tab(s) orally once a day  gabapentin 100 mg oral tablet: orally once a day (at bedtime)  Jardiance 10 mg oral tablet: 1 tab(s) orally  melatonin 3 mg oral tablet: 1 tab(s) orally once a day (at bedtime) As needed Insomnia  metoprolol tartrate 25 mg oral tablet: 1 tab(s) orally 2 times a day  MiraLax oral powder for reconstitution: orally once a day (at bedtime) if needed for constipation  Nasal Saline 0.65% nasal spray: 1 spray(s) nasal 2 times a day both nostrils   predniSONE 20 mg oral tablet: 1 tab(s) orally once a day Take for one more day  ramelteon 8 mg oral tablet: 1 tab(s) orally once a day (at bedtime)  Remeron 15 mg oral tablet: 1.5 tab(s) orally once a day (at bedtime)  senna leaf extract oral tablet: 2 tab(s) orally once a day (at bedtime)  sertraline 25 mg oral tablet: 3 tab(s) orally once a day  spironolactone 25 mg oral tablet: 1 tab(s) orally 2 times a day  tiotropium 2.5 mcg/inh inhalation aerosol: 2 puff(s) inhaled once a day  torsemide 40 mg oral tablet: 1 tab(s) orally 2 times a day

## 2024-03-18 NOTE — PROGRESS NOTE ADULT - ASSESSMENT
89 y/o F with PMH of HTN, Dyslipidemia, MR s/p Bushra- Clip, A. Fib not on anticoagulants, Chronic  Diastolic CHF, Chronic Hypoxic Respiratory Failure (on 3L NC), COVID-19, CKD 3, lower GI Bleeding, Anemia, Anxiety, and Depression presented with worsening SOB.    Shortness of breath - thought initially to be acute on chronic CHF but no significant findings for CHF (TTE pending).  Consider pulmonary etiology such as COPD exacerbation.  TTE shows severe pHTN so likely the cause of her dypsnea.    - weaned down to 4L today   - as per pulmonary, able to be dc'ed  - cont with telemetry  - cardiology consulted and following -> TTE, cont with torsemide and spironolactone  - pulmonary consulted -> nebs PRN, short course of prednisone  - on symbicort    Acute on chronic kidney disease - likely secondary to diuretic use, improved today  - nephrology consulted  - avoid nephrotoxic meds  - monitor electrolytes, hyperphosphatemia improved    Hyponatremia -   - sodium stable  - dc'ed D5  - monitor BMP     Atrial fibrillation   - cont with metoprolol for rate control    HLD  - cont with atorvastatin    Anxiety/depression  - cont with sertraline    Preventive measures  - on heparin for DVT ppx

## 2024-03-18 NOTE — CASE MANAGEMENT PROGRESS NOTE - NSCMPROGRESSNOTE_GEN_ALL_CORE
Patient medically cleared for DC. F/U appts in place. All DME in place. BLS ambulance arranged . accepted [facility] services. DC needs in place . Dtr at bedside and receptive

## 2024-03-18 NOTE — PROGRESS NOTE ADULT - ASSESSMENT
The patient is a 89 year old female with a history of HTN, HL, atrial fibrillation, MitraClip, chronic diastolic heart failure, GI bleed who presents with shortness of breath.    Plan:  - ECG with known atrial fibrillation  - CT chest with no acute finding  - BNP 5414  - Echo with normal LV systolic function, normal MitraClip function, severe pulm HTN  - Pulmonary hypertension - fluctuating in the past but has been severe on prior echos at times  - No evidence of decompensated heart failure on exam or imaging  - Continue metoprolol tartrate 25 mg bid  - Continue torsemide 40 mg bid  - Continue spironolactone 25 mg daily  - Pulm follow-up

## 2024-03-18 NOTE — DISCHARGE NOTE PROVIDER - NSDCCPCAREPLAN_GEN_ALL_CORE_FT
PRINCIPAL DISCHARGE DIAGNOSIS  Diagnosis: Severe pulmonary hypertension  Assessment and Plan of Treatment: ***  PLEASE follow up with the cardiologist and eventually the advanced heart failure clinic.    PLEASE return to the ED/hospital with worsening shortness of breath or chest pain, increase swelling, dizziness, headaches, etc.    ***      SECONDARY DISCHARGE DIAGNOSES  Diagnosis: Cardiomegaly  Assessment and Plan of Treatment:     Diagnosis: COPD exacerbation  Assessment and Plan of Treatment: *****  PLEASE continue taking prednisone 20mg for one more day to complete a 6 day course.  PLEASE continue taking symbicort (budesonide/formoterol) 2 puffs twice a day and spiriva (tiotripium) 2 puffs daily  ****    Diagnosis: CHF exacerbation  Assessment and Plan of Treatment: ******  PLEASE continue taking torsemide 40m twice aday   *****

## 2024-03-18 NOTE — PROGRESS NOTE ADULT - SUBJECTIVE AND OBJECTIVE BOX
Chief Complaint: Shortness of breath    Interval Events: No events overnight.    Review of Systems:  General: No fevers, chills, weight gain  Skin: No rashes, color changes  Cardiovascular: No chest pain, orthopnea  Respiratory: No shortness of breath, cough  Gastrointestinal: No nausea, abdominal pain  Genitourinary: No incontinence, pain with urination  Musculoskeletal: No pain, swelling, decreased range of motion  Neurological: No headache, weakness  Psychiatric: No depression, anxiety  Endocrine: No weight gain, increased thirst  All other systems are comprehensively negative.    Physical Exam:  Vital Signs Last 24 Hrs  T(C): 36.4 (18 Mar 2024 05:05), Max: 36.8 (17 Mar 2024 21:06)  T(F): 97.5 (18 Mar 2024 05:05), Max: 98.3 (17 Mar 2024 21:06)  HR: 82 (18 Mar 2024 07:04) (70 - 87)  BP: 114/62 (18 Mar 2024 05:05) (112/61 - 117/68)  BP(mean): --  RR: 18 (18 Mar 2024 08:28) (17 - 18)  SpO2: 95% (18 Mar 2024 10:17) (93% - 99%)  Parameters below as of 18 Mar 2024 10:17  Patient On (Oxygen Delivery Method): nasal cannula, 4 LPM  General: NAD  HEENT: MMM  Neck: No JVD, no carotid bruit  Lungs: CTAB  CV: RRR, nl S1/S2, no M/R/G  Abdomen: S/NT/ND, +BS  Extremities: No LE edema, no cyanosis  Neuro: AAOx3, non-focal  Skin: No rash    Labs:             03-18    142  |  105  |  60<H>  ----------------------------<  120<H>  4.2   |  31  |  1.56<H>    Ca    8.7      18 Mar 2024 07:53  Phos  3.3     03-18  Mg     2.3     03-18    TPro  6.5  /  Alb  3.0<L>  /  TBili  0.8  /  DBili  x   /  AST  16  /  ALT  21  /  AlkPhos  46  03-18                        11.1   6.96  )-----------( 145      ( 18 Mar 2024 07:53 )             36.5       ECG/Telemetry: Atrial fibrillation
Chief Complaint: Shortness of breath    Interval Events: No events overnight.    Review of Systems:  General: No fevers, chills, weight gain  Skin: No rashes, color changes  Cardiovascular: No chest pain, orthopnea  Respiratory: No shortness of breath, cough  Gastrointestinal: No nausea, abdominal pain  Genitourinary: No incontinence, pain with urination  Musculoskeletal: No pain, swelling, decreased range of motion  Neurological: No headache, weakness  Psychiatric: No depression, anxiety  Endocrine: No weight gain, increased thirst  All other systems are comprehensively negative.    Physical Exam:  Vitals:        Vital Signs Last 24 Hrs  T(C): 36.4 (15 Mar 2024 08:04), Max: 36.7 (14 Mar 2024 14:07)  T(F): 97.6 (15 Mar 2024 08:04), Max: 98.1 (14 Mar 2024 14:07)  HR: 77 (15 Mar 2024 08:04) (75 - 88)  BP: 125/67 (15 Mar 2024 08:04) (107/68 - 125/68)  BP(mean): --  RR: 20 (15 Mar 2024 08:04) (17 - 20)  SpO2: 93% (15 Mar 2024 08:04) (89% - 95%)  Parameters below as of 15 Mar 2024 09:18  Patient On (Oxygen Delivery Method): nasal cannula  General: NAD  HEENT: MMM  Neck: No JVD, no carotid bruit  Lungs: CTAB  CV: RRR, nl S1/S2, no M/R/G  Abdomen: S/NT/ND, +BS  Extremities: No LE edema, no cyanosis  Neuro: AAOx3, non-focal  Skin: No rash    Labs:                        11.7   9.39  )-----------( 169      ( 15 Mar 2024 07:43 )             37.9     03-15    138  |  103  |  62<H>  ----------------------------<  136<H>  4.7   |  25  |  1.79<H>    Ca    9.1      15 Mar 2024 07:43  Phos  4.7     03-15  Mg     2.9     03-15    TPro  7.8  /  Alb  3.2<L>  /  TBili  0.7  /  DBili  x   /  AST  16  /  ALT  14  /  AlkPhos  53  03-15        PT/INR - ( 13 Mar 2024 14:10 )   PT: 14.3 sec;   INR: 1.32 ratio         PTT - ( 13 Mar 2024 14:10 )  PTT:31.6 sec    ECG/Telemetry: Atrial fibrillation
Patient is a 89y old  Female who presents with a chief complaint of SOB (17 Mar 2024 09:40)    INTERVAL HPI/OVERNIGHT EVENTS:  No acute events overnight.  No respiratory distress.  This AM, patient noted to be on 6L on NC.  Patient denies any SOB and says her breathing is "ok."  No other complaints voiced.  No nausea.      MEDICATIONS  (STANDING):  atorvastatin 10 milliGRAM(s) Oral at bedtime  budesonide  80 MICROgram(s)/formoterol 4.5 MICROgram(s) Inhaler 2 Puff(s) Inhalation two times a day  ferrous    sulfate 325 milliGRAM(s) Oral daily  gabapentin 100 milliGRAM(s) Oral at bedtime  heparin   Injectable 5000 Unit(s) SubCutaneous every 8 hours  melatonin 3 milliGRAM(s) Oral at bedtime  metoprolol tartrate 25 milliGRAM(s) Oral two times a day  mirtazapine 22.5 milliGRAM(s) Oral at bedtime  predniSONE   Tablet 20 milliGRAM(s) Oral daily  senna 2 Tablet(s) Oral at bedtime  sertraline 75 milliGRAM(s) Oral daily  sodium chloride 0.65% Nasal 1 Spray(s) Both Nostrils three times a day  spironolactone 25 milliGRAM(s) Oral two times a day  tiotropium 2.5 MICROgram(s) Inhaler 2 Puff(s) Inhalation daily  torsemide 40 milliGRAM(s) Oral two times a day    MEDICATIONS  (PRN):  acetaminophen     Tablet .. 650 milliGRAM(s) Oral every 6 hours PRN Temp greater or equal to 38C (100.4F), Mild Pain (1 - 3)  albuterol    0.083% 2.5 milliGRAM(s) Nebulizer every 3 hours PRN Shortness of Breath and/or Wheezing  bisacodyl 5 milliGRAM(s) Oral every 12 hours PRN Constipation  guaiFENesin Oral Liquid (Sugar-Free) 200 milliGRAM(s) Oral every 6 hours PRN Cough  polyethylene glycol 3350 17 Gram(s) Oral at bedtime PRN Constipation    Allergies  codeine (Other)    ROS as above and reviewed and is otherwise negative    PHYSICAL EXAM:  Vital Signs Last 24 Hrs  T(C): 36.4 (17 Mar 2024 05:09), Max: 37.1 (16 Mar 2024 16:02)  T(F): 97.5 (17 Mar 2024 05:09), Max: 98.7 (16 Mar 2024 16:02)  HR: 83 (17 Mar 2024 05:09) (75 - 86)  BP: 112/66 (17 Mar 2024 05:09) (106/61 - 124/63)  BP(mean): --  RR: 18 (17 Mar 2024 05:09) (18 - 18)  SpO2: 97% (17 Mar 2024 05:09) (92% - 97%)    Parameters below as of 17 Mar 2024 05:09  Patient On (Oxygen Delivery Method): nasal cannula  O2 Flow (L/min): 6    GENERAL:     elderly female in NAD, on O2  HEAD:     atraumatic, normocephalic  EYES:     EOMI, conjunctiva and sclera clear  RESPIRATORY:     diminished breath sounds bilaterally, no gross crackles or wheezing  CARDIOVASCULAR:     regular rate and rhythm, ii/vi systolic murmur at LLSB, no rubs or gallops  GASTROINTESTINAL:     soft, nontender, nondistended, bowel sounds present, +umbilical hernia  EXTREMITIES:    edema improved  MUSCULOSKELETAL:     no joint pain or swelling or deformities  NERVOUS SYSTEM:     grossly intact  PSYCH:     appropriate, alert and orientated x3, good concentration    LABS:                        11.4   7.58  )-----------( 156      ( 17 Mar 2024 06:00 )             37.3     17 Mar 2024 06:00    142    |  106    |  58     ----------------------------<  126    4.4     |  31     |  1.47     Ca    9.0        17 Mar 2024 06:00  Phos  3.7       17 Mar 2024 06:00  Mg     2.3       17 Mar 2024 06:00    TPro  7.5    /  Alb  3.2    /  TBili  0.9    /  DBili  x      /  AST  15     /  ALT  15     /  AlkPhos  47     17 Mar 2024 06:00      Urinalysis Basic - ( 17 Mar 2024 06:00 )    Color: x / Appearance: x / SG: x / pH: x  Gluc: 126 mg/dL / Ketone: x  / Bili: x / Urobili: x   Blood: x / Protein: x / Nitrite: x   Leuk Esterase: x / RBC: x / WBC x   Sq Epi: x / Non Sq Epi: x / Bacteria: x      CAPILLARY BLOOD GLUCOSE          
Patient is a 89y Female with a known history of :  Acute on chronic diastolic congestive heart failure [I50.33]    MEME (acute kidney injury) [N17.9]    Hyponatremia [E87.1]    Acute on chronic respiratory failure with hypoxia [J96.21]    Chronic atrial fibrillation [I48.20]    Need for prophylactic measure [Z29.9]      HPI:   This is a 89 y/o F with PMH of HTN, Dyslipidemia, MR s/p Bush[place]- Stephanie, A. Fib not on anticoagulants, Chronic  Diastolic CHF, Chronic Hypoxic Respiratory Failure (on 3L NC), COVID-19, CKD 3, lower GI Bleeding, Anemia, Anxiety, and Depression who was sent from [facility] living [facility] for worsening SOB over the past 2 days even with minimal effort with dropping of her SPO2 while on supplemental oxygen. Patient states that she feels her nose dry & stuffy with difficulty to breath, denies cough but her son at the bedside reports dry cough. Patient also reports itchy eyes, no chest pain or palpitations, no dizziness, nausea, vomiting, fever, or chills.  (13 Mar 2024 21:19)      REVIEW OF SYSTEMS:    CONSTITUTIONAL: No fever, weight loss, or fatigue  EYES: No eye pain, visual disturbances, or discharge  ENMT:  No difficulty hearing, tinnitus, vertigo; No sinus or throat pain  NECK: No pain or stiffness  BREASTS: No pain, masses, or nipple discharge  RESPIRATORY: No cough, wheezing, chills or hemoptysis; No shortness of breath  CARDIOVASCULAR: No chest pain, palpitations, dizziness, or leg swelling  GASTROINTESTINAL: No abdominal or epigastric pain. No nausea, vomiting, or hematemesis; No diarrhea or constipation. No melena or hematochezia.  GENITOURINARY: No dysuria, frequency, hematuria, or incontinence  NEUROLOGICAL: No headaches, memory loss, loss of strength, numbness, or tremors  SKIN: No itching, burning, rashes, or lesions   LYMPH NODES: No enlarged glands  ENDOCRINE: No heat or cold intolerance; No hair loss  MUSCULOSKELETAL: No joint pain or swelling; No muscle, back, or extremity pain  PSYCHIATRIC: No depression, anxiety, mood swings, or difficulty sleeping  HEME/LYMPH: No easy bruising, or bleeding gums  ALLERGY AND IMMUNOLOGIC: No hives or eczema    MEDICATIONS  (STANDING):  atorvastatin 10 milliGRAM(s) Oral at bedtime  budesonide  80 MICROgram(s)/formoterol 4.5 MICROgram(s) Inhaler 2 Puff(s) Inhalation two times a day  ferrous    sulfate 325 milliGRAM(s) Oral daily  gabapentin 100 milliGRAM(s) Oral at bedtime  heparin   Injectable 5000 Unit(s) SubCutaneous every 8 hours  melatonin 3 milliGRAM(s) Oral at bedtime  metoprolol tartrate 25 milliGRAM(s) Oral two times a day  mirtazapine 22.5 milliGRAM(s) Oral at bedtime  predniSONE   Tablet 20 milliGRAM(s) Oral daily  senna 2 Tablet(s) Oral at bedtime  sertraline 75 milliGRAM(s) Oral daily  sodium chloride 0.65% Nasal 1 Spray(s) Both Nostrils three times a day  spironolactone 25 milliGRAM(s) Oral two times a day  tiotropium 2.5 MICROgram(s) Inhaler 2 Puff(s) Inhalation daily  torsemide 40 milliGRAM(s) Oral two times a day    MEDICATIONS  (PRN):  acetaminophen     Tablet .. 650 milliGRAM(s) Oral every 6 hours PRN Temp greater or equal to 38C (100.4F), Mild Pain (1 - 3)  albuterol    0.083% 2.5 milliGRAM(s) Nebulizer every 3 hours PRN Shortness of Breath and/or Wheezing  bisacodyl 5 milliGRAM(s) Oral every 12 hours PRN Constipation  guaiFENesin Oral Liquid (Sugar-Free) 200 milliGRAM(s) Oral every 6 hours PRN Cough  polyethylene glycol 3350 17 Gram(s) Oral at bedtime PRN Constipation      ALLERGIES: codeine (Other)      FAMILY HISTORY:  Family history of blood disorder (Mother)        Social history:  Alochol:   Smoking:   Drug Use:   Marital Status:     PHYSICAL EXAMINATION:  -----------------------------  T(C): 36.4 (03-17-24 @ 05:09), Max: 37.1 (03-16-24 @ 16:02)  HR: 83 (03-17-24 @ 05:09) (75 - 86)  BP: 112/66 (03-17-24 @ 05:09) (106/61 - 124/63)  RR: 18 (03-17-24 @ 05:09) (18 - 18)  SpO2: 97% (03-17-24 @ 05:09) (92% - 97%)  Wt(kg): --        Constitutional: well developed, normal appearance, well groomed, well nourished, no deformities and no acute distress.   Eyes: the conjunctiva exhibited no abnormalities and the eyelids demonstrated no xanthelasmas.   HEENT: normal oral mucosa, no oral pallor and no oral cyanosis.   Neck: normal jugular venous A waves present, normal jugular venous V waves present and no jugular venous camargo A waves.   Pulmonary: no respiratory distress, normal respiratory rhythm and effort, no accessory muscle use and lungs were clear to auscultation bilaterally.   Cardiovascular: heart rate and rhythm were irreg/irreg, normal S1 and S2 and no rub, heave or thrill are present. II-II/VI systolic murmur  Musculoskeletal: the gait could not be assessed.   Extremities: no clubbing of the fingernails, no localized cyanosis, no petechial hemorrhages and no ischemic changes.   Skin: normal skin color and pigmentation, no rash, no venous stasis, no skin lesions, no skin ulcer and no xanthoma was observed.   Psychiatric: oriented to person, place, and time, the affect was normal, the mood was normal and not feeling anxious.     LABS:   --------  03-17    142  |  106  |  58<H>  ----------------------------<  126<H>  4.4   |  31  |  1.47<H>    Ca    9.0      17 Mar 2024 06:00  Phos  3.7     03-17  Mg     2.3     03-17    TPro  7.5  /  Alb  3.2<L>  /  TBili  0.9  /  DBili  x   /  AST  15  /  ALT  15  /  AlkPhos  47  03-17                         11.4   7.58  )-----------( 156      ( 17 Mar 2024 06:00 )             37.3                   Radiology:    
Patient is a 89y old  Female who presents with a chief complaint of SOB (16 Mar 2024 09:30)  Patient seen in follow up for MEME.        PAST MEDICAL HISTORY:  HTN (hypertension)    HLD (hyperlipidemia)    Atrial fibrillation    Syncope    Depression    Leg edema    Mitral valve stenosis, unspecified etiology    Hearing loss of left ear      MEDICATIONS  (STANDING):  atorvastatin 10 milliGRAM(s) Oral at bedtime  budesonide  80 MICROgram(s)/formoterol 4.5 MICROgram(s) Inhaler 2 Puff(s) Inhalation two times a day  ciprofloxacin  0.3% Ophthalmic Solution 1 Drop(s) Both EYES every 6 hours  ferrous    sulfate 325 milliGRAM(s) Oral daily  gabapentin 100 milliGRAM(s) Oral at bedtime  heparin   Injectable 5000 Unit(s) SubCutaneous every 8 hours  melatonin 3 milliGRAM(s) Oral at bedtime  metoprolol tartrate 25 milliGRAM(s) Oral two times a day  mirtazapine 22.5 milliGRAM(s) Oral at bedtime  predniSONE   Tablet 20 milliGRAM(s) Oral daily  senna 2 Tablet(s) Oral at bedtime  sertraline 75 milliGRAM(s) Oral daily  sodium chloride 0.65% Nasal 1 Spray(s) Both Nostrils three times a day  spironolactone 25 milliGRAM(s) Oral two times a day  tiotropium 2.5 MICROgram(s) Inhaler 2 Puff(s) Inhalation daily  torsemide 40 milliGRAM(s) Oral two times a day    MEDICATIONS  (PRN):  acetaminophen     Tablet .. 650 milliGRAM(s) Oral every 6 hours PRN Temp greater or equal to 38C (100.4F), Mild Pain (1 - 3)  albuterol    0.083% 2.5 milliGRAM(s) Nebulizer every 3 hours PRN Shortness of Breath and/or Wheezing  bisacodyl 5 milliGRAM(s) Oral every 12 hours PRN Constipation  guaiFENesin Oral Liquid (Sugar-Free) 200 milliGRAM(s) Oral every 6 hours PRN Cough  polyethylene glycol 3350 17 Gram(s) Oral at bedtime PRN Constipation    T(C): 36.1 (03-16-24 @ 05:00), Max: 36.9 (03-15-24 @ 16:56)  HR: 82 (03-16-24 @ 06:44) (72 - 84)  BP: 119/83 (03-16-24 @ 05:00) (105/52 - 125/67)  RR: 18 (03-16-24 @ 05:00) (17 - 20)  SpO2: 96% (03-16-24 @ 06:44) (87% - 100%)  Wt(kg): --  I&O's Detail    15 Mar 2024 07:01  -  16 Mar 2024 07:00  --------------------------------------------------------  IN:  Total IN: 0 mL    OUT:    Voided (mL): 550 mL  Total OUT: 550 mL    Total NET: -550 mL          PHYSICAL EXAM:  General: No distress  Respiratory: b/l air entry  Cardiovascular: S1 S2  Gastrointestinal: soft  Extremities:  edema                              11.9   8.88  )-----------( 185      ( 16 Mar 2024 06:00 )             37.6     03-16    143  |  104  |  59<H>  ----------------------------<  149<H>  4.7   |  28  |  1.75<H>    Ca    9.3      16 Mar 2024 06:00  Phos  3.8     03-16  Mg     2.8     03-16    TPro  7.6  /  Alb  3.2<L>  /  TBili  0.7  /  DBili  x   /  AST  15  /  ALT  10  /  AlkPhos  54  03-16        LIVER FUNCTIONS - ( 16 Mar 2024 06:00 )  Alb: 3.2 g/dL / Pro: 7.6 g/dL / ALK PHOS: 54 U/L / ALT: 10 U/L / AST: 15 U/L / GGT: x           Urinalysis Basic - ( 16 Mar 2024 06:00 )    Color: x / Appearance: x / SG: x / pH: x  Gluc: 149 mg/dL / Ketone: x  / Bili: x / Urobili: x   Blood: x / Protein: x / Nitrite: x   Leuk Esterase: x / RBC: x / WBC x   Sq Epi: x / Non Sq Epi: x / Bacteria: x        Sodium, Serum: 143 (03-16 @ 06:00)  Sodium, Serum: 138 (03-15 @ 07:43)  Sodium, Serum: 138 (03-14 @ 12:00)  Sodium, Serum: 141 (03-14 @ 07:49)    Creatinine, Serum: 1.75 (03-16 @ 06:00)  Creatinine, Serum: 1.79 (03-15 @ 07:43)  Creatinine, Serum: 1.98 (03-14 @ 12:00)  Creatinine, Serum: 1.92 (03-14 @ 07:49)  Creatinine, Serum: 1.96 (03-13 @ 14:10)    Potassium, Serum: 4.7 (03-16 @ 06:00)  Potassium, Serum: 4.7 (03-15 @ 07:43)  Potassium, Serum: 5.2 (03-14 @ 12:00)  Potassium, Serum: 5.1 (03-14 @ 07:49)    Hemoglobin: 11.9 (03-16 @ 06:00)  Hemoglobin: 11.7 (03-15 @ 07:43)  Hemoglobin: 11.2 (03-14 @ 07:49)  Hemoglobin: 12.1 (03-13 @ 14:10)          < from: US Renal (03.14.24 @ 12:09) >    ACC: [id number] EXAM:  US KIDNEY(S)   ORDERED BY: PATRICIA COLEMAN     PROCEDURE DATE:  03/14/2024          INTERPRETATION:  CLINICAL INFORMATION: Acute kidney injury.    COMPARISON: None available.    TECHNIQUE: Sonography of the kidneys and bladder.    FINDINGS:    Right kidney: 10.8 cm.  No hydronephrosis or intrarenal calcification. Possible small hypodense   lesion at the upper pole, not adequately evaluated on this exam.    Left kidney: 9.1 cm.  No hydronephrosis or intrarenal calcification.  Large, 10 cm exophytic cyst with internal septation.    Urinary bladder: Within normal limits.  Bladder volume measures 320 cc.    IMPRESSION:    No sonographic evidence for hydronephrosis.        --- End of Report ---            RAYA FLORES MD;Attending Radiologist  This document has been electronically signed. Mar 14 2024  2:54PM    < end of copied text >  < from: CT Chest No Cont (03.13.24 @ 16:12) >    ACC: [id number] EXAM:  CT CHEST   ORDERED BY: BABATUNDE DIXON     PROCEDURE DATE:  03/13/2024          INTERPRETATION:  CLINICAL INFORMATION: Shortness of breath    COMPARISON: November 05, 2022    CONTRAST/COMPLICATIONS:  IV Contrast: NONE  Oral Contrast: NONE  Complications: None reported at time of study completion    PROCEDURE:  CT of the Chest was performed.  Sagittal and coronal reformats were performed.    FINDINGS:    LUNGS AND AIRWAYS: Patent central airways.  Stable 3 mm left upper lobe   pulmonary nodule and small area of scar in the lingula.  PLEURA: No pleural effusion.  MEDIASTINUM AND DUDLEY: No lymphadenopathy.  VESSELS: Atherosclerotic arterial calcifications, including coronary   artery calcification.  HEART: Marked cardiomegaly. Mitral valve clips noted. No pericardial   effusion.  CHEST WALL AND LOWER NECK: Within normal limits.  VISUALIZED UPPER ABDOMEN: Cholelithiasis. Hepatic and bilateral renal   cysts.  BONES: Old left rib fractures. Spinal degenerative changes. Stable mild   T10 compression deformity.    IMPRESSION:  No acute findings.  Marked cardiomegaly.        --- End of Report ---            ZACK ALFRED MD; Attending Radiologist  This document has been electronically signed. Mar 13 2024  5:09PM    < end of copied text >        
Patient is a 89y old  Female who presents with a chief complaint of SOB (18 Mar 2024 10:38)    INTERVAL HPI/OVERNIGHT EVENTS:  No acute events overnight.  This AM, patient states she has trouble breathing because her nostrils are clogged.  Respiratory status stable.  No other complaints voiced.        MEDICATIONS  (STANDING):  atorvastatin 10 milliGRAM(s) Oral at bedtime  budesonide  80 MICROgram(s)/formoterol 4.5 MICROgram(s) Inhaler 2 Puff(s) Inhalation two times a day  ferrous    sulfate 325 milliGRAM(s) Oral daily  gabapentin 100 milliGRAM(s) Oral at bedtime  heparin   Injectable 5000 Unit(s) SubCutaneous every 8 hours  melatonin 3 milliGRAM(s) Oral at bedtime  metoprolol tartrate 25 milliGRAM(s) Oral two times a day  mirtazapine 22.5 milliGRAM(s) Oral at bedtime  predniSONE   Tablet 20 milliGRAM(s) Oral daily  senna 2 Tablet(s) Oral at bedtime  sertraline 75 milliGRAM(s) Oral daily  sodium chloride 0.65% Nasal 1 Spray(s) Both Nostrils three times a day  spironolactone 25 milliGRAM(s) Oral two times a day  tiotropium 2.5 MICROgram(s) Inhaler 2 Puff(s) Inhalation daily  torsemide 40 milliGRAM(s) Oral two times a day    MEDICATIONS  (PRN):  acetaminophen     Tablet .. 650 milliGRAM(s) Oral every 6 hours PRN Temp greater or equal to 38C (100.4F), Mild Pain (1 - 3)  albuterol    0.083% 2.5 milliGRAM(s) Nebulizer every 3 hours PRN Shortness of Breath and/or Wheezing  bisacodyl 5 milliGRAM(s) Oral every 12 hours PRN Constipation  guaiFENesin Oral Liquid (Sugar-Free) 200 milliGRAM(s) Oral every 6 hours PRN Cough  polyethylene glycol 3350 17 Gram(s) Oral at bedtime PRN Constipation      Allergies  codeine (Other)    ROS as above and reviewed and is otherwise negative    PHYSICAL EXAM:  Vital Signs Last 24 Hrs  T(C): 36.4 (18 Mar 2024 05:05), Max: 36.8 (17 Mar 2024 21:06)  T(F): 97.5 (18 Mar 2024 05:05), Max: 98.3 (17 Mar 2024 21:06)  HR: 82 (18 Mar 2024 07:04) (70 - 87)  BP: 114/62 (18 Mar 2024 05:05) (112/61 - 117/68)  BP(mean): --  RR: 18 (18 Mar 2024 08:28) (17 - 18)  SpO2: 95% (18 Mar 2024 10:17) (93% - 99%)    Parameters below as of 18 Mar 2024 10:17  Patient On (Oxygen Delivery Method): nasal cannula, 4 LPM      GENERAL:     elderly female in NAD, on O2  HEAD:     atraumatic, normocephalic  EYES:     EOMI, conjunctiva and sclera clear  RESPIRATORY:     diminished breath sounds bilaterally, no gross crackles or wheezing  CARDIOVASCULAR:     regular rate and rhythm, ii/vi systolic murmur at LLSB, no rubs or gallops  GASTROINTESTINAL:     soft, nontender, nondistended, bowel sounds present, +umbilical hernia  EXTREMITIES:    edema improved  MUSCULOSKELETAL:     no joint pain or swelling or deformities  NERVOUS SYSTEM:     grossly intact  PSYCH:     appropriate, alert and orientated x3, good concentration        LABS:                        11.1   6.96  )-----------( 145      ( 18 Mar 2024 07:53 )             36.5     18 Mar 2024 07:53    142    |  105    |  60     ----------------------------<  120    4.2     |  31     |  1.56     Ca    8.7        18 Mar 2024 07:53  Phos  3.3       18 Mar 2024 07:53  Mg     2.3       18 Mar 2024 07:53    TPro  6.5    /  Alb  3.0    /  TBili  0.8    /  DBili  x      /  AST  16     /  ALT  21     /  AlkPhos  46     18 Mar 2024 07:53      Urinalysis Basic - ( 18 Mar 2024 07:53 )    Color: x / Appearance: x / SG: x / pH: x  Gluc: 120 mg/dL / Ketone: x  / Bili: x / Urobili: x   Blood: x / Protein: x / Nitrite: x   Leuk Esterase: x / RBC: x / WBC x   Sq Epi: x / Non Sq Epi: x / Bacteria: x      CAPILLARY BLOOD GLUCOSE          
Date/Time Patient Seen:  		  Referring MD:   Data Reviewed	       Patient is a 89y old  Female who presents with a chief complaint of SOB (13 Mar 2024 21:19)      Subjective/HPI     PAST MEDICAL & SURGICAL HISTORY:  HTN (hypertension)    HLD (hyperlipidemia)    Atrial fibrillation  On Pradaxa    Syncope    Depression    Leg edema    Mitral valve stenosis, unspecified etiology    Hearing loss of left ear    H/O knee surgery    H/O external ear surgery    Cataracta          Medication list         MEDICATIONS  (STANDING):  atorvastatin 10 milliGRAM(s) Oral at bedtime  budesonide  80 MICROgram(s)/formoterol 4.5 MICROgram(s) Inhaler 2 Puff(s) Inhalation two times a day  ciprofloxacin  0.3% Ophthalmic Solution 1 Drop(s) Both EYES every 6 hours  ferrous    sulfate 325 milliGRAM(s) Oral daily  furosemide   Injectable 40 milliGRAM(s) IV Push daily  gabapentin 100 milliGRAM(s) Oral at bedtime  heparin   Injectable 5000 Unit(s) SubCutaneous every 8 hours  melatonin 3 milliGRAM(s) Oral at bedtime  metoprolol tartrate 25 milliGRAM(s) Oral two times a day  mirtazapine 22.5 milliGRAM(s) Oral at bedtime  predniSONE   Tablet 20 milliGRAM(s) Oral daily  senna 2 Tablet(s) Oral at bedtime  sertraline 75 milliGRAM(s) Oral daily  sodium chloride 0.65% Nasal 1 Spray(s) Both Nostrils three times a day  spironolactone 25 milliGRAM(s) Oral two times a day  tiotropium 2.5 MICROgram(s) Inhaler 2 Puff(s) Inhalation daily    MEDICATIONS  (PRN):  acetaminophen     Tablet .. 650 milliGRAM(s) Oral every 6 hours PRN Temp greater or equal to 38C (100.4F), Mild Pain (1 - 3)  albuterol    0.083% 2.5 milliGRAM(s) Nebulizer every 3 hours PRN Shortness of Breath and/or Wheezing  bisacodyl 5 milliGRAM(s) Oral every 12 hours PRN Constipation  guaiFENesin Oral Liquid (Sugar-Free) 200 milliGRAM(s) Oral every 6 hours PRN Cough  polyethylene glycol 3350 17 Gram(s) Oral at bedtime PRN Constipation         Vitals log        ICU Vital Signs Last 24 Hrs  T(C): 36.8 (14 Mar 2024 05:01), Max: 37 (13 Mar 2024 19:14)  T(F): 98.2 (14 Mar 2024 05:01), Max: 98.6 (13 Mar 2024 19:14)  HR: 89 (14 Mar 2024 05:01) (75 - 100)  BP: 113/60 (14 Mar 2024 05:01) (105/63 - 126/64)  BP(mean): --  ABP: --  ABP(mean): --  RR: 19 (14 Mar 2024 05:01) (14 - 20)  SpO2: 96% (14 Mar 2024 05:01) (93% - 99%)    O2 Parameters below as of 14 Mar 2024 05:01  Patient On (Oxygen Delivery Method): nasal cannula  O2 Flow (L/min): 4               Input and Output:  I&O's Detail      Lab Data                        12.1   7.34  )-----------( 191      ( 13 Mar 2024 14:10 )             38.6     03-13    129<L>  |  99  |  51<H>  ----------------------------<  114<H>  4.4   |  24  |  1.96<H>    Ca    9.4      13 Mar 2024 14:10    TPro  7.8  /  Alb  3.2<L>  /  TBili  0.8  /  DBili  x   /  AST  20  /  ALT  12  /  AlkPhos  62  03-13            Review of Systems	      Objective     Physical Examination    heart s1s2  lung dc BS      Pertinent Lab findings & Imaging      Edgar:  NO   Adequate UO     I&O's Detail           Discussed with:     Cultures:	        Radiology                            
Date/Time Patient Seen:  		  Referring MD:   Data Reviewed	       Patient is a 89y old  Female who presents with a chief complaint of SOB (15 Mar 2024 15:02)      Subjective/HPI     PAST MEDICAL & SURGICAL HISTORY:  HTN (hypertension)    HLD (hyperlipidemia)    Atrial fibrillation  On Pradaxa    Syncope    Depression    Leg edema    Mitral valve stenosis, unspecified etiology    Hearing loss of left ear    H/O knee surgery    H/O external ear surgery    Cataracta          Medication list         MEDICATIONS  (STANDING):  atorvastatin 10 milliGRAM(s) Oral at bedtime  budesonide  80 MICROgram(s)/formoterol 4.5 MICROgram(s) Inhaler 2 Puff(s) Inhalation two times a day  ciprofloxacin  0.3% Ophthalmic Solution 1 Drop(s) Both EYES every 6 hours  ferrous    sulfate 325 milliGRAM(s) Oral daily  gabapentin 100 milliGRAM(s) Oral at bedtime  heparin   Injectable 5000 Unit(s) SubCutaneous every 8 hours  melatonin 3 milliGRAM(s) Oral at bedtime  metoprolol tartrate 25 milliGRAM(s) Oral two times a day  mirtazapine 22.5 milliGRAM(s) Oral at bedtime  predniSONE   Tablet 20 milliGRAM(s) Oral daily  senna 2 Tablet(s) Oral at bedtime  sertraline 75 milliGRAM(s) Oral daily  sodium chloride 0.65% Nasal 1 Spray(s) Both Nostrils three times a day  spironolactone 25 milliGRAM(s) Oral two times a day  tiotropium 2.5 MICROgram(s) Inhaler 2 Puff(s) Inhalation daily  torsemide 40 milliGRAM(s) Oral two times a day    MEDICATIONS  (PRN):  acetaminophen     Tablet .. 650 milliGRAM(s) Oral every 6 hours PRN Temp greater or equal to 38C (100.4F), Mild Pain (1 - 3)  albuterol    0.083% 2.5 milliGRAM(s) Nebulizer every 3 hours PRN Shortness of Breath and/or Wheezing  bisacodyl 5 milliGRAM(s) Oral every 12 hours PRN Constipation  guaiFENesin Oral Liquid (Sugar-Free) 200 milliGRAM(s) Oral every 6 hours PRN Cough  polyethylene glycol 3350 17 Gram(s) Oral at bedtime PRN Constipation         Vitals log        ICU Vital Signs Last 24 Hrs  T(C): 36.1 (16 Mar 2024 05:00), Max: 36.9 (15 Mar 2024 16:56)  T(F): 97 (16 Mar 2024 05:00), Max: 98.4 (15 Mar 2024 16:56)  HR: 83 (16 Mar 2024 05:00) (72 - 84)  BP: 119/83 (16 Mar 2024 05:00) (105/52 - 125/67)  BP(mean): --  ABP: --  ABP(mean): --  RR: 18 (16 Mar 2024 05:00) (18 - 20)  SpO2: 100% (16 Mar 2024 05:00) (87% - 100%)    O2 Parameters below as of 16 Mar 2024 05:00  Patient On (Oxygen Delivery Method): mask, nonrebreather  O2 Flow (L/min): 10               Input and Output:  I&O's Detail    14 Mar 2024 07:01  -  15 Mar 2024 07:00  --------------------------------------------------------  IN:  Total IN: 0 mL    OUT:    Voided (mL): 375 mL  Total OUT: 375 mL    Total NET: -375 mL      15 Mar 2024 07:01  -  16 Mar 2024 05:49  --------------------------------------------------------  IN:  Total IN: 0 mL    OUT:    Voided (mL): 400 mL  Total OUT: 400 mL    Total NET: -400 mL          Lab Data                        11.7   9.39  )-----------( 169      ( 15 Mar 2024 07:43 )             37.9     03-15    138  |  103  |  62<H>  ----------------------------<  136<H>  4.7   |  25  |  1.79<H>    Ca    9.1      15 Mar 2024 07:43  Phos  4.7     03-15  Mg     2.9     03-15    TPro  7.8  /  Alb  3.2<L>  /  TBili  0.7  /  DBili  x   /  AST  16  /  ALT  14  /  AlkPhos  53  03-15            Review of Systems	      Objective     Physical Examination    heart s1s2  lung dec BS  head nc      Pertinent Lab findings & Imaging      Edgar:  NO   Adequate UO     I&O's Detail    14 Mar 2024 07:01  -  15 Mar 2024 07:00  --------------------------------------------------------  IN:  Total IN: 0 mL    OUT:    Voided (mL): 375 mL  Total OUT: 375 mL    Total NET: -375 mL      15 Mar 2024 07:01  -  16 Mar 2024 05:49  --------------------------------------------------------  IN:  Total IN: 0 mL    OUT:    Voided (mL): 400 mL  Total OUT: 400 mL    Total NET: -400 mL               Discussed with:     Cultures:	        Radiology                            
Date/Time Patient Seen:  		  Referring MD:   Data Reviewed	       Patient is a 89y old  Female who presents with a chief complaint of SOB (16 Mar 2024 14:48)      Subjective/HPI     PAST MEDICAL & SURGICAL HISTORY:  HTN (hypertension)    HLD (hyperlipidemia)    Atrial fibrillation  On Pradaxa    Syncope    Depression    Leg edema    Mitral valve stenosis, unspecified etiology    Hearing loss of left ear    H/O knee surgery    H/O external ear surgery    Cataracta          Medication list         MEDICATIONS  (STANDING):  atorvastatin 10 milliGRAM(s) Oral at bedtime  budesonide  80 MICROgram(s)/formoterol 4.5 MICROgram(s) Inhaler 2 Puff(s) Inhalation two times a day  ferrous    sulfate 325 milliGRAM(s) Oral daily  gabapentin 100 milliGRAM(s) Oral at bedtime  heparin   Injectable 5000 Unit(s) SubCutaneous every 8 hours  melatonin 3 milliGRAM(s) Oral at bedtime  metoprolol tartrate 25 milliGRAM(s) Oral two times a day  mirtazapine 22.5 milliGRAM(s) Oral at bedtime  predniSONE   Tablet 20 milliGRAM(s) Oral daily  senna 2 Tablet(s) Oral at bedtime  sertraline 75 milliGRAM(s) Oral daily  sodium chloride 0.65% Nasal 1 Spray(s) Both Nostrils three times a day  spironolactone 25 milliGRAM(s) Oral two times a day  tiotropium 2.5 MICROgram(s) Inhaler 2 Puff(s) Inhalation daily  torsemide 40 milliGRAM(s) Oral two times a day    MEDICATIONS  (PRN):  acetaminophen     Tablet .. 650 milliGRAM(s) Oral every 6 hours PRN Temp greater or equal to 38C (100.4F), Mild Pain (1 - 3)  albuterol    0.083% 2.5 milliGRAM(s) Nebulizer every 3 hours PRN Shortness of Breath and/or Wheezing  bisacodyl 5 milliGRAM(s) Oral every 12 hours PRN Constipation  guaiFENesin Oral Liquid (Sugar-Free) 200 milliGRAM(s) Oral every 6 hours PRN Cough  polyethylene glycol 3350 17 Gram(s) Oral at bedtime PRN Constipation         Vitals log        ICU Vital Signs Last 24 Hrs  T(C): 36.4 (17 Mar 2024 05:09), Max: 37.1 (16 Mar 2024 16:02)  T(F): 97.5 (17 Mar 2024 05:09), Max: 98.7 (16 Mar 2024 16:02)  HR: 83 (17 Mar 2024 05:09) (75 - 86)  BP: 112/66 (17 Mar 2024 05:09) (106/61 - 124/63)  BP(mean): --  ABP: --  ABP(mean): --  RR: 18 (17 Mar 2024 05:09) (18 - 18)  SpO2: 97% (17 Mar 2024 05:09) (92% - 97%)    O2 Parameters below as of 17 Mar 2024 05:09  Patient On (Oxygen Delivery Method): nasal cannula  O2 Flow (L/min): 6               Input and Output:  I&O's Detail      Lab Data                        11.4   7.58  )-----------( 156      ( 17 Mar 2024 06:00 )             37.3     03-17    142  |  106  |  58<H>  ----------------------------<  126<H>  4.4   |  31  |  1.47<H>    Ca    9.0      17 Mar 2024 06:00  Phos  3.7     03-17  Mg     2.3     03-17    TPro  7.5  /  Alb  3.2<L>  /  TBili  0.9  /  DBili  x   /  AST  15  /  ALT  15  /  AlkPhos  47  03-17            Review of Systems	      Objective     Physical Examination    heart s1s2  lung dc BS  head nc      Pertinent Lab findings & Imaging      Edgar:  NO   Adequate UO     I&O's Detail           Discussed with:     Cultures:	        Radiology                            
Date/Time Patient Seen:  		  Referring MD:   Data Reviewed	       Patient is a 89y old  Female who presents with a chief complaint of SOB (17 Mar 2024 11:44)      Subjective/HPI     PAST MEDICAL & SURGICAL HISTORY:  HTN (hypertension)    HLD (hyperlipidemia)    Atrial fibrillation  On Pradaxa    Syncope    Depression    Leg edema    Mitral valve stenosis, unspecified etiology    Hearing loss of left ear    H/O knee surgery    H/O external ear surgery    Cataracta          Medication list         MEDICATIONS  (STANDING):  atorvastatin 10 milliGRAM(s) Oral at bedtime  budesonide  80 MICROgram(s)/formoterol 4.5 MICROgram(s) Inhaler 2 Puff(s) Inhalation two times a day  ferrous    sulfate 325 milliGRAM(s) Oral daily  gabapentin 100 milliGRAM(s) Oral at bedtime  heparin   Injectable 5000 Unit(s) SubCutaneous every 8 hours  melatonin 3 milliGRAM(s) Oral at bedtime  metoprolol tartrate 25 milliGRAM(s) Oral two times a day  mirtazapine 22.5 milliGRAM(s) Oral at bedtime  predniSONE   Tablet 20 milliGRAM(s) Oral daily  senna 2 Tablet(s) Oral at bedtime  sertraline 75 milliGRAM(s) Oral daily  sodium chloride 0.65% Nasal 1 Spray(s) Both Nostrils three times a day  spironolactone 25 milliGRAM(s) Oral two times a day  tiotropium 2.5 MICROgram(s) Inhaler 2 Puff(s) Inhalation daily  torsemide 40 milliGRAM(s) Oral two times a day    MEDICATIONS  (PRN):  acetaminophen     Tablet .. 650 milliGRAM(s) Oral every 6 hours PRN Temp greater or equal to 38C (100.4F), Mild Pain (1 - 3)  albuterol    0.083% 2.5 milliGRAM(s) Nebulizer every 3 hours PRN Shortness of Breath and/or Wheezing  bisacodyl 5 milliGRAM(s) Oral every 12 hours PRN Constipation  guaiFENesin Oral Liquid (Sugar-Free) 200 milliGRAM(s) Oral every 6 hours PRN Cough  polyethylene glycol 3350 17 Gram(s) Oral at bedtime PRN Constipation         Vitals log        ICU Vital Signs Last 24 Hrs  T(C): 36.4 (18 Mar 2024 05:05), Max: 36.8 (17 Mar 2024 21:06)  T(F): 97.5 (18 Mar 2024 05:05), Max: 98.3 (17 Mar 2024 21:06)  HR: 70 (18 Mar 2024 05:05) (70 - 87)  BP: 114/62 (18 Mar 2024 05:05) (112/61 - 117/68)  BP(mean): --  ABP: --  ABP(mean): --  RR: 17 (18 Mar 2024 05:05) (17 - 18)  SpO2: 94% (18 Mar 2024 05:05) (94% - 99%)    O2 Parameters below as of 18 Mar 2024 05:05  Patient On (Oxygen Delivery Method): nasal cannula  O2 Flow (L/min): 6               Input and Output:  I&O's Detail      Lab Data                        11.4   7.58  )-----------( 156      ( 17 Mar 2024 06:00 )             37.3     03-17    142  |  106  |  58<H>  ----------------------------<  126<H>  4.4   |  31  |  1.47<H>    Ca    9.0      17 Mar 2024 06:00  Phos  3.7     03-17  Mg     2.3     03-17    TPro  7.5  /  Alb  3.2<L>  /  TBili  0.9  /  DBili  x   /  AST  15  /  ALT  15  /  AlkPhos  47  03-17            Review of Systems	      Objective     Physical Examination    heart s1s2  lung dc BS  head nc      Pertinent Lab findings & Imaging      Edgar:  NO   Adequate UO     I&O's Detail           Discussed with:     Cultures:	        Radiology                            
NEPHROLOGY PROGRESS NOTE    CHIEF COMPLAINT:  MEME/CKD    HPI:  Renal function a little better.  Less sob.  Good subjective urine output.     EXAM:  Vital Signs Last 24 Hrs  T(C): 36.4 (15 Mar 2024 08:04), Max: 36.5 (15 Mar 2024 05:16)  T(F): 97.6 (15 Mar 2024 08:04), Max: 97.7 (15 Mar 2024 05:16)  HR: 77 (15 Mar 2024 08:04) (75 - 88)  BP: 125/67 (15 Mar 2024 08:04) (109/71 - 125/68)  BP(mean): --  RR: 20 (15 Mar 2024 08:04) (17 - 20)  SpO2: 93% (15 Mar 2024 08:04) (89% - 93%)    Parameters below as of 15 Mar 2024 09:18  Patient On (Oxygen Delivery Method): nasal cannula      I&O's Summary    14 Mar 2024 07:01  -  15 Mar 2024 07:00  --------------------------------------------------------  IN: 0 mL / OUT: 375 mL / NET: -375 mL    Conversant, in no apparent distress  Normal respiratory effort, lungs dim BS  Heart RRR with no murmur, no peripheral edema    LABS                        11.7   9.39  )-----------( 169      ( 15 Mar 2024 07:43 )             37.9     03-15    138  |  103  |  62<H>  ----------------------------<  136<H>  4.7   |  25  |  1.79<H>    Ca    9.1      15 Mar 2024 07:43  Phos  4.7     03-15  Mg     2.9     03-15    TPro  7.8  /  Alb  3.2<L>  /  TBili  0.7  /  DBili  x   /  AST  16  /  ALT  14  /  AlkPhos  53  03-15      RADIOLOGY:  Renal sonogram shows 9-10 cm kidneys with no hydronephrosis      Impression:  * MEME - prerenal/cardiorenal picture  * 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 Non-proteinuric  * Chronic diastolic heart failure, severe pulmonary hypertension, chronic afib    Recommendations:   * Follow daily BMP and strict I/O  * Agree with diuretic moderation        
Patient is a 89y old  Female who presents with a chief complaint of SOB (16 Mar 2024 05:49)    INTERVAL HPI/OVERNIGHT EVENTS:  Needed NRB and high flow overnight.  This AM, patient seen resting in her chair.  Was on 6L NC.  Patient said she feels "ok" this morning and that her breathing is "ok."  No distress noted.  Patient also denies any other complaints.  Said the swelling in her leg has also improved.      MEDICATIONS  (STANDING):  atorvastatin 10 milliGRAM(s) Oral at bedtime  budesonide  80 MICROgram(s)/formoterol 4.5 MICROgram(s) Inhaler 2 Puff(s) Inhalation two times a day  ciprofloxacin  0.3% Ophthalmic Solution 1 Drop(s) Both EYES every 6 hours  ferrous    sulfate 325 milliGRAM(s) Oral daily  gabapentin 100 milliGRAM(s) Oral at bedtime  heparin   Injectable 5000 Unit(s) SubCutaneous every 8 hours  melatonin 3 milliGRAM(s) Oral at bedtime  metoprolol tartrate 25 milliGRAM(s) Oral two times a day  mirtazapine 22.5 milliGRAM(s) Oral at bedtime  predniSONE   Tablet 20 milliGRAM(s) Oral daily  senna 2 Tablet(s) Oral at bedtime  sertraline 75 milliGRAM(s) Oral daily  sodium chloride 0.65% Nasal 1 Spray(s) Both Nostrils three times a day  spironolactone 25 milliGRAM(s) Oral two times a day  tiotropium 2.5 MICROgram(s) Inhaler 2 Puff(s) Inhalation daily  torsemide 40 milliGRAM(s) Oral two times a day    MEDICATIONS  (PRN):  acetaminophen     Tablet .. 650 milliGRAM(s) Oral every 6 hours PRN Temp greater or equal to 38C (100.4F), Mild Pain (1 - 3)  albuterol    0.083% 2.5 milliGRAM(s) Nebulizer every 3 hours PRN Shortness of Breath and/or Wheezing  bisacodyl 5 milliGRAM(s) Oral every 12 hours PRN Constipation  guaiFENesin Oral Liquid (Sugar-Free) 200 milliGRAM(s) Oral every 6 hours PRN Cough  polyethylene glycol 3350 17 Gram(s) Oral at bedtime PRN Constipation      Allergies  codeine (Other)    ROS as above and reviewed and is otherwise negative    PHYSICAL EXAM:  Vital Signs Last 24 Hrs  T(C): 36.1 (16 Mar 2024 05:00), Max: 36.9 (15 Mar 2024 16:56)  T(F): 97 (16 Mar 2024 05:00), Max: 98.4 (15 Mar 2024 16:56)  HR: 82 (16 Mar 2024 06:44) (72 - 84)  BP: 119/83 (16 Mar 2024 05:00) (105/52 - 121/52)  BP(mean): --  RR: 18 (16 Mar 2024 05:00) (18 - 20)  SpO2: 96% (16 Mar 2024 06:44) (87% - 100%)    Parameters below as of 16 Mar 2024 06:44  Patient On (Oxygen Delivery Method): mask, Venturi, .35    GENERAL:     elderly female in NAD, on O2  HEAD:     atraumatic, normocephalic  EYES:     EOMI, conjunctiva and sclera clear  RESPIRATORY:     diminished breath sounds bilaterally, no gross crackles or wheezing  CARDIOVASCULAR:     regular rate and rhythm, ii/vi systolic murmur at LLSB, no rubs or gallops  GASTROINTESTINAL:     soft, nontender, nondistended, bowel sounds present, +umbilical hernia  EXTREMITIES:    edema improved  MUSCULOSKELETAL:     no joint pain or swelling or deformities  NERVOUS SYSTEM:     grossly intact  PSYCH:     appropriate, alert and orientated x3, good concentration      LABS:                        11.9   8.88  )-----------( 185      ( 16 Mar 2024 06:00 )             37.6     16 Mar 2024 06:00    143    |  104    |  59     ----------------------------<  149    4.7     |  28     |  1.75     Ca    9.3        16 Mar 2024 06:00  Phos  3.8       16 Mar 2024 06:00  Mg     2.8       16 Mar 2024 06:00    TPro  7.6    /  Alb  3.2    /  TBili  0.7    /  DBili  x      /  AST  15     /  ALT  10     /  AlkPhos  54     16 Mar 2024 06:00      Urinalysis Basic - ( 16 Mar 2024 06:00 )    Color: x / Appearance: x / SG: x / pH: x  Gluc: 149 mg/dL / Ketone: x  / Bili: x / Urobili: x   Blood: x / Protein: x / Nitrite: x   Leuk Esterase: x / RBC: x / WBC x   Sq Epi: x / Non Sq Epi: x / Bacteria: x      CAPILLARY BLOOD GLUCOSE          
Date/Time Patient Seen:  		  Referring MD:   Data Reviewed	       Patient is a 89y old  Female who presents with a chief complaint of Heart failure    Per HPI:  This is a 89 y/o F with PMH of HTN, Dyslipidemia, MR s/p KIMBER Benitez. Fib not on anticoagulants, Chronic  Diastolic CHF, Chronic Hypoxic Respiratory Failure (on 3L NC), COVID-19, CKD 3, lower GI Bleeding, Anemia, Anxiety, and Depression who was sent [facility] living [facility] for worsening SOB over the past 2 days even with minimal effort with dropping of her SPO2 while on supplemental oxygen. Patient states that she feels her nose dry & stuffy with difficulty to breath, denies cough but her son at the bedside reports dry cough. Patient also reports itchy eyes, no chest pain or palpitations, no dizziness, nausea, vomiting, fever, or chills.  (13 Mar 2024 21:19) (14 Mar 2024 16:16)      Subjective/HPI     PAST MEDICAL & SURGICAL HISTORY:  HTN (hypertension)    HLD (hyperlipidemia)    Atrial fibrillation  On Pradaxa    Syncope    Depression    Leg edema    Mitral valve stenosis, unspecified etiology    Hearing loss of left ear    H/O knee surgery    H/O external ear surgery    Cataracta          Medication list         MEDICATIONS  (STANDING):  atorvastatin 10 milliGRAM(s) Oral at bedtime  budesonide  80 MICROgram(s)/formoterol 4.5 MICROgram(s) Inhaler 2 Puff(s) Inhalation two times a day  ciprofloxacin  0.3% Ophthalmic Solution 1 Drop(s) Both EYES every 6 hours  dextrose 5%. 1000 milliLiter(s) (50 mL/Hr) IV Continuous <Continuous>  ferrous    sulfate 325 milliGRAM(s) Oral daily  furosemide   Injectable 40 milliGRAM(s) IV Push daily  gabapentin 100 milliGRAM(s) Oral at bedtime  heparin   Injectable 5000 Unit(s) SubCutaneous every 8 hours  melatonin 3 milliGRAM(s) Oral at bedtime  metoprolol tartrate 25 milliGRAM(s) Oral two times a day  mirtazapine 22.5 milliGRAM(s) Oral at bedtime  predniSONE   Tablet 20 milliGRAM(s) Oral daily  senna 2 Tablet(s) Oral at bedtime  sertraline 75 milliGRAM(s) Oral daily  sodium chloride 0.65% Nasal 1 Spray(s) Both Nostrils three times a day  spironolactone 25 milliGRAM(s) Oral two times a day  tiotropium 2.5 MICROgram(s) Inhaler 2 Puff(s) Inhalation daily    MEDICATIONS  (PRN):  acetaminophen     Tablet .. 650 milliGRAM(s) Oral every 6 hours PRN Temp greater or equal to 38C (100.4F), Mild Pain (1 - 3)  albuterol    0.083% 2.5 milliGRAM(s) Nebulizer every 3 hours PRN Shortness of Breath and/or Wheezing  bisacodyl 5 milliGRAM(s) Oral every 12 hours PRN Constipation  guaiFENesin Oral Liquid (Sugar-Free) 200 milliGRAM(s) Oral every 6 hours PRN Cough  polyethylene glycol 3350 17 Gram(s) Oral at bedtime PRN Constipation         Vitals log        ICU Vital Signs Last 24 Hrs  T(C): 36.5 (15 Mar 2024 05:16), Max: 36.7 (14 Mar 2024 14:07)  T(F): 97.7 (15 Mar 2024 05:16), Max: 98.1 (14 Mar 2024 14:07)  HR: 77 (15 Mar 2024 05:16) (75 - 88)  BP: 112/52 (15 Mar 2024 05:16) (107/68 - 125/68)  BP(mean): --  ABP: --  ABP(mean): --  RR: 18 (15 Mar 2024 05:16) (17 - 18)  SpO2: 89% (15 Mar 2024 05:16) (89% - 95%)    O2 Parameters below as of 15 Mar 2024 05:16  Patient On (Oxygen Delivery Method): nasal cannula  O2 Flow (L/min): 5               Input and Output:  I&O's Detail    13 Mar 2024 07:01  -  14 Mar 2024 07:00  --------------------------------------------------------  IN:  Total IN: 0 mL    OUT:    Voided (mL): 300 mL  Total OUT: 300 mL    Total NET: -300 mL      14 Mar 2024 07:01  -  15 Mar 2024 05:51  --------------------------------------------------------  IN:  Total IN: 0 mL    OUT:    Voided (mL): 375 mL  Total OUT: 375 mL    Total NET: -375 mL          Lab Data                        11.2   4.67  )-----------( 167      ( 14 Mar 2024 07:49 )             35.8     03-14    138  |  100  |  56<H>  ----------------------------<  198<H>  5.2   |  26  |  1.98<H>    Ca    9.3      14 Mar 2024 12:00  Phos  6.3     03-14  Mg     2.8     03-14    TPro  7.8  /  Alb  3.2<L>  /  TBili  0.8  /  DBili  x   /  AST  20  /  ALT  12  /  AlkPhos  62  03-13            Review of Systems	      Objective     Physical Examination    heart s1s2  lung dc BS      Pertinent Lab findings & Imaging      Edgar:  NO   Adequate UO     I&O's Detail    13 Mar 2024 07:01  -  14 Mar 2024 07:00  --------------------------------------------------------  IN:  Total IN: 0 mL    OUT:    Voided (mL): 300 mL  Total OUT: 300 mL    Total NET: -300 mL      14 Mar 2024 07:01  -  15 Mar 2024 05:51  --------------------------------------------------------  IN:  Total IN: 0 mL    OUT:    Voided (mL): 375 mL  Total OUT: 375 mL    Total NET: -375 mL               Discussed with:     Cultures:	        Radiology                            
Patient is a 89y Female with a known history of :  Acute on chronic diastolic congestive heart failure [I50.33]    MEME (acute kidney injury) [N17.9]    Hyponatremia [E87.1]    Acute on chronic respiratory failure with hypoxia [J96.21]    Chronic atrial fibrillation [I48.20]    Need for prophylactic measure [Z29.9]      HPI:   This is a 89 y/o F with PMH of HTN, Dyslipidemia, MR s/p Bushra- Stephanie, A. Fib not on anticoagulants, Chronic  Diastolic CHF, Chronic Hypoxic Respiratory Failure (on 3L NC), COVID-19, CKD 3, lower GI Bleeding, Anemia, Anxiety, and Depression who was sent from [facility] living [facility] for worsening SOB over the past 2 days even with minimal effort with dropping of her SPO2 while on supplemental oxygen. Patient states that she feels her nose dry & stuffy with difficulty to breath, denies cough but her son at the bedside reports dry cough. Patient also reports itchy eyes, no chest pain or palpitations, no dizziness, nausea, vomiting, fever, or chills.  (13 Mar 2024 21:19)      REVIEW OF SYSTEMS:    CONSTITUTIONAL: No fever, weight loss, or fatigue  EYES: No eye pain, visual disturbances, or discharge  ENMT:  No difficulty hearing, tinnitus, vertigo; No sinus or throat pain  NECK: No pain or stiffness  BREASTS: No pain, masses, or nipple discharge  RESPIRATORY: No cough, wheezing, chills or hemoptysis; No shortness of breath  CARDIOVASCULAR: No chest pain, palpitations, dizziness, or leg swelling  GASTROINTESTINAL: No abdominal or epigastric pain. No nausea, vomiting, or hematemesis; No diarrhea or constipation. No melena or hematochezia.  GENITOURINARY: No dysuria, frequency, hematuria, or incontinence  NEUROLOGICAL: No headaches, memory loss, loss of strength, numbness, or tremors  SKIN: No itching, burning, rashes, or lesions   LYMPH NODES: No enlarged glands  ENDOCRINE: No heat or cold intolerance; No hair loss  MUSCULOSKELETAL: No joint pain or swelling; No muscle, back, or extremity pain  PSYCHIATRIC: No depression, anxiety, mood swings, or difficulty sleeping  HEME/LYMPH: No easy bruising, or bleeding gums  ALLERGY AND IMMUNOLOGIC: No hives or eczema    MEDICATIONS  (STANDING):  atorvastatin 10 milliGRAM(s) Oral at bedtime  budesonide  80 MICROgram(s)/formoterol 4.5 MICROgram(s) Inhaler 2 Puff(s) Inhalation two times a day  ciprofloxacin  0.3% Ophthalmic Solution 1 Drop(s) Both EYES every 6 hours  ferrous    sulfate 325 milliGRAM(s) Oral daily  gabapentin 100 milliGRAM(s) Oral at bedtime  heparin   Injectable 5000 Unit(s) SubCutaneous every 8 hours  melatonin 3 milliGRAM(s) Oral at bedtime  metoprolol tartrate 25 milliGRAM(s) Oral two times a day  mirtazapine 22.5 milliGRAM(s) Oral at bedtime  predniSONE   Tablet 20 milliGRAM(s) Oral daily  senna 2 Tablet(s) Oral at bedtime  sertraline 75 milliGRAM(s) Oral daily  sodium chloride 0.65% Nasal 1 Spray(s) Both Nostrils three times a day  spironolactone 25 milliGRAM(s) Oral two times a day  tiotropium 2.5 MICROgram(s) Inhaler 2 Puff(s) Inhalation daily  torsemide 40 milliGRAM(s) Oral two times a day    MEDICATIONS  (PRN):  acetaminophen     Tablet .. 650 milliGRAM(s) Oral every 6 hours PRN Temp greater or equal to 38C (100.4F), Mild Pain (1 - 3)  albuterol    0.083% 2.5 milliGRAM(s) Nebulizer every 3 hours PRN Shortness of Breath and/or Wheezing  bisacodyl 5 milliGRAM(s) Oral every 12 hours PRN Constipation  guaiFENesin Oral Liquid (Sugar-Free) 200 milliGRAM(s) Oral every 6 hours PRN Cough  polyethylene glycol 3350 17 Gram(s) Oral at bedtime PRN Constipation      ALLERGIES: codeine (Other)      FAMILY HISTORY:  Family history of blood disorder (Mother)        Social history:  Alochol:   Smoking:   Drug Use:   Marital Status:     PHYSICAL EXAMINATION:  -----------------------------  T(C): 36.3 (03-16-24 @ 10:30), Max: 36.9 (03-15-24 @ 16:56)  HR: 78 (03-16-24 @ 13:55) (72 - 84)  BP: 114/62 (03-16-24 @ 10:30) (105/52 - 119/83)  RR: 18 (03-16-24 @ 10:30) (18 - 20)  SpO2: 96% (03-16-24 @ 13:55) (92% - 100%)  Wt(kg): --    03-15 @ 07:01  -  03-16 @ 07:00  --------------------------------------------------------  IN:  Total IN: 0 mL    OUT:    Voided (mL): 550 mL  Total OUT: 550 mL    Total NET: -550 mL            Constitutional: well developed, normal appearance, well groomed, well nourished, no deformities and no acute distress.   Eyes: the conjunctiva exhibited no abnormalities and the eyelids demonstrated no xanthelasmas.   HEENT: normal oral mucosa, no oral pallor and no oral cyanosis.   Neck: normal jugular venous A waves present, normal jugular venous V waves present and no jugular venous camargo A waves.   Pulmonary: no respiratory distress, normal respiratory rhythm and effort, no accessory muscle use and lungs were clear to auscultation bilaterally. Anteriorly  Cardiovascular: heart rate and rhythm were irreg/irreg, normal S1 and S2 and no murmur, gallop, rub, heave or thrill are present.   Musculoskeletal: the gait could not be assessed.   Extremities: no clubbing of the fingernails, no localized cyanosis, no petechial hemorrhages and no ischemic changes.   Skin: normal skin color and pigmentation, no rash, no venous stasis, no skin lesions, no skin ulcer and no xanthoma was observed.       LABS:   --------  03-16    143  |  104  |  59<H>  ----------------------------<  149<H>  4.7   |  28  |  1.75<H>    Ca    9.3      16 Mar 2024 06:00  Phos  3.8     03-16  Mg     2.8     03-16    TPro  7.6  /  Alb  3.2<L>  /  TBili  0.7  /  DBili  x   /  AST  15  /  ALT  10  /  AlkPhos  54  03-16                         11.9   8.88  )-----------( 185      ( 16 Mar 2024 06:00 )             37.6                   Radiology:    < from: US Transthoracic Echocardiogram w/Doppler Complete (03.14.24 @ 09:32) >    ACC: [id number] EXAM:  US TTE W DOPPLER COMPLETE   ORDERED BY: LYUDMILA LORD     PROCEDURE DATE:  03/14/2024          INTERPRETATION:  Indication: CHF    Technician: Romy Garcia    Study Quality: Fair    Height 5 feet 2 inches, weight 143 pounds, blood pressure 130/60 mmHg    Measurements: Aortic root size 3.2 cm, left atrial size 5.3 cm, right   atrial size 5.9 cm, right ventricular size 4.8 cm, left ventricular   end-diastolic diameter 4.5 cm, left ventricular end-systolic diameter 2.4   cm, septal wall thickness 1.2 cm, posterior wall thickness 1.2 cm, aortic   velocity 1.41 m/s, mitral E velocity 1.75 m/s,    Mitral Valve: Moderate mitral annular calcification, there is a mitral   valve clip noted with mild to moderate eccentric mitral regurgitation.   Peak gradient 16 mmHg, mean gradient 6 mmHg, suggestive of mild mitral   stenosis  Aortic Valve: Sclerosed aortic valve with normal cusp excursion.  Left Atrium: Severely enlarged  Left Ventricle: Normal size, mild left ventricular hypertrophy with the   normal overall systolic function, D shape septum, paradoxical septal   motion due to right ventricular pressure overload.  Pericardium: No pericardial effusion  Tricuspid Valve: Appears to be thickened with the moderate to severe   tricuspid regurgitation with the severely elevated right ventricular   systolic pressure, estimated right ventricular systolic pressure 99 mm   the. IVC dilated with less than 50% respiratory variation  Pulmonic Valve: Mild pulmonary regurgitation  Right Ventricle: Severely dilated with right ventricular systolic   dysfunction  Right Atrium: Severely dilated    IMPRESSION:  1. mild left ventricular hypertrophy, preserved systolic function, D   shaped septum, paradoxical septal motion due to rightventricular   pressure overload  2. severely dilated right ventricle with the mild right ventricular   systolic dysfunction  3. severe biatrial enlargement.  4. Mitral valve clip, mild to moderate eccentric mitral regurgitation,   mildly elevated velocity suggestive of mild mitral stenosis.  4. Moderate to severe eccentric tricuspid regurgitation with severe   pulmonary hypertension, 99 mmHg. which is significantly higher than prior   echo done in 2022    --- End of Report ---             [facility]  This document has been electronically signed. Mar 14 2024 12:45PM    < end of copied text >  
Patient is a 89y old  Female who presents with a chief complaint of SOB (14 Mar 2024 05:32)    INTERVAL HPI/OVERNIGHT EVENTS:  Patient admitted yesterday for SOB.   This AM, patient reports feeling better, improved.  Said that the nebulizers helped.  Also reported some anxiety/nervousness because she was scared at the [employer] that she was going to lose power to her oxygen.  Patient also reported that her legs have been swollen but improved this morning.  No other complaints.  No nausea.  No chest pain.      MEDICATIONS  (STANDING):  atorvastatin 10 milliGRAM(s) Oral at bedtime  budesonide  80 MICROgram(s)/formoterol 4.5 MICROgram(s) Inhaler 2 Puff(s) Inhalation two times a day  ciprofloxacin  0.3% Ophthalmic Solution 1 Drop(s) Both EYES every 6 hours  ferrous    sulfate 325 milliGRAM(s) Oral daily  furosemide   Injectable 40 milliGRAM(s) IV Push daily  gabapentin 100 milliGRAM(s) Oral at bedtime  heparin   Injectable 5000 Unit(s) SubCutaneous every 8 hours  melatonin 3 milliGRAM(s) Oral at bedtime  metoprolol tartrate 25 milliGRAM(s) Oral two times a day  mirtazapine 22.5 milliGRAM(s) Oral at bedtime  predniSONE   Tablet 20 milliGRAM(s) Oral daily  senna 2 Tablet(s) Oral at bedtime  sertraline 75 milliGRAM(s) Oral daily  sodium chloride 0.65% Nasal 1 Spray(s) Both Nostrils three times a day  spironolactone 25 milliGRAM(s) Oral two times a day  tiotropium 2.5 MICROgram(s) Inhaler 2 Puff(s) Inhalation daily    MEDICATIONS  (PRN):  acetaminophen     Tablet .. 650 milliGRAM(s) Oral every 6 hours PRN Temp greater or equal to 38C (100.4F), Mild Pain (1 - 3)  albuterol    0.083% 2.5 milliGRAM(s) Nebulizer every 3 hours PRN Shortness of Breath and/or Wheezing  bisacodyl 5 milliGRAM(s) Oral every 12 hours PRN Constipation  guaiFENesin Oral Liquid (Sugar-Free) 200 milliGRAM(s) Oral every 6 hours PRN Cough  polyethylene glycol 3350 17 Gram(s) Oral at bedtime PRN Constipation      Allergies  codeine (Other)    ROS as above and reviewed and is otherwise negative    PHYSICAL EXAM:  Vital Signs Last 24 Hrs  T(C): 36.4 (14 Mar 2024 08:29), Max: 37 (13 Mar 2024 19:14)  T(F): 97.6 (14 Mar 2024 08:29), Max: 98.6 (13 Mar 2024 19:14)  HR: 86 (14 Mar 2024 08:29) (75 - 100)  BP: 113/65 (14 Mar 2024 08:29) (105/63 - 126/64)  BP(mean): --  RR: 18 (14 Mar 2024 08:29) (14 - 20)  SpO2: 95% (14 Mar 2024 08:29) (93% - 99%)    Parameters below as of 14 Mar 2024 08:29  Patient On (Oxygen Delivery Method): nasal cannula  O2 Flow (L/min): 5    GENERAL:     elderly female in NAD, on O2  HEAD:     atraumatic, normocephalic  EYES:     EOMI, conjunctiva and sclera clear  RESPIRATORY:     diminished breath sounds bilaterally, no gross crackles or wheezing  CARDIOVASCULAR:     regular rate and rhythm, ii/vi systolic murmur at LLSB, no rubs or gallops  GASTROINTESTINAL:     soft, nontender, nondistended, bowel sounds present, +umbilical hernia  EXTREMITIES:    slight pitting edema with chronic venous stasis changes in BLE  MUSCULOSKELETAL:     no joint pain or swelling or deformities  NERVOUS SYSTEM:     grossly intact  PSYCH:     appropriate, alert and orientated x3, good concentration      LABS:                        11.2   4.67  )-----------( 167      ( 14 Mar 2024 07:49 )             35.8     14 Mar 2024 07:49    141    |  103    |  54     ----------------------------<  213    5.1     |  26     |  1.92     Ca    9.3        14 Mar 2024 07:49  Phos  6.3       14 Mar 2024 07:49  Mg     2.8       14 Mar 2024 07:49    TPro  7.8    /  Alb  3.2    /  TBili  0.8    /  DBili  x      /  AST  20     /  ALT  12     /  AlkPhos  62     13 Mar 2024 14:10    PT/INR - ( 13 Mar 2024 14:10 )   PT: 14.3 sec;   INR: 1.32 ratio         PTT - ( 13 Mar 2024 14:10 )  PTT:31.6 sec  Urinalysis Basic - ( 14 Mar 2024 07:49 )    Color: x / Appearance: x / SG: x / pH: x  Gluc: 213 mg/dL / Ketone: x  / Bili: x / Urobili: x   Blood: x / Protein: x / Nitrite: x   Leuk Esterase: x / RBC: x / WBC x   Sq Epi: x / Non Sq Epi: x / Bacteria: x      CAPILLARY BLOOD GLUCOSE          
Patient is a 89y old  Female who presents with a chief complaint of SOB (15 Mar 2024 05:51)    INTERVAL HPI/OVERNIGHT EVENTS:  No acute events overnight.  This AM, patient seen sleeping in her chair.  When awoken, patient said she feels "ok."  Said she had an uneventful night.  Asked if her breathing is better, said "I don't know" but denies any distress.      MEDICATIONS  (STANDING):  atorvastatin 10 milliGRAM(s) Oral at bedtime  budesonide  80 MICROgram(s)/formoterol 4.5 MICROgram(s) Inhaler 2 Puff(s) Inhalation two times a day  ciprofloxacin  0.3% Ophthalmic Solution 1 Drop(s) Both EYES every 6 hours  dextrose 5%. 1000 milliLiter(s) (50 mL/Hr) IV Continuous <Continuous>  ferrous    sulfate 325 milliGRAM(s) Oral daily  gabapentin 100 milliGRAM(s) Oral at bedtime  heparin   Injectable 5000 Unit(s) SubCutaneous every 8 hours  melatonin 3 milliGRAM(s) Oral at bedtime  metoprolol tartrate 25 milliGRAM(s) Oral two times a day  mirtazapine 22.5 milliGRAM(s) Oral at bedtime  predniSONE   Tablet 20 milliGRAM(s) Oral daily  senna 2 Tablet(s) Oral at bedtime  sertraline 75 milliGRAM(s) Oral daily  sodium chloride 0.65% Nasal 1 Spray(s) Both Nostrils three times a day  spironolactone 25 milliGRAM(s) Oral two times a day  tiotropium 2.5 MICROgram(s) Inhaler 2 Puff(s) Inhalation daily    MEDICATIONS  (PRN):  acetaminophen     Tablet .. 650 milliGRAM(s) Oral every 6 hours PRN Temp greater or equal to 38C (100.4F), Mild Pain (1 - 3)  albuterol    0.083% 2.5 milliGRAM(s) Nebulizer every 3 hours PRN Shortness of Breath and/or Wheezing  bisacodyl 5 milliGRAM(s) Oral every 12 hours PRN Constipation  guaiFENesin Oral Liquid (Sugar-Free) 200 milliGRAM(s) Oral every 6 hours PRN Cough  polyethylene glycol 3350 17 Gram(s) Oral at bedtime PRN Constipation      Allergies  codeine (Other)    ROS as above and reviewed and is otherwise negative      PHYSICAL EXAM:  Vital Signs Last 24 Hrs  T(C): 36.4 (15 Mar 2024 08:04), Max: 36.7 (14 Mar 2024 14:07)  T(F): 97.6 (15 Mar 2024 08:04), Max: 98.1 (14 Mar 2024 14:07)  HR: 77 (15 Mar 2024 08:04) (75 - 88)  BP: 125/67 (15 Mar 2024 08:04) (107/68 - 125/68)  BP(mean): --  RR: 20 (15 Mar 2024 08:04) (17 - 20)  SpO2: 93% (15 Mar 2024 08:04) (89% - 95%)    Parameters below as of 15 Mar 2024 09:18  Patient On (Oxygen Delivery Method): nasal cannula      GENERAL:     elderly female in NAD, on O2  HEAD:     atraumatic, normocephalic  EYES:     EOMI, conjunctiva and sclera clear  RESPIRATORY:     diminished breath sounds bilaterally, no gross crackles or wheezing  CARDIOVASCULAR:     regular rate and rhythm, ii/vi systolic murmur at LLSB, no rubs or gallops  GASTROINTESTINAL:     soft, nontender, nondistended, bowel sounds present, +umbilical hernia  EXTREMITIES:    edema improved though legs were elevated when examined, chronic venous stasis changes in BLE  MUSCULOSKELETAL:     no joint pain or swelling or deformities  NERVOUS SYSTEM:     grossly intact  PSYCH:     appropriate, alert and orientated x3, good concentration      LABS:                        11.7   9.39  )-----------( 169      ( 15 Mar 2024 07:43 )             37.9     15 Mar 2024 07:43    138    |  103    |  62     ----------------------------<  136    4.7     |  25     |  1.79     Ca    9.1        15 Mar 2024 07:43  Phos  4.7       15 Mar 2024 07:43  Mg     2.9       15 Mar 2024 07:43    TPro  7.8    /  Alb  3.2    /  TBili  0.7    /  DBili  x      /  AST  16     /  ALT  14     /  AlkPhos  53     15 Mar 2024 07:43    PT/INR - ( 13 Mar 2024 14:10 )   PT: 14.3 sec;   INR: 1.32 ratio         PTT - ( 13 Mar 2024 14:10 )  PTT:31.6 sec  Urinalysis Basic - ( 15 Mar 2024 07:43 )    Color: x / Appearance: x / SG: x / pH: x  Gluc: 136 mg/dL / Ketone: x  / Bili: x / Urobili: x   Blood: x / Protein: x / Nitrite: x   Leuk Esterase: x / RBC: x / WBC x   Sq Epi: x / Non Sq Epi: x / Bacteria: x      CAPILLARY BLOOD GLUCOSE

## 2024-03-19 ENCOUNTER — NON-APPOINTMENT (OUTPATIENT)
Age: 89
End: 2024-03-19

## 2024-03-19 ENCOUNTER — APPOINTMENT (OUTPATIENT)
Dept: CARDIOLOGY | Facility: CLINIC | Age: 89
End: 2024-03-19

## 2024-03-19 ENCOUNTER — APPOINTMENT (OUTPATIENT)
Dept: PHARMACY | Facility: CLINIC | Age: 89
End: 2024-03-19

## 2024-03-19 NOTE — DISCUSSION/SUMMARY
[FreeTextEntry1] : 89 year old woman with history of HFpEF, MR s/p Mitraclip (8/30/16), mixed pre- and post-capillary pulmonary hypertension, permanent AFib off AC since 11/2022 due to GIB requiring transfusions, hypertension, hyperlipidemia, CKD (b/l Cr 1.3), SHERYL and hypoxia on home O2 3-4L s/p hospitalization for symptoms of severe pHTN with a combination of COPD exacerbation.  I have reviewed all of the medical records available to me at this time from her admission at length, including consultations, laboratory records, radiologic records, medication administration records and discharge summary.

## 2024-03-19 NOTE — CARDIOLOGY SUMMARY
[de-identified] : 3/14/24 IMPRESSION: 1. mild left ventricular hypertrophy, preserved systolic function, D  shaped septum, paradoxical septal motion due to right ventricular  pressure overload 2. severely dilated right ventricle with the mild right ventricular  systolic dysfunction 3. severe biatrial enlargement. 4. Mitral valve clip, mild to moderate eccentric mitral regurgitation,  mildly elevated velocity suggestive of mild mitral stenosis. 4. Moderate to severe eccentric tricuspid regurgitation with severe  pulmonary hypertension, 99 mmHg. which is significantly higher than prior  echo done in 2022

## 2024-03-19 NOTE — HISTORY OF PRESENT ILLNESS
[FreeTextEntry1] : 89 y/o F with PMH of HTN, Dyslipidemia, MR s/p Bushra- Clip, A. Fib not on anticoagulants, Chronic  Diastolic CHF, Chronic Hypoxic Respiratory Failure (on 3L NC), COVID-19, CKD 3, lower GI Bleeding, Anemia, Anxiety, and Depression presented with worsening SOB.      TTE was done that showed "1. mild left ventricular hypertrophy, preserved systolic function, D shaped septum, paradoxical septal motion due to right ventricular pressure overload2. severely dilated right ventricle with the mild right ventricular systolic dysfunction 3. severe biatrial enlargement.  4. Mitral valve clip, mild to moderate eccentric mitral regurgitation, mildly elevated velocity suggestive of mild mitral stenosis.  4. Moderate to severe eccentric tricuspid regurgitation with severe pulmonary hypertension, 99 mmHg.   Symptoms thought to be secondary to severe pHTN with a combination of COPD exacerbation.   Initially thought to be CHF exacerbation and was started on lasix IV.  Pulmonary consulted, also given prednisone and inhaled steroids.

## 2024-03-21 ENCOUNTER — APPOINTMENT (OUTPATIENT)
Dept: HEART FAILURE | Facility: CLINIC | Age: 89
End: 2024-03-21
Payer: MEDICARE

## 2024-03-21 VITALS
DIASTOLIC BLOOD PRESSURE: 65 MMHG | OXYGEN SATURATION: 96 % | WEIGHT: 150 LBS | HEART RATE: 79 BPM | BODY MASS INDEX: 26.58 KG/M2 | RESPIRATION RATE: 18 BRPM | SYSTOLIC BLOOD PRESSURE: 114 MMHG | HEIGHT: 63 IN

## 2024-03-21 PROCEDURE — 99215 OFFICE O/P EST HI 40 MIN: CPT

## 2024-03-22 LAB
ANION GAP SERPL CALC-SCNC: 14 MMOL/L
BUN SERPL-MCNC: 48 MG/DL
CALCIUM SERPL-MCNC: 8.9 MG/DL
CHLORIDE SERPL-SCNC: 97 MMOL/L
CO2 SERPL-SCNC: 28 MMOL/L
CREAT SERPL-MCNC: 1.53 MG/DL
EGFR: 32 ML/MIN/1.73M2
GLUCOSE SERPL-MCNC: 159 MG/DL
MAGNESIUM SERPL-MCNC: 2.3 MG/DL
NT-PROBNP SERPL-MCNC: 4404 PG/ML
POTASSIUM SERPL-SCNC: 4.4 MMOL/L
SODIUM SERPL-SCNC: 138 MMOL/L

## 2024-03-22 NOTE — HISTORY OF PRESENT ILLNESS
[FreeTextEntry1] : Mrs. Mix is a very pleasant 89 year old woman with HFpEF, MR s/p Mitraclip (8/30/16), mixed pre- and post-capillary pulmonary hypertension, permanent AFib off AC since 11/2022 due to GIB requiring transfusions, hypertension, hyperlipidemia, CKD (b/l Cr 1.2-1.3), SHERYL and hypoxia on home O2 3-4L who presents today for follow-up after recent hospitalizations. Here with her son Mahendra.   Last seen in August at which time was doing reasonably well. In the interim, has had recurrent hospitalizations. In November 2023, brief admission to [facility] for LLE ulcer and cellulitis, treated with ABX. Readmitted December 2023 at [facility] this time for ongoing LLE cellulitis, again treated with ABX. She was seen by our HF team this admission, though to be compensated and euvolemic. More recently, admitted at [facility] 3/13-3/18/24 for increased GUERRA and LE swelling. She was treated with brief course of Prednisone for suspicion of COPD exacerbation. Thought to be compensated from a HF perspective by gen cards team. TTE this admission with preserved LV systolic function but mod-severe TR, dilated IVC and PASP of 99 mmHg.   Since her return home, no significant change. Remains very fatigued with ongoing GUERRA. Utilizing 4L NC. Has been unable to log daily weights as scale had broken. Taking all medications as prescribed.

## 2024-03-22 NOTE — PHYSICAL EXAM
[No Rub] : no rub [No Gallop] : no gallop [Soft] : abdomen soft [Non Tender] : non-tender [Normal Bowel Sounds] : normal bowel sounds [No Cyanosis] : no cyanosis [No Clubbing] : no clubbing [Normal Radial B/L] : normal radial B/L [No Rash] : no rash [Normal] : moves all extremities, no focal deficits, normal speech [Alert and Oriented] : alert and oriented [de-identified] : No perioral cyanosis, MMM  [de-identified] : JVP 16 cm H2O with large V waves [de-identified] : III/VI systolic murmur at base [de-identified] : Irregularly irregular S1 S2 [de-identified] : on 4L O2 via NC. Fine bibasilar crackles  [de-identified] : unable to appreciate HSM or masses due to body habitus [de-identified] :  2+ BLE edema to mid calves [de-identified] : using wheelchair for distance [de-identified] : warm peripherally

## 2024-03-22 NOTE — ASSESSMENT
[FreeTextEntry1] : 89 year old woman with chronic diastolic HF, RVSD, severe pHTN, rate controlled AFib, severe MR s/p MitraClip, CKD stage 3 and chronic hypoxia on home O2 who has had recent hospitalizations for LLE cellulitis and GUERRA in recent months. She is ACC/AHA Stage C, NYHA Class III, and volume expanded but normotensive and not tachycardic. I have recommended the followin. Chronic diastolic heart failure - Increase Torsemide to 80 mg BID (from 40 mg BID). Will reassess early next week need for adjustment and she will purchase a new scale. Continue Jardiance 10 mg QD and Spironolactone 25 mg BID. Again discussed utility of CardioMEMS for longitudinal management which her children will continue discussing with her. Labs from 3/18 with K 4.2, Cr 1.5, preserved LFTs and pro-BNP of 5400 (from 3500 in December). Labs today with K 4.4, Mg 2.3, stable Cr of 1.5 and pro-BNP 4,400 (from 5,800 earlier this month). Repeat labs in 5 days.   2. Pulmonary hypertension - Severely elevated pulmonary pressures by TTE, most recent 99 mmHg. At the time of her last RHC in 2019, PA 61/21/35, PCW 12 without a response to inhaled NO and with PA 71.3%, PVR 3.77 Mendez. Prior TTE from 3/15/22 reviewed with Dr. Moore (structural cardiology) who did not think there was significant or new MS. Most recent TTE from 3/2024 reporting mild MS.   3. AFib - Rate controlled on beta blocker. Off AC due to recurrent GIB requiring transfusions.   4. Lower GIB - Hospitalized 2022 s/p 2U PRBC and Pradaxa was stopped. Failed trial of Eliquis 2.5 mg BID, readmitted 2022 requiring transfusion. GI thought this to be a diverticular bleed and family opted to manage conservatively.   5. CKD stage 3 - Baseline Cr 1.2-1.3 though more recently 1.5-1.7. Continue SGLT2-i to delay further progression.   6. Hypertension - Well controlled.  7. Anxiety and Depression- On Sertraline 75 mg QD and Ramelteon. Followed by geriatrician Dr. Gieniusz  8. Insomnia- On Mirtazipine 7.5 mg qHS. Recommended against hypnotic sleep aids and benzodiazepines.  9. Will call next week to assess response to above. RTC in 2 weeks as scheduled with Dr. Champagne

## 2024-03-22 NOTE — CARDIOLOGY SUMMARY
[de-identified] : \par  08/05/22 ECG Afib at 68 bpm, iRBBB, low voltage, left axis, NSTW abnormalities \par  03/15/22 ECG: AFib at 63 bpm, iRBBB, LAFB. No sig change from 11/24/21.\par  11/24/21 ECG: AFib at 48 bpm, iRBBB, LAFB. No sig change from prior 5/19/21\par  05/19/21 ECG: AFib at 61 bpm, iRBBB, LAFB. No sig change from prior.\par  11/18/20 ECG: AFib at 57 bpm, iRBBB, LAFB. No sig change from prior 8/28/20\par   [de-identified] : \par  03/10/22-03/30/22 Zio monitor: Persistent AFib (100% burden). HR ranging  bpm (average HR 64 bpm). Mild IVCD, rare VPD's and couplets. No symptoms.\par   [de-identified] : 3/14/24 TTE: LVIDd 4.5 cm, normal LV systolic function, mild LVH (septum 1.2 and PWT 1.2), D shpaed setpum with paradoxical septal motion due to RV pressure overload, severely dilated RV with RV systolic dysfunction, severe JANEL, mitral valve clip noted with mild-mod MR and peak/mean gradient of 16 and 6 mmHg suggestive of mild MS, mod-severe TR, RVSP 99 mmHg, IVC dilated with less than 50% respiratory variation  07/19/22 TTE: LVIDd 5.4 cm, LVEF 68%, mild LVH ( SW 1.0 cm, PW 1.0 cm) no segmental WMA, RV is enlarged w/ decreased RVSF, severe JANEL, s/p lala clip w/mild MR (peak/mean 10  mmHg/ 4 mmHg @ HR 66), mild AR, mod-severe TR, severe PH (RVSP 83 mmHg)  03/15/22 TTE: LVIDd 5.0 cm, LVEF 70%, no segmental WMA, flattening of the septum c/w RV pressure overload, mild concentric LVH (septum 1.4 cm, PWT 1.1 cm), RVE with decreased RVSF, severe JANEL, s/p Mitraclip with mild-mod MR, mod-severe MS (peak gradient 15 mmHg, mean gradient 6 mmHg), severe TR, mild AK, severe PH (RVSP 79 mmHg).  11/18/20 TTE: LVIDd 4.6 cm, normal LVEF, concentric LVH (SW 1.2, PW 1.3 cm), flattening of septum c/w RV pressure overload, normal LA size, mod SONNY, RVE with decreased RV function, mild-mod MR s/p MitraClip (peak 10 mmHg, mean 4 mmHg), mod-severe TR, no effusion, severe PH (RVSP 95 mmHg). Iatrogenic transeptal flow noted (transeptal puncture for Clip).  03/01/18 TTE: LVEF 70%, LVIDd 5.1, septum/PWT 1.1, LA 5.7, severely dilated RV with severe RVSD, severe biatrial enlargement, severe TR, MR present but shadowed by Mitraclip,est RVSP 82, left to right atrial flow noted from transseptal puncture.  [de-identified] : 03/11/19 RHC baseline: no RA or RV reported, PA 61/21/35, PCW 12, Ao 93%, PA 71%, CO/CI (F) 6.09/3.26, PVR 3.77 Mendez. Nitric Oxide: RA 10, RV 57/12, PA 54/17/30, no PCW reported, Ao 93%, PA 77%, CO/CI (F) 8.19/4.38,  PVR 3.66 Mendez.

## 2024-03-25 ENCOUNTER — NON-APPOINTMENT (OUTPATIENT)
Age: 89
End: 2024-03-25

## 2024-03-28 ENCOUNTER — LABORATORY RESULT (OUTPATIENT)
Age: 89
End: 2024-03-28

## 2024-03-29 ENCOUNTER — NON-APPOINTMENT (OUTPATIENT)
Age: 89
End: 2024-03-29

## 2024-03-29 RX ORDER — MULTIVIT-MIN/FOLIC/VIT K/LYCOP 400-300MCG
50 MCG TABLET ORAL
Qty: 90 | Refills: 3 | Status: ACTIVE | COMMUNITY
Start: 2024-02-27 | End: 1900-01-01

## 2024-03-29 RX ORDER — SODIUM CHLORIDE 0.65 %
0.65 AEROSOL, SPRAY (ML) NASAL TWICE DAILY
Qty: 1 | Refills: 5 | Status: ACTIVE | COMMUNITY
Start: 2024-02-27 | End: 1900-01-01

## 2024-04-01 ENCOUNTER — APPOINTMENT (OUTPATIENT)
Dept: HEART FAILURE | Facility: CLINIC | Age: 89
End: 2024-04-01
Payer: MEDICARE

## 2024-04-01 VITALS
OXYGEN SATURATION: 97 % | TEMPERATURE: 97.8 F | SYSTOLIC BLOOD PRESSURE: 104 MMHG | BODY MASS INDEX: 25.69 KG/M2 | HEART RATE: 71 BPM | WEIGHT: 145 LBS | HEIGHT: 63 IN | DIASTOLIC BLOOD PRESSURE: 68 MMHG

## 2024-04-01 PROCEDURE — 99214 OFFICE O/P EST MOD 30 MIN: CPT

## 2024-04-01 PROCEDURE — G2211 COMPLEX E/M VISIT ADD ON: CPT

## 2024-04-10 ENCOUNTER — APPOINTMENT (OUTPATIENT)
Dept: OTOLARYNGOLOGY | Facility: CLINIC | Age: 89
End: 2024-04-10

## 2024-04-15 ENCOUNTER — APPOINTMENT (OUTPATIENT)
Dept: HEART FAILURE | Facility: CLINIC | Age: 89
End: 2024-04-15
Payer: MEDICARE

## 2024-04-15 VITALS
HEART RATE: 64 BPM | OXYGEN SATURATION: 95 % | HEIGHT: 63 IN | WEIGHT: 147 LBS | DIASTOLIC BLOOD PRESSURE: 62 MMHG | SYSTOLIC BLOOD PRESSURE: 106 MMHG | BODY MASS INDEX: 26.05 KG/M2 | TEMPERATURE: 97.2 F

## 2024-04-15 PROCEDURE — 99214 OFFICE O/P EST MOD 30 MIN: CPT

## 2024-04-15 NOTE — CARDIOLOGY SUMMARY
[de-identified] : 08/05/22 ECG Afib at 68 bpm, iRBBB, low voltage, left axis, NSTW abnormalities  03/15/22 ECG: AFib at 63 bpm, iRBBB, LAFB. No sig change from 11/24/21. 11/24/21 ECG: AFib at 48 bpm, iRBBB, LAFB. No sig change from prior 5/19/21 05/19/21 ECG: AFib at 61 bpm, iRBBB, LAFB. No sig change from prior. 11/18/20 ECG: AFib at 57 bpm, iRBBB, LAFB. No sig change from prior 8/28/20  [de-identified] : \par  03/10/22-03/30/22 Zio monitor: Persistent AFib (100% burden). HR ranging  bpm (average HR 64 bpm). Mild IVCD, rare VPD's and couplets. No symptoms.\par   [de-identified] : 3/14/24 TTE: LVIDd 4.5 cm, normal LV systolic function, mild LVH (septum 1.2 and PWT 1.2), D shpaed setpum with paradoxical septal motion due to RV pressure overload, severely dilated RV with RV systolic dysfunction, severe JANEL, mitral valve clip noted with mild-mod MR and peak/mean gradient of 16 and 6 mmHg suggestive of mild MS, mod-severe TR, RVSP 99 mmHg, IVC dilated with less than 50% respiratory variation  07/19/22 TTE: LVIDd 5.4 cm, LVEF 68%, mild LVH ( SW 1.0 cm, PW 1.0 cm) no segmental WMA, RV is enlarged w/ decreased RVSF, severe JANEL, s/p lala clip w/mild MR (peak/mean 10  mmHg/ 4 mmHg @ HR 66), mild AR, mod-severe TR, severe PH (RVSP 83 mmHg)  03/15/22 TTE: LVIDd 5.0 cm, LVEF 70%, no segmental WMA, flattening of the septum c/w RV pressure overload, mild concentric LVH (septum 1.4 cm, PWT 1.1 cm), RVE with decreased RVSF, severe JANEL, s/p Mitraclip with mild-mod MR, mod-severe MS (peak gradient 15 mmHg, mean gradient 6 mmHg), severe TR, mild GA, severe PH (RVSP 79 mmHg).  11/18/20 TTE: LVIDd 4.6 cm, normal LVEF, concentric LVH (SW 1.2, PW 1.3 cm), flattening of septum c/w RV pressure overload, normal LA size, mod SNONY, RVE with decreased RV function, mild-mod MR s/p MitraClip (peak 10 mmHg, mean 4 mmHg), mod-severe TR, no effusion, severe PH (RVSP 95 mmHg). Iatrogenic transeptal flow noted (transeptal puncture for Clip).  03/01/18 TTE: LVEF 70%, LVIDd 5.1, septum/PWT 1.1, LA 5.7, severely dilated RV with severe RVSD, severe biatrial enlargement, severe TR, MR present but shadowed by Mitraclip,est RVSP 82, left to right atrial flow noted from transseptal puncture.  [de-identified] : 03/11/19 RHC baseline: no RA or RV reported, PA 61/21/35, PCW 12, Ao 93%, PA 71%, CO/CI (F) 6.09/3.26, PVR 3.77 Mendez. Nitric Oxide: RA 10, RV 57/12, PA 54/17/30, no PCW reported, Ao 93%, PA 77%, CO/CI (F) 8.19/4.38,  PVR 3.66 Mendez.

## 2024-04-15 NOTE — PHYSICAL EXAM
[No Rub] : no rub [Normal Conjunctiva] : normal conjunctiva [No Gallop] : no gallop [No Respiratory Distress] : no respiratory distress  [Soft] : abdomen soft [Non Tender] : non-tender [Normal Bowel Sounds] : normal bowel sounds [No Clubbing] : no clubbing [No Cyanosis] : no cyanosis [Normal Radial B/L] : normal radial B/L [No Rash] : no rash [Normal] : moves all extremities, no focal deficits, normal speech [Alert and Oriented] : alert and oriented [de-identified] : unable to appreciate HSM or masses due to body habitus [No Acute Distress] : no acute distress [Frail] : frail [de-identified] : No perioral cyanosis, MMM  [de-identified] : JVP ~14 cm H2O with large V waves [de-identified] : III/VI systolic murmur at base [de-identified] : Irregularly irregular S1 S2 [de-identified] : on 3L O2 via NC. Fine bibasilar crackles  [de-identified] : using wheelchair for distance [de-identified] :  +1 BLE edema to mid calves [de-identified] : warm peripherally, small laceration on L shin

## 2024-04-15 NOTE — HISTORY OF PRESENT ILLNESS
[FreeTextEntry1] : Mrs. Mix is a very pleasant 89 year old woman with HFpEF, MR s/p Mitraclip (8/30/16), mixed pre- and post-capillary pulmonary hypertension, permanent AFib off AC since 11/2022 due to GIB requiring transfusions, hypertension, hyperlipidemia, CKD (b/l Cr 1.2-1.3, more recently 1.5-1.7), SHERYL and hypoxia on home O2 3-4L who presents today for routine follow-up.  She has had recurrent hospitalizations. In November 2023, brief admission to [facility] for LLE ulcer and cellulitis, treated with ABX. Readmitted December 2023 at [facility] this time for ongoing LLE cellulitis, again treated with ABX. She was seen by our HF team this admission, though to be compensated and euvolemic. More recently, admitted at [facility] 3/13-3/18/24 for increased GUERRA and LE swelling. She was treated with brief course of Prednisone for suspicion of COPD exacerbation. Thought to be compensated from a HF perspective by gen cards team. TTE this admission with preserved LV systolic function but mod-severe TR, dilated IVC and PASP of 99 mmHg.   Last seen earlier this month where she appeared to be responding well to escalation of diuretics from 3/21. Home weights stable in past 2 weeks, 247 lbs this morning. Clinic weight today 147 lbs from 145 lbs last visit. Appetite is improving, now having meals in communal cafeteria. Fatigue has greatly improved though not resolved. She can walk short distances from room to room slowly but for longer distances uses wheelchair. Utilizing continuous home O2, 4L. Endorses SOB with laying flat so utilizes 2 pillows.

## 2024-04-15 NOTE — PHYSICAL EXAM
[No Acute Distress] : no acute distress [Frail] : frail [Normal Conjunctiva] : normal conjunctiva [No Rub] : no rub [No Gallop] : no gallop [No Respiratory Distress] : no respiratory distress  [Soft] : abdomen soft [Normal Bowel Sounds] : normal bowel sounds [Normal Radial B/L] : normal radial B/L [No Rash] : no rash [Alert and Oriented] : alert and oriented [Normal Venous Pressure] : normal venous pressure [Normal] : alert and oriented, normal memory [de-identified] : No perioral cyanosis, MMM  [de-identified] : JVP 8 cm H2O, no HJR [de-identified] : Irregularly irregular S1 S2 [de-identified] : III/VI systolic murmur at base [de-identified] : On 4L O2 via NC. Fine RLL basilar crackles, otherwise clear  [de-identified] : Using wheelchair for distance [de-identified] :  +1 BLE edema to mid calves [de-identified] : Warm peripherally

## 2024-04-15 NOTE — ASSESSMENT
[FreeTextEntry1] : 89 year old woman with chronic diastolic HF, RVSD, severe pHTN, rate controlled AFib, severe MR s/p MitraClip, CKD stage 3 and chronic hypoxia on home O2 who has had recent hospitalizations for LLE cellulitis and GUERRA in recent months. She is ACC/AHA Stage C, NYHA Class III, and remains volume overloaded, however is improving on augmented dose of oral diuretics.   1. Chronic diastolic heart failure - Continue Torsemide 80 mg BID (escalated 3/21 from 40 mg BID). Continue Jardiance 10 mg QD and Spironolactone 25 mg BID. Labs from 3/28 reviewed with K 4.1, Na 137, BUN/Cr 56/1.7 from 1.5, 1.9, overall stable. Will repeat labs in 2 weeks to reassess renal function and electrolytes.   2. Pulmonary hypertension - Severely elevated pulmonary pressures by TTE, most recent 99 mmHg. At the time of her last RHC in March 2019, PA 61/21/35, PCW 12 without a response to inhaled NO and with PA 71.3%, PVR 3.77 Mendez. Prior TTE from 3/15/22 reviewed with Dr. Moore (structural cardiology) who did not think there was significant or new MS. Most recent TTE from 3/2024 reporting mild MS.   3. AFib - Rate controlled on beta blocker. Off AC due to recurrent GIB requiring transfusions.   4. Lower GIB - Hospitalized September 2022 s/p 2U PRBC and Pradaxa was stopped. Failed trial of Eliquis 2.5 mg BID, readmitted October 2022 requiring transfusion. GI thought this to be a diverticular bleed and family opted to manage conservatively.   5. CKD stage 3 - Baseline Cr 1.2-1.3 though more recently 1.5-1.7. Diuresis as above. Continue SGLT2-i to delay further progression.   6. Hypertension - Well controlled.  7. Anxiety and Depression- On Sertraline 75 mg QD and Ramelteon. Followed by geriatrician Dr. Jones  8. Insomnia- On Mirtazipine 7.5 mg qHS. Recommended against hypnotic sleep aids and benzodiazepines.  9. Follow up with HF NP in 2 weeks to reassess volume status. Follow up with Dr. Champagne in 3 months.

## 2024-04-15 NOTE — HISTORY OF PRESENT ILLNESS
[FreeTextEntry1] : Mrs. Mix is a very pleasant 89 year old woman with HFpEF, MR s/p Mitraclip (8/30/16), mixed pre- and post-capillary pulmonary hypertension, permanent AFib off AC since 11/2022 due to GIB requiring transfusions, hypertension, hyperlipidemia, CKD (b/l Cr 1.2-1.3), SHERYL and hypoxia on home O2 3-4L who presents today for follow-up.  She has had recurrent hospitalizations. In November 2023, brief admission to [facility] for LLE ulcer and cellulitis, treated with ABX. Readmitted December 2023 at [facility] this time for ongoing LLE cellulitis, again treated with ABX. She was seen by our HF team this admission, though to be compensated and euvolemic. More recently, admitted at [facility] 3/13-3/18/24 for increased GUERRA and LE swelling. She was treated with brief course of Prednisone for suspicion of COPD exacerbation. Thought to be compensated from a HF perspective by gen cards team. TTE this admission with preserved LV systolic function but mod-severe TR, dilated IVC and PASP of 99 mmHg.   She was last seen by HF NP on 3/21/24 at which time she was noted to be volume overloaded and diuretics were escalated. With that, her weight is down 5lbs and she reports symptomatic improvement. She remains on 3L O2 via nasal cannula. She's able to walk from her room to the dining room at the [facility] where she resides. Her son, who is present at the visit, notes that her walking seems to be worse with regards to both her GUERRA and her balance. She has a full time aide with her. Her appetite is good. She notes improvement in her LE edema, although not yet resolved. She's sleeping well, denies orthopnea, PND. She denies lightheadedness/dizziness.

## 2024-04-15 NOTE — CARDIOLOGY SUMMARY
[de-identified] : \par  08/05/22 ECG Afib at 68 bpm, iRBBB, low voltage, left axis, NSTW abnormalities \par  03/15/22 ECG: AFib at 63 bpm, iRBBB, LAFB. No sig change from 11/24/21.\par  11/24/21 ECG: AFib at 48 bpm, iRBBB, LAFB. No sig change from prior 5/19/21\par  05/19/21 ECG: AFib at 61 bpm, iRBBB, LAFB. No sig change from prior.\par  11/18/20 ECG: AFib at 57 bpm, iRBBB, LAFB. No sig change from prior 8/28/20\par   [de-identified] : \par  03/10/22-03/30/22 Zio monitor: Persistent AFib (100% burden). HR ranging  bpm (average HR 64 bpm). Mild IVCD, rare VPD's and couplets. No symptoms.\par   [de-identified] : 3/14/24 TTE: LVIDd 4.5 cm, normal LV systolic function, mild LVH (septum 1.2 and PWT 1.2), D shpaed setpum with paradoxical septal motion due to RV pressure overload, severely dilated RV with RV systolic dysfunction, severe JANEL, mitral valve clip noted with mild-mod MR and peak/mean gradient of 16 and 6 mmHg suggestive of mild MS, mod-severe TR, RVSP 99 mmHg, IVC dilated with less than 50% respiratory variation  07/19/22 TTE: LVIDd 5.4 cm, LVEF 68%, mild LVH ( SW 1.0 cm, PW 1.0 cm) no segmental WMA, RV is enlarged w/ decreased RVSF, severe JANLE, s/p lala clip w/mild MR (peak/mean 10  mmHg/ 4 mmHg @ HR 66), mild AR, mod-severe TR, severe PH (RVSP 83 mmHg)  03/15/22 TTE: LVIDd 5.0 cm, LVEF 70%, no segmental WMA, flattening of the septum c/w RV pressure overload, mild concentric LVH (septum 1.4 cm, PWT 1.1 cm), RVE with decreased RVSF, severe JANEL, s/p Mitraclip with mild-mod MR, mod-severe MS (peak gradient 15 mmHg, mean gradient 6 mmHg), severe TR, mild ME, severe PH (RVSP 79 mmHg).  11/18/20 TTE: LVIDd 4.6 cm, normal LVEF, concentric LVH (SW 1.2, PW 1.3 cm), flattening of septum c/w RV pressure overload, normal LA size, mod SONNY, RVE with decreased RV function, mild-mod MR s/p MitraClip (peak 10 mmHg, mean 4 mmHg), mod-severe TR, no effusion, severe PH (RVSP 95 mmHg). Iatrogenic transeptal flow noted (transeptal puncture for Clip).  03/01/18 TTE: LVEF 70%, LVIDd 5.1, septum/PWT 1.1, LA 5.7, severely dilated RV with severe RVSD, severe biatrial enlargement, severe TR, MR present but shadowed by Mitraclip,est RVSP 82, left to right atrial flow noted from transseptal puncture.  [de-identified] : 03/11/19 RHC baseline: no RA or RV reported, PA 61/21/35, PCW 12, Ao 93%, PA 71%, CO/CI (F) 6.09/3.26, PVR 3.77 Mendez. Nitric Oxide: RA 10, RV 57/12, PA 54/17/30, no PCW reported, Ao 93%, PA 77%, CO/CI (F) 8.19/4.38,  PVR 3.66 Mendez.

## 2024-04-19 NOTE — ED ADULT TRIAGE NOTE - BRAND OF COVID-19 VACCINATION
I phoned [facility] and was able to speak with the nurse on Candido's floor regarding his upcoming appointment with [facility]. She indicated that they do have the appointment listed on his calendar and we reviewed that it is on 5/9 with Dr. Britton in the [place] office. I provided the [facility] number as the office contact.    #4/Pfizer dose 1, 2, and 3

## 2024-05-15 NOTE — PHYSICAL THERAPY INITIAL EVALUATION ADULT - PHYSICAL ASSIST/NONPHYSICAL ASSIST: SIT/STAND, REHAB EVAL
CONTROLLED MEDICATION REFILL REQUEST    STATE REGULATION APPT EVERY 3 MONTHS     UDS(URINE DRUG SCREEN) EVERY 6 MONTHS OR UP TO PROVIDER PREFERENCE      NEW NARC CONSENT EVERY YEAR      MEDICATION:   amphetamine-dextroamphetamine (Adderall) 10 MG tablet (03/21/2024)  amphetamine-dextroamphetamine (ADDERALL) 20 MG tablet (03/21/2024)     NEXT OFFICE VISIT: Appointment with Eli Murray MD (06/19/2024)     LAST OFFICE VISIT: Office Visit with Eli Murray MD (03/13/2024)     NARC CONSENT: NONE IN EPIC     URINE DRUG SCREEN(STANDING ORDER): PT(PATIENT) HAS A PENDED ORDER   Urine Drug Screen - Urine, Clean Catch (02/09/2024 09:00)     PROVIDER PLEASE ADVISE   
verbal cues/1 person assist

## 2024-05-22 ENCOUNTER — APPOINTMENT (OUTPATIENT)
Dept: PULMONOLOGY | Facility: CLINIC | Age: 89
End: 2024-05-22
Payer: MEDICARE

## 2024-05-22 VITALS
WEIGHT: 153 LBS | DIASTOLIC BLOOD PRESSURE: 53 MMHG | HEART RATE: 80 BPM | BODY MASS INDEX: 27.11 KG/M2 | SYSTOLIC BLOOD PRESSURE: 92 MMHG | HEIGHT: 63 IN | OXYGEN SATURATION: 91 %

## 2024-05-22 DIAGNOSIS — R06.09 OTHER FORMS OF DYSPNEA: ICD-10-CM

## 2024-05-22 PROCEDURE — 99213 OFFICE O/P EST LOW 20 MIN: CPT | Mod: 25

## 2024-05-22 PROCEDURE — 94618 PULMONARY STRESS TESTING: CPT

## 2024-05-22 NOTE — PROCEDURE
[FreeTextEntry1] : Pulmonary stress test on oxygen was attempted patient ambulated for 1 minute with oxygen desaturation despite continuous oxygen

## 2024-05-22 NOTE — HISTORY OF PRESENT ILLNESS
[TextBox_4] : Follow-up for heart failure with preserved ejection fraction.  Chronic A-fib. Overall has been doing well but still gets short of breath with exertion and has episodes of transient shortness of breath associated with anxiety.  Usually during these episodes she gets very anxious...  She had a recent chest CT that looked no different than before.  She continues to ambulate with supplemental oxygen

## 2024-05-28 ENCOUNTER — APPOINTMENT (OUTPATIENT)
Dept: HEART FAILURE | Facility: CLINIC | Age: 89
End: 2024-05-28
Payer: MEDICARE

## 2024-05-28 VITALS
TEMPERATURE: 97.3 F | HEIGHT: 63 IN | WEIGHT: 155 LBS | SYSTOLIC BLOOD PRESSURE: 102 MMHG | DIASTOLIC BLOOD PRESSURE: 58 MMHG | OXYGEN SATURATION: 91 % | BODY MASS INDEX: 27.46 KG/M2 | HEART RATE: 83 BPM

## 2024-05-28 DIAGNOSIS — I27.20 PULMONARY HYPERTENSION, UNSPECIFIED: ICD-10-CM

## 2024-05-28 PROCEDURE — 99214 OFFICE O/P EST MOD 30 MIN: CPT

## 2024-05-28 RX ORDER — TORSEMIDE 20 MG/1
20 TABLET ORAL
Qty: 240 | Refills: 5 | Status: DISCONTINUED | COMMUNITY
Start: 2019-03-20 | End: 2024-05-28

## 2024-05-28 RX ORDER — BUMETANIDE 2 MG/1
2 TABLET ORAL
Qty: 360 | Refills: 2 | Status: ACTIVE | COMMUNITY
Start: 2024-05-28 | End: 1900-01-01

## 2024-05-29 LAB
ANION GAP SERPL CALC-SCNC: 15 MMOL/L
BUN SERPL-MCNC: 64 MG/DL
CALCIUM SERPL-MCNC: 9 MG/DL
CHLORIDE SERPL-SCNC: 95 MMOL/L
CO2 SERPL-SCNC: 26 MMOL/L
CREAT SERPL-MCNC: 2.07 MG/DL
EGFR: 22 ML/MIN/1.73M2
GLUCOSE SERPL-MCNC: 153 MG/DL
NT-PROBNP SERPL-MCNC: 5300 PG/ML
POTASSIUM SERPL-SCNC: 4 MMOL/L
SODIUM SERPL-SCNC: 136 MMOL/L

## 2024-06-03 NOTE — HISTORY OF PRESENT ILLNESS
[FreeTextEntry1] : Mrs. Mix is a very pleasant 89 year old woman with HFpEF, MR s/p Mitraclip (8/30/16), mixed pre- and post-capillary pulmonary hypertension, permanent AFib off AC since 11/2022 due to GIB requiring transfusions, hypertension, hyperlipidemia, CKD (b/l Cr 1.2-1.3, more recently 1.5-1.7), SHERYL and hypoxia on home O2 3-4L who presents today for routine follow-up.  She has had recurrent hospitalizations. In November 2023, brief admission to [facility] for LLE ulcer and cellulitis, treated with ABX. Readmitted December 2023 at [facility] this time for ongoing LLE cellulitis, again treated with ABX. She was seen by our HF team this admission, though to be compensated and euvolemic. More recently, admitted at [facility] 3/13-3/18/24 for increased GUERRA and LE swelling. She was treated with brief course of Prednisone for suspicion of COPD exacerbation. Thought to be compensated from a HF perspective by gen cards team. TTE this admission with preserved LV systolic function but mod-severe TR, dilated IVC and PASP of 99 mmHg.   Last seen on 4/15/24 and was euvolemic and maintained on torsemide 80mg BID. She is accompanied by her son Ronald today. She states that since last visit, she feels ok overall, but endorses some occasional worsening SOB. She notes that her BLE edema has been larger than previously and notes some weight gain, but doesn't recall her weight. She was stating she is only taking torsemide once a day in the AM, but upon calling [facility], she is taking it twice a day and taking her other predications as prescribed. She said fatigue is still the same as previously and nothing has changed. She can walk short distances from room to room slowly but for longer distances uses wheelchair. Utilizing continuous home O2, 4L. Endorses SOB with laying flat so utilizes 2 pillows.   She otherwise denies any CP/palpitation, LH/dizziness, or SOB at rest. No recent hospitalization in the interim. Notably on her vital signs, she is now 155lb (from 147lb last visit) and called the [facility] where her weight at the last check was 146lb.

## 2024-06-03 NOTE — PHYSICAL EXAM
[Normal Conjunctiva] : normal conjunctiva [Normal Venous Pressure] : normal venous pressure [No Rub] : no rub [No Gallop] : no gallop [No Respiratory Distress] : no respiratory distress  [Soft] : abdomen soft [Normal Bowel Sounds] : normal bowel sounds [Normal Radial B/L] : normal radial B/L [Normal] : alert and oriented, normal memory [de-identified] : No perioral cyanosis, MMM  [de-identified] : JVP ~14-16 cm H2O [de-identified] : III/VI systolic murmur at base [de-identified] : Irregularly irregular S1 S2 [de-identified] : On 4L O2 via NC. clear otherwise [de-identified] : Using wheelchair for distance [de-identified] :  +1 BLE edema to mid calves [de-identified] : Warm peripherally

## 2024-06-03 NOTE — CARDIOLOGY SUMMARY
[de-identified] : 08/05/22 ECG Afib at 68 bpm, iRBBB, low voltage, left axis, NSTW abnormalities  03/15/22 ECG: AFib at 63 bpm, iRBBB, LAFB. No sig change from 11/24/21. 11/24/21 ECG: AFib at 48 bpm, iRBBB, LAFB. No sig change from prior 5/19/21 05/19/21 ECG: AFib at 61 bpm, iRBBB, LAFB. No sig change from prior. 11/18/20 ECG: AFib at 57 bpm, iRBBB, LAFB. No sig change from prior 8/28/20  [de-identified] : \par  03/10/22-03/30/22 Zio monitor: Persistent AFib (100% burden). HR ranging  bpm (average HR 64 bpm). Mild IVCD, rare VPD's and couplets. No symptoms.\par   [de-identified] : 3/14/24 TTE: LVIDd 4.5 cm, normal LV systolic function, mild LVH (septum 1.2 and PWT 1.2), D shpaed setpum with paradoxical septal motion due to RV pressure overload, severely dilated RV with RV systolic dysfunction, severe JANEL, mitral valve clip noted with mild-mod MR and peak/mean gradient of 16 and 6 mmHg suggestive of mild MS, mod-severe TR, RVSP 99 mmHg, IVC dilated with less than 50% respiratory variation  07/19/22 TTE: LVIDd 5.4 cm, LVEF 68%, mild LVH ( SW 1.0 cm, PW 1.0 cm) no segmental WMA, RV is enlarged w/ decreased RVSF, severe JANEL, s/p lala clip w/mild MR (peak/mean 10  mmHg/ 4 mmHg @ HR 66), mild AR, mod-severe TR, severe PH (RVSP 83 mmHg)  03/15/22 TTE: LVIDd 5.0 cm, LVEF 70%, no segmental WMA, flattening of the septum c/w RV pressure overload, mild concentric LVH (septum 1.4 cm, PWT 1.1 cm), RVE with decreased RVSF, severe JANEL, s/p Mitraclip with mild-mod MR, mod-severe MS (peak gradient 15 mmHg, mean gradient 6 mmHg), severe TR, mild TX, severe PH (RVSP 79 mmHg).  11/18/20 TTE: LVIDd 4.6 cm, normal LVEF, concentric LVH (SW 1.2, PW 1.3 cm), flattening of septum c/w RV pressure overload, normal LA size, mod SONNY, RVE with decreased RV function, mild-mod MR s/p MitraClip (peak 10 mmHg, mean 4 mmHg), mod-severe TR, no effusion, severe PH (RVSP 95 mmHg). Iatrogenic transeptal flow noted (transeptal puncture for Clip).  03/01/18 TTE: LVEF 70%, LVIDd 5.1, septum/PWT 1.1, LA 5.7, severely dilated RV with severe RVSD, severe biatrial enlargement, severe TR, MR present but shadowed by Mitraclip,est RVSP 82, left to right atrial flow noted from transseptal puncture.  [de-identified] : 03/11/19 RHC baseline: no RA or RV reported, PA 61/21/35, PCW 12, Ao 93%, PA 71%, CO/CI (F) 6.09/3.26, PVR 3.77 Mendez. Nitric Oxide: RA 10, RV 57/12, PA 54/17/30, no PCW reported, Ao 93%, PA 77%, CO/CI (F) 8.19/4.38,  PVR 3.66 Mendez.

## 2024-06-03 NOTE — ASSESSMENT
[FreeTextEntry1] : 89 year old woman with chronic diastolic HF, RVSD, severe pHTN, rate controlled AFib, severe MR s/p MitraClip, CKD stage 3 and chronic hypoxia on home O2 who has had recent hospitalizations for LLE cellulitis and GUERRA in recent months.   She is ACC/AHA Stage C, NYHA Class III, currently volume overloaded on exam and weight gain of +8 pounds since last visit. Will switch her diuretics today and monitor her response. I have recommended the followin. Chronic diastolic heart failure - Continue with Jardiance 10 mg QD and Spironolactone 25 mg BID.  Will switch torsemide 80mg BID to bumex 4mg BID and reassess the upcoming days of diuresis.  Discussed CardioMEMS for longitudinal management but remains reluctant. Labs from  with K 3.9, BUN 47, Cr 1.8 and pro-BNP 4,700 (from 5,800). Will obtain repeat labs today.   2. Pulmonary hypertension - Severely elevated pulmonary pressures by TTE, most recent 99 mmHg. At the time of her last RHC in 2019, PA 61/21/35, PCW 12 without a response to inhaled NO and with PA 71.3%, PVR 3.77 Mendez. Prior TTE from 3/15/22 reviewed with Dr. Moore (structural cardiology) who did not think there was significant or new MS. Most recent TTE from 3/2024 reporting mild MS.   3. AFib - Rate controlled on beta blocker. Off AC due to recurrent GIB requiring transfusions.   4. Lower GIB - Hospitalized 2022 s/p 2U PRBC and Pradaxa was stopped. Failed trial of Eliquis 2.5 mg BID, readmitted 2022 requiring transfusion. GI thought this to be a diverticular bleed and family opted to manage conservatively.   5. CKD stage 3 - Baseline Cr 1.2-1.3 though more recently 1.5-1.7. Stable. Continue SGLT2-i to delay further progression.   6. Hypertension - Well controlled.  7. Anxiety and Depression- On Sertraline 75 mg QD and Ramelteon. Followed by geriatrician Dr. Jones  8. Insomnia- On Mirtazipine 7.5 mg qHS. Recommended against hypnotic sleep aids and benzodiazepines.  Follow up with HF NP in 2 weeks for diuretic response and return to Dr. Champagne in July

## 2024-06-03 NOTE — ADDENDUM
[FreeTextEntry1] : 5/29 - labs reviewed and discussed with alisha Cardenas and suspecting the increase Cr is due to her being volume overloaded. Will continue with the medication course as discussed  Spoke with the [facility] director Tona for Mrs. Mix and instructions to monitor Mrs. Mix fluid status. -Will check daily blood pressure and HR reading once a day  -Will do daily weights in the AM  -continue the diuretic of bumetanide 4mg (2 tab) twice a day -continue the other medications as prescribed  -Weight in office is 155lb on 5/28.  -Give our office a call if she gains 3 pounds in 1 day or 5 pounds in 1 week.  -Will have a follow up in the HF clinic on June 12th at 11am.

## 2024-06-04 ENCOUNTER — APPOINTMENT (OUTPATIENT)
Dept: HEART FAILURE | Facility: CLINIC | Age: 89
End: 2024-06-04
Payer: MEDICARE

## 2024-06-04 VITALS
TEMPERATURE: 97.3 F | BODY MASS INDEX: 27.99 KG/M2 | HEART RATE: 65 BPM | WEIGHT: 158 LBS | OXYGEN SATURATION: 95 % | SYSTOLIC BLOOD PRESSURE: 122 MMHG | DIASTOLIC BLOOD PRESSURE: 47 MMHG

## 2024-06-04 PROCEDURE — 99214 OFFICE O/P EST MOD 30 MIN: CPT

## 2024-06-04 RX ORDER — METOLAZONE 5 MG/1
5 TABLET ORAL
Qty: 5 | Refills: 0 | Status: ACTIVE | COMMUNITY
Start: 2024-06-04 | End: 1900-01-01

## 2024-06-04 NOTE — PHYSICAL EXAM
[Normal Conjunctiva] : normal conjunctiva [No Rub] : no rub [No Gallop] : no gallop [No Respiratory Distress] : no respiratory distress  [Soft] : abdomen soft [Normal Bowel Sounds] : normal bowel sounds [Normal Radial B/L] : normal radial B/L [Normal] : alert and oriented, normal memory [de-identified] : increased WOB with minimal exertion getting from wheelchair to exam table [de-identified] : ANDREY [de-identified] : JVP ~18 cm H2O with v waves [de-identified] : III/VI systolic murmur at base [de-identified] : Irregularly irregular S1 S2 [de-identified] : On 4L O2 via NC. fine bibasilar crackles [de-identified] : Using wheelchair for distance [de-identified] : +2 BLE to knees [de-identified] : Warm peripherally

## 2024-06-04 NOTE — HISTORY OF PRESENT ILLNESS
[FreeTextEntry1] : Mrs. Mix is a very pleasant 89 year old woman with HFpEF, MR s/p Mitraclip (8/30/16), mixed pre- and post-capillary pulmonary hypertension, permanent AFib off AC since 11/2022 due to GIB requiring transfusions, hypertension, hyperlipidemia, CKD (b/l Cr 1.2-1.3, more recently 1.5-1.7), SHERYL and hypoxia on home O2 4L who presents today for an acute visit.  She has had recurrent hospitalizations. In November 2023, brief admission to [facility] for LLE ulcer and cellulitis, treated with ABX. Readmitted December 2023 at [facility] this time for ongoing LLE cellulitis, again treated with ABX. She was seen by our HF team this admission, though to be compensated and euvolemic. More recently, admitted at [facility] 3/13-3/18/24 for increased GUERRA and LE swelling. She was treated with brief course of Prednisone for suspicion of COPD exacerbation. Thought to be compensated from a HF perspective by gen cards team. TTE this admission with preserved LV systolic function but mod-severe TR, dilated IVC and PASP of 99 mmHg.   When she was last seen on 5/28 she was noted to be volume overloaded and her torsemide 80mg BID was changed to bumex 4mg BID. Her weight has uptrended further to 158lbs (recent dry weight ~147lbs, which is what her weight was at her visit on 4/15). She notes GUERRA if she's rushing or trying to walk at a faster pace but states she's able to do her ADLs and walk around the assisted living with her walker on 4L O2 without significant SOB. She's sleeping sitting nearly upright and has LE edema. She reports significant bendopnea. She reports feeling like she's retaining fluid and is up about 7 or so lbs. She does feel she's urinating more with the bumex, however her weight is not coming down. She reports her appetite is good. Denies PND, CP, palpitations, lightheadedness.

## 2024-06-04 NOTE — CARDIOLOGY SUMMARY
[de-identified] : 08/05/22 ECG Afib at 68 bpm, iRBBB, low voltage, left axis, NSTW abnormalities  03/15/22 ECG: AFib at 63 bpm, iRBBB, LAFB. No sig change from 11/24/21. 11/24/21 ECG: AFib at 48 bpm, iRBBB, LAFB. No sig change from prior 5/19/21 05/19/21 ECG: AFib at 61 bpm, iRBBB, LAFB. No sig change from prior. 11/18/20 ECG: AFib at 57 bpm, iRBBB, LAFB. No sig change from prior 8/28/20  [de-identified] : \par  03/10/22-03/30/22 Zio monitor: Persistent AFib (100% burden). HR ranging  bpm (average HR 64 bpm). Mild IVCD, rare VPD's and couplets. No symptoms.\par   [de-identified] : 3/14/24 TTE: LVIDd 4.5 cm, normal LV systolic function, mild LVH (septum 1.2 and PWT 1.2), D shpaed setpum with paradoxical septal motion due to RV pressure overload, severely dilated RV with RV systolic dysfunction, severe JANEL, mitral valve clip noted with mild-mod MR and peak/mean gradient of 16 and 6 mmHg suggestive of mild MS, mod-severe TR, RVSP 99 mmHg, IVC dilated with less than 50% respiratory variation  07/19/22 TTE: LVIDd 5.4 cm, LVEF 68%, mild LVH ( SW 1.0 cm, PW 1.0 cm) no segmental WMA, RV is enlarged w/ decreased RVSF, severe JANEL, s/p lala clip w/mild MR (peak/mean 10  mmHg/ 4 mmHg @ HR 66), mild AR, mod-severe TR, severe PH (RVSP 83 mmHg)  03/15/22 TTE: LVIDd 5.0 cm, LVEF 70%, no segmental WMA, flattening of the septum c/w RV pressure overload, mild concentric LVH (septum 1.4 cm, PWT 1.1 cm), RVE with decreased RVSF, severe JANEL, s/p Mitraclip with mild-mod MR, mod-severe MS (peak gradient 15 mmHg, mean gradient 6 mmHg), severe TR, mild NV, severe PH (RVSP 79 mmHg).  11/18/20 TTE: LVIDd 4.6 cm, normal LVEF, concentric LVH (SW 1.2, PW 1.3 cm), flattening of septum c/w RV pressure overload, normal LA size, mod SONNY, RVE with decreased RV function, mild-mod MR s/p MitraClip (peak 10 mmHg, mean 4 mmHg), mod-severe TR, no effusion, severe PH (RVSP 95 mmHg). Iatrogenic transeptal flow noted (transeptal puncture for Clip).  03/01/18 TTE: LVEF 70%, LVIDd 5.1, septum/PWT 1.1, LA 5.7, severely dilated RV with severe RVSD, severe biatrial enlargement, severe TR, MR present but shadowed by Mitraclip,est RVSP 82, left to right atrial flow noted from transseptal puncture.  [de-identified] : 03/11/19 RHC baseline: no RA or RV reported, PA 61/21/35, PCW 12, Ao 93%, PA 71%, CO/CI (F) 6.09/3.26, PVR 3.77 Mendez. Nitric Oxide: RA 10, RV 57/12, PA 54/17/30, no PCW reported, Ao 93%, PA 77%, CO/CI (F) 8.19/4.38,  PVR 3.66 Mendez.

## 2024-06-04 NOTE — ASSESSMENT
[FreeTextEntry1] : 89 year old woman with chronic diastolic HF, RVSD, severe pHTN, rate controlled AFib, severe MR s/p MitraClip, CKD stage 3 and chronic hypoxia on home O2 who has had recent hospitalizations for LLE cellulitis and GUERRA in recent months.   She is ACC/AHA Stage C, NYHA Class IIIb, currently volume overloaded on exam with ongoing weight gain not responding to high dose oral diuretics. I recommend inpatient admission for IV diuresis and close monitoring of labs however she and her son declined, thus I have recommended the followin. Chronic diastolic heart failure - Will add furosix daily x 3 days (lasix 80mg subcut daily). Continue with bumex 4mg BID, Jardiance 10 mg QD, Spironolactone 25 mg BID. Discussed CardioMEMS for longitudinal management but remains reluctant. Labs from   show Na 136, K 4, BUN/Cr 64/2.07. Will obtain repeat labs today and after 2 days of furosix. Labs later this week to be done at the [place].  2. Pulmonary hypertension - Severely elevated pulmonary pressures by TTE, most recent 99 mmHg. At the time of her last RHC in 2019, PA 61/21/35, PCW 12 without a response to inhaled NO and with PA 71.3%, PVR 3.77 Mendez. Prior TTE from 3/15/22 reviewed with Dr. Moore (structural cardiology) who did not think there was significant or new MS. Most recent TTE from 3/2024 reporting mild MS. Diuresis as above.  3. AFib - Rate controlled on beta blocker. Off AC due to recurrent GIB requiring transfusions.   4. Lower GIB - Hospitalized 2022 s/p 2U PRBC and Pradaxa was stopped. Failed trial of Eliquis 2.5 mg BID, readmitted 2022 requiring transfusion. GI thought this to be a diverticular bleed and family opted to manage conservatively.   5. CKD stage 3 - Baseline Cr 1.2-1.3 though more recently 1.5-1.7. Most recent labs with Cr 2 likely in the setting of volume overload, renal venous congestion. Diuretics as above. Will repeat labs today and at the end of the week to monitor closely. Continue SGLT2-i to delay further progression.   6. Hypertension - Well controlled.  7. Anxiety and Depression- On Sertraline 75 mg QD and Ramelteon. Followed by geriatrician Dr. Jones  8. Insomnia- On Mirtazipine 7.5 mg qHS. Recommended against hypnotic sleep aids and benzodiazepines.  Follow up with HF NP next week to reassess diuretic response and with Dr. Champagne in July

## 2024-06-05 ENCOUNTER — NON-APPOINTMENT (OUTPATIENT)
Age: 89
End: 2024-06-05

## 2024-06-05 LAB
ANION GAP SERPL CALC-SCNC: 14 MMOL/L
BUN SERPL-MCNC: 62 MG/DL
CALCIUM SERPL-MCNC: 9.2 MG/DL
CHLORIDE SERPL-SCNC: 98 MMOL/L
CO2 SERPL-SCNC: 28 MMOL/L
CREAT SERPL-MCNC: 2.06 MG/DL
EGFR: 22 ML/MIN/1.73M2
GLUCOSE SERPL-MCNC: 135 MG/DL
MAGNESIUM SERPL-MCNC: 2.9 MG/DL
NT-PROBNP SERPL-MCNC: 5212 PG/ML
POTASSIUM SERPL-SCNC: 4.1 MMOL/L
SODIUM SERPL-SCNC: 139 MMOL/L

## 2024-06-06 ENCOUNTER — LABORATORY RESULT (OUTPATIENT)
Age: 89
End: 2024-06-06

## 2024-06-11 ENCOUNTER — NON-APPOINTMENT (OUTPATIENT)
Age: 89
End: 2024-06-11

## 2024-06-12 ENCOUNTER — APPOINTMENT (OUTPATIENT)
Dept: HEART FAILURE | Facility: CLINIC | Age: 89
End: 2024-06-12
Payer: MEDICARE

## 2024-06-12 VITALS
TEMPERATURE: 97.5 F | OXYGEN SATURATION: 94 % | RESPIRATION RATE: 17 BRPM | WEIGHT: 156 LBS | DIASTOLIC BLOOD PRESSURE: 58 MMHG | HEART RATE: 104 BPM | BODY MASS INDEX: 27.64 KG/M2 | HEIGHT: 63 IN | SYSTOLIC BLOOD PRESSURE: 105 MMHG

## 2024-06-12 VITALS — HEART RATE: 86 BPM

## 2024-06-12 DIAGNOSIS — I50.32 CHRONIC DIASTOLIC (CONGESTIVE) HEART FAILURE: ICD-10-CM

## 2024-06-12 DIAGNOSIS — I48.91 UNSPECIFIED ATRIAL FIBRILLATION: ICD-10-CM

## 2024-06-12 PROCEDURE — 99214 OFFICE O/P EST MOD 30 MIN: CPT

## 2024-06-12 RX ORDER — BUDESONIDE AND FORMOTEROL FUMARATE DIHYDRATE 80; 4.5 UG/1; UG/1
80-4.5 AEROSOL RESPIRATORY (INHALATION) TWICE DAILY
Qty: 1 | Refills: 3 | Status: ACTIVE | COMMUNITY
Start: 2024-04-12 | End: 1900-01-01

## 2024-06-12 RX ORDER — ALBUTEROL SULFATE 2.5 MG/3ML
(2.5 MG/3ML) SOLUTION RESPIRATORY (INHALATION)
Qty: 1 | Refills: 3 | Status: ACTIVE | COMMUNITY
Start: 2022-12-28 | End: 1900-01-01

## 2024-06-12 RX ORDER — ATORVASTATIN CALCIUM 10 MG/1
10 TABLET, FILM COATED ORAL
Qty: 90 | Refills: 3 | Status: ACTIVE | COMMUNITY
Start: 2022-10-11 | End: 1900-01-01

## 2024-06-12 RX ORDER — CEPHALEXIN 500 MG/1
500 CAPSULE ORAL 4 TIMES DAILY
Qty: 12 | Refills: 0 | Status: DISCONTINUED | COMMUNITY
Start: 2024-02-27 | End: 2024-06-12

## 2024-06-12 RX ORDER — TIOTROPIUM BROMIDE INHALATION SPRAY 3.12 UG/1
2.5 SPRAY, METERED RESPIRATORY (INHALATION) DAILY
Qty: 1 | Refills: 3 | Status: ACTIVE | COMMUNITY
Start: 2024-04-12 | End: 1900-01-01

## 2024-06-12 NOTE — PHYSICAL EXAM
[Normal Conjunctiva] : normal conjunctiva [No Rub] : no rub [No Gallop] : no gallop [No Respiratory Distress] : no respiratory distress  [Soft] : abdomen soft [Normal Bowel Sounds] : normal bowel sounds [Normal Radial B/L] : normal radial B/L [Normal] : alert and oriented, normal memory [Clear Lung Fields] : clear lung fields [Non Tender] : non-tender [de-identified] : increased WOB with minimal exertion getting from wheelchair to exam table [de-identified] : ANDREY [de-identified] : JVP ~12cm H2O with v waves [de-identified] : III/VI systolic best heard LSB [de-identified] : Irregularly irregular S1 S2 [de-identified] : On 4L O2 via NC [de-identified] : Using wheelchair for distance [de-identified] : +1 BLE edema to mid calves, improved [de-identified] : Warm peripherally [de-identified] : more alert and interactive compared to last week

## 2024-06-12 NOTE — HISTORY OF PRESENT ILLNESS
[FreeTextEntry1] : Mrs. Mix is a very pleasant 89 year old woman with HFpEF, MR s/p Mitraclip (8/30/16), mixed pre- and post-capillary pulmonary hypertension, permanent AFib off AC since 11/2022 due to GIB requiring transfusions, hypertension, hyperlipidemia, CKD (b/l Cr 1.2-1.3, more recently 1.5-1.7), SHERYL and hypoxia on home O2 4L who presents today for follow up.  She has had recurrent hospitalizations. In November 2023, brief admission to [facility] for LLE ulcer and cellulitis, treated with ABX. Readmitted December 2023 at [facility] this time for ongoing LLE cellulitis, again treated with ABX. She was seen by our HF team this admission, though to be compensated and euvolemic. More recently, admitted at [facility] 3/13-3/18/24 for increased GUERRA and LE swelling. She was treated with brief course of Prednisone for suspicion of COPD exacerbation. Thought to be compensated from a HF perspective by gen cards team. TTE this admission with preserved LV systolic function but mod-severe TR, dilated IVC and PASP of 99 mmHg.   When she was last seen on 6/4 she was noted to be volume overloaded and she was given a dose of metolazone 5mg on 6/5 in addition to bumex 4mg BID. With this she reports increased urine output. Her weight at home has come down about 4lbs, from 157.6lb on 6/5 to 153lbs today. Here in clinic,  her weight is 156lb from 158lb. She reports some improvement in her SOB and LE edema. She is able to walk short distances around the assisted living with her walker on 4L O2 without significant SOB and perform her ADLs, but does note increased GUERRA if she's rushing or trying to walk at a faster pace. She's sleeping sitting nearly upright which has been unchanged despite escalation of diuretics. She continues to report significant bendopnea. Overall, she feels improved and is able to do the things she'd like to during the day, participating in group activities at the assisted living. She reports her appetite is good. Denies PND, CP, palpitations, lightheadedness. 
X Size Of Lesion In Cm (Optional): 0
Detail Level: Detailed
Size Of Lesion: 11cm

## 2024-06-12 NOTE — ASSESSMENT
[FreeTextEntry1] : 89 year old woman with chronic diastolic HF, RVSD, severe pHTN, rate controlled AFib, severe MR s/p MitraClip, CKD stage 3 and chronic hypoxia on home O2 4L who has had recent hospitalizations for LLE cellulitis and GUERRA in recent months.   She is ACC/AHA Stage C, NYHA Class III, currently mildly volume overloaded on exam with weight above goal weight, but overall has improved with escalation of oral diuretics. Labs following metolazone showed uptrend in Cr 2.27 from 2.07 (eGFR 20 fro 22). Electrolytes WNL, proBNP improving. I have recommended the followin. Chronic diastolic heart failure - Continue with bumex 4mg BID, Jardiance 10 mg QD, Spironolactone 25 mg BID. Pending review of labs will consider additional dose of metolazone 5mg x 1. CardioMEMS was previously discussed for longitudinal management but pt was reluctant.  Continue daily weights. Repeat labs to be done today.   2. Pulmonary hypertension - Severely elevated pulmonary pressures by TTE, most recent 99 mmHg. At the time of her last RHC in 2019, PA 61/21/35, PCW 12 without a response to inhaled NO and with PA 71.3%, PVR 3.77 Mendez. Prior TTE from 3/15/22 reviewed with Dr. Moore (structural cardiology) who did not think there was significant or new MS. Most recent TTE from 3/2024 reporting mild MS. Diuresis as above.  3. AFib - Rate controlled on beta blocker. Off AC due to recurrent GIB requiring transfusions.   4. Lower GIB - Hospitalized 2022 s/p 2U PRBC and Pradaxa was stopped. Failed trial of Eliquis 2.5 mg BID, readmitted 2022 requiring transfusion. GI thought this to be a diverticular bleed and family opted to manage conservatively.   5. CKD stage 3 - Most recent labs with Cr ~2 likely in the setting of volume overload, renal venous congestion. Diuretics as above. Will repeat labs today. Continue SGLT2-i to delay further progression.   6. Hypertension - Well controlled.  7. Anxiety and Depression- On Sertraline 75 mg QD and Ramelteon. Followed by geriatrician Dr. Jones  8. Insomnia- On Mirtazipine 7.5 mg qHS. Recommended against hypnotic sleep aids and benzodiazepines.  Follow up with Dr. Champagne in July, sooner with HF NP if needed.

## 2024-06-12 NOTE — CARDIOLOGY SUMMARY
[de-identified] : 08/05/22 ECG Afib at 68 bpm, iRBBB, low voltage, left axis, NSTW abnormalities  03/15/22 ECG: AFib at 63 bpm, iRBBB, LAFB. No sig change from 11/24/21. 11/24/21 ECG: AFib at 48 bpm, iRBBB, LAFB. No sig change from prior 5/19/21 05/19/21 ECG: AFib at 61 bpm, iRBBB, LAFB. No sig change from prior. 11/18/20 ECG: AFib at 57 bpm, iRBBB, LAFB. No sig change from prior 8/28/20  [de-identified] : \par  03/10/22-03/30/22 Zio monitor: Persistent AFib (100% burden). HR ranging  bpm (average HR 64 bpm). Mild IVCD, rare VPD's and couplets. No symptoms.\par   [de-identified] : 3/14/24 TTE: LVIDd 4.5 cm, normal LV systolic function, mild LVH (septum 1.2 and PWT 1.2), D shpaed setpum with paradoxical septal motion due to RV pressure overload, severely dilated RV with RV systolic dysfunction, severe JANEL, mitral valve clip noted with mild-mod MR and peak/mean gradient of 16 and 6 mmHg suggestive of mild MS, mod-severe TR, RVSP 99 mmHg, IVC dilated with less than 50% respiratory variation  07/19/22 TTE: LVIDd 5.4 cm, LVEF 68%, mild LVH ( SW 1.0 cm, PW 1.0 cm) no segmental WMA, RV is enlarged w/ decreased RVSF, severe JANEL, s/p lala clip w/mild MR (peak/mean 10  mmHg/ 4 mmHg @ HR 66), mild AR, mod-severe TR, severe PH (RVSP 83 mmHg)  03/15/22 TTE: LVIDd 5.0 cm, LVEF 70%, no segmental WMA, flattening of the septum c/w RV pressure overload, mild concentric LVH (septum 1.4 cm, PWT 1.1 cm), RVE with decreased RVSF, severe JANEL, s/p Mitraclip with mild-mod MR, mod-severe MS (peak gradient 15 mmHg, mean gradient 6 mmHg), severe TR, mild IA, severe PH (RVSP 79 mmHg).  11/18/20 TTE: LVIDd 4.6 cm, normal LVEF, concentric LVH (SW 1.2, PW 1.3 cm), flattening of septum c/w RV pressure overload, normal LA size, mod SONNY, RVE with decreased RV function, mild-mod MR s/p MitraClip (peak 10 mmHg, mean 4 mmHg), mod-severe TR, no effusion, severe PH (RVSP 95 mmHg). Iatrogenic transeptal flow noted (transeptal puncture for Clip).  03/01/18 TTE: LVEF 70%, LVIDd 5.1, septum/PWT 1.1, LA 5.7, severely dilated RV with severe RVSD, severe biatrial enlargement, severe TR, MR present but shadowed by Mitraclip,est RVSP 82, left to right atrial flow noted from transseptal puncture.  [de-identified] : 03/11/19 RHC baseline: no RA or RV reported, PA 61/21/35, PCW 12, Ao 93%, PA 71%, CO/CI (F) 6.09/3.26, PVR 3.77 Mendez. Nitric Oxide: RA 10, RV 57/12, PA 54/17/30, no PCW reported, Ao 93%, PA 77%, CO/CI (F) 8.19/4.38,  PVR 3.66 Mendez.

## 2024-06-14 ENCOUNTER — NON-APPOINTMENT (OUTPATIENT)
Age: 89
End: 2024-06-14

## 2024-06-14 LAB
ALBUMIN SERPL ELPH-MCNC: 4.2 G/DL
ALP BLD-CCNC: 72 U/L
ALT SERPL-CCNC: 12 U/L
ANION GAP SERPL CALC-SCNC: 17 MMOL/L
AST SERPL-CCNC: 19 U/L
BILIRUB SERPL-MCNC: 0.5 MG/DL
BUN SERPL-MCNC: 85 MG/DL
CALCIUM SERPL-MCNC: 9.1 MG/DL
CHLORIDE SERPL-SCNC: 96 MMOL/L
CO2 SERPL-SCNC: 26 MMOL/L
CREAT SERPL-MCNC: 2.11 MG/DL
EGFR: 22 ML/MIN/1.73M2
GLUCOSE SERPL-MCNC: 210 MG/DL
MAGNESIUM SERPL-MCNC: 2.8 MG/DL
NT-PROBNP SERPL-MCNC: 5459 PG/ML
POTASSIUM SERPL-SCNC: 4.2 MMOL/L
PROT SERPL-MCNC: 7.4 G/DL
SODIUM SERPL-SCNC: 139 MMOL/L

## 2024-06-17 ENCOUNTER — NON-APPOINTMENT (OUTPATIENT)
Age: 89
End: 2024-06-17

## 2024-06-18 ENCOUNTER — NON-APPOINTMENT (OUTPATIENT)
Age: 89
End: 2024-06-18

## 2024-06-20 ENCOUNTER — NON-APPOINTMENT (OUTPATIENT)
Age: 89
End: 2024-06-20

## 2024-06-27 ENCOUNTER — APPOINTMENT (OUTPATIENT)
Dept: GERIATRICS | Facility: CLINIC | Age: 89
End: 2024-06-27

## 2024-06-27 VITALS
HEART RATE: 96 BPM | RESPIRATION RATE: 17 BRPM | BODY MASS INDEX: 28.53 KG/M2 | WEIGHT: 161 LBS | HEIGHT: 63 IN | SYSTOLIC BLOOD PRESSURE: 109 MMHG | TEMPERATURE: 97.4 F | OXYGEN SATURATION: 97 % | DIASTOLIC BLOOD PRESSURE: 61 MMHG

## 2024-06-27 DIAGNOSIS — D64.9 ANEMIA, UNSPECIFIED: ICD-10-CM

## 2024-06-27 DIAGNOSIS — J96.91 RESPIRATORY FAILURE, UNSPECIFIED WITH HYPOXIA: ICD-10-CM

## 2024-06-27 DIAGNOSIS — Z71.89 OTHER SPECIFIED COUNSELING: ICD-10-CM

## 2024-06-27 DIAGNOSIS — R60.0 LOCALIZED EDEMA: ICD-10-CM

## 2024-06-27 DIAGNOSIS — E11.40 TYPE 2 DIABETES MELLITUS WITH DIABETIC NEUROPATHY, UNSPECIFIED: ICD-10-CM

## 2024-06-27 DIAGNOSIS — N18.30 CHRONIC KIDNEY DISEASE, STAGE 3 UNSPECIFIED: ICD-10-CM

## 2024-06-27 DIAGNOSIS — I50.30 UNSPECIFIED DIASTOLIC (CONGESTIVE) HEART FAILURE: ICD-10-CM

## 2024-06-27 DIAGNOSIS — Z13.21 ENCOUNTER FOR SCREENING FOR NUTRITIONAL DISORDER: ICD-10-CM

## 2024-06-27 DIAGNOSIS — Z74.1 NEED FOR ASSISTANCE WITH PERSONAL CARE: ICD-10-CM

## 2024-06-27 PROCEDURE — G2211 COMPLEX E/M VISIT ADD ON: CPT

## 2024-06-27 PROCEDURE — 99215 OFFICE O/P EST HI 40 MIN: CPT

## 2024-07-01 LAB
ALBUMIN SERPL ELPH-MCNC: 4 G/DL
ALP BLD-CCNC: 78 U/L
ALT SERPL-CCNC: 14 U/L
ANION GAP SERPL CALC-SCNC: 17 MMOL/L
AST SERPL-CCNC: 20 U/L
BASOPHILS # BLD AUTO: 0.03 K/UL
BASOPHILS NFR BLD AUTO: 0.4 %
BILIRUB SERPL-MCNC: 0.6 MG/DL
BUN SERPL-MCNC: 81 MG/DL
CALCIUM SERPL-MCNC: 9.1 MG/DL
CHLORIDE SERPL-SCNC: 93 MMOL/L
CO2 SERPL-SCNC: 26 MMOL/L
CREAT SERPL-MCNC: 2.33 MG/DL
EGFR: 19 ML/MIN/1.73M2
EOSINOPHIL # BLD AUTO: 0.17 K/UL
EOSINOPHIL NFR BLD AUTO: 2.4 %
ESTIMATED AVERAGE GLUCOSE: 212 MG/DL
FERRITIN SERPL-MCNC: 262 NG/ML
GLUCOSE SERPL-MCNC: 248 MG/DL
HBA1C MFR BLD HPLC: 9 %
HCT VFR BLD CALC: 33 %
HGB BLD-MCNC: 11 G/DL
IMM GRANULOCYTES NFR BLD AUTO: 0.9 %
IRON SATN MFR SERPL: 13 %
IRON SERPL-MCNC: 42 UG/DL
LYMPHOCYTES # BLD AUTO: 1.08 K/UL
LYMPHOCYTES NFR BLD AUTO: 15.5 %
MAN DIFF?: NORMAL
MCHC RBC-ENTMCNC: 30.5 PG
MCHC RBC-ENTMCNC: 33.3 GM/DL
MCV RBC AUTO: 91.4 FL
MONOCYTES # BLD AUTO: 1.18 K/UL
MONOCYTES NFR BLD AUTO: 17 %
NEUTROPHILS # BLD AUTO: 4.44 K/UL
NEUTROPHILS NFR BLD AUTO: 63.8 %
NT-PROBNP SERPL-MCNC: 8042 PG/ML
PLATELET # BLD AUTO: 133 K/UL
POTASSIUM SERPL-SCNC: 4.3 MMOL/L
PROT SERPL-MCNC: 7.4 G/DL
RBC # BLD: 3.61 M/UL
RBC # FLD: 15.6 %
SODIUM SERPL-SCNC: 137 MMOL/L
TIBC SERPL-MCNC: 317 UG/DL
UIBC SERPL-MCNC: 275 UG/DL
WBC # FLD AUTO: 6.96 K/UL

## 2024-07-02 ENCOUNTER — NON-APPOINTMENT (OUTPATIENT)
Age: 89
End: 2024-07-02

## 2024-07-05 ENCOUNTER — APPOINTMENT (OUTPATIENT)
Dept: GERIATRICS | Facility: CLINIC | Age: 89
End: 2024-07-05

## 2024-07-15 ENCOUNTER — APPOINTMENT (OUTPATIENT)
Dept: HEART FAILURE | Facility: CLINIC | Age: 89
End: 2024-07-15

## 2024-08-06 NOTE — DISCHARGE NOTE PROVIDER - HOSPITAL COURSE
Patient overall doing well on current dose sertraline. Patient reports significant improvement in anxiety on this medication.  No panic attacks or symptoms that interfere with daily life. Did screen positive for mild depression but patient herself feels her mood is good. Denies low mood, HI/SI. Will continue to see mental health therapist and has psychiatry appointment in 10/2024   89 y/o F with PMH of HTN, Dyslipidemia, MR s/p Bushra- Clip, A. Fib not on anticoagulants, Chronic  Diastolic CHF, Chronic Hypoxic Respiratory Failure (on 3L NC), COVID-19, CKD 3, lower GI Bleeding, Anemia, Anxiety, and Depression presented with worsening SOB.    Shortness of breath - thought initially to be acute on chronic CHF but no significant findings for CHF (TTE pending), likely pulmonary etiology such as COPD exacerbation  - was on NRB/high flow between 3/15-3/16, stable on 6L today  - still need to be monitored  - cont with telemetry  - cardiology consulted and following -> TTE, cont with torsemide and spironolactone  - pulmonary consulted -> nebs PRN, short course of prednisone  - on symbicort    Acute on chronic kidney disease - likely secondary to diuretic use, improved today  - nephrology consulted  - avoid nephrotoxic meds  - monitor electrolytes, hyperphosphatemia improved    Hyponatremia -   - sodium stable  - dc'ed D5  - monitor BMP     Atrial fibrillation   - cont with metoprolol for rate control    HLD  - cont with atorvastatin    Anxiety/depression  - cont with sertraline    Preventive measures  - on heparin for DVT ppx     89 y/o F with PMH of HTN, Dyslipidemia, MR s/p Bushra- Clip, A. Fib not on anticoagulants, Chronic  Diastolic CHF, Chronic Hypoxic Respiratory Failure (on 3L NC), COVID-19, CKD 3, lower GI Bleeding, Anemia, Anxiety, and Depression presented with worsening SOB.  Initially thought to be CHF exacerbation and was started on lasix IV.   TTE was done that showed "1. mild left ventricular hypertrophy, preserved systolic function, D shaped septum, paradoxical septal motion due to right ventricular pressure overload2. severely dilated right ventricle with the mild right ventricular systolic dysfunction 3. severe biatrial enlargement.  4. Mitral valve clip, mild to moderate eccentric mitral regurgitation, mildly elevated velocity suggestive of mild mitral stenosis.  4. Moderate to severe eccentric tricuspid regurgitation with severe pulmonary hypertension, 99 mmHg. which is significantly higher than prior echo done in 2022"  Pulmonary was consulted and started the patient on nebs and prednisone.  Was also started on symbicort and spiriva as well.  Patient also had some MEME that was attributed to lasix use and eventually stabilized (albeit still slightly elevated).  Had a brief episode of hyponatremia that also improved.  Patient remained on metoprolol for afib (was on eliquis in the past but stopped secondary to hx of GI bleed).  Patient with eventually improved respiratory status and was weaned down to 4L.  Symptoms thought to be secondary to severe pHTN with a combination of COPD exacerbation.  Patient to be discharged back to facility with follow up with cardiology.

## 2024-09-13 ENCOUNTER — TRANSCRIPTION ENCOUNTER (OUTPATIENT)
Age: 89
End: 2024-09-13

## 2024-10-05 NOTE — SWALLOW BEDSIDE ASSESSMENT ADULT - SWALLOW EVAL: FEEDING ASSISTANCE
English
8:30 am in [facility]
set up only required
Supervision and assistance with all PO

## 2024-10-06 NOTE — ED PROVIDER NOTE - NSTIMEPROVIDERCAREINITIATE_GEN_ER
You can access the FollowMyHealth Patient Portal offered by [facility] by registering at the following website: [email or web address]. By joining [employer]’s FollowMyHealth portal, you will also be able to view your health information using other applications (apps) compatible with our system. 13-Mar-2024 13:39

## 2024-10-14 NOTE — ED ADULT NURSE NOTE - CHEST APPEARANCE
Routed to PSR to complete changes, staff to be advised of Lab appt for Dec so orders can be entered and dated   normal

## 2024-10-30 NOTE — PROGRESS NOTE ADULT - ASSESSMENT
85 yo HFpEF (3/2018 ef 71%) s/p mitral clip in 8/2016, normal cors in 2016,  permanent a fib on pradaxa, htn, hld , SHERYL not on cpap a/w ADHF.     - She remains decompensated, with hf on exam.   - Cont Bumex gtt at current dose.   - Please continue to maintain strict I/Os, monitor daily weights, Cr, and K.  creatinine not yet bumping, arguing that there is still adequate room for diuresis  - hf eval noted, with plans to perform rhc with sat run to evaluated poorly explained chronic hypoxemia     - AF is slow at times.   - would reduce diltiazem to 240, and as noted by hf service, would consider dc'ing it entirely in light of rv dysfunction, though she is already on high dose bb, and digoxin is not desirable either, so that rate control may be difficult  - Cont Pradaxa until we get close enough to euvolemia to plan for rhc    - 03/01/18 TTE: LVEF 71%, LVIDd 4.7cm (PW 1.0, SW 1.1), RVE with RVSD, moderate SONNY, severe LAE, mild-mod MR, mild MS, mod TR, mild-mod MD, RVSP 52  - Once better optimized can re-echo to assess if the MR has significantly worsened, and reassess rv/pasp.     - cont statin  - Further cardiac workup will depend on clinical course.   - All other workup per primary team. Will followup. color consistent with ethnicity/race

## 2024-11-11 NOTE — ED ADULT NURSE NOTE - CAS TRG GENERAL AIRWAY, MLM
Patient presents for evaluation of right foot pain. Reports she slipped on black ice and fell back in December of 2023 and has had problems with pain since. Has had an x-ray of the right ankle:  FINDINGS:     No acute fracture or traumatic malalignment. Mild to moderate degenerative  changes of the tibiotalar joint. No syndesmotic widening. Osseous bodies  adjacent to the tip of the medial malleolus, likely the sequela of old  avulsion injury. No significant subcutaneous soft tissue edema.   IMPRESSION:  No acute osseous abnormality.  Right foot x-ray   IMPRESSION:   Fracture/Dislocation: None.  Degenerative changes: Large plantar calcaneal spur. Moderate-sized dorsal  calcaneal enthesophyte. Moderate to severe talonavicular joint degenerative  changes.  Alignment: Normal.  Additional findings: None.  Right ankle MRI  IMPRESSION:  1. Findings suggestive of old sprains of the deltoid ligament, ATFL and CFL  sprains with periligamentous scarring/granulation tissue  2. Mild tibiotalar osteoarthrosis  3. Periligamentous scarring/granulation tissue and sinus Tarsi with minimal  adjacent calcaneal subchondral change  Patient was referred to me due to her continued pain.   Currently, there is no sign of inflammatory arthropathy or autoimmune disease. Patient symptoms are exclusive to the foot/ankle and injury aside from the left foot having some swelling which she feels is from compensating for the right ankle pain.    I will obtain repeat ESR and CRP. We did discuss these labs are very non-specific and elevate for multiple reasons including trauma, infection, diabetes, obesity, gender, age, etc.   We discussed treatment of OA is supportive care with heat, ice, rest, NSAIDs, tylenol arthritis, topical analgesics, PT.   I recommend patient obtain compression socks that she may wear during the day as she is on her feet a lot and see if this provides any relief.   I will give a trial of diclofenac 75 mg BID PRN pain and  patient will see if this works any better than the ibuprofen she is using. If she decides to make this a long term prescription, will request PCP take over as she has no rheumatological diagnosis requiring follow up with me at this time.   Patent

## 2024-11-18 NOTE — ED PROVIDER NOTE - CARDIAC MURMUR
Internal Medicine discharge instructions:  Please start taking lasix 20mg daily and potassium chloride supplementation  Take verapamil 180mg twice a day (once in the morning, another in the evening)  Continue taking atenolol 25 mg daily  You should take losartan 50 mg once a day instead of the 100mg  Resume metformin and synthroid  It will be vital to follow up with oncology and PCP to plan a outpatient biopsy. This is the only way you can determine the next steps forward.  When possible, get MRI of your spine soon to better determine lesions found on CT imaging  Please follow up with cardiology to manage heart failure and blood pressure. You were started on a different regimen this admission.  Follow up with thoracic surgery for possible plans for heart surgery  Would need to know diagnosis & prognosis prior to open heart surgical consideration   Please follow up with palliative care doctors   Please get blood work done to look at your kidney function and electrolytes with your new medication. This should be done in 1 week.   
no murmur

## 2025-05-28 ENCOUNTER — APPOINTMENT (OUTPATIENT)
Dept: PULMONOLOGY | Facility: CLINIC | Age: 89
End: 2025-05-28